# Patient Record
Sex: FEMALE | Race: WHITE | Employment: OTHER | URBAN - NONMETROPOLITAN AREA
[De-identification: names, ages, dates, MRNs, and addresses within clinical notes are randomized per-mention and may not be internally consistent; named-entity substitution may affect disease eponyms.]

---

## 2017-01-03 ENCOUNTER — OFFICE VISIT (OUTPATIENT)
Dept: FAMILY MEDICINE CLINIC | Age: 72
End: 2017-01-03
Payer: MEDICARE

## 2017-01-03 ENCOUNTER — HOSPITAL ENCOUNTER (OUTPATIENT)
Dept: GENERAL RADIOLOGY | Age: 72
Discharge: HOME | End: 2017-01-03
Payer: MEDICARE

## 2017-01-03 DIAGNOSIS — Z88.9 MULTIPLE ALLERGIES: Primary | ICD-10-CM

## 2017-01-03 DIAGNOSIS — M25.551 HIP PAIN, RIGHT: Primary | ICD-10-CM

## 2017-01-03 DIAGNOSIS — M25.551 HIP PAIN, RIGHT: ICD-10-CM

## 2017-01-03 PROCEDURE — 73502 X-RAY EXAM HIP UNI 2-3 VIEWS: CPT

## 2017-01-03 PROCEDURE — 95115 IMMUNOTHERAPY ONE INJECTION: CPT | Performed by: FAMILY MEDICINE

## 2017-01-19 RX ORDER — CARISOPRODOL 350 MG/1
350 TABLET ORAL 3 TIMES DAILY PRN
Qty: 30 TABLET | Refills: 2 | Status: SHIPPED | OUTPATIENT
Start: 2017-01-19 | End: 2017-04-04 | Stop reason: SDUPTHER

## 2017-01-25 ENCOUNTER — NURSE ONLY (OUTPATIENT)
Dept: FAMILY MEDICINE CLINIC | Age: 72
End: 2017-01-25
Payer: MEDICARE

## 2017-01-25 DIAGNOSIS — Z88.9 MULTIPLE ALLERGIES: Primary | ICD-10-CM

## 2017-01-25 DIAGNOSIS — J30.2 OTHER SEASONAL ALLERGIC RHINITIS: ICD-10-CM

## 2017-01-25 PROCEDURE — 95115 IMMUNOTHERAPY ONE INJECTION: CPT | Performed by: FAMILY MEDICINE

## 2017-02-20 ENCOUNTER — NURSE ONLY (OUTPATIENT)
Dept: FAMILY MEDICINE CLINIC | Age: 72
End: 2017-02-20
Payer: MEDICARE

## 2017-02-20 DIAGNOSIS — J30.2 OTHER SEASONAL ALLERGIC RHINITIS: Primary | ICD-10-CM

## 2017-02-20 PROCEDURE — 95115 IMMUNOTHERAPY ONE INJECTION: CPT | Performed by: FAMILY MEDICINE

## 2017-03-13 ENCOUNTER — NURSE ONLY (OUTPATIENT)
Dept: FAMILY MEDICINE CLINIC | Age: 72
End: 2017-03-13
Payer: MEDICARE

## 2017-03-13 DIAGNOSIS — E79.0 ELEVATED URIC ACID IN BLOOD: ICD-10-CM

## 2017-03-13 DIAGNOSIS — J30.2 OTHER SEASONAL ALLERGIC RHINITIS: ICD-10-CM

## 2017-03-13 DIAGNOSIS — Z88.9 MULTIPLE ALLERGIES: Primary | ICD-10-CM

## 2017-03-13 PROCEDURE — 95115 IMMUNOTHERAPY ONE INJECTION: CPT | Performed by: FAMILY MEDICINE

## 2017-03-13 RX ORDER — ALLOPURINOL 100 MG/1
100 TABLET ORAL DAILY
Qty: 90 TABLET | Refills: 3 | Status: SHIPPED | OUTPATIENT
Start: 2017-03-13 | End: 2018-04-20 | Stop reason: SDUPTHER

## 2017-03-21 RX ORDER — CELECOXIB 200 MG/1
CAPSULE ORAL
Qty: 30 CAPSULE | Refills: 5 | Status: SHIPPED | OUTPATIENT
Start: 2017-03-21 | End: 2017-07-21

## 2017-03-21 RX ORDER — GABAPENTIN 300 MG/1
CAPSULE ORAL
Qty: 180 CAPSULE | Refills: 3 | Status: SHIPPED | OUTPATIENT
Start: 2017-03-21 | End: 2017-08-02 | Stop reason: SDUPTHER

## 2017-03-29 ENCOUNTER — OFFICE VISIT (OUTPATIENT)
Dept: FAMILY MEDICINE CLINIC | Age: 72
End: 2017-03-29
Payer: MEDICARE

## 2017-03-29 ENCOUNTER — HOSPITAL ENCOUNTER (OUTPATIENT)
Dept: GENERAL RADIOLOGY | Age: 72
Discharge: HOME | End: 2017-03-29
Payer: MEDICARE

## 2017-03-29 VITALS
TEMPERATURE: 98.4 F | RESPIRATION RATE: 16 BRPM | HEIGHT: 65 IN | BODY MASS INDEX: 32.32 KG/M2 | HEART RATE: 77 BPM | WEIGHT: 194 LBS | OXYGEN SATURATION: 97 % | SYSTOLIC BLOOD PRESSURE: 142 MMHG | DIASTOLIC BLOOD PRESSURE: 94 MMHG

## 2017-03-29 DIAGNOSIS — M54.2 CHRONIC NECK PAIN: Primary | ICD-10-CM

## 2017-03-29 DIAGNOSIS — E78.5 HYPERLIPIDEMIA, UNSPECIFIED HYPERLIPIDEMIA TYPE: ICD-10-CM

## 2017-03-29 DIAGNOSIS — M54.2 CHRONIC NECK PAIN: ICD-10-CM

## 2017-03-29 DIAGNOSIS — G89.29 CHRONIC NECK PAIN: Primary | ICD-10-CM

## 2017-03-29 DIAGNOSIS — M25.512 ACUTE PAIN OF LEFT SHOULDER: ICD-10-CM

## 2017-03-29 DIAGNOSIS — G89.29 CHRONIC NECK PAIN: ICD-10-CM

## 2017-03-29 DIAGNOSIS — I10 ESSENTIAL HYPERTENSION: ICD-10-CM

## 2017-03-29 PROCEDURE — 72040 X-RAY EXAM NECK SPINE 2-3 VW: CPT

## 2017-03-29 PROCEDURE — G8427 DOCREV CUR MEDS BY ELIG CLIN: HCPCS | Performed by: FAMILY MEDICINE

## 2017-03-29 PROCEDURE — 96372 THER/PROPH/DIAG INJ SC/IM: CPT | Performed by: FAMILY MEDICINE

## 2017-03-29 PROCEDURE — G8399 PT W/DXA RESULTS DOCUMENT: HCPCS | Performed by: FAMILY MEDICINE

## 2017-03-29 PROCEDURE — 4040F PNEUMOC VAC/ADMIN/RCVD: CPT | Performed by: FAMILY MEDICINE

## 2017-03-29 PROCEDURE — G8419 CALC BMI OUT NRM PARAM NOF/U: HCPCS | Performed by: FAMILY MEDICINE

## 2017-03-29 PROCEDURE — 99214 OFFICE O/P EST MOD 30 MIN: CPT | Performed by: FAMILY MEDICINE

## 2017-03-29 PROCEDURE — 1036F TOBACCO NON-USER: CPT | Performed by: FAMILY MEDICINE

## 2017-03-29 PROCEDURE — 1123F ACP DISCUSS/DSCN MKR DOCD: CPT | Performed by: FAMILY MEDICINE

## 2017-03-29 PROCEDURE — 72125 CT NECK SPINE W/O DYE: CPT

## 2017-03-29 PROCEDURE — 3017F COLORECTAL CA SCREEN DOC REV: CPT | Performed by: FAMILY MEDICINE

## 2017-03-29 PROCEDURE — 1090F PRES/ABSN URINE INCON ASSESS: CPT | Performed by: FAMILY MEDICINE

## 2017-03-29 PROCEDURE — 73030 X-RAY EXAM OF SHOULDER: CPT

## 2017-03-29 PROCEDURE — 3014F SCREEN MAMMO DOC REV: CPT | Performed by: FAMILY MEDICINE

## 2017-03-29 PROCEDURE — G8484 FLU IMMUNIZE NO ADMIN: HCPCS | Performed by: FAMILY MEDICINE

## 2017-03-29 RX ORDER — OXYCODONE AND ACETAMINOPHEN 10; 325 MG/1; MG/1
1 TABLET ORAL EVERY 6 HOURS PRN
Qty: 30 TABLET | Refills: 0 | Status: SHIPPED | OUTPATIENT
Start: 2017-03-29 | End: 2017-04-18 | Stop reason: SDUPTHER

## 2017-03-29 RX ORDER — TRIAMCINOLONE ACETONIDE 40 MG/ML
40 INJECTION, SUSPENSION INTRA-ARTICULAR; INTRAMUSCULAR ONCE
Status: COMPLETED | OUTPATIENT
Start: 2017-03-29 | End: 2017-03-29

## 2017-03-29 RX ORDER — SIMVASTATIN 20 MG
20 TABLET ORAL EVERY EVENING
Qty: 90 TABLET | Refills: 3 | Status: SHIPPED | OUTPATIENT
Start: 2017-03-29 | End: 2018-02-12 | Stop reason: SDUPTHER

## 2017-03-29 RX ORDER — METHYLPREDNISOLONE 4 MG/1
TABLET ORAL
Qty: 1 KIT | Refills: 0 | Status: SHIPPED | OUTPATIENT
Start: 2017-03-29 | End: 2017-04-04

## 2017-03-29 RX ADMIN — TRIAMCINOLONE ACETONIDE 40 MG: 40 INJECTION, SUSPENSION INTRA-ARTICULAR; INTRAMUSCULAR at 10:25

## 2017-03-29 ASSESSMENT — ENCOUNTER SYMPTOMS
EYES NEGATIVE: 1
GASTROINTESTINAL NEGATIVE: 1
RESPIRATORY NEGATIVE: 1
TROUBLE SWALLOWING: 1
ALLERGIC/IMMUNOLOGIC NEGATIVE: 1

## 2017-03-31 ENCOUNTER — TELEPHONE (OUTPATIENT)
Dept: NEUROSURGERY | Age: 72
End: 2017-03-31

## 2017-04-04 ENCOUNTER — TELEPHONE (OUTPATIENT)
Dept: FAMILY MEDICINE CLINIC | Age: 72
End: 2017-04-04

## 2017-04-04 RX ORDER — CARISOPRODOL 350 MG/1
350 TABLET ORAL 3 TIMES DAILY PRN
Qty: 90 TABLET | Refills: 0 | OUTPATIENT
Start: 2017-04-04 | End: 2017-05-17 | Stop reason: SDUPTHER

## 2017-04-10 ENCOUNTER — OFFICE VISIT (OUTPATIENT)
Dept: NEUROSURGERY | Age: 72
End: 2017-04-10
Payer: MEDICARE

## 2017-04-10 ENCOUNTER — HOSPITAL ENCOUNTER (OUTPATIENT)
Dept: GENERAL RADIOLOGY | Age: 72
Discharge: HOME | End: 2017-04-10
Payer: MEDICARE

## 2017-04-10 VITALS
HEIGHT: 64 IN | WEIGHT: 190 LBS | HEART RATE: 80 BPM | BODY MASS INDEX: 32.44 KG/M2 | DIASTOLIC BLOOD PRESSURE: 85 MMHG | OXYGEN SATURATION: 95 % | SYSTOLIC BLOOD PRESSURE: 139 MMHG

## 2017-04-10 DIAGNOSIS — Z98.1 S/P CERVICAL SPINAL FUSION: ICD-10-CM

## 2017-04-10 DIAGNOSIS — M54.9 CHRONIC NECK AND BACK PAIN: ICD-10-CM

## 2017-04-10 DIAGNOSIS — M54.2 CHRONIC NECK AND BACK PAIN: ICD-10-CM

## 2017-04-10 DIAGNOSIS — I10 ESSENTIAL HYPERTENSION: ICD-10-CM

## 2017-04-10 DIAGNOSIS — M48.02 FORAMINAL STENOSIS OF CERVICAL REGION: ICD-10-CM

## 2017-04-10 DIAGNOSIS — G89.29 CHRONIC NECK AND BACK PAIN: ICD-10-CM

## 2017-04-10 DIAGNOSIS — M79.602 LEFT ARM PAIN: ICD-10-CM

## 2017-04-10 DIAGNOSIS — Z98.1 S/P CERVICAL SPINAL FUSION: Primary | ICD-10-CM

## 2017-04-10 LAB
CHOLESTEROL, TOTAL: 139 MG/DL (ref 160–199)
HDLC SERPL-MCNC: 47 MG/DL (ref 65–121)
LDL CHOLESTEROL CALCULATED: 53 MG/DL
TRIGL SERPL-MCNC: 195 MG/DL (ref 150–199)

## 2017-04-10 PROCEDURE — 3014F SCREEN MAMMO DOC REV: CPT | Performed by: NEUROLOGICAL SURGERY

## 2017-04-10 PROCEDURE — G8419 CALC BMI OUT NRM PARAM NOF/U: HCPCS | Performed by: NEUROLOGICAL SURGERY

## 2017-04-10 PROCEDURE — 72052 X-RAY EXAM NECK SPINE 6/>VWS: CPT

## 2017-04-10 PROCEDURE — 1090F PRES/ABSN URINE INCON ASSESS: CPT | Performed by: NEUROLOGICAL SURGERY

## 2017-04-10 PROCEDURE — 3017F COLORECTAL CA SCREEN DOC REV: CPT | Performed by: NEUROLOGICAL SURGERY

## 2017-04-10 PROCEDURE — 1036F TOBACCO NON-USER: CPT | Performed by: NEUROLOGICAL SURGERY

## 2017-04-10 PROCEDURE — G8399 PT W/DXA RESULTS DOCUMENT: HCPCS | Performed by: NEUROLOGICAL SURGERY

## 2017-04-10 PROCEDURE — 4040F PNEUMOC VAC/ADMIN/RCVD: CPT | Performed by: NEUROLOGICAL SURGERY

## 2017-04-10 PROCEDURE — 1123F ACP DISCUSS/DSCN MKR DOCD: CPT | Performed by: NEUROLOGICAL SURGERY

## 2017-04-10 PROCEDURE — G8427 DOCREV CUR MEDS BY ELIG CLIN: HCPCS | Performed by: NEUROLOGICAL SURGERY

## 2017-04-10 PROCEDURE — 99204 OFFICE O/P NEW MOD 45 MIN: CPT | Performed by: NEUROLOGICAL SURGERY

## 2017-04-10 ASSESSMENT — ENCOUNTER SYMPTOMS
SPUTUM PRODUCTION: 0
BACK PAIN: 1
EYES NEGATIVE: 1
HEARTBURN: 1
STRIDOR: 0
SORE THROAT: 1
ABDOMINAL PAIN: 0
NAUSEA: 0
COUGH: 1
CONSTIPATION: 1
WHEEZING: 1
DIARRHEA: 0
SHORTNESS OF BREATH: 0
VOMITING: 0
HEMOPTYSIS: 0

## 2017-04-18 DIAGNOSIS — M54.2 CHRONIC NECK PAIN: ICD-10-CM

## 2017-04-18 DIAGNOSIS — G89.29 CHRONIC NECK PAIN: ICD-10-CM

## 2017-04-18 RX ORDER — OXYCODONE AND ACETAMINOPHEN 10; 325 MG/1; MG/1
1 TABLET ORAL EVERY 6 HOURS PRN
Qty: 30 TABLET | Refills: 0 | Status: SHIPPED | OUTPATIENT
Start: 2017-04-18 | End: 2017-05-17 | Stop reason: SDUPTHER

## 2017-04-19 ENCOUNTER — HOSPITAL ENCOUNTER (OUTPATIENT)
Dept: MRI IMAGING | Age: 72
Discharge: HOME | End: 2017-04-19
Payer: MEDICARE

## 2017-04-19 DIAGNOSIS — M79.602 LEFT ARM PAIN: ICD-10-CM

## 2017-04-19 DIAGNOSIS — M48.02 FORAMINAL STENOSIS OF CERVICAL REGION: ICD-10-CM

## 2017-04-19 DIAGNOSIS — F41.9 ANXIETY: ICD-10-CM

## 2017-04-19 PROCEDURE — 72141 MRI NECK SPINE W/O DYE: CPT

## 2017-04-19 RX ORDER — LORAZEPAM 1 MG/1
1 TABLET ORAL 3 TIMES DAILY PRN
Qty: 90 TABLET | Refills: 2 | Status: SHIPPED | OUTPATIENT
Start: 2017-04-19 | End: 2017-07-25 | Stop reason: SDUPTHER

## 2017-04-21 DIAGNOSIS — E03.8 OTHER SPECIFIED HYPOTHYROIDISM: Primary | ICD-10-CM

## 2017-04-21 DIAGNOSIS — Z12.31 ENCOUNTER FOR SCREENING MAMMOGRAM FOR MALIGNANT NEOPLASM OF BREAST: ICD-10-CM

## 2017-04-21 DIAGNOSIS — M85.80 OSTEOPENIA: ICD-10-CM

## 2017-04-21 DIAGNOSIS — Z78.0 POSTMENOPAUSAL: ICD-10-CM

## 2017-04-24 DIAGNOSIS — I10 ESSENTIAL HYPERTENSION: ICD-10-CM

## 2017-04-24 LAB
ALBUMIN SERPL-MCNC: 4.1 G/DL (ref 3.5–5.2)
ALP BLD-CCNC: 98 U/L (ref 35–104)
ALT SERPL-CCNC: 16 U/L (ref 5–33)
ANION GAP SERPL CALCULATED.3IONS-SCNC: 18 MMOL/L (ref 7–19)
AST SERPL-CCNC: 23 U/L (ref 5–32)
BACTERIA: ABNORMAL /HPF
BILIRUB SERPL-MCNC: <0.2 MG/DL (ref 0.2–1.2)
BILIRUBIN URINE: NEGATIVE
BLOOD, URINE: NEGATIVE
BUN BLDV-MCNC: 20 MG/DL (ref 8–23)
CALCIUM SERPL-MCNC: 9.6 MG/DL (ref 8.8–10.2)
CHLORIDE BLD-SCNC: 101 MMOL/L (ref 98–111)
CLARITY: CLEAR
CO2: 23 MMOL/L (ref 22–29)
COLOR: YELLOW
CREAT SERPL-MCNC: 1.1 MG/DL (ref 0.5–0.9)
EPITHELIAL CELLS, UA: 1 /HPF (ref 0–5)
GFR NON-AFRICAN AMERICAN: 49
GLOBULIN: 3.1 G/DL
GLUCOSE BLD-MCNC: 104 MG/DL (ref 74–109)
GLUCOSE URINE: NEGATIVE MG/DL
HCT VFR BLD CALC: 40.3 % (ref 37–47)
HEMOGLOBIN: 13 G/DL (ref 12–16)
HYALINE CASTS: 1 /HPF (ref 0–8)
KETONES, URINE: NEGATIVE MG/DL
LEUKOCYTE ESTERASE, URINE: ABNORMAL
MCH RBC QN AUTO: 29 PG (ref 27–31)
MCHC RBC AUTO-ENTMCNC: 32.3 G/DL (ref 33–37)
MCV RBC AUTO: 90 FL (ref 81–99)
NITRITE, URINE: POSITIVE
PDW BLD-RTO: 13.9 % (ref 11.5–14.5)
PH UA: 5.5
PLATELET # BLD: 268 K/UL (ref 130–400)
PMV BLD AUTO: 10.4 FL (ref 7.4–10.4)
POTASSIUM SERPL-SCNC: 4.4 MMOL/L (ref 3.5–5)
PROTEIN UA: NEGATIVE MG/DL
RBC # BLD: 4.48 M/UL (ref 4.2–5.4)
RBC UA: 2 /HPF (ref 0–4)
SODIUM BLD-SCNC: 142 MMOL/L (ref 136–145)
SPECIFIC GRAVITY UA: 1.01
TOTAL PROTEIN: 7.2 G/DL (ref 6.6–8.7)
UROBILINOGEN, URINE: 0.2 E.U./DL
WBC # BLD: 6.3 K/UL (ref 4.8–10.8)
WBC UA: 11 /HPF (ref 0–5)

## 2017-04-26 LAB
ORGANISM: ABNORMAL
URINE CULTURE, ROUTINE: ABNORMAL
URINE CULTURE, ROUTINE: ABNORMAL

## 2017-04-26 RX ORDER — AMLODIPINE AND OLMESARTAN MEDOXOMIL 10; 40 MG/1; MG/1
TABLET ORAL
Qty: 30 TABLET | Refills: 5 | Status: SHIPPED | OUTPATIENT
Start: 2017-04-26 | End: 2017-10-16 | Stop reason: SDUPTHER

## 2017-04-26 RX ORDER — FLUOXETINE HYDROCHLORIDE 40 MG/1
CAPSULE ORAL
Qty: 30 CAPSULE | Refills: 5 | Status: SHIPPED | OUTPATIENT
Start: 2017-04-26 | End: 2017-10-16 | Stop reason: SDUPTHER

## 2017-04-27 ENCOUNTER — TELEPHONE (OUTPATIENT)
Dept: FAMILY MEDICINE CLINIC | Age: 72
End: 2017-04-27

## 2017-04-27 DIAGNOSIS — M25.561 RIGHT KNEE PAIN, UNSPECIFIED CHRONICITY: Primary | ICD-10-CM

## 2017-05-11 ENCOUNTER — OFFICE VISIT (OUTPATIENT)
Dept: NEUROSURGERY | Age: 72
End: 2017-05-11
Payer: MEDICARE

## 2017-05-11 VITALS
OXYGEN SATURATION: 93 % | WEIGHT: 190 LBS | HEIGHT: 65 IN | HEART RATE: 73 BPM | BODY MASS INDEX: 31.65 KG/M2 | DIASTOLIC BLOOD PRESSURE: 95 MMHG | SYSTOLIC BLOOD PRESSURE: 172 MMHG

## 2017-05-11 DIAGNOSIS — M54.2 CHRONIC NECK AND BACK PAIN: ICD-10-CM

## 2017-05-11 DIAGNOSIS — M48.02 FORAMINAL STENOSIS OF CERVICAL REGION: ICD-10-CM

## 2017-05-11 DIAGNOSIS — G89.29 CHRONIC NECK AND BACK PAIN: ICD-10-CM

## 2017-05-11 DIAGNOSIS — M50.30 DDD (DEGENERATIVE DISC DISEASE), CERVICAL: ICD-10-CM

## 2017-05-11 DIAGNOSIS — Z98.1 S/P CERVICAL SPINAL FUSION: Primary | ICD-10-CM

## 2017-05-11 DIAGNOSIS — M79.602 LEFT ARM PAIN: ICD-10-CM

## 2017-05-11 DIAGNOSIS — M54.9 CHRONIC NECK AND BACK PAIN: ICD-10-CM

## 2017-05-11 PROCEDURE — G8399 PT W/DXA RESULTS DOCUMENT: HCPCS | Performed by: NEUROLOGICAL SURGERY

## 2017-05-11 PROCEDURE — 1036F TOBACCO NON-USER: CPT | Performed by: NEUROLOGICAL SURGERY

## 2017-05-11 PROCEDURE — 3017F COLORECTAL CA SCREEN DOC REV: CPT | Performed by: NEUROLOGICAL SURGERY

## 2017-05-11 PROCEDURE — 1123F ACP DISCUSS/DSCN MKR DOCD: CPT | Performed by: NEUROLOGICAL SURGERY

## 2017-05-11 PROCEDURE — G8427 DOCREV CUR MEDS BY ELIG CLIN: HCPCS | Performed by: NEUROLOGICAL SURGERY

## 2017-05-11 PROCEDURE — 4040F PNEUMOC VAC/ADMIN/RCVD: CPT | Performed by: NEUROLOGICAL SURGERY

## 2017-05-11 PROCEDURE — 1090F PRES/ABSN URINE INCON ASSESS: CPT | Performed by: NEUROLOGICAL SURGERY

## 2017-05-11 PROCEDURE — 99213 OFFICE O/P EST LOW 20 MIN: CPT | Performed by: NEUROLOGICAL SURGERY

## 2017-05-11 PROCEDURE — 3014F SCREEN MAMMO DOC REV: CPT | Performed by: NEUROLOGICAL SURGERY

## 2017-05-11 PROCEDURE — G8417 CALC BMI ABV UP PARAM F/U: HCPCS | Performed by: NEUROLOGICAL SURGERY

## 2017-05-17 DIAGNOSIS — G89.29 CHRONIC NECK PAIN: ICD-10-CM

## 2017-05-17 DIAGNOSIS — G47.00 INSOMNIA, UNSPECIFIED TYPE: ICD-10-CM

## 2017-05-17 DIAGNOSIS — M54.2 CHRONIC NECK PAIN: ICD-10-CM

## 2017-05-17 RX ORDER — CARISOPRODOL 350 MG/1
350 TABLET ORAL 3 TIMES DAILY PRN
Qty: 90 TABLET | Refills: 1 | Status: SHIPPED | OUTPATIENT
Start: 2017-05-17 | End: 2017-08-02 | Stop reason: SDUPTHER

## 2017-05-17 RX ORDER — BUPROPION HYDROCHLORIDE 150 MG/1
TABLET, EXTENDED RELEASE ORAL
Qty: 30 TABLET | Refills: 5 | Status: SHIPPED | OUTPATIENT
Start: 2017-05-17 | End: 2017-10-20 | Stop reason: SDUPTHER

## 2017-05-17 RX ORDER — OXYCODONE AND ACETAMINOPHEN 10; 325 MG/1; MG/1
1 TABLET ORAL EVERY 6 HOURS PRN
Qty: 30 TABLET | Refills: 0 | Status: SHIPPED | OUTPATIENT
Start: 2017-05-17 | End: 2017-06-08 | Stop reason: SDUPTHER

## 2017-05-17 RX ORDER — DOXEPIN HYDROCHLORIDE 50 MG/1
100 CAPSULE ORAL NIGHTLY
Qty: 180 CAPSULE | Refills: 3 | Status: SHIPPED | OUTPATIENT
Start: 2017-05-17 | End: 2018-06-06 | Stop reason: SDUPTHER

## 2017-05-18 RX ORDER — LEVOTHYROXINE SODIUM 0.1 MG/1
TABLET ORAL
Qty: 30 TABLET | Refills: 5 | Status: SHIPPED | OUTPATIENT
Start: 2017-05-18 | End: 2017-11-15 | Stop reason: SDUPTHER

## 2017-05-30 ENCOUNTER — HOSPITAL ENCOUNTER (OUTPATIENT)
Dept: GENERAL RADIOLOGY | Age: 72
Discharge: HOME | End: 2017-05-30
Payer: MEDICARE

## 2017-05-30 ENCOUNTER — OFFICE VISIT (OUTPATIENT)
Dept: FAMILY MEDICINE CLINIC | Age: 72
End: 2017-05-30
Payer: MEDICARE

## 2017-05-30 VITALS
TEMPERATURE: 98.1 F | HEART RATE: 82 BPM | RESPIRATION RATE: 16 BRPM | OXYGEN SATURATION: 99 % | BODY MASS INDEX: 31.65 KG/M2 | HEIGHT: 65 IN | WEIGHT: 190 LBS | DIASTOLIC BLOOD PRESSURE: 82 MMHG | SYSTOLIC BLOOD PRESSURE: 138 MMHG

## 2017-05-30 DIAGNOSIS — T14.8XXA BRUISE: ICD-10-CM

## 2017-05-30 DIAGNOSIS — Z01.818 PRE-OP TESTING: ICD-10-CM

## 2017-05-30 DIAGNOSIS — Z01.818 PRE-OP TESTING: Primary | ICD-10-CM

## 2017-05-30 LAB
ANION GAP SERPL CALCULATED.3IONS-SCNC: 16 MMOL/L (ref 7–19)
BACTERIA: NEGATIVE /HPF
BILIRUBIN URINE: NEGATIVE
BLOOD, URINE: NEGATIVE
BUN BLDV-MCNC: 20 MG/DL (ref 8–23)
CALCIUM SERPL-MCNC: 9.4 MG/DL (ref 8.8–10.2)
CHLORIDE BLD-SCNC: 97 MMOL/L (ref 98–111)
CLARITY: CLEAR
CO2: 25 MMOL/L (ref 22–29)
COLOR: YELLOW
CREAT SERPL-MCNC: 1.1 MG/DL (ref 0.5–0.9)
EPITHELIAL CELLS, UA: 1 /HPF (ref 0–5)
GFR NON-AFRICAN AMERICAN: 49
GLUCOSE BLD-MCNC: 121 MG/DL (ref 74–109)
GLUCOSE URINE: NEGATIVE MG/DL
HCT VFR BLD CALC: 41 % (ref 37–47)
HEMOGLOBIN: 13.2 G/DL (ref 12–16)
HYALINE CASTS: 0 /HPF (ref 0–8)
INR BLD: 0.97 (ref 0.88–1.18)
KETONES, URINE: NEGATIVE MG/DL
LEUKOCYTE ESTERASE, URINE: ABNORMAL
MCH RBC QN AUTO: 29 PG (ref 27–31)
MCHC RBC AUTO-ENTMCNC: 32.2 G/DL (ref 33–37)
MCV RBC AUTO: 90.1 FL (ref 81–99)
NITRITE, URINE: NEGATIVE
PDW BLD-RTO: 13.8 % (ref 11.5–14.5)
PH UA: 6.5
PLATELET # BLD: 333 K/UL (ref 130–400)
PMV BLD AUTO: 10.3 FL (ref 7.4–10.4)
POTASSIUM SERPL-SCNC: 4.5 MMOL/L (ref 3.5–5)
PROTEIN UA: NEGATIVE MG/DL
PROTHROMBIN TIME: 12.8 SEC (ref 12–14.6)
RBC # BLD: 4.55 M/UL (ref 4.2–5.4)
RBC UA: 1 /HPF (ref 0–4)
SODIUM BLD-SCNC: 138 MMOL/L (ref 136–145)
SPECIFIC GRAVITY UA: 1.01
UROBILINOGEN, URINE: 0.2 E.U./DL
WBC # BLD: 7 K/UL (ref 4.8–10.8)
WBC UA: 4 /HPF (ref 0–5)

## 2017-05-30 PROCEDURE — 93000 ELECTROCARDIOGRAM COMPLETE: CPT | Performed by: FAMILY MEDICINE

## 2017-05-30 PROCEDURE — 4040F PNEUMOC VAC/ADMIN/RCVD: CPT | Performed by: FAMILY MEDICINE

## 2017-05-30 PROCEDURE — G8427 DOCREV CUR MEDS BY ELIG CLIN: HCPCS | Performed by: FAMILY MEDICINE

## 2017-05-30 PROCEDURE — 1090F PRES/ABSN URINE INCON ASSESS: CPT | Performed by: FAMILY MEDICINE

## 2017-05-30 PROCEDURE — 3017F COLORECTAL CA SCREEN DOC REV: CPT | Performed by: FAMILY MEDICINE

## 2017-05-30 PROCEDURE — G8417 CALC BMI ABV UP PARAM F/U: HCPCS | Performed by: FAMILY MEDICINE

## 2017-05-30 PROCEDURE — 71020 XR CHEST STANDARD TWO VW: CPT

## 2017-05-30 PROCEDURE — 1036F TOBACCO NON-USER: CPT | Performed by: FAMILY MEDICINE

## 2017-05-30 PROCEDURE — G8399 PT W/DXA RESULTS DOCUMENT: HCPCS | Performed by: FAMILY MEDICINE

## 2017-05-30 PROCEDURE — 3014F SCREEN MAMMO DOC REV: CPT | Performed by: FAMILY MEDICINE

## 2017-05-30 PROCEDURE — 1123F ACP DISCUSS/DSCN MKR DOCD: CPT | Performed by: FAMILY MEDICINE

## 2017-05-30 PROCEDURE — 99214 OFFICE O/P EST MOD 30 MIN: CPT | Performed by: FAMILY MEDICINE

## 2017-05-30 ASSESSMENT — ENCOUNTER SYMPTOMS
ALLERGIC/IMMUNOLOGIC NEGATIVE: 1
GASTROINTESTINAL NEGATIVE: 1
RESPIRATORY NEGATIVE: 1
EYES NEGATIVE: 1

## 2017-06-01 LAB
ORGANISM: ABNORMAL
URINE CULTURE, ROUTINE: ABNORMAL
URINE CULTURE, ROUTINE: ABNORMAL

## 2017-06-08 DIAGNOSIS — G89.29 CHRONIC NECK PAIN: ICD-10-CM

## 2017-06-08 DIAGNOSIS — M54.2 CHRONIC NECK PAIN: ICD-10-CM

## 2017-06-08 RX ORDER — OXYCODONE AND ACETAMINOPHEN 10; 325 MG/1; MG/1
1 TABLET ORAL EVERY 6 HOURS PRN
Qty: 30 TABLET | Refills: 0 | Status: SHIPPED | OUTPATIENT
Start: 2017-06-08 | End: 2017-07-10 | Stop reason: SDUPTHER

## 2017-06-15 DIAGNOSIS — N18.4 CKD (CHRONIC KIDNEY DISEASE) STAGE 4, GFR 15-29 ML/MIN (HCC): ICD-10-CM

## 2017-06-15 DIAGNOSIS — N18.4 CKD (CHRONIC KIDNEY DISEASE) STAGE 4, GFR 15-29 ML/MIN (HCC): Primary | ICD-10-CM

## 2017-06-15 LAB
ALBUMIN SERPL-MCNC: 3.5 G/DL (ref 3.5–5.2)
ALP BLD-CCNC: 87 U/L (ref 35–104)
ALT SERPL-CCNC: 11 U/L (ref 5–33)
ANION GAP SERPL CALCULATED.3IONS-SCNC: 18 MMOL/L (ref 7–19)
AST SERPL-CCNC: 28 U/L (ref 5–32)
BILIRUB SERPL-MCNC: 0.3 MG/DL (ref 0.2–1.2)
BUN BLDV-MCNC: 15 MG/DL (ref 8–23)
CALCIUM SERPL-MCNC: 9 MG/DL (ref 8.8–10.2)
CHLORIDE BLD-SCNC: 97 MMOL/L (ref 98–111)
CO2: 23 MMOL/L (ref 22–29)
CREAT SERPL-MCNC: 1.1 MG/DL (ref 0.5–0.9)
GFR NON-AFRICAN AMERICAN: 49
GLUCOSE BLD-MCNC: 111 MG/DL (ref 74–109)
HCT VFR BLD CALC: 34.8 % (ref 37–47)
HEMOGLOBIN: 11.3 G/DL (ref 12–16)
MCH RBC QN AUTO: 29.4 PG (ref 27–31)
MCHC RBC AUTO-ENTMCNC: 32.5 G/DL (ref 33–37)
MCV RBC AUTO: 90.4 FL (ref 81–99)
PDW BLD-RTO: 13.6 % (ref 11.5–14.5)
PLATELET # BLD: 240 K/UL (ref 130–400)
PMV BLD AUTO: 10.7 FL (ref 9.4–12.3)
POTASSIUM SERPL-SCNC: 4.3 MMOL/L (ref 3.5–5)
RBC # BLD: 3.85 M/UL (ref 4.2–5.4)
SODIUM BLD-SCNC: 138 MMOL/L (ref 136–145)
TOTAL PROTEIN: 7 G/DL (ref 6.6–8.7)
WBC # BLD: 9.3 K/UL (ref 4.8–10.8)

## 2017-07-01 RX ORDER — CARVEDILOL 12.5 MG/1
TABLET ORAL
Qty: 180 TABLET | Refills: 0 | Status: SHIPPED | OUTPATIENT
Start: 2017-07-01 | End: 2017-10-18 | Stop reason: SDUPTHER

## 2017-07-06 ENCOUNTER — NURSE ONLY (OUTPATIENT)
Dept: FAMILY MEDICINE CLINIC | Age: 72
End: 2017-07-06
Payer: MEDICARE

## 2017-07-06 DIAGNOSIS — Z88.9 MULTIPLE ALLERGIES: Primary | ICD-10-CM

## 2017-07-06 DIAGNOSIS — J30.2 OTHER SEASONAL ALLERGIC RHINITIS: ICD-10-CM

## 2017-07-06 PROCEDURE — 95115 IMMUNOTHERAPY ONE INJECTION: CPT | Performed by: FAMILY MEDICINE

## 2017-07-10 DIAGNOSIS — I10 ESSENTIAL HYPERTENSION: ICD-10-CM

## 2017-07-10 DIAGNOSIS — G89.29 CHRONIC NECK PAIN: ICD-10-CM

## 2017-07-10 DIAGNOSIS — M54.2 CHRONIC NECK PAIN: ICD-10-CM

## 2017-07-10 RX ORDER — OXYCODONE AND ACETAMINOPHEN 10; 325 MG/1; MG/1
1 TABLET ORAL DAILY
Qty: 30 TABLET | Refills: 0 | Status: SHIPPED | OUTPATIENT
Start: 2017-07-10 | End: 2018-09-25 | Stop reason: ALTCHOICE

## 2017-07-10 RX ORDER — CHLORTHALIDONE 25 MG/1
TABLET ORAL
Qty: 30 TABLET | Refills: 5 | Status: SHIPPED | OUTPATIENT
Start: 2017-07-10 | End: 2018-04-23 | Stop reason: ALTCHOICE

## 2017-07-20 ENCOUNTER — APPOINTMENT (OUTPATIENT)
Dept: GENERAL RADIOLOGY | Age: 72
End: 2017-07-20
Payer: MEDICARE

## 2017-07-20 ENCOUNTER — APPOINTMENT (OUTPATIENT)
Dept: CT IMAGING | Age: 72
End: 2017-07-20
Payer: MEDICARE

## 2017-07-20 ENCOUNTER — HOSPITAL ENCOUNTER (EMERGENCY)
Age: 72
Discharge: HOME | End: 2017-07-20
Attending: EMERGENCY MEDICINE
Payer: MEDICARE

## 2017-07-20 VITALS
HEIGHT: 65 IN | TEMPERATURE: 98.4 F | SYSTOLIC BLOOD PRESSURE: 143 MMHG | RESPIRATION RATE: 20 BRPM | HEART RATE: 72 BPM | DIASTOLIC BLOOD PRESSURE: 71 MMHG | WEIGHT: 186 LBS | OXYGEN SATURATION: 94 % | BODY MASS INDEX: 30.99 KG/M2

## 2017-07-20 DIAGNOSIS — R47.9 SPEECH DISTURBANCE, UNSPECIFIED TYPE: ICD-10-CM

## 2017-07-20 DIAGNOSIS — R79.89 ELEVATED SERUM CREATININE: Primary | ICD-10-CM

## 2017-07-20 DIAGNOSIS — N30.00 ACUTE CYSTITIS WITHOUT HEMATURIA: ICD-10-CM

## 2017-07-20 LAB
ALBUMIN SERPL-MCNC: 3.8 G/DL (ref 3.5–5.2)
ALP BLD-CCNC: 109 U/L (ref 35–104)
ALT SERPL-CCNC: 12 U/L (ref 5–33)
ANION GAP SERPL CALCULATED.3IONS-SCNC: 14 MMOL/L (ref 7–19)
APTT: 32 SEC (ref 26–36.2)
AST SERPL-CCNC: 18 U/L (ref 5–32)
BACTERIA: ABNORMAL /HPF
BASOPHILS ABSOLUTE: 0.1 K/UL (ref 0–0.2)
BASOPHILS RELATIVE PERCENT: 0.6 % (ref 0–1)
BILIRUB SERPL-MCNC: <0.2 MG/DL (ref 0.2–1.2)
BILIRUBIN URINE: NEGATIVE
BLOOD, URINE: NEGATIVE
BUN BLDV-MCNC: 31 MG/DL (ref 8–23)
CALCIUM SERPL-MCNC: 9.1 MG/DL (ref 8.8–10.2)
CASTS: ABNORMAL /LPF
CHLORIDE BLD-SCNC: 99 MMOL/L (ref 98–111)
CLARITY: ABNORMAL
CO2: 24 MMOL/L (ref 22–29)
COLOR: YELLOW
CREAT SERPL-MCNC: 1.7 MG/DL (ref 0.5–0.9)
EOSINOPHILS ABSOLUTE: 0.2 K/UL (ref 0–0.6)
EOSINOPHILS RELATIVE PERCENT: 2.3 % (ref 0–5)
EPITHELIAL CELLS, UA: ABNORMAL /HPF
GFR NON-AFRICAN AMERICAN: 29
GLUCOSE BLD-MCNC: 103 MG/DL (ref 74–109)
GLUCOSE URINE: NEGATIVE MG/DL
HCT VFR BLD CALC: 33.6 % (ref 37–47)
HEMOGLOBIN: 11 G/DL (ref 12–16)
INR BLD: 1.01 (ref 0.88–1.18)
KETONES, URINE: NEGATIVE MG/DL
LEUKOCYTE ESTERASE, URINE: ABNORMAL
LYMPHOCYTES ABSOLUTE: 2.1 K/UL (ref 1.1–4.5)
LYMPHOCYTES RELATIVE PERCENT: 27.3 % (ref 20–40)
MCH RBC QN AUTO: 29.3 PG (ref 27–31)
MCHC RBC AUTO-ENTMCNC: 32.7 G/DL (ref 33–37)
MCV RBC AUTO: 89.4 FL (ref 81–99)
MONOCYTES ABSOLUTE: 1.4 K/UL (ref 0–0.9)
MONOCYTES RELATIVE PERCENT: 17.6 % (ref 0–10)
NEUTROPHILS ABSOLUTE: 4 K/UL (ref 1.5–7.5)
NEUTROPHILS RELATIVE PERCENT: 51.8 % (ref 50–65)
NITRITE, URINE: NEGATIVE
PDW BLD-RTO: 13.2 % (ref 11.5–14.5)
PERFORMED ON: NORMAL
PH UA: 5.5
PLATELET # BLD: 226 K/UL (ref 130–400)
PMV BLD AUTO: 9.3 FL (ref 9.4–12.3)
POC TROPONIN I: 0 NG/ML (ref 0–0.08)
POTASSIUM SERPL-SCNC: 4.1 MMOL/L (ref 3.5–5)
PROTEIN UA: NEGATIVE MG/DL
PROTHROMBIN TIME: 13.2 SEC (ref 12–14.6)
RBC # BLD: 3.76 M/UL (ref 4.2–5.4)
RENAL EPITHELIAL, UA: ABNORMAL /HPF
SODIUM BLD-SCNC: 137 MMOL/L (ref 136–145)
SPECIFIC GRAVITY UA: 1.02
TOTAL PROTEIN: 6.8 G/DL (ref 6.6–8.7)
TSH SERPL DL<=0.05 MIU/L-ACNC: 0.78 UIU/ML (ref 0.27–4.2)
UROBILINOGEN, URINE: 0.2 E.U./DL
WBC # BLD: 7.8 K/UL (ref 4.8–10.8)
WBC UA: ABNORMAL /HPF (ref 0–5)
YEAST: ABNORMAL /HPF

## 2017-07-20 PROCEDURE — 99284 EMERGENCY DEPT VISIT MOD MDM: CPT | Performed by: EMERGENCY MEDICINE

## 2017-07-20 PROCEDURE — 36415 COLL VENOUS BLD VENIPUNCTURE: CPT

## 2017-07-20 PROCEDURE — 84484 ASSAY OF TROPONIN QUANT: CPT

## 2017-07-20 PROCEDURE — 84443 ASSAY THYROID STIM HORMONE: CPT

## 2017-07-20 PROCEDURE — 81001 URINALYSIS AUTO W/SCOPE: CPT

## 2017-07-20 PROCEDURE — 93005 ELECTROCARDIOGRAM TRACING: CPT

## 2017-07-20 PROCEDURE — 71010 XR CHEST PORTABLE: CPT

## 2017-07-20 PROCEDURE — 85730 THROMBOPLASTIN TIME PARTIAL: CPT

## 2017-07-20 PROCEDURE — 85610 PROTHROMBIN TIME: CPT

## 2017-07-20 PROCEDURE — 87086 URINE CULTURE/COLONY COUNT: CPT

## 2017-07-20 PROCEDURE — 99285 EMERGENCY DEPT VISIT HI MDM: CPT

## 2017-07-20 PROCEDURE — 2580000003 HC RX 258: Performed by: EMERGENCY MEDICINE

## 2017-07-20 PROCEDURE — 70450 CT HEAD/BRAIN W/O DYE: CPT

## 2017-07-20 PROCEDURE — 85025 COMPLETE CBC W/AUTO DIFF WBC: CPT

## 2017-07-20 PROCEDURE — 80053 COMPREHEN METABOLIC PANEL: CPT

## 2017-07-20 RX ORDER — CIPROFLOXACIN 500 MG/1
500 TABLET, FILM COATED ORAL 2 TIMES DAILY
Qty: 14 TABLET | Refills: 0 | Status: SHIPPED | OUTPATIENT
Start: 2017-07-20 | End: 2017-07-27

## 2017-07-20 RX ORDER — 0.9 % SODIUM CHLORIDE 0.9 %
500 INTRAVENOUS SOLUTION INTRAVENOUS ONCE
Status: COMPLETED | OUTPATIENT
Start: 2017-07-20 | End: 2017-07-20

## 2017-07-20 RX ADMIN — SODIUM CHLORIDE 500 ML: 9 INJECTION, SOLUTION INTRAVENOUS at 16:19

## 2017-07-20 ASSESSMENT — ENCOUNTER SYMPTOMS
BACK PAIN: 0
DIARRHEA: 0
RHINORRHEA: 0
SORE THROAT: 0
SHORTNESS OF BREATH: 0
ABDOMINAL PAIN: 0
NAUSEA: 0
VOMITING: 0

## 2017-07-21 ENCOUNTER — OFFICE VISIT (OUTPATIENT)
Dept: NEUROLOGY | Age: 72
End: 2017-07-21
Payer: MEDICARE

## 2017-07-21 ENCOUNTER — CARE COORDINATION (OUTPATIENT)
Dept: PRIMARY CARE CLINIC | Age: 72
End: 2017-07-21

## 2017-07-21 VITALS
DIASTOLIC BLOOD PRESSURE: 88 MMHG | BODY MASS INDEX: 30.33 KG/M2 | WEIGHT: 182.25 LBS | OXYGEN SATURATION: 94 % | SYSTOLIC BLOOD PRESSURE: 164 MMHG | HEART RATE: 76 BPM

## 2017-07-21 DIAGNOSIS — I34.1 MVP (MITRAL VALVE PROLAPSE): Primary | ICD-10-CM

## 2017-07-21 DIAGNOSIS — R26.9 GAIT ABNORMALITY: ICD-10-CM

## 2017-07-21 DIAGNOSIS — R25.1 TREMOR: ICD-10-CM

## 2017-07-21 DIAGNOSIS — R47.1 DYSARTHRIA: ICD-10-CM

## 2017-07-21 PROCEDURE — 1123F ACP DISCUSS/DSCN MKR DOCD: CPT | Performed by: PSYCHIATRY & NEUROLOGY

## 2017-07-21 PROCEDURE — G8427 DOCREV CUR MEDS BY ELIG CLIN: HCPCS | Performed by: PSYCHIATRY & NEUROLOGY

## 2017-07-21 PROCEDURE — G8399 PT W/DXA RESULTS DOCUMENT: HCPCS | Performed by: PSYCHIATRY & NEUROLOGY

## 2017-07-21 PROCEDURE — 99204 OFFICE O/P NEW MOD 45 MIN: CPT | Performed by: PSYCHIATRY & NEUROLOGY

## 2017-07-21 PROCEDURE — 3017F COLORECTAL CA SCREEN DOC REV: CPT | Performed by: PSYCHIATRY & NEUROLOGY

## 2017-07-21 PROCEDURE — 1090F PRES/ABSN URINE INCON ASSESS: CPT | Performed by: PSYCHIATRY & NEUROLOGY

## 2017-07-21 PROCEDURE — 1036F TOBACCO NON-USER: CPT | Performed by: PSYCHIATRY & NEUROLOGY

## 2017-07-21 PROCEDURE — 4040F PNEUMOC VAC/ADMIN/RCVD: CPT | Performed by: PSYCHIATRY & NEUROLOGY

## 2017-07-21 PROCEDURE — G8417 CALC BMI ABV UP PARAM F/U: HCPCS | Performed by: PSYCHIATRY & NEUROLOGY

## 2017-07-21 PROCEDURE — 3014F SCREEN MAMMO DOC REV: CPT | Performed by: PSYCHIATRY & NEUROLOGY

## 2017-07-22 LAB — URINE CULTURE, ROUTINE: NORMAL

## 2017-07-25 DIAGNOSIS — F41.9 ANXIETY: ICD-10-CM

## 2017-07-25 LAB
EKG P AXIS: 53 DEGREES
EKG P-R INTERVAL: 180 MS
EKG Q-T INTERVAL: 404 MS
EKG QRS DURATION: 94 MS
EKG QTC CALCULATION (BAZETT): 426 MS
EKG T AXIS: 41 DEGREES

## 2017-07-25 RX ORDER — LORAZEPAM 1 MG/1
1 TABLET ORAL 3 TIMES DAILY PRN
Qty: 90 TABLET | Refills: 2 | Status: SHIPPED | OUTPATIENT
Start: 2017-07-25 | End: 2017-11-15 | Stop reason: SDUPTHER

## 2017-07-26 ENCOUNTER — TELEPHONE (OUTPATIENT)
Dept: NEUROLOGY | Age: 72
End: 2017-07-26

## 2017-07-31 ENCOUNTER — OFFICE VISIT (OUTPATIENT)
Dept: FAMILY MEDICINE CLINIC | Age: 72
End: 2017-07-31
Payer: MEDICARE

## 2017-07-31 VITALS
DIASTOLIC BLOOD PRESSURE: 68 MMHG | OXYGEN SATURATION: 98 % | SYSTOLIC BLOOD PRESSURE: 128 MMHG | WEIGHT: 186 LBS | TEMPERATURE: 98.3 F | BODY MASS INDEX: 30.95 KG/M2 | HEART RATE: 99 BPM

## 2017-07-31 DIAGNOSIS — I10 ESSENTIAL HYPERTENSION: Primary | ICD-10-CM

## 2017-07-31 DIAGNOSIS — G25.0 ESSENTIAL TREMOR: ICD-10-CM

## 2017-07-31 DIAGNOSIS — Z88.9 MULTIPLE ALLERGIES: ICD-10-CM

## 2017-07-31 DIAGNOSIS — N17.9 AKI (ACUTE KIDNEY INJURY) (HCC): ICD-10-CM

## 2017-07-31 DIAGNOSIS — J30.2 SEASONAL ALLERGIC RHINITIS, UNSPECIFIED ALLERGIC RHINITIS TRIGGER: ICD-10-CM

## 2017-07-31 DIAGNOSIS — N18.4 CHRONIC KIDNEY DISEASE (CKD), STAGE IV (SEVERE) (HCC): ICD-10-CM

## 2017-07-31 LAB
ANION GAP SERPL CALCULATED.3IONS-SCNC: 16 MMOL/L (ref 7–19)
BUN BLDV-MCNC: 23 MG/DL (ref 8–23)
CALCIUM SERPL-MCNC: 9.7 MG/DL (ref 8.8–10.2)
CHLORIDE BLD-SCNC: 95 MMOL/L (ref 98–111)
CO2: 23 MMOL/L (ref 22–29)
CREAT SERPL-MCNC: 1.2 MG/DL (ref 0.5–0.9)
GFR NON-AFRICAN AMERICAN: 44
GLUCOSE BLD-MCNC: 96 MG/DL (ref 74–109)
POTASSIUM SERPL-SCNC: 4.6 MMOL/L (ref 3.5–5)
SODIUM BLD-SCNC: 134 MMOL/L (ref 136–145)

## 2017-07-31 PROCEDURE — 3017F COLORECTAL CA SCREEN DOC REV: CPT | Performed by: FAMILY MEDICINE

## 2017-07-31 PROCEDURE — 95115 IMMUNOTHERAPY ONE INJECTION: CPT | Performed by: FAMILY MEDICINE

## 2017-07-31 PROCEDURE — 4040F PNEUMOC VAC/ADMIN/RCVD: CPT | Performed by: FAMILY MEDICINE

## 2017-07-31 PROCEDURE — 99213 OFFICE O/P EST LOW 20 MIN: CPT | Performed by: FAMILY MEDICINE

## 2017-07-31 PROCEDURE — G8427 DOCREV CUR MEDS BY ELIG CLIN: HCPCS | Performed by: FAMILY MEDICINE

## 2017-07-31 PROCEDURE — 1123F ACP DISCUSS/DSCN MKR DOCD: CPT | Performed by: FAMILY MEDICINE

## 2017-07-31 PROCEDURE — 1036F TOBACCO NON-USER: CPT | Performed by: FAMILY MEDICINE

## 2017-07-31 PROCEDURE — 3014F SCREEN MAMMO DOC REV: CPT | Performed by: FAMILY MEDICINE

## 2017-07-31 PROCEDURE — 1090F PRES/ABSN URINE INCON ASSESS: CPT | Performed by: FAMILY MEDICINE

## 2017-07-31 PROCEDURE — G8399 PT W/DXA RESULTS DOCUMENT: HCPCS | Performed by: FAMILY MEDICINE

## 2017-07-31 PROCEDURE — G8417 CALC BMI ABV UP PARAM F/U: HCPCS | Performed by: FAMILY MEDICINE

## 2017-07-31 ASSESSMENT — ENCOUNTER SYMPTOMS
RESPIRATORY NEGATIVE: 1
GASTROINTESTINAL NEGATIVE: 1
ALLERGIC/IMMUNOLOGIC NEGATIVE: 1
EYES NEGATIVE: 1

## 2017-08-02 DIAGNOSIS — G89.29 CHRONIC NECK AND BACK PAIN: Primary | ICD-10-CM

## 2017-08-02 DIAGNOSIS — M54.9 CHRONIC NECK AND BACK PAIN: Primary | ICD-10-CM

## 2017-08-02 DIAGNOSIS — M54.2 CHRONIC NECK AND BACK PAIN: Primary | ICD-10-CM

## 2017-08-02 RX ORDER — GABAPENTIN 300 MG/1
CAPSULE ORAL
Qty: 180 CAPSULE | Refills: 5 | OUTPATIENT
Start: 2017-08-02 | End: 2018-06-06 | Stop reason: SDUPTHER

## 2017-08-02 RX ORDER — CARISOPRODOL 350 MG/1
350 TABLET ORAL 3 TIMES DAILY PRN
Qty: 90 TABLET | Refills: 2 | OUTPATIENT
Start: 2017-08-02 | End: 2018-01-19 | Stop reason: SDUPTHER

## 2017-08-04 ENCOUNTER — HOSPITAL ENCOUNTER (OUTPATIENT)
Dept: NON INVASIVE DIAGNOSTICS | Age: 72
Discharge: HOME | End: 2017-08-04
Payer: MEDICARE

## 2017-08-11 ENCOUNTER — HOSPITAL ENCOUNTER (OUTPATIENT)
Dept: NON INVASIVE DIAGNOSTICS | Age: 72
Discharge: HOME | End: 2017-08-11
Payer: MEDICARE

## 2017-08-15 ENCOUNTER — HOSPITAL ENCOUNTER (OUTPATIENT)
Dept: NON INVASIVE DIAGNOSTICS | Age: 72
Discharge: HOME | End: 2017-08-15
Payer: MEDICARE

## 2017-08-15 DIAGNOSIS — R47.1 DYSARTHRIA: ICD-10-CM

## 2017-08-15 DIAGNOSIS — I34.1 MVP (MITRAL VALVE PROLAPSE): ICD-10-CM

## 2017-08-15 LAB
LV EF: 58 %
LVEF MODALITY: NORMAL

## 2017-08-15 PROCEDURE — 93306 TTE W/DOPPLER COMPLETE: CPT

## 2017-08-15 PROCEDURE — 93880 EXTRACRANIAL BILAT STUDY: CPT

## 2017-08-22 ENCOUNTER — NURSE ONLY (OUTPATIENT)
Dept: FAMILY MEDICINE CLINIC | Age: 72
End: 2017-08-22
Payer: MEDICARE

## 2017-08-22 DIAGNOSIS — Z88.9 MULTIPLE ALLERGIES: Primary | ICD-10-CM

## 2017-08-22 DIAGNOSIS — J30.2 SEASONAL ALLERGIC RHINITIS, UNSPECIFIED ALLERGIC RHINITIS TRIGGER: ICD-10-CM

## 2017-08-22 PROCEDURE — 95115 IMMUNOTHERAPY ONE INJECTION: CPT | Performed by: FAMILY MEDICINE

## 2017-09-08 ENCOUNTER — TELEPHONE (OUTPATIENT)
Dept: FAMILY MEDICINE CLINIC | Age: 72
End: 2017-09-08

## 2017-09-08 DIAGNOSIS — I10 ESSENTIAL HYPERTENSION: Primary | ICD-10-CM

## 2017-09-08 RX ORDER — CLONIDINE HYDROCHLORIDE 0.1 MG/1
0.1 TABLET ORAL 2 TIMES DAILY
Qty: 60 TABLET | Refills: 3 | Status: SHIPPED | OUTPATIENT
Start: 2017-09-08 | End: 2017-12-08 | Stop reason: SDUPTHER

## 2017-09-12 ENCOUNTER — NURSE ONLY (OUTPATIENT)
Dept: FAMILY MEDICINE CLINIC | Age: 72
End: 2017-09-12
Payer: MEDICARE

## 2017-09-12 DIAGNOSIS — J30.2 SEASONAL ALLERGIC RHINITIS, UNSPECIFIED ALLERGIC RHINITIS TRIGGER: Primary | ICD-10-CM

## 2017-09-12 PROCEDURE — 95115 IMMUNOTHERAPY ONE INJECTION: CPT | Performed by: FAMILY MEDICINE

## 2017-09-21 ENCOUNTER — HOSPITAL ENCOUNTER (OUTPATIENT)
Dept: GENERAL RADIOLOGY | Age: 72
Discharge: HOME | End: 2017-09-21
Payer: MEDICARE

## 2017-09-21 DIAGNOSIS — H20.9 UVEITIS: ICD-10-CM

## 2017-09-21 DIAGNOSIS — D86.9 SARCOIDOSIS: ICD-10-CM

## 2017-09-21 PROCEDURE — 71020 XR CHEST STANDARD TWO VW: CPT

## 2017-09-26 DIAGNOSIS — N28.9 RENAL INSUFFICIENCY: Primary | ICD-10-CM

## 2017-09-27 DIAGNOSIS — N28.9 RENAL INSUFFICIENCY: ICD-10-CM

## 2017-09-27 LAB
ANION GAP SERPL CALCULATED.3IONS-SCNC: 14 MMOL/L (ref 7–19)
BUN BLDV-MCNC: 20 MG/DL (ref 8–23)
CALCIUM SERPL-MCNC: 9.4 MG/DL (ref 8.8–10.2)
CHLORIDE BLD-SCNC: 102 MMOL/L (ref 98–111)
CO2: 22 MMOL/L (ref 22–29)
CREAT SERPL-MCNC: 1.1 MG/DL (ref 0.5–0.9)
GFR NON-AFRICAN AMERICAN: 48
GLUCOSE BLD-MCNC: 86 MG/DL (ref 74–109)
POTASSIUM SERPL-SCNC: 4.2 MMOL/L (ref 3.5–5)
SODIUM BLD-SCNC: 138 MMOL/L (ref 136–145)

## 2017-09-29 DIAGNOSIS — N28.9 RENAL INSUFFICIENCY: Primary | ICD-10-CM

## 2017-10-06 DIAGNOSIS — N28.9 RENAL INSUFFICIENCY: ICD-10-CM

## 2017-10-06 LAB
ANION GAP SERPL CALCULATED.3IONS-SCNC: 17 MMOL/L (ref 7–19)
BUN BLDV-MCNC: 26 MG/DL (ref 8–23)
CALCIUM SERPL-MCNC: 9.2 MG/DL (ref 8.8–10.2)
CHLORIDE BLD-SCNC: 101 MMOL/L (ref 98–111)
CO2: 22 MMOL/L (ref 22–29)
CREAT SERPL-MCNC: 1.5 MG/DL (ref 0.5–0.9)
GFR NON-AFRICAN AMERICAN: 34
GLUCOSE BLD-MCNC: 94 MG/DL (ref 74–109)
POTASSIUM SERPL-SCNC: 4.2 MMOL/L (ref 3.5–5)
SODIUM BLD-SCNC: 140 MMOL/L (ref 136–145)

## 2017-10-11 DIAGNOSIS — N28.9 RENAL INSUFFICIENCY: ICD-10-CM

## 2017-10-11 LAB
ANION GAP SERPL CALCULATED.3IONS-SCNC: 16 MMOL/L (ref 7–19)
BUN BLDV-MCNC: 20 MG/DL (ref 8–23)
CALCIUM SERPL-MCNC: 9.3 MG/DL (ref 8.8–10.2)
CHLORIDE BLD-SCNC: 95 MMOL/L (ref 98–111)
CO2: 23 MMOL/L (ref 22–29)
CREAT SERPL-MCNC: 1.2 MG/DL (ref 0.5–0.9)
GFR NON-AFRICAN AMERICAN: 44
GLUCOSE BLD-MCNC: 105 MG/DL (ref 74–109)
POTASSIUM SERPL-SCNC: 4.2 MMOL/L (ref 3.5–5)
SODIUM BLD-SCNC: 134 MMOL/L (ref 136–145)

## 2017-10-16 RX ORDER — AMLODIPINE AND OLMESARTAN MEDOXOMIL 10; 40 MG/1; MG/1
1 TABLET ORAL DAILY
Qty: 30 TABLET | Refills: 5 | Status: SHIPPED | OUTPATIENT
Start: 2017-10-16 | End: 2018-03-24 | Stop reason: SDUPTHER

## 2017-10-16 RX ORDER — FLUOXETINE HYDROCHLORIDE 40 MG/1
40 CAPSULE ORAL DAILY
Qty: 30 CAPSULE | Refills: 5 | Status: SHIPPED | OUTPATIENT
Start: 2017-10-16 | End: 2018-05-09 | Stop reason: SDUPTHER

## 2017-10-18 RX ORDER — CARVEDILOL 12.5 MG/1
12.5 TABLET ORAL 2 TIMES DAILY
Qty: 180 TABLET | Refills: 3 | Status: SHIPPED | OUTPATIENT
Start: 2017-10-18 | End: 2018-04-23 | Stop reason: DRUGHIGH

## 2017-10-20 ENCOUNTER — NURSE ONLY (OUTPATIENT)
Dept: FAMILY MEDICINE CLINIC | Age: 72
End: 2017-10-20
Payer: MEDICARE

## 2017-10-20 DIAGNOSIS — N28.9 RENAL INSUFFICIENCY: ICD-10-CM

## 2017-10-20 DIAGNOSIS — J30.2 SEASONAL ALLERGIC RHINITIS, UNSPECIFIED CHRONICITY, UNSPECIFIED TRIGGER: Primary | ICD-10-CM

## 2017-10-20 LAB
ANION GAP SERPL CALCULATED.3IONS-SCNC: 20 MMOL/L (ref 7–19)
BUN BLDV-MCNC: 20 MG/DL (ref 8–23)
CALCIUM SERPL-MCNC: 9.6 MG/DL (ref 8.8–10.2)
CHLORIDE BLD-SCNC: 98 MMOL/L (ref 98–111)
CO2: 20 MMOL/L (ref 22–29)
CREAT SERPL-MCNC: 1.4 MG/DL (ref 0.5–0.9)
GFR NON-AFRICAN AMERICAN: 37
GLUCOSE BLD-MCNC: 99 MG/DL (ref 74–109)
POTASSIUM SERPL-SCNC: 4.4 MMOL/L (ref 3.5–5)
SODIUM BLD-SCNC: 138 MMOL/L (ref 136–145)

## 2017-10-20 PROCEDURE — 95115 IMMUNOTHERAPY ONE INJECTION: CPT | Performed by: FAMILY MEDICINE

## 2017-10-20 RX ORDER — BUPROPION HYDROCHLORIDE 150 MG/1
TABLET, EXTENDED RELEASE ORAL
Qty: 30 TABLET | Refills: 5 | Status: SHIPPED | OUTPATIENT
Start: 2017-10-20 | End: 2018-04-24 | Stop reason: SDUPTHER

## 2017-10-20 NOTE — PROGRESS NOTES
Patient is here for allergy injection(s). No problems with previous injection(s). No s/s of sickness voiced or observed today Patient has epi-pen and left without being checked today.

## 2017-10-25 DIAGNOSIS — N28.9 RENAL INSUFFICIENCY: ICD-10-CM

## 2017-10-25 LAB
ANION GAP SERPL CALCULATED.3IONS-SCNC: 16 MMOL/L (ref 7–19)
BUN BLDV-MCNC: 20 MG/DL (ref 8–23)
CALCIUM SERPL-MCNC: 9.4 MG/DL (ref 8.8–10.2)
CHLORIDE BLD-SCNC: 96 MMOL/L (ref 98–111)
CO2: 24 MMOL/L (ref 22–29)
CREAT SERPL-MCNC: 1.3 MG/DL (ref 0.5–0.9)
GFR NON-AFRICAN AMERICAN: 40
GLUCOSE BLD-MCNC: 118 MG/DL (ref 74–109)
POTASSIUM SERPL-SCNC: 4.8 MMOL/L (ref 3.5–5)
SODIUM BLD-SCNC: 136 MMOL/L (ref 136–145)

## 2017-11-01 DIAGNOSIS — N28.9 RENAL INSUFFICIENCY: ICD-10-CM

## 2017-11-01 LAB
ANION GAP SERPL CALCULATED.3IONS-SCNC: 17 MMOL/L (ref 7–19)
BUN BLDV-MCNC: 22 MG/DL (ref 8–23)
CALCIUM SERPL-MCNC: 9.5 MG/DL (ref 8.8–10.2)
CHLORIDE BLD-SCNC: 99 MMOL/L (ref 98–111)
CO2: 23 MMOL/L (ref 22–29)
CREAT SERPL-MCNC: 1.3 MG/DL (ref 0.5–0.9)
GFR NON-AFRICAN AMERICAN: 40
GLUCOSE BLD-MCNC: 92 MG/DL (ref 74–109)
POTASSIUM SERPL-SCNC: 4.4 MMOL/L (ref 3.5–5)
SODIUM BLD-SCNC: 139 MMOL/L (ref 136–145)

## 2017-11-09 DIAGNOSIS — N28.9 RENAL INSUFFICIENCY: ICD-10-CM

## 2017-11-09 LAB
ANION GAP SERPL CALCULATED.3IONS-SCNC: 16 MMOL/L (ref 7–19)
BUN BLDV-MCNC: 15 MG/DL (ref 8–23)
CALCIUM SERPL-MCNC: 9.2 MG/DL (ref 8.8–10.2)
CHLORIDE BLD-SCNC: 99 MMOL/L (ref 98–111)
CO2: 23 MMOL/L (ref 22–29)
CREAT SERPL-MCNC: 1.2 MG/DL (ref 0.5–0.9)
GFR NON-AFRICAN AMERICAN: 44
GLUCOSE BLD-MCNC: 96 MG/DL (ref 74–109)
POTASSIUM SERPL-SCNC: 4.3 MMOL/L (ref 3.5–5)
SODIUM BLD-SCNC: 138 MMOL/L (ref 136–145)

## 2017-11-15 DIAGNOSIS — F41.9 ANXIETY: ICD-10-CM

## 2017-11-15 RX ORDER — PANTOPRAZOLE SODIUM 40 MG/1
TABLET, DELAYED RELEASE ORAL
Qty: 60 TABLET | Refills: 5 | Status: SHIPPED | OUTPATIENT
Start: 2017-11-15 | End: 2018-10-08 | Stop reason: SDUPTHER

## 2017-11-15 RX ORDER — LEVOTHYROXINE SODIUM 0.1 MG/1
100 TABLET ORAL DAILY
Qty: 30 TABLET | Refills: 5 | Status: SHIPPED | OUTPATIENT
Start: 2017-11-15 | End: 2018-05-30 | Stop reason: SDUPTHER

## 2017-11-15 RX ORDER — LORAZEPAM 1 MG/1
1 TABLET ORAL 3 TIMES DAILY PRN
Qty: 90 TABLET | Refills: 2 | Status: SHIPPED | OUTPATIENT
Start: 2017-11-15 | End: 2018-03-23 | Stop reason: SDUPTHER

## 2017-12-01 ENCOUNTER — NURSE ONLY (OUTPATIENT)
Dept: FAMILY MEDICINE CLINIC | Age: 72
End: 2017-12-01
Payer: MEDICARE

## 2017-12-01 DIAGNOSIS — Z88.9 MULTIPLE ALLERGIES: Primary | ICD-10-CM

## 2017-12-01 DIAGNOSIS — J30.2 SEASONAL ALLERGIC RHINITIS, UNSPECIFIED CHRONICITY, UNSPECIFIED TRIGGER: ICD-10-CM

## 2017-12-01 PROCEDURE — 95115 IMMUNOTHERAPY ONE INJECTION: CPT | Performed by: FAMILY MEDICINE

## 2017-12-08 DIAGNOSIS — I10 ESSENTIAL HYPERTENSION: ICD-10-CM

## 2017-12-08 RX ORDER — CLONIDINE HYDROCHLORIDE 0.1 MG/1
TABLET ORAL
Qty: 60 TABLET | Refills: 5 | Status: SHIPPED | OUTPATIENT
Start: 2017-12-08 | End: 2018-06-13 | Stop reason: SDUPTHER

## 2017-12-19 ENCOUNTER — NURSE ONLY (OUTPATIENT)
Dept: FAMILY MEDICINE CLINIC | Age: 72
End: 2017-12-19
Payer: MEDICARE

## 2017-12-19 DIAGNOSIS — J30.2 SEASONAL ALLERGIC RHINITIS, UNSPECIFIED CHRONICITY, UNSPECIFIED TRIGGER: Primary | ICD-10-CM

## 2017-12-19 DIAGNOSIS — E53.8 B12 DEFICIENCY: ICD-10-CM

## 2017-12-19 PROCEDURE — 96372 THER/PROPH/DIAG INJ SC/IM: CPT | Performed by: FAMILY MEDICINE

## 2017-12-19 PROCEDURE — 95115 IMMUNOTHERAPY ONE INJECTION: CPT | Performed by: FAMILY MEDICINE

## 2017-12-19 RX ORDER — CYANOCOBALAMIN 1000 UG/ML
1000 INJECTION INTRAMUSCULAR; SUBCUTANEOUS ONCE
Status: COMPLETED | OUTPATIENT
Start: 2017-12-19 | End: 2017-12-19

## 2017-12-19 RX ADMIN — CYANOCOBALAMIN 1000 MCG: 1000 INJECTION INTRAMUSCULAR; SUBCUTANEOUS at 14:55

## 2018-01-19 DIAGNOSIS — M54.9 CHRONIC NECK AND BACK PAIN: ICD-10-CM

## 2018-01-19 DIAGNOSIS — M54.2 CHRONIC NECK AND BACK PAIN: ICD-10-CM

## 2018-01-19 DIAGNOSIS — G89.29 CHRONIC NECK AND BACK PAIN: ICD-10-CM

## 2018-01-19 RX ORDER — CARISOPRODOL 350 MG/1
350 TABLET ORAL 3 TIMES DAILY PRN
Qty: 90 TABLET | Refills: 2 | OUTPATIENT
Start: 2018-01-19 | End: 2018-02-18

## 2018-01-25 ENCOUNTER — OFFICE VISIT (OUTPATIENT)
Dept: FAMILY MEDICINE CLINIC | Age: 73
End: 2018-01-25
Payer: MEDICARE

## 2018-01-25 ENCOUNTER — HOSPITAL ENCOUNTER (OUTPATIENT)
Dept: GENERAL RADIOLOGY | Age: 73
Discharge: HOME | End: 2018-01-25
Payer: MEDICARE

## 2018-01-25 VITALS
RESPIRATION RATE: 18 BRPM | DIASTOLIC BLOOD PRESSURE: 68 MMHG | HEART RATE: 72 BPM | SYSTOLIC BLOOD PRESSURE: 122 MMHG | OXYGEN SATURATION: 94 %

## 2018-01-25 DIAGNOSIS — M25.511 ACUTE PAIN OF RIGHT SHOULDER: ICD-10-CM

## 2018-01-25 DIAGNOSIS — M25.511 ACUTE PAIN OF RIGHT SHOULDER: Primary | ICD-10-CM

## 2018-01-25 PROCEDURE — 1090F PRES/ABSN URINE INCON ASSESS: CPT | Performed by: FAMILY MEDICINE

## 2018-01-25 PROCEDURE — G8427 DOCREV CUR MEDS BY ELIG CLIN: HCPCS | Performed by: FAMILY MEDICINE

## 2018-01-25 PROCEDURE — 3017F COLORECTAL CA SCREEN DOC REV: CPT | Performed by: FAMILY MEDICINE

## 2018-01-25 PROCEDURE — G8417 CALC BMI ABV UP PARAM F/U: HCPCS | Performed by: FAMILY MEDICINE

## 2018-01-25 PROCEDURE — 1123F ACP DISCUSS/DSCN MKR DOCD: CPT | Performed by: FAMILY MEDICINE

## 2018-01-25 PROCEDURE — G8399 PT W/DXA RESULTS DOCUMENT: HCPCS | Performed by: FAMILY MEDICINE

## 2018-01-25 PROCEDURE — 4040F PNEUMOC VAC/ADMIN/RCVD: CPT | Performed by: FAMILY MEDICINE

## 2018-01-25 PROCEDURE — 73030 X-RAY EXAM OF SHOULDER: CPT

## 2018-01-25 PROCEDURE — 1036F TOBACCO NON-USER: CPT | Performed by: FAMILY MEDICINE

## 2018-01-25 PROCEDURE — G8484 FLU IMMUNIZE NO ADMIN: HCPCS | Performed by: FAMILY MEDICINE

## 2018-01-25 PROCEDURE — 99213 OFFICE O/P EST LOW 20 MIN: CPT | Performed by: FAMILY MEDICINE

## 2018-01-25 PROCEDURE — 3014F SCREEN MAMMO DOC REV: CPT | Performed by: FAMILY MEDICINE

## 2018-01-25 ASSESSMENT — ENCOUNTER SYMPTOMS
ALLERGIC/IMMUNOLOGIC NEGATIVE: 1
GASTROINTESTINAL NEGATIVE: 1
EYES NEGATIVE: 1
RESPIRATORY NEGATIVE: 1

## 2018-01-25 NOTE — PROGRESS NOTES
Subjective:      Patient ID: Jeffrey Crystal is a 74 y.o. female. Chief Complaint   Patient presents with    Shoulder Pain     Right shoulder pain S/P direct fall on right shoulder 30 minutes ago      Patient fall this morning  Patient is a 74 year white female. 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 She fall on her right shoulder. She is hurting the right shoulder she is not able to move her right arm. She came in for evaluation of possible fracture versus dislocation. Review of Systems   Constitutional: Negative. HENT: Negative. Eyes: Negative. Respiratory: Negative. Cardiovascular: Negative. Gastrointestinal: Negative. Endocrine: Negative. Genitourinary: Negative. Musculoskeletal:        Right shoulder/arm pain S/P fall   Allergic/Immunologic: Negative. Neurological: Negative. Hematological: Negative. Psychiatric/Behavioral: Negative. Objective:   Physical Exam   Constitutional: She is oriented to person, place, and time. She appears well-developed and well-nourished. HENT:   Head: Normocephalic and atraumatic. Right Ear: External ear normal.   Left Ear: External ear normal.   Nose: Nose normal.   Mouth/Throat: Oropharynx is clear and moist.   Eyes: Conjunctivae and EOM are normal. Pupils are equal, round, and reactive to light. Neck: Normal range of motion. Neck supple. Cardiovascular: Normal rate, regular rhythm, normal heart sounds and intact distal pulses. Pulmonary/Chest: Effort normal and breath sounds normal.   Abdominal: Soft. Bowel sounds are normal.   Musculoskeletal:        Right shoulder: She exhibits decreased range of motion and tenderness. Neurological: She is alert and oriented to person, place, and time. She has normal reflexes. Skin: Skin is warm and dry. Psychiatric: She has a normal mood and affect. Her behavior is normal. Judgment and thought content normal.   Vitals reviewed. Assessment:       1.  Acute pain of right
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
## 2018-02-01 DIAGNOSIS — R53.1 WEAKNESS GENERALIZED: ICD-10-CM

## 2018-02-01 DIAGNOSIS — R53.81 PHYSICAL DECONDITIONING: ICD-10-CM

## 2018-02-12 DIAGNOSIS — E78.5 HYPERLIPIDEMIA, UNSPECIFIED HYPERLIPIDEMIA TYPE: ICD-10-CM

## 2018-02-12 RX ORDER — SIMVASTATIN 20 MG
TABLET ORAL
Qty: 90 TABLET | Refills: 3 | Status: SHIPPED | OUTPATIENT
Start: 2018-02-12 | End: 2019-03-11 | Stop reason: SDUPTHER

## 2018-02-23 DIAGNOSIS — G89.29 CHRONIC NECK PAIN: ICD-10-CM

## 2018-02-23 DIAGNOSIS — M54.2 CHRONIC NECK PAIN: ICD-10-CM

## 2018-02-23 RX ORDER — OXYCODONE AND ACETAMINOPHEN 10; 325 MG/1; MG/1
1 TABLET ORAL EVERY 6 HOURS PRN
Qty: 30 TABLET | Refills: 0 | Status: SHIPPED | OUTPATIENT
Start: 2018-02-23 | End: 2018-04-23 | Stop reason: SDUPTHER

## 2018-02-23 NOTE — TELEPHONE ENCOUNTER
Pt had a fall recently and is having severe right shoulder pain. She is having an MRI this morning to check for a rotator tear. She has an apt with Dr. Lexx Farooq on 3/5/18 to discuss MRI and treatment options. She is requesting a refill on the Percocet 10. The last time she had a rx was for #30 in June of 2017 after her knee surgery.

## 2018-03-02 ENCOUNTER — HOSPITAL ENCOUNTER (OUTPATIENT)
Dept: GENERAL RADIOLOGY | Age: 73
Discharge: HOME | End: 2018-03-02
Payer: MEDICARE

## 2018-03-02 ENCOUNTER — NURSE ONLY (OUTPATIENT)
Dept: FAMILY MEDICINE CLINIC | Age: 73
End: 2018-03-02
Payer: MEDICARE

## 2018-03-02 DIAGNOSIS — N39.0 URINARY TRACT INFECTION WITHOUT HEMATURIA, SITE UNSPECIFIED: Primary | ICD-10-CM

## 2018-03-02 DIAGNOSIS — I10 ESSENTIAL HYPERTENSION: ICD-10-CM

## 2018-03-02 DIAGNOSIS — T14.8XXA BRUISING: ICD-10-CM

## 2018-03-02 DIAGNOSIS — E03.8 OTHER SPECIFIED HYPOTHYROIDISM: ICD-10-CM

## 2018-03-02 DIAGNOSIS — E78.5 HYPERLIPIDEMIA, UNSPECIFIED HYPERLIPIDEMIA TYPE: ICD-10-CM

## 2018-03-02 DIAGNOSIS — Z01.818 PRE-OP TESTING: ICD-10-CM

## 2018-03-02 DIAGNOSIS — Z01.818 PRE-OP TESTING: Primary | ICD-10-CM

## 2018-03-02 DIAGNOSIS — N39.0 URINARY TRACT INFECTION WITHOUT HEMATURIA, SITE UNSPECIFIED: ICD-10-CM

## 2018-03-02 LAB
ABO/RH: NORMAL
ALBUMIN SERPL-MCNC: 4.2 G/DL (ref 3.5–5.2)
ALP BLD-CCNC: 106 U/L (ref 35–104)
ALT SERPL-CCNC: 27 U/L (ref 5–33)
ANION GAP SERPL CALCULATED.3IONS-SCNC: 15 MMOL/L (ref 7–19)
ANTIBODY SCREEN: NORMAL
AST SERPL-CCNC: 29 U/L (ref 5–32)
BACTERIA: NEGATIVE /HPF
BILIRUB SERPL-MCNC: <0.2 MG/DL (ref 0.2–1.2)
BILIRUBIN URINE: NEGATIVE
BLOOD, URINE: NEGATIVE
BUN BLDV-MCNC: 14 MG/DL (ref 8–23)
CALCIUM SERPL-MCNC: 9.6 MG/DL (ref 8.8–10.2)
CHLORIDE BLD-SCNC: 90 MMOL/L (ref 98–111)
CHOLESTEROL, TOTAL: 165 MG/DL (ref 160–199)
CLARITY: CLEAR
CO2: 28 MMOL/L (ref 22–29)
COLOR: YELLOW
CREAT SERPL-MCNC: 0.9 MG/DL (ref 0.5–0.9)
EPITHELIAL CELLS, UA: 2 /HPF (ref 0–5)
GFR NON-AFRICAN AMERICAN: >60
GLUCOSE BLD-MCNC: 114 MG/DL (ref 74–109)
GLUCOSE URINE: NEGATIVE MG/DL
HCT VFR BLD CALC: 37.5 % (ref 37–47)
HDLC SERPL-MCNC: 54 MG/DL (ref 65–121)
HEMOGLOBIN: 12.3 G/DL (ref 12–16)
HYALINE CASTS: 1 /HPF (ref 0–8)
INR BLD: 0.95 (ref 0.88–1.18)
KETONES, URINE: NEGATIVE MG/DL
LDL CHOLESTEROL CALCULATED: 93 MG/DL
LEUKOCYTE ESTERASE, URINE: ABNORMAL
MCH RBC QN AUTO: 28.3 PG (ref 27–31)
MCHC RBC AUTO-ENTMCNC: 32.8 G/DL (ref 33–37)
MCV RBC AUTO: 86.2 FL (ref 81–99)
NITRITE, URINE: NEGATIVE
PDW BLD-RTO: 14 % (ref 11.5–14.5)
PH UA: 6
PLATELET # BLD: 273 K/UL (ref 130–400)
PMV BLD AUTO: 9.9 FL (ref 9.4–12.3)
POTASSIUM SERPL-SCNC: 4.9 MMOL/L (ref 3.5–5)
PROTEIN UA: NEGATIVE MG/DL
PROTHROMBIN TIME: 12.6 SEC (ref 12–14.6)
RBC # BLD: 4.35 M/UL (ref 4.2–5.4)
RBC UA: 0 /HPF (ref 0–4)
SODIUM BLD-SCNC: 133 MMOL/L (ref 136–145)
SPECIFIC GRAVITY UA: 1.01
TOTAL PROTEIN: 7.3 G/DL (ref 6.6–8.7)
TRIGL SERPL-MCNC: 89 MG/DL (ref 0–149)
TSH SERPL DL<=0.05 MIU/L-ACNC: 1.34 UIU/ML (ref 0.27–4.2)
UROBILINOGEN, URINE: 0.2 E.U./DL
WBC # BLD: 6.8 K/UL (ref 4.8–10.8)
WBC UA: 1 /HPF (ref 0–5)

## 2018-03-02 PROCEDURE — 93000 ELECTROCARDIOGRAM COMPLETE: CPT | Performed by: FAMILY MEDICINE

## 2018-03-02 PROCEDURE — 71046 X-RAY EXAM CHEST 2 VIEWS: CPT

## 2018-03-05 PROBLEM — M12.811 ROTATOR CUFF ARTHROPATHY, RIGHT: Status: ACTIVE | Noted: 2018-03-05

## 2018-03-05 LAB
ORGANISM: ABNORMAL
URINE CULTURE, ROUTINE: ABNORMAL
URINE CULTURE, ROUTINE: ABNORMAL

## 2018-03-05 RX ORDER — CLONIDINE HYDROCHLORIDE 0.1 MG/1
0.1 TABLET ORAL 2 TIMES DAILY PRN
COMMUNITY
End: 2022-01-01 | Stop reason: HOSPADM

## 2018-03-05 RX ORDER — SIMVASTATIN 20 MG
20 TABLET ORAL NIGHTLY
COMMUNITY
End: 2019-02-28

## 2018-03-05 RX ORDER — CHLORTHALIDONE 25 MG/1
25 TABLET ORAL DAILY
COMMUNITY
End: 2018-11-15

## 2018-03-05 RX ORDER — OXYCODONE AND ACETAMINOPHEN 10; 325 MG/1; MG/1
1 TABLET ORAL 2 TIMES DAILY PRN
COMMUNITY
End: 2018-03-12 | Stop reason: HOSPADM

## 2018-03-05 RX ORDER — AMLODIPINE AND OLMESARTAN MEDOXOMIL 10; 40 MG/1; MG/1
1 TABLET ORAL DAILY
COMMUNITY
End: 2020-02-27 | Stop reason: SDUPTHER

## 2018-03-05 RX ORDER — MOMETASONE FUROATE 50 UG/1
2 SPRAY, METERED NASAL DAILY
Status: ON HOLD | COMMUNITY
End: 2022-01-01

## 2018-03-06 ENCOUNTER — ANESTHESIA EVENT (OUTPATIENT)
Dept: PERIOP | Facility: HOSPITAL | Age: 73
End: 2018-03-06

## 2018-03-06 ENCOUNTER — HOSPITAL ENCOUNTER (INPATIENT)
Facility: HOSPITAL | Age: 73
LOS: 6 days | Discharge: HOME HEALTH | End: 2018-03-12
Attending: ORTHOPAEDIC SURGERY | Admitting: ORTHOPAEDIC SURGERY

## 2018-03-06 ENCOUNTER — APPOINTMENT (OUTPATIENT)
Dept: GENERAL RADIOLOGY | Facility: HOSPITAL | Age: 73
End: 2018-03-06

## 2018-03-06 ENCOUNTER — ANESTHESIA (OUTPATIENT)
Dept: PERIOP | Facility: HOSPITAL | Age: 73
End: 2018-03-06

## 2018-03-06 DIAGNOSIS — M12.811 ROTATOR CUFF ARTHROPATHY, RIGHT: Primary | ICD-10-CM

## 2018-03-06 DIAGNOSIS — Z78.9 IMPAIRED MOBILITY AND ADLS: ICD-10-CM

## 2018-03-06 DIAGNOSIS — I10 ESSENTIAL HYPERTENSION: Chronic | ICD-10-CM

## 2018-03-06 DIAGNOSIS — Z74.09 IMPAIRED FUNCTIONAL MOBILITY, BALANCE, GAIT, AND ENDURANCE: ICD-10-CM

## 2018-03-06 DIAGNOSIS — Z74.09 IMPAIRED MOBILITY AND ADLS: ICD-10-CM

## 2018-03-06 PROBLEM — E03.9 ACQUIRED HYPOTHYROIDISM: Chronic | Status: ACTIVE | Noted: 2018-03-06

## 2018-03-06 PROBLEM — K21.9 GASTROESOPHAGEAL REFLUX DISEASE WITHOUT ESOPHAGITIS: Chronic | Status: ACTIVE | Noted: 2018-03-06

## 2018-03-06 PROBLEM — E78.00 PURE HYPERCHOLESTEROLEMIA: Chronic | Status: ACTIVE | Noted: 2018-03-06

## 2018-03-06 PROBLEM — J45.20 MILD INTERMITTENT ASTHMA WITHOUT COMPLICATION: Status: ACTIVE | Noted: 2018-03-06

## 2018-03-06 PROBLEM — F41.1 GENERALIZED ANXIETY DISORDER: Chronic | Status: ACTIVE | Noted: 2018-03-06

## 2018-03-06 LAB
ABO GROUP BLD: NORMAL
BLD GP AB SCN SERPL QL: NEGATIVE
RH BLD: POSITIVE

## 2018-03-06 PROCEDURE — 86901 BLOOD TYPING SEROLOGIC RH(D): CPT | Performed by: ORTHOPAEDIC SURGERY

## 2018-03-06 PROCEDURE — 25010000003 CEFAZOLIN PER 500 MG: Performed by: NURSE ANESTHETIST, CERTIFIED REGISTERED

## 2018-03-06 PROCEDURE — 94799 UNLISTED PULMONARY SVC/PX: CPT

## 2018-03-06 PROCEDURE — 25010000002 MIDAZOLAM PER 1 MG: Performed by: ANESTHESIOLOGY

## 2018-03-06 PROCEDURE — 25010000002 MIDAZOLAM PER 1 MG

## 2018-03-06 PROCEDURE — 86850 RBC ANTIBODY SCREEN: CPT | Performed by: ORTHOPAEDIC SURGERY

## 2018-03-06 PROCEDURE — 25010000002 PROPOFOL 10 MG/ML EMULSION: Performed by: NURSE ANESTHETIST, CERTIFIED REGISTERED

## 2018-03-06 PROCEDURE — 0RRJ00Z REPLACEMENT OF RIGHT SHOULDER JOINT WITH REVERSE BALL AND SOCKET SYNTHETIC SUBSTITUTE, OPEN APPROACH: ICD-10-PCS | Performed by: ORTHOPAEDIC SURGERY

## 2018-03-06 PROCEDURE — 25010000002 SUCCINYLCHOLINE PER 20 MG: Performed by: NURSE ANESTHETIST, CERTIFIED REGISTERED

## 2018-03-06 PROCEDURE — 94640 AIRWAY INHALATION TREATMENT: CPT

## 2018-03-06 PROCEDURE — 25010000002 FENTANYL CITRATE (PF) 100 MCG/2ML SOLUTION: Performed by: NURSE ANESTHETIST, CERTIFIED REGISTERED

## 2018-03-06 PROCEDURE — C1776 JOINT DEVICE (IMPLANTABLE): HCPCS | Performed by: ORTHOPAEDIC SURGERY

## 2018-03-06 PROCEDURE — 73030 X-RAY EXAM OF SHOULDER: CPT

## 2018-03-06 PROCEDURE — 86900 BLOOD TYPING SEROLOGIC ABO: CPT | Performed by: ORTHOPAEDIC SURGERY

## 2018-03-06 PROCEDURE — 25010000003 CEFAZOLIN PER 500 MG: Performed by: ORTHOPAEDIC SURGERY

## 2018-03-06 PROCEDURE — 25010000002 ROPIVACAINE PER 1 MG: Performed by: ANESTHESIOLOGY

## 2018-03-06 PROCEDURE — C1713 ANCHOR/SCREW BN/BN,TIS/BN: HCPCS | Performed by: ORTHOPAEDIC SURGERY

## 2018-03-06 DEVICE — IMPLANTABLE DEVICE: Type: IMPLANTABLE DEVICE | Status: FUNCTIONAL

## 2018-03-06 DEVICE — SCRW GLEN UNIVERS REVERS PERIPH 4.5X24MM: Type: IMPLANTABLE DEVICE | Status: FUNCTIONAL

## 2018-03-06 DEVICE — SCRW GLEN UNIVERS REVERS CENTRL 6.5X15MM: Type: IMPLANTABLE DEVICE | Status: FUNCTIONAL

## 2018-03-06 DEVICE — SCRW GLEN UNIVERS REVERS PERIPH 4.5X30MM: Type: IMPLANTABLE DEVICE | Status: FUNCTIONAL

## 2018-03-06 DEVICE — GLENOSPHERE UNIVERS REVERS S/36 PLS4 LAT: Type: IMPLANTABLE DEVICE | Status: FUNCTIONAL

## 2018-03-06 DEVICE — CUP SUT UNIVERS REVERS OFFST PLS2 36 RT: Type: IMPLANTABLE DEVICE | Status: FUNCTIONAL

## 2018-03-06 DEVICE — STEM HUM/SHLDR UNIVERS REVERS SZ8: Type: IMPLANTABLE DEVICE | Status: FUNCTIONAL

## 2018-03-06 RX ORDER — DIPHENHYDRAMINE HYDROCHLORIDE 50 MG/ML
25 INJECTION INTRAMUSCULAR; INTRAVENOUS EVERY 6 HOURS PRN
Status: DISCONTINUED | OUTPATIENT
Start: 2018-03-06 | End: 2018-03-12 | Stop reason: HOSPADM

## 2018-03-06 RX ORDER — GABAPENTIN 300 MG/1
300 CAPSULE ORAL NIGHTLY
Status: DISCONTINUED | OUTPATIENT
Start: 2018-03-06 | End: 2018-03-06

## 2018-03-06 RX ORDER — FLUMAZENIL 0.1 MG/ML
0.2 INJECTION INTRAVENOUS AS NEEDED
Status: DISCONTINUED | OUTPATIENT
Start: 2018-03-06 | End: 2018-03-06

## 2018-03-06 RX ORDER — BUDESONIDE AND FORMOTEROL FUMARATE DIHYDRATE 160; 4.5 UG/1; UG/1
2 AEROSOL RESPIRATORY (INHALATION)
Status: DISCONTINUED | OUTPATIENT
Start: 2018-03-06 | End: 2018-03-12 | Stop reason: HOSPADM

## 2018-03-06 RX ORDER — ROPIVACAINE HYDROCHLORIDE 5 MG/ML
INJECTION, SOLUTION EPIDURAL; INFILTRATION; PERINEURAL AS NEEDED
Status: DISCONTINUED | OUTPATIENT
Start: 2018-03-06 | End: 2018-03-06 | Stop reason: SURG

## 2018-03-06 RX ORDER — MEPERIDINE HYDROCHLORIDE 25 MG/ML
12.5 INJECTION INTRAMUSCULAR; INTRAVENOUS; SUBCUTANEOUS
Status: DISCONTINUED | OUTPATIENT
Start: 2018-03-06 | End: 2018-03-06

## 2018-03-06 RX ORDER — ONDANSETRON 4 MG/1
4 TABLET, ORALLY DISINTEGRATING ORAL EVERY 6 HOURS PRN
Status: DISCONTINUED | OUTPATIENT
Start: 2018-03-06 | End: 2018-03-12 | Stop reason: HOSPADM

## 2018-03-06 RX ORDER — NALOXONE HCL 0.4 MG/ML
0.4 VIAL (ML) INJECTION
Status: DISCONTINUED | OUTPATIENT
Start: 2018-03-06 | End: 2018-03-12 | Stop reason: HOSPADM

## 2018-03-06 RX ORDER — ONDANSETRON 2 MG/ML
4 INJECTION INTRAMUSCULAR; INTRAVENOUS AS NEEDED
Status: DISCONTINUED | OUTPATIENT
Start: 2018-03-06 | End: 2018-03-06

## 2018-03-06 RX ORDER — CEFAZOLIN SODIUM 1 G/3ML
INJECTION, POWDER, FOR SOLUTION INTRAMUSCULAR; INTRAVENOUS AS NEEDED
Status: DISCONTINUED | OUTPATIENT
Start: 2018-03-06 | End: 2018-03-06 | Stop reason: SURG

## 2018-03-06 RX ORDER — OXYCODONE AND ACETAMINOPHEN 10; 325 MG/1; MG/1
1 TABLET ORAL EVERY 4 HOURS PRN
Status: DISCONTINUED | OUTPATIENT
Start: 2018-03-06 | End: 2018-03-12 | Stop reason: HOSPADM

## 2018-03-06 RX ORDER — IPRATROPIUM BROMIDE AND ALBUTEROL SULFATE 2.5; .5 MG/3ML; MG/3ML
3 SOLUTION RESPIRATORY (INHALATION) EVERY 6 HOURS PRN
Status: DISCONTINUED | OUTPATIENT
Start: 2018-03-06 | End: 2018-03-12 | Stop reason: HOSPADM

## 2018-03-06 RX ORDER — ROCURONIUM BROMIDE 10 MG/ML
INJECTION, SOLUTION INTRAVENOUS AS NEEDED
Status: DISCONTINUED | OUTPATIENT
Start: 2018-03-06 | End: 2018-03-06 | Stop reason: SURG

## 2018-03-06 RX ORDER — ALLOPURINOL 100 MG/1
50 TABLET ORAL DAILY
Status: DISCONTINUED | OUTPATIENT
Start: 2018-03-06 | End: 2018-03-12 | Stop reason: HOSPADM

## 2018-03-06 RX ORDER — LIDOCAINE HYDROCHLORIDE 20 MG/ML
INJECTION, SOLUTION INFILTRATION; PERINEURAL AS NEEDED
Status: DISCONTINUED | OUTPATIENT
Start: 2018-03-06 | End: 2018-03-06 | Stop reason: SURG

## 2018-03-06 RX ORDER — DOCUSATE SODIUM 100 MG/1
100 CAPSULE, LIQUID FILLED ORAL 2 TIMES DAILY PRN
Status: DISCONTINUED | OUTPATIENT
Start: 2018-03-06 | End: 2018-03-12 | Stop reason: HOSPADM

## 2018-03-06 RX ORDER — DOXEPIN HYDROCHLORIDE 100 MG/1
100 CAPSULE ORAL NIGHTLY
Status: DISCONTINUED | OUTPATIENT
Start: 2018-03-06 | End: 2018-03-12 | Stop reason: HOSPADM

## 2018-03-06 RX ORDER — MORPHINE SULFATE 4 MG/ML
4 INJECTION, SOLUTION INTRAMUSCULAR; INTRAVENOUS
Status: DISCONTINUED | OUTPATIENT
Start: 2018-03-06 | End: 2018-03-12 | Stop reason: HOSPADM

## 2018-03-06 RX ORDER — ONDANSETRON 4 MG/1
4 TABLET, FILM COATED ORAL EVERY 6 HOURS PRN
Status: DISCONTINUED | OUTPATIENT
Start: 2018-03-06 | End: 2018-03-12 | Stop reason: HOSPADM

## 2018-03-06 RX ORDER — CLONIDINE HYDROCHLORIDE 0.1 MG/1
0.1 TABLET ORAL EVERY 12 HOURS SCHEDULED
Status: DISCONTINUED | OUTPATIENT
Start: 2018-03-06 | End: 2018-03-12 | Stop reason: HOSPADM

## 2018-03-06 RX ORDER — MIDAZOLAM HYDROCHLORIDE 1 MG/ML
INJECTION INTRAMUSCULAR; INTRAVENOUS
Status: COMPLETED
Start: 2018-03-06 | End: 2018-03-06

## 2018-03-06 RX ORDER — ONDANSETRON 2 MG/ML
4 INJECTION INTRAMUSCULAR; INTRAVENOUS EVERY 6 HOURS PRN
Status: DISCONTINUED | OUTPATIENT
Start: 2018-03-06 | End: 2018-03-12 | Stop reason: HOSPADM

## 2018-03-06 RX ORDER — MORPHINE SULFATE 2 MG/ML
2 INJECTION, SOLUTION INTRAMUSCULAR; INTRAVENOUS AS NEEDED
Status: DISCONTINUED | OUTPATIENT
Start: 2018-03-06 | End: 2018-03-06

## 2018-03-06 RX ORDER — SODIUM CHLORIDE, SODIUM LACTATE, POTASSIUM CHLORIDE, CALCIUM CHLORIDE 600; 310; 30; 20 MG/100ML; MG/100ML; MG/100ML; MG/100ML
1000 INJECTION, SOLUTION INTRAVENOUS CONTINUOUS
Status: DISPENSED | OUTPATIENT
Start: 2018-03-06 | End: 2018-03-08

## 2018-03-06 RX ORDER — FENTANYL CITRATE 50 UG/ML
INJECTION, SOLUTION INTRAMUSCULAR; INTRAVENOUS AS NEEDED
Status: DISCONTINUED | OUTPATIENT
Start: 2018-03-06 | End: 2018-03-06 | Stop reason: SURG

## 2018-03-06 RX ORDER — METOCLOPRAMIDE HYDROCHLORIDE 5 MG/ML
5 INJECTION INTRAMUSCULAR; INTRAVENOUS
Status: DISCONTINUED | OUTPATIENT
Start: 2018-03-06 | End: 2018-03-06

## 2018-03-06 RX ORDER — LORAZEPAM 1 MG/1
1 TABLET ORAL 3 TIMES DAILY
Status: DISCONTINUED | OUTPATIENT
Start: 2018-03-06 | End: 2018-03-12 | Stop reason: HOSPADM

## 2018-03-06 RX ORDER — ACETAMINOPHEN 325 MG/1
650 TABLET ORAL EVERY 4 HOURS PRN
Status: DISCONTINUED | OUTPATIENT
Start: 2018-03-06 | End: 2018-03-12 | Stop reason: HOSPADM

## 2018-03-06 RX ORDER — SUCCINYLCHOLINE CHLORIDE 20 MG/ML
INJECTION INTRAMUSCULAR; INTRAVENOUS AS NEEDED
Status: DISCONTINUED | OUTPATIENT
Start: 2018-03-06 | End: 2018-03-06 | Stop reason: SURG

## 2018-03-06 RX ORDER — CARVEDILOL 6.25 MG/1
12.5 TABLET ORAL EVERY 12 HOURS SCHEDULED
Status: DISCONTINUED | OUTPATIENT
Start: 2018-03-06 | End: 2018-03-12 | Stop reason: HOSPADM

## 2018-03-06 RX ORDER — MIDAZOLAM HYDROCHLORIDE 1 MG/ML
1 INJECTION INTRAMUSCULAR; INTRAVENOUS
Status: DISCONTINUED | OUTPATIENT
Start: 2018-03-06 | End: 2018-03-06 | Stop reason: HOSPADM

## 2018-03-06 RX ORDER — MAGNESIUM HYDROXIDE 1200 MG/15ML
LIQUID ORAL AS NEEDED
Status: DISCONTINUED | OUTPATIENT
Start: 2018-03-06 | End: 2018-03-06 | Stop reason: HOSPADM

## 2018-03-06 RX ORDER — MIDAZOLAM HYDROCHLORIDE 1 MG/ML
2 INJECTION INTRAMUSCULAR; INTRAVENOUS
Status: DISCONTINUED | OUTPATIENT
Start: 2018-03-06 | End: 2018-03-06 | Stop reason: HOSPADM

## 2018-03-06 RX ORDER — LEVOTHYROXINE SODIUM 0.1 MG/1
100 TABLET ORAL
Status: DISCONTINUED | OUTPATIENT
Start: 2018-03-07 | End: 2018-03-12 | Stop reason: HOSPADM

## 2018-03-06 RX ORDER — FLUOXETINE HYDROCHLORIDE 20 MG/1
40 CAPSULE ORAL DAILY
Status: DISCONTINUED | OUTPATIENT
Start: 2018-03-06 | End: 2018-03-12 | Stop reason: HOSPADM

## 2018-03-06 RX ORDER — SENNA AND DOCUSATE SODIUM 50; 8.6 MG/1; MG/1
2 TABLET, FILM COATED ORAL NIGHTLY
Status: DISCONTINUED | OUTPATIENT
Start: 2018-03-06 | End: 2018-03-12 | Stop reason: HOSPADM

## 2018-03-06 RX ORDER — GABAPENTIN 300 MG/1
600 CAPSULE ORAL EVERY 8 HOURS SCHEDULED
Status: DISCONTINUED | OUTPATIENT
Start: 2018-03-06 | End: 2018-03-12 | Stop reason: HOSPADM

## 2018-03-06 RX ORDER — IPRATROPIUM BROMIDE AND ALBUTEROL SULFATE 2.5; .5 MG/3ML; MG/3ML
3 SOLUTION RESPIRATORY (INHALATION) ONCE AS NEEDED
Status: DISCONTINUED | OUTPATIENT
Start: 2018-03-06 | End: 2018-03-06

## 2018-03-06 RX ORDER — HYDRALAZINE HYDROCHLORIDE 20 MG/ML
5 INJECTION INTRAMUSCULAR; INTRAVENOUS
Status: DISCONTINUED | OUTPATIENT
Start: 2018-03-06 | End: 2018-03-06

## 2018-03-06 RX ORDER — LIDOCAINE HYDROCHLORIDE 40 MG/ML
SOLUTION TOPICAL AS NEEDED
Status: DISCONTINUED | OUTPATIENT
Start: 2018-03-06 | End: 2018-03-06 | Stop reason: SURG

## 2018-03-06 RX ORDER — SODIUM CHLORIDE 0.9 % (FLUSH) 0.9 %
3 SYRINGE (ML) INJECTION AS NEEDED
Status: DISCONTINUED | OUTPATIENT
Start: 2018-03-06 | End: 2018-03-06 | Stop reason: HOSPADM

## 2018-03-06 RX ORDER — SODIUM CHLORIDE, SODIUM LACTATE, POTASSIUM CHLORIDE, CALCIUM CHLORIDE 600; 310; 30; 20 MG/100ML; MG/100ML; MG/100ML; MG/100ML
100 INJECTION, SOLUTION INTRAVENOUS CONTINUOUS
Status: DISCONTINUED | OUTPATIENT
Start: 2018-03-06 | End: 2018-03-09

## 2018-03-06 RX ORDER — ACETAMINOPHEN 160 MG/5ML
650 SOLUTION ORAL EVERY 4 HOURS PRN
Status: DISCONTINUED | OUTPATIENT
Start: 2018-03-06 | End: 2018-03-12 | Stop reason: HOSPADM

## 2018-03-06 RX ORDER — NALOXONE HCL 0.4 MG/ML
0.04 VIAL (ML) INJECTION AS NEEDED
Status: DISCONTINUED | OUTPATIENT
Start: 2018-03-06 | End: 2018-03-06

## 2018-03-06 RX ORDER — PROMETHAZINE HYDROCHLORIDE 25 MG/ML
12.5 INJECTION, SOLUTION INTRAMUSCULAR; INTRAVENOUS EVERY 6 HOURS PRN
Status: DISCONTINUED | OUTPATIENT
Start: 2018-03-06 | End: 2018-03-12 | Stop reason: HOSPADM

## 2018-03-06 RX ORDER — BUPROPION HYDROCHLORIDE 100 MG/1
100 TABLET, EXTENDED RELEASE ORAL DAILY
Status: DISCONTINUED | OUTPATIENT
Start: 2018-03-06 | End: 2018-03-12 | Stop reason: HOSPADM

## 2018-03-06 RX ORDER — SODIUM CHLORIDE 0.9 % (FLUSH) 0.9 %
1-10 SYRINGE (ML) INJECTION AS NEEDED
Status: DISCONTINUED | OUTPATIENT
Start: 2018-03-06 | End: 2018-03-06 | Stop reason: HOSPADM

## 2018-03-06 RX ORDER — NALOXONE HCL 0.4 MG/ML
0.1 VIAL (ML) INJECTION
Status: DISCONTINUED | OUTPATIENT
Start: 2018-03-06 | End: 2018-03-12 | Stop reason: HOSPADM

## 2018-03-06 RX ORDER — DIPHENHYDRAMINE HCL 25 MG
25 CAPSULE ORAL EVERY 6 HOURS PRN
Status: DISCONTINUED | OUTPATIENT
Start: 2018-03-06 | End: 2018-03-12 | Stop reason: HOSPADM

## 2018-03-06 RX ORDER — ATORVASTATIN CALCIUM 10 MG/1
10 TABLET, FILM COATED ORAL NIGHTLY
Status: DISCONTINUED | OUTPATIENT
Start: 2018-03-06 | End: 2018-03-12 | Stop reason: HOSPADM

## 2018-03-06 RX ORDER — FAMOTIDINE 20 MG/1
40 TABLET, FILM COATED ORAL DAILY
Status: DISCONTINUED | OUTPATIENT
Start: 2018-03-06 | End: 2018-03-08

## 2018-03-06 RX ORDER — SODIUM CHLORIDE 0.9 % (FLUSH) 0.9 %
1-10 SYRINGE (ML) INJECTION AS NEEDED
Status: DISCONTINUED | OUTPATIENT
Start: 2018-03-06 | End: 2018-03-12 | Stop reason: HOSPADM

## 2018-03-06 RX ORDER — IPRATROPIUM BROMIDE AND ALBUTEROL SULFATE 2.5; .5 MG/3ML; MG/3ML
3 SOLUTION RESPIRATORY (INHALATION) ONCE
Status: COMPLETED | OUTPATIENT
Start: 2018-03-06 | End: 2018-03-06

## 2018-03-06 RX ORDER — DIAZEPAM 5 MG/1
5 TABLET ORAL EVERY 6 HOURS PRN
Status: DISCONTINUED | OUTPATIENT
Start: 2018-03-06 | End: 2018-03-06

## 2018-03-06 RX ORDER — OXYCODONE AND ACETAMINOPHEN 7.5; 325 MG/1; MG/1
2 TABLET ORAL EVERY 4 HOURS PRN
Status: DISCONTINUED | OUTPATIENT
Start: 2018-03-06 | End: 2018-03-12 | Stop reason: HOSPADM

## 2018-03-06 RX ORDER — PHENYLEPHRINE HCL IN 0.9% NACL 0.8MG/10ML
SYRINGE (ML) INTRAVENOUS AS NEEDED
Status: DISCONTINUED | OUTPATIENT
Start: 2018-03-06 | End: 2018-03-06 | Stop reason: SURG

## 2018-03-06 RX ORDER — LABETALOL HYDROCHLORIDE 5 MG/ML
5 INJECTION, SOLUTION INTRAVENOUS
Status: DISCONTINUED | OUTPATIENT
Start: 2018-03-06 | End: 2018-03-06

## 2018-03-06 RX ORDER — PROPOFOL 10 MG/ML
VIAL (ML) INTRAVENOUS AS NEEDED
Status: DISCONTINUED | OUTPATIENT
Start: 2018-03-06 | End: 2018-03-06 | Stop reason: SURG

## 2018-03-06 RX ADMIN — IPRATROPIUM BROMIDE AND ALBUTEROL SULFATE 3 ML: 2.5; .5 SOLUTION RESPIRATORY (INHALATION) at 12:47

## 2018-03-06 RX ADMIN — CLONIDINE HYDROCHLORIDE 0.1 MG: 0.1 TABLET ORAL at 21:44

## 2018-03-06 RX ADMIN — LORAZEPAM 1 MG: 1 TABLET ORAL at 17:44

## 2018-03-06 RX ADMIN — SODIUM CHLORIDE, POTASSIUM CHLORIDE, SODIUM LACTATE AND CALCIUM CHLORIDE 1000 ML: 600; 310; 30; 20 INJECTION, SOLUTION INTRAVENOUS at 10:10

## 2018-03-06 RX ADMIN — CARVEDILOL 12.5 MG: 6.25 TABLET, FILM COATED ORAL at 21:45

## 2018-03-06 RX ADMIN — GABAPENTIN 600 MG: 300 CAPSULE ORAL at 21:44

## 2018-03-06 RX ADMIN — MIDAZOLAM HYDROCHLORIDE 1 MG: 1 INJECTION, SOLUTION INTRAMUSCULAR; INTRAVENOUS at 12:23

## 2018-03-06 RX ADMIN — BUDESONIDE AND FORMOTEROL FUMARATE DIHYDRATE 2 PUFF: 160; 4.5 AEROSOL RESPIRATORY (INHALATION) at 20:00

## 2018-03-06 RX ADMIN — Medication 80 MCG: at 13:33

## 2018-03-06 RX ADMIN — GABAPENTIN 600 MG: 300 CAPSULE ORAL at 16:18

## 2018-03-06 RX ADMIN — LORAZEPAM 1 MG: 1 TABLET ORAL at 21:45

## 2018-03-06 RX ADMIN — PHENOL 2 SPRAY: 1.5 LIQUID ORAL at 22:42

## 2018-03-06 RX ADMIN — CEFAZOLIN SODIUM 2 G: 1 INJECTION, POWDER, FOR SOLUTION INTRAMUSCULAR; INTRAVENOUS at 21:44

## 2018-03-06 RX ADMIN — LIDOCAINE HYDROCHLORIDE 1 EACH: 40 SOLUTION TOPICAL at 12:57

## 2018-03-06 RX ADMIN — DOXEPIN HYDROCHLORIDE 100 MG: 100 CAPSULE ORAL at 21:45

## 2018-03-06 RX ADMIN — OXYCODONE HYDROCHLORIDE AND ACETAMINOPHEN 2 TABLET: 7.5; 325 TABLET ORAL at 22:42

## 2018-03-06 RX ADMIN — SODIUM CHLORIDE, POTASSIUM CHLORIDE, SODIUM LACTATE AND CALCIUM CHLORIDE 100 ML/HR: 600; 310; 30; 20 INJECTION, SOLUTION INTRAVENOUS at 15:18

## 2018-03-06 RX ADMIN — FENTANYL CITRATE 100 MCG: 50 INJECTION, SOLUTION INTRAMUSCULAR; INTRAVENOUS at 12:56

## 2018-03-06 RX ADMIN — CEFAZOLIN 2 G: 1 INJECTION, POWDER, FOR SOLUTION INTRAVENOUS at 13:05

## 2018-03-06 RX ADMIN — EPHEDRINE SULFATE 10 MG: 50 INJECTION INTRAMUSCULAR; INTRAVENOUS; SUBCUTANEOUS at 13:33

## 2018-03-06 RX ADMIN — ALLOPURINOL 50 MG: 100 TABLET ORAL at 16:18

## 2018-03-06 RX ADMIN — ROCURONIUM BROMIDE 10 MG: 10 INJECTION INTRAVENOUS at 12:56

## 2018-03-06 RX ADMIN — Medication 80 MCG: at 13:07

## 2018-03-06 RX ADMIN — LIDOCAINE HYDROCHLORIDE 0.5 ML: 10 INJECTION, SOLUTION EPIDURAL; INFILTRATION; INTRACAUDAL; PERINEURAL at 10:10

## 2018-03-06 RX ADMIN — MIDAZOLAM HYDROCHLORIDE 1 MG: 1 INJECTION, SOLUTION INTRAMUSCULAR; INTRAVENOUS at 12:26

## 2018-03-06 RX ADMIN — FAMOTIDINE 40 MG: 20 TABLET, FILM COATED ORAL at 16:18

## 2018-03-06 RX ADMIN — EPHEDRINE SULFATE 10 MG: 50 INJECTION INTRAMUSCULAR; INTRAVENOUS; SUBCUTANEOUS at 13:16

## 2018-03-06 RX ADMIN — PROPOFOL 110 MG: 10 INJECTION, EMULSION INTRAVENOUS at 12:56

## 2018-03-06 RX ADMIN — BUPROPION HYDROCHLORIDE 100 MG: 100 TABLET, FILM COATED, EXTENDED RELEASE ORAL at 16:18

## 2018-03-06 RX ADMIN — SUCCINYLCHOLINE CHLORIDE 100 MG: 20 INJECTION, SOLUTION INTRAMUSCULAR; INTRAVENOUS at 12:56

## 2018-03-06 RX ADMIN — ROPIVACAINE HYDROCHLORIDE 30 ML: 5 INJECTION, SOLUTION EPIDURAL; INFILTRATION; PERINEURAL at 12:32

## 2018-03-06 RX ADMIN — FLUOXETINE HYDROCHLORIDE 40 MG: 20 CAPSULE ORAL at 16:18

## 2018-03-06 RX ADMIN — OXYCODONE HYDROCHLORIDE AND ACETAMINOPHEN 1 TABLET: 10; 325 TABLET ORAL at 15:18

## 2018-03-06 RX ADMIN — LIDOCAINE HYDROCHLORIDE 60 MG: 20 INJECTION, SOLUTION INFILTRATION; PERINEURAL at 12:56

## 2018-03-06 RX ADMIN — ATORVASTATIN CALCIUM 10 MG: 10 TABLET, FILM COATED ORAL at 21:45

## 2018-03-06 NOTE — PLAN OF CARE
Problem: Patient Care Overview (Adult)  Goal: Plan of Care Review  Outcome: Ongoing (interventions implemented as appropriate)   03/06/18 1739   Patient Care Overview   Progress improving   Coping/Psychosocial Response Interventions   Plan Of Care Reviewed With patient   Outcome Evaluation   Outcome Summary/Follow up Plan Pt up x1. PO pain med given with some relief.        Problem: Perioperative Period (Adult)  Goal: Signs and Symptoms of Listed Potential Problems Will be Absent or Manageable (Perioperative Period)  Outcome: Ongoing (interventions implemented as appropriate)      Problem: Fall Risk (Adult)  Goal: Identify Related Risk Factors and Signs and Symptoms  Outcome: Outcome(s) achieved Date Met: 03/06/18    Goal: Absence of Falls  Outcome: Ongoing (interventions implemented as appropriate)

## 2018-03-06 NOTE — OP NOTE
Patient Name: Abbi  MRN: [id number]  : 1945      DATE of SURGERY: 3/6/18    SURGEON: Imtiaz Lobato MD    ASSISTANT: NONE    PREOPERATIVE DIAGNOSIS: Right Shoulder Rotator Cuff Tear Arthropathy    POSTOPERATIVE DIAGNOSIS:  Right Shoulder Rotator Cuff Tear Arthropathy    PROCEDURE PERFORMED:  Right Reverse Total Shoulder Arthroplasty    IMPLANTS: Arthrex [employer] Revers                Baseplate: small                Glenosphere: 36 + 4                Poly: + 6                Suture Cup: 36 + 2 right                              Stem: 8 pressfit    ANESTHESIA USED: General endotrachial anesthesia, interscalene block    OPERATIVE INDICATIONS: 73 y.o. YO female with progressive loss of function and increasing pain of the upper extremity due to a massive irreparable tear of the rotator cuff. She fell several months ago and developed pseudoparalysis of the shoulder.  Due to loss of function and progressive pain, a reverse shoulder arthroplasty is planned to improve function and decrease pain. Surgical evaluation was discussed and the patient wished to proceed understanding risks, benefits, and alternatives. The surgical indications were to relieve pain, improve function, and prevent future disability in regards to the shoulder pathology dictated in the aboved diagnoses.  Risks included, but were not limited to, that of anesthesia, bleeding, infection, pain, damage to local structures, postoperative dislocation, need for further surgery, failure of repair, stiffness, failure of implants, and loss of function.    The patient has failed a combination of the following improve pain and function: physical therapy >12 wks, corticosteroid injections, NSAID’s, activity modification.     ESTIMATED BLOOD LOSS: 150 mL    DRAINS: none     COMPLICATIONS: none    SPECIMENS: none    FINDINGS: see op note    PROCEDURE in DETAIL:  The patient was seen in the preoperative holding room, once again the informed consent was  "reviewed with the patient and signed.  The site of surgery was marked with the patient's agreement.  After being transported to the operating room, a timeout was performed identifying the correct patient as well as the operative site.  Dose appropriate IV antibiotics were given prior to incision.  The patient was positioned in the beach chair position, all bony prominences were protected and a sterile prep and drape was performed.  The surgical site was draped with ioban dressing.    A deltopectoral approach to the shoulder joint was utilized as soft tissue was dissected down the level of the cephalic vein which was taken laterally along with the deltoid.  The biceps tendon was located, tenodesed to the superior border of the pectoralis major tendon insertion.  The rotator interval was opened.  A tagging stitch was placed in the subscapularis and with progressive external rotation of the shoulder, the tendon and underlying capsule were peeled from the less tuberosity.  The humeral head was dislocated from the glenoid and strategic retractors were placed to protect the surrounding soft tissue.  The axillary nerve was palpated and protected, verified with a \"tug test.\"    Beginning a the apex of the humeral head, a starting reamer was introduced into the shaft of the humerus, followed by reaming with a 6 and 7 mm reamer.  The proximal humeral osteotomy guide was inserted and an osteotomy was performed and 135 degrees in 20 degrees of retroversion.  A protective plate was placed on the osteotomy site and attention was turned to the glenoid.      Again, strategic retractors were placed surround the glenoid, the axillary nerve was protected, and a complete capsulectomy was performed.  The labrum was excised.  A guidepin was placed in the inferior-central aspect of the glenoid, following by a reaming device, and drilling of the central peg hole.  A baseplate was impacted and superior, central, and inferior locking screws " were inserted.  The glenosphere was then impacted without complication.    Attention was turned back to the humeral side where progressively sized broaches were inserted until a stable fit was achieved, followed by the metaphyseal reaming guide.  The metaphysis was reamed and a trial stem inserted.  Trial polyethylenes were placed in the suture cup until range of motion and stability were adequate.  The conjoint tendon showed increased tension. With traction of the shoulder, the entire scapula was translating without dissociation of the polyethylene.    Trial implants were removed, final implants impacted, and the shoulder was once again reduced showing excellent stability and range of motion.    The incision was thoroughly irrigated, followed by closure in layers.  The skin was closed with adhesive glue.  A sterile dressing and sling were placed.  Counts were correct.    The patient was awakened by anesthesia, transported to the recovery room in stable condition.    POSTOPERATIVE PLAN:  1) Admit inpatient for pain control, neurovascular monitoring  2) Discharge home once pain is controlled  3) Reverse total shoulder protocol    Electronically signed by Imtiaz Lobato MD on 3/6/2018 at 2:13 PM

## 2018-03-06 NOTE — ANESTHESIA POSTPROCEDURE EVALUATION
"Patient: Hanny Ivy    Procedure Summary     Date Anesthesia Start Anesthesia Stop Room / Location    03/06/18 1251 1410 [facility] OR 10 / [facility]       Procedure Diagnosis Surgeon Provider    RIGHT REVERSE TOTAL SHOULDER ARTHROPLASTY (Right Shoulder) Rotator cuff arthropathy, right  (RIGHT SHOULDER ARTHROPATHY) MD Talya Alejandre, CRNA          Anesthesia Type: general  Last vitals  BP   148/64 (03/06/18 1450)   Temp   97.6 °F (36.4 °C) (03/06/18 1450)   Pulse   69 (03/06/18 1450)   Resp   16 (03/06/18 1450)     SpO2   96 % (03/06/18 1450)     Post Anesthesia Care and Evaluation    Patient location during evaluation: PACU  Patient participation: complete - patient participated  Level of consciousness: awake and alert  Pain management: adequate  Airway patency: patent  Anesthetic complications: No anesthetic complications    Cardiovascular status: acceptable  Respiratory status: acceptable  Hydration status: acceptable    Comments: Blood pressure 148/64, pulse 69, temperature 97.6 °F (36.4 °C), temperature source Oral, resp. rate 16, height 162.5 cm (63.98\"), weight 87.9 kg (193 lb 12.6 oz), SpO2 96 %.    Pt discharged from PACU based on baudilio score >8      "

## 2018-03-06 NOTE — BRIEF OP NOTE
TOTAL SHOULDER REVERSE ARTHROPLASTY  Progress Note    Hanny Ivy  3/6/2018    Pre-op Diagnosis:   RIGHT SHOULDER ARTHROPATHY       Post-Op Diagnosis Codes:     * Rotator cuff arthropathy, right [M12.811]    Procedure/CPT® Codes:  NE RECONSTR TOTAL SHOULDER IMPLANT [68048]    Procedure(s):  RIGHT REVERSE TOTAL SHOULDER ARTHROPLASTY    Surgeon(s):  Imtiaz Lobato MD    Anesthesia: General with Block    Staff:   Circulator: Andrea Syed RN; Erica Hansen RN  Scrub Person: Erica Montes; Juan Bee  Vendor Representative: Harrison Brannon  Assistant: Donna Cortez    Estimated Blood Loss: <500ml    Urine Voided: * No values recorded between 3/6/2018 12:50 PM and 3/6/2018  2:11 PM *    Specimens:                None      Drains:           Findings: see op note    Complications: none      Imtiaz Lobato MD     Date: 3/6/2018  Time: 2:13 PM

## 2018-03-06 NOTE — ANESTHESIA PREPROCEDURE EVALUATION
Anesthesia Evaluation     Patient summary reviewed and Nursing notes reviewed   no history of anesthetic complications:  NPO Solid Status: > 8 hours  NPO Liquid Status: > 8 hours           Airway   Mallampati: I  TM distance: >3 FB  Neck ROM: full  No difficulty expected  Dental      Pulmonary     breath sounds clear to auscultation  (+) asthma (uses inhalers regularly, reports chronic cough),   (-) not a smoker  Cardiovascular   Exercise tolerance: poor (<4 METS)    Patient on routine beta blocker and Beta blocker given within 24 hours of surgery  Rhythm: regular  Rate: normal    (+) hypertension,   (-) CAD, angina, cardiac stents      Neuro/Psych  (-) seizures, TIA, CVA  GI/Hepatic/Renal/Endo    (+) obesity,   renal disease CRI, hypothyroidism,   (-) liver disease, diabetes    Musculoskeletal     Abdominal    Substance History      OB/GYN          Other   (+) arthritis                     Anesthesia Plan    ASA 2     general   (Preop cxr, cbc, bmp, ekg reviewed via media tab, all wnl)  intravenous induction   Anesthetic plan and risks discussed with patient.

## 2018-03-06 NOTE — ANESTHESIA PROCEDURE NOTES
Airway  Urgency: elective    Airway not difficult    General Information and Staff    Patient location during procedure: OR  CRNA: EYE, CATALINA    Indications and Patient Condition  Indications for airway management: airway protection    Preoxygenated: yes  Mask difficulty assessment: 1 - vent by mask    Final Airway Details  Final airway type: endotracheal airway      Successful airway: ETT  Cuffed: yes   Successful intubation technique: direct laryngoscopy  Facilitating devices/methods: intubating stylet  Endotracheal tube insertion site: oral  Blade: Cory  Blade size: 3.5.  ETT size: 7.0 mm  Cormack-Lehane Classification: grade I - full view of glottis  Placement verified by: chest auscultation and capnometry   Cuff volume (mL): 5  Measured from: lips  ETT to lips (cm): 21  Number of attempts at approach: 1

## 2018-03-06 NOTE — PLAN OF CARE
Problem: Patient Care Overview (Adult)  Goal: Plan of Care Review  Outcome: Ongoing (interventions implemented as appropriate)   03/06/18 142   Patient Care Overview   Progress improving   Coping/Psychosocial Response Interventions   Plan Of Care Reviewed With patient   Outcome Evaluation   Outcome Summary/Follow up Plan d/c criteria met       Problem: Perioperative Period (Adult)  Goal: Signs and Symptoms of Listed Potential Problems Will be Absent or Manageable (Perioperative Period)  Outcome: Ongoing (interventions implemented as appropriate)

## 2018-03-06 NOTE — H&P
Pt Name: Hanny Ivy  MRN: [id number]  YOB: 1945  Date of evaluation: 3/6/2018    H&P including current review of systems was updated in the paper chart and/or the document previously scanned into the record.  There have been no significant changes or new problems since the original evaluation.  The patient's problems continue and indications for contemplated procedure have not changed.    Electronically signed by Imtiaz Lobato MD on 3/6/2018 at 11:20 AM

## 2018-03-06 NOTE — DISCHARGE INSTRUCTIONS
UPPER EXTREMITY POST-OP INSTRUCTIONS - DR. STANTON    IMPORTANT PHONE NUMBERS:  • For emergencies, please call [phone]  • You may reach Dr. Stanton and clinical staff at [phone]- M-F 8:00 am-5:00 pm  • After 5pm or on the weekends, please call [phone]  • Call immediately if you have any of the following symptoms:     Elevated temperature above 101.5 degrees for more than 48 hours after surgery     Persistent drainage from wound     Severe pain around surgical site    Sling use: The sling is provided for your comfort and to ensure proper healing of your repair following surgery. Please place the abduction pillow with the curved side against your side and the sling on the side of the pillow. Your surgery requires that you wear the sling if noted below.  ____ For comfort. Remove sling 24 hours and begin range of motion exercises  _X___ At all times except bathing, dressing, and therapy. Also wear the sling during sleep.  ____ No sling required    Bathing:  ___No bandages, no restrictions!!  _X__You may remove you dressing and shower on the 3rd day after surgery (Ex. Tues surgery, shower on Friday)  ** if you are told to it is ok to remove your dressing and shower, DO NOT SOAK your incisions in a tub.  ___Keep splint clean, dry, and intact. DO NOT place foreign objects into your splint.      Dressings: Keep dressing/splint intact unless instructed otherwise below. SOME DRAINAGE IS NORMAL!    • DO NOT touch or apply ointment to the incision.    • DO NOT remove the steri-strips over the incisions (if you have steri-strips). They will         generally fall off on their own or can be removed 1 weeksafter surgery.    • If you have yellow gauze and it comes off, do not worry about it. Leave them off.   • Signs of infection that warrant a phone call to our clinical line:     o Excessive drainage or redness     o Red streaking coming away from the incision  o Increased pain  o Increased temperature  above 101 degrees      Physical Therapy:        *  Your physical therapy status will be discussed with you postoperatively and at your first post-op appointment. Some injuries will not require physical therapy.      *  If you have a shoulder manipulation, please schedule therapy for the next day      Medications: You will be discharged with the appropriate medications following your surgery. Fill these at the pharmacy and take them as directed on the label. Not all of the medications below may be prescribed. Occasionally, other medications may be prescribed with specific instructions.    Percocet/Lortab (oxycodone/hydrocodone with tylenol) - Pain Medication, will cause drowsiness, possibly itchiness (this is NOT an allergy - use benadryl or an over the counter allergy medication such as Claritin or Zyrtec)     o Take 1-2 tablets every 4-6 hours. DO NOT EXCEED 4,000mg of Tylenol in 24 hours.  **DO NOT MIX WITH ALCOHOL, DRIVE WHILE TAKING, OR TAKE with extra TYLENOL**    Colace (Docusate) - stool softener, used for constipation. Take this only if you feel constipated.      Zofran (Ondansetron) or Phenergan - Anti-nausea medication, will cause drowsiness      **If you are running low on pain medications, please notify us if you need a refill 24-48 hours prior to when you run out, so we can make arrangements to refill the prescription for you if we determine is necessary

## 2018-03-06 NOTE — PLAN OF CARE
Problem: Perioperative Period (Adult)  Goal: Signs and Symptoms of Listed Potential Problems Will be Absent or Manageable (Perioperative Period)  Outcome: Ongoing (interventions implemented as appropriate)   03/06/18 8107   Perioperative Period   Problems Assessed (Perioperative Period) pain;hypothermia;hypoxia/hypoxemia;perioperative injury;situational response   Problems Present (Perioperative Period) situational response

## 2018-03-06 NOTE — ANESTHESIA PROCEDURE NOTES
Peripheral Block    Patient location during procedure: pre-op  Start time: 3/6/2018 12:27 PM  Stop time: 3/6/2018 12:31 PM  Reason for block: post-op pain management  Performed by  Anesthesiologist: TOM BRAY  Preanesthetic Checklist  Completed: patient identified, site marked, surgical consent, pre-op evaluation, timeout performed, IV checked, risks and benefits discussed and monitors and equipment checked  Prep:  Pt Position: supine  Sterile barriers:cap, gloves and mask  Prep: ChloraPrep  Patient monitoring: blood pressure monitoring, continuous pulse oximetry and EKG  Procedure  Sedation:yes  Performed under: local infiltration  Guidance:ultrasound guided  ULTRASOUND INTERPRETATION. Using ultrasound guidance a 20 G gauge needle was placed in close proximity to the nerve, at which point, under ultrasound guidance anesthetic was injected in the area of the nerve and spread of the anesthesia was seen on ultrasound in close proximity thereto.  There were no abnormalities seen on ultrasound; a digital image was taken; and the patient tolerated the procedure with no complications. Images:still images obtained    Laterality:right  Block Type:interscalene  Injection Technique:single-shot  Needle Type:echogenic  Needle Gauge:20 G  Resistance on Injection: none  Medications  Local Injected:ropivacaine 0.5% Local Amount Injected:30mL  Post Assessment  Injection Assessment: negative aspiration for heme, no paresthesia on injection and incremental injection  Patient Tolerance:comfortable throughout block  Complications:no

## 2018-03-07 LAB
ANION GAP SERPL CALCULATED.3IONS-SCNC: 9 MMOL/L (ref 4–13)
BUN BLD-MCNC: 17 MG/DL (ref 5–21)
BUN/CREAT SERPL: 11 (ref 7–25)
CALCIUM SPEC-SCNC: 8.7 MG/DL (ref 8.4–10.4)
CHLORIDE SERPL-SCNC: 97 MMOL/L (ref 98–110)
CO2 SERPL-SCNC: 26 MMOL/L (ref 24–31)
CREAT BLD-MCNC: 1.54 MG/DL (ref 0.5–1.4)
GFR SERPL CREATININE-BSD FRML MDRD: 33 ML/MIN/1.73
GLUCOSE BLD-MCNC: 107 MG/DL (ref 70–100)
HCT VFR BLD AUTO: 28.4 % (ref 37–47)
HGB BLD-MCNC: 9.3 G/DL (ref 12–16)
POTASSIUM BLD-SCNC: 4.4 MMOL/L (ref 3.5–5.3)
SODIUM BLD-SCNC: 132 MMOL/L (ref 135–145)

## 2018-03-07 PROCEDURE — 80048 BASIC METABOLIC PNL TOTAL CA: CPT | Performed by: ORTHOPAEDIC SURGERY

## 2018-03-07 PROCEDURE — 94799 UNLISTED PULMONARY SVC/PX: CPT

## 2018-03-07 PROCEDURE — 97530 THERAPEUTIC ACTIVITIES: CPT

## 2018-03-07 PROCEDURE — G8982 BODY POS GOAL STATUS: HCPCS

## 2018-03-07 PROCEDURE — 97166 OT EVAL MOD COMPLEX 45 MIN: CPT

## 2018-03-07 PROCEDURE — G8987 SELF CARE CURRENT STATUS: HCPCS | Performed by: OCCUPATIONAL THERAPIST

## 2018-03-07 PROCEDURE — 97110 THERAPEUTIC EXERCISES: CPT

## 2018-03-07 PROCEDURE — 85014 HEMATOCRIT: CPT | Performed by: ORTHOPAEDIC SURGERY

## 2018-03-07 PROCEDURE — 25010000002 HYDROMORPHONE PER 4 MG: Performed by: ORTHOPAEDIC SURGERY

## 2018-03-07 PROCEDURE — 25010000002 MORPHINE PER 10 MG: Performed by: ORTHOPAEDIC SURGERY

## 2018-03-07 PROCEDURE — 85018 HEMOGLOBIN: CPT | Performed by: ORTHOPAEDIC SURGERY

## 2018-03-07 PROCEDURE — 25010000003 CEFAZOLIN PER 500 MG: Performed by: ORTHOPAEDIC SURGERY

## 2018-03-07 PROCEDURE — G8981 BODY POS CURRENT STATUS: HCPCS

## 2018-03-07 PROCEDURE — G8988 SELF CARE GOAL STATUS: HCPCS | Performed by: OCCUPATIONAL THERAPIST

## 2018-03-07 PROCEDURE — 97162 PT EVAL MOD COMPLEX 30 MIN: CPT

## 2018-03-07 PROCEDURE — 25010000002 ENOXAPARIN PER 10 MG: Performed by: ORTHOPAEDIC SURGERY

## 2018-03-07 RX ADMIN — OXYCODONE HYDROCHLORIDE AND ACETAMINOPHEN 2 TABLET: 7.5; 325 TABLET ORAL at 06:38

## 2018-03-07 RX ADMIN — LORAZEPAM 1 MG: 1 TABLET ORAL at 10:36

## 2018-03-07 RX ADMIN — OXYCODONE HYDROCHLORIDE AND ACETAMINOPHEN 2 TABLET: 7.5; 325 TABLET ORAL at 14:37

## 2018-03-07 RX ADMIN — MORPHINE SULFATE 4 MG: 4 INJECTION INTRAVENOUS at 05:12

## 2018-03-07 RX ADMIN — CLONIDINE HYDROCHLORIDE 0.1 MG: 0.1 TABLET ORAL at 09:11

## 2018-03-07 RX ADMIN — BUPROPION HYDROCHLORIDE 100 MG: 100 TABLET, FILM COATED, EXTENDED RELEASE ORAL at 09:11

## 2018-03-07 RX ADMIN — CARVEDILOL 12.5 MG: 6.25 TABLET, FILM COATED ORAL at 09:11

## 2018-03-07 RX ADMIN — FAMOTIDINE 40 MG: 20 TABLET, FILM COATED ORAL at 09:00

## 2018-03-07 RX ADMIN — CEFAZOLIN SODIUM 2 G: 1 INJECTION, POWDER, FOR SOLUTION INTRAMUSCULAR; INTRAVENOUS at 05:09

## 2018-03-07 RX ADMIN — ATORVASTATIN CALCIUM 10 MG: 10 TABLET, FILM COATED ORAL at 22:17

## 2018-03-07 RX ADMIN — DOCUSATE SODIUM AND SENNOSIDES 2 TABLET: 8.6; 5 TABLET, FILM COATED ORAL at 22:16

## 2018-03-07 RX ADMIN — FLUOXETINE HYDROCHLORIDE 40 MG: 20 CAPSULE ORAL at 09:11

## 2018-03-07 RX ADMIN — OXYCODONE HYDROCHLORIDE AND ACETAMINOPHEN 2 TABLET: 7.5; 325 TABLET ORAL at 10:36

## 2018-03-07 RX ADMIN — ACETAMINOPHEN 650 MG: 325 TABLET, FILM COATED ORAL at 22:16

## 2018-03-07 RX ADMIN — CARVEDILOL 12.5 MG: 6.25 TABLET, FILM COATED ORAL at 22:17

## 2018-03-07 RX ADMIN — LEVOTHYROXINE SODIUM 100 MCG: 100 TABLET ORAL at 05:08

## 2018-03-07 RX ADMIN — BUDESONIDE AND FORMOTEROL FUMARATE DIHYDRATE 2 PUFF: 160; 4.5 AEROSOL RESPIRATORY (INHALATION) at 07:36

## 2018-03-07 RX ADMIN — HYDROMORPHONE HYDROCHLORIDE 1 MG: 1 INJECTION, SOLUTION INTRAMUSCULAR; INTRAVENOUS; SUBCUTANEOUS at 13:11

## 2018-03-07 RX ADMIN — ALLOPURINOL 50 MG: 100 TABLET ORAL at 09:11

## 2018-03-07 RX ADMIN — DOXEPIN HYDROCHLORIDE 100 MG: 100 CAPSULE ORAL at 22:16

## 2018-03-07 RX ADMIN — BUDESONIDE AND FORMOTEROL FUMARATE DIHYDRATE 2 PUFF: 160; 4.5 AEROSOL RESPIRATORY (INHALATION) at 21:38

## 2018-03-07 RX ADMIN — GABAPENTIN 600 MG: 300 CAPSULE ORAL at 05:08

## 2018-03-07 RX ADMIN — SODIUM CHLORIDE, POTASSIUM CHLORIDE, SODIUM LACTATE AND CALCIUM CHLORIDE 100 ML/HR: 600; 310; 30; 20 INJECTION, SOLUTION INTRAVENOUS at 00:47

## 2018-03-07 RX ADMIN — GABAPENTIN 600 MG: 300 CAPSULE ORAL at 14:11

## 2018-03-07 RX ADMIN — HYDROMORPHONE HYDROCHLORIDE 1 MG: 1 INJECTION, SOLUTION INTRAMUSCULAR; INTRAVENOUS; SUBCUTANEOUS at 00:45

## 2018-03-07 RX ADMIN — ENOXAPARIN SODIUM 40 MG: 40 INJECTION SUBCUTANEOUS at 09:10

## 2018-03-07 RX ADMIN — OXYCODONE HYDROCHLORIDE AND ACETAMINOPHEN 2 TABLET: 7.5; 325 TABLET ORAL at 02:23

## 2018-03-07 NOTE — THERAPY EVALUATION
Acute Care - Occupational Therapy Initial Evaluation  [facility]     Patient Name: Hanny Ivy  : 1945  MRN: [id number]  Today's Date: 3/7/2018  Onset of Illness/Injury or Date of Surgery Date: (P) 18  Date of Referral to OT: (P) 18  Referring Physician: FRANCISCO) Dr. Lobaot    Admit Date: 3/6/2018       ICD-10-CM ICD-9-CM   1. Impaired mobility and ADLs Z74.09 799.89   2. Impaired functional mobility, balance, gait, and endurance Z74.09 V49.89     Patient Active Problem List   Diagnosis   • Dermatochalasis of both upper eyelids   • Visual field defect, unspecified   • Rotator cuff arthropathy, right   • Pure hypercholesterolemia   • Essential hypertension   • Mild intermittent asthma without complication   • Generalized anxiety disorder   • Gastroesophageal reflux disease without esophagitis   • Acquired hypothyroidism     Past Medical History:   Diagnosis Date   • Asthma    • Dermatochalasis of both upper eyelids    • GERD (gastroesophageal reflux disease)    • Hypertension    • Hypothyroidism     hypothyroidism   • Osteoarthritis    • Visual field defect, unspecified      Past Surgical History:   Procedure Laterality Date   • ANKLE SURGERY     • CERVICAL FUSION     • HYSTERECTOMY     • OTHER SURGICAL HISTORY      thumb   • REPLACEMENT TOTAL KNEE     • TOTAL SHOULDER ARTHROPLASTY W/ DISTAL CLAVICLE EXCISION Right 3/6/2018    Procedure: RIGHT REVERSE TOTAL SHOULDER ARTHROPLASTY;  Surgeon: Imtiaz Lobato MD;  Location: [facility];  Service:           OT ASSESSMENT FLOWSHEET (last 72 hours)      OT Evaluation       18 1531 18 1451 18 0830 18 0818 18 1500    Rehab Evaluation    Document Type  therapy note (daily note)  -AE (P)  evaluation  -MK evaluation   See MAR  -PB (r) LN (t) PB (c)     Subjective Information  agree to therapy;complains of;pain  -AE (P)  agree to therapy;complains of;pain;weakness  -MK agree to therapy;complains of;pain;weakness;dizziness  -PB (r)  LN (t) PB (c)     Patient Effort, Rehab Treatment    good  -PB (r) LN (t) PB (c)     General Information    Patient Profile Review   (P)  yes  -MK yes  -PB (r) LN (t) PB (c)     Onset of Illness/Injury or Date of Surgery Date   (P)  03/06/18  -MK 03/06/18  -PB (r) LN (t) PB (c)     Referring Physician   (P)  Dr. Lobato  -MK Dr. Lobato  -PB (r) LN (t) PB (c)     General Observations   (P)  Pt sitting upright in chair with breakfast tray nearby, awake and alert. Sling on RUE  -MK Pt sitting upright in chair with breakfast tray nearby, awake and alert. shoulder immobilizer on RUE  -PB (r) LN (t) PB (c)     Pertinent History Of Current Problem   (P)  Pt is s/p Right reverse TSA performed on 3/6/18 for massive R rotator cuff tear. Pt fell several months ago and developed pseudoparalysis of the R shoulder as well as progressive loss of strength.  - Pt is s/p Right reverse TSA performed on 3/6/18 for massive R rotator cuff tear. Pt fell several months ago and developed pseudoparalysis of the R shoulder as well as progressive loss of strength.  -PB (r) LN (t) PB (c)     Precautions/Limitations  shoulder precautions;brace on when up   reverse total shlder NWB  -AE (P)  shoulder precautions;brace on when up;fall precautions   reverse total shoulder  -MK shoulder precautions   Reverse TSA  -PB (r) LN (t) PB (c)     Prior Level of Function   (P)  independent:;all household mobility;community mobility;ADL's  - independent:;all household mobility;gait;transfer;bed mobility;ADL's  -PB (r) LN (t) PB (c)     Equipment Currently Used at Home walker, rolling;wheelchair;bath bench  -KP  (P)  bath bench;wheelchair;walker, rolling  - none   pt states they have WC and walker for  at home  -PB (r) LN (t) PB (c) none  -MG    Plans/Goals Discussed With   (P)  patient;agreed upon  - patient;agreed upon  -PB (r) LN (t) PB (c)     Risks Reviewed   (P)  patient:;LOB;nausea/vomiting;dizziness;increased discomfort;lines disloged   - patient:;LOB;dizziness;increased discomfort;change in vital signs;lines disloged  -PB (r) LN (t) PB (c)     Benefits Reviewed   (P)  patient:;improve function;increase independence;increase strength;increase balance;decrease pain;increase knowledge  - patient:;improve function;increase independence;increase strength;increase balance;decrease pain  -PB (r) LN (t) PB (c)     Barriers to Rehab   (P)  previous functional deficit;physical barrier  - previous functional deficit  -PB (r) LN (t) PB (c)     Living Environment    Lives With spouse  -KP  (P)  spouse  -MK spouse  -PB (r) LN (t) PB (c) spouse  -MG    Living Arrangements house  -KP  (P)  house  - house  -PB (r) LN (t) PB (c) house  -MG    Home Accessibility   (P)  stairs to enter home;tub/shower is not walk in;bed and bath on same level;grab bars present (bathtub)   wants a bedside commode for return home  - stairs to enter home;tub/shower is not walk in   Wants a commode for return home  -PB (r) LN (t) PB (c) no concerns  -MG    Number of Stairs to Enter Home   (P)  2  -MK 2  -PB (r) LN (t) PB (c)     Stair Railings at Home    none  -PB (r) LN (t) PB (c) none  -MG    Type of Financial/Environmental Concern    none  -PB (r) LN (t) PB (c) none  -MG    Transportation Available family or friend will provide;car  -   car;family or friend will provide  -PB (r) LN (t) PB (c) car;family or friend will provide  -    Living Environment Comment   (P)  spouse undergoing chemo, daughters available some but not 24/7 for support  - spouse currently undergoing chemo  -PB (r) LN (t) PB (c)     Clinical Impression    Date of Referral to OT   (P)  03/06/18  -      OT Diagnosis   (P)  decreased ADL  -      Prognosis   (P)  good  -      Impairments Found (describe specific impairments)   (P)  aerobic capacity/endurance;gait, locomotion, and balance;joint integrity and mobility  -      Patient/Family Goals Statement   (P)  improve overall function and  independence for return home  -      Criteria for Skilled Therapeutic Interventions Met   (P)  yes;treatment indicated  -      Rehab Potential   (P)  good, to achieve stated therapy goals  -      Therapy Frequency   (P)  3-5 times/wk  -      Predicted Duration of Therapy Intervention (days/wks)   (P)  until d/c  -      Anticipated Equipment Needs At Discharge   (P)  bedside commode  -      Anticipated Discharge Disposition   (P)  skilled nursing facility  -      Functional Level Prior    Ambulation     0-->independent  -MG    Transferring     0-->independent  -MG    Toileting     0-->independent  -MG    Bathing     0-->independent  -MG    Dressing     0-->independent  -MG    Eating     0-->independent  -MG    Communication     0-->understands/communicates without difficulty  -MG    Swallowing     0-->swallows foods/liquids without difficulty  -MG    Prior Functional Level Comment     INDEPENDENT  -MG    Pain Assessment    Pain Assessment  0-10  -AE (P)  0-10  -MK 0-10  -PB (r) LN (t) PB (c)     Pain Score  6  -AE (P)  10  -MK 10  -PB (r) LN (t) PB (c)     Post Pain Score  6  -AE (P)  8  -MK 8  -PB (r) LN (t) PB (c)     Pain Type  Surgical pain;Acute pain  -AE (P)  Surgical pain;Acute pain  - Surgical pain  -PB (r) LN (t) PB (c)     Pain Location  Shoulder  -AE (P)  Shoulder  - Shoulder  -PB (r) LN (t) PB (c)     Pain Orientation  Right  -AE (P)  Right  -MK Right  -PB (r) LN (t) PB (c)     Pain Descriptors  Aching  -AE (P)  Aching  - Aching  -PB (r) LN (t) PB (c)     Pain Frequency  Constant/continuous  -AE (P)  Constant/continuous  -MK Constant/continuous  -PB (r) LN (t) PB (c)     Effect of Pain on Daily Activities    Impairs mobility and ADLs  -PB (r) LN (t) PB (c)     Pain Intervention(s)  Medication (See MAR)  -AE (P)  Medication (See MAR);Repositioned;Ambulation/increased activity  - Repositioned;Ambulation/increased activity  -PB (r) LN (t) PB (c)     Response to Interventions  tolerated   -AE (P)  tolerated  -MK pain decreased slightly  -PB (r) LN (t) PB (c)     Vision Assessment/Intervention    Visual Impairment   (P)  WFL with corrective lenses  -MK WFL with corrective lenses  -PB (r) LN (t) PB (c)     Cognitive Assessment/Intervention    Current Cognitive/Communication Assessment   (P)  functional  -MK functional  -PB (r) LN (t) PB (c)     Orientation Status   (P)  oriented x 4  -MK oriented x 4  -PB (r) LN (t) PB (c)     Follows Commands/Answers Questions   (P)  100% of the time;able to follow single-step instructions  -MK able to follow single-step instructions;100% of the time  -PB (r) LN (t) PB (c)     Personal Safety   (P)  WNL/WFL  -MK WNL/WFL  -PB (r) LN (t) PB (c)     Personal Safety Interventions   (P)  fall prevention program maintained;nonskid shoes/slippers when out of bed;supervised activity  - fall prevention program maintained;nonskid shoes/slippers when out of bed;supervised activity  -PB (r) LN (t) PB (c)     ROM (Range of Motion)    General ROM Detail   (P)  AROM WFL LUE, deferred R due to sx   -MK BLE AROM WFL  -PB (r) LN (t) PB (c)     MMT (Manual Muscle Testing)    General MMT Assessment Detail   (P)  LUE obs functional 4/5, deferred RUE  -MK BLE grossly 4+/5  -PB (r) LN (t) PB (c)     Mobility Assessment/Training    Extremity Weight-Bearing Status   (P)  right upper extremity  -MK right upper extremity  -PB (r) LN (t) PB (c)     Right Upper Extremity Weight-Bearing  non weight-bearing  -AE (P)  non weight-bearing  -MK non weight-bearing  -PB (r) LN (t) PB (c)     Bed Mobility, Assessment/Treatment    Bed Mobility, Assistive Device  bed rails;head of bed elevated  -AE (P)  bed rails;head of bed elevated  -MK bed rails;head of bed elevated  -PB (r) LN (t) PB (c)     Bed Mobility, Scoot/Bridge, [place]  minimum assist (75% patient effort)  -AE (P)  minimum assist (75% patient effort);2 person assist required  - minimum assist (75% patient effort);2 person assist  required  -PB (r) LN (t) PB (c)     Bed Mob, Sit to Supine, [place]   (P)  minimum assist (75% patient effort)  -MK contact guard assist;verbal cues required  -PB (r) LN (t) PB (c)     Bed Mobility, Safety Issues   (P)  decreased use of arms for pushing/pulling  -MK decreased use of arms for pushing/pulling  -PB (r) LN (t) PB (c)     Bed Mobility, Impairments   (P)  strength decreased;impaired balance;pain   immobilizer to RUE  -MK strength decreased;impaired balance  -PB (r) LN (t) PB (c)     Bed Mobility, Comment   (P)  limited in roll and sup>sit due to pain and restrictions/immobilizer, continuing education required for ind and safety  -MK Pt able to perform small bridge while supine  -PB (r) LN (t) PB (c)     Transfer Assessment/Treatment    Transfers, Chair-Bed [place]   (P)  minimum assist (75% patient effort)  -MK minimum assist (75% patient effort);verbal cues required;nonverbal cues required (demo/gesture)  -PB (r) LN (t) PB (c)     Transfers, Sit-Stand [place]  minimum assist (75% patient effort);verbal cues required  -AE (P)  minimum assist (75% patient effort);verbal cues required  -MK minimum assist (75% patient effort);contact guard assist;verbal cues required;nonverbal cues required (demo/gesture)  -PB (r) LN (t) PB (c)     Transfers, Stand-Sit [place]  minimum assist (75% patient effort)  -AE (P)  minimum assist (75% patient effort);verbal cues required  -MK minimum assist (75% patient effort);contact guard assist;verbal cues required;nonverbal cues required (demo/gesture)  -PB (r) LN (t) PB (c)     Transfers, Sit-Stand-Sit, Assist Device   (P)  aleksandar walker  -MK aleksandar walker   on L  -PB (r) LN (t) PB (c)     Transfer, Safety Issues   (P)  balance decreased during turns  -MK balance decreased during turns  -PB (r) LN (t) PB (c)     Transfer, Impairments   (P)  strength decreased;impaired balance;pain  -MK strength decreased;impaired balance;pain  -PB (r) LN (t) PB (c)     Transfer,  Comment   (P)  pt became dizzy with 1 min standing, does not slow to descend to chair, vc required for safety  - Pt became dizzy and weak after 30 seconds of standing, requested to sit; postural sway noted, min A to correct  -PB (r) LN (t) PB (c)     Functional Mobility    Functional Mobility- Comment   (P)  unable to perform at this time due to fatigue and dizziness  -      Upper Body Dressing Assessment/Training    UB Dressing Assess/Train, Clothing Type   (P)  donning:;doffing:   sh immobilizer  -      UB Dressing Assess/Train, Position   (P)  sitting  -      UB Dressing Assess/Train, [place]   (P)  maximum assist (25% patient effort)  -      UB Dressing Assess/Train, Impairments   (P)  strength decreased;impaired balance;pain  -      UB Dressing Assess/Train, Comment   (P)  maxA for sh immobilizer due to first time, will continue to need mod assistance with brace  -      Lower Body Dressing Assessment/Training    LB Dressing Assess/Train, Clothing Type   (P)  donning:;doffing:;slipper socks  -      LB Dressing Assess/Train, Position   (P)  sitting  -      LB Dressing Assess/Train, [place]   (P)  maximum assist (25% patient effort)  -      LB Dressing Assess/Train, Impairments   (P)  strength decreased;impaired balance;pain  -      LB Dressing Assess/Train, Comment   (P)  unable to perform task at this time due to dizziness and sh immobilizer, will need continuing ed for activity modifications  -      Motor Skills/Interventions    Additional Documentation   (P)  Balance Skills Training (Group)  - Balance Skills Training (Group)  -PB (r) LN (t) PB (c)     Balance Skills Training    Sitting-Level of Assistance  Contact guard  -AE (P)  Contact guard;Close supervision  - Contact guard;Close supervision  -PB (r) LN (t) PB (c)     Sitting-Balance Support   (P)  Left upper extremity supported;Feet supported  - Left upper extremity supported;Feet supported  -PB (r) LN (t) PB (c)      Standing-Level of Assistance   (P)  Contact guard;Minimum assistance  - Contact guard;Minimum assistance  -PB (r) LN (t) PB (c)     Static Standing Balance Support   (P)  assistive device  - assistive device  -PB (r) LN (t) PB (c)     Therapy Exercises    Bilateral Lower Extremities  AROM:;20 reps;sitting;LAQ;hip flexion;ankle pumps/circles  -AE       Orthotics Prosthetics    Additional Documentation   (P)  Orthosis Location (Group);Orthosis Management/Training (Group)  - Orthosis Location (Group);Orthosis Management/Training (Group)  -PB (r) LN (t) PB (c)     Orthosis Location    Orthosis Location/Type   (P)  upper extremity  - upper extremity  -PB (r) LN (t) PB (c)     Orthosis, Upper Extremity   (P)  Right:;shoulder immobilizer  - Right:;shoulder immobilizer  -PB (r) LN (t) PB (c)     Orthosis Management/Training    Orthosis Fabrication Detail    Shoulder immobilizer   -PB (r) LN (t) PB (c)     Orthosis Indications   (P)  immobilize, protect/position healing structures;rest, reduce inflammation;rest, reduce pain;restrict motion  - immobilize, protect/position healing structures;restrict motion  -PB (r) LN (t) PB (c)     Orthosis Indications Comment    s/p R reverse TSA  -PB (r) LN (t) PB (c)     Orthosis Skills Training   (P)  activity limitations;clothing management related to orthosis;doffing orthosis;donning orthosis;orthosis maintenance;purpose/goals of orthosis;recognizing skin issues related to orthosis;restrictions/precautions;sleeping with orthosis  - activity limitations;clothing management related to orthosis;doffing orthosis;donning orthosis;purpose/goals of orthosis;restrictions/precautions;sleeping with orthosis  -PB (r) LN (t) PB (c)     Orthosis Skills Training Comment   (P)  verbalized understanding initially, however requires continuing ed due to inability to teach back at end of eval  -MK      Orthosis Wear Schedule   (P)  remove for hygiene/bathing;wear full time  -MK wear full  time  -PB (r) LN (t) PB (c)     Orthosis Skin Assessment   (P)  skin is intact, no issues w/ skin integrity  -MK skin is intact, no issues w/ skin integrity  -PB (r) LN (t) PB (c)     Sensory Assessment/Intervention    Sensory Impairment   (P)  --   WNL per pt  -MK --   WNL per pt except slight numbness in L toes- prior surgery  -PB (r) LN (t) PB (c)     Light Touch    LLE;RLE  -PB (r) LN (t) PB (c)     LLE Light Touch    WNL  -PB (r) LN (t) PB (c)     RLE Light Touch    WNL  -PB (r) LN (t) PB (c)     General Therapy Interventions    Planned Therapy Interventions   (P)  activity intolerance;ADL retraining;balance training;bed mobility training;energy conservation;orthotic fitting/training;strengthening;ROM (Range of Motion);transfer training  -      Positioning and Restraints    Pre-Treatment Position  in bed  -AE (P)  sitting in chair/recliner  -MK sitting in chair/recliner  -PB (r) LN (t) PB (c)     Post Treatment Position  bed  -AE (P)  bed  -MK bed  -PB (r) LN (t) PB (c)     In Bed  fowlers;call light within reach  -AE (P)  fowlers;call light within reach;encouraged to call for assist;with family/caregiver;side rails up x2;SCD pump applied  -MK fowlers;call light within reach;encouraged to call for assist;side rails up x2;SCD pump applied;notified nsg;with family/caregiver  -PB (r) LN (t) PB (c)       03/05/18 1725                Living Environment    Transportation Available car;family or friend will provide  -KPA          User Key  (r) = Recorded By, (t) = Taken By, (c) = Cosigned By    Initials Name Effective Dates    AE Radha Puga, PTA 06/22/15 -     MG Duke Oleary, ANI 08/02/16 -     KPA Meghna Horner, ANI 08/02/16 -     PB Tk Nice, PT DPT 08/02/16 -     KP Alisson Pitts, BSW 09/15/16 -     MK Darleen Garza, OT Student 03/07/18 -     PENNY Hancock, PT Student 01/08/18 -            Occupational Therapy Education     Title: PT OT SLP Therapies (Active)     Topic: Occupational Therapy (Done)      Point: ADL training (Done)    Description: Instruct learner(s) on proper safety adaptation and remediation techniques during self care or transfers.   Instruct in proper use of assistive devices.    Learning Progress Summary    Learner Readiness Method Response Comment Documented by Status   Patient Acceptance E VU,NR precautions per surgeon, activity limitations/modifications, ADL retraining, transfer training, NWB status, orthosis wear/care  03/07/18 1019 Done               Point: Precautions (Done)    Description: Instruct learner(s) on prescribed precautions during self-care and functional transfers.    Learning Progress Summary    Learner Readiness Method Response Comment Documented by Status   Patient Acceptance E VU,NR precautions per surgeon, activity limitations/modifications, ADL retraining, transfer training, NWB status, orthosis wear/care  03/07/18 1019 Done               Point: Body mechanics (Done)    Description: Instruct learner(s) on proper positioning and spine alignment during self-care, functional mobility activities and/or exercises.    Learning Progress Summary    Learner Readiness Method Response Comment Documented by Status   Patient Acceptance E VU,NR precautions per surgeon, activity limitations/modifications, ADL retraining, transfer training, NWB status, orthosis wear/care  03/07/18 1019 Done                      User Key     Initials Effective Dates Name Provider Type Discipline     03/07/18 -  Darleen Garza, OT Student OT Student OT                  OT Recommendation and Plan  Anticipated Equipment Needs At Discharge: (P) bedside commode  Anticipated Discharge Disposition: (P) skilled nursing facility  Planned Therapy Interventions: (P) activity intolerance, ADL retraining, balance training, bed mobility training, energy conservation, orthotic fitting/training, strengthening, ROM (Range of Motion), transfer training  Therapy Frequency: (P) 3-5 times/wk  Plan of Care Review  Plan Of  Care Reviewed With: (P) patient  Progress: (P) no change  Outcome Summary/Follow up Plan: (P) OT eval completed. Pt up in chair finishing breakfast. Shoulder immobilizer was readjusted and pt was educated on precautions, wear, care, and activity modifications. Pt verbalized understanding, however unable to teach back appropriately to demonstrate understanding and safety, continuing education required. Sit<>stand Kayla with vc and use of hemiwalker, able to maintain NWB status with transfer with min vc. Unable to complete LB ADL at this time due to pain, dizziness, and fatigue. Pt is at a significant risk of falls due to immobilizer and dizziness with standing. Skilled OT required to address overall balance and endurance and for continued education regarding activity modifications and brace wear. Due to limited direct support at home and pt requiring 24/7 care at this time, OT recommends d/c to SNF to return independence and safety with ADL upon d/c.          OT Goals       03/07/18 1028          Transfer Training OT LTG    Transfer Training OT LTG, Date Established (P)  03/07/18  [facility]      Transfer Training OT LTG, Time to Achieve (P)  by discharge  -      Transfer Training OT LTG, Activity Type (P)  toilet;tub  -      Transfer Training OT LTG, [place] Level (P)  supervision required  -      Transfer Training OT LTG, Assist Device (P)  walker, aleksandar;tub bench  -      Transfer Training OT LTG, Additional Goal (P)  without vb to maintain NWB status  -      Patient Education OT LTG    Patient Education OT LTG, Date Established (P)  03/07/18  [facility]      Patient Education OT LTG, Time to Achieve (P)  by discharge  -      Patient Education OT LTG, Education Type (P)  precautions per surgeon;brace use/care;positioning;posture/body mechanics;1 hand/aleksandar technique;home safety;energy conservation;linden/doff brace  -      Patient Education OT LTG, Education Understanding (P)  independent;demonstrates  adequately;verbalizes understanding  -      Bathing OT LTG    Bathing Goal OT LTG, Date Established (P)  03/07/18  -      Bathing Goal OT LTG, Time to Achieve (P)  by discharge  -      Bathing Goal OT LTG, Activity Type (P)  upper body bathing;lower body bathing  -      Bathing Goal OT LTG, [place] Level (P)  supervision required  -      Bathing Goal OT LTG, Assist Device (P)  tub bench  -      LB Dressing OT LTG    LB Dressing Goal OT LTG, Date Established (P)  03/07/18  -      LB Dressing Goal OT LTG, Time to Achieve (P)  by discharge  -      LB Dressing Goal OT LTG, [place] Level (P)  minimum assist (75% patient effort)  -        User Key  (r) = Recorded By, (t) = Taken By, (c) = Cosigned By    Initials Name Provider Type    MARIE Garza OT Student OT Student                Outcome Measures       03/07/18 1018 03/07/18 1012       How much help from another person do you currently need...    Turning from your back to your side while in flat bed without using bedrails?  3  -PB (r) LN (t) PB (c)     Moving from lying on back to sitting on the side of a flat bed without bedrails?  2  -PB (r) LN (t) PB (c)     Moving to and from a bed to a chair (including a wheelchair)?  3  -PB (r) LN (t) PB (c)     Standing up from a chair using your arms (e.g., wheelchair, bedside chair)?  3  -PB (r) LN (t) PB (c)     Climbing 3-5 steps with a railing?  2  -PB (r) LN (t) PB (c)     To walk in hospital room?  2  -PB (r) LN (t) PB (c)     AM-PAC 6 Clicks Score  15  -PB (r) LN (t)     How much help from another is currently needed...    Putting on and taking off regular lower body clothing? (P)  2  -MK      Bathing (including washing, rinsing, and drying) (P)  2  -MK      Toileting (which includes using toilet bed pan or urinal) (P)  2  -MK      Putting on and taking off regular upper body clothing (P)  2  -MK      Taking care of personal grooming (such as brushing teeth) (P)  2  -MK      Eating meals (P)   3  -MK      Score (P)  13  -MM (r) MK (t)      Functional Assessment    Outcome Measure Options (P)  AM-PAC 6 Clicks Daily Activity (OT)  -MK AM-PAC 6 Clicks Basic Mobility (PT)  -PB (r) LN (t) PB (c)       User Key  (r) = Recorded By, (t) = Taken By, (c) = Cosigned By    Initials Name Provider Type    PB Tk Nice, PT DPT Physical Therapist    MM Duke Loco, OTR/L Occupational Therapist    MARIE Garza, OT Student OT Student    LN Toribio Hancock, PT Student PT Student          Time Calculation:   OT Start Time: (P) 0914  OT Stop Time: (P) 0957  OT Time Calculation (min): (P) 43 min    Therapy Charges for Today     Code Description Service Date Service Provider Modifiers Qty    [id number] HC OT EVAL MOD COMPLEXITY 3 3/7/2018 Darleen Garza, OT Student GO, KX 1          OT G-codes  OT Professional Judgement Used?: (P) Yes  OT Functional Scales Options: (P) AM-PAC 6 Clicks Daily Activity (OT)  Score: (P) 13  Functional Limitation: (P) Self care  Self Care Current Status (): (P) At least 60 percent but less than 80 percent impaired, limited or restricted  Self Care Goal Status (): (P) At least 40 percent but less than 60 percent impaired, limited or restricted    Darleen Garza OT Student  3/7/2018

## 2018-03-07 NOTE — PLAN OF CARE
Problem: Patient Care Overview (Adult)  Goal: Plan of Care Review  Outcome: Ongoing (interventions implemented as appropriate)   03/07/18 1017   Coping/Psychosocial Response Interventions   Plan Of Care Reviewed With patient   Outcome Evaluation   Outcome Summary/Follow up Plan PT eval completed. Pt presents s/p right reverse TSA performed on 3/6/18. Pt educated on purpose and use of shoulder immobilizer orthotic, and on shoulder precautions following reverse TSA. Pt performed sit <-> stand with CGA-min A, but became dizzy and weak and requested to sit after 30 seconds of standing with aleksandar walker on L. Pt then performed chair -> bed transfer with min A, and sit -> supine with CGA - supervision. Pt Is limited by decreased activity tolerance and being unable to use RUE to assist with transfers/mobility. Pt will benefit from skilled therapy to address her deficits and improve safety and independence with functional mobility. Recommend DC to SNF due to pt's home environment.        Problem: Inpatient Physical Therapy  Goal: Bed Mobility Goal LTG- PT  Outcome: Ongoing (interventions implemented as appropriate)   03/07/18 1017   Bed Mobility PT LTG   Bed Mobility PT LTG, Date Established 03/07/18   Bed Mobility PT LTG, Time to Achieve by discharge   Bed Mobility PT LTG, Activity Type all bed mobility   Bed Mobility PT LTG, [place] Level supervision required   Bed Mobility PT Goal LTG, Assist Device bed rails     Goal: Transfer Training Goal 1 LTG- PT  Outcome: Ongoing (interventions implemented as appropriate)   03/07/18 1017   Transfer Training PT LTG   Transfer Training PT LTG, Date Established 03/07/18   Transfer Training PT LTG, Time to Achieve by discharge   Transfer Training PT LTG, Activity Type bed to chair /chair to bed;sit to stand/stand to sit   Transfer Training PT LTG, [place] Level contact guard assist   Transfer Training PT LTG, Assist Device walker, aleksandar     Goal: Gait Training Goal LTG-  PT  Outcome: Ongoing (interventions implemented as appropriate)   03/07/18 1017   Gait Training PT LTG   Gait Training Goal PT LTG, Date Established 03/07/18   Gait Training Goal PT LTG, Time to Achieve by discharge   Gait Training Goal PT LTG, [place] Level contact guard assist   Gait Training Goal PT LTG, Assist Device walker, aleksandar   Gait Training Goal PT LTG, Distance to Achieve 30 ft x2 with seated rest break     Goal: Patient Education Goal LTG- PT  Outcome: Ongoing (interventions implemented as appropriate)   03/07/18 1017   Patient Education PT LTG   Patient Education PT LTG, Date Established 03/07/18   Patient Education PT LTG, Time to Achieve by discharge   Patient Education PT LTG, Education Type precaution per surgeon;linden/doff brace;positioning;gait;transfers;bed mobility   Patient Education PT LTG, Education Understanding demonstrate adequately;verbalize understanding

## 2018-03-07 NOTE — THERAPY TREATMENT NOTE
Acute Care - Physical Therapy Treatment Note  [facility]     Patient Name: Hanny Ivy  : 1945  MRN: [id number]  Today's Date: 3/7/2018  Onset of Illness/Injury or Date of Surgery Date: (P) 18  Date of Referral to PT: 18  Referring Physician: FRANCISCO) Dr. Lobato    Admit Date: 3/6/2018    Visit Dx:    ICD-10-CM ICD-9-CM   1. Impaired mobility and ADLs Z74.09 799.89   2. Impaired functional mobility, balance, gait, and endurance Z74.09 V49.89     Patient Active Problem List   Diagnosis   • Dermatochalasis of both upper eyelids   • Visual field defect, unspecified   • Rotator cuff arthropathy, right   • Pure hypercholesterolemia   • Essential hypertension   • Mild intermittent asthma without complication   • Generalized anxiety disorder   • Gastroesophageal reflux disease without esophagitis   • Acquired hypothyroidism               Adult Rehabilitation Note       18 1451          Rehab Assessment/Intervention    Discipline physical therapy assistant  -AE      Document Type therapy note (daily note)  -AE      Subjective Information agree to therapy;complains of;pain  -AE      Precautions/Limitations shoulder precautions;brace on when up   reverse total shlder NWB  -AE      Recorded by [AE] Radha Puga PTA      Pain Assessment    Pain Assessment 0-10  -AE      Pain Score 6  -AE      Post Pain Score 6  -AE      Pain Type Surgical pain;Acute pain  -AE      Pain Location Shoulder  -AE      Pain Orientation Right  -AE      Pain Descriptors Aching  -AE      Pain Frequency Constant/continuous  -AE      Pain Intervention(s) Medication (See MAR)  -AE      Response to Interventions tolerated  -AE      Recorded by [AE] Radha Puga PTA      Mobility Assessment/Training    Right Upper Extremity Weight-Bearing non weight-bearing  -AE      Recorded by [AE] Radha Puga PTA      Bed Mobility, Assessment/Treatment    Bed Mobility, Assistive Device bed rails;head of bed elevated  -AE      Bed Mobility,  Scoot/Bridge, [place] minimum assist (75% patient effort)  -AE      Recorded by [AE] Radha Puga PTA      Transfer Assessment/Treatment    Transfers, Sit-Stand [place] minimum assist (75% patient effort);verbal cues required  -AE      Transfers, Stand-Sit [place] minimum assist (75% patient effort)  -AE      Recorded by [AE] Radha Puga PTA      Gait Assessment/Treatment    Gait, Assistive Device --   HHA  -AE      Gait, Safety Issues weight-shifting ability decreased  -AE      Gait, Comment --   3 side steps  -AE      Recorded by [AE] Radha Puga PTA      Balance Skills Training    Sitting-Level of Assistance Contact guard  -AE      Recorded by [AE] Radha Puga PTA      Therapy Exercises    Bilateral Lower Extremities AROM:;20 reps;sitting;LAQ;hip flexion;ankle pumps/circles  -AE      Recorded by [AE] Radha Puga PTA      Positioning and Restraints    Pre-Treatment Position in bed  -AE      Post Treatment Position bed  -AE      In Bed fowlers;call light within reach  -AE      Recorded by [AE] Radha Puga PTA        User Key  (r) = Recorded By, (t) = Taken By, (c) = Cosigned By    Initials Name Effective Dates    AE Radha Puga PTA 06/22/15 -                 IP PT Goals       03/07/18 1017          Bed Mobility PT LTG    Bed Mobility PT LTG, Date Established 03/07/18  -PB (r) LN (t) PB (c)      Bed Mobility PT LTG, Time to Achieve by discharge  -PB (r) LN (t) PB (c)      Bed Mobility PT LTG, Activity Type all bed mobility  -PB (r) LN (t) PB (c)      Bed Mobility PT LTG, [place] Level supervision required  -PB (r) LN (t) PB (c)      Bed Mobility PT Goal  LTG, Assist Device bed rails  -PB (r) LN (t) PB (c)      Transfer Training PT LTG    Transfer Training PT LTG, Date Established 03/07/18  -PB (r) LN (t) PB (c)      Transfer Training PT LTG, Time to Achieve by discharge  -PB (r) LN (t) PB (c)      Transfer Training PT LTG, Activity Type bed to chair /chair to bed;sit to  stand/stand to sit  -PB (r) LN (t) PB (c)      Transfer Training PT LTG, [place] Level contact guard assist  -PB (r) LN (t) PB (c)      Transfer Training PT LTG, Assist Device walker, aleksandar  -PB (r) LN (t) PB (c)      Gait Training PT LTG    Gait Training Goal PT LTG, Date Established 03/07/18  -PB (r) LN (t) PB (c)      Gait Training Goal PT LTG, Time to Achieve by discharge  -PB (r) LN (t) PB (c)      Gait Training Goal PT LTG, [place] Level contact guard assist  -PB (r) LN (t) PB (c)      Gait Training Goal PT LTG, Assist Device walker, aleksandar  -PB (r) LN (t) PB (c)      Gait Training Goal PT LTG, Distance to Achieve 30 ft x2 with seated rest break  -PB (r) LN (t) PB (c)      Patient Education PT LTG    Patient Education PT LTG, Date Established 03/07/18  -PB (r) LN (t) PB (c)      Patient Education PT LTG, Time to Achieve by discharge  -PB (r) LN (t) PB (c)      Patient Education PT LTG, Education Type precaution per surgeon;linden/doff brace;positioning;gait;transfers;bed mobility  -PB (r) LN (t) PB (c)      Patient Education PT LTG, Education Understanding demonstrate adequately;verbalize understanding  -PB (r) LN (t) PB (c)        User Key  (r) = Recorded By, (t) = Taken By, (c) = Cosigned By    Initials Name Provider Type    PB Tk Nice, PT DPT Physical Therapist    LN Toribio Hancock, PT Student PT Student          Physical Therapy Education     Title: PT OT SLP Therapies (Active)     Topic: Physical Therapy (Active)     Point: Mobility training (Active)    Learning Progress Summary    Learner Readiness Method Response Comment Documented by Status   Patient Acceptance E NR Educated on orthosis purpose and readjustment for proper fit and comfort; reverse TSA precautions and activity limitations; hand placement and safety with sit <-> stand and chair <-> bed; technique with bed mobility using bed rails LN 03/07/18 1015 Active               Point: Precautions (Active)    Learning Progress Summary     Learner Readiness Method Response Comment Documented by Status   Patient Acceptance E NR Educated on orthosis purpose and readjustment for proper fit and comfort; reverse TSA precautions and activity limitations; hand placement and safety with sit <-> stand and chair <-> bed; technique with bed mobility using bed rails LN 03/07/18 1015 Active                      User Key     Initials Effective Dates Name Provider Type Discipline    LN 01/08/18 -  Toribio Hancock, PT Student PT Student PT                    PT Recommendation and Plan  Anticipated Discharge Disposition: skilled nursing facility  Planned Therapy Interventions: strengthening, bed mobility training, balance training, transfer training, gait training, home exercise program, patient/family education, orthotic fitting/training  PT Frequency: per priority policy, 2 times/day             Outcome Measures       03/07/18 1018 03/07/18 1012       How much help from another person do you currently need...    Turning from your back to your side while in flat bed without using bedrails?  3  -PB (r) LN (t) PB (c)     Moving from lying on back to sitting on the side of a flat bed without bedrails?  2  -PB (r) LN (t) PB (c)     Moving to and from a bed to a chair (including a wheelchair)?  3  -PB (r) LN (t) PB (c)     Standing up from a chair using your arms (e.g., wheelchair, bedside chair)?  3  -PB (r) LN (t) PB (c)     Climbing 3-5 steps with a railing?  2  -PB (r) LN (t) PB (c)     To walk in hospital room?  2  -PB (r) LN (t) PB (c)     AM-PAC 6 Clicks Score  15  -PB (r) LN (t)     How much help from another is currently needed...    Putting on and taking off regular lower body clothing? (P)  2  -MK      Bathing (including washing, rinsing, and drying) (P)  2  -MK      Toileting (which includes using toilet bed pan or urinal) (P)  2  -MK      Putting on and taking off regular upper body clothing (P)  2  -MK      Taking care of personal grooming (such as brushing  teeth) (P)  2  -MK      Eating meals (P)  3  -MK      Score (P)  13  -MM (r) MK (t)      Functional Assessment    Outcome Measure Options (P)  AM-PAC 6 Clicks Daily Activity (OT)  -MK AM-PAC 6 Clicks Basic Mobility (PT)  -PB (r) LN (t) PB (c)       User Key  (r) = Recorded By, (t) = Taken By, (c) = Cosigned By    Initials Name Provider Type    PB Tk Nice, PT DPT Physical Therapist    MM Duke Loco, OTR/L Occupational Therapist    MARIE Garza, OT Student OT Student    LN Toribio Hancock, PT Student PT Student           Time Calculation:         PT Charges       03/07/18 1519 03/07/18 1025       Time Calculation    Start Time 1451  -AE 0920   additional 8 minutes for chart review  -PB (r) LN (t) PB (c)     Stop Time 1520  -AE 0957  -PB (r) LN (t) PB (c)     Time Calculation (min) 29 min  -AE 37 min  -PB (r) LN (t)     PT Received On 03/07/18  -AE 03/07/18  -PB (r) LN (t) PB (c)     PT Goal Re-Cert Due Date 03/17/18  -AE 03/17/18  -PB (r) LN (t) PB (c)     Time Calculation- PT    Total Timed Code Minutes- PT 29 minute(s)  -AE        User Key  (r) = Recorded By, (t) = Taken By, (c) = Cosigned By    Initials Name Provider Type    AE Radha Puga PTA Physical Therapy Assistant    CLAIRE Nice, PT DPT Physical Therapist    PENNY Hancock, PT Student PT Student          Therapy Charges for Today     Code Description Service Date Service Provider Modifiers Qty    47998939658 HC PT THER PROC EA 15 MIN 3/7/2018 Radha Puga PTA GP, KX 1    39260967778 HC PT THERAPEUTIC ACT EA 15 MIN 3/7/2018 Radha Puga PTA GP, KX 1          PT G-Codes  Outcome Measure Options: (P) AM-PAC 6 Clicks Daily Activity (OT)  Score: 15  Functional Limitation: Changing and maintaining body position  Changing and Maintaining Body Position Current Status (): At least 40 percent but less than 60 percent impaired, limited or restricted  Changing and Maintaining Body Position Goal Status (): At least 20 percent but less  than 40 percent impaired, limited or restricted    Radha Puga, PTA  3/7/2018

## 2018-03-07 NOTE — PLAN OF CARE
Problem: Patient Care Overview (Adult)  Goal: Plan of Care Review  Outcome: Ongoing (interventions implemented as appropriate)   03/07/18 1021   Patient Care Overview   Progress no change   Coping/Psychosocial Response Interventions   Plan Of Care Reviewed With patient   Outcome Evaluation   Outcome Summary/Follow up Plan OT eval completed. Pt up in chair finishing breakfast. Shoulder immobilizer was readjusted and pt was educated on precautions, wear, care, and activity modifications. Pt verbalized understanding, however unable to teach back appropriately to demonstrate understanding and safety, continuing education required. Sit<>stand Kayla with vc and use of hemiwalker, able to maintain NWB status with transfer with min vc. Unable to complete LB ADL at this time due to pain, dizziness, and fatigue. Pt is at a significant risk of falls due to immobilizer and dizziness with standing. Skilled OT required to address overall balance and endurance and for continued education regarding activity modifications and brace wear. Due to limited direct support at home and pt requiring 24/7 care at this time, OT recommends d/c to SNF to return independence and safety with ADL upon d/c.       Problem: Inpatient Occupational Therapy  Goal: Transfer Training Goal 1 LTG- OT  Outcome: Ongoing (interventions implemented as appropriate)   03/07/18 1028   Transfer Training OT LTG   Transfer Training OT LTG, Date Established 03/07/18   Transfer Training OT LTG, Time to Achieve by discharge   Transfer Training OT LTG, Activity Type toilet;tub   Transfer Training OT LTG, [place] Level supervision required   Transfer Training OT LTG, Assist Device walker, aleksandar;tub bench   Transfer Training OT LTG, Additional Goal without vb to maintain NWB status     Goal: Patient Education Goal LTG- OT  Outcome: Ongoing (interventions implemented as appropriate)   03/07/18 1028   Patient Education OT LTG   Patient Education OT LTG, Date Established  03/07/18   Patient Education OT LTG, Time to Achieve by discharge   Patient Education OT LTG, Education Type precautions per surgeon;brace use/care;positioning;posture/body mechanics;1 hand/aleksandar technique;home safety;energy conservation;linden/doff brace   Patient Education OT LTG, Education Understanding independent;demonstrates adequately;verbalizes understanding     Goal: Bathing Goal LTG- OT  Outcome: Ongoing (interventions implemented as appropriate)   03/07/18 1028   Bathing OT LTG   Bathing Goal OT LTG, Date Established 03/07/18   Bathing Goal OT LTG, Time to Achieve by discharge   Bathing Goal OT LTG, Activity Type upper body bathing;lower body bathing   Bathing Goal OT LTG, [place] Level supervision required   Bathing Goal OT LTG, Assist Device tub bench     Goal: LB Dressing Goal LTG- OT  Outcome: Ongoing (interventions implemented as appropriate)   03/07/18 1028   LB Dressing OT LTG   LB Dressing Goal OT LTG, Date Established 03/07/18   LB Dressing Goal OT LTG, Time to Achieve by discharge   LB Dressing Goal OT LTG, [place] Level minimum assist (75% patient effort)

## 2018-03-07 NOTE — PROGRESS NOTES
"  Orthopedic Surgery Progress Note    Hanny Ivy  3/7/2018      Subjective:     Systemic or Specific Complaints: doing well.     Objective:     Patient Vitals for the past 24 hrs:   BP Temp Temp src Pulse Resp SpO2 Height Weight   03/07/18 0315 109/70 98.1 °F (36.7 °C) Oral 83 18 99 % - -   03/06/18 2350 118/49 98.1 °F (36.7 °C) Oral 84 19 95 % - -   03/06/18 2025 147/59 98.2 °F (36.8 °C) Oral 76 18 96 % - -   03/06/18 2000 - - - - - 95 % - -   03/06/18 1550 151/65 98 °F (36.7 °C) Oral 74 18 100 % 162.6 cm (64\") 92.7 kg (204 lb 7 oz)   03/06/18 1520 160/58 97.6 °F (36.4 °C) Oral 73 18 97 % - -   03/06/18 1450 148/64 97.6 °F (36.4 °C) Oral 69 16 96 % - -   03/06/18 1429 164/47 98.9 °F (37.2 °C) Temporal Art 74 16 96 % - -   03/06/18 1425 146/43 - - 74 16 95 % - -   03/06/18 1420 155/62 - - 84 14 90 % - -   03/06/18 1412 137/60 97.5 °F (36.4 °C) Temporal Art 74 14 96 % - -   03/06/18 1239 167/70 - - 81 16 93 % - -   03/06/18 1235 167/70 - - 80 - - - -   03/06/18 1232 - - - 74 - - - -   03/06/18 1230 169/61 - - - - - - -   03/06/18 1229 - - - 78 - - - -   03/06/18 1228 166/63 - - 80 16 92 % - -   03/06/18 1226 - - - 79 - - - -   03/06/18 1225 166/63 - - - - - - -   03/06/18 1224 170/76 - - 76 16 94 % - -   03/06/18 1223 - - - 77 - - - -   03/06/18 1210 165/62 - - 78 16 98 % - -   03/06/18 1200 159/64 - - 78 16 94 % - -   03/06/18 0955 169/77 98.1 °F (36.7 °C) Temporal Art 81 16 93 % 162.5 cm (63.98\") 87.9 kg (193 lb 12.6 oz)       right upper  General: alert, appears stated age and cooperative   Wound: clean, dry, intact             Dressing: clean, dry, intact   Extremity: Distal NVI           DVT Exam: No evidence of DVT seen on physical exam.                   Data Review:  Lab Results (last 24 hours)     Procedure Component Value Units Date/Time    Hemoglobin & Hematocrit, Blood [[id number]]  (Abnormal) Collected:  03/07/18 0438    Specimen:  Blood Updated:  03/07/18 0548     Hemoglobin 9.3 (L) g/dL      " Hematocrit 28.4 (L) %     Basic Metabolic Panel [[id number]]  (Abnormal) Collected:  03/07/18 0438    Specimen:  Blood Updated:  03/07/18 0602     Glucose 107 (H) mg/dL      BUN 17 mg/dL      Creatinine 1.54 (H) mg/dL      Sodium 132 (L) mmol/L      Potassium 4.4 mmol/L      Chloride 97 (L) mmol/L      CO2 26.0 mmol/L      Calcium 8.7 mg/dL      eGFR Non African Amer 33 (L) mL/min/1.73      BUN/Creatinine Ratio 11.0     Anion Gap 9.0 mmol/L     Narrative:       The MDRD GFR formula is only valid for adults with stable renal function between ages 18 and 70.        Imaging Results (last 24 hours)     Procedure Component Value Units Date/Time    XR Shoulder 2+ View Right [[id number]] Collected:  03/06/18 1450     Updated:  03/06/18 1455    Narrative:       XR SHOULDER 2+ VW RIGHT- 3/6/2018 2:30 PM CST     History: postop     Comparison: None      Findings:   3 frontal postoperative views of the right shoulder are submitted.      New reverse total shoulder arthroplasty with components appearing in  good position. No periprosthetic fractures identified       Impression:       Impression:   1. New reverse total shoulder arthroplasty without visualized  complication.        This report was finalized on 03/06/2018 14:52 by Dr Kuldip Nelson, .          Assessment:     POD# 1 r Reverse TSA    Plan:      1:  DVT prophylaxis, ICE, elevate  2:  Pain control  3:  Physical therapy/Occupational therapy  4:  Anticipate discharge today or tomorrow when pain well controlled  5: Reverse Protocol       Sandeep Prater PA-C

## 2018-03-07 NOTE — CONSULTS
Consults         Referring Provider:  Luis Alfredo  Reason for Consultation: Medical management    Patient Care Team:  Kun Gonzalez MD as PCP - General (Family Medicine)  Robb Ambrocio MD as Consulting Physician (Otolaryngology)    Chief complaint shoulder pain    Subjective .     History of present illness:  The patient presents today for surgical correction after failing conservative management.  They have been through anti-inflammatories, muscle relaxers, and pain medication.  The pain is progressed to the point in time where it is affecting their activities of daily living and after being explained all their options and elected to undergo surgical correction.  Their primary care physician does not attend here [facility]; therefore, I have been asked to take care of their primary medical needs in the perioperative period.  Postoperative pain is as expected.  There are no other precipitating or relieving factors.  Block is starting to wear off and pain is rated 4-5 out of 10.    REVIEW OF SYSTEMS:    CONSTITUTIONAL:  Negative for anorexia, chills, fevers, night sweats and weight loss  EYES:  negative for eye dryness, icterus and redness  HEENT:   negative for dental problems, epistaxis, facial trauma and thrush  RESPIRATORY:  negative for chest tightness, cough, dyspnea on exertion, pneumonia and sputum  CARDIOVASCULAR: negative for chest pain, dyspnea, exertional chest pressure/discomfort, irregular heart beat, palpitations, paroxysmal nocturnal dyspnea and syncope  GASTROINTESTINAL:  History of constipation with pain medication, negative for abdominal pain, hematemesis, jaundice, melena and rectal bleeding  MUSCULOSKELETAL:  negative for muscle weakness, myalgias and neck pain  NEUROLOGICAL:   negative for dizziness, headaches, seizures, speech problems, tremors and vertigo  INTEGUMENT: negative for pruritus, rash, skin color change and skin lesion(s)         History    Past Medical History:    Diagnosis Date   • Asthma    • Dermatochalasis of both upper eyelids    • GERD (gastroesophageal reflux disease)    • Hypertension    • Hypothyroidism     hypothyroidism   • Osteoarthritis    • Visual field defect, unspecified      Past Surgical History:   Procedure Laterality Date   • ANKLE SURGERY     • CERVICAL FUSION     • HYSTERECTOMY     • OTHER SURGICAL HISTORY      thumb   • REPLACEMENT TOTAL KNEE     • TOTAL SHOULDER ARTHROPLASTY W/ DISTAL CLAVICLE EXCISION Right 3/6/2018    Procedure: RIGHT REVERSE TOTAL SHOULDER ARTHROPLASTY;  Surgeon: Imtiaz Lobato MD;  Location: [facility];  Service:      Family History   Problem Relation Age of Onset   • Cancer Maternal Grandmother      Social History   Substance Use Topics   • Smoking status: Never Smoker   • Smokeless tobacco: Never Used   • Alcohol use No     Prescriptions Prior to Admission   Medication Sig Dispense Refill Last Dose   • allopurinol (ZYLOPRIM) 100 MG tablet Take 1 tablet by mouth daily   3/5/2018 at 0700   • amlodipine-olmesartan (KESHAV) 10-40 MG per tablet Take 1 tablet by mouth Daily.   3/5/2018 at 0700   • buPROPion SR (WELLBUTRIN SR) 150 MG 12 hr tablet Take  by mouth Daily.   3/5/2018 at 0700   • carisoprodol (SOMA) 350 MG tablet 3 (Three) Times a Day As Needed.   3/5/2018 at 2100   • carvedilol (COREG) 12.5 MG tablet TAKE ONE TABLET BY MOUTH TWICE A DAY WTIH MEALS ** MAY CAUSEDROWSINESS   3/6/2018 at 0500   • Cetirizine HCl (ZYRTEC ALLERGY) 10 MG capsule Take 10 mg by mouth Daily.   3/5/2018 at 0700   • CloNIDine (CATAPRES) 0.1 MG tablet Take 0.1 mg by mouth 2 (Two) Times a Day.   3/6/2018 at 0500   • doxepin (SINEquan) 50 MG capsule Take 100 mg by mouth Every Night.   3/5/2018 at 2100   • DULERA 100-5 MCG/ACT inhaler Inhale 2 (Two) Times a Day.   3/5/2018 at 2100   • FLUoxetine (PROzac) 40 MG capsule TAKE ONE CAPSULE BY MOUTH DAILY ** MAY CAUSE DROWSINESS   3/5/2018 at 0700   • gabapentin (NEURONTIN) 300 MG capsule TAKE TWO CAPSULES  BY MOUTH THREE TIMES DAILY AS NEEDED ** MAY CAUSEDROWSINESS   3/5/2018 at 2100   • ipratropium-albuterol (COMBIVENT)  MCG/ACT inhaler Every 6 (Six) Hours.   Past Week at Unknown time   • levothyroxine (SYNTHROID, LEVOTHROID) 100 MCG tablet Take 1 tablet by mouth Q Morning   3/5/2018 at 0700   • LORazepam (ATIVAN) 1 MG tablet 3 (Three) Times a Day.   3/6/2018 at 0400   • mometasone (NASONEX) 50 MCG/ACT nasal spray 2 sprays into each nostril Daily.   3/5/2018 at 0330   • oxyCODONE-acetaminophen (PERCOCET)  MG per tablet Take 1 tablet by mouth 2 (Two) Times a Day As Needed for Moderate Pain .   3/5/2018 at 2100   • pantoprazole (PROTONIX) 40 MG EC tablet TAKE ONE TABLET BY MOUTH TWICE A DAY   3/5/2018 at 0700   • simvastatin (ZOCOR) 20 MG tablet Take 20 mg by mouth Every Night.   3/5/2018 at 2100   • Unable to find 1 each 1 (One) Time. BENTAL 20 MG PO QD/PRN   Past Month at Unknown time   • celecoxib (CELEBREX) 200 MG capsule Take 1 capsule by mouth daily PRN   More than a month at Unknown time   • chlorthalidone (HYGROTON) 25 MG tablet Take 25 mg by mouth Daily.      • EPINEPHrine (EPIPEN 2-ABRIL) 0.3 MG/0.3ML solution auto-injector injection Inject 0.3 mg into the muscle as needed. Use as directed for allergic reaction   Unknown at Unknown time   • HYDROcodone-acetaminophen (NORCO)  MG per tablet 2 (Two) Times a Day.   Taking   • VYTORIN 10-20 MG per tablet Take  by mouth Every Night.   Taking       Allergies:  Penicillins and Sulfa antibiotics    Objective     Vital Signs   Temp:  [97.5 °F (36.4 °C)-98.9 °F (37.2 °C)] 98.1 °F (36.7 °C)  Heart Rate:  [69-84] 80  Resp:  [14-19] 18  BP: (109-170)/(43-77) 109/70          Physical Exam:  Constitutional: oriented to person, place, and time. appears well-developed.   HEENT:   Head: Normocephalic and atraumatic.   Eyes: Pupils are equal, round, and reactive to light.   Neck: Neck supple.   Cardiovascular: Regular rhythm and normal heart sounds.     Pulmonary/Chest: Effort normal and breath sounds normal. CTAB  Abdominal: Soft. Bowel sounds are normal. He exhibits no distension. There is no tenderness. There is no rebound and no guarding.   Musculoskeletal: Shoulder and brace with good pulses noted distally   Neurological: she is alert and oriented to person, place, and time. she has normal reflexes.   Skin: Skin is warm and dry.     Results Review:   I reviewed the patient's new imaging results and agree with the interpretation.      Assessment/Plan     Principal Problem:    Rotator cuff arthropathy, right  Active Problems:    Pure hypercholesterolemia    Essential hypertension    Mild intermittent asthma without complication    Generalized anxiety disorder    Gastroesophageal reflux disease without esophagitis    Acquired hypothyroidism      Constipation-start with Miralax 1 capful BID until BM, then decrease to 1 x a day,then can step up to Amitizia 24 mcg po BID which can be used for opiod induced constipation,and ultimately end up with Relistor 12 mcg sub Q q 48 hours to block the effect of narcotics on the gut.      GERD-exacerbated by pain meds and anesthesia, will add PPI as needed and can step up to Carafate 1 gm po q AC and qhs.      Hypertension-review pre and post BP's,will restart home BP meds when BP allows. Add Clonidine 0.1 mg q 4 hours prn if bp> 140/90,monitor BP and adjust meds as necessary.    Generalized anxiety disorder-restart home medications and follow clinical condition.  She does not have much help at home which is exacerbating her anxiety therefore we will keep her tonight and let her work with therapy hopefully discharge home tomorrow    I discussed the patients findings and my recommendations with patient, family and consulting provider    Jac Mena MD  03/07/18  8:01 AM

## 2018-03-07 NOTE — THERAPY EVALUATION
Acute Care - Physical Therapy Initial Evaluation  [facility]     Patient Name: Hanny Ivy  : 1945  MRN: [id number]  Today's Date: 3/7/2018   Onset of Illness/Injury or Date of Surgery Date: (P) 18  Date of Referral to PT: 18  Referring Physician: FRANCISCO) Dr. Lobato      Admit Date: 3/6/2018     Visit Dx:    ICD-10-CM ICD-9-CM   1. Impaired mobility and ADLs Z74.09 799.89   2. Impaired functional mobility, balance, gait, and endurance Z74.09 V49.89     Patient Active Problem List   Diagnosis   • Dermatochalasis of both upper eyelids   • Visual field defect, unspecified   • Rotator cuff arthropathy, right   • Pure hypercholesterolemia   • Essential hypertension   • Mild intermittent asthma without complication   • Generalized anxiety disorder   • Gastroesophageal reflux disease without esophagitis   • Acquired hypothyroidism     Past Medical History:   Diagnosis Date   • Asthma    • Dermatochalasis of both upper eyelids    • GERD (gastroesophageal reflux disease)    • Hypertension    • Hypothyroidism     hypothyroidism   • Osteoarthritis    • Visual field defect, unspecified      Past Surgical History:   Procedure Laterality Date   • ANKLE SURGERY     • CERVICAL FUSION     • HYSTERECTOMY     • OTHER SURGICAL HISTORY      thumb   • REPLACEMENT TOTAL KNEE     • TOTAL SHOULDER ARTHROPLASTY W/ DISTAL CLAVICLE EXCISION Right 3/6/2018    Procedure: RIGHT REVERSE TOTAL SHOULDER ARTHROPLASTY;  Surgeon: Imtiaz Lobato MD;  Location: [facility];  Service:           PT ASSESSMENT (last 72 hours)      PT Evaluation       18 0830 18 0818    Rehab Evaluation    Document Type (P)  evaluation  -MK evaluation   See MAR  -PB (r) LN (t) PB (c)    Subjective Information (P)  agree to therapy;complains of;pain;weakness  - agree to therapy;complains of;pain;weakness;dizziness  -PB (r) LN (t) PB (c)    Patient Effort, Rehab Treatment  good  -PB (r) LN (t) PB (c)    General Information    Patient Profile  Review (P)  yes  - yes  -PB (r) LN (t) PB (c)    Onset of Illness/Injury or Date of Surgery Date (P)  03/06/18  -MK 03/06/18  -PB (r) LN (t) PB (c)    Referring Physician (P)  Dr. Lobato  - Dr. Lobato  -PB (r) LN (t) PB (c)    General Observations (P)  Pt sitting upright in chair with breakfast tray nearby, awake and alert. Sling on RUE  -MK Pt sitting upright in chair with breakfast tray nearby, awake and alert. shoulder immobilizer on RUE  -PB (r) LN (t) PB (c)    Pertinent History Of Current Problem (P)  Pt is s/p Right reverse TSA performed on 3/6/18 for massive R rotator cuff tear. Pt fell several months ago and developed pseudoparalysis of the R shoulder as well as progressive loss of strength.  - Pt is s/p Right reverse TSA performed on 3/6/18 for massive R rotator cuff tear. Pt fell several months ago and developed pseudoparalysis of the R shoulder as well as progressive loss of strength.  -PB (r) LN (t) PB (c)    Precautions/Limitations (P)  shoulder precautions;brace on when up;fall precautions   reverse total shoulder  - shoulder precautions   Reverse TSA  -PB (r) LN (t) PB (c)    Prior Level of Function (P)  independent:;all household mobility;community mobility;ADL's  - independent:;all household mobility;gait;transfer;bed mobility;ADL's  -PB (r) LN (t) PB (c)    Equipment Currently Used at Home (P)  bath bench;wheelchair;walker, rolling  - none   pt states they have WC and walker for  at home  -PB (r) LN (t) PB (c)    Plans/Goals Discussed With (P)  patient;agreed upon  -MK patient;agreed upon  -PB (r) LN (t) PB (c)    Risks Reviewed (P)  patient:;LOB;nausea/vomiting;dizziness;increased discomfort;lines disloged  - patient:;LOB;dizziness;increased discomfort;change in vital signs;lines disloged  -PB (r) LN (t) PB (c)    Benefits Reviewed (P)  patient:;improve function;increase independence;increase strength;increase balance;decrease pain;increase knowledge  -MK patient:;improve  function;increase independence;increase strength;increase balance;decrease pain  -PB (r) LN (t) PB (c)    Barriers to Rehab (P)  previous functional deficit;physical barrier  - previous functional deficit  -PB (r) LN (t) PB (c)    Living Environment    Lives With (P)  spouse  - spouse  -PB (r) LN (t) PB (c)    Living Arrangements (P)  house  - house  -PB (r) LN (t) PB (c)    Home Accessibility (P)  stairs to enter home;tub/shower is not walk in;bed and bath on same level;grab bars present (bathtub)   wants a bedside commode for return home  - stairs to enter home;tub/shower is not walk in   Wants a commode for return home  -PB (r) LN (t) PB (c)    Number of Stairs to Enter Home (P)  2  -MK 2  -PB (r) LN (t) PB (c)    Stair Railings at Home  none  -PB (r) LN (t) PB (c)    Type of Financial/Environmental Concern  none  -PB (r) LN (t) PB (c)    Transportation Available  car;family or friend will provide  -PB (r) LN (t) PB (c)    Living Environment Comment (P)  spouse undergoing chemo, daughters available some but not 24/7 for support  - spouse currently undergoing chemo  -PB (r) LN (t) PB (c)    Clinical Impression    Date of Referral to PT  03/06/18  -PB (r) LN (t) PB (c)    PT Diagnosis  Impaired functionalmobility  -PB (r) LN (t) PB (c)    Patient/Family Goals Statement  Return home  -PB (r) LN (t) PB (c)    Criteria for Skilled Therapeutic Interventions Met  yes;treatment indicated  -PB (r) LN (t) PB (c)    Pathology/Pathophysiology Noted (Describe Specifically for Each System)  musculoskeletal  -PB (r) LN (t) PB (c)    Rehab Potential  good, to achieve stated therapy goals  -PB (r) LN (t) PB (c)    Predicted Duration of Therapy Intervention (days/wks)  Until DC  -PB (r) LN (t) PB (c)    Pain Assessment    Pain Assessment (P)  0-10  - 0-10  -PB (r) LN (t) PB (c)    Pain Score (P)  10  -MK 10  -PB (r) LN (t) PB (c)    Post Pain Score (P)  8  -MK 8  -PB (r) LN (t) PB (c)    Pain Type (P)  Surgical  pain;Acute pain  -MK Surgical pain  -PB (r) LN (t) PB (c)    Pain Location (P)  Shoulder  -MK Shoulder  -PB (r) LN (t) PB (c)    Pain Orientation (P)  Right  -MK Right  -PB (r) LN (t) PB (c)    Pain Descriptors (P)  Aching  -MK Aching  -PB (r) LN (t) PB (c)    Pain Frequency (P)  Constant/continuous  -MK Constant/continuous  -PB (r) LN (t) PB (c)    Effect of Pain on Daily Activities  Impairs mobility and ADLs  -PB (r) LN (t) PB (c)    Pain Intervention(s) (P)  Medication (See MAR);Repositioned;Ambulation/increased activity  -MK Repositioned;Ambulation/increased activity  -PB (r) LN (t) PB (c)    Response to Interventions (P)  tolerated  -MK pain decreased slightly  -PB (r) LN (t) PB (c)    Vision Assessment/Intervention    Visual Impairment (P)  WFL with corrective lenses  -MK WFL with corrective lenses  -PB (r) LN (t) PB (c)    Cognitive Assessment/Intervention    Current Cognitive/Communication Assessment (P)  functional  -MK functional  -PB (r) LN (t) PB (c)    Orientation Status (P)  oriented x 4  -MK oriented x 4  -PB (r) LN (t) PB (c)    Follows Commands/Answers Questions (P)  100% of the time;able to follow single-step instructions  - able to follow single-step instructions;100% of the time  -PB (r) LN (t) PB (c)    Personal Safety (P)  WNL/WFL  -MK WNL/WFL  -PB (r) LN (t) PB (c)    Personal Safety Interventions (P)  fall prevention program maintained;nonskid shoes/slippers when out of bed;supervised activity  - fall prevention program maintained;nonskid shoes/slippers when out of bed;supervised activity  -PB (r) LN (t) PB (c)    ROM (Range of Motion)    General ROM Detail (P)  AROM WFL LUE, deferred R due to sx   -MK BLE AROM WFL  -PB (r) LN (t) PB (c)    MMT (Manual Muscle Testing)    General MMT Assessment Detail (P)  LUE obs functional 4/5, deferred RUE  -MK BLE grossly 4+/5  -PB (r) LN (t) PB (c)    Mobility Assessment/Training    Extremity Weight-Bearing Status (P)  right upper extremity  -MK right  upper extremity  -PB (r) LN (t) PB (c)    Right Upper Extremity Weight-Bearing (P)  non weight-bearing  -MK non weight-bearing  -PB (r) LN (t) PB (c)    Bed Mobility, Assessment/Treatment    Bed Mobility, Assistive Device  bed rails;head of bed elevated  -PB (r) LN (t) PB (c)    Bed Mobility, Scoot/Bridge, [place] (P)  minimum assist (75% patient effort)  -MK minimum assist (75% patient effort);2 person assist required  -PB (r) LN (t) PB (c)    Bed Mob, Sit to Supine, [place] (P)  minimum assist (75% patient effort)  -MK contact guard assist;verbal cues required  -PB (r) LN (t) PB (c)    Bed Mobility, Safety Issues (P)  decreased use of arms for pushing/pulling  -MK decreased use of arms for pushing/pulling  -PB (r) LN (t) PB (c)    Bed Mobility, Impairments (P)  strength decreased;impaired balance;pain   immobilizer to RUE  -MK strength decreased;impaired balance  -PB (r) LN (t) PB (c)    Bed Mobility, Comment (P)  limited in roll and sup>sit due to pain and restrictions/immobilizer, continuing education required for ind and safety  -MK Pt able to perform small bridge while supine  -PB (r) LN (t) PB (c)    Transfer Assessment/Treatment    Transfers, Chair-Bed [place] (P)  minimum assist (75% patient effort)  -MK minimum assist (75% patient effort);verbal cues required;nonverbal cues required (demo/gesture)  -PB (r) LN (t) PB (c)    Transfers, Sit-Stand [place] (P)  minimum assist (75% patient effort);verbal cues required  -MK minimum assist (75% patient effort);contact guard assist;verbal cues required;nonverbal cues required (demo/gesture)  -PB (r) LN (t) PB (c)    Transfers, Stand-Sit [place] (P)  minimum assist (75% patient effort);verbal cues required  -MK minimum assist (75% patient effort);contact guard assist;verbal cues required;nonverbal cues required (demo/gesture)  -PB (r) LN (t) PB (c)    Transfers, Sit-Stand-Sit, Assist Device (P)  aleksandar walker  -MK aleksandar walker   on L  -PB (r)  LN (t) PB (c)    Transfer, Safety Issues  balance decreased during turns  -PB (r) LN (t) PB (c)    Transfer, Impairments (P)  strength decreased;impaired balance;pain  - strength decreased;impaired balance;pain  -PB (r) LN (t) PB (c)    Transfer, Comment (P)  pt became dizzy with 1 min standing, does not slow to descend to chair, vc required for safety  - Pt became dizzy and weak after 30 seconds of standing, requested to sit; postural sway noted, min A to correct  -PB (r) LN (t) PB (c)    Gait Assessment/Treatment    Gait, [place] Level  not tested   Pt became dizzy and reported legs feeling like giving out  -PB (r) LN (t) PB (c)    Motor Skills/Interventions    Additional Documentation (P)  Balance Skills Training (Group)  - Balance Skills Training (Group)  -PB (r) LN (t) PB (c)    Balance Skills Training    Sitting-Level of Assistance (P)  Contact guard;Close supervision  - Contact guard;Close supervision  -PB (r) LN (t) PB (c)    Sitting-Balance Support (P)  Left upper extremity supported;Feet supported  - Left upper extremity supported;Feet supported  -PB (r) LN (t) PB (c)    Standing-Level of Assistance (P)  Contact guard;Minimum assistance  - Contact guard;Minimum assistance  -PB (r) LN (t) PB (c)    Static Standing Balance Support (P)  assistive device  - assistive device  -PB (r) LN (t) PB (c)    Orthotics Prosthetics    Additional Documentation (P)  Orthosis Location (Group);Orthosis Management/Training (Group)  - Orthosis Location (Group);Orthosis Management/Training (Group)  -PB (r) LN (t) PB (c)    Orthosis Location    Orthosis Location/Type (P)  upper extremity  - upper extremity  -PB (r) LN (t) PB (c)    Orthosis, Upper Extremity (P)  Right:;shoulder immobilizer  - Right:;shoulder immobilizer  -PB (r) LN (t) PB (c)    Orthosis Management/Training    Orthosis Fabrication Detail  Shoulder immobilizer   -PB (r) LN (t) PB (c)    Orthosis Indications (P)  immobilize,  protect/position healing structures;rest, reduce inflammation;rest, reduce pain;restrict motion  - immobilize, protect/position healing structures;restrict motion  -PB (r) LN (t) PB (c)    Orthosis Indications Comment  s/p R reverse TSA  -PB (r) LN (t) PB (c)    Orthosis Skills Training (P)  activity limitations;clothing management related to orthosis;doffing orthosis;donning orthosis;orthosis maintenance;purpose/goals of orthosis;recognizing skin issues related to orthosis;restrictions/precautions;sleeping with orthosis  -MK activity limitations;clothing management related to orthosis;doffing orthosis;donning orthosis;purpose/goals of orthosis;restrictions/precautions;sleeping with orthosis  -PB (r) LN (t) PB (c)    Orthosis Skills Training Comment (P)  verbalized understanding initially, however requires continuing ed due to inability to teach back at end of eval  -MK     Orthosis Wear Schedule (P)  remove for hygiene/bathing;wear full time  - wear full time  -PB (r) LN (t) PB (c)    Orthosis Skin Assessment (P)  skin is intact, no issues w/ skin integrity  - skin is intact, no issues w/ skin integrity  -PB (r) LN (t) PB (c)    Sensory Assessment/Intervention    Sensory Impairment (P)  --   WNL per pt  - --   WNL per pt except slight numbness in L toes- prior surgery  -PB (r) LN (t) PB (c)    Light Touch  LLE;RLE  -PB (r) LN (t) PB (c)    LLE Light Touch  WNL  -PB (r) LN (t) PB (c)    RLE Light Touch  WNL  -PB (r) LN (t) PB (c)    Positioning and Restraints    Pre-Treatment Position (P)  sitting in chair/recliner  - sitting in chair/recliner  -PB (r) LN (t) PB (c)    Post Treatment Position (P)  bed  - bed  -PB (r) LN (t) PB (c)    In Bed (P)  fowlers;call light within reach;encouraged to call for assist;with family/caregiver;side rails up x2;SCD pump applied  - fowlers;call light within reach;encouraged to call for assist;side rails up x2;SCD pump applied;notified nsg;with family/caregiver  -PB (r)  LN (t) PB (c)      03/06/18 1500 03/05/18 1725    General Information    Equipment Currently Used at Home none  -MG     Living Environment    Lives With spouse  -MG     Living Arrangements house  -MG     Home Accessibility no concerns  -MG     Stair Railings at Home none  -MG     Type of Financial/Environmental Concern none  -MG     Transportation Available car;family or friend will provide  -MG car;family or friend will provide  -KP      User Key  (r) = Recorded By, (t) = Taken By, (c) = Cosigned By    Initials Name Provider Type    MG Duke Oleary, RN Registered Nurse    KP Meghna Horner, ANI Registered Nurse    PB Tk Nice, PT DPT Physical Therapist    MARIE Garza, OT Student OT Student    LN Toribio Hancock, PT Student PT Student          Physical Therapy Education     Title: PT OT SLP Therapies (Active)     Topic: Physical Therapy (Active)     Point: Mobility training (Active)    Learning Progress Summary    Learner Readiness Method Response Comment Documented by Status   Patient Acceptance E NR Educated on orthosis purpose and readjustment for proper fit and comfort; reverse TSA precautions and activity limitations; hand placement and safety with sit <-> stand and chair <-> bed; technique with bed mobility using bed rails LN 03/07/18 1015 Active               Point: Precautions (Active)    Learning Progress Summary    Learner Readiness Method Response Comment Documented by Status   Patient Acceptance E NR Educated on orthosis purpose and readjustment for proper fit and comfort; reverse TSA precautions and activity limitations; hand placement and safety with sit <-> stand and chair <-> bed; technique with bed mobility using bed rails LN 03/07/18 1015 Active                      User Key     Initials Effective Dates Name Provider Type Discipline    LN 01/08/18 -  Toribio Hancock, PT Student PT Student PT                PT Recommendation and Plan  Anticipated Discharge Disposition: skilled nursing  facility  Planned Therapy Interventions: strengthening, bed mobility training, balance training, transfer training, gait training, home exercise program, patient/family education, orthotic fitting/training  PT Frequency: per priority policy, 2 times/day  Plan of Care Review  Plan Of Care Reviewed With: patient  Outcome Summary/Follow up Plan: PT michael completed. Pt presents s/p right reverse TSA performed on 3/6/18. Pt educated on purpose and use of shoulder immobilizer orthotic, and on shoulder precautions following reverse TSA. Pt performed sit  <-> stand with CGA-min A, but became dizzy and weak and requested to sit after 30 seconds of standing with aleksandar walker on L. Pt then performed chair -> bed transfer with min A, and sit -> supine with CGA - supervision. Pt Is limited by decreased activity tolerance and being unable to use RUE to assist with transfers/mobility. Pt will benefit from skilled therapy to address her deficits and improve safety and independence with functional mobilit. Recommend DC to SNF due to pt's home environment.           IP PT Goals       03/07/18 1017          Bed Mobility PT LTG    Bed Mobility PT LTG, Date Established 03/07/18  -PB (r) LN (t) PB (c)      Bed Mobility PT LTG, Time to Achieve by discharge  -PB (r) LN (t) PB (c)      Bed Mobility PT LTG, Activity Type all bed mobility  -PB (r) LN (t) PB (c)      Bed Mobility PT LTG, [place] Level supervision required  -PB (r) LN (t) PB (c)      Bed Mobility PT Goal  LTG, Assist Device bed rails  -PB (r) LN (t) PB (c)      Transfer Training PT LTG    Transfer Training PT LTG, Date Established 03/07/18  -PB (r) LN (t) PB (c)      Transfer Training PT LTG, Time to Achieve by discharge  -PB (r) LN (t) PB (c)      Transfer Training PT LTG, Activity Type bed to chair /chair to bed;sit to stand/stand to sit  -PB (r) LN (t) PB (c)      Transfer Training PT LTG, [place] Level contact guard assist  -PB (r) LN (t) PB (c)      Transfer  Training PT LTG, Assist Device walker, aleksandar  -PB (r) LN (t) PB (c)      Gait Training PT LTG    Gait Training Goal PT LTG, Date Established 03/07/18  -PB (r) LN (t) PB (c)      Gait Training Goal PT LTG, Time to Achieve by discharge  -PB (r) LN (t) PB (c)      Gait Training Goal PT LTG, [place] Level contact guard assist  -PB (r) LN (t) PB (c)      Gait Training Goal PT LTG, Assist Device walker, aleksandar  -PB (r) LN (t) PB (c)      Gait Training Goal PT LTG, Distance to Achieve 30 ft x2 with seated rest break  -PB (r) LN (t) PB (c)      Patient Education PT LTG    Patient Education PT LTG, Date Established 03/07/18  -PB (r) LN (t) PB (c)      Patient Education PT LTG, Time to Achieve by discharge  -PB (r) LN (t) PB (c)      Patient Education PT LTG, Education Type precaution per surgeon;linden/doff brace;positioning;gait;transfers;bed mobility  -PB (r) LN (t) PB (c)      Patient Education PT LTG, Education Understanding demonstrate adequately;verbalize understanding  -PB (r) LN (t) PB (c)        User Key  (r) = Recorded By, (t) = Taken By, (c) = Cosigned By    Initials Name Provider Type    PB Tk Nice, PT DPT Physical Therapist    LN Toribio Hancock, PT Student PT Student                Outcome Measures       03/07/18 1018 03/07/18 1012       How much help from another person do you currently need...    Turning from your back to your side while in flat bed without using bedrails?  3  -PB (r) LN (t) PB (c)     Moving from lying on back to sitting on the side of a flat bed without bedrails?  2  -PB (r) LN (t) PB (c)     Moving to and from a bed to a chair (including a wheelchair)?  3  -PB (r) LN (t) PB (c)     Standing up from a chair using your arms (e.g., wheelchair, bedside chair)?  3  -PB (r) LN (t) PB (c)     Climbing 3-5 steps with a railing?  2  -PB (r) LN (t) PB (c)     To walk in hospital room?  2  -PB (r) LN (t) PB (c)     AM-PAC 6 Clicks Score  15  -PB (r) LN (t)     How much help from another is  currently needed...    Putting on and taking off regular lower body clothing? (P)  2  -MK      Bathing (including washing, rinsing, and drying) (P)  2  -MK      Toileting (which includes using toilet bed pan or urinal) (P)  2  -MK      Putting on and taking off regular upper body clothing (P)  2  -MK      Taking care of personal grooming (such as brushing teeth) (P)  2  -MK      Eating meals (P)  3  -MK      Score (P)  13  -PB (r) MK (t)      Functional Assessment    Outcome Measure Options (P)  AM-PAC 6 Clicks Daily Activity (OT)  -MK AM-PAC 6 Clicks Basic Mobility (PT)  -PB (r) LN (t) PB (c)       User Key  (r) = Recorded By, (t) = Taken By, (c) = Cosigned By    Initials Name Provider Type    CLAIRE Nice, PT DPT Physical Therapist    MARIE Garza, OT Student OT Student    LN Toribio Hancock, PT Student PT Student           Time Calculation:         PT Charges       03/07/18 1025          Time Calculation    Start Time 0920   additional 8 minutes for chart review  -PB (r) LN (t) PB (c)      Stop Time 0957  -PB (r) LN (t) PB (c)      Time Calculation (min) 37 min  -PB (r) LN (t)      PT Received On 03/07/18  -PB (r) LN (t) PB (c)      PT Goal Re-Cert Due Date 03/17/18  -PB (r) LN (t) PB (c)        User Key  (r) = Recorded By, (t) = Taken By, (c) = Cosigned By    Initials Name Provider Type    CLAIRE Niec, PT DPT Physical Therapist    PENNY Hancock, PT Student PT Student          Therapy Charges for Today     Code Description Service Date Service Provider Modifiers Qty    [id number] HC PT EVAL MOD COMPLEXITY 3 3/7/2018 Toribio Hancock, PT Student GP, KX 1          PT G-Codes  Outcome Measure Options: (P) AM-PAC 6 Clicks Daily Activity (OT)  Score: 15  Functional Limitation: Changing and maintaining body position  Changing and Maintaining Body Position Current Status (): At least 40 percent but less than 60 percent impaired, limited or restricted  Changing and Maintaining Body Position Goal Status  (): At least 20 percent but less than 40 percent impaired, limited or restricted      Toribio Hancock, PT Student  3/7/2018

## 2018-03-07 NOTE — PLAN OF CARE
Problem: Patient Care Overview (Adult)  Goal: Plan of Care Review  Outcome: Ongoing (interventions implemented as appropriate)   03/07/18 0432   Coping/Psychosocial Response Interventions   Plan Of Care Reviewed With patient   Outcome Evaluation   Outcome Summary/Follow up Plan VSS, Dilaudid and Percocet given for pain with some relief, up with standby assist to bathroom, ice in place to incision, c/o some numbness to right arm, dressing cdi.        Problem: Perioperative Period (Adult)  Goal: Signs and Symptoms of Listed Potential Problems Will be Absent or Manageable (Perioperative Period)  Outcome: Ongoing (interventions implemented as appropriate)      Problem: Fall Risk (Adult)  Goal: Absence of Falls  Outcome: Ongoing (interventions implemented as appropriate)         Ears: no ear pain and no hearing problems.Nose: no nasal congestion and no nasal drainage.Mouth/Throat: no dysphagia, no hoarseness and no throat pain.Neck: no lumps, no pain, no stiffness and no swollen glands.

## 2018-03-07 NOTE — PLAN OF CARE
Problem: Patient Care Overview (Adult)  Goal: Plan of Care Review  Outcome: Ongoing (interventions implemented as appropriate)   03/07/18 7365   Patient Care Overview   Progress improving   Coping/Psychosocial Response Interventions   Plan Of Care Reviewed With patient   Outcome Evaluation   Outcome Summary/Follow up Plan Pt A&Ox4. C/O constant pain 9-10 out of 10. Meds administered prn as directed. Ativan held at 16:00 due to pt was drifting off to sleep during conversation. Dressing is c/d/i. Sling/immobilizer in place. Up to bsc with assist x1.       Problem: Fall Risk (Adult)  Goal: Absence of Falls  Outcome: Ongoing (interventions implemented as appropriate)

## 2018-03-08 ENCOUNTER — TELEPHONE (OUTPATIENT)
Dept: FAMILY MEDICINE CLINIC | Age: 73
End: 2018-03-08

## 2018-03-08 DIAGNOSIS — R26.81 GAIT INSTABILITY: ICD-10-CM

## 2018-03-08 DIAGNOSIS — R29.6 RECURRENT FALLS: Primary | ICD-10-CM

## 2018-03-08 LAB
ANION GAP SERPL CALCULATED.3IONS-SCNC: 8 MMOL/L (ref 4–13)
BUN BLD-MCNC: 18 MG/DL (ref 5–21)
BUN/CREAT SERPL: 12.4 (ref 7–25)
CALCIUM SPEC-SCNC: 8.8 MG/DL (ref 8.4–10.4)
CHLORIDE SERPL-SCNC: 94 MMOL/L (ref 98–110)
CO2 SERPL-SCNC: 30 MMOL/L (ref 24–31)
CREAT BLD-MCNC: 1.45 MG/DL (ref 0.5–1.4)
GFR SERPL CREATININE-BSD FRML MDRD: 35 ML/MIN/1.73
GLUCOSE BLD-MCNC: 124 MG/DL (ref 70–100)
POTASSIUM BLD-SCNC: 4.3 MMOL/L (ref 3.5–5.3)
SODIUM BLD-SCNC: 132 MMOL/L (ref 135–145)

## 2018-03-08 PROCEDURE — 80048 BASIC METABOLIC PNL TOTAL CA: CPT | Performed by: ORTHOPAEDIC SURGERY

## 2018-03-08 PROCEDURE — 97530 THERAPEUTIC ACTIVITIES: CPT

## 2018-03-08 PROCEDURE — 94799 UNLISTED PULMONARY SVC/PX: CPT

## 2018-03-08 PROCEDURE — 97535 SELF CARE MNGMENT TRAINING: CPT

## 2018-03-08 PROCEDURE — 97110 THERAPEUTIC EXERCISES: CPT

## 2018-03-08 PROCEDURE — 97116 GAIT TRAINING THERAPY: CPT

## 2018-03-08 PROCEDURE — 25010000002 ENOXAPARIN PER 10 MG: Performed by: ORTHOPAEDIC SURGERY

## 2018-03-08 RX ORDER — FAMOTIDINE 20 MG/1
20 TABLET, FILM COATED ORAL DAILY
Status: DISCONTINUED | OUTPATIENT
Start: 2018-03-09 | End: 2018-03-12 | Stop reason: HOSPADM

## 2018-03-08 RX ADMIN — CLONIDINE HYDROCHLORIDE 0.1 MG: 0.1 TABLET ORAL at 08:44

## 2018-03-08 RX ADMIN — CARVEDILOL 12.5 MG: 6.25 TABLET, FILM COATED ORAL at 20:28

## 2018-03-08 RX ADMIN — DOCUSATE SODIUM AND SENNOSIDES 2 TABLET: 8.6; 5 TABLET, FILM COATED ORAL at 20:21

## 2018-03-08 RX ADMIN — CLONIDINE HYDROCHLORIDE 0.1 MG: 0.1 TABLET ORAL at 20:29

## 2018-03-08 RX ADMIN — DOXEPIN HYDROCHLORIDE 100 MG: 100 CAPSULE ORAL at 20:21

## 2018-03-08 RX ADMIN — LORAZEPAM 1 MG: 1 TABLET ORAL at 20:21

## 2018-03-08 RX ADMIN — BUDESONIDE AND FORMOTEROL FUMARATE DIHYDRATE 2 PUFF: 160; 4.5 AEROSOL RESPIRATORY (INHALATION) at 09:05

## 2018-03-08 RX ADMIN — ENOXAPARIN SODIUM 40 MG: 40 INJECTION SUBCUTANEOUS at 08:45

## 2018-03-08 RX ADMIN — BUDESONIDE AND FORMOTEROL FUMARATE DIHYDRATE 2 PUFF: 160; 4.5 AEROSOL RESPIRATORY (INHALATION) at 21:29

## 2018-03-08 RX ADMIN — CARVEDILOL 12.5 MG: 6.25 TABLET, FILM COATED ORAL at 08:45

## 2018-03-08 RX ADMIN — LEVOTHYROXINE SODIUM 100 MCG: 100 TABLET ORAL at 06:41

## 2018-03-08 RX ADMIN — LORAZEPAM 1 MG: 1 TABLET ORAL at 08:45

## 2018-03-08 RX ADMIN — ACETAMINOPHEN 650 MG: 325 TABLET, FILM COATED ORAL at 15:48

## 2018-03-08 RX ADMIN — ALLOPURINOL 50 MG: 100 TABLET ORAL at 08:45

## 2018-03-08 RX ADMIN — OXYCODONE HYDROCHLORIDE AND ACETAMINOPHEN 1 TABLET: 10; 325 TABLET ORAL at 12:08

## 2018-03-08 RX ADMIN — FAMOTIDINE 40 MG: 20 TABLET, FILM COATED ORAL at 08:44

## 2018-03-08 RX ADMIN — ACETAMINOPHEN 650 MG: 325 TABLET, FILM COATED ORAL at 20:21

## 2018-03-08 RX ADMIN — FLUOXETINE HYDROCHLORIDE 40 MG: 20 CAPSULE ORAL at 08:44

## 2018-03-08 RX ADMIN — ACETAMINOPHEN 650 MG: 325 TABLET, FILM COATED ORAL at 06:41

## 2018-03-08 RX ADMIN — ATORVASTATIN CALCIUM 10 MG: 10 TABLET, FILM COATED ORAL at 20:21

## 2018-03-08 RX ADMIN — BUPROPION HYDROCHLORIDE 100 MG: 100 TABLET, FILM COATED, EXTENDED RELEASE ORAL at 08:45

## 2018-03-08 NOTE — PLAN OF CARE
Problem: Patient Care Overview (Adult)  Goal: Plan of Care Review  Outcome: Ongoing (interventions implemented as appropriate)   03/08/18 4077   Patient Care Overview   Progress progress towards functional goals is fair   Coping/Psychosocial Response Interventions   Plan Of Care Reviewed With patient   Outcome Evaluation   Outcome Summary/Follow up Plan Pt and son educated on benefits of acute rehab at discharge. Pt mod A for bed mobility and CGA for transfers. Max A to adjust sling. Continue OT POC

## 2018-03-08 NOTE — PLAN OF CARE
Problem: Patient Care Overview (Adult)  Goal: Plan of Care Review  Outcome: Ongoing (interventions implemented as appropriate)   03/08/18 1041   Patient Care Overview   Progress improving   Coping/Psychosocial Response Interventions   Plan Of Care Reviewed With patient   Outcome Evaluation   Outcome Summary/Follow up Plan Pt sitting in floor with nursing. Pt was assisted to standing with max assist of 3 . Pt was min x 2 HHA to take steps back to bed. Pt was min x 2 sit to supine. Pt then performed AROM ex's in bed. RN approved.

## 2018-03-08 NOTE — PROGRESS NOTES
"Pharmacy Dosing Service  Automatic Renal Adjustment  Pepcid    Assessment/Action/Plan:  Based on prescribing information provided by the drug , Pepcid 40 mg PO every 24 hours, has been changed to 20 mg PO every 24 hours. Pharmacy will continue to monitor daily and make further adjustment(s) accordingly.     Subjective:  Hanny Ivy is a 73 y.o. female     Additional Factors Considered:  • Patient disposition per documentation  • Disease state or condition being treated    Objective:  Ht: 162.6 cm (64\"); Wt: 92.7 kg (204 lb 7 oz)  Estimated Creatinine Clearance: 37 mL/min (by C-G formula based on Cr of 1.45).   Lab Results   Component Value Date    CREATININE 1.45 (H) 03/08/2018    CREATININE 1.54 (H) 03/07/2018       HERNAN Mortensen RPH  03/08/18 10:12 AM    "

## 2018-03-08 NOTE — THERAPY TREATMENT NOTE
Acute Care - Physical Therapy Treatment Note  [facility]     Patient Name: Hanny Ivy  : 1945  MRN: [id number]  Today's Date: 3/8/2018  Onset of Illness/Injury or Date of Surgery Date: 18  Date of Referral to PT: 18  Referring Physician: Dr. Lobato    Admit Date: 3/6/2018    Visit Dx:    ICD-10-CM ICD-9-CM   1. Impaired mobility and ADLs Z74.09 799.89   2. Impaired functional mobility, balance, gait, and endurance Z74.09 V49.89     Patient Active Problem List   Diagnosis   • Dermatochalasis of both upper eyelids   • Visual field defect, unspecified   • Rotator cuff arthropathy, right   • Pure hypercholesterolemia   • Essential hypertension   • Mild intermittent asthma without complication   • Generalized anxiety disorder   • Gastroesophageal reflux disease without esophagitis   • Acquired hypothyroidism               Adult Rehabilitation Note       18 1034 18 0932 18 1451    Rehab Assessment/Intervention    Discipline occupational therapy assistant  -TS physical therapy assistant  -AE physical therapy assistant  -AE    Document Type therapy note (daily note)  -TS therapy note (daily note)  -AE therapy note (daily note)  -AE    Subjective Information  agree to therapy;complains of;pain  -AE agree to therapy;complains of;pain  -AE    Precautions/Limitations  shoulder precautions;brace on when up   reverse total shoulder  -AE shoulder precautions;brace on when up   reverse total shlder NWB  -AE    Recorded by [TS] RIN Gutierrez/HERNAN [AE] Radha Puga PTA [AE] Radha Puga PTA    Pain Assessment    Pain Assessment  0-10  -AE 0-10  -AE    Pain Score  6  -AE 6  -AE    Post Pain Score  6  -AE 6  -AE    Pain Type   Surgical pain;Acute pain  -AE    Pain Location   Shoulder  -AE    Pain Orientation   Right  -AE    Pain Descriptors  Aching  -AE Aching  -AE    Pain Frequency   Constant/continuous  -AE    Pain Intervention(s)  Medication (See MAR)  -AE Medication (See MAR)   -AE    Response to Interventions   tolerated  -AE    Recorded by  [AE] Radha Puga PTA [AE] Radha Puga PTA    Mobility Assessment/Training    Right Upper Extremity Weight-Bearing   non weight-bearing  -AE    Recorded by   [AE] Radha Puga PTA    Bed Mobility, Assessment/Treatment    Bed Mobility, Assistive Device   bed rails;head of bed elevated  -AE    Bed Mobility, Scoot/Bridge, Christian   minimum assist (75% patient effort)  -AE    Bed Mob, Sit to Supine, Christian  minimum assist (75% patient effort);2 person assist required  -AE     Recorded by  [AE] Radha Puga PTA [AE] Radha Puga PTA    Transfer Assessment/Treatment    Transfers, Sit-Stand Christian  maximum assist (25% patient effort)   Pt was assisted to standing position RN assist  -AE minimum assist (75% patient effort);verbal cues required  -AE    Transfers, Stand-Sit Christian   minimum assist (75% patient effort)  -AE    Transfer, Comment  --   sitting in floor with nursing  -AE     Recorded by  [AE] Radha Puga PTA [AE] Radha Puga PTA    Gait Assessment/Treatment    Gait, Christian Level  minimum assist (75% patient effort);2 person assist required  -AE     Gait, Assistive Device  --   HHA  -AE --   HHA  -AE    Gait, Distance (Feet)  3   steps back to bed  -AE     Gait, Safety Issues   weight-shifting ability decreased  -AE    Gait, Comment   --   3 side steps  -AE    Recorded by  [AE] Radha Puga PTA [AE] Radha Puga PTA    Balance Skills Training    Sitting-Level of Assistance   Contact guard  -AE    Recorded by   [AE] Radha Puga PTA    Therapy Exercises    Bilateral Lower Extremities  AROM:;hip abduction/adduction;ankle pumps/circles;SLR;20 reps   RROM 40 reps heel slides  -AE AROM:;20 reps;sitting;LAQ;hip flexion;ankle pumps/circles  -AE    Recorded by  [AE] Radha Puga PTA [AE] Radha Puga PTA    Positioning and Restraints    Pre-Treatment Position  --   sitting in floor with  nursing  -AE in bed  -AE    Post Treatment Position  bed  -AE bed  -AE    In Bed  fowlers;call light within reach  -AE fowlers;call light within reach  -AE    Recorded by  [AE] Radha Puga PTA [AE] Radha Puga PTA      User Key  (r) = Recorded By, (t) = Taken By, (c) = Cosigned By    Initials Name Effective Dates    AE Radha Puga PTA 06/22/15 -     TS Talya Biggs, GAR/HERNAN 08/02/16 -                 IP PT Goals       03/07/18 1017          Bed Mobility PT LTG    Bed Mobility PT LTG, Date Established 03/07/18  -PB (r) LN (t) PB (c)      Bed Mobility PT LTG, Time to Achieve by discharge  -PB (r) LN (t) PB (c)      Bed Mobility PT LTG, Activity Type all bed mobility  -PB (r) LN (t) PB (c)      Bed Mobility PT LTG, [place] Level supervision required  -PB (r) LN (t) PB (c)      Bed Mobility PT Goal  LTG, Assist Device bed rails  -PB (r) LN (t) PB (c)      Transfer Training PT LTG    Transfer Training PT LTG, Date Established 03/07/18  -PB (r) LN (t) PB (c)      Transfer Training PT LTG, Time to Achieve by discharge  -PB (r) LN (t) PB (c)      Transfer Training PT LTG, Activity Type bed to chair /chair to bed;sit to stand/stand to sit  -PB (r) LN (t) PB (c)      Transfer Training PT LTG, [place] Level contact guard assist  -PB (r) LN (t) PB (c)      Transfer Training PT LTG, Assist Device walker, aleksandar  -PB (r) LN (t) PB (c)      Gait Training PT LTG    Gait Training Goal PT LTG, Date Established 03/07/18  -PB (r) LN (t) PB (c)      Gait Training Goal PT LTG, Time to Achieve by discharge  -PB (r) LN (t) PB (c)      Gait Training Goal PT LTG, [place] Level contact guard assist  -PB (r) LN (t) PB (c)      Gait Training Goal PT LTG, Assist Device walker, aleksandar  -PB (r) LN (t) PB (c)      Gait Training Goal PT LTG, Distance to Achieve 30 ft x2 with seated rest break  -PB (r) LN (t) PB (c)      Patient Education PT LTG    Patient Education PT LTG, Date Established 03/07/18  -PB (r) LN (t) PB (c)       Patient Education PT LTG, Time to Achieve by discharge  -PB (r) LN (t) PB (c)      Patient Education PT LTG, Education Type precaution per surgeon;linden/doff brace;positioning;gait;transfers;bed mobility  -PB (r) LN (t) PB (c)      Patient Education PT LTG, Education Understanding demonstrate adequately;verbalize understanding  -PB (r) LN (t) PB (c)        User Key  (r) = Recorded By, (t) = Taken By, (c) = Cosigned By    Initials Name Provider Type    PB Tk Nice, PT DPT Physical Therapist    LN Toribio Hancock, PT Student PT Student          Physical Therapy Education     Title: PT OT SLP Therapies (Active)     Topic: Physical Therapy (Active)     Point: Mobility training (Done)    Learning Progress Summary    Learner Readiness Method Response Comment Documented by Status   Patient Eager E VU ex's AE 03/08/18 1046 Done    Acceptance E NR Educated on orthosis purpose and readjustment for proper fit and comfort; reverse TSA precautions and activity limitations; hand placement and safety with sit <-> stand and chair <-> bed; technique with bed mobility using bed rails LN 03/07/18 1015 Active               Point: Precautions (Active)    Learning Progress Summary    Learner Readiness Method Response Comment Documented by Status   Patient Acceptance E NR Educated on orthosis purpose and readjustment for proper fit and comfort; reverse TSA precautions and activity limitations; hand placement and safety with sit <-> stand and chair <-> bed; technique with bed mobility using bed rails LN 03/07/18 1015 Active                      User Key     Initials Effective Dates Name Provider Type Discipline    AE 06/22/15 -  Radha Puga PTA Physical Therapy Assistant PT    LN 01/08/18 -  Toribio Hancock, PT Student PT Student PT                    PT Recommendation and Plan  Anticipated Discharge Disposition: skilled nursing facility  Planned Therapy Interventions: strengthening, bed mobility training, balance training, transfer  training, gait training, home exercise program, patient/family education, orthotic fitting/training  PT Frequency: per priority policy, 2 times/day  Plan of Care Review  Plan Of Care Reviewed With: patient  Progress: improving  Outcome Summary/Follow up Plan: Pt sitting in floor with nursing. Pt was assisted to standing with max assist of 3 . Pt was min x 2 HHA to take steps          Outcome Measures       03/08/18 0932 03/07/18 1018 03/07/18 1012    How much help from another person do you currently need...    Turning from your back to your side while in flat bed without using bedrails? 3  -AE  3  -PB (r) LN (t) PB (c)    Moving from lying on back to sitting on the side of a flat bed without bedrails? 2  -AE  2  -PB (r) LN (t) PB (c)    Moving to and from a bed to a chair (including a wheelchair)? 3  -AE  3  -PB (r) LN (t) PB (c)    Standing up from a chair using your arms (e.g., wheelchair, bedside chair)? 3  -AE  3  -PB (r) LN (t) PB (c)    Climbing 3-5 steps with a railing? 2  -AE  2  -PB (r) LN (t) PB (c)    To walk in hospital room? 2  -AE  2  -PB (r) LN (t) PB (c)    AM-PAC 6 Clicks Score 15  -AE  15  -PB (r) LN (t)    How much help from another is currently needed...    Putting on and taking off regular lower body clothing?  2  -MM (r) MK (t) MM (c)     Bathing (including washing, rinsing, and drying)  2  -MM (r) MK (t) MM (c)     Toileting (which includes using toilet bed pan or urinal)  2  -MM (r) MK (t) MM (c)     Putting on and taking off regular upper body clothing  2  -MM (r) MK (t) MM (c)     Taking care of personal grooming (such as brushing teeth)  2  -MM (r) MK (t) MM (c)     Eating meals  3  -MM (r) MK (t) MM (c)     Score  13  -MM (r) MK (t)     Functional Assessment    Outcome Measure Options AM-PAC 6 Clicks Basic Mobility (PT)  -AE AM-PAC 6 Clicks Daily Activity (OT)  -MM (r) MK (t) MM (c) AM-PAC 6 Clicks Basic Mobility (PT)  -PB (r) LN (t) PB (c)      User Key  (r) = Recorded By, (t) = Taken  By, (c) = Cosigned By    Initials Name Provider Type    AE Radha Puga PTA Physical Therapy Assistant    PB Tk Nice, PT DPT Physical Therapist    MM Duke Loco, OTR/L Occupational Therapist    MARIE Garza, OT Student OT Student    LN Toribio Hancock, PT Student PT Student           Time Calculation:         PT Charges       03/08/18 1045          Time Calculation    Start Time 0932  -AE      Stop Time 0956  -AE      Time Calculation (min) 24 min  -AE      PT Received On 03/08/18  -AE      PT Goal Re-Cert Due Date 03/17/18  -AE      Time Calculation- PT    Total Timed Code Minutes- PT 24 minute(s)  -AE        User Key  (r) = Recorded By, (t) = Taken By, (c) = Cosigned By    Initials Name Provider Type    AE Radha Puga PTA Physical Therapy Assistant          Therapy Charges for Today     Code Description Service Date Service Provider Modifiers Qty    22228235494 HC PT THER PROC EA 15 MIN 3/7/2018 Radha Puga PTA GP, KX 1    14331331195 HC PT THERAPEUTIC ACT EA 15 MIN 3/7/2018 Radha Puga PTA GP, KX 1    01281873922 HC PT THERAPEUTIC ACT EA 15 MIN 3/8/2018 Radha Puga, SHANTANU GP, KX 1    97289452110 HC PT THER PROC EA 15 MIN 3/8/2018 Radha Puga PTA GP, KX 1          PT G-Codes  Outcome Measure Options: AM-PAC 6 Clicks Basic Mobility (PT)  Score: 15  Functional Limitation: Changing and maintaining body position  Changing and Maintaining Body Position Current Status (): At least 40 percent but less than 60 percent impaired, limited or restricted  Changing and Maintaining Body Position Goal Status (): At least 20 percent but less than 40 percent impaired, limited or restricted    Radha Puga PTA  3/8/2018

## 2018-03-08 NOTE — SIGNIFICANT NOTE
I walked by the patient's room at this time and saw that she was lying in the floor. She was on her back tilted trying to lean up on her left arm. She denies hitting her head or hurting anything in the fall. No changes noted, see assessment.

## 2018-03-08 NOTE — THERAPY TREATMENT NOTE
Acute Care - Physical Therapy Treatment Note  [facility]     Patient Name: Hanny Ivy  : 1945  MRN: [id number]  Today's Date: 3/8/2018  Onset of Illness/Injury or Date of Surgery Date: 18  Date of Referral to PT: 18  Referring Physician: Dr. Lobato    Admit Date: 3/6/2018    Visit Dx:    ICD-10-CM ICD-9-CM   1. Impaired mobility and ADLs Z74.09 799.89   2. Impaired functional mobility, balance, gait, and endurance Z74.09 V49.89     Patient Active Problem List   Diagnosis   • Dermatochalasis of both upper eyelids   • Visual field defect, unspecified   • Rotator cuff arthropathy, right   • Pure hypercholesterolemia   • Essential hypertension   • Mild intermittent asthma without complication   • Generalized anxiety disorder   • Gastroesophageal reflux disease without esophagitis   • Acquired hypothyroidism               Adult Rehabilitation Note       18 1503 18 1034 18 0932    Rehab Assessment/Intervention    Discipline physical therapy assistant  -AE occupational therapy assistant  -TS physical therapy assistant  -AE    Document Type therapy note (daily note)  -AE therapy note (daily note)  -TS therapy note (daily note)  -AE    Subjective Information agree to therapy;complains of;pain  -AE agree to therapy;complains of;pain  -TS agree to therapy;complains of;pain  -AE    Patient Effort, Rehab Treatment  adequate  -TS     Symptoms Noted During/After Treatment fatigue;shortness of breath  -AE      Precautions/Limitations shoulder precautions;non-weight bearing status;brace on when up   exit alarm  -AE non-weight bearing status;shoulder precautions;brace on when up;fall precautions  -TS shoulder precautions;brace on when up   reverse total shoulder  -AE    Specific Treatment Considerations impulsive, confused  -AE      Recorded by [AE] Radha Puga PTA [TS] RIN Gutierrez/HERNAN [AE] Radha Puga PTA    Pain Assessment    Pain Assessment 0-10  -AE 0-10  -TS 0-10  -AE     Pain Score 5  -AE 5  -TS 6  -AE    Post Pain Score 5  -AE  6  -AE    Pain Type Acute pain  -AE Acute pain;Surgical pain  -TS     Pain Location Shoulder  -AE      Pain Orientation Right  -AE      Pain Descriptors Aching  -AE Aching  -TS Aching  -AE    Pain Frequency Constant/continuous  -AE      Pain Intervention(s) Medication (See MAR)  -AE Medication (See MAR)  -TS Medication (See MAR)  -AE    Response to Interventions tolerated  -AE      Recorded by [AE] Radha Puga PTA [TS] NEGRITA Gutierrez [AE] Radha Puga PTA    Cognitive Assessment/Intervention    Personal Safety Interventions  fall prevention program maintained;gait belt;nonskid shoes/slippers when out of bed  -TS     Recorded by  [TS] NEGRITA Gutierrez     Bed Mobility, Assessment/Treatment    Bed Mobility, Assistive Device head of bed elevated  -AE head of bed elevated  -TS     Bed Mobility, Scoot/Bridge, [place] minimum assist (75% patient effort);verbal cues required  -AE      Bed Mob, Supine to Sit, [place]  minimum assist (75% patient effort);moderate assist (50% patient effort)  -TS     Bed Mob, Sit to Supine, [place] moderate assist (50% patient effort)  -AE  minimum assist (75% patient effort);2 person assist required  -AE    Bed Mobility, Safety Issues decreased use of arms for pushing/pulling  -AE      Bed Mobility, Impairments strength decreased  -AE      Recorded by [AE] Radha Puga PTA [TS] NEGRITA Gutierrez [AE] Radha Puga PTA    Transfer Assessment/Treatment    Transfers, Bed-Chair [place]  contact guard assist  -TS     Transfers, Sit-Stand [place] contact guard assist;2 person assist required  -AE contact guard assist;stand by assist  -TS maximum assist (25% patient effort)   Pt was assisted to standing position RN assist  -AE    Transfers, Stand-Sit [place]  contact guard assist;stand by assist  -TS     Transfer, Safety Issues balance decreased during turns  -AE       Transfer, Impairments strength decreased  -AE      Transfer, Comment   --   sitting in floor with nursing  -AE    Recorded by [AE] Radha Puga PTA [TS] NEGRITA Gutierrez [AE] Radha Puga PTA    Gait Assessment/Treatment    Gait, [place] Level minimum assist (75% patient effort);2 person assist required  -AE  minimum assist (75% patient effort);2 person assist required  -AE    Gait, Assistive Device --   HHA  -AE  --   HHA  -AE    Gait, Distance (Feet) 20   plus 20ft x 1 sitting rest  -AE  3   steps back to bed  -AE    Gait, Gait Deviations forward flexed posture;step length decreased;marine decreased  -AE      Gait, Safety Issues balance decreased during turns;weight-shifting ability decreased  -AE      Gait, Comment --   RN followed with chair  -AE      Recorded by [AE] Radha Puga PTA  [AE] Radha Puga PTA    Upper Body Dressing Assessment/Training    UB Dressing Assess/Train, Position  standing  -TS     UB Dressing Assess/Train, [place]  maximum assist (25% patient effort)  -TS     UB Dressing Assess/Train, Comment  max A for adjustment of immobilizer   -TS     Recorded by  [TS] RIN Gutierrez/L     Therapy Exercises    Bilateral Lower Extremities AROM:;20 reps;sitting  -AE  AROM:;hip abduction/adduction;ankle pumps/circles;SLR;20 reps   RROM 40 reps heel slides  -AE    Recorded by [AE] Radha Puga PTA  [AE] Radha Puga PTA    Orthosis Management/Training    Orthosis Skills Training  doffing orthosis;donning orthosis;purpose/goals of orthosis  -TS     Recorded by  [TS] RIN Gutierrez/L     Positioning and Restraints    Pre-Treatment Position in bed  -AE in bed  -TS --   sitting in floor with nursing  -AE    Post Treatment Position bed  -AE chair  -TS bed  -AE    In Bed fowlers;call light within reach;exit alarm on  -AE  fowlers;call light within reach  -AE    In Chair  call light within reach;encouraged to call for assist;with  family/caregiver;sitting  -TS     Recorded by [AE] Radha Puga PTA [TS] RIN Gutierrez/HERNAN [AE] Radha Puga PTA      03/07/18 1456          Rehab Assessment/Intervention    Discipline physical therapy assistant  -AE      Document Type therapy note (daily note)  -AE      Subjective Information agree to therapy;complains of;pain  -AE      Precautions/Limitations shoulder precautions;brace on when up   reverse total shlder NWB  -AE      Recorded by [AE] Radha Puga PTA      Pain Assessment    Pain Assessment 0-10  -AE      Pain Score 6  -AE      Post Pain Score 6  -AE      Pain Type Surgical pain;Acute pain  -AE      Pain Location Shoulder  -AE      Pain Orientation Right  -AE      Pain Descriptors Aching  -AE      Pain Frequency Constant/continuous  -AE      Pain Intervention(s) Medication (See MAR)  -AE      Response to Interventions tolerated  -AE      Recorded by [AE] Radha Puga PTA      Mobility Assessment/Training    Right Upper Extremity Weight-Bearing non weight-bearing  -AE      Recorded by [AE] Radha Puga PTA      Bed Mobility, Assessment/Treatment    Bed Mobility, Assistive Device bed rails;head of bed elevated  -AE      Bed Mobility, Scoot/Bridge, [place] minimum assist (75% patient effort)  -AE      Recorded by [AE] Radha Puga PTA      Transfer Assessment/Treatment    Transfers, Sit-Stand [place] minimum assist (75% patient effort);verbal cues required  -AE      Transfers, Stand-Sit [place] minimum assist (75% patient effort)  -AE      Recorded by [AE] Radha Puga PTA      Gait Assessment/Treatment    Gait, Assistive Device --   HHA  -AE      Gait, Safety Issues weight-shifting ability decreased  -AE      Gait, Comment --   3 side steps  -AE      Recorded by [AE] Radha Puga PTA      Balance Skills Training    Sitting-Level of Assistance Contact guard  -AE      Recorded by [AE] Radha Puga PTA      Therapy Exercises    Bilateral Lower  Extremities AROM:;20 reps;sitting;LAQ;hip flexion;ankle pumps/circles  -AE      Recorded by [AE] Radha Puga PTA      Positioning and Restraints    Pre-Treatment Position in bed  -AE      Post Treatment Position bed  -AE      In Bed fowlers;call light within reach  -AE      Recorded by [AE] Radha Puga PTA        User Key  (r) = Recorded By, (t) = Taken By, (c) = Cosigned By    Initials Name Effective Dates    AE Radha Puga PTA 06/22/15 -     TS Talya Biggs, GAR/L 08/02/16 -                 IP PT Goals       03/07/18 1017          Bed Mobility PT LTG    Bed Mobility PT LTG, Date Established 03/07/18  -PB (r) LN (t) PB (c)      Bed Mobility PT LTG, Time to Achieve by discharge  -PB (r) LN (t) PB (c)      Bed Mobility PT LTG, Activity Type all bed mobility  -PB (r) LN (t) PB (c)      Bed Mobility PT LTG, [place] supervision required  -PB (r) LN (t) PB (c)      Bed Mobility PT Goal  LTG, Assist Device bed rails  -PB (r) LN (t) PB (c)      Transfer Training PT LTG    Transfer Training PT LTG, Date Established 03/07/18  -PB (r) LN (t) PB (c)      Transfer Training PT LTG, Time to Achieve by discharge  -PB (r) LN (t) PB (c)      Transfer Training PT LTG, Activity Type bed to chair /chair to bed;sit to stand/stand to sit  -PB (r) LN (t) PB (c)      Transfer Training PT LTG, [place] contact guard assist  -PB (r) LN (t) PB (c)      Transfer Training PT LTG, Assist Device walker, aleksandar  -PB (r) LN (t) PB (c)      Gait Training PT LTG    Gait Training Goal PT LTG, Date Established 03/07/18  -PB (r) LN (t) PB (c)      Gait Training Goal PT LTG, Time to Achieve by discharge  -PB (r) LN (t) PB (c)      Gait Training Goal PT LTG, [place] contact guard assist  -PB (r) LN (t) PB (c)      Gait Training Goal PT LTG, Assist Device walker, aleksandar  -PB (r) LN (t) PB (c)      Gait Training Goal PT LTG, Distance to Achieve 30 ft x2 with seated rest break  -PB (r) LN (t) PB (c)       Patient Education PT LTG    Patient Education PT LTG, Date Established 03/07/18  -PB (r) LN (t) PB (c)      Patient Education PT LTG, Time to Achieve by discharge  -PB (r) LN (t) PB (c)      Patient Education PT LTG, Education Type precaution per surgeon;[place]/doff brace;positioning;gait;transfers;bed mobility  -PB (r) LN (t) PB (c)      Patient Education PT LTG, Education Understanding demonstrate adequately;verbalize understanding  -PB (r) LN (t) PB (c)        User Key  (r) = Recorded By, (t) = Taken By, (c) = Cosigned By    Initials Name Provider Type    PB Tk Nice, PT DPT Physical Therapist    PENNY Hancock, PT Student PT Student          Physical Therapy Education     Title: PT OT SLP Therapies (Active)     Topic: Physical Therapy (Active)     Point: Mobility training (Done)    Learning Progress Summary    Learner Readiness Method Response Comment Documented by Status   Patient Eager E VU ex's AE 03/08/18 1046 Done    Acceptance E NR Educated on orthosis purpose and readjustment for proper fit and comfort; reverse TSA precautions and activity limitations; hand placement and safety with sit <-> stand and chair <-> bed; technique with bed mobility using bed rails LN 03/07/18 1015 Active               Point: Precautions (Active)    Learning Progress Summary    Learner Readiness Method Response Comment Documented by Status   Patient Acceptance E NR Educated on orthosis purpose and readjustment for proper fit and comfort; reverse TSA precautions and activity limitations; hand placement and safety with sit <-> stand and chair <-> bed; technique with bed mobility using bed rails LN 03/07/18 1015 Active                      User Key     Initials Effective Dates Name Provider Type Discipline    AE 06/22/15 -  Radha Puga, PTA Physical Therapy Assistant PT    LN 01/08/18 -  Toribio Hancock, PT Student PT Student PT                    PT Recommendation and Plan  Anticipated Discharge Disposition: skilled nursing  facility  Planned Therapy Interventions: strengthening, bed mobility training, balance training, transfer training, gait training, home exercise program, patient/family education, orthotic fitting/training  PT Frequency: per priority policy, 2 times/day  Plan of Care Review  Plan Of Care Reviewed With: patient  Progress: improving  Outcome Summary/Follow up Plan: Pt sitting in floor with nursing. Pt was assisted to standing with max assist of 3 . Pt was min x 2 HHA to take steps          Outcome Measures       03/08/18 1400 03/08/18 0932 03/07/18 1018    How much help from another person do you currently need...    Turning from your back to your side while in flat bed without using bedrails?  3  -AE     Moving from lying on back to sitting on the side of a flat bed without bedrails?  2  -AE     Moving to and from a bed to a chair (including a wheelchair)?  3  -AE     Standing up from a chair using your arms (e.g., wheelchair, bedside chair)?  3  -AE     Climbing 3-5 steps with a railing?  2  -AE     To walk in hospital room?  2  -AE     AM-PAC 6 Clicks Score  15  -AE     How much help from another is currently needed...    Putting on and taking off regular lower body clothing? 2  -TS  2  -MM (r) MK (t) MM (c)    Bathing (including washing, rinsing, and drying) 2  -TS  2  -MM (r) MK (t) MM (c)    Toileting (which includes using toilet bed pan or urinal) 3  -TS  2  -MM (r) MK (t) MM (c)    Putting on and taking off regular upper body clothing 3  -TS  2  -MM (r) MK (t) MM (c)    Taking care of personal grooming (such as brushing teeth) 3  -TS  2  -MM (r) MK (t) MM (c)    Eating meals 4  -TS  3  -MM (r) MK (t) MM (c)    Score 17  -TS  13  -MM (r) MK (t)    Functional Assessment    Outcome Measure Options AM-PAC 6 Clicks Daily Activity (OT)  -TS AM-PAC 6 Clicks Basic Mobility (PT)  -AE AM-PAC 6 Clicks Daily Activity (OT)  -MM (r) MK (t) MM (c)      03/07/18 1012          How much help from another person do you currently  need...    Turning from your back to your side while in flat bed without using bedrails? 3  -PB (r) LN (t) PB (c)      Moving from lying on back to sitting on the side of a flat bed without bedrails? 2  -PB (r) LN (t) PB (c)      Moving to and from a bed to a chair (including a wheelchair)? 3  -PB (r) LN (t) PB (c)      Standing up from a chair using your arms (e.g., wheelchair, bedside chair)? 3  -PB (r) LN (t) PB (c)      Climbing 3-5 steps with a railing? 2  -PB (r) LN (t) PB (c)      To walk in hospital room? 2  -PB (r) LN (t) PB (c)      AM-PAC 6 Clicks Score 15  -PB (r) LN (t)      Functional Assessment    Outcome Measure Options AM-PAC 6 Clicks Basic Mobility (PT)  -PB (r) LN (t) PB (c)        User Key  (r) = Recorded By, (t) = Taken By, (c) = Cosigned By    Initials Name Provider Type    AE Radha Puga PTA Physical Therapy Assistant    TS Talya Biggs, GAR/L Occupational Therapy Assistant    PB Tk Nice, PT DPT Physical Therapist    MM Duke Loco, OTR/L Occupational Therapist    MARIE Garza, OT Student OT Student    LN Toribio Hancock, PT Student PT Student           Time Calculation:         PT Charges       03/08/18 1548 03/08/18 1545 03/08/18 1045    Time Calculation    Start Time 1503  -AE 1500  -AE 0932  -AE    Stop Time 1541  -AE  0956  -AE    Time Calculation (min) 38 min  -AE  24 min  -AE    PT Received On 03/08/18  -AE  03/08/18  -AE    PT Goal Re-Cert Due Date 03/17/18  -AE  03/17/18  -AE    Time Calculation- PT    Total Timed Code Minutes- PT 28 minute(s)  -AE  24 minute(s)  -AE      User Key  (r) = Recorded By, (t) = Taken By, (c) = Cosigned By    Initials Name Provider Type    GUIDO Puga PTA Physical Therapy Assistant          Therapy Charges for Today     Code Description Service Date Service Provider Modifiers Qty    08234568126  PT THER PROC EA 15 MIN 3/7/2018 Radha Puga PTA GP, KX 1    [id number]  PT THERAPEUTIC ACT EA 15 MIN 3/7/2018 Radha Puga,  PTA GP, KX 1    25344905485 HC PT THERAPEUTIC ACT EA 15 MIN 3/8/2018 Radha Puga, PTA GP, KX 1    56529086609 HC PT THER PROC EA 15 MIN 3/8/2018 Radha Puga, PTA GP, KX 1    [id number] HC GAIT TRAINING EA 15 MIN 3/8/2018 Radha Puga, SHANTANU GP, KX 1    [id number] HC PT THER PROC EA 15 MIN 3/8/2018 Radha Puga, SHANTANU GP, KX 2          PT G-Codes  Outcome Measure Options: AM-PAC 6 Clicks Daily Activity (OT)  Score: 15  Functional Limitation: Changing and maintaining body position  Changing and Maintaining Body Position Current Status (): At least 40 percent but less than 60 percent impaired, limited or restricted  Changing and Maintaining Body Position Goal Status (): At least 20 percent but less than 40 percent impaired, limited or restricted    Radha Puga PTA  3/8/2018

## 2018-03-08 NOTE — PLAN OF CARE
Problem: Patient Care Overview (Adult)  Goal: Plan of Care Review  Outcome: Ongoing (interventions implemented as appropriate)   03/08/18 1429   Patient Care Overview   Progress improving   Coping/Psychosocial Response Interventions   Plan Of Care Reviewed With patient;daughter   Outcome Evaluation   Outcome Summary/Follow up Plan VSS throughout shift. Patient was slightly disoriented throughout shift. Patient was found in floor in the AM. Physician and family notified. The patient will be discharged to a nursing home when approved.      Goal: Adult Individualization and Mutuality  Outcome: Ongoing (interventions implemented as appropriate)    Goal: Discharge Needs Assessment  Outcome: Ongoing (interventions implemented as appropriate)      Problem: Perioperative Period (Adult)  Goal: Signs and Symptoms of Listed Potential Problems Will be Absent or Manageable (Perioperative Period)  Outcome: Ongoing (interventions implemented as appropriate)      Problem: Fall Risk (Adult)  Goal: Absence of Falls  Outcome: Ongoing (interventions implemented as appropriate)

## 2018-03-08 NOTE — PROGRESS NOTES
Hanny Ivy is a 73 y.o. female patient.  Still complains of pain in the shoulder.\  Limited help at home.  Current Facility-Administered Medications   Medication Dose Route Frequency Provider Last Rate Last Dose   • acetaminophen (TYLENOL) tablet 650 mg  650 mg Oral Q4H PRN Imtiaz Lobato MD   650 mg at 03/07/18 2216    Or   • acetaminophen (TYLENOL) 160 MG/5ML solution 650 mg  650 mg Oral Q4H PRN Imtiaz Lobato MD        Or   • acetaminophen (TYLENOL) suppository 650 mg  650 mg Rectal Q4H PRN Imtiaz Lobato MD       • allopurinol (ZYLOPRIM) tablet 50 mg  50 mg Oral Daily Imtiaz Lobato MD   50 mg at 03/07/18 0911   • atorvastatin (LIPITOR) tablet 10 mg  10 mg Oral Nightly Imtiaz Lobato MD   10 mg at 03/07/18 2217   • budesonide-formoterol (SYMBICORT) 160-4.5 MCG/ACT inhaler 2 puff  2 puff Inhalation BID - RT Imtiaz Lobato MD   2 puff at 03/07/18 2138   • buPROPion SR (WELLBUTRIN SR) 12 hr tablet 100 mg  100 mg Oral Daily Imtiaz Lobato MD   100 mg at 03/07/18 0911   • carvedilol (COREG) tablet 12.5 mg  12.5 mg Oral Q12H Imtiaz Lobato MD   12.5 mg at 03/07/18 2217   • CloNIDine (CATAPRES) tablet 0.1 mg  0.1 mg Oral Q12H Imtiaz Lobato MD   0.1 mg at 03/07/18 0911   • diphenhydrAMINE (BENADRYL) capsule 25 mg  25 mg Oral Q6H PRN Imtiaz Lobato MD        Or   • diphenhydrAMINE (BENADRYL) injection 25 mg  25 mg Intravenous Q6H PRN Imtiaz Lobato MD       • docusate sodium (COLACE) capsule 100 mg  100 mg Oral BID PRN Imtiaz Lobato MD       • doxepin (SINEquan) capsule 100 mg  100 mg Oral Nightly Imtiaz Lobato MD   100 mg at 03/07/18 2216   • enoxaparin (LOVENOX) syringe 40 mg  40 mg Subcutaneous Daily Imtiaz Lobato MD   40 mg at 03/07/18 0910   • famotidine (PEPCID) tablet 40 mg  40 mg Oral Daily Imtiaz Lobato MD   40 mg at 03/07/18 0900   • FLUoxetine (PROzac) capsule 40 mg  40 mg Oral Daily Imtiaz Alcantar  MD Luis Alfredo   40 mg at 03/07/18 0911   • gabapentin (NEURONTIN) capsule 600 mg  600 mg Oral Q8H Imtiaz Lobato MD   600 mg at 03/07/18 1411   • HYDROmorphone (DILAUDID) injection 1 mg  1 mg Intravenous Q4H PRN Imtiaz Lobato MD   1 mg at 03/07/18 1311    And   • naloxone (NARCAN) injection 0.1 mg  0.1 mg Intravenous Q5 Min PRN Imtiaz Lobato MD       • ipratropium-albuterol (DUO-NEB) nebulizer solution 3 mL  3 mL Nebulization Q6H PRN Imtiaz Lobato MD       • lactated ringers infusion  100 mL/hr Intravenous Continuous Wilda Davis  mL/hr at 03/07/18 0047 100 mL/hr at 03/07/18 0047   • lactated ringers infusion  100 mL/hr Intravenous Continuous Imtiaz Lobato MD       • levothyroxine (SYNTHROID, LEVOTHROID) tablet 100 mcg  100 mcg Oral Q AM Imtiaz Lobato MD   100 mcg at 03/07/18 0508   • LORazepam (ATIVAN) tablet 1 mg  1 mg Oral TID Imtiaz Lobato MD   1 mg at 03/07/18 1036   • magnesium hydroxide (MILK OF MAGNESIA) suspension 2400 mg/10mL 10 mL  10 mL Oral Daily PRN Imtiaz Lobato MD       • Morphine sulfate (PF) injection 4 mg  4 mg Intravenous Q2H PRN Imtiaz Lobato MD   4 mg at 03/07/18 0512    And   • naloxone (NARCAN) injection 0.4 mg  0.4 mg Intravenous Q5 Min PRN Imtiaz Lobato MD       • ondansetron (ZOFRAN) tablet 4 mg  4 mg Oral Q6H PRN Imtiaz Lobato MD        Or   • ondansetron ODT (ZOFRAN-ODT) disintegrating tablet 4 mg  4 mg Oral Q6H PRN Imtiaz Lobato MD        Or   • ondansetron (ZOFRAN) injection 4 mg  4 mg Intravenous Q6H PRN Imtiaz Lobato MD       • oxyCODONE-acetaminophen (PERCOCET)  MG per tablet 1 tablet  1 tablet Oral Q4H PRN Imtiaz Lobato MD   1 tablet at 03/06/18 1518   • oxyCODONE-acetaminophen (PERCOCET) 7.5-325 MG per tablet 2 tablet  2 tablet Oral Q4H PRN Imtiaz Lobato MD   2 tablet at 03/07/18 1437   • phenol (CHLORASEPTIC) 1.4 % liquid 2 spray  2 spray  "Mouth/Throat Q2H PRN Imtiaz Lobato MD   2 spray at 03/06/18 2242   • promethazine (PHENERGAN) injection 12.5 mg  12.5 mg Intravenous Q6H PRN Imtiaz Lobato MD        Or   • promethazine (PHENERGAN) injection 12.5 mg  12.5 mg Intramuscular Q6H PRN Imtiaz Lobato MD       • sennosides-docusate sodium (SENOKOT-S) 8.6-50 MG tablet 2 tablet  2 tablet Oral Nightly Imtiaz Lobato MD   2 tablet at 03/07/18 2216   • sodium chloride 0.9 % flush 1-10 mL  1-10 mL Intravenous PRN Imtiaz Lobato MD         ALLERGIES:    Allergies   Allergen Reactions   • Penicillins Other (See Comments)     Unknown   • Sulfa Antibiotics Other (See Comments)     Unknown     Principal Problem:    Rotator cuff arthropathy, right  Active Problems:    Pure hypercholesterolemia    Essential hypertension    Mild intermittent asthma without complication    Generalized anxiety disorder    Gastroesophageal reflux disease without esophagitis    Acquired hypothyroidism    Blood pressure 152/77, pulse 89, temperature 98.1 °F (36.7 °C), temperature source Oral, resp. rate 18, height 162.6 cm (64\"), weight 92.7 kg (204 lb 7 oz), SpO2 91 %.      Subjective:  Symptoms:  Stable.    Diet:  Adequate intake.    Activity level: Impaired due to pain.    Pain:  She complains of pain that is moderate.  She reports pain is improving.  Pain is partially controlled.      Review of Systems  Objective:  General Appearance:  Comfortable and well-appearing.    Vital signs: (most recent): Blood pressure 152/77, pulse 89, temperature 98.1 °F (36.7 °C), temperature source Oral, resp. rate 18, height 162.6 cm (64\"), weight 92.7 kg (204 lb 7 oz), SpO2 91 %.  Vital signs are normal.    Output: Producing urine and producing stool.    Lungs:  Normal respiratory rate and normal effort.    Heart: Normal rate.  Regular rhythm.    Abdomen: Abdomen is soft.  Bowel sounds are normal.     Extremities: (Shoulder with postoperative changes in sling with good " pulses noted distally)  Neurological: Patient is alert and oriented to person, place and time.    Skin:  Warm and dry.              Labs:  Lab Results (last 72 hours)     Procedure Component Value Units Date/Time    Hemoglobin & Hematocrit, Blood [[id number]]  (Abnormal) Collected:  03/07/18 0438    Specimen:  Blood Updated:  03/07/18 0548     Hemoglobin 9.3 (L) g/dL      Hematocrit 28.4 (L) %     Basic Metabolic Panel [[id number]]  (Abnormal) Collected:  03/07/18 0438    Specimen:  Blood Updated:  03/07/18 0602     Glucose 107 (H) mg/dL      BUN 17 mg/dL      Creatinine 1.54 (H) mg/dL      Sodium 132 (L) mmol/L      Potassium 4.4 mmol/L      Chloride 97 (L) mmol/L      CO2 26.0 mmol/L      Calcium 8.7 mg/dL      eGFR Non African Amer 33 (L) mL/min/1.73      BUN/Creatinine Ratio 11.0     Anion Gap 9.0 mmol/L     Narrative:       The MDRD GFR formula is only valid for adults with stable renal function between ages 18 and 70.          Imaging Results (last 72 hours)     Procedure Component Value Units Date/Time    XR Shoulder 2+ View Right [[id number]] Collected:  03/06/18 1450     Updated:  03/06/18 1455    Narrative:       XR SHOULDER 2+ VW RIGHT- 3/6/2018 2:30 PM CST     History: postop     Comparison: None      Findings:   3 frontal postoperative views of the right shoulder are submitted.      New reverse total shoulder arthroplasty with components appearing in  good position. No periprosthetic fractures identified       Impression:       Impression:   1. New reverse total shoulder arthroplasty without visualized  complication.        This report was finalized on 03/06/2018 14:52 by Dr Kuldip Nelson, .                Assessment:    Condition: In stable condition.  Improving.   (Anemia post-op expected-check iron, B12,and folate. Replenish as needed.Transfuse at acceptable levels depending on clinical judgement and comorbidities.  Labs this a.m. are pending    Constipation-start with Miralax 1 capful BID until BM,  then decrease to 1 x a day,then can step up to Amitizia 24 mcg po BID which can be used for opiod induced constipation,and ultimately end up with Relistor 12 mcg sub Q q 48 hours to block the effect of narcotics on the gut.    Explained to patient and staff that we were consulted for medical management during their acute care hospitalization. They need to f/u with their regular primary care provider concerning any further treatment and review of abnormalities found during their hospitalization at [facility] and they agree with that treatment plan. ).     Plan:   Discharge home.  Encourage ambulation and per physical therapy.  (Medically stable for discharge home today.  Home health being set up.  Labs still pending at this time for this morning.).     Patient Active Problem List   Diagnosis   • Dermatochalasis of both upper eyelids   • Visual field defect, unspecified   • Rotator cuff arthropathy, right   • Pure hypercholesterolemia   • Essential hypertension   • Mild intermittent asthma without complication   • Generalized anxiety disorder   • Gastroesophageal reflux disease without esophagitis   • Acquired hypothyroidism     Jac Mena MD  3/8/2018

## 2018-03-08 NOTE — DISCHARGE PLACEMENT REQUEST
"Call Back: Alisson Hong, [phone]      Annalisa Barrett (73 y.o. Female)     Date of Birth Social Security Number Address Home Phone MRN    1945  [address] [facility] [address] [phone] [id number]    Mandaeism Marital Status          None        Admission Date Admission Type Admitting Provider Attending Provider Department, Room/Bed    3/6/18 Elective Imtiaz Lobato MD Romine, Spencer Elton, MD [facility] 3A, 354/1    Discharge Date Discharge Disposition Discharge Destination                      Attending Provider: Imtiaz Lobato MD     Allergies:  Penicillins, Sulfa Antibiotics    Isolation:  None   Infection:  None   Code Status:  FULL    Ht:  162.6 cm (64\")   Wt:  92.7 kg (204 lb 7 oz)    Admission Cmt:  None   Principal Problem:  Rotator cuff arthropathy, right [M12.811]                 Active Insurance as of 3/6/2018     Primary Coverage     Payor Plan Insurance Group Employer/Plan Group    MEDICARE MEDICARE A & B      Payor Plan Address Payor Plan Phone Number Effective From Effective To    [address] [phone] 2/1/2008     [place] [address]       Subscriber Name Subscriber Birth Date Member ID       ANNALISA BARRETT 1945 [id number]           Secondary Coverage     Payor Plan Insurance Group Employer/Plan Group    AARP MED SUPP AARP HEALTH CARE OPTIONS      Payor Plan Address Payor Plan Phone Number Effective From Effective To    [facility] [phone] 1/1/2016     [address]       [place] [address]       Subscriber Name Subscriber Birth Date Member ID       ANNALISA BARRETT 1945 [id number]                 Emergency Contacts      (Rel.) Home Phone Work Phone Mobile Phone    Shantell Myanard (Daughter) -- [phone]            Insurance Information                MEDICARE/MEDICARE A & B Phone: [phone]    Subscriber: Annalisa Barrett Subscriber#: [id number]    Group#:  Precert#:         AARP MED " [facility] HEALTH CARE OPTIONS Phone: [phone]    Subscriber: Hanny Ivy Subscriber#: [id number]    Group#:  Precert#:              History & Physical      H&P filed by Sammi Sykes MD at 3/5/2018  2:46 PM      Scan on 3/6/2018 : HISTORY AND PHYSICAL 3/6/18 (below)              Electronically signed by Interface, Scans Incoming at 3/5/2018  2:46 PM      H&P filed by Sammi Sykes MD at 3/6/2018  9:40 AM      Scan on 3/6/2018 : 03/01/2018 (below)              Electronically signed by Interface, Scans Incoming at 3/6/2018  9:40 AM      H&P filed by Sammi Sykes MD at 3/6/2018 11:01 AM      Scan on 3/6/2018 : HISTORY AND PHYSICAL 3/7/2018 (below)              Electronically signed by Interface, Scans Incoming at 3/6/2018 11:01 AM      Imtiaz Lobato MD at 3/6/2018 11:20 AM          Pt Name: Hanny Ivy  MRN: [id number]  YOB: 1945  Date of evaluation: 3/6/2018    H&P including current review of systems was updated in the paper chart and/or the document previously scanned into the record.  There have been no significant changes or new problems since the original evaluation.  The patient's problems continue and indications for contemplated procedure have not changed.    Electronically signed by Imtiaz Lobato MD on 3/6/2018 at 11:20 AM       Electronically signed by Imtiaz Lobato MD at 3/6/2018 11:20 AM        Vital Signs (last 24 hours)       03/07 0700  -  03/08 0659 03/08 0700  -  03/08 1504   Most Recent    Temp (°F) 98.1 -  100.1      99.4     99.4 (37.4)    Heart Rate 80 -  94      83     83    Resp   18      18     18    /51 -  152/77      108/64     108/64    SpO2 (%) 91 -  98    92 -  96     92          Prior to Admission Medications     Prescriptions Last Dose Informant Patient Reported? Taking?    allopurinol (ZYLOPRIM) 100 MG tablet 3/5/2018  Yes Yes    Take 1 tablet by mouth daily    amlodipine-olmesartan (KESHAV) 10-40 MG per tablet 3/5/2018  Family Member Yes Yes    Take 1 tablet by mouth Daily.    buPROPion SR (WELLBUTRIN SR) 150 MG 12 hr tablet 3/5/2018  Yes Yes    Take  by mouth Daily.    carisoprodol (SOMA) 350 MG tablet 3/5/2018  Yes Yes    3 (Three) Times a Day As Needed.    carvedilol (COREG) 12.5 MG tablet 3/6/2018  Yes Yes    TAKE ONE TABLET BY MOUTH TWICE A DAY WTIH MEALS ** MAY CAUSEDROWSINESS    Cetirizine HCl (ZYRTEC ALLERGY) 10 MG capsule 3/5/2018 Family Member Yes Yes    Take 10 mg by mouth Daily.    CloNIDine (CATAPRES) 0.1 MG tablet 3/6/2018 Family Member Yes Yes    Take 0.1 mg by mouth 2 (Two) Times a Day.    doxepin (SINEquan) 50 MG capsule 3/5/2018  Yes Yes    Take 100 mg by mouth Every Night.    DULERA 100-5 MCG/ACT inhaler 3/5/2018  Yes Yes    Inhale 2 (Two) Times a Day.    FLUoxetine (PROzac) 40 MG capsule 3/5/2018  Yes Yes    TAKE ONE CAPSULE BY MOUTH DAILY ** MAY CAUSE DROWSINESS    gabapentin (NEURONTIN) 300 MG capsule 3/5/2018  Yes Yes    TAKE TWO CAPSULES BY MOUTH THREE TIMES DAILY AS NEEDED ** MAY CAUSEDROWSINESS    ipratropium-albuterol (COMBIVENT)  MCG/ACT inhaler Past Week  Yes Yes    Every 6 (Six) Hours.    levothyroxine (SYNTHROID, LEVOTHROID) 100 MCG tablet 3/5/2018  Yes Yes    Take 1 tablet by mouth Q Morning    LORazepam (ATIVAN) 1 MG tablet 3/6/2018  Yes Yes    3 (Three) Times a Day.    mometasone (NASONEX) 50 MCG/ACT nasal spray 3/5/2018 Family Member Yes Yes    2 sprays into each nostril Daily.    oxyCODONE-acetaminophen (PERCOCET)  MG per tablet 3/5/2018 Family Member Yes Yes    Take 1 tablet by mouth 2 (Two) Times a Day As Needed for Moderate Pain .    pantoprazole (PROTONIX) 40 MG EC tablet 3/5/2018  Yes Yes    TAKE ONE TABLET BY MOUTH TWICE A DAY    simvastatin (ZOCOR) 20 MG tablet 3/5/2018 Family Member Yes Yes    Take 20 mg by mouth Every Night.    Unable to find Past Month Family Member Yes Yes    1 each 1 (One) Time. BENTAL 20 MG PO QD/PRN    celecoxib (CELEBREX) 200 MG capsule More than a  "month  Yes No    Take 1 capsule by mouth daily PRN    chlorthalidone (HYGROTON) 25 MG tablet  Family Member Yes No    Take 25 mg by mouth Daily.    EPINEPHrine (EPIPEN 2-ABRIL) 0.3 MG/0.3ML solution auto-injector injection Unknown  Yes No    Inject 0.3 mg into the muscle as needed. Use as directed for allergic reaction    HYDROcodone-acetaminophen (NORCO)  MG per tablet   Yes No    2 (Two) Times a Day.    VYTORIN 10-20 MG per tablet   Yes No    Take  by mouth Every Night.          Hospital Medications (active)       Dose Frequency Start End    acetaminophen (TYLENOL) 160 MG/5ML solution 650 mg 650 mg Every 4 Hours PRN 3/6/2018     Sig - Route: Take 20.3 mL by mouth Every 4 (Four) Hours As Needed for Mild Pain . - Oral    Linked Group 1:  \"Or\" Linked Group Details        acetaminophen (TYLENOL) suppository 650 mg 650 mg Every 4 Hours PRN 3/6/2018     Sig - Route: Insert 2 suppositories into the rectum Every 4 (Four) Hours As Needed for Mild Pain . - Rectal    Linked Group 1:  \"Or\" Linked Group Details        acetaminophen (TYLENOL) tablet 650 mg 650 mg Every 4 Hours PRN 3/6/2018     Sig - Route: Take 2 tablets by mouth Every 4 (Four) Hours As Needed for Mild Pain . - Oral    Linked Group 1:  \"Or\" Linked Group Details        allopurinol (ZYLOPRIM) tablet 50 mg 50 mg Daily 3/6/2018     Sig - Route: Take 0.5 tablets by mouth Daily. - Oral    atorvastatin (LIPITOR) tablet 10 mg 10 mg Nightly 3/6/2018     Sig - Route: Take 1 tablet by mouth Every Night. - Oral    budesonide-formoterol (SYMBICORT) 160-4.5 MCG/ACT inhaler 2 puff 2 puff 2 Times Daily - RT 3/6/2018     Sig - Route: Inhale 2 puffs 2 (Two) Times a Day. - Inhalation    buPROPion SR (WELLBUTRIN SR) 12 hr tablet 100 mg 100 mg Daily 3/6/2018     Sig - Route: Take 1 tablet by mouth Daily. - Oral    carvedilol (COREG) tablet 12.5 mg 12.5 mg Every 12 Hours Scheduled 3/6/2018     Sig - Route: Take 2 tablets by mouth Every 12 (Twelve) Hours. - Oral    CloNIDine " "(CATAPRES) tablet 0.1 mg 0.1 mg Every 12 Hours Scheduled 3/6/2018     Sig - Route: Take 1 tablet by mouth Every 12 (Twelve) Hours. - Oral    diphenhydrAMINE (BENADRYL) capsule 25 mg 25 mg Every 6 Hours PRN 3/6/2018     Sig - Route: Take 1 capsule by mouth Every 6 (Six) Hours As Needed for Itching. - Oral    Linked Group 2:  \"Or\" Linked Group Details        diphenhydrAMINE (BENADRYL) injection 25 mg 25 mg Every 6 Hours PRN 3/6/2018     Sig - Route: Infuse 0.5 mL into a venous catheter Every 6 (Six) Hours As Needed for Itching. - Intravenous    Linked Group 2:  \"Or\" Linked Group Details        docusate sodium (COLACE) capsule 100 mg 100 mg 2 Times Daily PRN 3/6/2018     Sig - Route: Take 1 capsule by mouth 2 (Two) Times a Day As Needed for Constipation. - Oral    doxepin (SINEquan) capsule 100 mg 100 mg Nightly 3/6/2018     Sig - Route: Take 1 capsule by mouth Every Night. - Oral    enoxaparin (LOVENOX) syringe 40 mg 40 mg Daily 3/7/2018     Sig - Route: Inject 0.4 mL under the skin Daily. - Subcutaneous    famotidine (PEPCID) tablet 20 mg 20 mg Daily 3/9/2018     Sig - Route: Take 1 tablet by mouth Daily. - Oral    FLUoxetine (PROzac) capsule 40 mg 40 mg Daily 3/6/2018     Sig - Route: Take 2 capsules by mouth Daily. - Oral    gabapentin (NEURONTIN) capsule 600 mg 600 mg Every 8 Hours Scheduled 3/6/2018     Sig - Route: Take 2 capsules by mouth Every 8 (Eight) Hours. - Oral    HYDROmorphone (DILAUDID) injection 1 mg 1 mg Every 4 Hours PRN 3/6/2018 3/16/2018    Sig - Route: Infuse 1 mL into a venous catheter Every 4 (Four) Hours As Needed for Severe Pain . - Intravenous    Linked Group 3:  \"And\" Linked Group Details        ipratropium-albuterol (DUO-NEB) nebulizer solution 3 mL 3 mL Every 6 Hours PRN 3/6/2018     Sig - Route: Take 3 mL by nebulization Every 6 (Six) Hours As Needed for Wheezing. - Nebulization    lactated ringers infusion 1,000 mL 1,000 mL Continuous 3/6/2018 3/8/2018    Sig - Route: Infuse 1,000 mL " "into a venous catheter Continuous. - Intravenous    Cosign for Ordering: Accepted by Imtiaz Lobato MD on 3/6/2018  8:57 PM    lactated ringers infusion 100 mL/hr Continuous 3/6/2018     Sig - Route: Infuse 100 mL/hr into a venous catheter Continuous. - Intravenous    lactated ringers infusion 100 mL/hr Continuous 3/6/2018     Sig - Route: Infuse 100 mL/hr into a venous catheter Continuous. - Intravenous    levothyroxine (SYNTHROID, LEVOTHROID) tablet 100 mcg 100 mcg Every Early Morning 3/7/2018     Sig - Route: Take 1 tablet by mouth Every Morning. - Oral    LORazepam (ATIVAN) tablet 1 mg 1 mg 3 Times Daily 3/6/2018 3/16/2018    Sig - Route: Take 1 tablet by mouth 3 (Three) Times a Day. - Oral    magnesium hydroxide (MILK OF MAGNESIA) suspension 2400 mg/10mL 10 mL 10 mL Daily PRN 3/6/2018     Sig - Route: Take 10 mL by mouth Daily As Needed for Constipation. - Oral    Morphine sulfate (PF) injection 4 mg 4 mg Every 2 Hours PRN 3/6/2018 3/16/2018    Sig - Route: Infuse 1 mL into a venous catheter Every 2 (Two) Hours As Needed for Moderate Pain . - Intravenous    Linked Group 4:  \"And\" Linked Group Details        naloxone (NARCAN) injection 0.1 mg 0.1 mg Every 5 Minutes PRN 3/6/2018     Sig - Route: Infuse 0.25 mL into a venous catheter Every 5 (Five) Minutes As Needed for Respiratory Depression. - Intravenous    Linked Group 3:  \"And\" Linked Group Details        naloxone (NARCAN) injection 0.4 mg 0.4 mg Every 5 Minutes PRN 3/6/2018     Sig - Route: Infuse 1 mL into a venous catheter Every 5 (Five) Minutes As Needed for Respiratory Depression. - Intravenous    Linked Group 4:  \"And\" Linked Group Details        ondansetron (ZOFRAN) injection 4 mg 4 mg Every 6 Hours PRN 3/6/2018     Sig - Route: Infuse 2 mL into a venous catheter Every 6 (Six) Hours As Needed for Nausea or Vomiting. - Intravenous    Linked Group 5:  \"Or\" Linked Group Details        ondansetron (ZOFRAN) tablet 4 mg 4 mg Every 6 Hours PRN 3/6/2018 " "    Sig - Route: Take 1 tablet by mouth Every 6 (Six) Hours As Needed for Nausea or Vomiting. - Oral    Linked Group 5:  \"Or\" Linked Group Details        ondansetron ODT (ZOFRAN-ODT) disintegrating tablet 4 mg 4 mg Every 6 Hours PRN 3/6/2018     Sig - Route: Take 1 tablet by mouth Every 6 (Six) Hours As Needed for Nausea or Vomiting. - Oral    Linked Group 5:  \"Or\" Linked Group Details        oxyCODONE-acetaminophen (PERCOCET)  MG per tablet 1 tablet 1 tablet Every 4 Hours PRN 3/6/2018 3/16/2018    Sig - Route: Take 1 tablet by mouth Every 4 (Four) Hours As Needed for Moderate Pain . - Oral    oxyCODONE-acetaminophen (PERCOCET) 7.5-325 MG per tablet 2 tablet 2 tablet Every 4 Hours PRN 3/6/2018 3/16/2018    Sig - Route: Take 2 tablets by mouth Every 4 (Four) Hours As Needed for Severe Pain . - Oral    phenol (CHLORASEPTIC) 1.4 % liquid 2 spray 2 spray Every 2 Hours PRN 3/6/2018     Sig - Route: Apply 2 sprays to the mouth or throat Every 2 (Two) Hours As Needed for Sore Throat. - Mouth/Throat    promethazine (PHENERGAN) injection 12.5 mg 12.5 mg Every 6 Hours PRN 3/6/2018     Sig - Route: Infuse 0.5 mL into a venous catheter Every 6 (Six) Hours As Needed for Nausea or Vomiting. - Intravenous    Linked Group 6:  \"Or\" Linked Group Details        promethazine (PHENERGAN) injection 12.5 mg 12.5 mg Every 6 Hours PRN 3/6/2018     Sig - Route: Inject 0.5 mL into the shoulder, thigh, or buttocks Every 6 (Six) Hours As Needed for Nausea or Vomiting. - Intramuscular    Linked Group 6:  \"Or\" Linked Group Details        sennosides-docusate sodium (SENOKOT-S) 8.6-50 MG tablet 2 tablet 2 tablet Nightly 3/6/2018     Sig - Route: Take 2 tablets by mouth Every Night. - Oral    sodium chloride 0.9 % flush 1-10 mL 1-10 mL As Needed 3/6/2018     Sig - Route: Infuse 1-10 mL into a venous catheter As Needed for Line Care. - Intravenous    famotidine (PEPCID) tablet 40 mg (Discontinued) 40 mg Daily 3/6/2018 3/8/2018    Sig - Route: " Take 2 tablets by mouth Daily. - Oral             Operative/Procedure Notes (most recent note)      Imtiaz Lobato MD at 3/6/2018  2:13 PM  Version 1 of 1         TOTAL SHOULDER REVERSE ARTHROPLASTY  Progress Note    Hanny Ivy  3/6/2018    Pre-op Diagnosis:   RIGHT SHOULDER ARTHROPATHY       Post-Op Diagnosis Codes:     * Rotator cuff arthropathy, right [M12.811]    Procedure/CPT® Codes:  PA RECONSTR TOTAL SHOULDER IMPLANT [49733]    Procedure(s):  RIGHT REVERSE TOTAL SHOULDER ARTHROPLASTY    Surgeon(s):  Imtiaz Lobato MD    Anesthesia: General with Block    Staff:   Circulator: Andrea Syed RN; Erica Hansen RN  Scrub Person: Erica Montes; Juan Bee  Vendor Representative: Harrison Brannon  Assistant: Donna Cortez    Estimated Blood Loss: <500ml    Urine Voided: * No values recorded between 3/6/2018 12:50 PM and 3/6/2018  2:11 PM *    Specimens:                None      Drains:           Findings: see op note    Complications: none      Imtiaz Lobato MD     Date: 3/6/2018  Time: 2:13 PM         Electronically signed by Imtiaz Lobato MD at 3/6/2018  2:13 PM           Physician Progress Notes (most recent note)      Jac Mena MD at 3/8/2018  6:23 AM  Version 1 of 1         Hanny Ivy is a 73 y.o. female patient.  Still complains of pain in the shoulder.\help at home.  Current Facility-Administered Medications   Medication Dose Route Frequency Provider Last Rate Last Dose   • acetaminophen (TYLENOL) tablet 650 mg  650 mg Oral Q4H PRN Imtiaz Lobato MD   650 mg at 03/07/18 2216    Or   • acetaminophen (TYLENOL) 160 MG/5ML solution 650 mg  650 mg Oral Q4H PRN Imtiaz Lobato MD        Or   • acetaminophen (TYLENOL) suppository 650 mg  650 mg Rectal Q4H PRN Imtiaz Lobato MD       • allopurinol (ZYLOPRIM) tablet 50 mg  50 mg Oral Daily Imtiaz Lobato MD   50 mg at 03/07/18 0911   • atorvastatin (LIPITOR) tablet 10 mg  10 mg  Oral Nightly Imtiaz Lobato MD   10 mg at 03/07/18 2217   • budesonide-formoterol (SYMBICORT) 160-4.5 MCG/ACT inhaler 2 puff  2 puff Inhalation BID - RT Imtiaz Lobato MD   2 puff at 03/07/18 2138   • buPROPion SR (WELLBUTRIN SR) 12 hr tablet 100 mg  100 mg Oral Daily Imtiaz Lobato MD   100 mg at 03/07/18 0911   • carvedilol (COREG) tablet 12.5 mg  12.5 mg Oral Q12H Imtiaz Lobato MD   12.5 mg at 03/07/18 2217   • CloNIDine (CATAPRES) tablet 0.1 mg  0.1 mg Oral Q12H Imtiaz Lobato MD   0.1 mg at 03/07/18 0911   • diphenhydrAMINE (BENADRYL) capsule 25 mg  25 mg Oral Q6H PRN Imtiaz Lobato MD        Or   • diphenhydrAMINE (BENADRYL) injection 25 mg  25 mg Intravenous Q6H PRN Imtiaz Lobato MD       • docusate sodium (COLACE) capsule 100 mg  100 mg Oral BID PRN Imtiaz Lobato MD       • doxepin (SINEquan) capsule 100 mg  100 mg Oral Nightly Imtiaz Lobato MD   100 mg at 03/07/18 2216   • enoxaparin (LOVENOX) syringe 40 mg  40 mg Subcutaneous Daily Imtiaz Lobato MD   40 mg at 03/07/18 0910   • famotidine (PEPCID) tablet 40 mg  40 mg Oral Daily Imtiaz Lobato MD   40 mg at 03/07/18 0900   • FLUoxetine (PROzac) capsule 40 mg  40 mg Oral Daily Imtiaz Lobato MD   40 mg at 03/07/18 0911   • gabapentin (NEURONTIN) capsule 600 mg  600 mg Oral Q8H Imtiaz Lobato MD   600 mg at 03/07/18 1411   • HYDROmorphone (DILAUDID) injection 1 mg  1 mg Intravenous Q4H PRN Imtiaz Lobato MD   1 mg at 03/07/18 1311    And   • naloxone (NARCAN) injection 0.1 mg  0.1 mg Intravenous Q5 Min PRN Imtiaz Lobato MD       • ipratropium-albuterol (DUO-NEB) nebulizer solution 3 mL  3 mL Nebulization Q6H PRN Imtiaz Lobato MD       • lactated ringers infusion  100 mL/hr Intravenous Continuous Wilda Davis  mL/hr at 03/07/18 0047 100 mL/hr at 03/07/18 0047   • lactated ringers infusion  100 mL/hr Intravenous Continuous Imtiaz  Ortiz Lobato MD       • levothyroxine (SYNTHROID, LEVOTHROID) tablet 100 mcg  100 mcg Oral Q AM Imtiaz Lobato MD   100 mcg at 03/07/18 0508   • LORazepam (ATIVAN) tablet 1 mg  1 mg Oral TID Imtiaz Lobato MD   1 mg at 03/07/18 1036   • magnesium hydroxide (MILK OF MAGNESIA) suspension 2400 mg/10mL 10 mL  10 mL Oral Daily PRN Imtiaz Lobato MD       • Morphine sulfate (PF) injection 4 mg  4 mg Intravenous Q2H PRN Imtiaz Lobato MD   4 mg at 03/07/18 0512    And   • naloxone (NARCAN) injection 0.4 mg  0.4 mg Intravenous Q5 Min PRN Imtiaz Lobato MD       • ondansetron (ZOFRAN) tablet 4 mg  4 mg Oral Q6H PRN Imtiaz Lobato MD        Or   • ondansetron ODT (ZOFRAN-ODT) disintegrating tablet 4 mg  4 mg Oral Q6H PRN Imtiaz Lobato MD        Or   • ondansetron (ZOFRAN) injection 4 mg  4 mg Intravenous Q6H PRN Imtiaz Lobato MD       • oxyCODONE-acetaminophen (PERCOCET)  MG per tablet 1 tablet  1 tablet Oral Q4H PRN Imtiaz Lobato MD   1 tablet at 03/06/18 1518   • oxyCODONE-acetaminophen (PERCOCET) 7.5-325 MG per tablet 2 tablet  2 tablet Oral Q4H PRN Imtiaz Lobato MD   2 tablet at 03/07/18 1437   • phenol (CHLORASEPTIC) 1.4 % liquid 2 spray  2 spray Mouth/Throat Q2H PRN Imtiaz Lobato MD   2 spray at 03/06/18 2242   • promethazine (PHENERGAN) injection 12.5 mg  12.5 mg Intravenous Q6H PRN Imtiaz Lobato MD        Or   • promethazine (PHENERGAN) injection 12.5 mg  12.5 mg Intramuscular Q6H PRN Imtiaz Lobato MD       • sennosides-docusate sodium (SENOKOT-S) 8.6-50 MG tablet 2 tablet  2 tablet Oral Nightly Imtiaz Lobato MD   2 tablet at 03/07/18 9917   • sodium chloride 0.9 % flush 1-10 mL  1-10 mL Intravenous PRN Imtiaz Lobato MD         ALLERGIES:    Allergies   Allergen Reactions   • Penicillins Other (See Comments)     Unknown   • Sulfa Antibiotics Other (See Comments)     Unknown     Principal Problem:     "Rotator cuff arthropathy, right  Active Problems:    Pure hypercholesterolemia    Essential hypertension    Mild intermittent asthma without complication    Generalized anxiety disorder    Gastroesophageal reflux disease without esophagitis    Acquired hypothyroidism    Blood pressure 152/77, pulse 89, temperature 98.1 °F (36.7 °C), temperature source Oral, resp. rate 18, height 162.6 cm (64\"), weight 92.7 kg (204 lb 7 oz), SpO2 91 %.      Subjective:  Symptoms:  Stable.    Diet:  Adequate intake.    Activity level: Impaired due to pain.    Pain:  She complains of pain that is moderate.  She reports pain is improving.  Pain is partially controlled.      Review of Systems  Objective:  General Appearance:  Comfortable and well-appearing.    Vital signs: (most recent): Blood pressure 152/77, pulse 89, temperature 98.1 °F (36.7 °C), temperature source Oral, resp. rate 18, height 162.6 cm (64\"), weight 92.7 kg (204 lb 7 oz), SpO2 91 %.  Vital signs are normal.    Output: Producing urine and producing stool.    Lungs:  Normal respiratory rate and normal effort.    Heart: Normal rate.  Regular rhythm.    Abdomen: Abdomen is soft.  Bowel sounds are normal.     Extremities: (Shoulder with postoperative changes in sling with good pulses noted distally)  Neurological: Patient is alert and oriented to person, place and time.    Skin:  Warm and dry.              Labs:  Lab Results (last 72 hours)     Procedure Component Value Units Date/Time    Hemoglobin & Hematocrit, Blood [[id number]]  (Abnormal) Collected:  03/07/18 0438    Specimen:  Blood Updated:  03/07/18 0548     Hemoglobin 9.3 (L) g/dL      Hematocrit 28.4 (L) %     Basic Metabolic Panel [[id number]]  (Abnormal) Collected:  03/07/18 0438    Specimen:  Blood Updated:  03/07/18 0602     Glucose 107 (H) mg/dL      BUN 17 mg/dL      Creatinine 1.54 (H) mg/dL      Sodium 132 (L) mmol/L      Potassium 4.4 mmol/L      Chloride 97 (L) mmol/L      CO2 26.0 mmol/L      Calcium 8.7 " mg/dL      eGFR Non  Amer 33 (L) mL/min/1.73      BUN/Creatinine Ratio 11.0     Anion Gap 9.0 mmol/L     Narrative:       The MDRD GFR formula is only valid for adults with stable renal function between ages 18 and 70.          Imaging Results (last 72 hours)     Procedure Component Value Units Date/Time    XR Shoulder 2+ View Right [[id number]] Collected:  03/06/18 1450     Updated:  03/06/18 1455    Narrative:       XR SHOULDER 2+ VW RIGHT- 3/6/2018 2:30 PM CST     History: postop     Comparison: None      Findings:   3 frontal postoperative views of the right shoulder are submitted.      New reverse total shoulder arthroplasty with components appearing in  good position. No periprosthetic fractures identified       Impression:       Impression:   1. New reverse total shoulder arthroplasty without visualized  complication.        This report was finalized on 03/06/2018 14:52 by Dr Kuldip Nelson, .                Assessment:    Condition: In stable condition.  Improving.   (Anemia post-op expected-check iron, B12,and folate. Replenish as needed.Transfuse at acceptable levels depending on clinical judgement and comorbidities.  Labs this a.m. are pending    Constipation-start with Miralax 1 capful BID until BM, then decrease to 1 x a day,then can step up to Amitizia 24 mcg po BID which can be used for opiod induced constipation,and ultimately end up with Relistor 12 mcg sub Q q 48 hours to block the effect of narcotics on the gut.    Explained to patient and staff that we were consulted for medical management during their acute care hospitalization. They need to f/u with their regular primary care provider concerning any further treatment and review of abnormalities found during their hospitalization at [facility] and they agree with that treatment plan. ).     Plan:   Discharge home.  Encourage ambulation and per physical therapy.  (Medically stable for discharge home today.  Home health being set  up.  Labs still pending at this time for this morning.).     Patient Active Problem List   Diagnosis   • Dermatochalasis of both upper eyelids   • Visual field defect, unspecified   • Rotator cuff arthropathy, right   • Pure hypercholesterolemia   • Essential hypertension   • Mild intermittent asthma without complication   • Generalized anxiety disorder   • Gastroesophageal reflux disease without esophagitis   • Acquired hypothyroidism     Jac Mena MD  3/8/2018                                                 Electronically signed by Jac Mena MD at 3/8/2018  6:26 AM           Consult Notes (most recent note)      Jac Mena MD at 3/7/2018  8:01 AM  Version 1 of 1         Consults         Referring Provider:  Luis Alfredo  Reason for Consultation: Medical management    Patient Care Team:  Kun Gonzalez MD as PCP - General (Family Medicine)  Robb Ambrocio MD as Consulting Physician (Otolaryngology)    Chief complaint shoulder pain    Subjective .     History of present illness:  The patient presents today for surgical correction after failing conservative management.  They have been through anti-inflammatories, muscle relaxers, and pain medication.  The pain is progressed to the point in time where it is affecting their activities of daily living and after being explained all their options and elected to undergo surgical correction.  Their primary care physician does not attend here [facility]; therefore, I have been asked to take care of their primary medical needs in the perioperative period.  Postoperative pain is as expected.  There are no other precipitating or relieving factors.  Block is starting to wear off and pain is rated 4-5 out of 10.    REVIEW OF SYSTEMS:    CONSTITUTIONAL:  Negative for anorexia, chills, fevers, night sweats and weight loss  EYES:  negative for eye dryness, icterus and redness  HEENT:   negative for dental problems, epistaxis, facial trauma  and thrush  RESPIRATORY:  negative for chest tightness, cough, dyspnea on exertion, pneumonia and sputum  CARDIOVASCULAR: negative for chest pain, dyspnea, exertional chest pressure/discomfort, irregular heart beat, palpitations, paroxysmal nocturnal dyspnea and syncope  GASTROINTESTINAL:  History of constipation with pain medication, negative for abdominal pain, hematemesis, jaundice, melena and rectal bleeding  MUSCULOSKELETAL:  negative for muscle weakness, myalgias and neck pain  NEUROLOGICAL:   negative for dizziness, headaches, seizures, speech problems, tremors and vertigo  INTEGUMENT: negative for pruritus, rash, skin color change and skin lesion(s)         History    Past Medical History:   Diagnosis Date   • Asthma    • Dermatochalasis of both upper eyelids    • GERD (gastroesophageal reflux disease)    • Hypertension    • Hypothyroidism     hypothyroidism   • Osteoarthritis    • Visual field defect, unspecified      Past Surgical History:   Procedure Laterality Date   • ANKLE SURGERY     • CERVICAL FUSION     • HYSTERECTOMY     • OTHER SURGICAL HISTORY      thumb   • REPLACEMENT TOTAL KNEE     • TOTAL SHOULDER ARTHROPLASTY W/ DISTAL CLAVICLE EXCISION Right 3/6/2018    Procedure: RIGHT REVERSE TOTAL SHOULDER ARTHROPLASTY;  Surgeon: Imtiaz Lobato MD;  Location: [facility];  Service:      Family History   Problem Relation Age of Onset   • Cancer Maternal Grandmother      Social History   Substance Use Topics   • Smoking status: Never Smoker   • Smokeless tobacco: Never Used   • Alcohol use No     Prescriptions Prior to Admission   Medication Sig Dispense Refill Last Dose   • allopurinol (ZYLOPRIM) 100 MG tablet Take 1 tablet by mouth daily   3/5/2018 at 0700   • amlodipine-olmesartan (KESHAV) 10-40 MG per tablet Take 1 tablet by mouth Daily.   3/5/2018 at 0700   • buPROPion SR (WELLBUTRIN SR) 150 MG 12 hr tablet Take  by mouth Daily.   3/5/2018 at 0700   • carisoprodol (SOMA) 350 MG tablet 3 (Three)  Times a Day As Needed.   3/5/2018 at 2100   • carvedilol (COREG) 12.5 MG tablet TAKE ONE TABLET BY MOUTH TWICE A DAY WTIH MEALS ** MAY CAUSEDROWSINESS   3/6/2018 at 0500   • Cetirizine HCl (ZYRTEC ALLERGY) 10 MG capsule Take 10 mg by mouth Daily.   3/5/2018 at 0700   • CloNIDine (CATAPRES) 0.1 MG tablet Take 0.1 mg by mouth 2 (Two) Times a Day.   3/6/2018 at 0500   • doxepin (SINEquan) 50 MG capsule Take 100 mg by mouth Every Night.   3/5/2018 at 2100   • DULERA 100-5 MCG/ACT inhaler Inhale 2 (Two) Times a Day.   3/5/2018 at 2100   • FLUoxetine (PROzac) 40 MG capsule TAKE ONE CAPSULE BY MOUTH DAILY ** MAY CAUSE DROWSINESS   3/5/2018 at 0700   • gabapentin (NEURONTIN) 300 MG capsule TAKE TWO CAPSULES BY MOUTH THREE TIMES DAILY AS NEEDED ** MAY CAUSEDROWSINESS   3/5/2018 at 2100   • ipratropium-albuterol (COMBIVENT)  MCG/ACT inhaler Every 6 (Six) Hours.   Past Week at Unknown time   • levothyroxine (SYNTHROID, LEVOTHROID) 100 MCG tablet Take 1 tablet by mouth Q Morning   3/5/2018 at 0700   • LORazepam (ATIVAN) 1 MG tablet 3 (Three) Times a Day.   3/6/2018 at 0400   • mometasone (NASONEX) 50 MCG/ACT nasal spray 2 sprays into each nostril Daily.   3/5/2018 at 0330   • oxyCODONE-acetaminophen (PERCOCET)  MG per tablet Take 1 tablet by mouth 2 (Two) Times a Day As Needed for Moderate Pain .   3/5/2018 at 2100   • pantoprazole (PROTONIX) 40 MG EC tablet TAKE ONE TABLET BY MOUTH TWICE A DAY   3/5/2018 at 0700   • simvastatin (ZOCOR) 20 MG tablet Take 20 mg by mouth Every Night.   3/5/2018 at 2100   • Unable to find 1 each 1 (One) Time. BENTAL 20 MG PO QD/PRN   Past Month at Unknown time   • celecoxib (CELEBREX) 200 MG capsule Take 1 capsule by mouth daily PRN   More than a month at Unknown time   • chlorthalidone (HYGROTON) 25 MG tablet Take 25 mg by mouth Daily.      • EPINEPHrine (EPIPEN 2-ABRIL) 0.3 MG/0.3ML solution auto-injector injection Inject 0.3 mg into the muscle as needed. Use as directed for allergic  reaction   Unknown at Unknown time   • HYDROcodone-acetaminophen (NORCO)  MG per tablet 2 (Two) Times a Day.   Taking   • VYTORIN 10-20 MG per tablet Take  by mouth Every Night.   Taking       Allergies:  Penicillins and Sulfa antibiotics    Objective     Vital Signs   Temp:  [97.5 °F (36.4 °C)-98.9 °F (37.2 °C)] 98.1 °F (36.7 °C)  Heart Rate:  [69-84] 80  Resp:  [14-19] 18  BP: (109-170)/(43-77) 109/70          Physical Exam:  Constitutional: oriented to person, place, and time. appears well-developed.   HEENT:   Head: Normocephalic and atraumatic.   Eyes: Pupils are equal, round, and reactive to light.   Neck: Neck supple.   Cardiovascular: Regular rhythm and normal heart sounds.    Pulmonary/Chest: Effort normal and breath sounds normal. CTAB  Abdominal: Soft. Bowel sounds are normal. He exhibits no distension. There is no tenderness. There is no rebound and no guarding.   Musculoskeletal: Shoulder and brace with good pulses noted distally   Neurological: she is alert and oriented to person, place, and time. she has normal reflexes.   Skin: Skin is warm and dry.     Results Review:   I reviewed the patient's new imaging results and agree with the interpretation.      Assessment/Plan     Principal Problem:    Rotator cuff arthropathy, right  Active Problems:    Pure hypercholesterolemia    Essential hypertension    Mild intermittent asthma without complication    Generalized anxiety disorder    Gastroesophageal reflux disease without esophagitis    Acquired hypothyroidism      Constipation-start with Miralax 1 capful BID until BM, then decrease to 1 x a day,then can step up to Amitizia 24 mcg po BID which can be used for opiod induced constipation,and ultimately end up with Relistor 12 mcg sub Q q 48 hours to block the effect of narcotics on the gut.      GERD-exacerbated by pain meds and anesthesia, will add PPI as needed and can step up to Carafate 1 gm po q AC and qhs.      Hypertension-review pre and  post BP's,will restart home BP meds when BP allows. Add Clonidine 0.1 mg q 4 hours prn if bp> 140/90,monitor BP and adjust meds as necessary.    Generalized anxiety disorder-restart home medications and follow clinical condition.  She does not have much help at home which is exacerbating her anxiety therefore we will keep her tonight and let her work with therapy hopefully discharge home tomorrow    I discussed the patients findings and my recommendations with patient, family and consulting provider    Jac Mena MD  18  8:01 AM             Electronically signed by Jac Mena MD at 3/7/2018  8:03 AM           Physical Therapy Notes (most recent note)      Radha Puga PTA at 3/8/2018 10:46 AM  Version 1 of 1         Acute Care - Physical Therapy Treatment Note   [place]     Patient Name: Hanny Ivy  : 1945  MRN: [id number]  Today's Date: 3/8/2018  Onset of Illness/Injury or Date of Surgery Date: 18  Date of Referral to PT: 18  Referring Physician: Dr. Lobato    Admit Date: 3/6/2018    Visit Dx:    ICD-10-CM ICD-9-CM   1. Impaired mobility and ADLs Z74.09 799.89   2. Impaired functional mobility, balance, gait, and endurance Z74.09 V49.89     Patient Active Problem List   Diagnosis   • Dermatochalasis of both upper eyelids   • Visual field defect, unspecified   • Rotator cuff arthropathy, right   • Pure hypercholesterolemia   • Essential hypertension   • Mild intermittent asthma without complication   • Generalized anxiety disorder   • Gastroesophageal reflux disease without esophagitis   • Acquired hypothyroidism               Adult Rehabilitation Note       18 1034 18 0932 18 1451    Rehab Assessment/Intervention    Discipline occupational therapy assistant  -TS physical therapy assistant  -AE physical therapy assistant  -AE    Document Type therapy note (daily note)  -TS therapy note (daily note)  -AE therapy note (daily note)  -AE     Subjective Information  agree to therapy;complains of;pain  -AE agree to therapy;complains of;pain  -AE    Precautions/Limitations  shoulder precautions;brace on when up   reverse total shoulder  -AE shoulder precautions;brace on when up   reverse total shlder NWB  -AE    Recorded by [TS] RIN Gutierrez/HERNAN [AE] Radha Puga PTA [AE] Radha Puga PTA    Pain Assessment    Pain Assessment  0-10  -AE 0-10  -AE    Pain Score  6  -AE 6  -AE    Post Pain Score  6  -AE 6  -AE    Pain Type   Surgical pain;Acute pain  -AE    Pain Location   Shoulder  -AE    Pain Orientation   Right  -AE    Pain Descriptors  Aching  -AE Aching  -AE    Pain Frequency   Constant/continuous  -AE    Pain Intervention(s)  Medication (See MAR)  -AE Medication (See MAR)  -AE    Response to Interventions   tolerated  -AE    Recorded by  [AE] Radha Puga PTA [AE] Radha Puga PTA    Mobility Assessment/Training    Right Upper Extremity Weight-Bearing   non weight-bearing  -AE    Recorded by   [AE] Radha Puga PTA    Bed Mobility, Assessment/Treatment    Bed Mobility, Assistive Device   bed rails;head of bed elevated  -AE    Bed Mobility, Scoot/Bridge, [place]   minimum assist (75% patient effort)  -AE    Bed Mob, Sit to Supine, [place]  minimum assist (75% patient effort);2 person assist required  -AE     Recorded by  [AE] Radha Puga PTA [AE] Radha Puga PTA    Transfer Assessment/Treatment    Transfers, Sit-Stand [place]  maximum assist (25% patient effort)   Pt was assisted to standing position RN assist  -AE minimum assist (75% patient effort);verbal cues required  -AE    Transfers, Stand-Sit [place]   minimum assist (75% patient effort)  -AE    Transfer, Comment  --   sitting in floor with nursing  -AE     Recorded by  [AE] Radha Puga PTA [AE] Radha Puga PTA    Gait Assessment/Treatment    Gait, [place] Level  minimum assist (75% patient effort);2 person assist required  -AE      Gait, Assistive Device  --   HHA  -AE --   HHA  -AE    Gait, Distance (Feet)  3   steps back to bed  -AE     Gait, Safety Issues   weight-shifting ability decreased  -AE    Gait, Comment   --   3 side steps  -AE    Recorded by  [AE] Radha Puga PTA [AE] Radha Puga PTA    Balance Skills Training    Sitting-Level of Assistance   Contact guard  -AE    Recorded by   [AE] Radha Puga PTA    Therapy Exercises    Bilateral Lower Extremities  AROM:;hip abduction/adduction;ankle pumps/circles;SLR;20 reps   RROM 40 reps heel slides  -AE AROM:;20 reps;sitting;LAQ;hip flexion;ankle pumps/circles  -AE    Recorded by  [AE] Radha Puga PTA [AE] Radha Puga PTA    Positioning and Restraints    Pre-Treatment Position  --   sitting in floor with nursing  -AE in bed  -AE    Post Treatment Position  bed  -AE bed  -AE    In Bed  fowlers;call light within reach  -AE fowlers;call light within reach  -AE    Recorded by  [AE] Radha Puga PTA [AE] Radha Puga PTA      User Key  (r) = Recorded By, (t) = Taken By, (c) = Cosigned By    Initials Name Effective Dates    AE Radha Puga PTA 06/22/15 -     TS RIN Gutierrez/HERNAN 08/02/16 -                 IP PT Goals       03/07/18 1017          Bed Mobility PT LTG    Bed Mobility PT LTG, Date Established 03/07/18  -PB (r) LN (t) PB (c)      Bed Mobility PT LTG, Time to Achieve by discharge  -PB (r) LN (t) PB (c)      Bed Mobility PT LTG, Activity Type all bed mobility  -PB (r) LN (t) PB (c)      Bed Mobility PT LTG, [place] Level supervision required  -PB (r) LN (t) PB (c)      Bed Mobility PT Goal  LTG, Assist Device bed rails  -PB (r) LN (t) PB (c)      Transfer Training PT LTG    Transfer Training PT LTG, Date Established 03/07/18  -PB (r) LN (t) PB (c)      Transfer Training PT LTG, Time to Achieve by discharge  -PB (r) LN (t) PB (c)      Transfer Training PT LTG, Activity Type bed to chair /chair to bed;sit to stand/stand to sit  -PB (r) LN (t)  PB (c)      Transfer Training PT LTG, [place] Level contact guard assist  -PB (r) LN (t) PB (c)      Transfer Training PT LTG, Assist Device walker, aleksandar  -PB (r) LN (t) PB (c)      Gait Training PT LTG    Gait Training Goal PT LTG, Date Established 03/07/18  -PB (r) LN (t) PB (c)      Gait Training Goal PT LTG, Time to Achieve by discharge  -PB (r) LN (t) PB (c)      Gait Training Goal PT LTG, [place] Level contact guard assist  -PB (r) LN (t) PB (c)      Gait Training Goal PT LTG, Assist Device walker, aleksandar  -PB (r) LN (t) PB (c)      Gait Training Goal PT LTG, Distance to Achieve 30 ft x2 with seated rest break  -PB (r) LN (t) PB (c)      Patient Education PT LTG    Patient Education PT LTG, Date Established 03/07/18  -PB (r) LN (t) PB (c)      Patient Education PT LTG, Time to Achieve by discharge  -PB (r) LN (t) PB (c)      Patient Education PT LTG, Education Type precaution per surgeon;linden/doff brace;positioning;gait;transfers;bed mobility  -PB (r) LN (t) PB (c)      Patient Education PT LTG, Education Understanding demonstrate adequately;verbalize understanding  -PB (r) LN (t) PB (c)        User Key  (r) = Recorded By, (t) = Taken By, (c) = Cosigned By    Initials Name Provider Type    CLAIRE Nice, PT DPT Physical Therapist    PENNY Hancock, PT Student PT Student          Physical Therapy Education     Title: PT OT SLP Therapies (Active)     Topic: Physical Therapy (Active)     Point: Mobility training (Done)    Learning Progress Summary    Learner Readiness Method Response Comment Documented by Status   Patient Eager E VU ex's AE 03/08/18 1046 Done    Acceptance E NR Educated on orthosis purpose and readjustment for proper fit and comfort; reverse TSA precautions and activity limitations; hand placement and safety with sit <-> stand and chair <-> bed; technique with bed mobility using bed rails LN 03/07/18 1015 Active               Point: Precautions (Active)    Learning Progress Summary     Learner Readiness Method Response Comment Documented by Status   Patient Acceptance E NR Educated on orthosis purpose and readjustment for proper fit and comfort; reverse TSA precautions and activity limitations; hand placement and safety with sit <-> stand and chair <-> bed; technique with bed mobility using bed rails LN 03/07/18 1015 Active                      User Key     Initials Effective Dates Name Provider Type Discipline    AE 06/22/15 -  Radha Puga, PTA Physical Therapy Assistant PT    LN 01/08/18 -  Toribio Hancock, LILLY Student PT Student PT                    PT Recommendation and Plan  Anticipated Discharge Disposition: skilled nursing facility  Planned Therapy Interventions: strengthening, bed mobility training, balance training, transfer training, gait training, home exercise program, patient/family education, orthotic fitting/training  PT Frequency: per priority policy, 2 times/day  Plan of Care Review  Plan Of Care Reviewed With: patient  Progress: improving  Outcome Summary/Follow up Plan: Pt sitting in floor with nursing. Pt was assisted to standing with max assist of 3 . Pt was min x 2 HHA to take steps          Outcome Measures       03/08/18 0932 03/07/18 1018 03/07/18 1012    How much help from another person do you currently need...    Turning from your back to your side while in flat bed without using bedrails? 3  -AE  3  -PB (r) LN (t) PB (c)    Moving from lying on back to sitting on the side of a flat bed without bedrails? 2  -AE  2  -PB (r) LN (t) PB (c)    Moving to and from a bed to a chair (including a wheelchair)? 3  -AE  3  -PB (r) LN (t) PB (c)    Standing up from a chair using your arms (e.g., wheelchair, bedside chair)? 3  -AE  3  -PB (r) LN (t) PB (c)    Climbing 3-5 steps with a railing? 2  -AE  2  -PB (r) LN (t) PB (c)    To walk in hospital room? 2  -AE  2  -PB (r) LN (t) PB (c)    AM-PAC 6 Clicks Score 15  -AE  15  -PB (r) LN (t)    How much help from another is currently  needed...    Putting on and taking off regular lower body clothing?  2  -MM (r) MK (t) MM (c)     Bathing (including washing, rinsing, and drying)  2  -MM (r) MK (t) MM (c)     Toileting (which includes using toilet bed pan or urinal)  2  -MM (r) MK (t) MM (c)     Putting on and taking off regular upper body clothing  2  -MM (r) MK (t) MM (c)     Taking care of personal grooming (such as brushing teeth)  2  -MM (r) MK (t) MM (c)     Eating meals  3  -MM (r) MK (t) MM (c)     Score  13  -MM (r) MK (t)     Functional Assessment    Outcome Measure Options AM-PAC 6 Clicks Basic Mobility (PT)  -AE AM-PAC 6 Clicks Daily Activity (OT)  -MM (r) MK (t) MM (c) AM-PAC 6 Clicks Basic Mobility (PT)  -PB (r) LN (t) PB (c)      User Key  (r) = Recorded By, (t) = Taken By, (c) = Cosigned By    Initials Name Provider Type    AE Radha Puga PTA Physical Therapy Assistant    CLAIRE Nice, PT DPT Physical Therapist    MM Duke Loco, OTR/L Occupational Therapist    MARIE Garza, OT Student OT Student    LN Toribio Hancock, PT Student PT Student           Time Calculation:         PT Charges       03/08/18 1045          Time Calculation    Start Time 0932  -AE      Stop Time 0956  -AE      Time Calculation (min) 24 min  -AE      PT Received On 03/08/18  -AE      PT Goal Re-Cert Due Date 03/17/18  -AE      Time Calculation- PT    Total Timed Code Minutes- PT 24 minute(s)  -AE        User Key  (r) = Recorded By, (t) = Taken By, (c) = Cosigned By    Initials Name Provider Type    AE Radha Puga PTA Physical Therapy Assistant          Therapy Charges for Today     Code Description Service Date Service Provider Modifiers Qty    84570089278 HC PT THER PROC EA 15 MIN 3/7/2018 Radha Puga PTA GP, KX 1    47380234814 HC PT THERAPEUTIC ACT EA 15 MIN 3/7/2018 Radha Puga PTA GP, KX 1    18001876584 HC PT THERAPEUTIC ACT EA 15 MIN 3/8/2018 Radha Puga, PTA GP, KX 1    [id number]  PT THER PROC EA 15 MIN 3/8/2018 Radha  JUAN Puga PTA GP, KX 1          PT G-Codes  Outcome Measure Options: AM-PAC 6 Clicks Basic Mobility (PT)  Score: 15  Functional Limitation: Changing and maintaining body position  Changing and Maintaining Body Position Current Status (): At least 40 percent but less than 60 percent impaired, limited or restricted  Changing and Maintaining Body Position Goal Status (): At least 20 percent but less than 40 percent impaired, limited or restricted    Radha Puga PTA  3/8/2018          Electronically signed by Radha Puga PTA at 3/8/2018 10:47 AM           Occupational Therapy Notes (most recent note)      RIN Gutierrez/L at 3/8/2018  2:58 PM  Version 1 of 1         Acute Care - Occupational Therapy Treatment Note   [place]     Patient Name: Hanny Ivy  : 1945  MRN: [id number]  Today's Date: 3/8/2018  Onset of Illness/Injury or Date of Surgery Date: 18  Date of Referral to OT: 18  Referring Physician: Dr. Lobato      Admit Date: 3/6/2018    Visit Dx:     ICD-10-CM ICD-9-CM   1. Impaired mobility and ADLs Z74.09 799.89   2. Impaired functional mobility, balance, gait, and endurance Z74.09 V49.89     Patient Active Problem List   Diagnosis   • Dermatochalasis of both upper eyelids   • Visual field defect, unspecified   • Rotator cuff arthropathy, right   • Pure hypercholesterolemia   • Essential hypertension   • Mild intermittent asthma without complication   • Generalized anxiety disorder   • Gastroesophageal reflux disease without esophagitis   • Acquired hypothyroidism             Adult Rehabilitation Note       18 1034 18 0932 18 1451    Rehab Assessment/Intervention    Discipline occupational therapy assistant  -TS physical therapy assistant  -AE physical therapy assistant  -AE    Document Type therapy note (daily note)  -TS therapy note (daily note)  -AE therapy note (daily note)  -AE    Subjective Information agree to therapy;complains of;pain   -TS agree to therapy;complains of;pain  -AE agree to therapy;complains of;pain  -AE    Patient Effort, Rehab Treatment adequate  -TS      Precautions/Limitations non-weight bearing status;shoulder precautions;brace on when up;fall precautions  -TS shoulder precautions;brace on when up   reverse total shoulder  -AE shoulder precautions;brace on when up   reverse total shlder NWB  -AE    Recorded by [TS] RIN Gutierrez/HERNAN [AE] Radha Puga PTA [AE] Radha Puga PTA    Pain Assessment    Pain Assessment 0-10  -TS 0-10  -AE 0-10  -AE    Pain Score 5  -TS 6  -AE 6  -AE    Post Pain Score  6  -AE 6  -AE    Pain Type Acute pain;Surgical pain  -TS  Surgical pain;Acute pain  -AE    Pain Location   Shoulder  -AE    Pain Orientation   Right  -AE    Pain Descriptors Aching  -TS Aching  -AE Aching  -AE    Pain Frequency   Constant/continuous  -AE    Pain Intervention(s) Medication (See MAR)  -TS Medication (See MAR)  -AE Medication (See MAR)  -AE    Response to Interventions   tolerated  -AE    Recorded by [TS] RIN Gutierrez/HERNAN [AE] Radha Puga PTA [AE] Radha Puga PTA    Cognitive Assessment/Intervention    Personal Safety Interventions fall prevention program maintained;gait belt;nonskid shoes/slippers when out of bed  -TS      Recorded by [TS] RIN Gutirerez/L      Mobility Assessment/Training    Right Upper Extremity Weight-Bearing   non weight-bearing  -AE    Recorded by   [AE] Radha Puga PTA    Bed Mobility, Assessment/Treatment    Bed Mobility, Assistive Device head of bed elevated  -TS  bed rails;head of bed elevated  -AE    Bed Mobility, Scoot/Bridge, [place]   minimum assist (75% patient effort)  -AE    Bed Mob, Supine to Sit, [place] minimum assist (75% patient effort);moderate assist (50% patient effort)  -TS      Bed Mob, Sit to Supine, [place]  minimum assist (75% patient effort);2 person assist required  -AE     Recorded by [TS] Talya Biggs,  RIN/L [AE] Radha Puga PTA [AE] Radha Puga PTA    Transfer Assessment/Treatment    Transfers, Bed-Chair [place] contact guard assist  -TS      Transfers, Sit-Stand [place] contact guard assist;stand by assist  -TS maximum assist (25% patient effort)   Pt was assisted to standing position RN assist  -AE minimum assist (75% patient effort);verbal cues required  -AE    Transfers, Stand-Sit [place] contact guard assist;stand by assist  -TS  minimum assist (75% patient effort)  -AE    Transfer, Comment  --   sitting in floor with nursing  -AE     Recorded by [TS] RIN Gutierrez/HERNAN [AE] Radha Puga PTA [AE] Radha Puga PTA    Gait Assessment/Treatment    Gait, [place] Level  minimum assist (75% patient effort);2 person assist required  -AE     Gait, Assistive Device  --   HHA  -AE --   HHA  -AE    Gait, Distance (Feet)  3   steps back to bed  -AE     Gait, Safety Issues   weight-shifting ability decreased  -AE    Gait, Comment   --   3 side steps  -AE    Recorded by  [AE] Radha Puga PTA [AE] Radha Puga PTA    Upper Body Dressing Assessment/Training    UB Dressing Assess/Train, Position standing  -TS      UB Dressing Assess/Train, [place] maximum assist (25% patient effort)  -TS      UB Dressing Assess/Train, Comment max A for adjustment of immobilizer   -TS      Recorded by [TS] MANUEL GutierrezA/L      Balance Skills Training    Sitting-Level of Assistance   Contact guard  -AE    Recorded by   [AE] Radha Puga PTA    Therapy Exercises    Bilateral Lower Extremities  AROM:;hip abduction/adduction;ankle pumps/circles;SLR;20 reps   RROM 40 reps heel slides  -AE AROM:;20 reps;sitting;LAQ;hip flexion;ankle pumps/circles  -AE    Recorded by  [AE] Radha Puga PTA [AE] Radha Puga PTA    Orthosis Management/Training    Orthosis Skills Training doffing orthosis;donning orthosis;purpose/goals of orthosis  -TS      Recorded by [TS] Talya HAMILTON  Dian, GAR/L      Positioning and Restraints    Pre-Treatment Position in bed  -TS --   sitting in floor with nursing  -AE in bed  -AE    Post Treatment Position chair  -TS bed  -AE bed  -AE    In Bed  fowlers;call light within reach  -AE fowlers;call light within reach  -AE    In Chair call light within reach;encouraged to call for assist;with family/caregiver;sitting  -TS      Recorded by [TS] NEGRITA Gutierrez [AE] Radha Puga PTA [AE] Radha Puga PTA      User Key  (r) = Recorded By, (t) = Taken By, (c) = Cosigned By    Initials Name Effective Dates    AE Radha Puga PTA 06/22/15 -     TS NEGRITA Gutierrez 08/02/16 -                 OT Goals       03/07/18 1028          Transfer Training OT LTG    Transfer Training OT LTG, Date Established 03/07/18  -MM (r) MK (t) MM (c)      Transfer Training OT LTG, Time to Achieve by discharge  -MM (r) MK (t) MM (c)      Transfer Training OT LTG, Activity Type toilet;tub  -MM (r) MK (t) MM (c)      Transfer Training OT LTG, [place] Level supervision required  -MM (r) MK (t) MM (c)      Transfer Training OT LTG, Assist Device walker, aleksandar;tub bench  -MM (r) MK (t) MM (c)      Transfer Training OT LTG, Additional Goal without vb to maintain NWB status  -MM (r) MK (t) MM (c)      Patient Education OT LTG    Patient Education OT LTG, Date Established 03/07/18  -MM (r) MK (t) MM (c)      Patient Education OT LTG, Time to Achieve by discharge  -MM (r) MK (t) MM (c)      Patient Education OT LTG, Education Type precautions per surgeon;brace use/care;positioning;posture/body mechanics;1 hand/aleksandar technique;home safety;energy conservation;linden/doff brace  -MM (r) MK (t) MM (c)      Patient Education OT LTG, Education Understanding independent;demonstrates adequately;verbalizes understanding  -MM (r) MK (t) MM (c)      Bathing OT LTG    Bathing Goal OT LTG, Date Established 03/07/18  -RAMÓN (r) MARIE (t) RAMÓN (c)      Bathing Goal OT LTG, Time to  Achieve by discharge  -MM (r) MK (t) MM (c)      Bathing Goal OT LTG, Activity Type upper body bathing;lower body bathing  -MM (r) MK (t) MM (c)      Bathing Goal OT LTG, [place] Level supervision required  -MM (r) MK (t) MM (c)      Bathing Goal OT LTG, Assist Device tub bench  -MM (r) MK (t) MM (c)      LB Dressing OT LTG    LB Dressing Goal OT LTG, Date Established 03/07/18  -MM (r) MK (t) MM (c)      LB Dressing Goal OT LTG, Time to Achieve by discharge  -MM (r) MK (t) MM (c)      LB Dressing Goal OT LTG, [place] Level minimum assist (75% patient effort)  -MM (r) MK (t) MM (c)        User Key  (r) = Recorded By, (t) = Taken By, (c) = Cosigned By    Initials Name Provider Type    RAMÓN Loco, OTR/L Occupational Therapist    MARIE Garza, OT Student OT Student          Occupational Therapy Education     Title: PT OT SLP Therapies (Active)     Topic: Occupational Therapy (Done)     Point: ADL training (Done)    Description: Instruct learner(s) on proper safety adaptation and remediation techniques during self care or transfers.   Instruct in proper use of assistive devices.    Learning Progress Summary    Learner Readiness Method Response Comment Documented by Status   Patient Acceptance E,D VU ADLs, transfers, benefits of activity  03/08/18 1448 Done    Acceptance E VU,NR precautions per surgeon, activity limitations/modifications, ADL retraining, transfer training, NWB status, orthosis wear/care  03/07/18 1019 Done               Point: Precautions (Done)    Description: Instruct learner(s) on prescribed precautions during self-care and functional transfers.    Learning Progress Summary    Learner Readiness Method Response Comment Documented by Status   Patient Acceptance E VU,NR precautions per surgeon, activity limitations/modifications, ADL retraining, transfer training, NWB status, orthosis wear/care  03/07/18 1019 Done               Point: Body mechanics (Done)    Description:  Instruct learner(s) on proper positioning and spine alignment during self-care, functional mobility activities and/or exercises.    Learning Progress Summary    Learner Readiness Method Response Comment Documented by Status   Patient Acceptance E VU,NR precautions per surgeon, activity limitations/modifications, ADL retraining, transfer training, NWB status, orthosis wear/care  03/07/18 1019 Done                      User Key     Initials Effective Dates Name Provider Type Discipline     08/02/16 -  RIN Gutierrez/L Occupational Therapy Assistant OT     03/07/18 -  Darleen Garza OT Student OT Student OT                  OT Recommendation and Plan  Anticipated Equipment Needs At Discharge: bedside commode  Anticipated Discharge Disposition: skilled nursing facility  Planned Therapy Interventions: activity intolerance, ADL retraining, balance training, bed mobility training, energy conservation, orthotic fitting/training, strengthening, ROM (Range of Motion), transfer training  Therapy Frequency: 3-5 times/wk  Plan of Care Review  Plan Of Care Reviewed With: patient  Progress: progress towards functional goals is fair  Outcome Summary/Follow up Plan: Pt and son educated on benefits of acute rehab at discharge. Pt mod A for bed mobility and CGA for transfers. Max A to adjust sling. Continue OT POC         Outcome Measures       03/08/18 1400 03/08/18 0932 03/07/18 1018    How much help from another person do you currently need...    Turning from your back to your side while in flat bed without using bedrails?  3  -AE     Moving from lying on back to sitting on the side of a flat bed without bedrails?  2  -AE     Moving to and from a bed to a chair (including a wheelchair)?  3  -AE     Standing up from a chair using your arms (e.g., wheelchair, bedside chair)?  3  -AE     Climbing 3-5 steps with a railing?  2  -AE     To walk in hospital room?  2  -AE     AM-PAC 6 Clicks Score  15  -AE     How much help  from another is currently needed...    Putting on and taking off regular lower body clothing? 2  -TS  2  -MM (r) MK (t) MM (c)    Bathing (including washing, rinsing, and drying) 2  -TS  2  -MM (r) MK (t) MM (c)    Toileting (which includes using toilet bed pan or urinal) 3  -TS  2  -MM (r) MK (t) MM (c)    Putting on and taking off regular upper body clothing 3  -TS  2  -MM (r) MK (t) MM (c)    Taking care of personal grooming (such as brushing teeth) 3  -TS  2  -MM (r) MK (t) MM (c)    Eating meals 4  -TS  3  -MM (r) MK (t) MM (c)    Score 17  -TS  13  -MM (r) MK (t)    Functional Assessment    Outcome Measure Options AM-PAC 6 Clicks Daily Activity (OT)  -TS AM-PAC 6 Clicks Basic Mobility (PT)  -AE AM-PAC 6 Clicks Daily Activity (OT)  -MM (r) MK (t) MM (c)      03/07/18 1012          How much help from another person do you currently need...    Turning from your back to your side while in flat bed without using bedrails? 3  -PB (r) LN (t) PB (c)      Moving from lying on back to sitting on the side of a flat bed without bedrails? 2  -PB (r) LN (t) PB (c)      Moving to and from a bed to a chair (including a wheelchair)? 3  -PB (r) LN (t) PB (c)      Standing up from a chair using your arms (e.g., wheelchair, bedside chair)? 3  -PB (r) LN (t) PB (c)      Climbing 3-5 steps with a railing? 2  -PB (r) LN (t) PB (c)      To walk in hospital room? 2  -PB (r) LN (t) PB (c)      AM-PAC 6 Clicks Score 15  -PB (r) LN (t)      Functional Assessment    Outcome Measure Options AM-PAC 6 Clicks Basic Mobility (PT)  -PB (r) LN (t) PB (c)        User Key  (r) = Recorded By, (t) = Taken By, (c) = Cosigned By    Initials Name Provider Type    AE Radha Puga, PTA Physical Therapy Assistant    TS Talya Biggs, GAR/L Occupational Therapy Assistant    PB Tk Nice, PT DPT Physical Therapist    MM Duke Loco, OTR/L Occupational Therapist    MARIE Garza, OT Student OT Student    LN Toribio Hancock, PT Student PT  Student           Time Calculation:         Time Calculation- OT       03/08/18 1457          Time Calculation- OT    OT Start Time 1034  -TS      OT Stop Time 1100  -TS      OT Time Calculation (min) 26 min  -TS      Total Timed Code Minutes- OT 26 minute(s)  -TS      OT Received On 03/08/18  -TS        User Key  (r) = Recorded By, (t) = Taken By, (c) = Cosigned By    Initials Name Provider Type     RIN Gutierrez/L Occupational Therapy Assistant           Therapy Charges for Today     Code Description Service Date Service Provider Modifiers Qty    48716762296 HC OT SELF CARE/MGMT/TRAIN EA 15 MIN 3/8/2018 NEGRITA Gutierrez GO, KX 2          OT G-codes  OT Professional Judgement Used?: Yes  OT Functional Scales Options: AM-PAC 6 Clicks Daily Activity (OT)  Score: 13  Functional Limitation: Self care  Self Care Current Status (): At least 60 percent but less than 80 percent impaired, limited or restricted  Self Care Goal Status (): At least 40 percent but less than 60 percent impaired, limited or restricted    NEGRITA Hurtado  3/8/2018     Electronically signed by NEGRITA Gutierrez at 3/8/2018  2:59 PM

## 2018-03-08 NOTE — THERAPY TREATMENT NOTE
Acute Care - Occupational Therapy Treatment Note  [facility]     Patient Name: Hanny Ivy  : 1945  MRN: [id number]  Today's Date: 3/8/2018  Onset of Illness/Injury or Date of Surgery Date: 18  Date of Referral to OT: 18  Referring Physician: Dr. Lobato      Admit Date: 3/6/2018    Visit Dx:     ICD-10-CM ICD-9-CM   1. Impaired mobility and ADLs Z74.09 799.89   2. Impaired functional mobility, balance, gait, and endurance Z74.09 V49.89     Patient Active Problem List   Diagnosis   • Dermatochalasis of both upper eyelids   • Visual field defect, unspecified   • Rotator cuff arthropathy, right   • Pure hypercholesterolemia   • Essential hypertension   • Mild intermittent asthma without complication   • Generalized anxiety disorder   • Gastroesophageal reflux disease without esophagitis   • Acquired hypothyroidism             Adult Rehabilitation Note       18 1034 18 0932 18 1451    Rehab Assessment/Intervention    Discipline occupational therapy assistant  -TS physical therapy assistant  -AE physical therapy assistant  -AE    Document Type therapy note (daily note)  -TS therapy note (daily note)  -AE therapy note (daily note)  -AE    Subjective Information agree to therapy;complains of;pain  -TS agree to therapy;complains of;pain  -AE agree to therapy;complains of;pain  -AE    Patient Effort, Rehab Treatment adequate  -TS      Precautions/Limitations non-weight bearing status;shoulder precautions;brace on when up;fall precautions  -TS shoulder precautions;brace on when up   reverse total shoulder  -AE shoulder precautions;brace on when up   reverse total shlder NWB  -AE    Recorded by [TS] RIN Gutierrez/HERNAN [AE] Radha Puga PTA [AE] Radha Puga PTA    Pain Assessment    Pain Assessment 0-10  -TS 0-10  -AE 0-10  -AE    Pain Score 5  -TS 6  -AE 6  -AE    Post Pain Score  6  -AE 6  -AE    Pain Type Acute pain;Surgical pain  -TS  Surgical pain;Acute pain  -AE     Pain Location   Shoulder  -AE    Pain Orientation   Right  -AE    Pain Descriptors Aching  -TS Aching  -AE Aching  -AE    Pain Frequency   Constant/continuous  -AE    Pain Intervention(s) Medication (See MAR)  -TS Medication (See MAR)  -AE Medication (See MAR)  -AE    Response to Interventions   tolerated  -AE    Recorded by [TS] MANUEL GutierrezA/L [AE] Radha Puga PTA [AE] Radha Puga PTA    Cognitive Assessment/Intervention    Personal Safety Interventions fall prevention program maintained;gait belt;nonskid shoes/slippers when out of bed  -TS      Recorded by [TS] MANUEL GutierrezA/L      Mobility Assessment/Training    Right Upper Extremity Weight-Bearing   non weight-bearing  -AE    Recorded by   [AE] Radha Puga PTA    Bed Mobility, Assessment/Treatment    Bed Mobility, Assistive Device head of bed elevated  -TS  bed rails;head of bed elevated  -AE    Bed Mobility, Scoot/Bridge, [place]   minimum assist (75% patient effort)  -AE    Bed Mob, Supine to Sit, [place] minimum assist (75% patient effort);moderate assist (50% patient effort)  -TS      Bed Mob, Sit to Supine, [place]  minimum assist (75% patient effort);2 person assist required  -AE     Recorded by [TS] RIN Gutierrez/HERNAN [AE] Radha Puga PTA [AE] Radha Puga PTA    Transfer Assessment/Treatment    Transfers, Bed-Chair [place] contact guard assist  -TS      Transfers, Sit-Stand [place] contact guard assist;stand by assist  -TS maximum assist (25% patient effort)   Pt was assisted to standing position RN assist  -AE minimum assist (75% patient effort);verbal cues required  -AE    Transfers, Stand-Sit [place] contact guard assist;stand by assist  -TS  minimum assist (75% patient effort)  -AE    Transfer, Comment  --   sitting in floor with nursing  -AE     Recorded by [TS] RIN Gutierrez/HERNAN [AE] Radha Puga PTA [AE] Radha Puga PTA    Gait Assessment/Treatment     Gait, [place] Level  minimum assist (75% patient effort);2 person assist required  -AE     Gait, Assistive Device  --   HHA  -AE --   HHA  -AE    Gait, Distance (Feet)  3   steps back to bed  -AE     Gait, Safety Issues   weight-shifting ability decreased  -AE    Gait, Comment   --   3 side steps  -AE    Recorded by  [AE] Radha Puga PTA [AE] Radha Puga PTA    Upper Body Dressing Assessment/Training    UB Dressing Assess/Train, Position standing  -TS      UB Dressing Assess/Train, [place] maximum assist (25% patient effort)  -TS      UB Dressing Assess/Train, Comment max A for adjustment of immobilizer   -TS      Recorded by [TS] RIN Gutierrez/L      Balance Skills Training    Sitting-Level of Assistance   Contact guard  -AE    Recorded by   [AE] Radha Puga PTA    Therapy Exercises    Bilateral Lower Extremities  AROM:;hip abduction/adduction;ankle pumps/circles;SLR;20 reps   RROM 40 reps heel slides  -AE AROM:;20 reps;sitting;LAQ;hip flexion;ankle pumps/circles  -AE    Recorded by  [AE] Radha Puga PTA [AE] Radha Puga PTA    Orthosis Management/Training    Orthosis Skills Training doffing orthosis;donning orthosis;purpose/goals of orthosis  -TS      Recorded by [TS] RIN Gutierrez/L      Positioning and Restraints    Pre-Treatment Position in bed  -TS --   sitting in floor with nursing  -AE in bed  -AE    Post Treatment Position chair  -TS bed  -AE bed  -AE    In Bed  fowlers;call light within reach  -AE fowlers;call light within reach  -AE    In Chair call light within reach;encouraged to call for assist;with family/caregiver;sitting  -TS      Recorded by [TS] RIN Gutierrez/HERNAN [AE] Radha Puga PTA [AE] Radha Puga PTA      User Key  (r) = Recorded By, (t) = Taken By, (c) = Cosigned By    Initials Name Effective Dates    AE Radha Puga PTA 06/22/15 -     TS NEGRITA Gutierrez 08/02/16 -                 OT Goals       03/07/18  1028          Transfer Training OT LTG    Transfer Training OT LTG, Date Established 03/07/18  -MM (r) MK (t) MM (c)      Transfer Training OT LTG, Time to Achieve by discharge  -MM (r) MK (t) MM (c)      Transfer Training OT LTG, Activity Type toilet;tub  -MM (r) MK (t) MM (c)      Transfer Training OT LTG, [place] Level supervision required  -MM (r) MK (t) MM (c)      Transfer Training OT LTG, Assist Device walker, aleksandar;tub bench  -MM (r) MK (t) MM (c)      Transfer Training OT LTG, Additional Goal without vb to maintain NWB status  -MM (r) MK (t) MM (c)      Patient Education OT LTG    Patient Education OT LTG, Date Established 03/07/18  -MM (r) MK (t) MM (c)      Patient Education OT LTG, Time to Achieve by discharge  -MM (r) MK (t) MM (c)      Patient Education OT LTG, Education Type precautions per surgeon;brace use/care;positioning;posture/body mechanics;1 hand/aleksandar technique;home safety;energy conservation;linden/doff brace  -MM (r) MK (t) MM (c)      Patient Education OT LTG, Education Understanding independent;demonstrates adequately;verbalizes understanding  -MM (r) MK (t) MM (c)      Bathing OT LTG    Bathing Goal OT LTG, Date Established 03/07/18  -MM (r) MK (t) MM (c)      Bathing Goal OT LTG, Time to Achieve by discharge  -MM (r) MK (t) MM (c)      Bathing Goal OT LTG, Activity Type upper body bathing;lower body bathing  -MM (r) MK (t) MM (c)      Bathing Goal OT LTG, [place] Level supervision required  -MM (r) MK (t) MM (c)      Bathing Goal OT LTG, Assist Device tub bench  -MM (r) MK (t) MM (c)      LB Dressing OT LTG    LB Dressing Goal OT LTG, Date Established 03/07/18  -MM (r) MK (t) MM (c)      LB Dressing Goal OT LTG, Time to Achieve by discharge  -MM (r) MK (t) MM (c)      LB Dressing Goal OT LTG, [place] Level minimum assist (75% patient effort)  -MM (r) MK (t) MM (c)        User Key  (r) = Recorded By, (t) = Taken By, (c) = Cosigned By    Initials Name Provider Type    MM Duke  ISAAC Loco OTR/L Occupational Therapist    MARIE Garza, OT Student OT Student          Occupational Therapy Education     Title: PT OT SLP Therapies (Active)     Topic: Occupational Therapy (Done)     Point: ADL training (Done)    Description: Instruct learner(s) on proper safety adaptation and remediation techniques during self care or transfers.   Instruct in proper use of assistive devices.    Learning Progress Summary    Learner Readiness Method Response Comment Documented by Status   Patient Acceptance E,D VU ADLs, transfers, benefits of activity  03/08/18 1448 Done    Acceptance E VU,NR precautions per surgeon, activity limitations/modifications, ADL retraining, transfer training, NWB status, orthosis wear/care  03/07/18 1019 Done               Point: Precautions (Done)    Description: Instruct learner(s) on prescribed precautions during self-care and functional transfers.    Learning Progress Summary    Learner Readiness Method Response Comment Documented by Status   Patient Acceptance E VU,NR precautions per surgeon, activity limitations/modifications, ADL retraining, transfer training, NWB status, orthosis wear/care  03/07/18 1019 Done               Point: Body mechanics (Done)    Description: Instruct learner(s) on proper positioning and spine alignment during self-care, functional mobility activities and/or exercises.    Learning Progress Summary    Learner Readiness Method Response Comment Documented by Status   Patient Acceptance E VU,NR precautions per surgeon, activity limitations/modifications, ADL retraining, transfer training, NWB status, orthosis wear/care  03/07/18 1019 Done                      User Key     Initials Effective Dates Name Provider Type Discipline     08/02/16 -  RIN Gutierrez/L Occupational Therapy Assistant OT     03/07/18 -  Darleen Garza, OT Student OT Student OT                  OT Recommendation and Plan  Anticipated Equipment Needs At Discharge:  bedside commode  Anticipated Discharge Disposition: skilled nursing facility  Planned Therapy Interventions: activity intolerance, ADL retraining, balance training, bed mobility training, energy conservation, orthotic fitting/training, strengthening, ROM (Range of Motion), transfer training  Therapy Frequency: 3-5 times/wk  Plan of Care Review  Plan Of Care Reviewed With: patient  Progress: progress towards functional goals is fair  Outcome Summary/Follow up Plan: Pt and son educated on benefits of acute rehab at discharge. Pt mod A for bed mobility and CGA for transfers. Max A to adjust sling. Continue OT POC         Outcome Measures       03/08/18 1400 03/08/18 0932 03/07/18 1018    How much help from another person do you currently need...    Turning from your back to your side while in flat bed without using bedrails?  3  -AE     Moving from lying on back to sitting on the side of a flat bed without bedrails?  2  -AE     Moving to and from a bed to a chair (including a wheelchair)?  3  -AE     Standing up from a chair using your arms (e.g., wheelchair, bedside chair)?  3  -AE     Climbing 3-5 steps with a railing?  2  -AE     To walk in hospital room?  2  -AE     AM-PAC 6 Clicks Score  15  -AE     How much help from another is currently needed...    Putting on and taking off regular lower body clothing? 2  -TS  2  -MM (r) MK (t) MM (c)    Bathing (including washing, rinsing, and drying) 2  -TS  2  -MM (r) MK (t) MM (c)    Toileting (which includes using toilet bed pan or urinal) 3  -TS  2  -MM (r) MK (t) MM (c)    Putting on and taking off regular upper body clothing 3  -TS  2  -MM (r) MK (t) MM (c)    Taking care of personal grooming (such as brushing teeth) 3  -TS  2  -MM (r) MK (t) MM (c)    Eating meals 4  -TS  3  -MM (r) MK (t) MM (c)    Score 17  -TS  13  -MM (r) MK (t)    Functional Assessment    Outcome Measure Options AM-PAC 6 Clicks Daily Activity (OT)  -TS AM-PAC 6 Clicks Basic Mobility (PT)  -AE  -[facility] 6 Clicks Daily Activity (OT)  -MM (r) MK (t) MM (c)      03/07/18 1012          How much help from another person do you currently need...    Turning from your back to your side while in flat bed without using bedrails? 3  -PB (r) LN (t) PB (c)      Moving from lying on back to sitting on the side of a flat bed without bedrails? 2  -PB (r) LN (t) PB (c)      Moving to and from a bed to a chair (including a wheelchair)? 3  -PB (r) LN (t) PB (c)      Standing up from a chair using your arms (e.g., wheelchair, bedside chair)? 3  -PB (r) LN (t) PB (c)      Climbing 3-5 steps with a railing? 2  -PB (r) LN (t) PB (c)      To walk in hospital room? 2  -PB (r) LN (t) PB (c)      AM-PAC 6 Clicks Score 15  -PB (r) LN (t)      Functional Assessment    Outcome Measure Options -[facility] 6 Clicks Basic Mobility (PT)  -PB (r) LN (t) PB (c)        User Key  (r) = Recorded By, (t) = Taken By, (c) = Cosigned By    Initials Name Provider Type    AE Radha Puga, PTA Physical Therapy Assistant     Talya Biggs, GAR/L Occupational Therapy Assistant    PB Tk Nice, PT DPT Physical Therapist    MM Duke Loco, OTR/L Occupational Therapist    MARIE Garza, OT Student OT Student    LN Toribio Hancock, PT Student PT Student           Time Calculation:         Time Calculation- OT       03/08/18 1457          Time Calculation- OT    OT Start Time 1034  -TS      OT Stop Time 1100  -TS      OT Time Calculation (min) 26 min  -TS      Total Timed Code Minutes- OT 26 minute(s)  -TS      OT Received On 03/08/18  -TS        User Key  (r) = Recorded By, (t) = Taken By, (c) = Cosigned By    Initials Name Provider Type     Talya Biggs GAR/L Occupational Therapy Assistant           Therapy Charges for Today     Code Description Service Date Service Provider Modifiers Qty    [id number]  OT SELF CARE/MGMT/TRAIN EA 15 MIN 3/8/2018 RIN Gutierrez/L GO, KX 2          OT G-codes  OT Professional Judgement  Used?: Yes  OT Functional Scales Options: AM-PAC 6 Clicks Daily Activity (OT)  Score: 13  Functional Limitation: Self care  Self Care Current Status (): At least 60 percent but less than 80 percent impaired, limited or restricted  Self Care Goal Status (): At least 40 percent but less than 60 percent impaired, limited or restricted    RIN Hurtado/HERNAN  3/8/2018

## 2018-03-08 NOTE — PLAN OF CARE
Problem: Patient Care Overview (Adult)  Goal: Plan of Care Review  Outcome: Ongoing (interventions implemented as appropriate)   03/08/18 2687   Patient Care Overview   Progress improving   Coping/Psychosocial Response Interventions   Plan Of Care Reviewed With patient   Outcome Evaluation   Outcome Summary/Follow up Plan Pt. slightly confused at beginning of shift and has been drowsy through the night, Tylenol given for pain and pt. rested well, dressing cdi, up with 1 assist, safety maintained, possible d/c home today.        Problem: Perioperative Period (Adult)  Goal: Signs and Symptoms of Listed Potential Problems Will be Absent or Manageable (Perioperative Period)  Outcome: Ongoing (interventions implemented as appropriate)      Problem: Fall Risk (Adult)  Goal: Absence of Falls  Outcome: Ongoing (interventions implemented as appropriate)

## 2018-03-09 LAB
ANION GAP SERPL CALCULATED.3IONS-SCNC: 3 MMOL/L (ref 4–13)
BASOPHILS # BLD AUTO: 0.03 10*3/MM3 (ref 0–0.2)
BASOPHILS NFR BLD AUTO: 0.4 % (ref 0–2)
BUN BLD-MCNC: 18 MG/DL (ref 5–21)
BUN/CREAT SERPL: 16.1 (ref 7–25)
CALCIUM SPEC-SCNC: 8.6 MG/DL (ref 8.4–10.4)
CHLORIDE SERPL-SCNC: 95 MMOL/L (ref 98–110)
CO2 SERPL-SCNC: 33 MMOL/L (ref 24–31)
CREAT BLD-MCNC: 1.12 MG/DL (ref 0.5–1.4)
DEPRECATED RDW RBC AUTO: 42.9 FL (ref 40–54)
EOSINOPHIL # BLD AUTO: 0.13 10*3/MM3 (ref 0–0.7)
EOSINOPHIL NFR BLD AUTO: 1.6 % (ref 0–4)
ERYTHROCYTE [DISTWIDTH] IN BLOOD BY AUTOMATED COUNT: 14.3 % (ref 12–15)
GFR SERPL CREATININE-BSD FRML MDRD: 47 ML/MIN/1.73
GLUCOSE BLD-MCNC: 115 MG/DL (ref 70–100)
HCT VFR BLD AUTO: 26.9 % (ref 37–47)
HGB BLD-MCNC: 8.8 G/DL (ref 12–16)
IMM GRANULOCYTES # BLD: 0.06 10*3/MM3 (ref 0–0.03)
IMM GRANULOCYTES NFR BLD: 0.7 % (ref 0–5)
LYMPHOCYTES # BLD AUTO: 0.87 10*3/MM3 (ref 0.72–4.86)
LYMPHOCYTES NFR BLD AUTO: 10.8 % (ref 15–45)
MCH RBC QN AUTO: 27.2 PG (ref 28–32)
MCHC RBC AUTO-ENTMCNC: 32.7 G/DL (ref 33–36)
MCV RBC AUTO: 83 FL (ref 82–98)
MONOCYTES # BLD AUTO: 1.46 10*3/MM3 (ref 0.19–1.3)
MONOCYTES NFR BLD AUTO: 18 % (ref 4–12)
NEUTROPHILS # BLD AUTO: 5.54 10*3/MM3 (ref 1.87–8.4)
NEUTROPHILS NFR BLD AUTO: 68.5 % (ref 39–78)
NRBC BLD MANUAL-RTO: 0 /100 WBC (ref 0–0)
PLATELET # BLD AUTO: 201 10*3/MM3 (ref 130–400)
PMV BLD AUTO: 9.5 FL (ref 6–12)
POTASSIUM BLD-SCNC: 4.3 MMOL/L (ref 3.5–5.3)
RBC # BLD AUTO: 3.24 10*6/MM3 (ref 4.2–5.4)
SODIUM BLD-SCNC: 131 MMOL/L (ref 135–145)
WBC NRBC COR # BLD: 8.09 10*3/MM3 (ref 4.8–10.8)

## 2018-03-09 PROCEDURE — 25010000002 ENOXAPARIN PER 10 MG: Performed by: ORTHOPAEDIC SURGERY

## 2018-03-09 PROCEDURE — 85025 COMPLETE CBC W/AUTO DIFF WBC: CPT | Performed by: FAMILY MEDICINE

## 2018-03-09 PROCEDURE — 80048 BASIC METABOLIC PNL TOTAL CA: CPT | Performed by: ORTHOPAEDIC SURGERY

## 2018-03-09 PROCEDURE — 97110 THERAPEUTIC EXERCISES: CPT

## 2018-03-09 PROCEDURE — 94799 UNLISTED PULMONARY SVC/PX: CPT

## 2018-03-09 PROCEDURE — 97535 SELF CARE MNGMENT TRAINING: CPT

## 2018-03-09 PROCEDURE — 97530 THERAPEUTIC ACTIVITIES: CPT

## 2018-03-09 RX ADMIN — ALLOPURINOL 50 MG: 100 TABLET ORAL at 08:42

## 2018-03-09 RX ADMIN — GABAPENTIN 600 MG: 300 CAPSULE ORAL at 20:41

## 2018-03-09 RX ADMIN — CLONIDINE HYDROCHLORIDE 0.1 MG: 0.1 TABLET ORAL at 20:40

## 2018-03-09 RX ADMIN — CLONIDINE HYDROCHLORIDE 0.1 MG: 0.1 TABLET ORAL at 08:43

## 2018-03-09 RX ADMIN — LEVOTHYROXINE SODIUM 100 MCG: 100 TABLET ORAL at 05:51

## 2018-03-09 RX ADMIN — ACETAMINOPHEN 650 MG: 325 TABLET, FILM COATED ORAL at 20:47

## 2018-03-09 RX ADMIN — LORAZEPAM 1 MG: 1 TABLET ORAL at 16:12

## 2018-03-09 RX ADMIN — LORAZEPAM 1 MG: 1 TABLET ORAL at 20:40

## 2018-03-09 RX ADMIN — DOXEPIN HYDROCHLORIDE 100 MG: 100 CAPSULE ORAL at 20:41

## 2018-03-09 RX ADMIN — CARVEDILOL 12.5 MG: 6.25 TABLET, FILM COATED ORAL at 20:40

## 2018-03-09 RX ADMIN — OXYCODONE HYDROCHLORIDE AND ACETAMINOPHEN 1 TABLET: 10; 325 TABLET ORAL at 02:12

## 2018-03-09 RX ADMIN — GABAPENTIN 600 MG: 300 CAPSULE ORAL at 16:12

## 2018-03-09 RX ADMIN — ATORVASTATIN CALCIUM 10 MG: 10 TABLET, FILM COATED ORAL at 20:40

## 2018-03-09 RX ADMIN — BUDESONIDE AND FORMOTEROL FUMARATE DIHYDRATE 2 PUFF: 160; 4.5 AEROSOL RESPIRATORY (INHALATION) at 08:07

## 2018-03-09 RX ADMIN — ACETAMINOPHEN 650 MG: 325 TABLET, FILM COATED ORAL at 05:51

## 2018-03-09 RX ADMIN — FLUOXETINE HYDROCHLORIDE 40 MG: 20 CAPSULE ORAL at 08:43

## 2018-03-09 RX ADMIN — DOCUSATE SODIUM AND SENNOSIDES 2 TABLET: 8.6; 5 TABLET, FILM COATED ORAL at 20:40

## 2018-03-09 RX ADMIN — ENOXAPARIN SODIUM 40 MG: 40 INJECTION SUBCUTANEOUS at 08:43

## 2018-03-09 RX ADMIN — MAGNESIUM HYDROXIDE 10 ML: 2400 SUSPENSION ORAL at 05:51

## 2018-03-09 RX ADMIN — GABAPENTIN 600 MG: 300 CAPSULE ORAL at 05:51

## 2018-03-09 RX ADMIN — FAMOTIDINE 20 MG: 20 TABLET, FILM COATED ORAL at 08:42

## 2018-03-09 RX ADMIN — BUDESONIDE AND FORMOTEROL FUMARATE DIHYDRATE 2 PUFF: 160; 4.5 AEROSOL RESPIRATORY (INHALATION) at 20:24

## 2018-03-09 RX ADMIN — LORAZEPAM 1 MG: 1 TABLET ORAL at 08:43

## 2018-03-09 RX ADMIN — CARVEDILOL 12.5 MG: 6.25 TABLET, FILM COATED ORAL at 08:42

## 2018-03-09 RX ADMIN — BUPROPION HYDROCHLORIDE 100 MG: 100 TABLET, FILM COATED, EXTENDED RELEASE ORAL at 08:42

## 2018-03-09 NOTE — PROGRESS NOTES
Continued Stay Note   [facility]     Patient Name: Hanny Ivy  MRN: [id number]  Today's Date: 3/9/2018    Admit Date: 3/6/2018          Discharge Plan       03/09/18 1516    Case Management/Social Work Plan    Plan [facility] to [facility]    Additional Comments Tori in admissions at [place] called SW back. Decision has not been made as of yet but Tori states she will call  when decision has been made. Patient does not have alternate facility choice as her home is very close to this facility. Will follow for decision.                  TJ WallisW

## 2018-03-09 NOTE — PROGRESS NOTES
Orthopedic Surgery Progress Note    Hanny Ivy  3/9/2018      Subjective:     Systemic or Specific Complaints: doing well.     Objective:     Patient Vitals for the past 24 hrs:   BP Temp Temp src Pulse Resp SpO2   03/09/18 0005 - - - - - 92 %   03/09/18 0000 152/53 99.2 °F (37.3 °C) Oral 92 20 (!) 87 %   03/08/18 2027 137/63 98.4 °F (36.9 °C) Oral 96 20 100 %   03/08/18 0927 - - - - - 92 %   03/08/18 0747 108/64 99.4 °F (37.4 °C) Oral 83 18 96 %       right upper  General: alert, appears stated age and cooperative   Wound: clean, dry, intact             Dressing: clean, dry, intact   Extremity: Distal NVI           DVT Exam: No evidence of DVT seen on physical exam.                   Data Review:  Lab Results (last 24 hours)     Procedure Component Value Units Date/Time    Basic Metabolic Panel [[id number]]  (Abnormal) Collected:  03/08/18 0658    Specimen:  Blood Updated:  03/08/18 0743     Glucose 124 (H) mg/dL      BUN 18 mg/dL      Creatinine 1.45 (H) mg/dL      Sodium 132 (L) mmol/L      Potassium 4.3 mmol/L      Chloride 94 (L) mmol/L      CO2 30.0 mmol/L      Calcium 8.8 mg/dL      eGFR Non African Amer 35 (L) mL/min/1.73      BUN/Creatinine Ratio 12.4     Anion Gap 8.0 mmol/L     Narrative:       The MDRD GFR formula is only valid for adults with stable renal function between ages 18 and 70.    Basic Metabolic Panel [[id number]]  (Abnormal) Collected:  03/09/18 0651    Specimen:  Blood Updated:  03/09/18 0727     Glucose 115 (H) mg/dL      BUN 18 mg/dL      Creatinine 1.12 mg/dL      Sodium 131 (L) mmol/L      Potassium 4.3 mmol/L      Chloride 95 (L) mmol/L      CO2 33.0 (H) mmol/L      Calcium 8.6 mg/dL      eGFR Non African Amer 47 (L) mL/min/1.73      BUN/Creatinine Ratio 16.1     Anion Gap 3.0 (L) mmol/L     Narrative:       The MDRD GFR formula is only valid for adults with stable renal function between ages 18 and 70.        Imaging Results (last 24 hours)     ** No results found for the last 24  hours. **          Assessment:     POD# 3 R Reverse TSA    Plan:      1:  DVT prophylaxis, ICE, elevate  2:  Pain control  3:  Physical therapy/Occupational therapy  4:  Anticipate discharge today or tomorrow when pain well controlled  5: Reverse Protocol       Sandeep Prater PA-C

## 2018-03-09 NOTE — PLAN OF CARE
Problem: Patient Care Overview (Adult)  Goal: Plan of Care Review  Outcome: Ongoing (interventions implemented as appropriate)   03/09/18 0515   Patient Care Overview   Progress no change   Coping/Psychosocial Response Interventions   Plan Of Care Reviewed With patient   Outcome Evaluation   Outcome Summary/Follow up Plan Pt. confused through the night and tried to get out of bed many times and took off her sling, dressing cdi, VSS, [facility].        Problem: Perioperative Period (Adult)  Goal: Signs and Symptoms of Listed Potential Problems Will be Absent or Manageable (Perioperative Period)  Outcome: Ongoing (interventions implemented as appropriate)      Problem: Fall Risk (Adult)  Goal: Absence of Falls  Outcome: Ongoing (interventions implemented as appropriate)

## 2018-03-09 NOTE — PROGRESS NOTES
Hanny Ivy is a 73 y.o. female patient.  Still complains of pain in the shoulder.\  Limited help at home.  Current Facility-Administered Medications   Medication Dose Route Frequency Provider Last Rate Last Dose   • acetaminophen (TYLENOL) tablet 650 mg  650 mg Oral Q4H PRN Imtiaz Lobato MD   650 mg at 03/09/18 0551    Or   • acetaminophen (TYLENOL) 160 MG/5ML solution 650 mg  650 mg Oral Q4H PRN Imtiaz Lobato MD        Or   • acetaminophen (TYLENOL) suppository 650 mg  650 mg Rectal Q4H PRN Imtiaz Lobato MD       • allopurinol (ZYLOPRIM) tablet 50 mg  50 mg Oral Daily Imtiaz Lobato MD   50 mg at 03/08/18 0845   • atorvastatin (LIPITOR) tablet 10 mg  10 mg Oral Nightly Imtiaz Lobato MD   10 mg at 03/08/18 2021   • budesonide-formoterol (SYMBICORT) 160-4.5 MCG/ACT inhaler 2 puff  2 puff Inhalation BID - RT Imtiaz Lobato MD   2 puff at 03/08/18 2129   • buPROPion SR (WELLBUTRIN SR) 12 hr tablet 100 mg  100 mg Oral Daily Imtiaz Lobato MD   100 mg at 03/08/18 0845   • carvedilol (COREG) tablet 12.5 mg  12.5 mg Oral Q12H Imtiaz Lobato MD   12.5 mg at 03/08/18 2028   • CloNIDine (CATAPRES) tablet 0.1 mg  0.1 mg Oral Q12H Imtiaz Lobato MD   0.1 mg at 03/08/18 2029   • diphenhydrAMINE (BENADRYL) capsule 25 mg  25 mg Oral Q6H PRN Imtiaz Lobato MD        Or   • diphenhydrAMINE (BENADRYL) injection 25 mg  25 mg Intravenous Q6H PRN Imtiaz Lobato MD       • docusate sodium (COLACE) capsule 100 mg  100 mg Oral BID PRN Imtiaz Lobato MD       • doxepin (SINEquan) capsule 100 mg  100 mg Oral Nightly Imtiaz Lobato MD   100 mg at 03/08/18 2021   • enoxaparin (LOVENOX) syringe 40 mg  40 mg Subcutaneous Daily Imtiaz Lobato MD   40 mg at 03/08/18 0845   • famotidine (PEPCID) tablet 20 mg  20 mg Oral Daily Imtiaz Lobato MD       • FLUoxetine (PROzac) capsule 40 mg  40 mg Oral Daily Imtiaz Lobato MD   40 mg at  03/08/18 0844   • gabapentin (NEURONTIN) capsule 600 mg  600 mg Oral Q8H Imtiaz Lobato MD   600 mg at 03/09/18 0551   • HYDROmorphone (DILAUDID) injection 1 mg  1 mg Intravenous Q4H PRN Imtiaz Lobato MD   1 mg at 03/07/18 1311    And   • naloxone (NARCAN) injection 0.1 mg  0.1 mg Intravenous Q5 Min PRN Imtiaz Lobato MD       • ipratropium-albuterol (DUO-NEB) nebulizer solution 3 mL  3 mL Nebulization Q6H PRN Imtiaz Lobato MD       • lactated ringers infusion  100 mL/hr Intravenous Continuous Wilda Davis  mL/hr at 03/07/18 0047 100 mL/hr at 03/07/18 0047   • lactated ringers infusion  100 mL/hr Intravenous Continuous Imtiaz Lobato MD       • levothyroxine (SYNTHROID, LEVOTHROID) tablet 100 mcg  100 mcg Oral Q AM Imtiaz Lobato MD   100 mcg at 03/09/18 0551   • LORazepam (ATIVAN) tablet 1 mg  1 mg Oral TID Imtiaz Lobato MD   1 mg at 03/08/18 2021   • magnesium hydroxide (MILK OF MAGNESIA) suspension 2400 mg/10mL 10 mL  10 mL Oral Daily PRN Imtiaz Lobato MD   10 mL at 03/09/18 0551   • Morphine sulfate (PF) injection 4 mg  4 mg Intravenous Q2H PRN Imtiaz Lobato MD   4 mg at 03/07/18 0512    And   • naloxone (NARCAN) injection 0.4 mg  0.4 mg Intravenous Q5 Min PRN Imtiaz oLbato MD       • ondansetron (ZOFRAN) tablet 4 mg  4 mg Oral Q6H PRN Imtiaz Lobato MD        Or   • ondansetron ODT (ZOFRAN-ODT) disintegrating tablet 4 mg  4 mg Oral Q6H PRN Imtiaz Lobato MD        Or   • ondansetron (ZOFRAN) injection 4 mg  4 mg Intravenous Q6H PRN Imtiaz Lobato MD       • oxyCODONE-acetaminophen (PERCOCET)  MG per tablet 1 tablet  1 tablet Oral Q4H PRN Imtiaz Lobato MD   1 tablet at 03/09/18 0212   • oxyCODONE-acetaminophen (PERCOCET) 7.5-325 MG per tablet 2 tablet  2 tablet Oral Q4H PRN Imtiaz Lobato MD   2 tablet at 03/07/18 1437   • phenol (CHLORASEPTIC) 1.4 % liquid 2 spray  2 spray  "Mouth/Throat Q2H PRN Imtiaz Lobato MD   2 spray at 03/06/18 2242   • promethazine (PHENERGAN) injection 12.5 mg  12.5 mg Intravenous Q6H PRN Imtiaz Lobato MD        Or   • promethazine (PHENERGAN) injection 12.5 mg  12.5 mg Intramuscular Q6H PRN Imtiaz Lobato MD       • sennosides-docusate sodium (SENOKOT-S) 8.6-50 MG tablet 2 tablet  2 tablet Oral Nightly Imtiaz Lobato MD   2 tablet at 03/08/18 2021   • sodium chloride 0.9 % flush 1-10 mL  1-10 mL Intravenous PRN Imtiaz Lobato MD         ALLERGIES:    Allergies   Allergen Reactions   • Penicillins Other (See Comments)     Unknown   • Sulfa Antibiotics Other (See Comments)     Unknown     Principal Problem:    Rotator cuff arthropathy, right  Active Problems:    Pure hypercholesterolemia    Essential hypertension    Mild intermittent asthma without complication    Generalized anxiety disorder    Gastroesophageal reflux disease without esophagitis    Acquired hypothyroidism    Blood pressure 152/53, pulse 92, temperature 99.2 °F (37.3 °C), temperature source Oral, resp. rate 20, height 162.6 cm (64\"), weight 92.7 kg (204 lb 7 oz), SpO2 92 %.      Subjective:  Symptoms:  Stable.    Diet:  Adequate intake.    Activity level: Impaired due to pain.    Pain:  She complains of pain that is moderate.  She reports pain is improving.  Pain is partially controlled.      Review of Systems  Objective:  General Appearance:  Comfortable and well-appearing.    Vital signs: (most recent): Blood pressure 152/53, pulse 92, temperature 99.2 °F (37.3 °C), temperature source Oral, resp. rate 20, height 162.6 cm (64\"), weight 92.7 kg (204 lb 7 oz), SpO2 92 %.  Vital signs are normal.    Output: Producing urine and producing stool.    Lungs:  Normal respiratory rate and normal effort.    Heart: Normal rate.  Regular rhythm.    Abdomen: Abdomen is soft.  Bowel sounds are normal.     Extremities: (Shoulder with postoperative changes in sling with good " pulses noted distally)  Neurological: Patient is alert and oriented to person, place and time.    Skin:  Warm and dry.              Labs:  Lab Results (last 72 hours)     Procedure Component Value Units Date/Time    Hemoglobin & Hematocrit, Blood [[id number]]  (Abnormal) Collected:  03/07/18 0438    Specimen:  Blood Updated:  03/07/18 0548     Hemoglobin 9.3 (L) g/dL      Hematocrit 28.4 (L) %     Basic Metabolic Panel [[id number]]  (Abnormal) Collected:  03/07/18 0438    Specimen:  Blood Updated:  03/07/18 0602     Glucose 107 (H) mg/dL      BUN 17 mg/dL      Creatinine 1.54 (H) mg/dL      Sodium 132 (L) mmol/L      Potassium 4.4 mmol/L      Chloride 97 (L) mmol/L      CO2 26.0 mmol/L      Calcium 8.7 mg/dL      eGFR Non African Amer 33 (L) mL/min/1.73      BUN/Creatinine Ratio 11.0     Anion Gap 9.0 mmol/L     Narrative:       The MDRD GFR formula is only valid for adults with stable renal function between ages 18 and 70.          Imaging Results (last 72 hours)     Procedure Component Value Units Date/Time    XR Shoulder 2+ View Right [[id number]] Collected:  03/06/18 1450     Updated:  03/06/18 1455    Narrative:       XR SHOULDER 2+ VW RIGHT- 3/6/2018 2:30 PM CST     History: postop     Comparison: None      Findings:   3 frontal postoperative views of the right shoulder are submitted.      New reverse total shoulder arthroplasty with components appearing in  good position. No periprosthetic fractures identified       Impression:       Impression:   1. New reverse total shoulder arthroplasty without visualized  complication.        This report was finalized on 03/06/2018 14:52 by Dr Kuldip Nelson, .                Assessment:    Condition: In stable condition.  Improving.   (Anemia post-op expected-check iron, B12,and folate. Replenish as needed.Transfuse at acceptable levels depending on clinical judgement and comorbidities.  Labs this a.m. are pending    Constipation-start with Miralax 1 capful BID until BM,  then decrease to 1 x a day,then can step up to Amitizia 24 mcg po BID which can be used for opiod induced constipation,and ultimately end up with Relistor 12 mcg sub Q q 48 hours to block the effect of narcotics on the gut.    Explained to patient and staff that we were consulted for medical management during their acute care hospitalization. They need to f/u with their regular primary care provider concerning any further treatment and review of abnormalities found during their hospitalization at [facility] and they agree with that treatment plan. ).     Plan:   Discharge home.  Encourage ambulation and per physical therapy.  (Medically stable for discharge home today.  Home health being set up.  She looks better today.  Okay to discharge home if okay with orthopedics.).   Explained to patient and staff that we were consulted for medical management during their acute care hospitalization. They need to f/u with their regular primary care provider concerning any further treatment and review of abnormalities found during their hospitalization at [facility] and they agree with that treatment plan.     Patient Active Problem List   Diagnosis   • Dermatochalasis of both upper eyelids   • Visual field defect, unspecified   • Rotator cuff arthropathy, right   • Pure hypercholesterolemia   • Essential hypertension   • Mild intermittent asthma without complication   • Generalized anxiety disorder   • Gastroesophageal reflux disease without esophagitis   • Acquired hypothyroidism     Jac Mena MD  3/9/2018

## 2018-03-09 NOTE — PLAN OF CARE
Problem: Patient Care Overview (Adult)  Goal: Plan of Care Review  Outcome: Ongoing (interventions implemented as appropriate)   03/09/18 1606   Patient Care Overview   Progress progress toward functional goals as expected   Coping/Psychosocial Response Interventions   Plan Of Care Reviewed With patient   Outcome Evaluation   Outcome Summary/Follow up Plan patient instructed multiple times to call out for assistance when needing to get out of bed.     Goal: Adult Individualization and Mutuality  Outcome: Ongoing (interventions implemented as appropriate)    Goal: Discharge Needs Assessment  Outcome: Ongoing (interventions implemented as appropriate)      Problem: Perioperative Period (Adult)  Goal: Signs and Symptoms of Listed Potential Problems Will be Absent or Manageable (Perioperative Period)  Outcome: Ongoing (interventions implemented as appropriate)      Problem: Fall Risk (Adult)  Goal: Absence of Falls  Outcome: Ongoing (interventions implemented as appropriate)

## 2018-03-09 NOTE — PLAN OF CARE
Problem: Patient Care Overview (Adult)  Goal: Plan of Care Review  Outcome: Ongoing (interventions implemented as appropriate)   03/09/18 1137   Patient Care Overview   Progress improving   Coping/Psychosocial Response Interventions   Plan Of Care Reviewed With patient   Outcome Evaluation   Outcome Summary/Follow up Plan Pt was up in chair and min x 1 to stand. Pt ambulated in room 15ft with aleksandar walker min- mod x 1 assist. Pt would benefit from continued rehab to improve balance endurance and strength.

## 2018-03-09 NOTE — PLAN OF CARE
Problem: Patient Care Overview (Adult)  Goal: Plan of Care Review  Outcome: Ongoing (interventions implemented as appropriate)   03/09/18 1322   Patient Care Overview   Progress progress towards functional goals is fair   Coping/Psychosocial Response Interventions   Plan Of Care Reviewed With patient   Outcome Evaluation   Outcome Summary/Follow up Plan Pt required max A for UB/LB dressing including readjustment and don/doff of R shoulder immobilizer. Pt mod A for UB/LB bathing. Pt would benefit from rehab at SNF prior to discharge. Continue OT POC

## 2018-03-09 NOTE — PROGRESS NOTES
Continued Stay Note  [facility]     Patient Name: Hanny Ivy  MRN: [id number]  Today's Date: 3/9/2018    Admit Date: 3/6/2018          Discharge Plan       03/09/18 1600    Case Management/Social Work Plan    Plan [facility] [place] - Monday    Patient/Family In Agreement With Plan yes    Additional Comments Tori in admissions at [facility] called and confirmed patient has been accepted. However, Tori states that patient cannot admit there until Monday, 3/12.  notified PA with Dr. Lobato.       03/09/18 1516    Case Management/Social Work Plan    Plan Referral to [facility] [place]    Additional Comments Tori in admissions at [facility] called SW back. Decision has not been made as of yet but Tori states she will call  when decision has been made. Patient does not have alternate facility choice as her home is very close to this facility. Will follow for decision.               Discharge Codes     None            TRAV Wallis

## 2018-03-09 NOTE — THERAPY TREATMENT NOTE
Acute Care - Occupational Therapy Treatment Note  [facility]     Patient Name: Hanny Ivy  : 1945  MRN: [id number]  Today's Date: 3/9/2018  Onset of Illness/Injury or Date of Surgery Date: 18  Date of Referral to OT: 18  Referring Physician: Dr. Lobato      Admit Date: 3/6/2018    Visit Dx:     ICD-10-CM ICD-9-CM   1. Impaired mobility and ADLs Z74.09 799.89   2. Impaired functional mobility, balance, gait, and endurance Z74.09 V49.89     Patient Active Problem List   Diagnosis   • Dermatochalasis of both upper eyelids   • Visual field defect, unspecified   • Rotator cuff arthropathy, right   • Pure hypercholesterolemia   • Essential hypertension   • Mild intermittent asthma without complication   • Generalized anxiety disorder   • Gastroesophageal reflux disease without esophagitis   • Acquired hypothyroidism             Adult Rehabilitation Note       18 1116 18 1009 18 1503    Rehab Assessment/Intervention    Discipline physical therapy assistant  -AE occupational therapy assistant  -TS physical therapy assistant  -AE    Document Type therapy note (daily note)  -AE therapy note (daily note)  -TS therapy note (daily note)  -AE    Subjective Information agree to therapy;complains of;pain  -AE agree to therapy;complains of;pain;fatigue  -TS agree to therapy;complains of;pain  -AE    Patient Effort, Rehab Treatment  good  -TS     Symptoms Noted During/After Treatment   fatigue;shortness of breath  -AE    Precautions/Limitations shoulder precautions;non-weight bearing status;brace on when up  -AE fall precautions;non-weight bearing status;shoulder precautions   NWB RUE  -TS shoulder precautions;non-weight bearing status;brace on when up   exit alarm  -AE    Specific Treatment Considerations   impulsive, confused  -AE    Recorded by [AE] Radha Puga PTA [TS] RIN Gutierrez/HERNAN [AE] Radha Puga PTA    Pain Assessment    Pain Assessment 0-10  -AE 0-10  -TS 0-10   -AE    Pain Score 4  -AE 3  -TS 5  -AE    Post Pain Score 4  -AE  5  -AE    Pain Type Acute pain  -AE Acute pain;Surgical pain  -TS Acute pain  -AE    Pain Location Shoulder  -AE Shoulder  -TS Shoulder  -AE    Pain Orientation Right  -AE Right  -TS Right  -AE    Pain Descriptors Aching  -AE Aching  -TS Aching  -AE    Pain Frequency Constant/continuous  -AE Intermittent  -TS Constant/continuous  -AE    Pain Intervention(s) Medication (See MAR)  -AE Repositioned  -TS Medication (See MAR)  -AE    Response to Interventions tolerated  -AE  tolerated  -AE    Recorded by [AE] Radha Puga PTA [TS] MANUEL GutierrezA/L [AE] Radha Puga PTA    Cognitive Assessment/Intervention    Orientation Status  oriented x 4  -TS     Personal Safety Interventions  fall prevention program maintained;gait belt;nonskid shoes/slippers when out of bed  -TS     Recorded by  [TS] Talya Biggs GAR/L     Mobility Assessment/Training    Right Upper Extremity Weight-Bearing non weight-bearing  -AE      Recorded by [AE] Radha Puga PTA      Bed Mobility, Assessment/Treatment    Bed Mobility, Assistive Device  bed rails;head of bed elevated  -TS head of bed elevated  -AE    Bed Mobility, Scoot/Bridge, [place]  supervision required  -TS minimum assist (75% patient effort);verbal cues required  -AE    Bed Mob, Supine to Sit, [place]  --   SBA  -TS     Bed Mob, Sit to Supine, [place]   moderate assist (50% patient effort)  -AE    Bed Mobility, Safety Issues   decreased use of arms for pushing/pulling  -AE    Bed Mobility, Impairments   strength decreased  -AE    Bed Mobility, Comment up in chair  -AE      Recorded by [AE] Radha Puga PTA [TS] MANUEL GutierrezA/L [AE] Radha Puga PTA    Transfer Assessment/Treatment    Transfers, Sit-Stand [place] minimum assist (75% patient effort);contact guard assist  -AE contact guard assist;verbal cues required  -TS contact guard assist;2 person assist  required  -AE    Transfers, Stand-Sit [place]  contact guard assist  -TS     Transfers, Sit-Stand-Sit, Assist Device  --   HHA on L  -TS     Transfer, Safety Issues balance decreased during turns  -AE  balance decreased during turns  -AE    Transfer, Impairments --   vc's to push up from chair and reach back  -AE  strength decreased  -AE    Transfer, Comment Practiced sit to stand x 5 reps  -AE      Recorded by [AE] Radha Puga PTA [TS] RIN Gutierrez/HERNAN [AE] Radha Puga PTA    Gait Assessment/Treatment    Gait, [place] Level minimum assist (75% patient effort);moderate assist (50% patient effort)  -AE  minimum assist (75% patient effort);2 person assist required  -AE    Gait, Assistive Device   --   HHA  -AE    Gait, Distance (Feet) 15  -AE  20   plus 20ft x 1 sitting rest  -AE    Gait, Gait Deviations forward flexed posture  -AE  forward flexed posture;step length decreased;marine decreased  -AE    Gait, Safety Issues balance decreased during turns  -AE  balance decreased during turns;weight-shifting ability decreased  -AE    Gait, Comment   --   RN followed with chair  -AE    Recorded by [AE] Radha Puga PTA  [AE] Radha Puga PTA    Functional Mobility    Functional Mobility- Ind. Level  contact guard assist  -TS     Functional Mobility- Device  --   HHA  -TS     Functional Mobility- Comment  in room/in bathroom with CGA and HHA on L side  -TS     Recorded by  [TS] RIN Gutierrez/L     Upper Body Bathing Assessment/Training    UB Bathing Assess/Train Assistive Device  one hand technique;tub bench  -TS     UB Bathing Assess/Train, Position  sitting  -TS     UB Bathing Assess/Train, [place] Level  moderate assist (50% patient effort)  -TS     UB Bathing Assess/Train, Impairments  ROM decreased;strength decreased  -TS     Recorded by  [TS] RIN Gutierrez/L     Lower Body Bathing Assessment/Training    LB Bathing Assess/Train Assistive Device  one hand  technique;tub bench  -TS     LB Bathing Assess/Train, Position  sitting;standing  -TS     LB Bathing Assess/Train, [place] Level  moderate assist (50% patient effort)  -TS     LB Bathing Assess/Train, Impairments  ROM decreased;strength decreased  -TS     Recorded by  [TS] NEGRITA Gutierrez     Upper Body Dressing Assessment/Training    UB Dressing Assess/Train, Clothing Type  doffing:;donning:;hospital gown   robe and shoulder immobilizer  -TS     UB Dressing Assess/Train, Position  sitting;standing  -TS     UB Dressing Assess/Train, [place]  maximum assist (25% patient effort)  -TS     UB Dressing Assess/Train, Impairments  ROM decreased;strength decreased  -TS     Recorded by  [TS] RIN Gutierrez/L     Lower Body Dressing Assessment/Training    LB Dressing Assess/Train, Clothing Type  doffing:;donning:;socks   brief  -TS     LB Dressing Assess/Train, Position  sitting;standing  -TS     LB Dressing Assess/Train, [place]  maximum assist (25% patient effort)  -TS     LB Dressing Assess/Train, Impairments  ROM decreased;pain  -TS     Recorded by  [TS] NEGRITA Gutierrez     Grooming Assessment/Training    Grooming Assess/Train, Position  sitting  -TS     Grooming Assess/Train, [place] Level  set up required;contact guard assist  -TS     Grooming Assess/Train, Impairments  ROM decreased  -TS     Recorded by  [TS] RIN Gutierrez/L     Balance Skills Training    Standing-Level of Assistance Minimum assistance  -AE      Recorded by [AE] Radha Puga PTA      Therapy Exercises    Bilateral Lower Extremities AROM:;20 reps   plus 10 reps hip flexion and mini squats  -AE  AROM:;20 reps;sitting  -AE    Recorded by [AE] Radha Puga PTA  [AE] Radha Puga PTA    Orthosis Management/Training    Orthosis Skills Training  doffing orthosis;donning orthosis;clothing management related to orthosis;activity limitations;purpose/goals of orthosis;restrictions/precautions   -TS     Recorded by  [TS] NEGRITA Gutierrez     Positioning and Restraints    Pre-Treatment Position sitting in chair/recliner  -AE in bed  -TS in bed  -AE    Post Treatment Position chair  -AE chair  -TS bed  -AE    In Bed   fowlers;call light within reach;exit alarm on  -AE    In Chair sitting;call light within reach  -AE reclined;call light within reach;encouraged to call for assist;notified nsg  -TS     Recorded by [AE] Radha Puga PTA [TS] NEGRITA Gutierrez [AE] Radha Puga PTA      03/08/18 1034 03/08/18 0932 03/07/18 1451    Rehab Assessment/Intervention    Discipline occupational therapy assistant  -TS physical therapy assistant  -AE physical therapy assistant  -AE    Document Type therapy note (daily note)  -TS therapy note (daily note)  -AE therapy note (daily note)  -AE    Subjective Information agree to therapy;complains of;pain  -TS agree to therapy;complains of;pain  -AE agree to therapy;complains of;pain  -AE    Patient Effort, Rehab Treatment adequate  -TS      Precautions/Limitations non-weight bearing status;shoulder precautions;brace on when up;fall precautions  -TS shoulder precautions;brace on when up   reverse total shoulder  -AE shoulder precautions;brace on when up   reverse total shlder NWB  -AE    Recorded by [TS] NEGRITA Gutierrez [AE] Radha Puga PTA [AE] Radha Puga PTA    Pain Assessment    Pain Assessment 0-10  -TS 0-10  -AE 0-10  -AE    Pain Score 5  -TS 6  -AE 6  -AE    Post Pain Score  6  -AE 6  -AE    Pain Type Acute pain;Surgical pain  -TS  Surgical pain;Acute pain  -AE    Pain Location   Shoulder  -AE    Pain Orientation   Right  -AE    Pain Descriptors Aching  -TS Aching  -AE Aching  -AE    Pain Frequency   Constant/continuous  -AE    Pain Intervention(s) Medication (See MAR)  -TS Medication (See MAR)  -AE Medication (See MAR)  -AE    Response to Interventions   tolerated  -AE    Recorded by [TS] RIN Gutierrez/HERNAN [AE]  Radha Puga PTA [AE] Radha Puga PTA    Cognitive Assessment/Intervention    Personal Safety Interventions fall prevention program maintained;gait belt;nonskid shoes/slippers when out of bed  -TS      Recorded by [TS] MANUEL GutierrezA/L      Mobility Assessment/Training    Right Upper Extremity Weight-Bearing   non weight-bearing  -AE    Recorded by   [AE] Radha Puga PTA    Bed Mobility, Assessment/Treatment    Bed Mobility, Assistive Device head of bed elevated  -TS  bed rails;head of bed elevated  -AE    Bed Mobility, Scoot/Bridge, [place]   minimum assist (75% patient effort)  -AE    Bed Mob, Supine to Sit, [place] minimum assist (75% patient effort);moderate assist (50% patient effort)  -TS      Bed Mob, Sit to Supine, [place]  minimum assist (75% patient effort);2 person assist required  -AE     Recorded by [TS] Talya Biggs, GAR/L [AE] Radha Puga PTA [AE] Radha Puga PTA    Transfer Assessment/Treatment    Transfers, Bed-Chair [place] contact guard assist  -TS      Transfers, Sit-Stand [place] contact guard assist;stand by assist  -TS maximum assist (25% patient effort)   Pt was assisted to standing position RN assist  -AE minimum assist (75% patient effort);verbal cues required  -AE    Transfers, Stand-Sit [place] contact guard assist;stand by assist  -TS  minimum assist (75% patient effort)  -AE    Transfer, Comment  --   sitting in floor with nursing  -AE     Recorded by [TS] MANUEL GutierrezA/L [AE] Radha Puga PTA [AE] Radha Puga PTA    Gait Assessment/Treatment    Gait, [place] Level  minimum assist (75% patient effort);2 person assist required  -AE     Gait, Assistive Device  --   HHA  -AE --   HHA  -AE    Gait, Distance (Feet)  3   steps back to bed  -AE     Gait, Safety Issues   weight-shifting ability decreased  -AE    Gait, Comment   --   3 side steps  -AE    Recorded by  [AE] Radha Puga PTA [AE] Radha MARTINEZ  SHANTANU Puga    Upper Body Dressing Assessment/Training    UB Dressing Assess/Train, Position standing  -TS      UB Dressing Assess/Train, [place] maximum assist (25% patient effort)  -TS      UB Dressing Assess/Train, Comment max A for adjustment of immobilizer   -TS      Recorded by [TS] RIN Gutierrez/L      Balance Skills Training    Sitting-Level of Assistance   Contact guard  -AE    Recorded by   [AE] Radha Puga PTA    Therapy Exercises    Bilateral Lower Extremities  AROM:;hip abduction/adduction;ankle pumps/circles;SLR;20 reps   RROM 40 reps heel slides  -AE AROM:;20 reps;sitting;LAQ;hip flexion;ankle pumps/circles  -AE    Recorded by  [AE] Radha Puga PTA [AE] Radha Puga PTA    Orthosis Management/Training    Orthosis Skills Training doffing orthosis;donning orthosis;purpose/goals of orthosis  -TS      Recorded by [TS] RIN Gutierrez/L      Positioning and Restraints    Pre-Treatment Position in bed  -TS --   sitting in floor with nursing  -AE in bed  -AE    Post Treatment Position chair  -TS bed  -AE bed  -AE    In Bed  fowlers;call light within reach  -AE fowlers;call light within reach  -AE    In Chair call light within reach;encouraged to call for assist;with family/caregiver;sitting  -TS      Recorded by [TS] RIN Gutierrez/HERNAN [AE] Radha Puga PTA [AE] Radha Puga PTA      User Key  (r) = Recorded By, (t) = Taken By, (c) = Cosigned By    Initials Name Effective Dates    AE Radha Puga PTA 06/22/15 -     TS RIN Gutierrez/HERNAN 08/02/16 -                 OT Goals       03/07/18 1028          Transfer Training OT LTG    Transfer Training OT LTG, Date Established 03/07/18  -MM (r) MK (t) MM (c)      Transfer Training OT LTG, Time to Achieve by discharge  -MM (r) MK (t) MM (c)      Transfer Training OT LTG, Activity Type toilet;tub  -MM (r) MK (t) MM (c)      Transfer Training OT LTG, [place] Level supervision required  -MM (r) MK (t)  MM (c)      Transfer Training OT LTG, Assist Device walker, aleksandar;tub bench  -MM (r) MK (t) MM (c)      Transfer Training OT LTG, Additional Goal without vb to maintain NWB status  -MM (r) MK (t) MM (c)      Patient Education OT LTG    Patient Education OT LTG, Date Established 03/07/18  -MM (r) MK (t) MM (c)      Patient Education OT LTG, Time to Achieve by discharge  -MM (r) MK (t) MM (c)      Patient Education OT LTG, Education Type precautions per surgeon;brace use/care;positioning;posture/body mechanics;1 hand/aleksandar technique;home safety;energy conservation;[place]/doff brace  -MM (r) MK (t) MM (c)      Patient Education OT LTG, Education Understanding independent;demonstrates adequately;verbalizes understanding  -MM (r) MK (t) MM (c)      Bathing OT LTG    Bathing Goal OT LTG, Date Established 03/07/18  -MM (r) MK (t) MM (c)      Bathing Goal OT LTG, Time to Achieve by discharge  -MM (r) MK (t) MM (c)      Bathing Goal OT LTG, Activity Type upper body bathing;lower body bathing  -MM (r) MK (t) MM (c)      Bathing Goal OT LTG, [place] Level supervision required  -MM (r) MK (t) MM (c)      Bathing Goal OT LTG, Assist Device tub bench  -MM (r) MK (t) MM (c)      LB Dressing OT LTG    LB Dressing Goal OT LTG, Date Established 03/07/18  -MM (r) MK (t) MM (c)      LB Dressing Goal OT LTG, Time to Achieve by discharge  -MM (r) MK (t) MM (c)      LB Dressing Goal OT LTG, [place] Level minimum assist (75% patient effort)  -MM (r) MK (t) MM (c)        User Key  (r) = Recorded By, (t) = Taken By, (c) = Cosigned By    Initials Name Provider Type    RAMÓN Loco, OTR/L Occupational Therapist    MARIE Garza, OT Student OT Student          Occupational Therapy Education     Title: PT OT SLP Therapies (Active)     Topic: Occupational Therapy (Done)     Point: ADL training (Done)    Description: Instruct learner(s) on proper safety adaptation and remediation techniques during self care or transfers.   Instruct in  proper use of assistive devices.    Learning Progress Summary    Learner Readiness Method Response Comment Documented by Status   Patient Acceptance E,D VU transfers, TB bathing and dressing  03/09/18 1321 Done    Acceptance E,D VU ADLs, transfers, benefits of activity  03/08/18 1448 Done    Acceptance E VU,NR precautions per surgeon, activity limitations/modifications, ADL retraining, transfer training, NWB status, orthosis wear/care  03/07/18 1019 Done               Point: Precautions (Done)    Description: Instruct learner(s) on prescribed precautions during self-care and functional transfers.    Learning Progress Summary    Learner Readiness Method Response Comment Documented by Status   Patient Acceptance E VU,NR precautions per surgeon, activity limitations/modifications, ADL retraining, transfer training, NWB status, orthosis wear/care  03/07/18 1019 Done               Point: Body mechanics (Done)    Description: Instruct learner(s) on proper positioning and spine alignment during self-care, functional mobility activities and/or exercises.    Learning Progress Summary    Learner Readiness Method Response Comment Documented by Status   Patient Acceptance E VU,NR precautions per surgeon, activity limitations/modifications, ADL retraining, transfer training, NWB status, orthosis wear/care  03/07/18 1019 Done                      User Key     Initials Effective Dates Name Provider Type Discipline     08/02/16 -  RIN Gutierrez/L Occupational Therapy Assistant OT     03/07/18 -  Darleen Garza OT Student OT Student OT                  OT Recommendation and Plan  Anticipated Equipment Needs At Discharge: bedside commode  Anticipated Discharge Disposition: skilled nursing facility  Planned Therapy Interventions: activity intolerance, ADL retraining, balance training, bed mobility training, energy conservation, orthotic fitting/training, strengthening, ROM (Range of Motion), transfer  training  Therapy Frequency: 3-5 times/wk  Plan of Care Review  Plan Of Care Reviewed With: patient  Progress: progress towards functional goals is fair  Outcome Summary/Follow up Plan: Pt required max A for UB/LB dressing including readjustment and don/doff of R shoulder immobilizer. Pt mod A for UB/LB bathing. Pt would benefit from rehab at [facility] prior to discharge. Continue OT POC         Outcome Measures       03/09/18 1200 03/08/18 1400 03/08/18 0932    How much help from another person do you currently need...    Turning from your back to your side while in flat bed without using bedrails?   3  -AE    Moving from lying on back to sitting on the side of a flat bed without bedrails?   2  -AE    Moving to and from a bed to a chair (including a wheelchair)?   3  -AE    Standing up from a chair using your arms (e.g., wheelchair, bedside chair)?   3  -AE    Climbing 3-5 steps with a railing?   2  -AE    To walk in hospital room?   2  -AE    AM-PAC 6 Clicks Score   15  -AE    How much help from another is currently needed...    Putting on and taking off regular lower body clothing? 2  -TS 2  -TS     Bathing (including washing, rinsing, and drying) 2  -TS 2  -TS     Toileting (which includes using toilet bed pan or urinal) 3  -TS 3  -TS     Putting on and taking off regular upper body clothing 3  -TS 3  -TS     Taking care of personal grooming (such as brushing teeth) 3  -TS 3  -TS     Eating meals 4  -TS 4  -TS     Score 17  -TS 17  -TS     Functional Assessment    Outcome Measure Options AM-PAC 6 Clicks Daily Activity (OT)  -TS AM-PAC 6 Clicks Daily Activity (OT)  -TS AM-PAC 6 Clicks Basic Mobility (PT)  -AE      03/07/18 1018 03/07/18 1012       How much help from another person do you currently need...    Turning from your back to your side while in flat bed without using bedrails?  3  -PB (r) LN (t) PB (c)     Moving from lying on back to sitting on the side of a flat bed without bedrails?  2  -PB (r) LN (t) PB (c)      Moving to and from a bed to a chair (including a wheelchair)?  3  -PB (r) LN (t) PB (c)     Standing up from a chair using your arms (e.g., wheelchair, bedside chair)?  3  -PB (r) LN (t) PB (c)     Climbing 3-5 steps with a railing?  2  -PB (r) LN (t) PB (c)     To walk in hospital room?  2  -PB (r) LN (t) PB (c)     AM-PAC 6 Clicks Score  15  -PB (r) LN (t)     How much help from another is currently needed...    Putting on and taking off regular lower body clothing? 2  -MM (r) MK (t) MM (c)      Bathing (including washing, rinsing, and drying) 2  -MM (r) MK (t) MM (c)      Toileting (which includes using toilet bed pan or urinal) 2  -MM (r) MK (t) MM (c)      Putting on and taking off regular upper body clothing 2  -MM (r) MK (t) MM (c)      Taking care of personal grooming (such as brushing teeth) 2  -MM (r) MK (t) MM (c)      Eating meals 3  -MM (r) MK (t) MM (c)      Score 13  -MM (r) MK (t)      Functional Assessment    Outcome Measure Options AM-PAC 6 Clicks Daily Activity (OT)  -MM (r) MK (t) MM (c) AM-PAC 6 Clicks Basic Mobility (PT)  -PB (r) LN (t) PB (c)       User Key  (r) = Recorded By, (t) = Taken By, (c) = Cosigned By    Initials Name Provider Type    GUIDO Puga, SHANTANU Physical Therapy Assistant    TS Talya Biggs, GAR/L Occupational Therapy Assistant    CLAIRE Nice, PT DPT Physical Therapist    MM Duke Loco, OTR/L Occupational Therapist    MARIE Garza, OT Student OT Student    LN Toribio Hancock, PT Student PT Student           Time Calculation:         Time Calculation- OT       03/09/18 1324          Time Calculation- OT    OT Start Time 1000  -TS      OT Stop Time 1055  -TS      OT Time Calculation (min) 55 min  -TS      Total Timed Code Minutes- OT 55 minute(s)  -TS      OT Received On 03/09/18  -TS        User Key  (r) = Recorded By, (t) = Taken By, (c) = Cosigned By    Initials Name Provider Type    NEGRITA Lozoya Occupational Therapy Assistant            Therapy Charges for Today     Code Description Service Date Service Provider Modifiers Qty    86787541625 HC OT SELF CARE/MGMT/TRAIN EA 15 MIN 3/8/2018 MANUEL GutierrezA/L GO, KX 2    [id number] HC OT SELF CARE/MGMT/TRAIN EA 15 MIN 3/9/2018 RIN Gutierrez/L GO, KX 4          OT G-codes  OT Professional Judgement Used?: Yes  OT Functional Scales Options: AM-PAC 6 Clicks Daily Activity (OT)  Score: 13  Functional Limitation: Self care  Self Care Current Status (): At least 60 percent but less than 80 percent impaired, limited or restricted  Self Care Goal Status (): At least 40 percent but less than 60 percent impaired, limited or restricted    NEGRITA Hurtado  3/9/2018

## 2018-03-09 NOTE — DISCHARGE SUMMARY
NAME: Hanny Ivy  : 1945  MRN: [id number]      Admission Date: 3/6/2018    Discharge Date: 3/12/10    Final Diagnoses: Rotator cuff arthropathy, right [M12.811]    Procedures: Right Reverse Total Shoulder Replacement Arthroplasty    Consultations: Dr. Miller for Medical Management    Reason for Admission: 73 y.o. YO female with progressive loss of function and increasing pain of the upper extremity due to a massive irreparable tear of the rotator cuff. She fell several months ago and developed pseudoparalysis of the shoulder.  Due to loss of function and progressive pain, a reverse shoulder arthroplasty is planned to improve function and decrease pain.    Hospital Course:  The patient was admitted with the above named diagnosis, surgery was performed and tolerated well.  At the time of discharge, the patient was afebrile, vitals stable, pain was controlled with oral medication, they were tolerating a by mouth diet, and voiding appropriately. Physical therapy and occupational therapy were consulted. Given these findings they were deemed suitable to be discharged. The patient progressed slower than expected. Nursing home placement was suggested for the safety of the patient.     Disposition: Home with      Activity: Reverse Total Shoulder Protocol, Right Shoulder    Wound Instructions: see postop instructions    Diet: regular    Resume home meds:   Prior to Admission medications    Medication Sig Start Date End Date Taking? Authorizing Provider   allopurinol (ZYLOPRIM) 100 MG tablet Take 1 tablet by mouth daily 16  Yes Historical Provider, MD   amlodipine-olmesartan (KESHAV) 10-40 MG per tablet Take 1 tablet by mouth Daily.   Yes Historical Provider, MD   buPROPion SR (WELLBUTRIN SR) 150 MG 12 hr tablet Take  by mouth Daily. 16  Yes Historical Provider, MD   carisoprodol (SOMA) 350 MG tablet 3 (Three) Times a Day As Needed. 16  Yes Historical Provider, MD   carvedilol (COREG) 12.5 MG tablet  TAKE ONE TABLET BY MOUTH TWICE A DAY WTIH MEALS ** MAY CAUSEDROWSINESS 9/9/16  Yes Historical Provider, MD   Cetirizine HCl (ZYRTEC ALLERGY) 10 MG capsule Take 10 mg by mouth Daily.   Yes Historical Provider, MD   CloNIDine (CATAPRES) 0.1 MG tablet Take 0.1 mg by mouth 2 (Two) Times a Day.   Yes Historical Provider, MD   doxepin (SINEquan) 50 MG capsule Take 100 mg by mouth Every Night. 11/12/16  Yes Historical Provider, MD   DULERA 100-5 MCG/ACT inhaler Inhale 2 (Two) Times a Day. 9/19/16  Yes Historical Provider, MD   FLUoxetine (PROzac) 40 MG capsule TAKE ONE CAPSULE BY MOUTH DAILY ** MAY CAUSE DROWSINESS 11/8/16  Yes Historical Provider, MD   gabapentin (NEURONTIN) 300 MG capsule TAKE TWO CAPSULES BY MOUTH THREE TIMES DAILY AS NEEDED ** MAY CAUSEDROWSINESS 9/9/16  Yes Historical Provider, MD   ipratropium-albuterol (COMBIVENT)  MCG/ACT inhaler Every 6 (Six) Hours.   Yes Historical Provider, MD   levothyroxine (SYNTHROID, LEVOTHROID) 100 MCG tablet Take 1 tablet by mouth Q Morning 6/9/16  Yes Historical Provider, MD   LORazepam (ATIVAN) 1 MG tablet 3 (Three) Times a Day. 9/9/16  Yes Historical Provider, MD   mometasone (NASONEX) 50 MCG/ACT nasal spray 2 sprays into each nostril Daily.   Yes Historical Provider, MD   oxyCODONE-acetaminophen (PERCOCET)  MG per tablet Take 1 tablet by mouth 2 (Two) Times a Day As Needed for Moderate Pain .   Yes Historical Provider, MD   pantoprazole (PROTONIX) 40 MG EC tablet TAKE ONE TABLET BY MOUTH TWICE A DAY 11/8/16  Yes Historical Provider, MD   simvastatin (ZOCOR) 20 MG tablet Take 20 mg by mouth Every Night.   Yes Historical Provider, MD   Unable to find 1 each 1 (One) Time. BENTAL 20 MG PO QD/PRN   Yes Historical Provider, MD   celecoxib (CELEBREX) 200 MG capsule Take 1 capsule by mouth daily PRN 11/6/14   Historical Provider, MD   chlorthalidone (HYGROTON) 25 MG tablet Take 25 mg by mouth Daily.    Historical Provider, MD   EPINEPHrine (EPIPEN 2-ABRIL) 0.3  MG/0.3ML solution auto-injector injection Inject 0.3 mg into the muscle as needed. Use as directed for allergic reaction    Historical Provider, MD   HYDROcodone-acetaminophen (NORCO)  MG per tablet 2 (Two) Times a Day. 2/8/16   Historical Provider, MD   VYTORIN 10-20 MG per tablet Take  by mouth Every Night. 10/12/16   Historical Provider, MD       Prescriptions for:  Percocet 10/325, #60    Return to Clinic: 2 weeks    Xrays: yes

## 2018-03-09 NOTE — PROGRESS NOTES
Continued Stay Note   [place]     Patient Name: Hanny Ivy  MRN: [id number]  Today's Date: 3/9/2018    Admit Date: 3/6/2018          Discharge Plan       03/09/18 1049    Case Management/Social Work Plan    [facility]    Additional Comments SW called [facility] and left a message for Tori in admissions re status of referral. Will follow for bed offer.               Discharge Codes     None            TJ WallisW

## 2018-03-09 NOTE — THERAPY TREATMENT NOTE
Acute Care - Physical Therapy Treatment Note  [facility]     Patient Name: Hanny Ivy  : 1945  MRN: [id number]  Today's Date: 3/9/2018  Onset of Illness/Injury or Date of Surgery Date: 18  Date of Referral to PT: 18  Referring Physician: Dr. Lobato    Admit Date: 3/6/2018    Visit Dx:    ICD-10-CM ICD-9-CM   1. Impaired mobility and ADLs Z74.09 799.89   2. Impaired functional mobility, balance, gait, and endurance Z74.09 V49.89     Patient Active Problem List   Diagnosis   • Dermatochalasis of both upper eyelids   • Visual field defect, unspecified   • Rotator cuff arthropathy, right   • Pure hypercholesterolemia   • Essential hypertension   • Mild intermittent asthma without complication   • Generalized anxiety disorder   • Gastroesophageal reflux disease without esophagitis   • Acquired hypothyroidism               Adult Rehabilitation Note       18 1116 18 1009 18 1503    Rehab Assessment/Intervention    Discipline physical therapy assistant  -AE occupational therapy assistant  -TS physical therapy assistant  -AE    Document Type therapy note (daily note)  -AE therapy note (daily note)  -TS therapy note (daily note)  -AE    Subjective Information agree to therapy;complains of;pain  -AE  agree to therapy;complains of;pain  -AE    Symptoms Noted During/After Treatment   fatigue;shortness of breath  -AE    Precautions/Limitations shoulder precautions;non-weight bearing status;brace on when up  -AE  shoulder precautions;non-weight bearing status;brace on when up   exit alarm  -AE    Specific Treatment Considerations   impulsive, confused  -AE    Recorded by [AE] Radha Puga PTA [TS] NEGRITA Gutierrez [AE] Radha Puga PTA    Pain Assessment    Pain Assessment 0-10  -AE  0-10  -AE    Pain Score 4  -AE  5  -AE    Post Pain Score 4  -AE  5  -AE    Pain Type Acute pain  -AE  Acute pain  -AE    Pain Location Shoulder  -AE  Shoulder  -AE    Pain Orientation Right   -AE  Right  -AE    Pain Descriptors Aching  -AE  Aching  -AE    Pain Frequency Constant/continuous  -AE  Constant/continuous  -AE    Pain Intervention(s) Medication (See MAR)  -AE  Medication (See MAR)  -AE    Response to Interventions tolerated  -AE  tolerated  -AE    Recorded by [AE] Radha Puga PTA  [AE] Radha Puga PTA    Mobility Assessment/Training    Right Upper Extremity Weight-Bearing non weight-bearing  -AE      Recorded by [AE] Radha Puga PTA      Bed Mobility, Assessment/Treatment    Bed Mobility, Assistive Device   head of bed elevated  -AE    Bed Mobility, Scoot/Bridge, [place]   minimum assist (75% patient effort);verbal cues required  -AE    Bed Mob, Sit to Supine, [place]   moderate assist (50% patient effort)  -AE    Bed Mobility, Safety Issues   decreased use of arms for pushing/pulling  -AE    Bed Mobility, Impairments   strength decreased  -AE    Bed Mobility, Comment up in chair  -AE      Recorded by [AE] Radha Puga PTA  [AE] Radha Puga PTA    Transfer Assessment/Treatment    Transfers, Sit-Stand [place] minimum assist (75% patient effort);contact guard assist  -AE  contact guard assist;2 person assist required  -AE    Transfer, Safety Issues balance decreased during turns  -AE  balance decreased during turns  -AE    Transfer, Impairments --   vc's to push up from chair and reach back  -AE  strength decreased  -AE    Transfer, Comment Practiced sit to stand x 5 reps  -AE      Recorded by [AE] Radha Puga PTA  [AE] Radha Puga PTA    Gait Assessment/Treatment    Gait, [place] Level minimum assist (75% patient effort);moderate assist (50% patient effort)  -AE  minimum assist (75% patient effort);2 person assist required  -AE    Gait, Assistive Device   --   HHA  -AE    Gait, Distance (Feet) 15  -AE  20   plus 20ft x 1 sitting rest  -AE    Gait, Gait Deviations forward flexed posture  -AE  forward flexed posture;step length decreased;marine  decreased  -AE    Gait, Safety Issues balance decreased during turns  -AE  balance decreased during turns;weight-shifting ability decreased  -AE    Gait, Comment   --   RN followed with chair  -AE    Recorded by [AE] Radha Puga PTA  [AE] aRdha Puga PTA    Balance Skills Training    Standing-Level of Assistance Minimum assistance  -AE      Recorded by [AE] Radha Puga PTA      Therapy Exercises    Bilateral Lower Extremities AROM:;20 reps   plus 10 reps hip flexion and mini squats  -AE  AROM:;20 reps;sitting  -AE    Recorded by [AE] Radha Puga PTA  [AE] Radha Puga PTA    Positioning and Restraints    Pre-Treatment Position sitting in chair/recliner  -AE  in bed  -AE    Post Treatment Position chair  -AE  bed  -AE    In Bed   fowlers;call light within reach;exit alarm on  -AE    In Chair sitting;call light within reach  -AE      Recorded by [AE] Radha Puga PTA  [AE] Radha Puga PTA      03/08/18 1034 03/08/18 0932 03/07/18 1451    Rehab Assessment/Intervention    Discipline occupational therapy assistant  -TS physical therapy assistant  -AE physical therapy assistant  -AE    Document Type therapy note (daily note)  -TS therapy note (daily note)  -AE therapy note (daily note)  -AE    Subjective Information agree to therapy;complains of;pain  -TS agree to therapy;complains of;pain  -AE agree to therapy;complains of;pain  -AE    Patient Effort, Rehab Treatment adequate  -TS      Precautions/Limitations non-weight bearing status;shoulder precautions;brace on when up;fall precautions  -TS shoulder precautions;brace on when up   reverse total shoulder  -AE shoulder precautions;brace on when up   reverse total shlder NWB  -AE    Recorded by [TS] RIN Gutierrez/HERNAN [AE] Radha Puga PTA [AE] Radha Puga PTA    Pain Assessment    Pain Assessment 0-10  -TS 0-10  -AE 0-10  -AE    Pain Score 5  -TS 6  -AE 6  -AE    Post Pain Score  6  -AE 6  -AE    Pain Type Acute pain;Surgical  pain  -TS  Surgical pain;Acute pain  -AE    Pain Location   Shoulder  -AE    Pain Orientation   Right  -AE    Pain Descriptors Aching  -TS Aching  -AE Aching  -AE    Pain Frequency   Constant/continuous  -AE    Pain Intervention(s) Medication (See MAR)  -TS Medication (See MAR)  -AE Medication (See MAR)  -AE    Response to Interventions   tolerated  -AE    Recorded by [TS] MANUEL GutierrezA/L [AE] Radha Puga PTA [AE] Radha Puga PTA    Cognitive Assessment/Intervention    Personal Safety Interventions fall prevention program maintained;gait belt;nonskid shoes/slippers when out of bed  -TS      Recorded by [TS] MANUEL GutierrezA/L      Mobility Assessment/Training    Right Upper Extremity Weight-Bearing   non weight-bearing  -AE    Recorded by   [AE] Radha Puga PTA    Bed Mobility, Assessment/Treatment    Bed Mobility, Assistive Device head of bed elevated  -TS  bed rails;head of bed elevated  -AE    Bed Mobility, Scoot/Bridge, [place]   minimum assist (75% patient effort)  -AE    Bed Mob, Supine to Sit, [place] minimum assist (75% patient effort);moderate assist (50% patient effort)  -TS      Bed Mob, Sit to Supine, [place]  minimum assist (75% patient effort);2 person assist required  -AE     Recorded by [TS] RIN Gutierrez/HERNAN [AE] Radha Puga PTA [AE] Radha Puga PTA    Transfer Assessment/Treatment    Transfers, Bed-Chair [place] contact guard assist  -TS      Transfers, Sit-Stand [place] contact guard assist;stand by assist  -TS maximum assist (25% patient effort)   Pt was assisted to standing position RN assist  -AE minimum assist (75% patient effort);verbal cues required  -AE    Transfers, Stand-Sit [place] contact guard assist;stand by assist  -TS  minimum assist (75% patient effort)  -AE    Transfer, Comment  --   sitting in floor with nursing  -AE     Recorded by [TS] RIN Gutierrez/HERNAN [AE] Radha Puga PTA [AE]  Radha Puga PTA    Gait Assessment/Treatment    Gait, [place] Level  minimum assist (75% patient effort);2 person assist required  -AE     Gait, Assistive Device  --   HHA  -AE --   HHA  -AE    Gait, Distance (Feet)  3   steps back to bed  -AE     Gait, Safety Issues   weight-shifting ability decreased  -AE    Gait, Comment   --   3 side steps  -AE    Recorded by  [AE] Radha Puga PTA [AE] Radha Puga PTA    Upper Body Dressing Assessment/Training    UB Dressing Assess/Train, Position standing  -TS      UB Dressing Assess/Train, [place] maximum assist (25% patient effort)  -TS      UB Dressing Assess/Train, Comment max A for adjustment of immobilizer   -TS      Recorded by [TS] NEGRITA Gutierrez      Balance Skills Training    Sitting-Level of Assistance   Contact guard  -AE    Recorded by   [AE] Radha Puga PTA    Therapy Exercises    Bilateral Lower Extremities  AROM:;hip abduction/adduction;ankle pumps/circles;SLR;20 reps   RROM 40 reps heel slides  -AE AROM:;20 reps;sitting;LAQ;hip flexion;ankle pumps/circles  -AE    Recorded by  [AE] Radha Puga PTA [AE] Radha Puga PTA    Orthosis Management/Training    Orthosis Skills Training doffing orthosis;donning orthosis;purpose/goals of orthosis  -TS      Recorded by [TS] NEGRITA Gutierrez      Positioning and Restraints    Pre-Treatment Position in bed  -TS --   sitting in floor with nursing  -AE in bed  -AE    Post Treatment Position chair  -TS bed  -AE bed  -AE    In Bed  fowlers;call light within reach  -AE fowlers;call light within reach  -AE    In Chair call light within reach;encouraged to call for assist;with family/caregiver;sitting  -TS      Recorded by [TS] NEGRITA Gutierrez [AE] Radha Puga PTA [AE] Radha Puga PTA      User Key  (r) = Recorded By, (t) = Taken By, (c) = Cosigned By    Initials Name Effective Dates    AE Radha Puga PTA 06/22/15 -     TS NEGRITA Gutierrez  08/02/16 -                 IP PT Goals       03/07/18 1017          Bed Mobility PT LTG    Bed Mobility PT LTG, Date Established 03/07/18  -PB (r) LN (t) PB (c)      Bed Mobility PT LTG, Time to Achieve by discharge  -PB (r) LN (t) PB (c)      Bed Mobility PT LTG, Activity Type all bed mobility  -PB (r) LN (t) PB (c)      Bed Mobility PT LTG, [place] Level supervision required  -PB (r) LN (t) PB (c)      Bed Mobility PT Goal  LTG, Assist Device bed rails  -PB (r) LN (t) PB (c)      Transfer Training PT LTG    Transfer Training PT LTG, Date Established 03/07/18  -PB (r) LN (t) PB (c)      Transfer Training PT LTG, Time to Achieve by discharge  -PB (r) LN (t) PB (c)      Transfer Training PT LTG, Activity Type bed to chair /chair to bed;sit to stand/stand to sit  -PB (r) LN (t) PB (c)      Transfer Training PT LTG, [place] Level contact guard assist  -PB (r) LN (t) PB (c)      Transfer Training PT LTG, Assist Device walker, aleksandar  -PB (r) LN (t) PB (c)      Gait Training PT LTG    Gait Training Goal PT LTG, Date Established 03/07/18  -PB (r) LN (t) PB (c)      Gait Training Goal PT LTG, Time to Achieve by discharge  -PB (r) LN (t) PB (c)      Gait Training Goal PT LTG, [place] Level contact guard assist  -PB (r) LN (t) PB (c)      Gait Training Goal PT LTG, Assist Device walker, aleksandar  -PB (r) LN (t) PB (c)      Gait Training Goal PT LTG, Distance to Achieve 30 ft x2 with seated rest break  -PB (r) LN (t) PB (c)      Patient Education PT LTG    Patient Education PT LTG, Date Established 03/07/18  -PB (r) LN (t) PB (c)      Patient Education PT LTG, Time to Achieve by discharge  -PB (r) LN (t) PB (c)      Patient Education PT LTG, Education Type precaution per surgeon;[place]/doff brace;positioning;gait;transfers;bed mobility  -PB (r) LN (t) PB (c)      Patient Education PT LTG, Education Understanding demonstrate adequately;verbalize understanding  -PB (r) LN (t) PB (c)        User Key  (r) = Recorded By,  (t) = Taken By, (c) = Cosigned By    Initials Name Provider Type    PB Tk Nice, PT DPT Physical Therapist    LN Toribio Hancock, PT Student PT Student          Physical Therapy Education     Title: PT OT SLP Therapies (Active)     Topic: Physical Therapy (Active)     Point: Mobility training (Done)    Learning Progress Summary    Learner Readiness Method Response Comment Documented by Status   Patient Acceptance E VU ex's AE 03/09/18 1137 Done    Eager E VU ex's AE 03/08/18 1046 Done    Acceptance E NR Educated on orthosis purpose and readjustment for proper fit and comfort; reverse TSA precautions and activity limitations; hand placement and safety with sit <-> stand and chair <-> bed; technique with bed mobility using bed rails LN 03/07/18 1015 Active               Point: Precautions (Active)    Learning Progress Summary    Learner Readiness Method Response Comment Documented by Status   Patient Acceptance E NR Educated on orthosis purpose and readjustment for proper fit and comfort; reverse TSA precautions and activity limitations; hand placement and safety with sit <-> stand and chair <-> bed; technique with bed mobility using bed rails LN 03/07/18 1015 Active                      User Key     Initials Effective Dates Name Provider Type Discipline    AE 06/22/15 -  Radha Puga, PTA Physical Therapy Assistant PT    LN 01/08/18 -  Toribio Hancock, PT Student PT Student PT                    PT Recommendation and Plan  Anticipated Discharge Disposition: skilled nursing facility  Planned Therapy Interventions: strengthening, bed mobility training, balance training, transfer training, gait training, home exercise program, patient/family education, orthotic fitting/training  PT Frequency: per priority policy, 2 times/day  Plan of Care Review  Plan Of Care Reviewed With: patient  Progress: improving  Outcome Summary/Follow up Plan: Pt was up in chair and min x 1 to stand. Pt ambulated in room 15ft with aleksandar walker  min- mod x 1 assist          Outcome Measures       03/08/18 1400 03/08/18 0932 03/07/18 1018    How much help from another person do you currently need...    Turning from your back to your side while in flat bed without using bedrails?  3  -AE     Moving from lying on back to sitting on the side of a flat bed without bedrails?  2  -AE     Moving to and from a bed to a chair (including a wheelchair)?  3  -AE     Standing up from a chair using your arms (e.g., wheelchair, bedside chair)?  3  -AE     Climbing 3-5 steps with a railing?  2  -AE     To walk in hospital room?  2  -AE     AM-PAC 6 Clicks Score  15  -AE     How much help from another is currently needed...    Putting on and taking off regular lower body clothing? 2  -TS  2  -MM (r) MK (t) MM (c)    Bathing (including washing, rinsing, and drying) 2  -TS  2  -MM (r) MK (t) MM (c)    Toileting (which includes using toilet bed pan or urinal) 3  -TS  2  -MM (r) MK (t) MM (c)    Putting on and taking off regular upper body clothing 3  -TS  2  -MM (r) MK (t) MM (c)    Taking care of personal grooming (such as brushing teeth) 3  -TS  2  -MM (r) MK (t) MM (c)    Eating meals 4  -TS  3  -MM (r) MK (t) MM (c)    Score 17  -TS  13  -MM (r) MK (t)    Functional Assessment    Outcome Measure Options AM-PAC 6 Clicks Daily Activity (OT)  -TS AM-PAC 6 Clicks Basic Mobility (PT)  -AE AM-PAC 6 Clicks Daily Activity (OT)  -MM (r) MK (t) MM (c)      03/07/18 1012          How much help from another person do you currently need...    Turning from your back to your side while in flat bed without using bedrails? 3  -PB (r) LN (t) PB (c)      Moving from lying on back to sitting on the side of a flat bed without bedrails? 2  -PB (r) LN (t) PB (c)      Moving to and from a bed to a chair (including a wheelchair)? 3  -PB (r) LN (t) PB (c)      Standing up from a chair using your arms (e.g., wheelchair, bedside chair)? 3  -PB (r) LN (t) PB (c)      Climbing 3-5 steps with a railing?  2  -PB (r) LN (t) PB (c)      To walk in hospital room? 2  -PB (r) LN (t) PB (c)      AM-PAC 6 Clicks Score 15  -PB (r) LN (t)      Functional Assessment    Outcome Measure Options AM-PAC 6 Clicks Basic Mobility (PT)  -PB (r) LN (t) PB (c)        User Key  (r) = Recorded By, (t) = Taken By, (c) = Cosigned By    Initials Name Provider Type    AE Radha Puga PTA Physical Therapy Assistant    TS Talya Biggs, GAR/L Occupational Therapy Assistant    PB Tk Nice, PT DPT Physical Therapist    MM Duke Loco, OTR/L Occupational Therapist    MARIE Garza, OT Student OT Student    LN Toribio Hancock, PT Student PT Student           Time Calculation:         PT Charges       03/09/18 1140          Time Calculation    Start Time 1116  -AE      Stop Time 1140  -AE      Time Calculation (min) 24 min  -AE      PT Received On 03/09/18  -AE      PT Goal Re-Cert Due Date 03/17/18  -AE      Time Calculation- PT    Total Timed Code Minutes- PT 24 minute(s)  -AE        User Key  (r) = Recorded By, (t) = Taken By, (c) = Cosigned By    Initials Name Provider Type    AE Radha Puga PTA Physical Therapy Assistant          Therapy Charges for Today     Code Description Service Date Service Provider Modifiers Qty    82457069643 HC PT THERAPEUTIC ACT EA 15 MIN 3/8/2018 Radha Puga PTA GP, KX 1    09141016455 HC PT THER PROC EA 15 MIN 3/8/2018 Radha Puga PTA GP, KX 1    99244314528 HC GAIT TRAINING EA 15 MIN 3/8/2018 Radha Puga PTA GP, KX 1    87332946396 HC PT THER PROC EA 15 MIN 3/8/2018 Radha Puga PTA GP, KX 2    49521254912 HC PT THERAPEUTIC ACT EA 15 MIN 3/9/2018 Radha Puga PTA GP, KX 1    44613943646 HC PT THER PROC EA 15 MIN 3/9/2018 Radha Puga PTA GP, KX 1          PT G-Codes  Outcome Measure Options: AM-PAC 6 Clicks Daily Activity (OT)  Score: 15  Functional Limitation: Changing and maintaining body position  Changing and Maintaining Body Position Current Status (): At least  40 percent but less than 60 percent impaired, limited or restricted  Changing and Maintaining Body Position Goal Status (): At least 20 percent but less than 40 percent impaired, limited or restricted    Radha Puga, PTA  3/9/2018

## 2018-03-10 PROCEDURE — 94799 UNLISTED PULMONARY SVC/PX: CPT

## 2018-03-10 PROCEDURE — 94760 N-INVAS EAR/PLS OXIMETRY 1: CPT

## 2018-03-10 PROCEDURE — 25010000002 ENOXAPARIN PER 10 MG: Performed by: ORTHOPAEDIC SURGERY

## 2018-03-10 PROCEDURE — 97110 THERAPEUTIC EXERCISES: CPT

## 2018-03-10 PROCEDURE — 97116 GAIT TRAINING THERAPY: CPT

## 2018-03-10 RX ADMIN — ACETAMINOPHEN 650 MG: 325 TABLET, FILM COATED ORAL at 20:06

## 2018-03-10 RX ADMIN — ACETAMINOPHEN 650 MG: 325 TABLET, FILM COATED ORAL at 14:27

## 2018-03-10 RX ADMIN — ATORVASTATIN CALCIUM 10 MG: 10 TABLET, FILM COATED ORAL at 20:05

## 2018-03-10 RX ADMIN — ENOXAPARIN SODIUM 40 MG: 40 INJECTION SUBCUTANEOUS at 08:33

## 2018-03-10 RX ADMIN — LEVOTHYROXINE SODIUM 100 MCG: 100 TABLET ORAL at 05:57

## 2018-03-10 RX ADMIN — LORAZEPAM 1 MG: 1 TABLET ORAL at 20:05

## 2018-03-10 RX ADMIN — CLONIDINE HYDROCHLORIDE 0.1 MG: 0.1 TABLET ORAL at 22:04

## 2018-03-10 RX ADMIN — FAMOTIDINE 20 MG: 20 TABLET, FILM COATED ORAL at 08:34

## 2018-03-10 RX ADMIN — CLONIDINE HYDROCHLORIDE 0.1 MG: 0.1 TABLET ORAL at 08:37

## 2018-03-10 RX ADMIN — DOXEPIN HYDROCHLORIDE 100 MG: 100 CAPSULE ORAL at 20:05

## 2018-03-10 RX ADMIN — GABAPENTIN 600 MG: 300 CAPSULE ORAL at 05:57

## 2018-03-10 RX ADMIN — DOCUSATE SODIUM AND SENNOSIDES 2 TABLET: 8.6; 5 TABLET, FILM COATED ORAL at 20:05

## 2018-03-10 RX ADMIN — CARVEDILOL 12.5 MG: 6.25 TABLET, FILM COATED ORAL at 08:34

## 2018-03-10 RX ADMIN — BUDESONIDE AND FORMOTEROL FUMARATE DIHYDRATE 2 PUFF: 160; 4.5 AEROSOL RESPIRATORY (INHALATION) at 07:04

## 2018-03-10 RX ADMIN — GABAPENTIN 600 MG: 300 CAPSULE ORAL at 20:05

## 2018-03-10 RX ADMIN — CARVEDILOL 12.5 MG: 6.25 TABLET, FILM COATED ORAL at 22:04

## 2018-03-10 RX ADMIN — BUDESONIDE AND FORMOTEROL FUMARATE DIHYDRATE 2 PUFF: 160; 4.5 AEROSOL RESPIRATORY (INHALATION) at 21:23

## 2018-03-10 RX ADMIN — BUPROPION HYDROCHLORIDE 100 MG: 100 TABLET, FILM COATED, EXTENDED RELEASE ORAL at 08:34

## 2018-03-10 RX ADMIN — ALLOPURINOL 50 MG: 100 TABLET ORAL at 08:33

## 2018-03-10 RX ADMIN — FLUOXETINE HYDROCHLORIDE 40 MG: 20 CAPSULE ORAL at 08:33

## 2018-03-10 RX ADMIN — LORAZEPAM 1 MG: 1 TABLET ORAL at 08:34

## 2018-03-10 NOTE — PLAN OF CARE
Problem: Patient Care Overview  Goal: Plan of Care Review  Outcome: Ongoing (interventions implemented as appropriate)   03/10/18 1042   Coping/Psychosocial   Plan of Care Reviewed With patient   Plan of Care Review   Progress improving   OTHER   Outcome Summary PT tx completed. Pt supine in bed. Rates right shoulder ppn 5/10. Cg supine to sit, CG/Min to stand, amb 60' x 2 min assist. First 60' used aleksandar wx, but this seem to throw her off. Went to hand held assistance for safety. She tends to festinate forward, her head going faster than feet.

## 2018-03-10 NOTE — PLAN OF CARE
Problem: Patient Care Overview (Adult)  Goal: Plan of Care Review  Outcome: Ongoing (interventions implemented as appropriate)   03/10/18 1540   Patient Care Overview   Progress improving   Coping/Psychosocial Response Interventions   Plan Of Care Reviewed With patient   Outcome Evaluation   Outcome Summary/Follow up Plan C/o right shoulder incisional pain, medicated PRN with some relief. Right arm sling in place. Up with assist and voiding. Tolerating diet. [facility] daughter.      Goal: Adult Individualization and Mutuality  Outcome: Ongoing (interventions implemented as appropriate)    Goal: Discharge Needs Assessment  Outcome: Ongoing (interventions implemented as appropriate)      Problem: Fall Risk (Adult)  Goal: Identify Related Risk Factors and Signs and Symptoms  Outcome: Ongoing (interventions implemented as appropriate)   03/06/18 1637   Fall Risk   Retired CPM F14 ROW HRRF FALL RISK: RELATED RISK FACTORS.HRRF FALL INJURY RISK V1 age-related changes;culprit medication(s);gait/mobility problems;history of falls;environment unfamiliar   Retired CPM F14 ROW HRSS FALL RISK: SIGNS AND SYMPTOMS presence of risk factors     Goal: Absence of Falls  Outcome: Ongoing (interventions implemented as appropriate)   03/10/18 1540   Retired CPM F14 SGRP CPG HR FALL RISK (ADULT)   Retired CPM F14 ROW GHR ABSENCE OF FALLS.FALL RISK (ADULT) making progress toward outcome       Problem: Shoulder Arthroplasty (Adult)  Goal: Signs and Symptoms of Listed Potential Problems Will be Absent, Minimized or Managed (Shoulder Arthroplasty)  Outcome: Ongoing (interventions implemented as appropriate)   03/10/18 1540   Goal/Outcome Evaluation   Problems Assessed (Shoulder Arthro) all   Problems Present (Shoulder Arthro) pain     Goal: Anesthesia/Sedation Recovery  Outcome: Outcome(s) achieved Date Met: 03/10/18

## 2018-03-10 NOTE — THERAPY TREATMENT NOTE
Acute Care - Physical Therapy Treatment Note  [facility]     Patient Name: Hanny Ivy  : 1945  MRN: [id number]  Today's Date: 3/10/2018     Date of Referral to PT: 18       Admit Date: 3/6/2018    Visit Dx:    ICD-10-CM ICD-9-CM   1. Impaired mobility and ADLs Z74.09 799.89   2. Impaired functional mobility, balance, gait, and endurance Z74.09 V49.89     Patient Active Problem List   Diagnosis   • Dermatochalasis of both upper eyelids   • Visual field defect, unspecified   • Rotator cuff arthropathy, right   • Pure hypercholesterolemia   • Essential hypertension   • Mild intermittent asthma without complication   • Generalized anxiety disorder   • Gastroesophageal reflux disease without esophagitis   • Acquired hypothyroidism       Therapy Treatment    Therapy Treatment / Health Promotion    Treatment Time/Intention  Discipline: physical therapy assistant  Document Type: therapy note (daily note)  Subjective Information: no complaints  Patient Effort: good  Plan of Care Review  Plan of Care Reviewed With: patient    Vitals/Pain/Safety  Pain Assessment  Additional Documentation: Pain Scale: Numbers Pre/Post-Treatment (Group)  Pain Scale: Numbers Pre/Post-Treatment  Pain Scale: Numbers, Pretreatment: 5/10  Pain Scale: Numbers, Post-Treatment: 5/10  Pain Location - Side: Right  Pain Location: shoulder  Pain Intervention(s): Ambulation/increased activity  Pain Scale: Word Pre/Post-Treatment  Pain Location - Side: Right  Pain Location: shoulder  Pain Intervention(s): Ambulation/increased activity  Pain Scale: FACES Pre/Post-Treatment  Pain Location - Side: Right  Pain Location: shoulder  Pain Intervention(s): Ambulation/increased activity  Positioning and Restraints  Pre-Treatment Position: in bed  Post Treatment Position: chair    Mobility,ADL,Motor, Modality  Mobility Assessment/Intervention  Extremity Weight-bearing Status: right upper extremity  Right Upper Extremity (Weight-bearing Status): non  weight-bearing (NWB)  Bed Mobility Assessment/Treatment  Supine-Sit [place] (Bed Mobility): verbal cues, contact guard  Bed Mobility, Safety Issues: decreased use of arms for pushing/pulling  Assistive Device (Bed Mobility): bed rails  Sit-Stand Transfer  Sit-Stand [place] (Transfers): verbal cues, contact guard, minimum assist (75% patient effort)  Stand-Sit Transfer  Stand-Sit [place] (Transfers): verbal cues, contact guard  Gait/Stairs Assessment/Training  [place] Level (Gait): verbal cues, minimum assist (75% patient effort)  Assistive Device (Gait): walker, aleksandar (then went to hand held assistance)  Distance in Feet (Gait): 60 (x 2)  Deviations/Abnormal Patterns (Gait): base of support, narrow (gets going to fast and legs cant keep up )     Motor Skills Assessment/Interventions  Additional Documentation: Therapeutic Exercise (Group)  Therapeutic Exercise  Exercise Type (Therapeutic Exercise): AROM (active range of motion)  Position (Therapeutic Exercise): seated  Sets/Reps (Therapeutic Exercise): 20           ROM/MMT             Sensory, Edema, Orthotics          Cognition, Communication, Swallow       Outcome Summary             Physical Therapy Education     Title: PT OT SLP Therapies (Active)     Topic: Physical Therapy (Active)     Point: Mobility training (Done)    Learning Progress Summary     Learner Status Readiness Method Response Comment Documented by    Patient Done Acceptance E MAGALYS ex's AE 03/09/18 1137     Done Eager E MAGALYS ex's AE 03/08/18 1046     Active Acceptance E NR Educated on orthosis purpose and readjustment for proper fit and comfort; reverse TSA precautions and activity limitations; hand placement and safety with sit <-> stand and chair <-> bed; technique with bed mobility using bed rails LN 03/07/18 1015          Point: Precautions (Active)    Learning Progress Summary     Learner Status Readiness Method Response Comment Documented by    Patient Active Acceptance E NR  Educated on orthosis purpose and readjustment for proper fit and comfort; reverse TSA precautions and activity limitations; hand placement and safety with sit <-> stand and chair <-> bed; technique with bed mobility using bed rails LN 03/07/18 1015                      User Key     Initials Effective Dates Name Provider Type Discipline    AE 06/22/15 -  Radha Puga, PTA Physical Therapy Assistant PT    LN 01/08/18 -  Toribio Hancock, PT Student PT Student PT                    PT Recommendation and Plan     Plan of Care Reviewed With: patient  Progress: improving  Outcome Summary: PT tx completed. Pt supine in bed. Rates right shoulder ppn 5/10. Cg supine to sit, CG/Min to stand, amb 60' x 2 min assist. First 60' used aleksandar wx, but this seem to throw her off. Went to hand held assistance for safety. She tends to festinate forward, her head going faster than feet.           Outcome Measures     Row Name 03/09/18 1200 03/08/18 1400 03/08/18 0932       How much help from another person do you currently need...    Turning from your back to your side while in flat bed without using bedrails?  --  -- 3  -AE    Moving from lying on back to sitting on the side of a flat bed without bedrails?  --  -- 2  -AE    Moving to and from a bed to a chair (including a wheelchair)?  --  -- 3  -AE    Standing up from a chair using your arms (e.g., wheelchair, bedside chair)?  --  -- 3  -AE    Climbing 3-5 steps with a railing?  --  -- 2  -AE    To walk in hospital room?  --  -- 2  -AE    AM-PAC 6 Clicks Score  --  -- 15  -AE       How much help from another is currently needed...    Putting on and taking off regular lower body clothing? 2  -TS 2  -TS  --    Bathing (including washing, rinsing, and drying) 2  -TS 2  -TS  --    Toileting (which includes using toilet bed pan or urinal) 3  -TS 3  -TS  --    Putting on and taking off regular upper body clothing 3  -TS 3  -TS  --    Taking care of personal grooming (such as brushing teeth) 3   -TS 3  -TS  --    Eating meals 4  -TS 4  -TS  --    Score 17  -TS 17  -TS  --       Functional Assessment    Outcome Measure Options AM-PAC 6 Clicks Daily Activity (OT)  -TS AM-PAC 6 Clicks Daily Activity (OT)  -TS AM-PAC 6 Clicks Basic Mobility (PT)  -AE      User Key  (r) = Recorded By, (t) = Taken By, (c) = Cosigned By    Initials Name Provider Type    AE Radha Puga PTA Physical Therapy Assistant     RIN Gutierrez/L Occupational Therapy Assistant           Time Calculation:           PT G-Codes  Outcome Measure Options: AM-PAC 6 Clicks Daily Activity (OT)  Score: 15  Functional Limitation: Changing and maintaining body position  Changing and Maintaining Body Position Current Status (): At least 40 percent but less than 60 percent impaired, limited or restricted  Changing and Maintaining Body Position Goal Status (): At least 20 percent but less than 40 percent impaired, limited or restricted    May Loera, PTA  3/10/2018

## 2018-03-10 NOTE — PLAN OF CARE
Problem: Patient Care Overview (Adult)  Goal: Plan of Care Review  Outcome: Ongoing (interventions implemented as appropriate)   03/09/18 3848   Patient Care Overview   Progress improving   Coping/Psychosocial Response Interventions   Plan Of Care Reviewed With patient   Outcome Evaluation   Outcome Summary/Follow up Plan Patient c/o mild pain tylenol given as requested. Dressing to right shoulder CDI, Sling in place, pulses palpable, VSS, Safety maintained.       Problem: Perioperative Period (Adult)  Goal: Signs and Symptoms of Listed Potential Problems Will be Absent or Manageable (Perioperative Period)  Outcome: Ongoing (interventions implemented as appropriate)      Problem: Fall Risk (Adult)  Goal: Absence of Falls  Outcome: Ongoing (interventions implemented as appropriate)

## 2018-03-11 PROBLEM — D64.9 ANEMIA: Status: ACTIVE | Noted: 2018-03-11

## 2018-03-11 PROCEDURE — 97116 GAIT TRAINING THERAPY: CPT

## 2018-03-11 PROCEDURE — 25010000002 ENOXAPARIN PER 10 MG: Performed by: ORTHOPAEDIC SURGERY

## 2018-03-11 PROCEDURE — 97110 THERAPEUTIC EXERCISES: CPT

## 2018-03-11 PROCEDURE — 94799 UNLISTED PULMONARY SVC/PX: CPT

## 2018-03-11 RX ADMIN — GABAPENTIN 600 MG: 300 CAPSULE ORAL at 14:18

## 2018-03-11 RX ADMIN — CLONIDINE HYDROCHLORIDE 0.1 MG: 0.1 TABLET ORAL at 20:46

## 2018-03-11 RX ADMIN — DOXEPIN HYDROCHLORIDE 100 MG: 100 CAPSULE ORAL at 20:47

## 2018-03-11 RX ADMIN — CARVEDILOL 12.5 MG: 6.25 TABLET, FILM COATED ORAL at 09:44

## 2018-03-11 RX ADMIN — FAMOTIDINE 20 MG: 20 TABLET, FILM COATED ORAL at 09:44

## 2018-03-11 RX ADMIN — BUDESONIDE AND FORMOTEROL FUMARATE DIHYDRATE 2 PUFF: 160; 4.5 AEROSOL RESPIRATORY (INHALATION) at 07:37

## 2018-03-11 RX ADMIN — ACETAMINOPHEN 650 MG: 325 TABLET, FILM COATED ORAL at 06:00

## 2018-03-11 RX ADMIN — ACETAMINOPHEN 650 MG: 325 TABLET, FILM COATED ORAL at 20:47

## 2018-03-11 RX ADMIN — CLONIDINE HYDROCHLORIDE 0.1 MG: 0.1 TABLET ORAL at 09:50

## 2018-03-11 RX ADMIN — BUDESONIDE AND FORMOTEROL FUMARATE DIHYDRATE 2 PUFF: 160; 4.5 AEROSOL RESPIRATORY (INHALATION) at 20:34

## 2018-03-11 RX ADMIN — CARVEDILOL 12.5 MG: 6.25 TABLET, FILM COATED ORAL at 20:46

## 2018-03-11 RX ADMIN — LORAZEPAM 1 MG: 1 TABLET ORAL at 09:50

## 2018-03-11 RX ADMIN — LEVOTHYROXINE SODIUM 100 MCG: 100 TABLET ORAL at 06:00

## 2018-03-11 RX ADMIN — FLUOXETINE HYDROCHLORIDE 40 MG: 20 CAPSULE ORAL at 09:44

## 2018-03-11 RX ADMIN — LORAZEPAM 1 MG: 1 TABLET ORAL at 17:07

## 2018-03-11 RX ADMIN — GABAPENTIN 600 MG: 300 CAPSULE ORAL at 20:47

## 2018-03-11 RX ADMIN — LORAZEPAM 1 MG: 1 TABLET ORAL at 20:47

## 2018-03-11 RX ADMIN — ACETAMINOPHEN 650 MG: 325 TABLET, FILM COATED ORAL at 14:18

## 2018-03-11 RX ADMIN — ENOXAPARIN SODIUM 40 MG: 40 INJECTION SUBCUTANEOUS at 09:50

## 2018-03-11 RX ADMIN — DOCUSATE SODIUM AND SENNOSIDES 2 TABLET: 8.6; 5 TABLET, FILM COATED ORAL at 20:46

## 2018-03-11 RX ADMIN — GABAPENTIN 600 MG: 300 CAPSULE ORAL at 06:00

## 2018-03-11 RX ADMIN — ALLOPURINOL 50 MG: 100 TABLET ORAL at 09:44

## 2018-03-11 RX ADMIN — BUPROPION HYDROCHLORIDE 100 MG: 100 TABLET, FILM COATED, EXTENDED RELEASE ORAL at 09:44

## 2018-03-11 RX ADMIN — ATORVASTATIN CALCIUM 10 MG: 10 TABLET, FILM COATED ORAL at 20:46

## 2018-03-11 NOTE — PLAN OF CARE
Problem: Patient Care Overview (Adult)  Goal: Plan of Care Review  Outcome: Ongoing (interventions implemented as appropriate)   03/11/18 0310   Patient Care Overview   Progress no change   Coping/Psychosocial Response Interventions   Plan Of Care Reviewed With patient   Outcome Evaluation   Outcome Summary/Follow up Plan Patient c/o of pain, prn meds given as ordered. Sling to right ar, in place. Patient more oriented tonight. VSS, Safety maintained.       Problem: Fall Risk (Adult)  Goal: Identify Related Risk Factors and Signs and Symptoms  Outcome: Outcome(s) achieved Date Met: 03/11/18    Goal: Absence of Falls  Outcome: Ongoing (interventions implemented as appropriate)      Problem: Shoulder Arthroplasty (Adult)  Goal: Signs and Symptoms of Listed Potential Problems Will be Absent, Minimized or Managed (Shoulder Arthroplasty)  Outcome: Ongoing (interventions implemented as appropriate)

## 2018-03-11 NOTE — PLAN OF CARE
Problem: Patient Care Overview (Adult)  Goal: Plan of Care Review  Outcome: Ongoing (interventions implemented as appropriate)   03/11/18 4144   Patient Care Overview   Progress improving   Coping/Psychosocial Response Interventions   Plan Of Care Reviewed With patient;family   Outcome Evaluation   Outcome Summary/Follow up Plan Up medicated x1 for c/o R shoulder pain. Scheduled ativan given. Family at bedside. R shoulder d/i with mepliex. R sling in place. Family at bedside. Family wants to send patient home with home health and they have set up several sitters to stay with patient. Continue to monitor.      Goal: Adult Individualization and Mutuality  Outcome: Ongoing (interventions implemented as appropriate)    Goal: Discharge Needs Assessment  Outcome: Ongoing (interventions implemented as appropriate)      Problem: Fall Risk (Adult)  Goal: Absence of Falls  Outcome: Ongoing (interventions implemented as appropriate)      Problem: Shoulder Arthroplasty (Adult)  Goal: Signs and Symptoms of Listed Potential Problems Will be Absent, Minimized or Managed (Shoulder Arthroplasty)  Outcome: Outcome(s) achieved Date Met: 03/11/18

## 2018-03-11 NOTE — THERAPY TREATMENT NOTE
Acute Care - Physical Therapy Treatment Note  [facility]     Patient Name: Hanny Ivy  : 1945  MRN: [id number]  Today's Date: 3/11/2018     Date of Referral to PT: 18       Admit Date: 3/6/2018    Visit Dx:    ICD-10-CM ICD-9-CM   1. Impaired mobility and ADLs Z74.09 799.89   2. Impaired functional mobility, balance, gait, and endurance Z74.09 V49.89     Patient Active Problem List   Diagnosis   • Dermatochalasis of both upper eyelids   • Visual field defect, unspecified   • Rotator cuff arthropathy, right   • Pure hypercholesterolemia   • Essential hypertension   • Mild intermittent asthma without complication   • Generalized anxiety disorder   • Gastroesophageal reflux disease without esophagitis   • Acquired hypothyroidism   • Anemia       Therapy Treatment    Therapy Treatment / Health Promotion    Treatment Time/Intention  Discipline: physical therapy assistant  Document Type: therapy note (daily note)  Subjective Information: no complaints  Patient Effort: adequate    Vitals/Pain/Safety  Pain Scale: Numbers Pre/Post-Treatment  Pain Scale: Numbers, Pretreatment: 2/10  Pain Scale: Numbers, Post-Treatment: 2/10  Pain Location - Side: Right  Pain Location: shoulder  Pain Scale: Word Pre/Post-Treatment  Pain Location - Side: Right  Pain Location: shoulder  Pain Scale: FACES Pre/Post-Treatment  Pain Location - Side: Right  Pain Location: shoulder  Positioning and Restraints  Pre-Treatment Position: in bed  Post Treatment Position: chair    Mobility,ADL,Motor, Modality  Mobility Assessment/Intervention  Extremity Weight-bearing Status: right upper extremity  Right Upper Extremity (Weight-bearing Status): non weight-bearing (NWB)  Bed Mobility Assessment/Treatment  Supine-Sit [place] (Bed Mobility): verbal cues, contact guard  Bed Mobility, Safety Issues: decreased use of arms for pushing/pulling  Sit-Stand Transfer  Sit-Stand [place] (Transfers): verbal cues, contact guard, minimum assist  (75% patient effort)  Stand-Sit Transfer  Stand-Sit [place] (Transfers): verbal cues, contact guard  Gait/Stairs Assessment/Training  [place] Level (Gait): minimum assist (75% patient effort), verbal cues  Distance in Feet (Gait): 50 (x2)  Deviations/Abnormal Patterns (Gait):  (towards end of walking tends to go head first)     Therapeutic Exercise  Exercise Type (Therapeutic Exercise): AROM (active range of motion)  Position (Therapeutic Exercise): seated  Sets/Reps (Therapeutic Exercise): 200           ROM/MMT             Sensory, Edema, Orthotics          Cognition, Communication, Swallow       Outcome Summary             Physical Therapy Education     Title: PT OT SLP Therapies (Active)     Topic: Physical Therapy (Active)     Point: Mobility training (Done)    Learning Progress Summary     Learner Status Readiness Method Response Comment Documented by    Patient Done Acceptance E VU ex's AE 03/09/18 1137     Done Eager E VU ex's AE 03/08/18 1046     Active Acceptance E NR Educated on orthosis purpose and readjustment for proper fit and comfort; reverse TSA precautions and activity limitations; hand placement and safety with sit <-> stand and chair <-> bed; technique with bed mobility using bed rails LN 03/07/18 1015          Point: Precautions (Active)    Learning Progress Summary     Learner Status Readiness Method Response Comment Documented by    Patient Active Acceptance E NR Educated on orthosis purpose and readjustment for proper fit and comfort; reverse TSA precautions and activity limitations; hand placement and safety with sit <-> stand and chair <-> bed; technique with bed mobility using bed rails LN 03/07/18 1015                      User Key     Initials Effective Dates Name Provider Type Discipline    AE 06/22/15 -  Radha Puga, PTA Physical Therapy Assistant PT    LN 01/08/18 -  Toribio Hancock, PT Student PT Student PT                    PT Recommendation and Plan     Plan of Care  Reviewed With: patient  Progress: improving  Outcome Summary: PT tx completed. Pt supine in bed. Rates right shoulder ppn 5/10. Cg supine to sit, CG/Min to stand, amb 60' x 2 min assist. First 60' used aleksandar wx, but this seem to throw her off. Went to hand held assistance for safety. She tends to festinate forward, her head going faster than feet.           Outcome Measures     Row Name 03/09/18 1200 03/08/18 1400          How much help from another is currently needed...    Putting on and taking off regular lower body clothing? 2  -TS 2  -TS     Bathing (including washing, rinsing, and drying) 2  -TS 2  -TS     Toileting (which includes using toilet bed pan or urinal) 3  -TS 3  -TS     Putting on and taking off regular upper body clothing 3  -TS 3  -TS     Taking care of personal grooming (such as brushing teeth) 3  -TS 3  -TS     Eating meals 4  -TS 4  -TS     Score 17  -TS 17  -TS        Functional Assessment    Outcome Measure Options AM-PAC 6 Clicks Daily Activity (OT)  -TS AM-PAC 6 Clicks Daily Activity (OT)  -TS       User Key  (r) = Recorded By, (t) = Taken By, (c) = Cosigned By    Initials Name Provider Type    TS RIN Gutierrez/L Occupational Therapy Assistant           Time Calculation:       Therapy Charges for Today     Code Description Service Date Service Provider Modifiers Qty    18832012823 HC GAIT TRAINING EA 15 MIN 3/10/2018 May Loera, SHANTANU GP, KX 1    28669699419 HC PT THER PROC EA 15 MIN 3/10/2018 May Loera PTA GP, KX 1          PT G-Codes  Outcome Measure Options: AM-PAC 6 Clicks Daily Activity (OT)  Score: 15  Functional Limitation: Changing and maintaining body position  Changing and Maintaining Body Position Current Status (): At least 40 percent but less than 60 percent impaired, limited or restricted  Changing and Maintaining Body Position Goal Status (): At least 20 percent but less than 40 percent impaired, limited or restricted    May Loera,  PTA  3/11/2018

## 2018-03-11 NOTE — PROGRESS NOTES
Orthopedic Surgery Progress Note    Hanny Ivy  3/11/2018      Subjective:     Systemic or Specific Complaints: doing well, waiting on bed at [place]    Objective:     Patient Vitals for the past 24 hrs:   BP Temp Temp src Pulse Resp SpO2   03/11/18 0944 141/52 - - - - -   03/11/18 0848 174/75 98 °F (36.7 °C) Oral 72 18 91 %   03/10/18 2020 130/54 98.8 °F (37.1 °C) Oral 72 18 96 %   03/10/18 1137 (!) 102/39 98.1 °F (36.7 °C) Oral 81 16 91 %       right upper  General: alert, appears stated age and cooperative   Wound: clean, dry, intact             Dressing: clean, dry, intact   Extremity: Distal NVI           DVT Exam: No evidence of DVT seen on physical exam.                   Data Review:  Lab Results (last 24 hours)     ** No results found for the last 24 hours. **        Imaging Results (last 24 hours)     ** No results found for the last 24 hours. **          Assessment:     POD# 4 R Reverse TSA    Plan:      1:  DVT prophylaxis, ICE, elevate  2:  Pain control  3:  Physical therapy/Occupational therapy  4:  Anticipate discharge today or tomorrow when bed available  5: Reverse Protocol       Imtiaz Lobato MD

## 2018-03-11 NOTE — PROGRESS NOTES
Family Medicine Progress Note    Patient:  Hanny Ivy  YOB: 1945    MRN: [id number]     Acct: [id number]     Admit date: 3/6/2018    Patient Seen, Chart, Consults notes, Labs, Radiology studies reviewed.    Subjective: Day 5 of stay with chronic rotator cuff tear with arthropathy on the right requiring arthroplasty and most recent (in last 24 hours) has had no new problems or complaints.  Pain is controlled.  Initially yesterday was told to me that she would be discharged, however no bed was available and she is staying until there is 1.  Fortunately medically she was stable yesterday as she appears to be today.    Past, Family, Social History unchanged from admission.    Diet: Diet Regular    Medications:  Scheduled Meds:  allopurinol 50 mg Oral Daily   atorvastatin 10 mg Oral Nightly   budesonide-formoterol 2 puff Inhalation BID - RT   buPROPion  mg Oral Daily   carvedilol 12.5 mg Oral Q12H   CloNIDine 0.1 mg Oral Q12H   doxepin 100 mg Oral Nightly   enoxaparin 40 mg Subcutaneous Daily   famotidine 20 mg Oral Daily   FLUoxetine 40 mg Oral Daily   gabapentin 600 mg Oral Q8H   levothyroxine 100 mcg Oral Q AM   LORazepam 1 mg Oral TID   sennosides-docusate sodium 2 tablet Oral Nightly     Continuous Infusions:   PRN Meds:•  acetaminophen **OR** acetaminophen **OR** acetaminophen  •  diphenhydrAMINE **OR** diphenhydrAMINE  •  docusate sodium  •  HYDROmorphone **AND** naloxone  •  ipratropium-albuterol  •  magnesium hydroxide  •  Morphine **AND** naloxone  •  ondansetron **OR** ondansetron ODT **OR** ondansetron  •  oxyCODONE-acetaminophen  •  oxyCODONE-acetaminophen  •  phenol  •  promethazine **OR** promethazine  •  sodium chloride    Objective:    Lab Results (last 24 hours)     ** No results found for the last 24 hours. **           Imaging Results (last 72 hours)     ** No results found for the last 72 hours. **           Physical Exam:    Vitals: /52   Pulse 72   Temp 98 °F (36.7  "°C) (Oral)   Resp 18   Ht 162.6 cm (64\")   Wt 92.7 kg (204 lb 7 oz)   SpO2 91%   BMI 35.09 kg/m²   24 hour intake/output:  Intake/Output Summary (Last 24 hours) at 03/11/18 1119  Last data filed at 03/10/18 2030   Gross per 24 hour   Intake                0 ml   Output              250 ml   Net             -250 ml     Last 3 weights:  Wt Readings from Last 3 Encounters:   03/09/18 92.7 kg (204 lb 7 oz)   11/28/16 87.5 kg (193 lb)       General Appearance alert, appears stated age and cooperative  Head normocephalic, without obvious abnormality  Eyes conjunctivae and sclerae normal and no icterus  Neck trachea midline and no JVD  Lungs clear to auscultation, respirations regular, respirations even, respirations unlabored and mildly diminished throughout  Heart regular rhythm & normal rate and normal S1, S2  Abdomen normal bowel sounds  Extremities no cyanosis, no redness and Right arm in a sling  Skin turgor normal and color normal  Neurologic Mental Status orientated to person, place, time and situation        Assessment:    Principal Problem:    Rotator cuff arthropathy, right  Active Problems:    Pure hypercholesterolemia    Essential hypertension    Mild intermittent asthma without complication    Generalized anxiety disorder    Gastroesophageal reflux disease without esophagitis    Acquired hypothyroidism    Anemia          Plan:  Continue present medical care plans.  She has had up-and-down blood pressure but it seems to be overall better.  Certainly no hypotensive episodes since yesterday.  I anticipate that she will be ready for discharge to a skilled facility when a bed is available.      Electronically signed by Jose Canales MD on 3/11/2018 at 11:19 AM            "

## 2018-03-11 NOTE — PLAN OF CARE
Problem: Patient Care Overview (Adult)  Goal: Plan of Care Review  Outcome: Ongoing (interventions implemented as appropriate)   03/11/18 1110   Patient Care Overview   Progress progress toward functional goals as expected   Coping/Psychosocial Response Interventions   Plan Of Care Reviewed With patient   Outcome Evaluation   Outcome Summary/Follow up Plan PT tx completed. Pt supine in bed no c/o. SBA bed mobility, CG sit to stand, amb 50' x 2 hand held assistance minimal assistance due to decreased balance.

## 2018-03-11 NOTE — PROGRESS NOTES
Continued Stay Note   [facility]     Patient Name: Hanny Ivy  MRN: [id number]  Today's Date: 3/11/2018    Admit Date: 3/6/2018          Discharge Plan     Row Name 03/11/18 1607       Plan    Plan Pt and her daughter are no longer wanting NHP.  Daughter has arranged sitters 24/7 and is requesting [facility] HH.  SW will follow for MD orders to arrange hh services.  TRAV Mclaughlin.    Patient/Family in Agreement with Plan yes              Discharge Codes    No documentation.       Expected Discharge Date and Time     Expected Discharge Date Expected Discharge Time    Mar 10, 2018             TRAV Orellana

## 2018-03-12 VITALS
TEMPERATURE: 98 F | WEIGHT: 204.44 LBS | OXYGEN SATURATION: 95 % | SYSTOLIC BLOOD PRESSURE: 154 MMHG | HEIGHT: 64 IN | RESPIRATION RATE: 18 BRPM | DIASTOLIC BLOOD PRESSURE: 76 MMHG | BODY MASS INDEX: 34.9 KG/M2 | HEART RATE: 75 BPM

## 2018-03-12 PROCEDURE — 97110 THERAPEUTIC EXERCISES: CPT

## 2018-03-12 PROCEDURE — 94799 UNLISTED PULMONARY SVC/PX: CPT

## 2018-03-12 PROCEDURE — 97116 GAIT TRAINING THERAPY: CPT

## 2018-03-12 PROCEDURE — 25010000002 ENOXAPARIN PER 10 MG: Performed by: ORTHOPAEDIC SURGERY

## 2018-03-12 PROCEDURE — 94760 N-INVAS EAR/PLS OXIMETRY 1: CPT

## 2018-03-12 RX ADMIN — BUPROPION HYDROCHLORIDE 100 MG: 100 TABLET, FILM COATED, EXTENDED RELEASE ORAL at 08:20

## 2018-03-12 RX ADMIN — BUDESONIDE AND FORMOTEROL FUMARATE DIHYDRATE 2 PUFF: 160; 4.5 AEROSOL RESPIRATORY (INHALATION) at 08:15

## 2018-03-12 RX ADMIN — GABAPENTIN 600 MG: 300 CAPSULE ORAL at 05:43

## 2018-03-12 RX ADMIN — FLUOXETINE HYDROCHLORIDE 40 MG: 20 CAPSULE ORAL at 08:20

## 2018-03-12 RX ADMIN — LEVOTHYROXINE SODIUM 100 MCG: 100 TABLET ORAL at 05:43

## 2018-03-12 RX ADMIN — FAMOTIDINE 20 MG: 20 TABLET, FILM COATED ORAL at 08:20

## 2018-03-12 RX ADMIN — ENOXAPARIN SODIUM 40 MG: 40 INJECTION SUBCUTANEOUS at 08:20

## 2018-03-12 RX ADMIN — ALLOPURINOL 50 MG: 100 TABLET ORAL at 08:21

## 2018-03-12 RX ADMIN — LORAZEPAM 1 MG: 1 TABLET ORAL at 08:20

## 2018-03-12 RX ADMIN — CARVEDILOL 12.5 MG: 6.25 TABLET, FILM COATED ORAL at 08:20

## 2018-03-12 RX ADMIN — CLONIDINE HYDROCHLORIDE 0.1 MG: 0.1 TABLET ORAL at 08:20

## 2018-03-12 RX ADMIN — ACETAMINOPHEN 650 MG: 325 TABLET, FILM COATED ORAL at 05:43

## 2018-03-12 NOTE — PROGRESS NOTES
Continued Stay Note  [facility]     Patient Name: Hanny Ivy  MRN: [id number]  Today's Date: 3/12/2018    Admit Date: 3/6/2018          Discharge Plan     Row Name 03/12/18 1015       Plan    Plan Home with [facility]    Patient/Family in Agreement with Plan yes    Final Discharge Disposition Code 06 - home with home health care    Final Note Plan discharge today. Patient has requested [facility] and PA has provided home health orders. SW faxed referral to [facility] at [phone]. No other discharge planning needs identified.               Discharge Codes    No documentation.       Expected Discharge Date and Time     Expected Discharge Date Expected Discharge Time    Mar 12, 2018             TRAV Wallis

## 2018-03-12 NOTE — PLAN OF CARE
Problem: Patient Care Overview (Adult)  Goal: Plan of Care Review  Outcome: Ongoing (interventions implemented as appropriate)   03/12/18 0120   Patient Care Overview   Progress no change   Coping/Psychosocial Response Interventions   Plan Of Care Reviewed With patient   Outcome Evaluation   Outcome Summary/Follow up Plan Patient rested well through the night. dressing to right shoulder CDI, sling in place. tylenol given for pain, VSS, Safety maintained.       Problem: Fall Risk (Adult)  Goal: Absence of Falls  Outcome: Ongoing (interventions implemented as appropriate)

## 2018-03-12 NOTE — THERAPY TREATMENT NOTE
Acute Care - Physical Therapy Treatment Note  [facility]     Patient Name: Hanny Ivy  : 1945  MRN: [id number]  Today's Date: 3/12/2018     Date of Referral to PT: 18       Admit Date: 3/6/2018    Visit Dx:    ICD-10-CM ICD-9-CM   1. Rotator cuff arthropathy, right M12.811 716.81   2. Impaired mobility and ADLs Z74.09 799.89   3. Impaired functional mobility, balance, gait, and endurance Z74.09 V49.89   4. Essential hypertension I10 401.9     Patient Active Problem List   Diagnosis   • Dermatochalasis of both upper eyelids   • Visual field defect, unspecified   • Rotator cuff arthropathy, right   • Pure hypercholesterolemia   • Essential hypertension   • Mild intermittent asthma without complication   • Generalized anxiety disorder   • Gastroesophageal reflux disease without esophagitis   • Acquired hypothyroidism   • Anemia       Therapy Treatment    Therapy Treatment / Health Promotion    Treatment Time/Intention  Discipline: physical therapy assistant  Document Type: therapy note (daily note)  Subjective Information: no complaints  Patient Effort: good    Vitals/Pain/Safety  Pain Scale: Numbers Pre/Post-Treatment  Pain Scale: Numbers, Pretreatment: 3/10  Pain Scale: Numbers, Post-Treatment: 4/10  Pain Location - Side: Right  Pain Location: shoulder  Pain Scale: Word Pre/Post-Treatment  Pain Location - Side: Right  Pain Location: shoulder  Pain Scale: FACES Pre/Post-Treatment  Pain Location - Side: Right  Pain Location: shoulder  Positioning and Restraints  Pre-Treatment Position: in bed  Post Treatment Position: chair    Mobility,ADL,Motor, Modality  Mobility Assessment/Intervention  Extremity Weight-bearing Status: right upper extremity  Right Upper Extremity (Weight-bearing Status): non weight-bearing (NWB)  Bed Mobility Assessment/Treatment  Scooting/Bridging [place] (Bed Mobility): conditional independence  Supine-Sit [place] (Bed Mobility): independent  Comment (Bed Mobility): sit  patient in chair  Sit-Stand Transfer  Sit-Stand [place] (Transfers): supervision, verbal cues  Stand-Sit Transfer  Stand-Sit [place] (Transfers): supervision, verbal cues  Gait/Stairs Assessment/Training  [place] Level (Gait): verbal cues, contact guard, minimum assist (75% patient effort)  Assistive Device (Gait):  (first 50' without AD, last 50' with handheld assistance)  Distance in Feet (Gait): 50 (x 2)  Pattern (Gait): step-through  Deviations/Abnormal Patterns (Gait): festinating/shuffling     Therapeutic Exercise  Therapeutic Exercise: standing, lower extremities  Lower Extremity Standing Therapeutic Exercise  Performed, Standing Lower Extremity (Therapeutic Exercise): mini-squats, toe raises, heel raises, hip/knee flexion/extension, hip abduction/adduction, hip flexion/extension, shallow squats  Exercise Type, Standing Lower Extremity (Therapeutic Exercise): AROM (active range of motion)  Sets/Reps Detail, Standing Lower Extremity (Therapeutic Exercise): 15           ROM/MMT             Sensory, Edema, Orthotics          Cognition, Communication, Swallow       Outcome Summary             Physical Therapy Education     Title: PT OT SLP Therapies (Active)     Topic: Physical Therapy (Active)     Point: Mobility training (Done)    Learning Progress Summary     Learner Status Readiness Method Response Comment Documented by    Patient Done Acceptance E VU ex's AE 03/09/18 1137     Done Eager E VU ex's AE 03/08/18 1046     Active Acceptance E NR Educated on orthosis purpose and readjustment for proper fit and comfort; reverse TSA precautions and activity limitations; hand placement and safety with sit <-> stand and chair <-> bed; technique with bed mobility using bed rails LN 03/07/18 1015          Point: Precautions (Active)    Learning Progress Summary     Learner Status Readiness Method Response Comment Documented by    Patient Active Acceptance E NR Educated on orthosis purpose and readjustment  for proper fit and comfort; reverse TSA precautions and activity limitations; hand placement and safety with sit <-> stand and chair <-> bed; technique with bed mobility using bed rails LN 03/07/18 1015                      User Key     Initials Effective Dates Name Provider Type Discipline    AE 06/22/15 -  Radha Puga PTA Physical Therapy Assistant PT    LN 01/08/18 -  Toribio Hancock, PT Student PT Student PT                    PT Recommendation and Plan     Plan of Care Reviewed With: patient  Progress: improving  Outcome Summary: PT tx completed. Pt supine in bed. Rates right shoulder ppn 5/10. Cg supine to sit, CG/Min to stand, amb 60' x 2 min assist. First 60' used aleksandar wx, but this seem to throw her off. Went to hand held assistance for safety. She tends to festinate forward, her head going faster than feet.           Outcome Measures     Row Name 03/09/18 1200             How much help from another is currently needed...    Putting on and taking off regular lower body clothing? 2  -TS      Bathing (including washing, rinsing, and drying) 2  -TS      Toileting (which includes using toilet bed pan or urinal) 3  -TS      Putting on and taking off regular upper body clothing 3  -TS      Taking care of personal grooming (such as brushing teeth) 3  -TS      Eating meals 4  -TS      Score 17  -TS         Functional Assessment    Outcome Measure Options AM-PAC 6 Clicks Daily Activity (OT)  -TS        User Key  (r) = Recorded By, (t) = Taken By, (c) = Cosigned By    Initials Name Provider Type    TS RIN Gutierrez/L Occupational Therapy Assistant           Time Calculation:       Therapy Charges for Today     Code Description Service Date Service Provider Modifiers Qty    83353453881 HC GAIT TRAINING EA 15 MIN 3/11/2018 May Loera, SHANTANU GP, KX 1    82901820820 HC PT THER PROC EA 15 MIN 3/11/2018 May Loera PTA GP, KX 1          PT G-Codes  Outcome Measure Options: AM-PAC 6 Clicks Daily Activity  (OT)  Score: 15  Functional Limitation: Changing and maintaining body position  Changing and Maintaining Body Position Current Status (): At least 40 percent but less than 60 percent impaired, limited or restricted  Changing and Maintaining Body Position Goal Status (): At least 20 percent but less than 40 percent impaired, limited or restricted    May Loera, PTA  3/12/2018

## 2018-03-12 NOTE — PLAN OF CARE
Problem: Patient Care Overview (Adult)  Goal: Plan of Care Review  Outcome: Ongoing (interventions implemented as appropriate)   03/12/18 1110   Patient Care Overview   Progress progress toward functional goals as expected   Coping/Psychosocial Response Interventions   Plan Of Care Reviewed With patient   Outcome Evaluation   Outcome Summary/Follow up Plan PT tx completed. Pt supiine in bed with no c/o. Minimal right shoulder pn rates 4/10. I bed mobility, S transfers, amb 50' x 2 with hand held assistance for half of distance when she got tired. She tends to lean forward. Benefit ffrom can.Performed AROM at rail x 15 reps.Plan for discharge home today.

## 2018-03-12 NOTE — PROGRESS NOTES
Orthopedic Surgery Progress Note    Hanny Ivy  3/12/2018      Subjective:     Systemic or Specific Complaints: doing well, home with HH    Objective:     Patient Vitals for the past 24 hrs:   BP Temp Temp src Pulse Resp SpO2   03/11/18 1952 158/80 98.2 °F (36.8 °C) Oral 75 16 94 %   03/11/18 0944 141/52 - - - - -   03/11/18 0848 174/75 98 °F (36.7 °C) Oral 72 18 91 %       right upper  General: alert, appears stated age and cooperative   Wound: clean, dry, intact             Dressing: clean, dry, intact   Extremity: Distal NVI           DVT Exam: No evidence of DVT seen on physical exam.                   Data Review:  Lab Results (last 24 hours)     ** No results found for the last 24 hours. **        Imaging Results (last 24 hours)     ** No results found for the last 24 hours. **          Assessment:     POD# 6 R Reverse TSA    Plan:      1:  DVT prophylaxis, ICE, elevate  2:  Pain control  3:  Physical therapy/Occupational therapy  4:  Anticipate discharge today   5: Reverse Protocol       Sandeep Prater PA-C

## 2018-03-12 NOTE — PROGRESS NOTES
Hanny Ivy is a 73 y.o. female patient.  Still complains of pain in the shoulder.\  Limited help at home.  Current Facility-Administered Medications   Medication Dose Route Frequency Provider Last Rate Last Dose   • acetaminophen (TYLENOL) tablet 650 mg  650 mg Oral Q4H PRN Imtiaz Lobato MD   650 mg at 03/12/18 0543    Or   • acetaminophen (TYLENOL) 160 MG/5ML solution 650 mg  650 mg Oral Q4H PRN Imtiaz Lobato MD        Or   • acetaminophen (TYLENOL) suppository 650 mg  650 mg Rectal Q4H PRN Imtiaz Lobato MD       • allopurinol (ZYLOPRIM) tablet 50 mg  50 mg Oral Daily Imtiaz Lobato MD   50 mg at 03/11/18 0944   • atorvastatin (LIPITOR) tablet 10 mg  10 mg Oral Nightly Imtiaz Lobato MD   10 mg at 03/11/18 2046   • budesonide-formoterol (SYMBICORT) 160-4.5 MCG/ACT inhaler 2 puff  2 puff Inhalation BID - RT Imtiaz Lobato MD   2 puff at 03/11/18 2034   • buPROPion SR (WELLBUTRIN SR) 12 hr tablet 100 mg  100 mg Oral Daily Imtiaz Lobato MD   100 mg at 03/11/18 0944   • carvedilol (COREG) tablet 12.5 mg  12.5 mg Oral Q12H Imtiaz Lobato MD   12.5 mg at 03/11/18 2046   • CloNIDine (CATAPRES) tablet 0.1 mg  0.1 mg Oral Q12H Imtiaz Lobato MD   0.1 mg at 03/11/18 2046   • diphenhydrAMINE (BENADRYL) capsule 25 mg  25 mg Oral Q6H PRN Imtiaz Lobato MD        Or   • diphenhydrAMINE (BENADRYL) injection 25 mg  25 mg Intravenous Q6H PRN Imtiaz Lobato MD       • docusate sodium (COLACE) capsule 100 mg  100 mg Oral BID PRN Imtiaz Lobato MD       • doxepin (SINEquan) capsule 100 mg  100 mg Oral Nightly Imtiaz Lobato MD   100 mg at 03/11/18 2047   • enoxaparin (LOVENOX) syringe 40 mg  40 mg Subcutaneous Daily Imtiaz Lobato MD   40 mg at 03/11/18 0950   • famotidine (PEPCID) tablet 20 mg  20 mg Oral Daily Imtiaz Lobato MD   20 mg at 03/11/18 0944   • FLUoxetine (PROzac) capsule 40 mg  40 mg Oral Daily Imtiaz Alcantar  MD Luis Alfredo   40 mg at 03/11/18 0944   • gabapentin (NEURONTIN) capsule 600 mg  600 mg Oral Q8H Imtiaz Lobato MD   600 mg at 03/12/18 0543   • HYDROmorphone (DILAUDID) injection 1 mg  1 mg Intravenous Q4H PRN Imtiaz Lobato MD   1 mg at 03/07/18 1311    And   • naloxone (NARCAN) injection 0.1 mg  0.1 mg Intravenous Q5 Min PRN Imtiaz Lobato MD       • ipratropium-albuterol (DUO-NEB) nebulizer solution 3 mL  3 mL Nebulization Q6H PRN Imtiaz Lobato MD       • levothyroxine (SYNTHROID, LEVOTHROID) tablet 100 mcg  100 mcg Oral Q AM Imtiaz Lobato MD   100 mcg at 03/12/18 0543   • LORazepam (ATIVAN) tablet 1 mg  1 mg Oral TID Imtiaz Lobato MD   1 mg at 03/11/18 2047   • magnesium hydroxide (MILK OF MAGNESIA) suspension 2400 mg/10mL 10 mL  10 mL Oral Daily PRN Imtiaz Lobato MD   10 mL at 03/09/18 0551   • Morphine sulfate (PF) injection 4 mg  4 mg Intravenous Q2H PRN Imtiaz Lobato MD   4 mg at 03/07/18 0512    And   • naloxone (NARCAN) injection 0.4 mg  0.4 mg Intravenous Q5 Min PRN Imtiaz Lobato MD       • ondansetron (ZOFRAN) tablet 4 mg  4 mg Oral Q6H PRN Imtiaz Lobato MD        Or   • ondansetron ODT (ZOFRAN-ODT) disintegrating tablet 4 mg  4 mg Oral Q6H PRN Imtiaz Lobato MD        Or   • ondansetron (ZOFRAN) injection 4 mg  4 mg Intravenous Q6H PRN Imtiaz Lobato MD       • oxyCODONE-acetaminophen (PERCOCET)  MG per tablet 1 tablet  1 tablet Oral Q4H PRN Imtiaz Lobato MD   1 tablet at 03/09/18 0212   • oxyCODONE-acetaminophen (PERCOCET) 7.5-325 MG per tablet 2 tablet  2 tablet Oral Q4H PRN Imtiaz Lobato MD   2 tablet at 03/07/18 1437   • phenol (CHLORASEPTIC) 1.4 % liquid 2 spray  2 spray Mouth/Throat Q2H PRN Imtiaz Lobato MD   2 spray at 03/06/18 2245   • promethazine (PHENERGAN) injection 12.5 mg  12.5 mg Intravenous Q6H PRN Imtiaz Lobato MD        Or   • promethazine (PHENERGAN)  "injection 12.5 mg  12.5 mg Intramuscular Q6H PRN Imtiaz Lobato MD       • sennosides-docusate sodium (SENOKOT-S) 8.6-50 MG tablet 2 tablet  2 tablet Oral Nightly Imtiaz Lobato MD   2 tablet at 03/11/18 2046   • sodium chloride 0.9 % flush 1-10 mL  1-10 mL Intravenous PRN Imtiaz Lobato MD         ALLERGIES:    Allergies   Allergen Reactions   • Penicillins Other (See Comments)     Unknown   • Sulfa Antibiotics Other (See Comments)     Unknown     Principal Problem:    Rotator cuff arthropathy, right  Active Problems:    Pure hypercholesterolemia    Essential hypertension    Mild intermittent asthma without complication    Generalized anxiety disorder    Gastroesophageal reflux disease without esophagitis    Acquired hypothyroidism    Anemia    Blood pressure 158/80, pulse 75, temperature 98.2 °F (36.8 °C), temperature source Oral, resp. rate 16, height 162.6 cm (64\"), weight 92.7 kg (204 lb 7 oz), SpO2 94 %.      Subjective:  Symptoms:  Stable.    Diet:  Adequate intake.    Activity level: Impaired due to pain.    Pain:  She complains of pain that is moderate.  She reports pain is improving.  Pain is partially controlled.      Review of Systems  Objective:  General Appearance:  Comfortable and well-appearing.    Vital signs: (most recent): Blood pressure 158/80, pulse 75, temperature 98.2 °F (36.8 °C), temperature source Oral, resp. rate 16, height 162.6 cm (64\"), weight 92.7 kg (204 lb 7 oz), SpO2 94 %.  Vital signs are normal.    Output: Producing urine and producing stool.    Lungs:  Normal effort and normal respiratory rate.    Heart: Normal rate.  Regular rhythm.    Abdomen: Abdomen is soft.  Bowel sounds are normal.     Extremities: (Shoulder with postoperative changes in sling with good pulses noted distally)  Neurological: Patient is alert and oriented to person, place and time.    Skin:  Warm and dry.              Labs:  Lab Results (last 72 hours)     Procedure Component Value Units " Date/Time    Hemoglobin & Hematocrit, Blood [[id number]]  (Abnormal) Collected:  03/07/18 0438    Specimen:  Blood Updated:  03/07/18 0548     Hemoglobin 9.3 (L) g/dL      Hematocrit 28.4 (L) %     Basic Metabolic Panel [[id number]]  (Abnormal) Collected:  03/07/18 0438    Specimen:  Blood Updated:  03/07/18 0602     Glucose 107 (H) mg/dL      BUN 17 mg/dL      Creatinine 1.54 (H) mg/dL      Sodium 132 (L) mmol/L      Potassium 4.4 mmol/L      Chloride 97 (L) mmol/L      CO2 26.0 mmol/L      Calcium 8.7 mg/dL      eGFR Non African Amer 33 (L) mL/min/1.73      BUN/Creatinine Ratio 11.0     Anion Gap 9.0 mmol/L     Narrative:       The MDRD GFR formula is only valid for adults with stable renal function between ages 18 and 70.          Imaging Results (last 72 hours)     Procedure Component Value Units Date/Time    XR Shoulder 2+ View Right [[id number]] Collected:  03/06/18 1450     Updated:  03/06/18 1455    Narrative:       XR SHOULDER 2+ VW RIGHT- 3/6/2018 2:30 PM CST     History: postop     Comparison: None      Findings:   3 frontal postoperative views of the right shoulder are submitted.      New reverse total shoulder arthroplasty with components appearing in  good position. No periprosthetic fractures identified       Impression:       Impression:   1. New reverse total shoulder arthroplasty without visualized  complication.        This report was finalized on 03/06/2018 14:52 by Dr Kuldip Nelson, .                Assessment:    Condition: In stable condition.  Improving.   (Anemia post-op expected-check iron, B12,and folate. Replenish as needed.Transfuse at acceptable levels depending on clinical judgement and comorbidities.  Labs this a.m. are pending    Constipation-start with Miralax 1 capful BID until BM, then decrease to 1 x a day,then can step up to Amitizia 24 mcg po BID which can be used for opiod induced constipation,and ultimately end up with Relistor 12 mcg sub Q q 48 hours to block the effect of  narcotics on the gut.    Explained to patient and staff that we were consulted for medical management during their acute care hospitalization. They need to f/u with their regular primary care provider concerning any further treatment and review of abnormalities found during their hospitalization at [facility] and they agree with that treatment plan. ).     Plan:   Discharge home.  Encourage ambulation and per physical therapy.  (Medically stable for discharge home today.  [facility] placement declined going home with HH  She looks better today.  Okay to discharge home if okay with orthopedics.  Hypertension-review pre and post BP's, adjust meds as necessary.).   Explained to patient and staff that we were consulted for medical management during their acute care hospitalization. They need to f/u with their regular primary care provider concerning any further treatment and review of abnormalities found during their hospitalization at [facility] and they agree with that treatment plan.     Patient Active Problem List   Diagnosis   • Dermatochalasis of both upper eyelids   • Visual field defect, unspecified   • Rotator cuff arthropathy, right   • Pure hypercholesterolemia   • Essential hypertension   • Mild intermittent asthma without complication   • Generalized anxiety disorder   • Gastroesophageal reflux disease without esophagitis   • Acquired hypothyroidism   • Anemia     Jac Mena MD  3/12/2018

## 2018-03-13 NOTE — THERAPY DISCHARGE NOTE
Acute Care - Occupational Therapy Discharge Summary  [facility]     Patient Name: Hanny Ivy  : 1945  MRN: [id number]    Today's Date: 3/13/2018  Onset of Illness/Injury or Date of Surgery Date: 18    Date of Referral to OT: 18  Referring Physician: Dr. Lobato      Admit Date: 3/6/2018        OT Recommendation and Plan    Visit Dx:    ICD-10-CM ICD-9-CM   1. Rotator cuff arthropathy, right M12.811 716.81   2. Impaired mobility and ADLs Z74.09 799.89   3. Impaired functional mobility, balance, gait, and endurance Z74.09 V49.89   4. Essential hypertension I10 401.9       Problem: Inpatient Occupational Therapy  Goal: Transfer Training Goal 1 LTG- OT  Outcome: Unable to achieve outcome(s) by discharge Date Met: 18 1028 18   Transfer Training OT LTG   Transfer Training OT LTG, Date Established 18 --    Transfer Training OT LTG, Time to Achieve by discharge --    Transfer Training OT LTG, Activity Type toilet;tub --    Transfer Training OT LTG, [place] Level supervision required --    Transfer Training OT LTG, Assist Device walker, aleksandar;tub bench --    Transfer Training OT LTG, Additional Goal without vb to maintain NWB status --    Transfer Training OT LTG, Date Goal Reviewed --  18   Transfer Training OT LTG, Outcome --  goal not met   Transfer Training OT LTG, Reason Goal Not Met --  discharged from facility      Goal: Patient Education Goal LTG- OT  Outcome: Unable to achieve outcome(s) by discharge Date Met: 18 1028 18 0812   Patient Education OT LTG   Patient Education OT LTG, Date Established 18 --    Patient Education OT LTG, Time to Achieve by discharge --    Patient Education OT LTG, Education Type precautions per surgeon;brace use/care;positioning;posture/body mechanics;1 hand/aleksandar technique;home safety;energy conservation;linden/doff brace --    Patient Education OT LTG, Education Understanding  independent;demonstrates adequately;verbalizes understanding --    Patient Education OT LTG, Date Goal Reviewed --  03/13/18   Patient Education OT LTG Outcome --  goal not met   Patient Education OT LTG, Reason Goal Not Met --  discharged from facility      Goal: Bathing Goal LTG- OT  Outcome: Unable to achieve outcome(s) by discharge Date Met: 03/13/18 03/07/18 1028 03/13/18 0812   Bathing OT LTG   Bathing Goal OT LTG, Date Established 03/07/18 --    Bathing Goal OT LTG, Time to Achieve by discharge --    Bathing Goal OT LTG, Activity Type upper body bathing;lower body bathing --    Bathing Goal OT LTG, [place] Level supervision required --    Bathing Goal OT LTG, Assist Device tub bench --    Bathing Goal OT LTG, Date Goal Reviewed --  03/13/18   Bathing Goal OT LTG, Outcome --  goal not met   Bathing Goal OT LTG, Reason Goal Not Met --  discharged from facility      Goal: LB Dressing Goal LTG- OT  Outcome: Unable to achieve outcome(s) by discharge Date Met: 03/13/18 03/07/18 1028 03/13/18 0812   LB Dressing OT LTG   LB Dressing Goal OT LTG, Date Established 03/07/18 --    LB Dressing Goal OT LTG, Time to Achieve by discharge --    LB Dressing Goal OT LTG, [place] Level minimum assist (75% patient effort) --    LB Dressing Goal OT LTG, Date Goal Reviewed --  03/13/18   LB Dressing Goal OT LTG, Outcome --  goal not met   LB Dressing Goal OT LTG, Reason Goal Not Met --  discharged from facility                         OT Discharge Summary  Reason for Discharge: Discharge from facility  Outcomes Achieved: Refer to plan of care for updates on goals achieved  Discharge Destination: Home with assist, Home with home health      RIN Hurtado/HERNAN  3/13/2018

## 2018-03-13 NOTE — PLAN OF CARE
Problem: Inpatient Physical Therapy  Goal: Bed Mobility Goal LTG- PT  Outcome: Unable to achieve outcome(s) by discharge Date Met: 03/13/18 03/13/18 0809   Bed Mobility PT LTG   Bed Mobility PT LTG, Date Goal Reviewed 03/13/18   Bed Mobility PT LTG, Outcome goal not met   Bed Mobility PT LTG, Reason Goal Not Met discharged from facility     Goal: Transfer Training Goal 1 LTG- PT  Outcome: Unable to achieve outcome(s) by discharge Date Met: 03/13/18 03/13/18 0809   Transfer Training PT LTG   Transfer Training PT LTG, Date Goal Reviewed 03/13/18   Transfer Training PT LTG, Reason Goal Not Met discharged from facility     Goal: Gait Training Goal LTG- PT  Outcome: Unable to achieve outcome(s) by discharge Date Met: 03/13/18 03/13/18 0809   Gait Training PT LTG   Gait Training Goal PT LTG, Date Goal Reviewed 03/13/18   Gait Training Goal PT LTG, Outcome goal not met   Gait Training Goal PT LTG, Reason Goal Not Met discharged from facility      03/13/18 0809   Gait Training PT LTG   Gait Training Goal PT LTG, Date Goal Reviewed 03/13/18   Gait Training Goal PT LTG, Outcome goal not met   Gait Training Goal PT LTG, Reason Goal Not Met discharged from facility     Goal: Patient Education Goal LTG- PT  Outcome: Unable to achieve outcome(s) by discharge Date Met: 03/13/18 03/13/18 0809   Patient Education PT LTG   Patient Education PT LTG, Date Goal Reviewed 03/13/18   Patient Education PT LTG Outcome goal not met   Patient Education PT LTG, Reason Goal Not Met discharged from facility      03/13/18 0809   Patient Education PT LTG   Patient Education PT LTG, Date Goal Reviewed 03/13/18   Patient Education PT LTG Outcome goal not met   Patient Education PT LTG, Reason Goal Not Met discharged from facility

## 2018-03-13 NOTE — PLAN OF CARE
Problem: Inpatient Occupational Therapy  Goal: Transfer Training Goal 1 LTG- OT  Outcome: Unable to achieve outcome(s) by discharge Date Met: 03/13/18 03/07/18 1028 03/13/18 0812   Transfer Training OT LTG   Transfer Training OT LTG, Date Established 03/07/18 --    Transfer Training OT LTG, Time to Achieve by discharge --    Transfer Training OT LTG, Activity Type toilet;tub --    Transfer Training OT LTG, [place] Level supervision required --    Transfer Training OT LTG, Assist Device walker, aleksandar;tub bench --    Transfer Training OT LTG, Additional Goal without vb to maintain NWB status --    Transfer Training OT LTG, Date Goal Reviewed --  03/13/18   Transfer Training OT LTG, Outcome --  goal not met   Transfer Training OT LTG, Reason Goal Not Met --  discharged from facility     Goal: Patient Education Goal LTG- OT  Outcome: Unable to achieve outcome(s) by discharge Date Met: 03/13/18 03/07/18 1028 03/13/18 0812   Patient Education OT LTG   Patient Education OT LTG, Date Established 03/07/18 --    Patient Education OT LTG, Time to Achieve by discharge --    Patient Education OT LTG, Education Type precautions per surgeon;brace use/care;positioning;posture/body mechanics;1 hand/aleksandar technique;home safety;energy conservation;linden/doff brace --    Patient Education OT LTG, Education Understanding independent;demonstrates adequately;verbalizes understanding --    Patient Education OT LTG, Date Goal Reviewed --  03/13/18   Patient Education OT LTG Outcome --  goal not met   Patient Education OT LTG, Reason Goal Not Met --  discharged from facility     Goal: Bathing Goal LTG- OT  Outcome: Unable to achieve outcome(s) by discharge Date Met: 03/13/18 03/07/18 1028 03/13/18 0812   Bathing OT LTG   Bathing Goal OT LTG, Date Established 03/07/18 --    Bathing Goal OT LTG, Time to Achieve by discharge --    Bathing Goal OT LTG, Activity Type upper body bathing;lower body bathing --    Bathing Goal OT LTG,  Th[place] Level supervision required --    Bathing Goal OT LTG, Assist Device tub bench --    Bathing Goal OT LTG, Date Goal Reviewed --  03/13/18   Bathing Goal OT LTG, Outcome --  goal not met   Bathing Goal OT LTG, Reason Goal Not Met --  discharged from facility     Goal: LB Dressing Goal LTG- OT  Outcome: Unable to achieve outcome(s) by discharge Date Met: 03/13/18 03/07/18 1028 03/13/18 0812   LB Dressing OT LTG   LB Dressing Goal OT LTG, Date Established 03/07/18 --    LB Dressing Goal OT LTG, Time to Achieve by discharge --    LB Dressing Goal OT LTG, Thayer Level minimum assist (75% patient effort) --    LB Dressing Goal OT LTG, Date Goal Reviewed --  03/13/18   LB Dressing Goal OT LTG, Outcome --  goal not met   LB Dressing Goal OT LTG, Reason Goal Not Met --  discharged from facility

## 2018-03-13 NOTE — THERAPY DISCHARGE NOTE
Acute Care - Physical Therapy Progress Note  [facility]     Patient Name: Hanny Ivy  : 1945  MRN: [id number]    Today's Date: 3/13/2018       Date of Referral to PT: 18         Admit Date: 3/6/2018      Visit Dx:      ICD-10-CM ICD-9-CM   1. Rotator cuff arthropathy, right M12.811 716.81   2. Impaired mobility and ADLs Z74.09 799.89   3. Impaired functional mobility, balance, gait, and endurance Z74.09 V49.89   4. Essential hypertension I10 401.9       Patient Active Problem List   Diagnosis   • Dermatochalasis of both upper eyelids   • Visual field defect, unspecified   • Rotator cuff arthropathy, right   • Pure hypercholesterolemia   • Essential hypertension   • Mild intermittent asthma without complication   • Generalized anxiety disorder   • Gastroesophageal reflux disease without esophagitis   • Acquired hypothyroidism   • Anemia       Therapy Treatment    Evaluation/Coping         Vitals/Pain/Safety         Cognition/Communication         Oral Motor/Eating         Mobility/Basic Activities/Instrumental Activities/Motor/Modality                   ROM/MMT                   Sensory/Myotome/Dermatome/Edema               Posture/Balance/Special Tests/Exercise/Transportation/Sexual Function                   Orthotics/Residual Limb/Prosthetic Management              Outcome Summary                 PT Recommendation and Plan    Anticipated Discharge Disposition (PT): skilled nursing facility (SNF)          Goal: Bed Mobility Goal LTG- PT  Outcome: Unable to achieve outcome(s) by discharge Date Met: 18 0809   Bed Mobility PT LTG   Bed Mobility PT LTG, Date Goal Reviewed 18   Bed Mobility PT LTG, Outcome goal not met   Bed Mobility PT LTG, Reason Goal Not Met discharged from facility      Goal: Transfer Training Goal 1 LTG- PT  Outcome: Unable to achieve outcome(s) by discharge Date Met: 18 0809   Transfer Training PT LTG   Transfer Training PT LTG, Date Goal  Reviewed 03/13/18   Transfer Training PT LTG, Reason Goal Not Met discharged from facility      Goal: Gait Training Goal LTG- PT  Outcome: Unable to achieve outcome(s) by discharge Date Met: 03/13/18 03/13/18 0809   Gait Training PT LTG   Gait Training Goal PT LTG, Date Goal Reviewed 03/13/18   Gait Training Goal PT LTG, Outcome goal not met   Gait Training Goal PT LTG, Reason Goal Not Met discharged from facility        03/13/18 0809   Gait Training PT LTG   Gait Training Goal PT LTG, Date Goal Reviewed 03/13/18   Gait Training Goal PT LTG, Outcome goal not met   Gait Training Goal PT LTG, Reason Goal Not Met discharged from facility      Goal: Patient Education Goal LTG- PT  Outcome: Unable to achieve outcome(s) by discharge Date Met: 03/13/18 03/13/18 0809   Patient Education PT LTG   Patient Education PT LTG, Date Goal Reviewed 03/13/18   Patient Education PT LTG Outcome goal not met   Patient Education PT LTG, Reason Goal Not Met discharged from facility        03/13/18 0809   Patient Education PT LTG   Patient Education PT LTG, Date Goal Reviewed 03/13/18   Patient Education PT LTG Outcome goal not met   Patient Education PT LTG, Reason Goal Not Met discharged from facility                             Time Calculation:                 PT G-Codes  Outcome Measure Options: AM-PAC 6 Clicks Daily Activity (OT)  Score: 15  Functional Limitation: Changing and maintaining body position  Changing and Maintaining Body Position Current Status (): At least 40 percent but less than 60 percent impaired, limited or restricted  Changing and Maintaining Body Position Goal Status (): At least 20 percent but less than 40 percent impaired, limited or restricted      Radha Puga, PTA  3/13/2018

## 2018-03-23 DIAGNOSIS — F41.9 ANXIETY: ICD-10-CM

## 2018-03-23 NOTE — TELEPHONE ENCOUNTER
Pt has recently had shoulder surgery and is not driving at this time. She is requesting a refill on Ativan to [address]. Last Rx was written in November 2017 for Ativan. MARIO completed and on file for review. Pt uses [facility] [place].

## 2018-03-26 RX ORDER — LORAZEPAM 1 MG/1
1 TABLET ORAL 3 TIMES DAILY PRN
Qty: 90 TABLET | Refills: 2 | OUTPATIENT
Start: 2018-03-26 | End: 2018-06-24

## 2018-03-26 RX ORDER — AMLODIPINE AND OLMESARTAN MEDOXOMIL 10; 40 MG/1; MG/1
TABLET ORAL
Qty: 90 TABLET | Refills: 3 | Status: SHIPPED | OUTPATIENT
Start: 2018-03-26 | End: 2018-04-23 | Stop reason: ALTCHOICE

## 2018-03-28 ENCOUNTER — NURSE ONLY (OUTPATIENT)
Dept: FAMILY MEDICINE CLINIC | Age: 73
End: 2018-03-28
Payer: MEDICARE

## 2018-03-28 DIAGNOSIS — J30.2 SEASONAL ALLERGIC RHINITIS, UNSPECIFIED CHRONICITY, UNSPECIFIED TRIGGER: Primary | ICD-10-CM

## 2018-03-28 PROCEDURE — 95115 IMMUNOTHERAPY ONE INJECTION: CPT | Performed by: FAMILY MEDICINE

## 2018-03-28 PROCEDURE — 96372 THER/PROPH/DIAG INJ SC/IM: CPT | Performed by: FAMILY MEDICINE

## 2018-03-28 RX ORDER — CYANOCOBALAMIN 1000 UG/ML
1000 INJECTION INTRAMUSCULAR; SUBCUTANEOUS ONCE
Status: COMPLETED | OUTPATIENT
Start: 2018-03-28 | End: 2018-03-28

## 2018-03-28 RX ADMIN — CYANOCOBALAMIN 1000 MCG: 1000 INJECTION INTRAMUSCULAR; SUBCUTANEOUS at 15:33

## 2018-03-30 ENCOUNTER — APPOINTMENT (OUTPATIENT)
Dept: CT IMAGING | Facility: HOSPITAL | Age: 73
End: 2018-03-30

## 2018-03-30 ENCOUNTER — APPOINTMENT (OUTPATIENT)
Dept: GENERAL RADIOLOGY | Facility: HOSPITAL | Age: 73
End: 2018-03-30

## 2018-03-30 ENCOUNTER — HOSPITAL ENCOUNTER (EMERGENCY)
Facility: HOSPITAL | Age: 73
Discharge: HOME | End: 2018-03-31
Admitting: FAMILY MEDICINE

## 2018-03-30 DIAGNOSIS — M25.511 RIGHT SHOULDER PAIN, UNSPECIFIED CHRONICITY: Primary | ICD-10-CM

## 2018-03-30 DIAGNOSIS — G89.18 POST-OP PAIN: ICD-10-CM

## 2018-03-30 LAB
ALBUMIN SERPL-MCNC: 3.9 G/DL (ref 3.5–5)
ALBUMIN/GLOB SERPL: 1.3 G/DL (ref 1.1–2.5)
ALP SERPL-CCNC: 99 U/L (ref 24–120)
ALT SERPL W P-5'-P-CCNC: 32 U/L (ref 0–54)
ANION GAP SERPL CALCULATED.3IONS-SCNC: 13 MMOL/L (ref 4–13)
APTT PPP: 31.9 SECONDS (ref 24.1–34.8)
AST SERPL-CCNC: 25 U/L (ref 7–45)
BASOPHILS # BLD AUTO: 0.06 10*3/MM3 (ref 0–0.2)
BASOPHILS NFR BLD AUTO: 0.8 % (ref 0–2)
BILIRUB SERPL-MCNC: 0.2 MG/DL (ref 0.1–1)
BUN BLD-MCNC: 16 MG/DL (ref 5–21)
BUN/CREAT SERPL: 13 (ref 7–25)
CALCIUM SPEC-SCNC: 9.3 MG/DL (ref 8.4–10.4)
CHLORIDE SERPL-SCNC: 102 MMOL/L (ref 98–110)
CO2 SERPL-SCNC: 25 MMOL/L (ref 24–31)
CREAT BLD-MCNC: 1.23 MG/DL (ref 0.5–1.4)
CRP SERPL-MCNC: 1.41 MG/DL (ref 0–0.99)
DEPRECATED RDW RBC AUTO: 43.8 FL (ref 40–54)
EOSINOPHIL # BLD AUTO: 0.21 10*3/MM3 (ref 0–0.7)
EOSINOPHIL NFR BLD AUTO: 2.7 % (ref 0–4)
ERYTHROCYTE [DISTWIDTH] IN BLOOD BY AUTOMATED COUNT: 14.4 % (ref 12–15)
ERYTHROCYTE [SEDIMENTATION RATE] IN BLOOD: 26 MM/HR (ref 0–20)
GFR SERPL CREATININE-BSD FRML MDRD: 43 ML/MIN/1.73
GLOBULIN UR ELPH-MCNC: 3 GM/DL
GLUCOSE BLD-MCNC: 103 MG/DL (ref 70–100)
HCT VFR BLD AUTO: 32.5 % (ref 37–47)
HGB BLD-MCNC: 10.3 G/DL (ref 12–16)
IMM GRANULOCYTES # BLD: 0.03 10*3/MM3 (ref 0–0.03)
IMM GRANULOCYTES NFR BLD: 0.4 % (ref 0–5)
INR PPP: 0.95 (ref 0.91–1.09)
LYMPHOCYTES # BLD AUTO: 1.76 10*3/MM3 (ref 0.72–4.86)
LYMPHOCYTES NFR BLD AUTO: 22.3 % (ref 15–45)
MCH RBC QN AUTO: 26.4 PG (ref 28–32)
MCHC RBC AUTO-ENTMCNC: 31.7 G/DL (ref 33–36)
MCV RBC AUTO: 83.3 FL (ref 82–98)
MONOCYTES # BLD AUTO: 1.51 10*3/MM3 (ref 0.19–1.3)
MONOCYTES NFR BLD AUTO: 19.1 % (ref 4–12)
NEUTROPHILS # BLD AUTO: 4.33 10*3/MM3 (ref 1.87–8.4)
NEUTROPHILS NFR BLD AUTO: 54.7 % (ref 39–78)
NRBC BLD MANUAL-RTO: 0 /100 WBC (ref 0–0)
PLATELET # BLD AUTO: 275 10*3/MM3 (ref 130–400)
PMV BLD AUTO: 9 FL (ref 6–12)
POTASSIUM BLD-SCNC: 4.1 MMOL/L (ref 3.5–5.3)
PROT SERPL-MCNC: 6.9 G/DL (ref 6.3–8.7)
PROTHROMBIN TIME: 13 SECONDS (ref 11.9–14.6)
RBC # BLD AUTO: 3.9 10*6/MM3 (ref 4.2–5.4)
SODIUM BLD-SCNC: 140 MMOL/L (ref 135–145)
WBC NRBC COR # BLD: 7.9 10*3/MM3 (ref 4.8–10.8)

## 2018-03-30 PROCEDURE — 73030 X-RAY EXAM OF SHOULDER: CPT

## 2018-03-30 PROCEDURE — 85610 PROTHROMBIN TIME: CPT | Performed by: PHYSICIAN ASSISTANT

## 2018-03-30 PROCEDURE — 85025 COMPLETE CBC W/AUTO DIFF WBC: CPT | Performed by: PHYSICIAN ASSISTANT

## 2018-03-30 PROCEDURE — 73201 CT UPPER EXTREMITY W/DYE: CPT

## 2018-03-30 PROCEDURE — 85730 THROMBOPLASTIN TIME PARTIAL: CPT | Performed by: PHYSICIAN ASSISTANT

## 2018-03-30 PROCEDURE — 85651 RBC SED RATE NONAUTOMATED: CPT | Performed by: PHYSICIAN ASSISTANT

## 2018-03-30 PROCEDURE — 87040 BLOOD CULTURE FOR BACTERIA: CPT | Performed by: PHYSICIAN ASSISTANT

## 2018-03-30 PROCEDURE — 80053 COMPREHEN METABOLIC PANEL: CPT | Performed by: PHYSICIAN ASSISTANT

## 2018-03-30 PROCEDURE — 86140 C-REACTIVE PROTEIN: CPT | Performed by: PHYSICIAN ASSISTANT

## 2018-03-30 PROCEDURE — 99283 EMERGENCY DEPT VISIT LOW MDM: CPT

## 2018-03-30 RX ADMIN — IOPAMIDOL 100 ML: 612 INJECTION, SOLUTION INTRAVENOUS at 23:44

## 2018-03-31 VITALS
TEMPERATURE: 97 F | RESPIRATION RATE: 20 BRPM | SYSTOLIC BLOOD PRESSURE: 169 MMHG | BODY MASS INDEX: 30.99 KG/M2 | WEIGHT: 186 LBS | HEART RATE: 79 BPM | HEIGHT: 65 IN | OXYGEN SATURATION: 97 % | DIASTOLIC BLOOD PRESSURE: 75 MMHG

## 2018-03-31 PROCEDURE — 25010000002 IOPAMIDOL 61 % SOLUTION: Performed by: PHYSICIAN ASSISTANT

## 2018-03-31 RX ORDER — CEPHALEXIN 500 MG/1
500 CAPSULE ORAL 2 TIMES DAILY
Qty: 20 CAPSULE | Refills: 0 | Status: SHIPPED | OUTPATIENT
Start: 2018-03-31 | End: 2018-11-15

## 2018-04-04 LAB
BACTERIA SPEC AEROBE CULT: NORMAL
BACTERIA SPEC AEROBE CULT: NORMAL

## 2018-04-04 NOTE — ED NOTES
"ED Call Back Questions    1. How are you doing since leaving the Emergency Department?    Doing better  2. Do you have any questions about your discharge instructions? No     3. Have you filled your new prescriptions yet? Yes   a. Do you have any questions about those medications? N/A    4. Were you able to make a follow-up appointment with the physician? No     5. Do you have a primary care physician? Yes   a. If No, would you like for me to set you up with one? N/A  i. If Yes, “I will have our ED  give you a call right back at this number to work with you on the best time for an appointment.”    6. We are always looking to get better at what we do. Do you have any suggestions for what we can do to be even better? No   a. If Yes, \"Thank you for sharing your concerns. I apologize. I will follow up with our manager and patient . Would you like someone to call you back?\" No     7. Is there anything else I can do for you? No   Visit was very good     Juan Manuel Ryan  04/04/18 1101    "

## 2018-04-16 ENCOUNTER — HOSPITAL ENCOUNTER (OUTPATIENT)
Dept: GENERAL RADIOLOGY | Age: 73
Discharge: HOME | End: 2018-04-16
Payer: MEDICARE

## 2018-04-16 ENCOUNTER — OFFICE VISIT (OUTPATIENT)
Dept: FAMILY MEDICINE CLINIC | Age: 73
End: 2018-04-16
Payer: MEDICARE

## 2018-04-16 VITALS
TEMPERATURE: 97.9 F | BODY MASS INDEX: 30.99 KG/M2 | SYSTOLIC BLOOD PRESSURE: 130 MMHG | WEIGHT: 186 LBS | DIASTOLIC BLOOD PRESSURE: 80 MMHG | RESPIRATION RATE: 16 BRPM | HEIGHT: 65 IN | HEART RATE: 68 BPM | OXYGEN SATURATION: 97 %

## 2018-04-16 DIAGNOSIS — R07.9 CHEST PAIN, UNSPECIFIED TYPE: ICD-10-CM

## 2018-04-16 DIAGNOSIS — R52 BODY ACHES: Primary | ICD-10-CM

## 2018-04-16 DIAGNOSIS — R52 BODY ACHES: ICD-10-CM

## 2018-04-16 LAB
ALBUMIN SERPL-MCNC: 4.2 G/DL (ref 3.5–5.2)
ALP BLD-CCNC: 106 U/L (ref 35–104)
ALT SERPL-CCNC: 19 U/L (ref 5–33)
ANION GAP SERPL CALCULATED.3IONS-SCNC: 18 MMOL/L (ref 7–19)
AST SERPL-CCNC: 21 U/L (ref 5–32)
BILIRUB SERPL-MCNC: <0.2 MG/DL (ref 0.2–1.2)
BUN BLDV-MCNC: 19 MG/DL (ref 8–23)
C-REACTIVE PROTEIN: 0.91 MG/DL (ref 0–0.5)
CALCIUM SERPL-MCNC: 9.5 MG/DL (ref 8.8–10.2)
CHLORIDE BLD-SCNC: 101 MMOL/L (ref 98–111)
CO2: 22 MMOL/L (ref 22–29)
CREAT SERPL-MCNC: 1.2 MG/DL (ref 0.5–0.9)
GFR NON-AFRICAN AMERICAN: 44
GLUCOSE BLD-MCNC: 102 MG/DL (ref 74–109)
HCT VFR BLD CALC: 35.9 % (ref 37–47)
HEMOGLOBIN: 10.9 G/DL (ref 12–16)
MCH RBC QN AUTO: 26.7 PG (ref 27–31)
MCHC RBC AUTO-ENTMCNC: 30.4 G/DL (ref 33–37)
MCV RBC AUTO: 87.8 FL (ref 81–99)
PDW BLD-RTO: 14.2 % (ref 11.5–14.5)
PLATELET # BLD: 323 K/UL (ref 130–400)
PMV BLD AUTO: 9.8 FL (ref 9.4–12.3)
POTASSIUM SERPL-SCNC: 4.8 MMOL/L (ref 3.5–5)
RBC # BLD: 4.09 M/UL (ref 4.2–5.4)
SODIUM BLD-SCNC: 141 MMOL/L (ref 136–145)
TOTAL CK: 99 U/L (ref 26–192)
TOTAL PROTEIN: 7.2 G/DL (ref 6.6–8.7)
WBC # BLD: 7.4 K/UL (ref 4.8–10.8)

## 2018-04-16 PROCEDURE — 99214 OFFICE O/P EST MOD 30 MIN: CPT | Performed by: FAMILY MEDICINE

## 2018-04-16 PROCEDURE — 3017F COLORECTAL CA SCREEN DOC REV: CPT | Performed by: FAMILY MEDICINE

## 2018-04-16 PROCEDURE — 4040F PNEUMOC VAC/ADMIN/RCVD: CPT | Performed by: FAMILY MEDICINE

## 2018-04-16 PROCEDURE — 93000 ELECTROCARDIOGRAM COMPLETE: CPT | Performed by: FAMILY MEDICINE

## 2018-04-16 PROCEDURE — 1123F ACP DISCUSS/DSCN MKR DOCD: CPT | Performed by: FAMILY MEDICINE

## 2018-04-16 PROCEDURE — G8417 CALC BMI ABV UP PARAM F/U: HCPCS | Performed by: FAMILY MEDICINE

## 2018-04-16 PROCEDURE — G8399 PT W/DXA RESULTS DOCUMENT: HCPCS | Performed by: FAMILY MEDICINE

## 2018-04-16 PROCEDURE — 71046 X-RAY EXAM CHEST 2 VIEWS: CPT

## 2018-04-16 PROCEDURE — 1036F TOBACCO NON-USER: CPT | Performed by: FAMILY MEDICINE

## 2018-04-16 PROCEDURE — G8427 DOCREV CUR MEDS BY ELIG CLIN: HCPCS | Performed by: FAMILY MEDICINE

## 2018-04-16 PROCEDURE — 1090F PRES/ABSN URINE INCON ASSESS: CPT | Performed by: FAMILY MEDICINE

## 2018-04-16 ASSESSMENT — ENCOUNTER SYMPTOMS
EYES NEGATIVE: 1
ALLERGIC/IMMUNOLOGIC NEGATIVE: 1
GASTROINTESTINAL NEGATIVE: 1
RESPIRATORY NEGATIVE: 1

## 2018-04-20 ENCOUNTER — TELEPHONE (OUTPATIENT)
Dept: FAMILY MEDICINE CLINIC | Age: 73
End: 2018-04-20

## 2018-04-20 DIAGNOSIS — E79.0 ELEVATED URIC ACID IN BLOOD: ICD-10-CM

## 2018-04-20 RX ORDER — ALLOPURINOL 100 MG/1
100 TABLET ORAL DAILY
Qty: 90 TABLET | Refills: 3 | Status: SHIPPED | OUTPATIENT
Start: 2018-04-20 | End: 2019-01-18 | Stop reason: SDUPTHER

## 2018-04-23 ENCOUNTER — HOSPITAL ENCOUNTER (OUTPATIENT)
Dept: GENERAL RADIOLOGY | Age: 73
Discharge: HOME | End: 2018-04-23
Payer: MEDICARE

## 2018-04-23 ENCOUNTER — OFFICE VISIT (OUTPATIENT)
Dept: FAMILY MEDICINE CLINIC | Age: 73
End: 2018-04-23
Payer: MEDICARE

## 2018-04-23 VITALS
DIASTOLIC BLOOD PRESSURE: 68 MMHG | BODY MASS INDEX: 30.82 KG/M2 | SYSTOLIC BLOOD PRESSURE: 104 MMHG | HEART RATE: 76 BPM | HEIGHT: 65 IN | WEIGHT: 185 LBS | RESPIRATION RATE: 18 BRPM | OXYGEN SATURATION: 94 % | TEMPERATURE: 98.1 F

## 2018-04-23 DIAGNOSIS — G89.29 CHRONIC NECK PAIN: ICD-10-CM

## 2018-04-23 DIAGNOSIS — M54.2 CHRONIC NECK PAIN: ICD-10-CM

## 2018-04-23 DIAGNOSIS — R50.9 FEVER, UNSPECIFIED FEVER CAUSE: ICD-10-CM

## 2018-04-23 DIAGNOSIS — D64.9 ANEMIA, UNSPECIFIED TYPE: ICD-10-CM

## 2018-04-23 DIAGNOSIS — I10 ESSENTIAL HYPERTENSION: ICD-10-CM
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
## 2018-04-23 LAB
ALBUMIN SERPL-MCNC: 4.2 G/DL (ref 3.5–5.2)
ALP BLD-CCNC: 107 U/L (ref 35–104)
ALT SERPL-CCNC: 21 U/L (ref 5–33)
ANION GAP SERPL CALCULATED.3IONS-SCNC: 17 MMOL/L (ref 7–19)
AST SERPL-CCNC: 25 U/L (ref 5–32)
BACTERIA: NEGATIVE /HPF
BILIRUB SERPL-MCNC: <0.2 MG/DL (ref 0.2–1.2)
BILIRUBIN URINE: ABNORMAL
BLOOD, URINE: NEGATIVE
BUN BLDV-MCNC: 16 MG/DL (ref 8–23)
CALCIUM SERPL-MCNC: 9.7 MG/DL (ref 8.8–10.2)
CHLORIDE BLD-SCNC: 101 MMOL/L (ref 98–111)
CLARITY: ABNORMAL
CO2: 24 MMOL/L (ref 22–29)
COLOR: ABNORMAL
CREAT SERPL-MCNC: 1.2 MG/DL (ref 0.5–0.9)
EPITHELIAL CELLS, UA: 25 /HPF (ref 0–5)
FERRITIN: 21.7 NG/ML (ref 13–150)
FOLATE: 9.3 NG/ML (ref 4.8–37.3)
GFR NON-AFRICAN AMERICAN: 44
GLUCOSE BLD-MCNC: 111 MG/DL (ref 74–109)
GLUCOSE URINE: NEGATIVE MG/DL
HCT VFR BLD CALC: 37.2 % (ref 37–47)
HEMOGLOBIN: 11.4 G/DL (ref 12–16)
HYALINE CASTS: 11 /HPF (ref 0–8)
IRON SATURATION: 7 % (ref 14–50)
IRON: 26 UG/DL (ref 37–145)
KETONES, URINE: NEGATIVE MG/DL
LEUKOCYTE ESTERASE, URINE: ABNORMAL
MCH RBC QN AUTO: 26.7 PG (ref 27–31)
MCHC RBC AUTO-ENTMCNC: 30.6 G/DL (ref 33–37)
MCV RBC AUTO: 87.1 FL (ref 81–99)
MONO TEST: NEGATIVE
NITRITE, URINE: NEGATIVE
PDW BLD-RTO: 14.1 % (ref 11.5–14.5)
PH UA: 6
PLATELET # BLD: 337 K/UL (ref 130–400)
PMV BLD AUTO: 10 FL (ref 9.4–12.3)
POTASSIUM SERPL-SCNC: 4.2 MMOL/L (ref 3.5–5)
PROTEIN UA: NEGATIVE MG/DL
RBC # BLD: 4.27 M/UL (ref 4.2–5.4)
RBC UA: 2 /HPF (ref 0–4)
RETICULOCYTE ABSOLUTE COUNT: 0.09 M/UL (ref 0.03–0.12)
RETICULOCYTE COUNT PCT: 2 % (ref 0.5–1.5)
SODIUM BLD-SCNC: 142 MMOL/L (ref 136–145)
SPECIFIC GRAVITY UA: 1.02
TOTAL IRON BINDING CAPACITY: 399 UG/DL (ref 250–400)
TOTAL PROTEIN: 7.6 G/DL (ref 6.6–8.7)
UROBILINOGEN, URINE: 0.2 E.U./DL
VITAMIN B-12: 825 PG/ML (ref 211–946)
WBC # BLD: 7.4 K/UL (ref 4.8–10.8)
WBC UA: 8 /HPF (ref 0–5)

## 2018-04-23 PROCEDURE — G8417 CALC BMI ABV UP PARAM F/U: HCPCS | Performed by: FAMILY MEDICINE

## 2018-04-23 PROCEDURE — 3017F COLORECTAL CA SCREEN DOC REV: CPT | Performed by: FAMILY MEDICINE

## 2018-04-23 PROCEDURE — G8399 PT W/DXA RESULTS DOCUMENT: HCPCS | Performed by: FAMILY MEDICINE

## 2018-04-23 PROCEDURE — 93000 ELECTROCARDIOGRAM COMPLETE: CPT | Performed by: FAMILY MEDICINE

## 2018-04-23 PROCEDURE — 1090F PRES/ABSN URINE INCON ASSESS: CPT | Performed by: FAMILY MEDICINE

## 2018-04-23 PROCEDURE — 99214 OFFICE O/P EST MOD 30 MIN: CPT | Performed by: FAMILY MEDICINE

## 2018-04-23 PROCEDURE — G8427 DOCREV CUR MEDS BY ELIG CLIN: HCPCS | Performed by: FAMILY MEDICINE

## 2018-04-23 PROCEDURE — 72125 CT NECK SPINE W/O DYE: CPT

## 2018-04-23 PROCEDURE — 1123F ACP DISCUSS/DSCN MKR DOCD: CPT | Performed by: FAMILY MEDICINE

## 2018-04-23 PROCEDURE — 1036F TOBACCO NON-USER: CPT | Performed by: FAMILY MEDICINE

## 2018-04-23 PROCEDURE — 4040F PNEUMOC VAC/ADMIN/RCVD: CPT | Performed by: FAMILY MEDICINE

## 2018-04-23 PROCEDURE — 71250 CT THORAX DX C-: CPT

## 2018-04-23 RX ORDER — LEVOFLOXACIN 500 MG/1
500 TABLET, FILM COATED ORAL DAILY
Qty: 10 TABLET | Refills: 0 | Status: SHIPPED | OUTPATIENT
Start: 2018-04-23 | End: 2018-05-03

## 2018-04-23 RX ORDER — AMLODIPINE AND OLMESARTAN MEDOXOMIL 5; 20 MG/1; MG/1
1 TABLET ORAL DAILY
Qty: 30 TABLET | Refills: 5 | Status: SHIPPED | OUTPATIENT
Start: 2018-04-23 | End: 2019-01-21 | Stop reason: SDUPTHER

## 2018-04-23 RX ORDER — CARVEDILOL 12.5 MG/1
6.25 TABLET ORAL 2 TIMES DAILY
Qty: 180 TABLET | Refills: 3 | Status: SHIPPED | OUTPATIENT
Start: 2018-04-23 | End: 2018-07-27 | Stop reason: SDUPTHER

## 2018-04-23 RX ORDER — FERROUS SULFATE 325(65) MG
325 TABLET ORAL DAILY
Qty: 30 TABLET | Refills: 3 | Status: SHIPPED | OUTPATIENT
Start: 2018-04-23 | End: 2018-09-25 | Stop reason: SDUPTHER

## 2018-04-23 RX ORDER — OXYCODONE AND ACETAMINOPHEN 10; 325 MG/1; MG/1
1 TABLET ORAL EVERY 6 HOURS PRN
Qty: 30 TABLET | Refills: 0 | Status: SHIPPED | OUTPATIENT
Start: 2018-04-23 | End: 2018-11-15 | Stop reason: SDUPTHER

## 2018-04-23 ASSESSMENT — ENCOUNTER SYMPTOMS
RESPIRATORY NEGATIVE: 1
ALLERGIC/IMMUNOLOGIC NEGATIVE: 1
GASTROINTESTINAL NEGATIVE: 1
EYES NEGATIVE: 1

## 2018-04-24 RX ORDER — BUPROPION HYDROCHLORIDE 150 MG/1
TABLET, EXTENDED RELEASE ORAL
Qty: 30 TABLET | Refills: 5 | Status: SHIPPED | OUTPATIENT
Start: 2018-04-24 | End: 2018-11-05 | Stop reason: SDUPTHER

## 2018-04-25 LAB
LYME (B. BURGDORFERI) AB IGG WB: NEGATIVE
LYME AB IGM BY WB:: NEGATIVE

## 2018-04-27 LAB
ROCKY MOUNTAIN SPOTTED FEVER AB IGM: ABNORMAL
ROCKY MOUNTAIN SPOTTED FEVER ANTIBODY IGG: ABNORMAL

## 2018-04-28 LAB
BLOOD CULTURE, ROUTINE: NORMAL
CULTURE, BLOOD 2: NORMAL

## 2018-04-30 DIAGNOSIS — A77.0 RMSF (ROCKY MOUNTAIN SPOTTED FEVER): Primary | ICD-10-CM

## 2018-04-30 RX ORDER — DOXYCYCLINE HYCLATE 100 MG
100 TABLET ORAL 2 TIMES DAILY
Qty: 20 TABLET | Refills: 0 | Status: SHIPPED | OUTPATIENT
Start: 2018-04-30 | End: 2018-05-10

## 2018-05-09 RX ORDER — FLUOXETINE HYDROCHLORIDE 40 MG/1
40 CAPSULE ORAL DAILY
Qty: 30 CAPSULE | Refills: 5 | Status: SHIPPED | OUTPATIENT
Start: 2018-05-09 | End: 2018-10-06 | Stop reason: SDUPTHER

## 2018-05-15 ENCOUNTER — TELEPHONE (OUTPATIENT)
Dept: FAMILY MEDICINE CLINIC | Age: 73
End: 2018-05-15

## 2018-05-15 DIAGNOSIS — B36.9 FUNGAL INFECTION OF SKIN OF ABDOMEN: Primary | ICD-10-CM

## 2018-05-30 RX ORDER — LEVOTHYROXINE SODIUM 0.1 MG/1
100 TABLET ORAL DAILY
Qty: 30 TABLET | Refills: 5 | Status: SHIPPED | OUTPATIENT
Start: 2018-05-30 | End: 2018-11-05 | Stop reason: SDUPTHER

## 2018-06-06 DIAGNOSIS — M54.9 CHRONIC NECK AND BACK PAIN: ICD-10-CM

## 2018-06-06 DIAGNOSIS — G47.00 INSOMNIA, UNSPECIFIED TYPE: ICD-10-CM

## 2018-06-06 DIAGNOSIS — G89.29 CHRONIC NECK AND BACK PAIN: ICD-10-CM

## 2018-06-06 DIAGNOSIS — M54.2 CHRONIC NECK AND BACK PAIN: ICD-10-CM

## 2018-06-06 RX ORDER — DOXEPIN HYDROCHLORIDE 50 MG/1
100 CAPSULE ORAL NIGHTLY
Qty: 180 CAPSULE | Refills: 3 | Status: SHIPPED | OUTPATIENT
Start: 2018-06-06 | End: 2019-04-30 | Stop reason: SDUPTHER

## 2018-06-06 RX ORDER — GABAPENTIN 300 MG/1
CAPSULE ORAL
Qty: 180 CAPSULE | Refills: 5 | Status: SHIPPED | OUTPATIENT
Start: 2018-06-06 | End: 2019-01-18 | Stop reason: SDUPTHER

## 2018-06-13 DIAGNOSIS — I10 ESSENTIAL HYPERTENSION: ICD-10-CM

## 2018-06-13 RX ORDER — CLONIDINE HYDROCHLORIDE 0.1 MG/1
TABLET ORAL
Qty: 60 TABLET | Refills: 5 | Status: SHIPPED | OUTPATIENT
Start: 2018-06-13 | End: 2018-12-28 | Stop reason: SDUPTHER

## 2018-06-21 DIAGNOSIS — Z12.31 ENCOUNTER FOR SCREENING MAMMOGRAM FOR MALIGNANT NEOPLASM OF BREAST: Primary | ICD-10-CM

## 2018-06-21 DIAGNOSIS — R05.3 CHRONIC COUGH: Primary | ICD-10-CM

## 2018-06-26 ENCOUNTER — HOSPITAL ENCOUNTER (OUTPATIENT)
Dept: GENERAL RADIOLOGY | Age: 73
Discharge: HOME | End: 2018-06-26
Payer: MEDICARE

## 2018-06-26 DIAGNOSIS — R05.3 CHRONIC COUGH: ICD-10-CM

## 2018-06-26 PROCEDURE — 71250 CT THORAX DX C-: CPT

## 2018-07-06 DIAGNOSIS — M54.9 CHRONIC NECK AND BACK PAIN: Primary | ICD-10-CM

## 2018-07-06 DIAGNOSIS — G89.29 CHRONIC NECK AND BACK PAIN: Primary | ICD-10-CM

## 2018-07-06 DIAGNOSIS — M54.2 CHRONIC NECK AND BACK PAIN: Primary | ICD-10-CM

## 2018-07-06 RX ORDER — CARISOPRODOL 350 MG/1
TABLET ORAL
Qty: 90 TABLET | Refills: 2 | Status: SHIPPED | OUTPATIENT
Start: 2018-07-06 | End: 2018-11-05 | Stop reason: SDUPTHER

## 2018-07-17 ENCOUNTER — HOSPITAL ENCOUNTER (OUTPATIENT)
Age: 73
Setting detail: OUTPATIENT SURGERY
Discharge: HOME | End: 2018-07-17
Attending: INTERNAL MEDICINE | Admitting: INTERNAL MEDICINE
Payer: MEDICARE

## 2018-07-17 VITALS
HEIGHT: 65 IN | DIASTOLIC BLOOD PRESSURE: 61 MMHG | HEART RATE: 64 BPM | TEMPERATURE: 97.8 F | BODY MASS INDEX: 31.65 KG/M2 | SYSTOLIC BLOOD PRESSURE: 123 MMHG | WEIGHT: 190 LBS | OXYGEN SATURATION: 96 % | RESPIRATION RATE: 16 BRPM

## 2018-07-17 PROBLEM — R91.8 PULMONARY INFILTRATE IN LEFT LUNG ON CHEST X-RAY: Status: ACTIVE | Noted: 2018-07-17

## 2018-07-17 LAB
APPEARANCE FLUID: NORMAL
APPEARANCE FLUID: NORMAL
CELL COUNT FLUID TYPE: NORMAL
CELL COUNT FLUID TYPE: NORMAL
CLOT EVALUATION: NORMAL
CLOT EVALUATION: NORMAL
COLOR FLUID: COLORLESS
COLOR FLUID: NORMAL
FLUID PATH CONSULT: NO
FLUID PATH CONSULT: NO
LYMPHOCYTES, BODY FLUID: 16 %
LYMPHOCYTES, BODY FLUID: 7 %
MACROPHAGE FLUID: 10 %
MACROPHAGE FLUID: 69 %
MONOCYTE, FLUID: 2 %
MONOCYTE, FLUID: 3 %
MONONUCLEAR UNIDENTIFIED CELLS FLUID: 4 %
NEUTROPHIL, FLUID: 8 %
NEUTROPHIL, FLUID: 81 %
NUCLEATED CELLS FLUID: 245 /CUMM
NUCLEATED CELLS FLUID: 875 /CUMM
NUMBER OF CELLS COUNTED FLUID: 100
NUMBER OF CELLS COUNTED FLUID: 100
RBC FLUID: 1580 /CUMM
RBC FLUID: 45 /CUMM

## 2018-07-17 PROCEDURE — 2500000003 HC RX 250 WO HCPCS: Performed by: INTERNAL MEDICINE

## 2018-07-17 PROCEDURE — 87102 FUNGUS ISOLATION CULTURE: CPT

## 2018-07-17 PROCEDURE — 6360000002 HC RX W HCPCS: Performed by: INTERNAL MEDICINE

## 2018-07-17 PROCEDURE — 87070 CULTURE OTHR SPECIMN AEROBIC: CPT

## 2018-07-17 PROCEDURE — 87186 SC STD MICRODIL/AGAR DIL: CPT

## 2018-07-17 PROCEDURE — 6370000000 HC RX 637 (ALT 250 FOR IP): Performed by: INTERNAL MEDICINE

## 2018-07-17 PROCEDURE — 7100000011 HC PHASE II RECOVERY - ADDTL 15 MIN: Performed by: INTERNAL MEDICINE

## 2018-07-17 PROCEDURE — 87077 CULTURE AEROBIC IDENTIFY: CPT

## 2018-07-17 PROCEDURE — 87252 VIRUS INOCULATION TISSUE: CPT

## 2018-07-17 PROCEDURE — 88112 CYTOPATH CELL ENHANCE TECH: CPT

## 2018-07-17 PROCEDURE — 3609027000 HC BRONCHOSCOPY: Performed by: INTERNAL MEDICINE

## 2018-07-17 PROCEDURE — 2580000003 HC RX 258: Performed by: INTERNAL MEDICINE

## 2018-07-17 PROCEDURE — 87210 SMEAR WET MOUNT SALINE/INK: CPT

## 2018-07-17 PROCEDURE — 87015 SPECIMEN INFECT AGNT CONCNTJ: CPT

## 2018-07-17 PROCEDURE — 88312 SPECIAL STAINS GROUP 1: CPT

## 2018-07-17 PROCEDURE — 89051 BODY FLUID CELL COUNT: CPT

## 2018-07-17 PROCEDURE — 99152 MOD SED SAME PHYS/QHP 5/>YRS: CPT | Performed by: INTERNAL MEDICINE

## 2018-07-17 PROCEDURE — 7100000010 HC PHASE II RECOVERY - FIRST 15 MIN: Performed by: INTERNAL MEDICINE

## 2018-07-17 PROCEDURE — 87206 SMEAR FLUORESCENT/ACID STAI: CPT

## 2018-07-17 PROCEDURE — 87116 MYCOBACTERIA CULTURE: CPT

## 2018-07-17 PROCEDURE — 87205 SMEAR GRAM STAIN: CPT

## 2018-07-17 RX ORDER — FENTANYL CITRATE 50 UG/ML
INJECTION, SOLUTION INTRAMUSCULAR; INTRAVENOUS PRN
Status: DISCONTINUED | OUTPATIENT
Start: 2018-07-17 | End: 2018-07-17 | Stop reason: HOSPADM

## 2018-07-17 RX ORDER — LIDOCAINE HYDROCHLORIDE 10 MG/ML
1 INJECTION, SOLUTION EPIDURAL; INFILTRATION; INTRACAUDAL; PERINEURAL ONCE
Status: COMPLETED | OUTPATIENT
Start: 2018-07-17 | End: 2018-07-17

## 2018-07-17 RX ORDER — SODIUM CHLORIDE 9 MG/ML
INJECTION, SOLUTION INTRAVENOUS CONTINUOUS
Status: DISCONTINUED | OUTPATIENT
Start: 2018-07-17 | End: 2018-07-17 | Stop reason: HOSPADM

## 2018-07-17 RX ORDER — LIDOCAINE HYDROCHLORIDE 10 MG/ML
INJECTION, SOLUTION EPIDURAL; INFILTRATION; INTRACAUDAL; PERINEURAL PRN
Status: DISCONTINUED | OUTPATIENT
Start: 2018-07-17 | End: 2018-07-17 | Stop reason: HOSPADM

## 2018-07-17 RX ORDER — MIDAZOLAM HYDROCHLORIDE 1 MG/ML
INJECTION INTRAMUSCULAR; INTRAVENOUS PRN
Status: DISCONTINUED | OUTPATIENT
Start: 2018-07-17 | End: 2018-07-17 | Stop reason: HOSPADM

## 2018-07-17 RX ADMIN — SODIUM CHLORIDE: 9 INJECTION, SOLUTION INTRAVENOUS at 06:56

## 2018-07-17 RX ADMIN — LIDOCAINE HYDROCHLORIDE 1 ML: 10 INJECTION, SOLUTION EPIDURAL; INFILTRATION; INTRACAUDAL; PERINEURAL at 06:57

## 2018-07-17 ASSESSMENT — PAIN - FUNCTIONAL ASSESSMENT: PAIN_FUNCTIONAL_ASSESSMENT: 0-10

## 2018-07-19 LAB
CULTURE, RESPIRATORY: ABNORMAL
GRAM STAIN RESULT: ABNORMAL
GRAM STAIN RESULT: ABNORMAL
ORGANISM: ABNORMAL
ORGANISM: ABNORMAL

## 2018-07-27 RX ORDER — CARVEDILOL 12.5 MG/1
TABLET ORAL
Qty: 180 TABLET | Refills: 3 | Status: SHIPPED | OUTPATIENT
Start: 2018-07-27 | End: 2019-05-02

## 2018-07-30 LAB
FINAL REPORT: NORMAL
FINAL REPORT: NORMAL
PRELIMINARY: NORMAL
PRELIMINARY: NORMAL

## 2018-08-01 DIAGNOSIS — M54.2 CHRONIC NECK AND BACK PAIN: ICD-10-CM

## 2018-08-01 DIAGNOSIS — G89.29 CHRONIC NECK AND BACK PAIN: ICD-10-CM

## 2018-08-01 DIAGNOSIS — M19.90 ARTHRITIS: Primary | ICD-10-CM

## 2018-08-01 DIAGNOSIS — F41.9 ANXIETY: Primary | ICD-10-CM

## 2018-08-01 DIAGNOSIS — M54.9 CHRONIC NECK AND BACK PAIN: ICD-10-CM

## 2018-08-01 RX ORDER — CELECOXIB 200 MG/1
CAPSULE ORAL
Qty: 30 CAPSULE | Refills: 1 | Status: SHIPPED | OUTPATIENT
Start: 2018-08-01 | End: 2018-08-01 | Stop reason: SDUPTHER

## 2018-08-01 RX ORDER — LORAZEPAM 1 MG/1
TABLET ORAL
Qty: 90 TABLET | Refills: 2 | Status: SHIPPED | OUTPATIENT
Start: 2018-08-01 | End: 2018-10-06 | Stop reason: SDUPTHER

## 2018-08-01 RX ORDER — CELECOXIB 200 MG/1
CAPSULE ORAL
Qty: 30 CAPSULE | Refills: 5 | Status: SHIPPED | OUTPATIENT
Start: 2018-08-01 | End: 2019-05-02

## 2018-08-06 RX ORDER — FERROUS SULFATE TAB EC 324 MG (65 MG FE EQUIVALENT) 324 (65 FE) MG
TABLET DELAYED RESPONSE ORAL
Qty: 30 TABLET | Refills: 3 | Status: SHIPPED | OUTPATIENT
Start: 2018-08-06 | End: 2018-12-14 | Stop reason: SDUPTHER

## 2018-08-08 ENCOUNTER — HOSPITAL ENCOUNTER (OUTPATIENT)
Dept: GENERAL RADIOLOGY | Age: 73
Discharge: HOME | End: 2018-08-08
Payer: MEDICARE

## 2018-08-08 ENCOUNTER — OFFICE VISIT (OUTPATIENT)
Dept: FAMILY MEDICINE CLINIC | Age: 73
End: 2018-08-08
Payer: MEDICARE

## 2018-08-08 VITALS
WEIGHT: 189 LBS | BODY MASS INDEX: 31.49 KG/M2 | HEIGHT: 65 IN | TEMPERATURE: 98.4 F | RESPIRATION RATE: 16 BRPM | OXYGEN SATURATION: 93 % | SYSTOLIC BLOOD PRESSURE: 116 MMHG | DIASTOLIC BLOOD PRESSURE: 68 MMHG | HEART RATE: 70 BPM

## 2018-08-08 DIAGNOSIS — M54.41 CHRONIC MIDLINE LOW BACK PAIN WITH BILATERAL SCIATICA: Primary | ICD-10-CM

## 2018-08-08 DIAGNOSIS — M54.41 CHRONIC MIDLINE LOW BACK PAIN WITH BILATERAL SCIATICA: ICD-10-CM

## 2018-08-08 DIAGNOSIS — G89.29 CHRONIC MIDLINE LOW BACK PAIN WITH BILATERAL SCIATICA: ICD-10-CM

## 2018-08-08 DIAGNOSIS — G89.29 CHRONIC MIDLINE LOW BACK PAIN WITH BILATERAL SCIATICA: Primary | ICD-10-CM

## 2018-08-08 DIAGNOSIS — M54.42 CHRONIC MIDLINE LOW BACK PAIN WITH BILATERAL SCIATICA: Primary | ICD-10-CM

## 2018-08-08 DIAGNOSIS — M54.42 CHRONIC MIDLINE LOW BACK PAIN WITH BILATERAL SCIATICA: ICD-10-CM

## 2018-08-08 PROCEDURE — 3017F COLORECTAL CA SCREEN DOC REV: CPT | Performed by: FAMILY MEDICINE

## 2018-08-08 PROCEDURE — 1090F PRES/ABSN URINE INCON ASSESS: CPT | Performed by: FAMILY MEDICINE

## 2018-08-08 PROCEDURE — G8399 PT W/DXA RESULTS DOCUMENT: HCPCS | Performed by: FAMILY MEDICINE

## 2018-08-08 PROCEDURE — 1123F ACP DISCUSS/DSCN MKR DOCD: CPT | Performed by: FAMILY MEDICINE

## 2018-08-08 PROCEDURE — 1036F TOBACCO NON-USER: CPT | Performed by: FAMILY MEDICINE

## 2018-08-08 PROCEDURE — G8417 CALC BMI ABV UP PARAM F/U: HCPCS | Performed by: FAMILY MEDICINE

## 2018-08-08 PROCEDURE — 96372 THER/PROPH/DIAG INJ SC/IM: CPT | Performed by: FAMILY MEDICINE

## 2018-08-08 PROCEDURE — 99213 OFFICE O/P EST LOW 20 MIN: CPT | Performed by: FAMILY MEDICINE

## 2018-08-08 PROCEDURE — 4040F PNEUMOC VAC/ADMIN/RCVD: CPT | Performed by: FAMILY MEDICINE

## 2018-08-08 PROCEDURE — 72072 X-RAY EXAM THORAC SPINE 3VWS: CPT

## 2018-08-08 PROCEDURE — 72100 X-RAY EXAM L-S SPINE 2/3 VWS: CPT

## 2018-08-08 PROCEDURE — G8427 DOCREV CUR MEDS BY ELIG CLIN: HCPCS | Performed by: FAMILY MEDICINE

## 2018-08-08 PROCEDURE — 1101F PT FALLS ASSESS-DOCD LE1/YR: CPT | Performed by: FAMILY MEDICINE

## 2018-08-08 RX ORDER — TRIAMCINOLONE ACETONIDE 40 MG/ML
40 INJECTION, SUSPENSION INTRA-ARTICULAR; INTRAMUSCULAR ONCE
Status: COMPLETED | OUTPATIENT
Start: 2018-08-08 | End: 2018-08-08

## 2018-08-08 RX ORDER — DEXAMETHASONE SODIUM PHOSPHATE 4 MG/ML
4 INJECTION, SOLUTION INTRA-ARTICULAR; INTRALESIONAL; INTRAMUSCULAR; INTRAVENOUS; SOFT TISSUE ONCE
Status: COMPLETED | OUTPATIENT
Start: 2018-08-08 | End: 2018-08-08

## 2018-08-08 RX ADMIN — TRIAMCINOLONE ACETONIDE 40 MG: 40 INJECTION, SUSPENSION INTRA-ARTICULAR; INTRAMUSCULAR at 16:18

## 2018-08-08 RX ADMIN — DEXAMETHASONE SODIUM PHOSPHATE 4 MG: 4 INJECTION, SOLUTION INTRA-ARTICULAR; INTRALESIONAL; INTRAMUSCULAR; INTRAVENOUS; SOFT TISSUE at 16:18

## 2018-08-08 ASSESSMENT — PATIENT HEALTH QUESTIONNAIRE - PHQ9
2. FEELING DOWN, DEPRESSED OR HOPELESS: 1
1. LITTLE INTEREST OR PLEASURE IN DOING THINGS: 1
SUM OF ALL RESPONSES TO PHQ QUESTIONS 1-9: 2
SUM OF ALL RESPONSES TO PHQ9 QUESTIONS 1 & 2: 2
SUM OF ALL RESPONSES TO PHQ QUESTIONS 1-9: 2

## 2018-08-08 ASSESSMENT — ENCOUNTER SYMPTOMS
RESPIRATORY NEGATIVE: 1
BACK PAIN: 1
ALLERGIC/IMMUNOLOGIC NEGATIVE: 1
EYES NEGATIVE: 1
GASTROINTESTINAL NEGATIVE: 1

## 2018-08-08 NOTE — PROGRESS NOTES
SUBJECTIVE:    Patient ID: Abel Davison is a 74 y.o. female. HPI:   Patient here for follow-up of back pain  Patient been having increasing lower back pain. Pain radiates to both of his legs. Pain is diffusely located between her lower back and midback. Denies any recent falls. She been having problems on and off with this for years. She have an appointment to see Dr. Nathan Cage her appointment is not until October. Past Medical History:   Diagnosis Date    Allergic rhinitis     Anxiety     Arthritis     Asthma     Chronic back pain     Chronic kidney disease     Depression     GERD (gastroesophageal reflux disease)     Hyperlipidemia     Hypertension     Hypothyroidism     Irritable bowel syndrome     Restless legs syndrome       Current Outpatient Prescriptions   Medication Sig Dispense Refill    ferrous sulfate 324 (65 Fe) MG EC tablet TAKE ONE TABLET BY MOUTH DAILY 30 tablet 3    celecoxib (CELEBREX) 200 MG capsule TAKE ONE CAPSULE BY MOUTH DAILY -TAKE WITH FOOD ** MAY CAUSE DROWSINESS 30 capsule 5    LORazepam (ATIVAN) 1 MG tablet TAKE ONE TABLET BY MOUTH THREE TIMES DAILY AS NEEDED FOR ANXIETY ** MAY CAUSE DROWSINESS 90 tablet 2    carvedilol (COREG) 12.5 MG tablet TAKE ONE TABLET BY MOUTH TWICE A DAY WTIH MEALS ** MAY CAUSE DROWSINESS 180 tablet 3    cloNIDine (CATAPRES) 0.1 MG tablet TAKE ONE TABLET BY MOUTH TWICE A DAY 60 tablet 5    gabapentin (NEURONTIN) 300 MG capsule TAKE TWO CAPSULES BY MOUTH THREE TIMES DAILY AS NEEDED ** MAY CAUSEDROWSINESS. 180 capsule 5    doxepin (SINEQUAN) 50 MG capsule Take 2 capsules by mouth nightly 180 capsule 3    levothyroxine (SYNTHROID) 100 MCG tablet Take 1 tablet by mouth daily 30 tablet 5    ciclopirox (LOPROX) 0.77 % cream Apply topically 2 times daily.  30 g 1    FLUoxetine (PROZAC) 40 MG capsule Take 1 capsule by mouth daily 30 capsule 5    buPROPion (WELLBUTRIN SR) 150 MG extended release tablet TAKE ONE TABLET BY MOUTH DAILY ** MAY CAUSE DROWSINESS 30 tablet 5    amLODIPine-olmesartan (WILL) 5-20 MG per tablet Take 1 tablet by mouth daily 30 tablet 5    ferrous sulfate (DAVID-CLAUDIA) 325 (65 Fe) MG tablet Take 1 tablet by mouth daily 30 tablet 3    allopurinol (ZYLOPRIM) 100 MG tablet Take 1 tablet by mouth daily 90 tablet 3    simvastatin (ZOCOR) 20 MG tablet TAKE ONE TABLET BY MOUTH EVERY EVENING 90 tablet 3    pantoprazole (PROTONIX) 40 MG tablet TAKE ONE TABLET BY MOUTH TWICE A DAY (Patient taking differently: daily TAKE ONE TABLET BY MOUTH TWICE A DAY) 60 tablet 5    mometasone-formoterol (DULERA) 100-5 MCG/ACT inhaler Inhale 2 puffs into the lungs 2 times daily 1 Inhaler 5    oxyCODONE-acetaminophen (PERCOCET)  MG per tablet Take 1 tablet by mouth daily . Earliest Fill Date: 7/10/17 (Patient taking differently: Take 1 tablet by mouth daily as needed. Jewell Cameron ) 30 tablet 0    dicyclomine (BENTYL) 20 MG tablet Take 1 tablet by mouth every 6 hours 120 tablet 5    cetirizine (ZYRTEC) 10 MG tablet Take 10 mg by mouth daily      albuterol-ipratropium (COMBIVENT)  MCG/ACT inhaler Inhale 2 puffs into the lungs every 6 hours as needed for Wheezing.  EPINEPHrine (EPIPEN) 0.3 MG/0.3ML SOAJ injection Inject 0.3 mg into the muscle as needed. Use as directed for allergic reaction      mometasone (NASONEX) 50 MCG/ACT nasal spray 2 sprays by Nasal route daily. No current facility-administered medications for this visit. Allergies   Allergen Reactions    Penicillins     Sulfa Antibiotics        Review of Systems   Constitutional: Negative. HENT: Negative. Eyes: Negative. Respiratory: Negative. Cardiovascular: Negative. Gastrointestinal: Negative. Endocrine: Negative. Genitourinary: Negative. Musculoskeletal: Positive for back pain. Skin: Negative. Allergic/Immunologic: Negative. Neurological: Negative. Hematological: Negative. Psychiatric/Behavioral: Negative.

## 2018-08-13 DIAGNOSIS — E78.5 HYPERLIPIDEMIA, UNSPECIFIED HYPERLIPIDEMIA TYPE: ICD-10-CM

## 2018-08-13 DIAGNOSIS — E79.0 ELEVATED URIC ACID IN BLOOD: ICD-10-CM

## 2018-08-13 DIAGNOSIS — E03.8 OTHER SPECIFIED HYPOTHYROIDISM: ICD-10-CM

## 2018-08-13 DIAGNOSIS — I10 ESSENTIAL HYPERTENSION: Primary | ICD-10-CM

## 2018-08-21 DIAGNOSIS — E79.0 ELEVATED URIC ACID IN BLOOD: ICD-10-CM

## 2018-08-21 DIAGNOSIS — E03.8 OTHER SPECIFIED HYPOTHYROIDISM: ICD-10-CM

## 2018-08-21 DIAGNOSIS — I10 ESSENTIAL HYPERTENSION: ICD-10-CM

## 2018-08-21 DIAGNOSIS — E78.5 HYPERLIPIDEMIA, UNSPECIFIED HYPERLIPIDEMIA TYPE: ICD-10-CM

## 2018-08-21 LAB
ALBUMIN SERPL-MCNC: 4.3 G/DL (ref 3.5–5.2)
ALP BLD-CCNC: 97 U/L (ref 35–104)
ALT SERPL-CCNC: 20 U/L (ref 5–33)
ANION GAP SERPL CALCULATED.3IONS-SCNC: 20 MMOL/L (ref 7–19)
AST SERPL-CCNC: 27 U/L (ref 5–32)
BACTERIA: NEGATIVE /HPF
BILIRUB SERPL-MCNC: <0.2 MG/DL (ref 0.2–1.2)
BILIRUBIN URINE: NEGATIVE
BLOOD, URINE: NEGATIVE
BUN BLDV-MCNC: 19 MG/DL (ref 8–23)
CALCIUM SERPL-MCNC: 9.7 MG/DL (ref 8.8–10.2)
CHLORIDE BLD-SCNC: 101 MMOL/L (ref 98–111)
CHOLESTEROL, TOTAL: 188 MG/DL (ref 160–199)
CLARITY: CLEAR
CO2: 21 MMOL/L (ref 22–29)
COLOR: YELLOW
CREAT SERPL-MCNC: 1.1 MG/DL (ref 0.5–0.9)
EPITHELIAL CELLS, UA: 2 /HPF (ref 0–5)
FUNGUS (MYCOLOGY) CULTURE: NORMAL
GFR NON-AFRICAN AMERICAN: 48
GLUCOSE BLD-MCNC: 103 MG/DL (ref 74–109)
GLUCOSE URINE: NEGATIVE MG/DL
HCT VFR BLD CALC: 39.4 % (ref 37–47)
HDLC SERPL-MCNC: 51 MG/DL (ref 65–121)
HEMOGLOBIN: 12.3 G/DL (ref 12–16)
HYALINE CASTS: 1 /HPF (ref 0–8)
KETONES, URINE: NEGATIVE MG/DL
KOH PREP: NORMAL
LDL CHOLESTEROL CALCULATED: 113 MG/DL
LEUKOCYTE ESTERASE, URINE: ABNORMAL
MCH RBC QN AUTO: 28.2 PG (ref 27–31)
MCHC RBC AUTO-ENTMCNC: 31.2 G/DL (ref 33–37)
MCV RBC AUTO: 90.4 FL (ref 81–99)
NITRITE, URINE: NEGATIVE
PDW BLD-RTO: 16.6 % (ref 11.5–14.5)
PH UA: 5.5
PLATELET # BLD: 254 K/UL (ref 130–400)
PMV BLD AUTO: 10.4 FL (ref 9.4–12.3)
POTASSIUM SERPL-SCNC: 4.5 MMOL/L (ref 3.5–5)
PROTEIN UA: NEGATIVE MG/DL
RBC # BLD: 4.36 M/UL (ref 4.2–5.4)
RBC UA: 1 /HPF (ref 0–4)
SODIUM BLD-SCNC: 142 MMOL/L (ref 136–145)
SPECIFIC GRAVITY UA: 1.02
TOTAL PROTEIN: 7.5 G/DL (ref 6.6–8.7)
TRIGL SERPL-MCNC: 121 MG/DL (ref 0–149)
TSH SERPL DL<=0.05 MIU/L-ACNC: 2.95 UIU/ML (ref 0.27–4.2)
URIC ACID, SERUM: 6.1 MG/DL (ref 2.4–5.7)
URINE REFLEX TO CULTURE: YES
UROBILINOGEN, URINE: 0.2 E.U./DL
WBC # BLD: 6.9 K/UL (ref 4.8–10.8)
WBC UA: 4 /HPF (ref 0–5)

## 2018-08-23 LAB — URINE CULTURE, ROUTINE: NORMAL

## 2018-08-27 LAB
FUNGUS (MYCOLOGY) CULTURE: ABNORMAL
FUNGUS (MYCOLOGY) CULTURE: ABNORMAL
KOH PREP: ABNORMAL
ORGANISM: ABNORMAL

## 2018-08-29 LAB
AFB CULTURE (MYCOBACTERIA): NORMAL
AFB CULTURE (MYCOBACTERIA): NORMAL
AFB SMEAR: NORMAL
AFB SMEAR: NORMAL

## 2018-09-12 ENCOUNTER — NURSE ONLY (OUTPATIENT)
Dept: FAMILY MEDICINE CLINIC | Age: 73
End: 2018-09-12
Payer: MEDICARE

## 2018-09-12 DIAGNOSIS — J30.2 SEASONAL ALLERGIES: ICD-10-CM

## 2018-09-12 DIAGNOSIS — Z23 NEED FOR INFLUENZA VACCINATION: Primary | ICD-10-CM

## 2018-09-12 DIAGNOSIS — Z88.9 MULTIPLE ALLERGIES: ICD-10-CM

## 2018-09-12 PROCEDURE — 90662 IIV NO PRSV INCREASED AG IM: CPT | Performed by: INTERNAL MEDICINE

## 2018-09-12 PROCEDURE — G0008 ADMIN INFLUENZA VIRUS VAC: HCPCS | Performed by: INTERNAL MEDICINE

## 2018-09-12 PROCEDURE — 95115 IMMUNOTHERAPY ONE INJECTION: CPT | Performed by: INTERNAL MEDICINE

## 2018-09-12 ASSESSMENT — PATIENT HEALTH QUESTIONNAIRE - PHQ9
1. LITTLE INTEREST OR PLEASURE IN DOING THINGS: 0
SUM OF ALL RESPONSES TO PHQ QUESTIONS 1-9: 0
SUM OF ALL RESPONSES TO PHQ9 QUESTIONS 1 & 2: 0
2. FEELING DOWN, DEPRESSED OR HOPELESS: 0
SUM OF ALL RESPONSES TO PHQ QUESTIONS 1-9: 0

## 2018-10-08 RX ORDER — PANTOPRAZOLE SODIUM 40 MG/1
TABLET, DELAYED RELEASE ORAL
Qty: 60 TABLET | Refills: 5 | Status: SHIPPED | OUTPATIENT
Start: 2018-10-08 | End: 2019-02-15 | Stop reason: SDUPTHER

## 2018-10-10 ENCOUNTER — NURSE ONLY (OUTPATIENT)
Dept: FAMILY MEDICINE CLINIC | Age: 73
End: 2018-10-10
Payer: MEDICARE

## 2018-10-10 DIAGNOSIS — E53.8 B12 DEFICIENCY: ICD-10-CM

## 2018-10-10 DIAGNOSIS — J30.2 SEASONAL ALLERGIES: Primary | ICD-10-CM

## 2018-10-10 PROCEDURE — 96372 THER/PROPH/DIAG INJ SC/IM: CPT | Performed by: FAMILY MEDICINE

## 2018-10-10 PROCEDURE — 95115 IMMUNOTHERAPY ONE INJECTION: CPT | Performed by: FAMILY MEDICINE

## 2018-10-10 RX ORDER — CYANOCOBALAMIN 1000 UG/ML
1000 INJECTION INTRAMUSCULAR; SUBCUTANEOUS ONCE
Status: COMPLETED | OUTPATIENT
Start: 2018-10-10 | End: 2018-10-10

## 2018-10-10 RX ADMIN — CYANOCOBALAMIN 1000 MCG: 1000 INJECTION INTRAMUSCULAR; SUBCUTANEOUS at 10:10

## 2018-10-25 ENCOUNTER — NURSE ONLY (OUTPATIENT)
Dept: FAMILY MEDICINE CLINIC | Age: 73
End: 2018-10-25
Payer: MEDICARE

## 2018-10-25 VITALS — HEART RATE: 78 BPM | TEMPERATURE: 98.4 F | DIASTOLIC BLOOD PRESSURE: 86 MMHG | SYSTOLIC BLOOD PRESSURE: 138 MMHG

## 2018-10-25 DIAGNOSIS — E53.8 VITAMIN B 12 DEFICIENCY: ICD-10-CM

## 2018-10-25 DIAGNOSIS — J30.2 SEASONAL ALLERGIES: ICD-10-CM

## 2018-10-25 DIAGNOSIS — Z88.9 MULTIPLE ALLERGIES: Primary | ICD-10-CM

## 2018-10-25 PROCEDURE — 95115 IMMUNOTHERAPY ONE INJECTION: CPT | Performed by: FAMILY MEDICINE

## 2018-10-25 PROCEDURE — 96372 THER/PROPH/DIAG INJ SC/IM: CPT | Performed by: FAMILY MEDICINE

## 2018-10-25 RX ORDER — CYANOCOBALAMIN 1000 UG/ML
1000 INJECTION INTRAMUSCULAR; SUBCUTANEOUS ONCE
Status: COMPLETED | OUTPATIENT
Start: 2018-10-25 | End: 2018-10-25

## 2018-10-25 RX ADMIN — CYANOCOBALAMIN 1000 MCG: 1000 INJECTION INTRAMUSCULAR; SUBCUTANEOUS at 09:03

## 2018-11-05 DIAGNOSIS — M54.9 CHRONIC NECK AND BACK PAIN: ICD-10-CM

## 2018-11-05 DIAGNOSIS — G89.29 CHRONIC NECK AND BACK PAIN: ICD-10-CM

## 2018-11-05 DIAGNOSIS — M54.2 CHRONIC NECK AND BACK PAIN: ICD-10-CM

## 2018-11-05 RX ORDER — BUPROPION HYDROCHLORIDE 150 MG/1
TABLET, EXTENDED RELEASE ORAL
Qty: 30 TABLET | Refills: 5 | Status: SHIPPED | OUTPATIENT
Start: 2018-11-05 | End: 2019-05-02

## 2018-11-05 RX ORDER — CARISOPRODOL 350 MG/1
TABLET ORAL
Qty: 90 TABLET | Refills: 2 | Status: SHIPPED | OUTPATIENT
Start: 2018-11-05 | End: 2018-12-05

## 2018-11-05 RX ORDER — LEVOTHYROXINE SODIUM 0.1 MG/1
TABLET ORAL
Qty: 30 TABLET | Refills: 5 | Status: SHIPPED | OUTPATIENT
Start: 2018-11-05 | End: 2019-05-02

## 2018-11-15 ENCOUNTER — OFFICE VISIT (OUTPATIENT)
Dept: PULMONOLOGY | Facility: CLINIC | Age: 73
End: 2018-11-15

## 2018-11-15 VITALS
HEIGHT: 64 IN | OXYGEN SATURATION: 97 % | BODY MASS INDEX: 32.78 KG/M2 | SYSTOLIC BLOOD PRESSURE: 136 MMHG | WEIGHT: 192 LBS | HEART RATE: 77 BPM | DIASTOLIC BLOOD PRESSURE: 74 MMHG

## 2018-11-15 DIAGNOSIS — J47.9 TRACTION BRONCHIECTASIS (HCC): ICD-10-CM

## 2018-11-15 DIAGNOSIS — M54.2 CHRONIC NECK PAIN: ICD-10-CM

## 2018-11-15 DIAGNOSIS — I10 ESSENTIAL HYPERTENSION: Chronic | ICD-10-CM

## 2018-11-15 DIAGNOSIS — K21.9 GASTROESOPHAGEAL REFLUX DISEASE WITHOUT ESOPHAGITIS: Chronic | ICD-10-CM

## 2018-11-15 DIAGNOSIS — G89.29 CHRONIC NECK PAIN: ICD-10-CM

## 2018-11-15 DIAGNOSIS — J45.20 MILD INTERMITTENT ASTHMA WITHOUT COMPLICATION: Primary | ICD-10-CM

## 2018-11-15 DIAGNOSIS — R91.8 PULMONARY INFILTRATE IN LEFT LUNG ON CHEST X-RAY: ICD-10-CM

## 2018-11-15 PROBLEM — R26.9 GAIT ABNORMALITY: Status: ACTIVE | Noted: 2017-07-21

## 2018-11-15 PROBLEM — I34.1 MVP (MITRAL VALVE PROLAPSE): Status: ACTIVE | Noted: 2017-07-21

## 2018-11-15 PROCEDURE — 99214 OFFICE O/P EST MOD 30 MIN: CPT | Performed by: INTERNAL MEDICINE

## 2018-11-15 RX ORDER — CELECOXIB 200 MG/1
CAPSULE ORAL
COMMUNITY
Start: 2018-08-01 | End: 2020-02-27 | Stop reason: ALTCHOICE

## 2018-11-15 RX ORDER — FERROUS SULFATE TAB EC 324 MG (65 MG FE EQUIVALENT) 324 (65 FE) MG
324 TABLET DELAYED RESPONSE ORAL
COMMUNITY
Start: 2018-08-06 | End: 2022-01-01 | Stop reason: HOSPADM

## 2018-11-15 RX ORDER — OXYCODONE AND ACETAMINOPHEN 10; 325 MG/1; MG/1
1 TABLET ORAL EVERY 6 HOURS PRN
Qty: 30 TABLET | Refills: 0 | Status: SHIPPED | OUTPATIENT
Start: 2018-11-15 | End: 2018-11-22

## 2018-11-15 RX ORDER — SIMVASTATIN 20 MG
20 TABLET ORAL NIGHTLY
COMMUNITY
Start: 2018-02-12 | End: 2022-01-01 | Stop reason: HOSPADM

## 2018-11-15 NOTE — PROGRESS NOTES
Subjective   Hanny Ivy is a 73 y.o. female.     Chief Complaint   Patient presents with   • Allergies        History of Present Illness   She follows up with history of moderate cough and history of mild intermittent asthma.  She had a lung infiltrate and we took her for bronchoscopy in July with BAL growing stenotrophomonas.  PFT in July showed normal spirometry, borderline dlco?Va at 77% pred.  We have reviewed her CT scans again.  She had the original one in the spring and one in August.  She has had interstitial infiltrates and traction bronchiectasis.  She still has some cough.  She does say that the Dulera helps her.  She does think she chokes a little bit when she swallows sometimes but not every time.  Is not specific for any consistencies.  She has moderate to severe cough in her chest aggravated by eating and drinking alleviated by inhalers but persistent through the days and is unchanged for several months.    Medical History   has a past medical history of Asthma, Cataract, Dermatochalasis of both upper eyelids, GERD (gastroesophageal reflux disease), Hypertension, Hypothyroidism, Osteoarthritis, and Visual field defect, unspecified.   has a past surgical history that includes Replacement total knee; Ankle surgery; Other surgical history; Cervical fusion; Hysterectomy; Appendectomy; Bronchoscopy; Cholecystectomy; and RIGHT REVERSE TOTAL SHOULDER ARTHROPLASTY (Right, 3/6/2018).  Allergies   Allergen Reactions   • Penicillins Other (See Comments)     Unknown   • Sulfa Antibiotics Other (See Comments)     Unknown       Current Outpatient Medications:   •  allopurinol (ZYLOPRIM) 100 MG tablet, Take 1 tablet by mouth daily, Disp: , Rfl:   •  amlodipine-olmesartan (KESHAV) 10-40 MG per tablet, Take 1 tablet by mouth Daily., Disp: , Rfl:   •  buPROPion SR (WELLBUTRIN SR) 150 MG 12 hr tablet, Take  by mouth Daily., Disp: , Rfl:   •  carisoprodol (SOMA) 350 MG tablet, 3 (Three) Times a Day As Needed., Disp: ,  Rfl:   •  carvedilol (COREG) 12.5 MG tablet, TAKE ONE TABLET BY MOUTH TWICE A DAY WTIH MEALS ** MAY CAUSEDROWSINESS, Disp: , Rfl:   •  celecoxib (CeleBREX) 200 MG capsule, celecoxib 200 mg capsule  prn, Disp: , Rfl:   •  Cetirizine HCl (ZYRTEC ALLERGY) 10 MG capsule, Take 10 mg by mouth Daily., Disp: , Rfl:   •  CloNIDine (CATAPRES) 0.1 MG tablet, Take 0.1 mg by mouth 2 (Two) Times a Day., Disp: , Rfl:   •  doxepin (SINEquan) 50 MG capsule, Take 100 mg by mouth Every Night., Disp: , Rfl:   •  DULERA 100-5 MCG/ACT inhaler, Inhale 2 (Two) Times a Day., Disp: , Rfl:   •  EPINEPHrine (EPIPEN 2-ABRIL) 0.3 MG/0.3ML solution auto-injector injection, Inject 0.3 mg into the muscle as needed. Use as directed for allergic reaction, Disp: , Rfl:   •  ferrous sulfate 324 (65 Fe) MG tablet delayed-release EC tablet, TAKE ONE TABLET BY MOUTH DAILY, Disp: , Rfl:   •  FLUoxetine (PROzac) 40 MG capsule, TAKE ONE CAPSULE BY MOUTH DAILY ** MAY CAUSE DROWSINESS, Disp: , Rfl:   •  gabapentin (NEURONTIN) 300 MG capsule, TAKE TWO CAPSULES BY MOUTH THREE TIMES DAILY AS NEEDED ** MAY CAUSEDROWSINESS, Disp: , Rfl:   •  levothyroxine (SYNTHROID, LEVOTHROID) 100 MCG tablet, Take 1 tablet by mouth Q Morning, Disp: , Rfl:   •  LORazepam (ATIVAN) 1 MG tablet, 3 (Three) Times a Day., Disp: , Rfl:   •  mometasone (NASONEX) 50 MCG/ACT nasal spray, 2 sprays into each nostril Daily., Disp: , Rfl:   •  pantoprazole (PROTONIX) 40 MG EC tablet, TAKE ONE TABLET BY MOUTH TWICE A DAY, Disp: , Rfl:   •  simvastatin (ZOCOR) 20 MG tablet, Take 20 mg by mouth Every Night., Disp: , Rfl:   •  simvastatin (ZOCOR) 20 MG tablet, simvastatin 20 mg tablet, Disp: , Rfl:   •  Unable to find, 1 each 1 (One) Time. BENTAL 20 MG PO QD/PRN, Disp: , Rfl:   family history includes Cancer in her maternal grandmother; Heart attack in her father; Hypertension in her mother; Stroke in her mother.   reports that  has never smoked. she has never used smokeless tobacco. She reports that  she does not drink alcohol or use drugs.  Review of Systems  ------------------------------------  Objective     Physical Exam      Chest CT 6/2018  The CT scan of the chest is performed without intravenous contrast  enhancement. The images are acquired in axial plane with subsequent  reconstruction in coronal and sagittal planes.  The comparison is made with the previous study dated 4/23/2018.  There is a persistent interstitial infiltrate with air bronchogram in  the left upper lobe anterior segment with traction bronchiectasis.  This is unchanged in since the previous study. There are persistent  interstitial infiltrate in the lower lobes bilaterally with the  subpleural sparing as in the previous study. Similar changes are seen  in the right middle lobe.  There is loss of left lung volume with elevation of the left hilum.  A few calcified granulomas are seen in the lungs bilaterally, in the  mediastinum and left hilum. These are also unchanged in the previous  study. There are atheromatous changes of the thoracic aorta. No  aneurysmal dilatation.  The thyroid gland is partly obscured by the extensive streak artifacts  produced by the right shoulder arthroplasty hardware.  There is no axillary lymphadenopathy.  Normal size mediastinal lymph nodes are seen. The hilar lymph node is  are not evaluated due to absence of contrast enhancement.  Asymmetrical right breast density is noted. This is unchanged in the  previous study. No discrete mass is seen. This may be correlated with  mammography.  Unenhanced liver and spleen appear unremarkable. There is a small  metallic foreign body in the right lobe of the liver posteriorly and  laterally. There are several calcified granulomas in the spleen. The  adrenal glands are normal.  The images reviewed in bone window show chronic degenerative changes  of the thoracic spine. No focal bony lesion is seen.  IMPRESSION:  A stable CT scan of the chest since the previous  study  dated 4/23/2018.  The chronic inflammatory ch  anges/scarring with traction bronchiectasis  in the left upper lobe. No change in the previous study. The loss of  left upper lung volume.    Pulmonary Functions Testing Results:    No results found for: FEV1, FVC, HDB3KTP, TLC, DLCO   ------------------------------------  Assessment/Plan   Hanny was seen today for allergies.    Diagnoses and all orders for this visit:    Mild intermittent asthma without complication    Pulmonary infiltrate in left lung on chest x-ray  -     SLP Video Swallow; Future  -     FL Video Swallow With Speech; Future  -     CT Chest Hi Resolution; Future    Gastroesophageal reflux disease without esophagitis    Essential hypertension    Traction bronchiectasis (CMS/HCC)  -     FL Video Swallow With Speech; Future  -     CT Chest Hi Resolution; Future      Patient's Body mass index is 32.96 kg/m². BMI is above normal parameters. Recommendations include: referral to primary care.      I am concerned that she might have some dysphagia and may be aspirating.  We will order a study for that.  We will need to follow-up imaging and get a repeat chest CT after the holidays it, 3 months from now for comparison.  Continue Dulera.  Add Mucinex or Mucinex to all strength 1 tablet twice a day with 8 ounces of water.  She will continue her Dulera and she can get that through her family's family practice office where she usually gets it.

## 2018-12-14 DIAGNOSIS — R30.0 DYSURIA: ICD-10-CM

## 2018-12-14 DIAGNOSIS — R30.0 DYSURIA: Primary | ICD-10-CM

## 2018-12-14 LAB
BACTERIA: ABNORMAL /HPF
BILIRUBIN URINE: NEGATIVE
BLOOD, URINE: NEGATIVE
CLARITY: ABNORMAL
COLOR: YELLOW
EPITHELIAL CELLS, UA: ABNORMAL /HPF
GLUCOSE URINE: NEGATIVE MG/DL
KETONES, URINE: NEGATIVE MG/DL
LEUKOCYTE ESTERASE, URINE: ABNORMAL
NITRITE, URINE: NEGATIVE
PH UA: 5.5
PROTEIN UA: NEGATIVE MG/DL
RBC UA: ABNORMAL /HPF (ref 0–2)
RENAL EPITHELIAL, UA: ABNORMAL /HPF
SPECIFIC GRAVITY UA: 1.01
URINE REFLEX TO CULTURE: YES
URINE TYPE: ABNORMAL
UROBILINOGEN, URINE: 0.2 E.U./DL
WBC UA: ABNORMAL /HPF (ref 0–5)

## 2018-12-14 RX ORDER — FERROUS SULFATE TAB EC 324 MG (65 MG FE EQUIVALENT) 324 (65 FE) MG
TABLET DELAYED RESPONSE ORAL
Qty: 30 TABLET | Refills: 3 | Status: SHIPPED | OUTPATIENT
Start: 2018-12-14 | End: 2019-04-17 | Stop reason: SDUPTHER

## 2018-12-16 LAB — URINE CULTURE, ROUTINE: NORMAL

## 2018-12-28 ENCOUNTER — OFFICE VISIT (OUTPATIENT)
Dept: FAMILY MEDICINE CLINIC | Age: 73
End: 2018-12-28
Payer: MEDICARE

## 2018-12-28 ENCOUNTER — HOSPITAL ENCOUNTER (OUTPATIENT)
Dept: GENERAL RADIOLOGY | Age: 73
Discharge: HOME | End: 2018-12-28
Payer: MEDICARE

## 2018-12-28 VITALS
WEIGHT: 197 LBS | BODY MASS INDEX: 32.78 KG/M2 | OXYGEN SATURATION: 95 % | HEART RATE: 65 BPM | TEMPERATURE: 98.1 F | SYSTOLIC BLOOD PRESSURE: 122 MMHG | DIASTOLIC BLOOD PRESSURE: 72 MMHG

## 2018-12-28 DIAGNOSIS — R42 DIZZINESS: ICD-10-CM

## 2018-12-28 DIAGNOSIS — R29.6 MULTIPLE FALLS: ICD-10-CM

## 2018-12-28 DIAGNOSIS — E03.9 HYPOTHYROIDISM, UNSPECIFIED TYPE: ICD-10-CM

## 2018-12-28 DIAGNOSIS — M85.80 OSTEOPENIA, UNSPECIFIED LOCATION: ICD-10-CM

## 2018-12-28 DIAGNOSIS — I65.23 CAROTID ARTERY PLAQUE, BILATERAL: ICD-10-CM

## 2018-12-28 DIAGNOSIS — I10 ESSENTIAL HYPERTENSION: ICD-10-CM

## 2018-12-28 DIAGNOSIS — R42 DIZZINESS: Primary | ICD-10-CM

## 2018-12-28 LAB
ALBUMIN SERPL-MCNC: 3.6 G/DL (ref 3.5–5.2)
ALP BLD-CCNC: 102 U/L (ref 35–104)
ALT SERPL-CCNC: 29 U/L (ref 5–33)
ANION GAP SERPL CALCULATED.3IONS-SCNC: 15 MMOL/L (ref 7–19)
AST SERPL-CCNC: 30 U/L (ref 5–32)
BASOPHILS ABSOLUTE: 0.1 K/UL (ref 0–0.2)
BASOPHILS RELATIVE PERCENT: 0.8 % (ref 0–1)
BILIRUB SERPL-MCNC: <0.2 MG/DL (ref 0.2–1.2)
BILIRUBIN URINE: NEGATIVE
BLOOD, URINE: NEGATIVE
BUN BLDV-MCNC: 22 MG/DL (ref 8–23)
CALCIUM SERPL-MCNC: 9 MG/DL (ref 8.8–10.2)
CHLORIDE BLD-SCNC: 104 MMOL/L (ref 98–111)
CLARITY: ABNORMAL
CO2: 22 MMOL/L (ref 22–29)
COLOR: YELLOW
CREAT SERPL-MCNC: 1.5 MG/DL (ref 0.5–0.9)
EOSINOPHILS ABSOLUTE: 0.2 K/UL (ref 0–0.6)
EOSINOPHILS RELATIVE PERCENT: 2.3 % (ref 0–5)
GFR NON-AFRICAN AMERICAN: 34
GLUCOSE BLD-MCNC: 103 MG/DL (ref 74–109)
GLUCOSE URINE: NEGATIVE MG/DL
HCT VFR BLD CALC: 37.9 % (ref 37–47)
HEMOGLOBIN: 11.9 G/DL (ref 12–16)
KETONES, URINE: NEGATIVE MG/DL
LEUKOCYTE ESTERASE, URINE: NEGATIVE
LYMPHOCYTES ABSOLUTE: 2.2 K/UL (ref 1.1–4.5)
LYMPHOCYTES RELATIVE PERCENT: 29.6 % (ref 20–40)
MCH RBC QN AUTO: 29.7 PG (ref 27–31)
MCHC RBC AUTO-ENTMCNC: 31.4 G/DL (ref 33–37)
MCV RBC AUTO: 94.5 FL (ref 81–99)
MONOCYTES ABSOLUTE: 1.1 K/UL (ref 0–0.9)
MONOCYTES RELATIVE PERCENT: 15 % (ref 0–10)
NEUTROPHILS ABSOLUTE: 3.9 K/UL (ref 1.5–7.5)
NEUTROPHILS RELATIVE PERCENT: 51.9 % (ref 50–65)
NITRITE, URINE: NEGATIVE
PDW BLD-RTO: 13.5 % (ref 11.5–14.5)
PH UA: 5.5
PLATELET # BLD: 248 K/UL (ref 130–400)
PMV BLD AUTO: 10 FL (ref 9.4–12.3)
POTASSIUM SERPL-SCNC: 4.7 MMOL/L (ref 3.5–5)
PROTEIN UA: NEGATIVE MG/DL
RBC # BLD: 4.01 M/UL (ref 4.2–5.4)
SODIUM BLD-SCNC: 141 MMOL/L (ref 136–145)
SPECIFIC GRAVITY UA: 1.02
T4 FREE: 1.1 NG/DL (ref 0.9–1.7)
TOTAL PROTEIN: 6.9 G/DL (ref 6.6–8.7)
TSH SERPL DL<=0.05 MIU/L-ACNC: 1.05 UIU/ML (ref 0.27–4.2)
URINE REFLEX TO CULTURE: ABNORMAL
UROBILINOGEN, URINE: 0.2 E.U./DL
VITAMIN B-12: 636 PG/ML (ref 211–946)
VITAMIN D 25-HYDROXY: 23.4 NG/ML
WBC # BLD: 7.5 K/UL (ref 4.8–10.8)

## 2018-12-28 PROCEDURE — 1036F TOBACCO NON-USER: CPT | Performed by: NURSE PRACTITIONER

## 2018-12-28 PROCEDURE — G8599 NO ASA/ANTIPLAT THER USE RNG: HCPCS | Performed by: NURSE PRACTITIONER

## 2018-12-28 PROCEDURE — G8399 PT W/DXA RESULTS DOCUMENT: HCPCS | Performed by: NURSE PRACTITIONER

## 2018-12-28 PROCEDURE — G8427 DOCREV CUR MEDS BY ELIG CLIN: HCPCS | Performed by: NURSE PRACTITIONER

## 2018-12-28 PROCEDURE — G8417 CALC BMI ABV UP PARAM F/U: HCPCS | Performed by: NURSE PRACTITIONER

## 2018-12-28 PROCEDURE — 1090F PRES/ABSN URINE INCON ASSESS: CPT | Performed by: NURSE PRACTITIONER

## 2018-12-28 PROCEDURE — 93000 ELECTROCARDIOGRAM COMPLETE: CPT | Performed by: NURSE PRACTITIONER

## 2018-12-28 PROCEDURE — 99214 OFFICE O/P EST MOD 30 MIN: CPT | Performed by: NURSE PRACTITIONER

## 2018-12-28 PROCEDURE — G8482 FLU IMMUNIZE ORDER/ADMIN: HCPCS | Performed by: NURSE PRACTITIONER

## 2018-12-28 PROCEDURE — 3017F COLORECTAL CA SCREEN DOC REV: CPT | Performed by: NURSE PRACTITIONER

## 2018-12-28 PROCEDURE — 4040F PNEUMOC VAC/ADMIN/RCVD: CPT | Performed by: NURSE PRACTITIONER

## 2018-12-28 PROCEDURE — 70450 CT HEAD/BRAIN W/O DYE: CPT

## 2018-12-28 PROCEDURE — 1123F ACP DISCUSS/DSCN MKR DOCD: CPT | Performed by: NURSE PRACTITIONER

## 2018-12-28 PROCEDURE — 1101F PT FALLS ASSESS-DOCD LE1/YR: CPT | Performed by: NURSE PRACTITIONER

## 2018-12-28 RX ORDER — CLONIDINE HYDROCHLORIDE 0.1 MG/1
TABLET ORAL
Qty: 60 TABLET | Refills: 5 | Status: SHIPPED | OUTPATIENT
Start: 2018-12-28 | End: 2019-04-30 | Stop reason: SDUPTHER

## 2018-12-28 ASSESSMENT — ENCOUNTER SYMPTOMS
CONSTIPATION: 0
BACK PAIN: 1
SHORTNESS OF BREATH: 1
DIARRHEA: 0

## 2018-12-28 NOTE — PROGRESS NOTES
palpitations (occasionally after fall/dizziness). Negative for chest pain and leg swelling. Gastrointestinal: Negative for constipation and diarrhea. Genitourinary: Positive for difficulty urinating. Musculoskeletal: Positive for back pain. Neurological: Positive for dizziness. OBJECTIVE:    Physical Exam   Constitutional: She is oriented to person, place, and time. She appears well-developed and well-nourished. No distress. HENT:   Head: Normocephalic and atraumatic. Right Ear: Tympanic membrane, external ear and ear canal normal.   Left Ear: Tympanic membrane, external ear and ear canal normal.   Nose: Nose normal. Right sinus exhibits no maxillary sinus tenderness and no frontal sinus tenderness. Left sinus exhibits no maxillary sinus tenderness and no frontal sinus tenderness. Mouth/Throat: Uvula is midline, oropharynx is clear and moist and mucous membranes are normal. No oropharyngeal exudate, posterior oropharyngeal edema or posterior oropharyngeal erythema. Eyes: Pupils are equal, round, and reactive to light. Conjunctivae and EOM are normal.   Neck: Trachea normal. Neck supple. Carotid bruit is not present. No tracheal deviation present. Cardiovascular: Normal rate, regular rhythm and normal heart sounds. Pulmonary/Chest: Effort normal and breath sounds normal. No respiratory distress. She has no wheezes. Abdominal: Soft. Bowel sounds are normal. There is no tenderness. Musculoskeletal: She exhibits no edema (no BLE edema). Slow position change at initiation of walking; cautious   Lymphadenopathy:     She has no cervical adenopathy. Neurological: She is alert and oriented to person, place, and time. Skin: Skin is warm and dry. She is not diaphoretic. Psychiatric: She has a normal mood and affect. Her behavior is normal.   Nursing note and vitals reviewed.      /72 (Site: Right Upper Arm, Position: Sitting, Cuff Size: Large Adult)   Pulse 65   Temp 98.1 °F (36.7

## 2019-01-17 DIAGNOSIS — J47.9 TRACTION BRONCHIECTASIS (HCC): Primary | ICD-10-CM

## 2019-01-17 DIAGNOSIS — J47.9 TRACTION BRONCHIECTASIS (HCC): ICD-10-CM

## 2019-01-17 DIAGNOSIS — R91.8 PULMONARY INFILTRATE IN LEFT LUNG ON CHEST X-RAY: Primary | ICD-10-CM

## 2019-01-17 DIAGNOSIS — R91.8 PULMONARY INFILTRATE IN LEFT LUNG ON CHEST X-RAY: ICD-10-CM

## 2019-01-18 DIAGNOSIS — G89.29 CHRONIC NECK AND BACK PAIN: ICD-10-CM

## 2019-01-18 DIAGNOSIS — M54.2 CHRONIC NECK AND BACK PAIN: ICD-10-CM

## 2019-01-18 DIAGNOSIS — E79.0 ELEVATED URIC ACID IN BLOOD: ICD-10-CM

## 2019-01-18 DIAGNOSIS — M54.9 CHRONIC NECK AND BACK PAIN: ICD-10-CM

## 2019-01-18 RX ORDER — ALLOPURINOL 100 MG/1
TABLET ORAL
Qty: 90 TABLET | Refills: 3 | Status: SHIPPED | OUTPATIENT
Start: 2019-01-18 | End: 2019-05-02

## 2019-01-18 RX ORDER — GABAPENTIN 300 MG/1
CAPSULE ORAL
Qty: 180 CAPSULE | Refills: 5 | Status: SHIPPED | OUTPATIENT
Start: 2019-01-18 | End: 2019-03-18 | Stop reason: SDUPTHER

## 2019-01-21 DIAGNOSIS — I10 ESSENTIAL HYPERTENSION: ICD-10-CM

## 2019-01-21 RX ORDER — AMLODIPINE AND OLMESARTAN MEDOXOMIL 5; 20 MG/1; MG/1
1 TABLET ORAL DAILY
Qty: 30 TABLET | Refills: 5 | Status: SHIPPED | OUTPATIENT
Start: 2019-01-21 | End: 2019-04-30 | Stop reason: SDUPTHER

## 2019-01-24 DIAGNOSIS — J47.9 TRACTION BRONCHIECTASIS (HCC): ICD-10-CM

## 2019-01-24 DIAGNOSIS — R91.8 PULMONARY INFILTRATE IN LEFT LUNG ON CHEST X-RAY: ICD-10-CM

## 2019-02-06 ENCOUNTER — OFFICE VISIT (OUTPATIENT)
Dept: FAMILY MEDICINE CLINIC | Age: 74
End: 2019-02-06
Payer: MEDICARE

## 2019-02-06 ENCOUNTER — HOSPITAL ENCOUNTER (OUTPATIENT)
Dept: GENERAL RADIOLOGY | Age: 74
Discharge: HOME | End: 2019-02-06
Payer: MEDICARE

## 2019-02-06 VITALS
SYSTOLIC BLOOD PRESSURE: 142 MMHG | OXYGEN SATURATION: 95 % | DIASTOLIC BLOOD PRESSURE: 78 MMHG | HEART RATE: 84 BPM | TEMPERATURE: 98.1 F | BODY MASS INDEX: 31.78 KG/M2 | WEIGHT: 191 LBS

## 2019-02-06 DIAGNOSIS — R22.42 MASS OF LEFT HIP REGION: ICD-10-CM

## 2019-02-06 DIAGNOSIS — R91.8 PULMONARY INFILTRATE IN LEFT LUNG ON CHEST X-RAY: ICD-10-CM

## 2019-02-06 DIAGNOSIS — E78.5 HYPERLIPIDEMIA, UNSPECIFIED HYPERLIPIDEMIA TYPE: ICD-10-CM

## 2019-02-06 DIAGNOSIS — R53.83 FATIGUE, UNSPECIFIED TYPE: ICD-10-CM

## 2019-02-06 DIAGNOSIS — R22.42 MASS OF LEFT HIP REGION: Primary | ICD-10-CM

## 2019-02-06 DIAGNOSIS — J47.9 TRACTION BRONCHIECTASIS (HCC): ICD-10-CM

## 2019-02-06 PROCEDURE — G8417 CALC BMI ABV UP PARAM F/U: HCPCS | Performed by: FAMILY MEDICINE

## 2019-02-06 PROCEDURE — G8399 PT W/DXA RESULTS DOCUMENT: HCPCS | Performed by: FAMILY MEDICINE

## 2019-02-06 PROCEDURE — 1123F ACP DISCUSS/DSCN MKR DOCD: CPT | Performed by: FAMILY MEDICINE

## 2019-02-06 PROCEDURE — 1090F PRES/ABSN URINE INCON ASSESS: CPT | Performed by: FAMILY MEDICINE

## 2019-02-06 PROCEDURE — G8482 FLU IMMUNIZE ORDER/ADMIN: HCPCS | Performed by: FAMILY MEDICINE

## 2019-02-06 PROCEDURE — 4040F PNEUMOC VAC/ADMIN/RCVD: CPT | Performed by: FAMILY MEDICINE

## 2019-02-06 PROCEDURE — 3017F COLORECTAL CA SCREEN DOC REV: CPT | Performed by: FAMILY MEDICINE

## 2019-02-06 PROCEDURE — 76881 US COMPL JOINT R-T W/IMG: CPT

## 2019-02-06 PROCEDURE — 1036F TOBACCO NON-USER: CPT | Performed by: FAMILY MEDICINE

## 2019-02-06 PROCEDURE — 71250 CT THORAX DX C-: CPT

## 2019-02-06 PROCEDURE — 1101F PT FALLS ASSESS-DOCD LE1/YR: CPT | Performed by: FAMILY MEDICINE

## 2019-02-06 PROCEDURE — 99214 OFFICE O/P EST MOD 30 MIN: CPT | Performed by: FAMILY MEDICINE

## 2019-02-06 PROCEDURE — G8427 DOCREV CUR MEDS BY ELIG CLIN: HCPCS | Performed by: FAMILY MEDICINE

## 2019-02-06 ASSESSMENT — ENCOUNTER SYMPTOMS
EYES NEGATIVE: 1
RESPIRATORY NEGATIVE: 1
GASTROINTESTINAL NEGATIVE: 1
ALLERGIC/IMMUNOLOGIC NEGATIVE: 1
BACK PAIN: 1

## 2019-02-14 ENCOUNTER — OFFICE VISIT (OUTPATIENT)
Dept: PULMONOLOGY | Facility: CLINIC | Age: 74
End: 2019-02-14

## 2019-02-14 VITALS
SYSTOLIC BLOOD PRESSURE: 136 MMHG | HEIGHT: 64 IN | BODY MASS INDEX: 32.95 KG/M2 | DIASTOLIC BLOOD PRESSURE: 74 MMHG | HEART RATE: 78 BPM | WEIGHT: 193 LBS | OXYGEN SATURATION: 96 %

## 2019-02-14 DIAGNOSIS — I10 ESSENTIAL HYPERTENSION: ICD-10-CM

## 2019-02-14 DIAGNOSIS — J47.9 TRACTION BRONCHIECTASIS (HCC): ICD-10-CM

## 2019-02-14 DIAGNOSIS — J45.20 MILD INTERMITTENT ASTHMA WITHOUT COMPLICATION: ICD-10-CM

## 2019-02-14 DIAGNOSIS — K21.9 GASTROESOPHAGEAL REFLUX DISEASE WITHOUT ESOPHAGITIS: ICD-10-CM

## 2019-02-14 DIAGNOSIS — R91.8 PULMONARY INFILTRATE IN LEFT LUNG ON CHEST X-RAY: Primary | ICD-10-CM

## 2019-02-14 PROCEDURE — 99214 OFFICE O/P EST MOD 30 MIN: CPT | Performed by: INTERNAL MEDICINE

## 2019-02-14 NOTE — PROGRESS NOTES
Subjective   Hanny Ivy is a 74 y.o. female.     Chief Complaint   Patient presents with   • Shortness of Breath      Background: a patient with mild intermittent asthma, developed pulmonary infiltrates fall 2018.  Dysphagia study neg 2019    History of Present Illness   Pt returns after we ordered dysphagia study and follow up ct chest.  We reviewed these today both unremarkable.  She has no fever chills or sweats no palpitations no substernal chest pain.  She has had back problems and has been getting back epidural injections.  That explains the air bubbles described on the CT below.  Best aggravated by exertion alleviated by rest associated with wheeze.    Medical/Family/Social History   has a past medical history of Asthma, Cataract, Dermatochalasis of both upper eyelids, GERD (gastroesophageal reflux disease), Hypertension, Hypothyroidism, Osteoarthritis, and Visual field defect, unspecified.   has a past surgical history that includes Replacement total knee; Ankle surgery; Other surgical history; Cervical fusion; Hysterectomy; Appendectomy; Bronchoscopy; Cholecystectomy; and RIGHT REVERSE TOTAL SHOULDER ARTHROPLASTY (Right, 3/6/2018).  family history includes Cancer in her maternal grandmother; Heart attack in her father; Hypertension in her mother; Stroke in her mother.   reports that  has never smoked. she has never used smokeless tobacco. She reports that she does not drink alcohol or use drugs.  Allergies   Allergen Reactions   • Penicillins Other (See Comments)     Unknown   • Sulfa Antibiotics Other (See Comments)     Unknown     Medications    Current Outpatient Medications:   •  allopurinol (ZYLOPRIM) 100 MG tablet, Take 1 tablet by mouth daily, Disp: , Rfl:   •  amlodipine-olmesartan (KESHAV) 10-40 MG per tablet, Take 1 tablet by mouth Daily., Disp: , Rfl:   •  buPROPion SR (WELLBUTRIN SR) 150 MG 12 hr tablet, Take  by mouth Daily., Disp: , Rfl:   •  carisoprodol (SOMA) 350 MG tablet, 3 (Three) Times  a Day As Needed., Disp: , Rfl:   •  carvedilol (COREG) 12.5 MG tablet, TAKE ONE TABLET BY MOUTH TWICE A DAY WTIH MEALS ** MAY CAUSEDROWSINESS, Disp: , Rfl:   •  celecoxib (CeleBREX) 200 MG capsule, celecoxib 200 mg capsule  prn, Disp: , Rfl:   •  Cetirizine HCl (ZYRTEC ALLERGY) 10 MG capsule, Take 10 mg by mouth Daily., Disp: , Rfl:   •  CloNIDine (CATAPRES) 0.1 MG tablet, Take 0.1 mg by mouth 2 (Two) Times a Day., Disp: , Rfl:   •  doxepin (SINEquan) 50 MG capsule, Take 100 mg by mouth Every Night., Disp: , Rfl:   •  DULERA 100-5 MCG/ACT inhaler, Inhale 2 (Two) Times a Day., Disp: , Rfl:   •  EPINEPHrine (EPIPEN 2-ABRIL) 0.3 MG/0.3ML solution auto-injector injection, Inject 0.3 mg into the muscle as needed. Use as directed for allergic reaction, Disp: , Rfl:   •  ferrous sulfate 324 (65 Fe) MG tablet delayed-release EC tablet, TAKE ONE TABLET BY MOUTH DAILY, Disp: , Rfl:   •  FLUoxetine (PROzac) 40 MG capsule, TAKE ONE CAPSULE BY MOUTH DAILY ** MAY CAUSE DROWSINESS, Disp: , Rfl:   •  gabapentin (NEURONTIN) 300 MG capsule, TAKE TWO CAPSULES BY MOUTH THREE TIMES DAILY AS NEEDED ** MAY CAUSEDROWSINESS, Disp: , Rfl:   •  levothyroxine (SYNTHROID, LEVOTHROID) 100 MCG tablet, Take 1 tablet by mouth Q Morning, Disp: , Rfl:   •  LORazepam (ATIVAN) 1 MG tablet, 3 (Three) Times a Day., Disp: , Rfl:   •  mometasone (NASONEX) 50 MCG/ACT nasal spray, 2 sprays into each nostril Daily., Disp: , Rfl:   •  pantoprazole (PROTONIX) 40 MG EC tablet, TAKE ONE TABLET BY MOUTH TWICE A DAY, Disp: , Rfl:   •  simvastatin (ZOCOR) 20 MG tablet, Take 20 mg by mouth Every Night., Disp: , Rfl:   •  simvastatin (ZOCOR) 20 MG tablet, simvastatin 20 mg tablet, Disp: , Rfl:   •  Unable to find, 1 each 1 (One) Time. BENTAL 20 MG PO QD/PRN, Disp: , Rfl:     Review of Systems   Constitutional: Negative for chills and fever.   Respiratory: Positive for wheezing.    Cardiovascular: Negative for chest pain.   Gastrointestinal: Negative for nausea and  "vomiting.     Objective   /74   Pulse 78   Ht 162.6 cm (64\")   Wt 87.5 kg (193 lb)   SpO2 96% Comment: RA  Breastfeeding? No   BMI 33.13 kg/m²   Physical Exam   Constitutional: She appears well-developed. She is active.  Non-toxic appearance. She does not appear ill. No distress.   HENT:   Head: Atraumatic.   Nose: Nose normal.   Eyes: Conjunctivae and EOM are normal. No scleral icterus.   Neck: Neck supple.   Cardiovascular: Normal rate, regular rhythm, S1 normal and S2 normal.   Pulmonary/Chest: Effort normal and breath sounds normal. No accessory muscle usage. She has no rhonchi.   Abdominal: Soft. She exhibits no distension. There is no tenderness.   Musculoskeletal: She exhibits no deformity.   Lymphadenopathy:     She has no cervical adenopathy.   Neurological: She is alert.   Skin: Skin is warm. No rash noted.   Psychiatric: She has a normal mood and affect.      ------------------------------------  Recent Imaging:      Examination. CT CHEST WO CONTRAST  History: Pulmonary infiltrate.  DLP: 868.3 2mGycm.  CT scan of the chest is performed without intravenous contrast  enhancement. The images are acquired in axial plane with subsequent  reconstruction in coronal and sagittal plane.  The comparison is made with the previous study dated 6/26/2018.  There is no significant interval change.  The chronic interstitial infiltrate/scarring in the left upper lobe  with traction bronchiectasis is reidentified. No change. There are  bilateral lower lobar interstitial infiltrate with subpleural sparing  is unchanged. No honeycombing. Similar changes are seen in the right  middle lobe.  No discrete lung nodules or masses. A few calcified granulomas are  seen.  Thyroid gland is not evaluated.  There are extensive streak artifacts from the cervical fusion hardware  and the right arthroplasty hardware which is obscuring the soft tissue  structures of the base of the neck and upper chest.  Atheromatous changes of " the thoracic aorta are seen. No aneurysmal  dilatation. Moderate dilatation of the main pulmonary artery and right  and left pulmonary arteries are noted.  There are several calcified granulomas in the mediastinum and left  hilum.  Normal-appearing normal size mediastinal lymph nodes are seen.  There are atheromatous changes of the right coronary artery. A heavy  calcification of the mitral annulus is noted.  Asymmetrical right breast density is incompletely evaluated due to  incomplete inclusion of the area.  The unenhanced liver and spleen appear normal. A small rounded  metallic foreign body is seen located posteriorly in the right lobe of  the liver. The adrenal glands bilaterally are normal. There is fatty  infiltration of the partly visualized pancreas.  The images reviewed in bone window show severe chronic degenerative  changes of the thoracic spine.  Incidentally noted is air bubbles in the posterior mediastinum,  particularly in the anterior paraspinal region and air in the epidural  space. This was not noted in the previous study. This may be due to  recent epidural injection and probable leakage along the lateral  roots. This may be clinically correlated.  IMPRESSION:  A stable CT scan of the chest. Persistent scarring in the  left upper lobe with traction bronchiectasis.  Chronic nonspecific interstitial pneumonia. Old healed granulomas.  Evidence of small amount of posterior mediastinal and epidural there  which is probably due to the recent epidural injection. This data be  clinically correlated and further evaluated.  Signed by Dr Kierra Miranda on 2/6/2019 4:22 PM    ------------------------------------  Assessment/Plan   Problem List Items Addressed This Visit        Cardiovascular and Mediastinum    Essential hypertension (Chronic)       Respiratory    Mild intermittent asthma without complication    Pulmonary infiltrate in left lung on chest x-ray - Primary    Traction bronchiectasis (CMS/HCC)        Digestive    Gastroesophageal reflux disease without esophagitis (Chronic)        Patient's Body mass index is 33.13 kg/m². BMI is above normal parameters. Recommendations include: referral to primary care.      She is overall stable.  She has findings on CT suggestive of NSI P.  She has multiple reasons not treat her with steroids.  She has had reflux she has had bone disease and overweight already.  Point.  We will continue her asthma therapy.  Call as needed if exercise tolerance decreases her dyspnea increases otherwise.

## 2019-02-15 RX ORDER — PANTOPRAZOLE SODIUM 40 MG/1
TABLET, DELAYED RELEASE ORAL
Qty: 60 TABLET | Refills: 5 | Status: SHIPPED | OUTPATIENT
Start: 2019-02-15 | End: 2019-05-02

## 2019-02-26 ENCOUNTER — HOSPITAL ENCOUNTER (OUTPATIENT)
Dept: GENERAL RADIOLOGY | Age: 74
Discharge: HOME | End: 2019-02-26
Payer: MEDICARE

## 2019-02-26 DIAGNOSIS — I65.23 CAROTID ARTERY PLAQUE, BILATERAL: ICD-10-CM

## 2019-02-26 DIAGNOSIS — R42 DIZZINESS: ICD-10-CM

## 2019-02-26 PROCEDURE — 93880 EXTRACRANIAL BILAT STUDY: CPT

## 2019-02-28 ENCOUNTER — HOSPITAL ENCOUNTER (OUTPATIENT)
Dept: GENERAL RADIOLOGY | Facility: HOSPITAL | Age: 74
Discharge: HOME | End: 2019-02-28
Admitting: SPECIALIST

## 2019-02-28 ENCOUNTER — APPOINTMENT (OUTPATIENT)
Dept: PREADMISSION TESTING | Facility: HOSPITAL | Age: 74
End: 2019-02-28

## 2019-02-28 VITALS
WEIGHT: 193.12 LBS | SYSTOLIC BLOOD PRESSURE: 128 MMHG | RESPIRATION RATE: 20 BRPM | DIASTOLIC BLOOD PRESSURE: 54 MMHG | OXYGEN SATURATION: 97 % | BODY MASS INDEX: 34.22 KG/M2 | HEART RATE: 73 BPM | HEIGHT: 63 IN

## 2019-02-28 LAB
ALBUMIN SERPL-MCNC: 4.2 G/DL (ref 3.5–5)
ALBUMIN/GLOB SERPL: 1.4 G/DL (ref 1.1–2.5)
ALP SERPL-CCNC: 95 U/L (ref 24–120)
ALT SERPL W P-5'-P-CCNC: 41 U/L (ref 0–54)
ANION GAP SERPL CALCULATED.3IONS-SCNC: 8 MMOL/L (ref 4–13)
AST SERPL-CCNC: 49 U/L (ref 7–45)
BASOPHILS # BLD AUTO: 0.04 10*3/MM3 (ref 0–0.2)
BASOPHILS NFR BLD AUTO: 0.7 % (ref 0–2)
BILIRUB SERPL-MCNC: 0.4 MG/DL (ref 0.1–1)
BUN BLD-MCNC: 21 MG/DL (ref 5–21)
BUN/CREAT SERPL: 17.4 (ref 7–25)
CALCIUM SPEC-SCNC: 9.1 MG/DL (ref 8.4–10.4)
CHLORIDE SERPL-SCNC: 98 MMOL/L (ref 98–110)
CO2 SERPL-SCNC: 27 MMOL/L (ref 24–31)
CREAT BLD-MCNC: 1.21 MG/DL (ref 0.5–1.4)
DEPRECATED RDW RBC AUTO: 42.5 FL (ref 40–54)
EOSINOPHIL # BLD AUTO: 0.16 10*3/MM3 (ref 0–0.7)
EOSINOPHIL NFR BLD AUTO: 2.7 % (ref 0–4)
ERYTHROCYTE [DISTWIDTH] IN BLOOD BY AUTOMATED COUNT: 13.2 % (ref 12–15)
GFR SERPL CREATININE-BSD FRML MDRD: 43 ML/MIN/1.73
GLOBULIN UR ELPH-MCNC: 2.9 GM/DL
GLUCOSE BLD-MCNC: 101 MG/DL (ref 70–100)
HCT VFR BLD AUTO: 37.4 % (ref 37–47)
HGB BLD-MCNC: 12.5 G/DL (ref 12–16)
IMM GRANULOCYTES # BLD AUTO: 0.02 10*3/MM3 (ref 0–0.05)
IMM GRANULOCYTES NFR BLD AUTO: 0.3 % (ref 0–5)
LYMPHOCYTES # BLD AUTO: 1.45 10*3/MM3 (ref 0.72–4.86)
LYMPHOCYTES NFR BLD AUTO: 24.3 % (ref 15–45)
MCH RBC QN AUTO: 29.6 PG (ref 28–32)
MCHC RBC AUTO-ENTMCNC: 33.4 G/DL (ref 33–36)
MCV RBC AUTO: 88.4 FL (ref 82–98)
MONOCYTES # BLD AUTO: 1.12 10*3/MM3 (ref 0.19–1.3)
MONOCYTES NFR BLD AUTO: 18.8 % (ref 4–12)
NEUTROPHILS # BLD AUTO: 3.17 10*3/MM3 (ref 1.87–8.4)
NEUTROPHILS NFR BLD AUTO: 53.2 % (ref 39–78)
NRBC BLD AUTO-RTO: 0 /100 WBC (ref 0–0)
PLATELET # BLD AUTO: 236 10*3/MM3 (ref 130–400)
PMV BLD AUTO: 9.6 FL (ref 6–12)
POTASSIUM BLD-SCNC: 4.3 MMOL/L (ref 3.5–5.3)
PROT SERPL-MCNC: 7.1 G/DL (ref 6.3–8.7)
RBC # BLD AUTO: 4.23 10*6/MM3 (ref 4.2–5.4)
SODIUM BLD-SCNC: 133 MMOL/L (ref 135–145)
WBC NRBC COR # BLD: 5.96 10*3/MM3 (ref 4.8–10.8)

## 2019-02-28 PROCEDURE — 80053 COMPREHEN METABOLIC PANEL: CPT | Performed by: SPECIALIST

## 2019-02-28 PROCEDURE — 93010 ELECTROCARDIOGRAM REPORT: CPT | Performed by: INTERNAL MEDICINE

## 2019-02-28 PROCEDURE — 85025 COMPLETE CBC W/AUTO DIFF WBC: CPT | Performed by: SPECIALIST

## 2019-02-28 PROCEDURE — 93005 ELECTROCARDIOGRAM TRACING: CPT

## 2019-02-28 PROCEDURE — 71046 X-RAY EXAM CHEST 2 VIEWS: CPT

## 2019-02-28 PROCEDURE — 36415 COLL VENOUS BLD VENIPUNCTURE: CPT

## 2019-02-28 RX ORDER — DICYCLOMINE HYDROCHLORIDE 10 MG/1
10 CAPSULE ORAL 4 TIMES DAILY PRN
COMMUNITY
End: 2020-02-27 | Stop reason: ALTCHOICE

## 2019-03-04 ENCOUNTER — ANESTHESIA EVENT (OUTPATIENT)
Dept: PERIOP | Facility: HOSPITAL | Age: 74
End: 2019-03-04

## 2019-03-04 ENCOUNTER — ANESTHESIA (OUTPATIENT)
Dept: PERIOP | Facility: HOSPITAL | Age: 74
End: 2019-03-04

## 2019-03-04 ENCOUNTER — HOSPITAL ENCOUNTER (OUTPATIENT)
Facility: HOSPITAL | Age: 74
Setting detail: HOSPITAL OUTPATIENT SURGERY
Discharge: HOME | End: 2019-03-04
Attending: SPECIALIST | Admitting: SPECIALIST

## 2019-03-04 VITALS
RESPIRATION RATE: 20 BRPM | DIASTOLIC BLOOD PRESSURE: 60 MMHG | SYSTOLIC BLOOD PRESSURE: 116 MMHG | HEART RATE: 65 BPM | OXYGEN SATURATION: 94 % | TEMPERATURE: 97.6 F

## 2019-03-04 DIAGNOSIS — D17.9 LIPOMA: ICD-10-CM

## 2019-03-04 PROCEDURE — 25010000002 NEOSTIGMINE PER 0.5 MG: Performed by: NURSE ANESTHETIST, CERTIFIED REGISTERED

## 2019-03-04 PROCEDURE — 25010000002 ONDANSETRON PER 1 MG: Performed by: NURSE ANESTHETIST, CERTIFIED REGISTERED

## 2019-03-04 PROCEDURE — 25010000002 FENTANYL CITRATE (PF) 100 MCG/2ML SOLUTION: Performed by: NURSE ANESTHETIST, CERTIFIED REGISTERED

## 2019-03-04 PROCEDURE — 88377 M/PHMTRC ALYS ISHQUANT/SEMIQ: CPT

## 2019-03-04 PROCEDURE — 25010000002 PROPOFOL 10 MG/ML EMULSION: Performed by: NURSE ANESTHETIST, CERTIFIED REGISTERED

## 2019-03-04 PROCEDURE — 88304 TISSUE EXAM BY PATHOLOGIST: CPT | Performed by: SPECIALIST

## 2019-03-04 RX ORDER — ACETAMINOPHEN 500 MG
1000 TABLET ORAL ONCE
Status: COMPLETED | OUTPATIENT
Start: 2019-03-04 | End: 2019-03-04

## 2019-03-04 RX ORDER — SODIUM CHLORIDE, SODIUM LACTATE, POTASSIUM CHLORIDE, CALCIUM CHLORIDE 600; 310; 30; 20 MG/100ML; MG/100ML; MG/100ML; MG/100ML
1000 INJECTION, SOLUTION INTRAVENOUS CONTINUOUS
Status: DISCONTINUED | OUTPATIENT
Start: 2019-03-04 | End: 2019-03-04 | Stop reason: HOSPADM

## 2019-03-04 RX ORDER — VANCOMYCIN HYDROCHLORIDE 1 G/200ML
1 INJECTION, SOLUTION INTRAVENOUS ONCE
Status: DISCONTINUED | OUTPATIENT
Start: 2019-03-04 | End: 2019-03-04

## 2019-03-04 RX ORDER — GLYCOPYRROLATE 0.2 MG/ML
INJECTION INTRAMUSCULAR; INTRAVENOUS AS NEEDED
Status: DISCONTINUED | OUTPATIENT
Start: 2019-03-04 | End: 2019-03-04 | Stop reason: SURG

## 2019-03-04 RX ORDER — ONDANSETRON 2 MG/ML
INJECTION INTRAMUSCULAR; INTRAVENOUS AS NEEDED
Status: DISCONTINUED | OUTPATIENT
Start: 2019-03-04 | End: 2019-03-04 | Stop reason: SURG

## 2019-03-04 RX ORDER — SODIUM CHLORIDE 0.9 % (FLUSH) 0.9 %
3 SYRINGE (ML) INJECTION AS NEEDED
Status: DISCONTINUED | OUTPATIENT
Start: 2019-03-04 | End: 2019-03-04 | Stop reason: HOSPADM

## 2019-03-04 RX ORDER — PROPOFOL 10 MG/ML
VIAL (ML) INTRAVENOUS AS NEEDED
Status: DISCONTINUED | OUTPATIENT
Start: 2019-03-04 | End: 2019-03-04 | Stop reason: SURG

## 2019-03-04 RX ORDER — OXYCODONE AND ACETAMINOPHEN 7.5; 325 MG/1; MG/1
2 TABLET ORAL EVERY 4 HOURS PRN
Status: DISCONTINUED | OUTPATIENT
Start: 2019-03-04 | End: 2019-03-04 | Stop reason: HOSPADM

## 2019-03-04 RX ORDER — IBUPROFEN 600 MG/1
600 TABLET ORAL ONCE AS NEEDED
Status: DISCONTINUED | OUTPATIENT
Start: 2019-03-04 | End: 2019-03-04 | Stop reason: HOSPADM

## 2019-03-04 RX ORDER — NALOXONE HCL 0.4 MG/ML
0.4 VIAL (ML) INJECTION AS NEEDED
Status: DISCONTINUED | OUTPATIENT
Start: 2019-03-04 | End: 2019-03-04 | Stop reason: HOSPADM

## 2019-03-04 RX ORDER — SODIUM CHLORIDE 0.9 % (FLUSH) 0.9 %
1-10 SYRINGE (ML) INJECTION AS NEEDED
Status: DISCONTINUED | OUTPATIENT
Start: 2019-03-04 | End: 2019-03-04 | Stop reason: HOSPADM

## 2019-03-04 RX ORDER — IPRATROPIUM BROMIDE AND ALBUTEROL SULFATE 2.5; .5 MG/3ML; MG/3ML
3 SOLUTION RESPIRATORY (INHALATION) ONCE AS NEEDED
Status: DISCONTINUED | OUTPATIENT
Start: 2019-03-04 | End: 2019-03-04 | Stop reason: HOSPADM

## 2019-03-04 RX ORDER — FENTANYL CITRATE 50 UG/ML
INJECTION, SOLUTION INTRAMUSCULAR; INTRAVENOUS AS NEEDED
Status: DISCONTINUED | OUTPATIENT
Start: 2019-03-04 | End: 2019-03-04 | Stop reason: SURG

## 2019-03-04 RX ORDER — SODIUM CHLORIDE 0.9 % (FLUSH) 0.9 %
3 SYRINGE (ML) INJECTION EVERY 12 HOURS SCHEDULED
Status: DISCONTINUED | OUTPATIENT
Start: 2019-03-04 | End: 2019-03-04 | Stop reason: HOSPADM

## 2019-03-04 RX ORDER — ROCURONIUM BROMIDE 10 MG/ML
INJECTION, SOLUTION INTRAVENOUS AS NEEDED
Status: DISCONTINUED | OUTPATIENT
Start: 2019-03-04 | End: 2019-03-04 | Stop reason: SURG

## 2019-03-04 RX ORDER — FENTANYL CITRATE 50 UG/ML
25 INJECTION, SOLUTION INTRAMUSCULAR; INTRAVENOUS AS NEEDED
Status: DISCONTINUED | OUTPATIENT
Start: 2019-03-04 | End: 2019-03-04 | Stop reason: HOSPADM

## 2019-03-04 RX ORDER — FAMOTIDINE 10 MG/ML
20 INJECTION, SOLUTION INTRAVENOUS
Status: DISCONTINUED | OUTPATIENT
Start: 2019-03-04 | End: 2019-03-04 | Stop reason: HOSPADM

## 2019-03-04 RX ORDER — MAGNESIUM HYDROXIDE 1200 MG/15ML
LIQUID ORAL AS NEEDED
Status: DISCONTINUED | OUTPATIENT
Start: 2019-03-04 | End: 2019-03-04 | Stop reason: HOSPADM

## 2019-03-04 RX ORDER — BUPIVACAINE HYDROCHLORIDE AND EPINEPHRINE 5; 5 MG/ML; UG/ML
INJECTION, SOLUTION PERINEURAL AS NEEDED
Status: DISCONTINUED | OUTPATIENT
Start: 2019-03-04 | End: 2019-03-04 | Stop reason: HOSPADM

## 2019-03-04 RX ORDER — OXYCODONE AND ACETAMINOPHEN 10; 325 MG/1; MG/1
1 TABLET ORAL ONCE AS NEEDED
Status: DISCONTINUED | OUTPATIENT
Start: 2019-03-04 | End: 2019-03-04 | Stop reason: HOSPADM

## 2019-03-04 RX ORDER — LABETALOL HYDROCHLORIDE 5 MG/ML
5 INJECTION, SOLUTION INTRAVENOUS
Status: DISCONTINUED | OUTPATIENT
Start: 2019-03-04 | End: 2019-03-04 | Stop reason: HOSPADM

## 2019-03-04 RX ORDER — MEPERIDINE HYDROCHLORIDE 25 MG/ML
12.5 INJECTION INTRAMUSCULAR; INTRAVENOUS; SUBCUTANEOUS
Status: DISCONTINUED | OUTPATIENT
Start: 2019-03-04 | End: 2019-03-04 | Stop reason: HOSPADM

## 2019-03-04 RX ORDER — ONDANSETRON 2 MG/ML
4 INJECTION INTRAMUSCULAR; INTRAVENOUS ONCE AS NEEDED
Status: DISCONTINUED | OUTPATIENT
Start: 2019-03-04 | End: 2019-03-04 | Stop reason: HOSPADM

## 2019-03-04 RX ORDER — METOCLOPRAMIDE HYDROCHLORIDE 5 MG/ML
5 INJECTION INTRAMUSCULAR; INTRAVENOUS
Status: DISCONTINUED | OUTPATIENT
Start: 2019-03-04 | End: 2019-03-04 | Stop reason: HOSPADM

## 2019-03-04 RX ORDER — SODIUM CHLORIDE, SODIUM LACTATE, POTASSIUM CHLORIDE, CALCIUM CHLORIDE 600; 310; 30; 20 MG/100ML; MG/100ML; MG/100ML; MG/100ML
100 INJECTION, SOLUTION INTRAVENOUS CONTINUOUS
Status: DISCONTINUED | OUTPATIENT
Start: 2019-03-04 | End: 2019-03-04 | Stop reason: HOSPADM

## 2019-03-04 RX ORDER — HYDROCODONE BITARTRATE AND ACETAMINOPHEN 7.5; 325 MG/1; MG/1
1 TABLET ORAL EVERY 4 HOURS PRN
Qty: 15 TABLET | Refills: 0 | Status: ON HOLD | OUTPATIENT
Start: 2019-03-04 | End: 2022-01-01

## 2019-03-04 RX ORDER — IPRATROPIUM BROMIDE AND ALBUTEROL SULFATE 2.5; .5 MG/3ML; MG/3ML
3 SOLUTION RESPIRATORY (INHALATION) ONCE
Status: COMPLETED | OUTPATIENT
Start: 2019-03-04 | End: 2019-03-04

## 2019-03-04 RX ORDER — LIDOCAINE HYDROCHLORIDE 20 MG/ML
INJECTION, SOLUTION INFILTRATION; PERINEURAL AS NEEDED
Status: DISCONTINUED | OUTPATIENT
Start: 2019-03-04 | End: 2019-03-04 | Stop reason: SURG

## 2019-03-04 RX ADMIN — VANCOMYCIN HYDROCHLORIDE 1 G: 1 INJECTION, POWDER, LYOPHILIZED, FOR SOLUTION INTRAVENOUS at 08:05

## 2019-03-04 RX ADMIN — SODIUM CHLORIDE, POTASSIUM CHLORIDE, SODIUM LACTATE AND CALCIUM CHLORIDE 1000 ML: 600; 310; 30; 20 INJECTION, SOLUTION INTRAVENOUS at 06:30

## 2019-03-04 RX ADMIN — ROCURONIUM BROMIDE 10 MG: 10 INJECTION INTRAVENOUS at 07:58

## 2019-03-04 RX ADMIN — GLYCOPYRROLATE 0.4 MG: 0.2 INJECTION, SOLUTION INTRAMUSCULAR; INTRAVENOUS at 08:34

## 2019-03-04 RX ADMIN — PROPOFOL 100 MG: 10 INJECTION, EMULSION INTRAVENOUS at 07:58

## 2019-03-04 RX ADMIN — Medication 1 MG: at 08:37

## 2019-03-04 RX ADMIN — LIDOCAINE HYDROCHLORIDE 100 MG: 20 INJECTION, SOLUTION INFILTRATION; PERINEURAL at 07:58

## 2019-03-04 RX ADMIN — ACETAMINOPHEN 1000 MG: 500 TABLET, FILM COATED ORAL at 07:15

## 2019-03-04 RX ADMIN — ONDANSETRON HYDROCHLORIDE 4 MG: 2 SOLUTION INTRAMUSCULAR; INTRAVENOUS at 08:05

## 2019-03-04 RX ADMIN — FENTANYL CITRATE 50 MCG: 50 INJECTION, SOLUTION INTRAMUSCULAR; INTRAVENOUS at 08:22

## 2019-03-04 RX ADMIN — FAMOTIDINE 20 MG: 10 INJECTION, SOLUTION INTRAVENOUS at 07:15

## 2019-03-04 RX ADMIN — FENTANYL CITRATE 50 MCG: 50 INJECTION, SOLUTION INTRAMUSCULAR; INTRAVENOUS at 07:58

## 2019-03-04 RX ADMIN — Medication 2 MG: at 08:35

## 2019-03-04 RX ADMIN — IPRATROPIUM BROMIDE AND ALBUTEROL SULFATE 3 ML: 2.5; .5 SOLUTION RESPIRATORY (INHALATION) at 07:15

## 2019-03-04 RX ADMIN — ROCURONIUM BROMIDE 20 MG: 10 INJECTION INTRAVENOUS at 07:59

## 2019-03-04 NOTE — ANESTHESIA PREPROCEDURE EVALUATION
Anesthesia Evaluation     Patient summary reviewed and Nursing notes reviewed   no history of anesthetic complications:  NPO Solid Status: > 8 hours  NPO Liquid Status: > 8 hours           Airway   Mallampati: I  TM distance: >3 FB  Neck ROM: full  No difficulty expected  Dental      Pulmonary     breath sounds clear to auscultation  (+) asthma (uses inhalers regularly, reports chronic cough),   (-) not a smoker  Cardiovascular   Exercise tolerance: poor (<4 METS)    Patient on routine beta blocker and Beta blocker given within 24 hours of surgery  Rhythm: regular  Rate: normal    (+) hypertension,   (-) CAD, angina, cardiac stents      Neuro/Psych  (+) psychiatric history Anxiety and Depression,     (-) seizures, TIA, CVA  GI/Hepatic/Renal/Endo    (+) obesity,  GERD,  renal disease CRI, hypothyroidism,   (-) liver disease, diabetes    Musculoskeletal     Abdominal    Substance History      OB/GYN          Other   (+) arthritis                       Anesthesia Plan    ASA 3     general     intravenous induction   Anesthetic plan, all risks, benefits, and alternatives have been provided, discussed and informed consent has been obtained with: patient.

## 2019-03-04 NOTE — OP NOTE
Giulia Rodrigez MD Operative Note    Hanny Ivy  3/4/2019    Pre-op Diagnosis:   LIPOMA LEFT HIP    Post-op Diagnosis:     same    Procedure/CPT® Codes:      Procedure(s):  EXCISION LIPOMA - LEFT HIP with layered closure    Surgeon(s):  Giulia Rodrigez MD    Anesthesia: General    Staff:   Circulator: Erin Henry RN  Scrub Person: Suze Salomon  Assistant: Zac Liz  Orientee: Marielos Veloz RNA    Estimated Blood Loss: minimal    Specimens:                ID Type Source Tests Collected by Time   A : lipoma Tissue Hip, Left TISSUE PATHOLOGY EXAM Giulia Rodrigez MD 3/4/2019 0833         Drains:      Indications: Symptomatic lipoma left hip    Findings: Somewhat amorphous racquetball sized lipoma in the deep subcutaneous tissue left hip    Complications: none    Procedure: The patient was brought to the operating room and placed in the supine position.  After induction of general anesthesia and infusion of IV antibiotics, the patient was prepped and draped in the usual sterile fashion.  A 10 cm incision was made overlying the palpable area.  Subtenons tissue was dissected without Accardi down to the deep subcutaneous space.  The lipoma was somewhat amorphous that did extend deep.  It was circumferentially dissected free and excised.  We did send it away for pathology.  There was significant lipomatous changes throughout this layer.  We debrided further fatty tissue and then irrigated with saline.  We then closed in layers of interrupted 2 oh and running 4-0 Vicryl.  Dressing was placed patient was awakened and transferred to the recovery room in stable condition with tolerated the procedure well.  At the end of the procedure all counts were correct.  Giulia Rodrigez MD     Date: 3/4/2019  Time: 8:40 AM

## 2019-03-04 NOTE — DISCHARGE INSTRUCTIONS

## 2019-03-04 NOTE — ANESTHESIA POSTPROCEDURE EVALUATION
Patient: Hanny Ivy    Procedure Summary     Date:  03/04/19 Room / Location:   [facility] OR  /  [facility]    Anesthesia Start:  0751 Anesthesia Stop:  0856    Procedure:  EXCISION LIPOMA - LEFT HIP (Left ) Diagnosis:  (LIPOMA LEFT HIP)    Surgeon:  Giulia Rodrigze MD Provider:  Cynthia Nails CRNA    Anesthesia Type:  general ASA Status:  3          Anesthesia Type: general  Last vitals  BP   113/59 (03/04/19 0930)   Temp   97.6 °F (36.4 °C) (03/04/19 0920)   Pulse   73 (03/04/19 0930)   Resp   16 (03/04/19 0930)     SpO2   93 % (03/04/19 0930)     Post Anesthesia Care and Evaluation    Patient location during evaluation: PACU  Patient participation: complete - patient participated  Level of consciousness: awake and alert  Pain management: adequate  Airway patency: patent  Anesthetic complications: No anesthetic complications    Cardiovascular status: acceptable  Respiratory status: acceptable  Hydration status: acceptable    Comments: Blood pressure 113/59, pulse 73, temperature 97.6 °F (36.4 °C), temperature source Temporal, resp. rate 16, SpO2 93 %, not currently breastfeeding.    Pt discharged from PACU based on baudilio score >8

## 2019-03-04 NOTE — ANESTHESIA PROCEDURE NOTES
Airway  Urgency: elective    Airway not difficult    General Information and Staff    Patient location during procedure: OR  CRNA: Cynthia Nails CRNA    Indications and Patient Condition  Indications for airway management: airway protection    Preoxygenated: yes  Mask difficulty assessment: 1 - vent by mask    Final Airway Details  Final airway type: endotracheal airway      Successful airway: ETT  Cuffed: yes   Successful intubation technique: direct laryngoscopy  Facilitating devices/methods: intubating stylet  Endotracheal tube insertion site: oral  Blade: Cory  Blade size: 3.5.  ETT size (mm): 7.5  Cormack-Lehane Classification: grade IIb - view of arytenoids or posterior of glottis only  Placement verified by: chest auscultation and capnometry   Cuff volume (mL): 5  Number of attempts at approach: 1    Additional Comments  Atraumatic. Dentition unchanged.

## 2019-03-11 DIAGNOSIS — E78.5 HYPERLIPIDEMIA, UNSPECIFIED HYPERLIPIDEMIA TYPE: ICD-10-CM

## 2019-03-11 RX ORDER — SIMVASTATIN 20 MG
TABLET ORAL
Qty: 90 TABLET | Refills: 3 | Status: SHIPPED | OUTPATIENT
Start: 2019-03-11 | End: 2019-05-02

## 2019-03-12 LAB
CYTO UR: NORMAL
LAB AP CASE REPORT: NORMAL
PATH REPORT.FINAL DX SPEC: NORMAL
PATH REPORT.GROSS SPEC: NORMAL

## 2019-03-18 DIAGNOSIS — M54.9 CHRONIC NECK AND BACK PAIN: ICD-10-CM

## 2019-03-18 DIAGNOSIS — M54.2 CHRONIC NECK AND BACK PAIN: ICD-10-CM

## 2019-03-18 DIAGNOSIS — G89.29 CHRONIC NECK AND BACK PAIN: ICD-10-CM

## 2019-03-18 DIAGNOSIS — F41.9 ANXIETY: ICD-10-CM

## 2019-03-18 RX ORDER — LORAZEPAM 1 MG/1
TABLET ORAL
Qty: 90 TABLET | Refills: 2 | Status: SHIPPED | OUTPATIENT
Start: 2019-03-18 | End: 2019-04-18

## 2019-03-18 RX ORDER — GABAPENTIN 300 MG/1
CAPSULE ORAL
Qty: 180 CAPSULE | Refills: 2 | Status: SHIPPED | OUTPATIENT
Start: 2019-03-18 | End: 2019-05-02

## 2019-04-02 ENCOUNTER — TELEPHONE (OUTPATIENT)
Dept: FAMILY MEDICINE CLINIC | Age: 74
End: 2019-04-02

## 2019-04-02 RX ORDER — CLINDAMYCIN HYDROCHLORIDE 150 MG/1
600 CAPSULE ORAL
Qty: 4 CAPSULE | Refills: 0 | OUTPATIENT
Start: 2019-04-02 | End: 2019-04-03 | Stop reason: SDUPTHER

## 2019-04-03 RX ORDER — CLINDAMYCIN HYDROCHLORIDE 150 MG/1
600 CAPSULE ORAL
Qty: 4 CAPSULE | Refills: 3 | Status: SHIPPED | OUTPATIENT
Start: 2019-04-03 | End: 2019-04-12 | Stop reason: SDUPTHER

## 2019-04-12 RX ORDER — CLINDAMYCIN HYDROCHLORIDE 150 MG/1
600 CAPSULE ORAL
Qty: 16 CAPSULE | Refills: 0 | Status: SHIPPED | OUTPATIENT
Start: 2019-04-12 | End: 2019-04-12

## 2019-04-18 RX ORDER — FERROUS SULFATE TAB EC 324 MG (65 MG FE EQUIVALENT) 324 (65 FE) MG
TABLET DELAYED RESPONSE ORAL
Qty: 30 TABLET | Refills: 5 | Status: SHIPPED | OUTPATIENT
Start: 2019-04-18 | End: 2019-05-02

## 2019-04-18 RX ORDER — FLUOXETINE HYDROCHLORIDE 40 MG/1
CAPSULE ORAL
Qty: 30 CAPSULE | Refills: 5 | Status: SHIPPED | OUTPATIENT
Start: 2019-04-18 | End: 2019-05-02

## 2019-04-24 DIAGNOSIS — B36.9 FUNGAL INFECTION OF SKIN OF ABDOMEN: ICD-10-CM

## 2019-04-30 DIAGNOSIS — G47.00 INSOMNIA, UNSPECIFIED TYPE: ICD-10-CM

## 2019-04-30 DIAGNOSIS — I10 ESSENTIAL HYPERTENSION: ICD-10-CM

## 2019-04-30 RX ORDER — DOXEPIN HYDROCHLORIDE 50 MG/1
100 CAPSULE ORAL NIGHTLY
Qty: 180 CAPSULE | Refills: 3 | Status: ON HOLD | OUTPATIENT
Start: 2019-04-30 | End: 2019-07-05 | Stop reason: HOSPADM

## 2019-04-30 RX ORDER — CLONIDINE HYDROCHLORIDE 0.1 MG/1
TABLET ORAL
Qty: 180 TABLET | Refills: 3 | Status: SHIPPED | OUTPATIENT
Start: 2019-04-30 | End: 2019-05-02

## 2019-04-30 RX ORDER — AMLODIPINE AND OLMESARTAN MEDOXOMIL 5; 20 MG/1; MG/1
1 TABLET ORAL DAILY
Qty: 90 TABLET | Refills: 3 | Status: ON HOLD
Start: 2019-04-30 | End: 2019-07-16 | Stop reason: HOSPADM

## 2019-05-02 ENCOUNTER — HOSPITAL ENCOUNTER (OUTPATIENT)
Dept: PREADMISSION TESTING | Age: 74
Discharge: HOME | End: 2019-05-06
Payer: MEDICARE

## 2019-05-02 ENCOUNTER — HOSPITAL ENCOUNTER (OUTPATIENT)
Dept: GENERAL RADIOLOGY | Age: 74
Discharge: HOME | End: 2019-05-02
Payer: MEDICARE

## 2019-05-02 VITALS — WEIGHT: 184 LBS | HEIGHT: 65 IN | BODY MASS INDEX: 30.66 KG/M2

## 2019-05-02 LAB
ALBUMIN SERPL-MCNC: 4 G/DL (ref 3.5–5.2)
ALP BLD-CCNC: 104 U/L (ref 35–104)
ALT SERPL-CCNC: 36 U/L (ref 5–33)
ANION GAP SERPL CALCULATED.3IONS-SCNC: 15 MMOL/L (ref 7–19)
APTT: 32.2 SEC (ref 26–36.2)
AST SERPL-CCNC: 37 U/L (ref 5–32)
BACTERIA: ABNORMAL /HPF
BASOPHILS ABSOLUTE: 0.1 K/UL (ref 0–0.2)
BASOPHILS RELATIVE PERCENT: 0.9 % (ref 0–1)
BILIRUB SERPL-MCNC: <0.2 MG/DL (ref 0.2–1.2)
BILIRUBIN URINE: NEGATIVE
BLOOD, URINE: NEGATIVE
BUN BLDV-MCNC: 20 MG/DL (ref 8–23)
CALCIUM SERPL-MCNC: 9.4 MG/DL (ref 8.8–10.2)
CHLORIDE BLD-SCNC: 97 MMOL/L (ref 98–111)
CLARITY: ABNORMAL
CO2: 22 MMOL/L (ref 22–29)
COLOR: YELLOW
CREAT SERPL-MCNC: 1.1 MG/DL (ref 0.5–0.9)
EOSINOPHILS ABSOLUTE: 0.2 K/UL (ref 0–0.6)
EOSINOPHILS RELATIVE PERCENT: 2.7 % (ref 0–5)
EPITHELIAL CELLS, UA: 0 /HPF (ref 0–5)
EPITHELIAL CELLS, UA: ABNORMAL /HPF
GFR NON-AFRICAN AMERICAN: 48
GLUCOSE BLD-MCNC: 111 MG/DL (ref 74–109)
GLUCOSE URINE: NEGATIVE MG/DL
HCT VFR BLD CALC: 39.4 % (ref 37–47)
HEMOGLOBIN: 13 G/DL (ref 12–16)
HYALINE CASTS: 0 /HPF (ref 0–8)
INR BLD: 1.1 (ref 0.88–1.18)
KETONES, URINE: NEGATIVE MG/DL
LEUKOCYTE ESTERASE, URINE: ABNORMAL
LYMPHOCYTES ABSOLUTE: 1.7 K/UL (ref 1.1–4.5)
LYMPHOCYTES RELATIVE PERCENT: 25.1 % (ref 20–40)
MCH RBC QN AUTO: 30.3 PG (ref 27–31)
MCHC RBC AUTO-ENTMCNC: 33 G/DL (ref 33–37)
MCV RBC AUTO: 91.8 FL (ref 81–99)
MONOCYTES ABSOLUTE: 1.1 K/UL (ref 0–0.9)
MONOCYTES RELATIVE PERCENT: 16.1 % (ref 0–10)
NEUTROPHILS ABSOLUTE: 3.7 K/UL (ref 1.5–7.5)
NEUTROPHILS RELATIVE PERCENT: 54.7 % (ref 50–65)
NITRITE, URINE: NEGATIVE
PDW BLD-RTO: 13.2 % (ref 11.5–14.5)
PH UA: 6 (ref 5–8)
PLATELET # BLD: 251 K/UL (ref 130–400)
PMV BLD AUTO: 9.8 FL (ref 9.4–12.3)
POTASSIUM SERPL-SCNC: 4.1 MMOL/L (ref 3.5–5)
PROTEIN UA: NEGATIVE MG/DL
PROTHROMBIN TIME: 13.6 SEC (ref 12–14.6)
RBC # BLD: 4.29 M/UL (ref 4.2–5.4)
RBC UA: 0 /HPF (ref 0–4)
RBC UA: ABNORMAL /HPF (ref 0–2)
SODIUM BLD-SCNC: 134 MMOL/L (ref 136–145)
SPECIFIC GRAVITY UA: 1.02 (ref 1–1.03)
TOTAL PROTEIN: 7.3 G/DL (ref 6.6–8.7)
URINE REFLEX TO CULTURE: YES
UROBILINOGEN, URINE: 0.2 E.U./DL
WBC # BLD: 6.7 K/UL (ref 4.8–10.8)
WBC UA: 0 /HPF (ref 0–5)
WBC UA: ABNORMAL /HPF (ref 0–5)

## 2019-05-02 PROCEDURE — 85730 THROMBOPLASTIN TIME PARTIAL: CPT

## 2019-05-02 PROCEDURE — 36415 COLL VENOUS BLD VENIPUNCTURE: CPT

## 2019-05-02 PROCEDURE — 85610 PROTHROMBIN TIME: CPT

## 2019-05-02 PROCEDURE — 87086 URINE CULTURE/COLONY COUNT: CPT

## 2019-05-02 PROCEDURE — 93005 ELECTROCARDIOGRAM TRACING: CPT

## 2019-05-02 PROCEDURE — 85025 COMPLETE CBC W/AUTO DIFF WBC: CPT

## 2019-05-02 PROCEDURE — 80053 COMPREHEN METABOLIC PANEL: CPT

## 2019-05-02 PROCEDURE — 71046 X-RAY EXAM CHEST 2 VIEWS: CPT

## 2019-05-02 PROCEDURE — 81001 URINALYSIS AUTO W/SCOPE: CPT

## 2019-05-02 RX ORDER — FLUOXETINE HYDROCHLORIDE 40 MG/1
40 CAPSULE ORAL DAILY
Status: ON HOLD | COMMUNITY
End: 2019-07-05 | Stop reason: HOSPADM

## 2019-05-02 RX ORDER — OXYCODONE AND ACETAMINOPHEN 10; 325 MG/1; MG/1
1 TABLET ORAL EVERY 6 HOURS PRN
Status: ON HOLD | COMMUNITY
End: 2019-05-24 | Stop reason: HOSPADM

## 2019-05-02 RX ORDER — BUPROPION HYDROCHLORIDE 150 MG/1
150 TABLET ORAL EVERY MORNING
COMMUNITY
End: 2019-09-06 | Stop reason: ALTCHOICE

## 2019-05-02 RX ORDER — GABAPENTIN 300 MG/1
600 CAPSULE ORAL 2 TIMES DAILY PRN
Status: ON HOLD | COMMUNITY
End: 2019-06-15

## 2019-05-02 RX ORDER — LEVOTHYROXINE SODIUM 0.1 MG/1
100 TABLET ORAL DAILY
COMMUNITY
End: 2019-10-10 | Stop reason: SDUPTHER

## 2019-05-02 RX ORDER — LORAZEPAM 1 MG/1
0.5 TABLET ORAL 3 TIMES DAILY
Status: ON HOLD | COMMUNITY
End: 2019-07-05 | Stop reason: HOSPADM

## 2019-05-02 RX ORDER — PANTOPRAZOLE SODIUM 40 MG/1
40 TABLET, DELAYED RELEASE ORAL DAILY
COMMUNITY
End: 2019-10-30 | Stop reason: ALTCHOICE

## 2019-05-02 RX ORDER — SIMVASTATIN 20 MG
20 TABLET ORAL NIGHTLY
COMMUNITY
End: 2019-09-06 | Stop reason: ALTCHOICE

## 2019-05-02 RX ORDER — ALLOPURINOL 100 MG/1
100 TABLET ORAL DAILY
COMMUNITY
End: 2019-09-06 | Stop reason: ALTCHOICE

## 2019-05-02 RX ORDER — CLONIDINE HYDROCHLORIDE 0.1 MG/1
0.1 TABLET ORAL 2 TIMES DAILY
Status: ON HOLD | COMMUNITY
End: 2019-06-21 | Stop reason: HOSPADM

## 2019-05-02 RX ORDER — FERROUS SULFATE TAB EC 324 MG (65 MG FE EQUIVALENT) 324 (65 FE) MG
324 TABLET DELAYED RESPONSE ORAL
Status: ON HOLD | COMMUNITY
End: 2019-06-21 | Stop reason: HOSPADM

## 2019-05-02 RX ORDER — ACETAMINOPHEN 160 MG
1 TABLET,DISINTEGRATING ORAL NIGHTLY
Status: ON HOLD | COMMUNITY
End: 2022-01-01 | Stop reason: SDUPTHER

## 2019-05-02 RX ORDER — CARVEDILOL 12.5 MG/1
12.5 TABLET ORAL 2 TIMES DAILY WITH MEALS
Status: ON HOLD | COMMUNITY
End: 2019-06-21 | Stop reason: HOSPADM

## 2019-05-03 LAB
EKG P AXIS: 57 DEGREES
EKG P-R INTERVAL: 202 MS
EKG Q-T INTERVAL: 406 MS
EKG QRS DURATION: 104 MS
EKG QTC CALCULATION (BAZETT): 434 MS
EKG T AXIS: 60 DEGREES

## 2019-05-04 LAB — URINE CULTURE, ROUTINE: NORMAL

## 2019-05-15 RX ORDER — BUPROPION HYDROCHLORIDE 150 MG/1
TABLET, EXTENDED RELEASE ORAL
Qty: 90 TABLET | Refills: 3 | Status: ON HOLD | OUTPATIENT
Start: 2019-05-15 | End: 2019-05-21

## 2019-05-15 RX ORDER — LEVOTHYROXINE SODIUM 0.1 MG/1
TABLET ORAL
Qty: 90 TABLET | Refills: 3 | Status: ON HOLD | OUTPATIENT
Start: 2019-05-15 | End: 2019-05-21

## 2019-05-21 ENCOUNTER — ANESTHESIA EVENT (OUTPATIENT)
Dept: OPERATING ROOM | Age: 74
DRG: 455 | End: 2019-05-21
Payer: MEDICARE

## 2019-05-21 ENCOUNTER — ANESTHESIA (OUTPATIENT)
Dept: OPERATING ROOM | Age: 74
DRG: 455 | End: 2019-05-21
Payer: MEDICARE

## 2019-05-21 ENCOUNTER — HOSPITAL ENCOUNTER (INPATIENT)
Age: 74
LOS: 3 days | Discharge: NURSING, REHAB OR LONG-TERM CARE | DRG: 455 | End: 2019-05-24
Payer: MEDICARE

## 2019-05-21 VITALS
OXYGEN SATURATION: 84 % | RESPIRATION RATE: 3 BRPM | TEMPERATURE: 95.9 F | SYSTOLIC BLOOD PRESSURE: 96 MMHG | DIASTOLIC BLOOD PRESSURE: 56 MMHG

## 2019-05-21 DIAGNOSIS — G89.29 CHRONIC BILATERAL LOW BACK PAIN WITHOUT SCIATICA: Primary | ICD-10-CM

## 2019-05-21 DIAGNOSIS — M54.50 CHRONIC BILATERAL LOW BACK PAIN WITHOUT SCIATICA: Primary | ICD-10-CM

## 2019-05-21 PROBLEM — M51.36 DDD (DEGENERATIVE DISC DISEASE), LUMBAR: Status: ACTIVE | Noted: 2019-05-21

## 2019-05-21 PROBLEM — M43.16 SPONDYLOLISTHESIS AT L4-L5 LEVEL: Status: ACTIVE | Noted: 2019-05-21

## 2019-05-21 LAB
ABO/RH: NORMAL
ANTIBODY SCREEN: NORMAL
ANTIBODY SCREEN: NORMAL

## 2019-05-21 PROCEDURE — 36415 COLL VENOUS BLD VENIPUNCTURE: CPT

## 2019-05-21 PROCEDURE — 07DR3ZZ EXTRACTION OF ILIAC BONE MARROW, PERCUTANEOUS APPROACH: ICD-10-PCS

## 2019-05-21 PROCEDURE — 7100000000 HC PACU RECOVERY - FIRST 15 MIN

## 2019-05-21 PROCEDURE — 86901 BLOOD TYPING SEROLOGIC RH(D): CPT

## 2019-05-21 PROCEDURE — 6360000002 HC RX W HCPCS

## 2019-05-21 PROCEDURE — 94664 DEMO&/EVAL PT USE INHALER: CPT

## 2019-05-21 PROCEDURE — C1713 ANCHOR/SCREW BN/BN,TIS/BN: HCPCS

## 2019-05-21 PROCEDURE — 6360000002 HC RX W HCPCS: Performed by: NURSE ANESTHETIST, CERTIFIED REGISTERED

## 2019-05-21 PROCEDURE — 7100000001 HC PACU RECOVERY - ADDTL 15 MIN

## 2019-05-21 PROCEDURE — 94762 N-INVAS EAR/PLS OXIMTRY CONT: CPT

## 2019-05-21 PROCEDURE — 2580000003 HC RX 258

## 2019-05-21 PROCEDURE — 0SG00A0 FUSION OF LUMBAR VERTEBRAL JOINT WITH INTERBODY FUSION DEVICE, ANTERIOR APPROACH, ANTERIOR COLUMN, OPEN APPROACH: ICD-10-PCS

## 2019-05-21 PROCEDURE — 2780000010 HC IMPLANT OTHER

## 2019-05-21 PROCEDURE — 6370000000 HC RX 637 (ALT 250 FOR IP)

## 2019-05-21 PROCEDURE — 2580000003 HC RX 258: Performed by: NURSE ANESTHETIST, CERTIFIED REGISTERED

## 2019-05-21 PROCEDURE — 3700000000 HC ANESTHESIA ATTENDED CARE

## 2019-05-21 PROCEDURE — 3700000001 HC ADD 15 MINUTES (ANESTHESIA)

## 2019-05-21 PROCEDURE — 0SG0071 FUSION OF LUMBAR VERTEBRAL JOINT WITH AUTOLOGOUS TISSUE SUBSTITUTE, POSTERIOR APPROACH, POSTERIOR COLUMN, OPEN APPROACH: ICD-10-PCS

## 2019-05-21 PROCEDURE — 1210000000 HC MED SURG R&B

## 2019-05-21 PROCEDURE — 3600000005 HC SURGERY LEVEL 5 BASE

## 2019-05-21 PROCEDURE — 86900 BLOOD TYPING SEROLOGIC ABO: CPT

## 2019-05-21 PROCEDURE — 2720000010 HC SURG SUPPLY STERILE

## 2019-05-21 PROCEDURE — 86850 RBC ANTIBODY SCREEN: CPT

## 2019-05-21 PROCEDURE — 2700000000 HC OXYGEN THERAPY PER DAY

## 2019-05-21 PROCEDURE — 2709999900 HC NON-CHARGEABLE SUPPLY

## 2019-05-21 PROCEDURE — 94640 AIRWAY INHALATION TREATMENT: CPT

## 2019-05-21 PROCEDURE — 2500000003 HC RX 250 WO HCPCS: Performed by: NURSE ANESTHETIST, CERTIFIED REGISTERED

## 2019-05-21 PROCEDURE — 3600000015 HC SURGERY LEVEL 5 ADDTL 15MIN

## 2019-05-21 DEVICE — TI MIS LORDOTIC ROD, 5.5 MM X 40 MM
Type: IMPLANTABLE DEVICE | Site: SPINE LUMBAR | Status: FUNCTIONAL
Brand: INVICTUS

## 2019-05-21 DEVICE — CANNULATED EXTENDED TAB POLYAXIAL REDUCTION SCREW, 6.5 MM X 45 MM
Type: IMPLANTABLE DEVICE | Site: SPINE LUMBAR | Status: FUNCTIONAL
Brand: INVICTUS

## 2019-05-21 DEVICE — FOUNDATION 3D LATERAL CAGE - 22MM X 50MM X 12MM, 8°
Type: IMPLANTABLE DEVICE | Site: SPINE LUMBAR | Status: FUNCTIONAL
Brand: CORELINK FOUNDATION 3D LATERAL CAGE

## 2019-05-21 DEVICE — TI MIS LORDOTIC ROD, 5.5 MM X 35 MM
Type: IMPLANTABLE DEVICE | Site: SPINE LUMBAR | Status: FUNCTIONAL
Brand: INVICTUS

## 2019-05-21 DEVICE — GRAFT BONE SUB 10CC W25XH8XL50MM NANOSTRUCTURED HA GRAN POR: Type: IMPLANTABLE DEVICE | Site: SPINE LUMBAR | Status: FUNCTIONAL

## 2019-05-21 DEVICE — SET SCREW
Type: IMPLANTABLE DEVICE | Site: SPINE LUMBAR | Status: FUNCTIONAL
Brand: INVICTUS

## 2019-05-21 RX ORDER — AMLODIPINE BESYLATE 5 MG/1
5 TABLET ORAL DAILY
Status: DISCONTINUED | OUTPATIENT
Start: 2019-05-21 | End: 2019-05-24 | Stop reason: HOSPADM

## 2019-05-21 RX ORDER — METOCLOPRAMIDE HYDROCHLORIDE 5 MG/ML
10 INJECTION INTRAMUSCULAR; INTRAVENOUS
Status: DISCONTINUED | OUTPATIENT
Start: 2019-05-21 | End: 2019-05-21 | Stop reason: HOSPADM

## 2019-05-21 RX ORDER — LORAZEPAM 1 MG/1
1 TABLET ORAL EVERY 6 HOURS PRN
Status: DISCONTINUED | OUTPATIENT
Start: 2019-05-21 | End: 2019-05-22

## 2019-05-21 RX ORDER — DOXEPIN HYDROCHLORIDE 25 MG/1
100 CAPSULE ORAL NIGHTLY
Status: DISCONTINUED | OUTPATIENT
Start: 2019-05-21 | End: 2019-05-24 | Stop reason: HOSPADM

## 2019-05-21 RX ORDER — DIPHENHYDRAMINE HYDROCHLORIDE 50 MG/ML
12.5 INJECTION INTRAMUSCULAR; INTRAVENOUS
Status: DISCONTINUED | OUTPATIENT
Start: 2019-05-21 | End: 2019-05-21 | Stop reason: HOSPADM

## 2019-05-21 RX ORDER — PROPOFOL 10 MG/ML
INJECTION, EMULSION INTRAVENOUS PRN
Status: DISCONTINUED | OUTPATIENT
Start: 2019-05-21 | End: 2019-05-21 | Stop reason: SDUPTHER

## 2019-05-21 RX ORDER — ENALAPRILAT 2.5 MG/2ML
1.25 INJECTION INTRAVENOUS
Status: DISCONTINUED | OUTPATIENT
Start: 2019-05-21 | End: 2019-05-21 | Stop reason: HOSPADM

## 2019-05-21 RX ORDER — FENTANYL CITRATE 50 UG/ML
INJECTION, SOLUTION INTRAMUSCULAR; INTRAVENOUS PRN
Status: DISCONTINUED | OUTPATIENT
Start: 2019-05-21 | End: 2019-05-21 | Stop reason: SDUPTHER

## 2019-05-21 RX ORDER — DOCUSATE SODIUM 100 MG/1
100 CAPSULE, LIQUID FILLED ORAL 2 TIMES DAILY
Status: DISCONTINUED | OUTPATIENT
Start: 2019-05-21 | End: 2019-05-23

## 2019-05-21 RX ORDER — BUPROPION HYDROCHLORIDE 150 MG/1
150 TABLET ORAL EVERY MORNING
Status: DISCONTINUED | OUTPATIENT
Start: 2019-05-22 | End: 2019-05-24 | Stop reason: HOSPADM

## 2019-05-21 RX ORDER — FENTANYL CITRATE 50 UG/ML
25 INJECTION, SOLUTION INTRAMUSCULAR; INTRAVENOUS
Status: DISCONTINUED | OUTPATIENT
Start: 2019-05-21 | End: 2019-05-21 | Stop reason: HOSPADM

## 2019-05-21 RX ORDER — PROPOFOL 10 MG/ML
INJECTION, EMULSION INTRAVENOUS CONTINUOUS PRN
Status: DISCONTINUED | OUTPATIENT
Start: 2019-05-21 | End: 2019-05-21 | Stop reason: SDUPTHER

## 2019-05-21 RX ORDER — MEPERIDINE HYDROCHLORIDE 50 MG/ML
12.5 INJECTION INTRAMUSCULAR; INTRAVENOUS; SUBCUTANEOUS EVERY 5 MIN PRN
Status: DISCONTINUED | OUTPATIENT
Start: 2019-05-21 | End: 2019-05-21 | Stop reason: HOSPADM

## 2019-05-21 RX ORDER — BUDESONIDE 0.25 MG/2ML
0.25 INHALANT ORAL 2 TIMES DAILY
Status: DISCONTINUED | OUTPATIENT
Start: 2019-05-21 | End: 2019-05-24

## 2019-05-21 RX ORDER — ONDANSETRON 2 MG/ML
4 INJECTION INTRAMUSCULAR; INTRAVENOUS EVERY 6 HOURS PRN
Status: DISCONTINUED | OUTPATIENT
Start: 2019-05-21 | End: 2019-05-24 | Stop reason: HOSPADM

## 2019-05-21 RX ORDER — SODIUM CHLORIDE 0.9 % (FLUSH) 0.9 %
10 SYRINGE (ML) INJECTION ONCE
Status: COMPLETED | OUTPATIENT
Start: 2019-05-21 | End: 2019-05-21

## 2019-05-21 RX ORDER — ALLOPURINOL 100 MG/1
100 TABLET ORAL DAILY
Status: DISCONTINUED | OUTPATIENT
Start: 2019-05-22 | End: 2019-05-24 | Stop reason: HOSPADM

## 2019-05-21 RX ORDER — FLUOXETINE HYDROCHLORIDE 20 MG/1
40 CAPSULE ORAL DAILY
Status: DISCONTINUED | OUTPATIENT
Start: 2019-05-21 | End: 2019-05-24 | Stop reason: HOSPADM

## 2019-05-21 RX ORDER — MORPHINE SULFATE 2 MG/ML
4 INJECTION, SOLUTION INTRAMUSCULAR; INTRAVENOUS EVERY 5 MIN PRN
Status: DISCONTINUED | OUTPATIENT
Start: 2019-05-21 | End: 2019-05-21 | Stop reason: HOSPADM

## 2019-05-21 RX ORDER — AMLODIPINE AND OLMESARTAN MEDOXOMIL 5; 20 MG/1; MG/1
1 TABLET ORAL DAILY
Status: DISCONTINUED | OUTPATIENT
Start: 2019-05-21 | End: 2019-05-21 | Stop reason: CLARIF

## 2019-05-21 RX ORDER — SODIUM CHLORIDE 0.9 % (FLUSH) 0.9 %
10 SYRINGE (ML) INJECTION EVERY 12 HOURS SCHEDULED
Status: DISCONTINUED | OUTPATIENT
Start: 2019-05-21 | End: 2019-05-24 | Stop reason: HOSPADM

## 2019-05-21 RX ORDER — FERROUS SULFATE 325(65) MG
324 TABLET ORAL
Status: DISCONTINUED | OUTPATIENT
Start: 2019-05-22 | End: 2019-05-24 | Stop reason: HOSPADM

## 2019-05-21 RX ORDER — PANTOPRAZOLE SODIUM 40 MG/1
40 TABLET, DELAYED RELEASE ORAL DAILY
Status: DISCONTINUED | OUTPATIENT
Start: 2019-05-22 | End: 2019-05-24 | Stop reason: HOSPADM

## 2019-05-21 RX ORDER — CEFAZOLIN SODIUM 1 G/50ML
1 INJECTION, SOLUTION INTRAVENOUS EVERY 8 HOURS
Status: COMPLETED | OUTPATIENT
Start: 2019-05-21 | End: 2019-05-22

## 2019-05-21 RX ORDER — PROMETHAZINE HYDROCHLORIDE 25 MG/ML
6.25 INJECTION, SOLUTION INTRAMUSCULAR; INTRAVENOUS
Status: DISCONTINUED | OUTPATIENT
Start: 2019-05-21 | End: 2019-05-21 | Stop reason: HOSPADM

## 2019-05-21 RX ORDER — GABAPENTIN 300 MG/1
300 CAPSULE ORAL 2 TIMES DAILY
Status: DISCONTINUED | OUTPATIENT
Start: 2019-05-21 | End: 2019-05-23

## 2019-05-21 RX ORDER — DEXAMETHASONE SODIUM PHOSPHATE 10 MG/ML
INJECTION INTRAMUSCULAR; INTRAVENOUS PRN
Status: DISCONTINUED | OUTPATIENT
Start: 2019-05-21 | End: 2019-05-21 | Stop reason: SDUPTHER

## 2019-05-21 RX ORDER — EPHEDRINE SULFATE 50 MG/ML
INJECTION, SOLUTION INTRAVENOUS PRN
Status: DISCONTINUED | OUTPATIENT
Start: 2019-05-21 | End: 2019-05-21 | Stop reason: SDUPTHER

## 2019-05-21 RX ORDER — CETIRIZINE HYDROCHLORIDE 10 MG/1
10 TABLET ORAL DAILY
Status: DISCONTINUED | OUTPATIENT
Start: 2019-05-21 | End: 2019-05-23

## 2019-05-21 RX ORDER — SODIUM CHLORIDE 0.9 % (FLUSH) 0.9 %
10 SYRINGE (ML) INJECTION PRN
Status: DISCONTINUED | OUTPATIENT
Start: 2019-05-21 | End: 2019-05-24 | Stop reason: HOSPADM

## 2019-05-21 RX ORDER — SODIUM CHLORIDE, SODIUM LACTATE, POTASSIUM CHLORIDE, CALCIUM CHLORIDE 600; 310; 30; 20 MG/100ML; MG/100ML; MG/100ML; MG/100ML
INJECTION, SOLUTION INTRAVENOUS CONTINUOUS
Status: DISCONTINUED | OUTPATIENT
Start: 2019-05-21 | End: 2019-05-21

## 2019-05-21 RX ORDER — MORPHINE SULFATE 2 MG/ML
2 INJECTION, SOLUTION INTRAMUSCULAR; INTRAVENOUS EVERY 5 MIN PRN
Status: DISCONTINUED | OUTPATIENT
Start: 2019-05-21 | End: 2019-05-21 | Stop reason: HOSPADM

## 2019-05-21 RX ORDER — LIDOCAINE HYDROCHLORIDE 10 MG/ML
INJECTION, SOLUTION INFILTRATION; PERINEURAL PRN
Status: DISCONTINUED | OUTPATIENT
Start: 2019-05-21 | End: 2019-05-21 | Stop reason: SDUPTHER

## 2019-05-21 RX ORDER — SODIUM CHLORIDE 0.9 % (FLUSH) 0.9 %
10 SYRINGE (ML) INJECTION PRN
Status: DISCONTINUED | OUTPATIENT
Start: 2019-05-21 | End: 2019-05-21 | Stop reason: HOSPADM

## 2019-05-21 RX ORDER — OXYCODONE AND ACETAMINOPHEN 10; 325 MG/1; MG/1
1 TABLET ORAL EVERY 4 HOURS PRN
Status: DISCONTINUED | OUTPATIENT
Start: 2019-05-21 | End: 2019-05-23

## 2019-05-21 RX ORDER — LABETALOL HYDROCHLORIDE 5 MG/ML
5 INJECTION, SOLUTION INTRAVENOUS EVERY 10 MIN PRN
Status: DISCONTINUED | OUTPATIENT
Start: 2019-05-21 | End: 2019-05-21 | Stop reason: HOSPADM

## 2019-05-21 RX ORDER — CARVEDILOL 12.5 MG/1
12.5 TABLET ORAL 2 TIMES DAILY WITH MEALS
Status: DISCONTINUED | OUTPATIENT
Start: 2019-05-21 | End: 2019-05-24 | Stop reason: HOSPADM

## 2019-05-21 RX ORDER — ONDANSETRON 2 MG/ML
INJECTION INTRAMUSCULAR; INTRAVENOUS PRN
Status: DISCONTINUED | OUTPATIENT
Start: 2019-05-21 | End: 2019-05-21 | Stop reason: SDUPTHER

## 2019-05-21 RX ORDER — LOSARTAN POTASSIUM 50 MG/1
50 TABLET ORAL DAILY
Status: DISCONTINUED | OUTPATIENT
Start: 2019-05-21 | End: 2019-05-24 | Stop reason: HOSPADM

## 2019-05-21 RX ORDER — HEPARIN SODIUM 5000 [USP'U]/ML
INJECTION, SOLUTION INTRAVENOUS; SUBCUTANEOUS PRN
Status: DISCONTINUED | OUTPATIENT
Start: 2019-05-21 | End: 2019-05-21 | Stop reason: HOSPADM

## 2019-05-21 RX ORDER — HYDRALAZINE HYDROCHLORIDE 20 MG/ML
5 INJECTION INTRAMUSCULAR; INTRAVENOUS EVERY 10 MIN PRN
Status: DISCONTINUED | OUTPATIENT
Start: 2019-05-21 | End: 2019-05-21 | Stop reason: HOSPADM

## 2019-05-21 RX ORDER — ALBUTEROL SULFATE 2.5 MG/3ML
2.5 SOLUTION RESPIRATORY (INHALATION) 4 TIMES DAILY
Status: DISCONTINUED | OUTPATIENT
Start: 2019-05-21 | End: 2019-05-24

## 2019-05-21 RX ORDER — LIDOCAINE HYDROCHLORIDE 10 MG/ML
1 INJECTION, SOLUTION EPIDURAL; INFILTRATION; INTRACAUDAL; PERINEURAL
Status: DISCONTINUED | OUTPATIENT
Start: 2019-05-21 | End: 2019-05-21 | Stop reason: HOSPADM

## 2019-05-21 RX ORDER — SUCCINYLCHOLINE CHLORIDE 20 MG/ML
INJECTION INTRAMUSCULAR; INTRAVENOUS PRN
Status: DISCONTINUED | OUTPATIENT
Start: 2019-05-21 | End: 2019-05-21 | Stop reason: SDUPTHER

## 2019-05-21 RX ORDER — MIDAZOLAM HYDROCHLORIDE 1 MG/ML
2 INJECTION INTRAMUSCULAR; INTRAVENOUS
Status: DISCONTINUED | OUTPATIENT
Start: 2019-05-21 | End: 2019-05-21 | Stop reason: HOSPADM

## 2019-05-21 RX ORDER — FENTANYL CITRATE 50 UG/ML
50 INJECTION, SOLUTION INTRAMUSCULAR; INTRAVENOUS
Status: DISCONTINUED | OUTPATIENT
Start: 2019-05-21 | End: 2019-05-21 | Stop reason: HOSPADM

## 2019-05-21 RX ORDER — CLONIDINE HYDROCHLORIDE 0.1 MG/1
0.1 TABLET ORAL 2 TIMES DAILY
Status: DISCONTINUED | OUTPATIENT
Start: 2019-05-21 | End: 2019-05-24 | Stop reason: HOSPADM

## 2019-05-21 RX ORDER — DIPHENHYDRAMINE HCL 25 MG
25 TABLET ORAL EVERY 6 HOURS PRN
Status: DISCONTINUED | OUTPATIENT
Start: 2019-05-21 | End: 2019-05-24 | Stop reason: HOSPADM

## 2019-05-21 RX ORDER — SODIUM CHLORIDE 9 MG/ML
INJECTION, SOLUTION INTRAVENOUS CONTINUOUS
Status: ACTIVE | OUTPATIENT
Start: 2019-05-21 | End: 2019-05-23

## 2019-05-21 RX ORDER — LEVOTHYROXINE SODIUM 0.1 MG/1
100 TABLET ORAL DAILY
Status: DISCONTINUED | OUTPATIENT
Start: 2019-05-22 | End: 2019-05-24 | Stop reason: HOSPADM

## 2019-05-21 RX ORDER — SODIUM CHLORIDE 0.9 % (FLUSH) 0.9 %
10 SYRINGE (ML) INJECTION EVERY 12 HOURS SCHEDULED
Status: DISCONTINUED | OUTPATIENT
Start: 2019-05-21 | End: 2019-05-21 | Stop reason: HOSPADM

## 2019-05-21 RX ADMIN — EPHEDRINE SULFATE 10 MG: 50 INJECTION, SOLUTION INTRAMUSCULAR; INTRAVENOUS; SUBCUTANEOUS at 11:43

## 2019-05-21 RX ADMIN — ALBUTEROL SULFATE 2.5 MG: 2.5 SOLUTION RESPIRATORY (INHALATION) at 20:02

## 2019-05-21 RX ADMIN — Medication 4 MG: at 14:55

## 2019-05-21 RX ADMIN — BUDESONIDE 250 MCG: 0.25 SUSPENSION RESPIRATORY (INHALATION) at 20:01

## 2019-05-21 RX ADMIN — SUCCINYLCHOLINE CHLORIDE 100 MG: 20 INJECTION, SOLUTION INTRAMUSCULAR; INTRAVENOUS; PARENTERAL at 11:31

## 2019-05-21 RX ADMIN — HYDROMORPHONE HYDROCHLORIDE 0.25 MG: 1 INJECTION, SOLUTION INTRAMUSCULAR; INTRAVENOUS; SUBCUTANEOUS at 14:11

## 2019-05-21 RX ADMIN — DOXEPIN HYDROCHLORIDE 100 MG: 25 CAPSULE ORAL at 20:27

## 2019-05-21 RX ADMIN — PROPOFOL 120 MCG/KG/MIN: 10 INJECTION, EMULSION INTRAVENOUS at 11:35

## 2019-05-21 RX ADMIN — SODIUM CHLORIDE: 9 INJECTION, SOLUTION INTRAVENOUS at 16:00

## 2019-05-21 RX ADMIN — ONDANSETRON HYDROCHLORIDE 4 MG: 2 INJECTION, SOLUTION INTRAMUSCULAR; INTRAVENOUS at 14:00

## 2019-05-21 RX ADMIN — CETIRIZINE HYDROCHLORIDE 10 MG: 10 TABLET, FILM COATED ORAL at 16:33

## 2019-05-21 RX ADMIN — HYDROMORPHONE HYDROCHLORIDE 0.25 MG: 1 INJECTION, SOLUTION INTRAMUSCULAR; INTRAVENOUS; SUBCUTANEOUS at 14:09

## 2019-05-21 RX ADMIN — Medication 0.5 MG: at 15:08

## 2019-05-21 RX ADMIN — Medication 0.5 MG: at 15:14

## 2019-05-21 RX ADMIN — PHENYLEPHRINE HYDROCHLORIDE 80 MCG: 10 INJECTION INTRAVENOUS at 11:43

## 2019-05-21 RX ADMIN — DOCUSATE SODIUM 100 MG: 100 CAPSULE, LIQUID FILLED ORAL at 20:27

## 2019-05-21 RX ADMIN — SODIUM CHLORIDE, SODIUM LACTATE, POTASSIUM CHLORIDE, AND CALCIUM CHLORIDE: 600; 310; 30; 20 INJECTION, SOLUTION INTRAVENOUS at 13:04

## 2019-05-21 RX ADMIN — DIPHENHYDRAMINE HCL 25 MG: 25 TABLET ORAL at 22:27

## 2019-05-21 RX ADMIN — CARVEDILOL 12.5 MG: 12.5 TABLET, FILM COATED ORAL at 16:33

## 2019-05-21 RX ADMIN — HYDROMORPHONE HYDROCHLORIDE 0.5 MG: 1 INJECTION, SOLUTION INTRAMUSCULAR; INTRAVENOUS; SUBCUTANEOUS at 14:03

## 2019-05-21 RX ADMIN — REMIFENTANIL HYDROCHLORIDE 0.25 MCG/KG/MIN: 1 INJECTION, POWDER, LYOPHILIZED, FOR SOLUTION INTRAVENOUS at 11:35

## 2019-05-21 RX ADMIN — SODIUM CHLORIDE, SODIUM LACTATE, POTASSIUM CHLORIDE, AND CALCIUM CHLORIDE: 600; 310; 30; 20 INJECTION, SOLUTION INTRAVENOUS at 10:15

## 2019-05-21 RX ADMIN — GABAPENTIN 300 MG: 300 CAPSULE ORAL at 20:27

## 2019-05-21 RX ADMIN — LIDOCAINE HYDROCHLORIDE 50 MG: 10 INJECTION, SOLUTION INFILTRATION; PERINEURAL at 11:31

## 2019-05-21 RX ADMIN — CEFAZOLIN SODIUM 1 G: 1 INJECTION, SOLUTION INTRAVENOUS at 20:27

## 2019-05-21 RX ADMIN — Medication 2 G: at 11:37

## 2019-05-21 RX ADMIN — SODIUM CHLORIDE, SODIUM LACTATE, POTASSIUM CHLORIDE, AND CALCIUM CHLORIDE: 600; 310; 30; 20 INJECTION, SOLUTION INTRAVENOUS at 10:56

## 2019-05-21 RX ADMIN — DEXAMETHASONE SODIUM PHOSPHATE 10 MG: 10 INJECTION INTRAMUSCULAR; INTRAVENOUS at 11:39

## 2019-05-21 RX ADMIN — PROPOFOL 120 MG: 10 INJECTION, EMULSION INTRAVENOUS at 11:31

## 2019-05-21 RX ADMIN — OXYCODONE HYDROCHLORIDE AND ACETAMINOPHEN 1 TABLET: 10; 325 TABLET ORAL at 20:27

## 2019-05-21 RX ADMIN — PHENYLEPHRINE HYDROCHLORIDE 80 MCG: 10 INJECTION INTRAVENOUS at 12:25

## 2019-05-21 RX ADMIN — AMLODIPINE BESYLATE 5 MG: 5 TABLET ORAL at 16:33

## 2019-05-21 RX ADMIN — LORAZEPAM 1 MG: 1 TABLET ORAL at 22:27

## 2019-05-21 RX ADMIN — Medication 10 ML: at 10:16

## 2019-05-21 RX ADMIN — EPHEDRINE SULFATE 10 MG: 50 INJECTION, SOLUTION INTRAMUSCULAR; INTRAVENOUS; SUBCUTANEOUS at 12:25

## 2019-05-21 RX ADMIN — FENTANYL CITRATE 100 MCG: 50 INJECTION INTRAMUSCULAR; INTRAVENOUS at 11:31

## 2019-05-21 ASSESSMENT — LIFESTYLE VARIABLES: SMOKING_STATUS: 0

## 2019-05-21 ASSESSMENT — PAIN SCALES - GENERAL
PAINLEVEL_OUTOF10: 7
PAINLEVEL_OUTOF10: 0
PAINLEVEL_OUTOF10: 0
PAINLEVEL_OUTOF10: 3
PAINLEVEL_OUTOF10: 0
PAINLEVEL_OUTOF10: 7
PAINLEVEL_OUTOF10: 6
PAINLEVEL_OUTOF10: 0
PAINLEVEL_OUTOF10: 7

## 2019-05-21 ASSESSMENT — PAIN - FUNCTIONAL ASSESSMENT: PAIN_FUNCTIONAL_ASSESSMENT: 0-10

## 2019-05-21 NOTE — H&P
Pt's H&P reviewed and the patient was seen and examined. There are no interval changes. Proceed per pre-operative plan.

## 2019-05-21 NOTE — ANESTHESIA POSTPROCEDURE EVALUATION
Department of Anesthesiology  Postprocedure Note    Patient: Loida Cleveland  MRN: [id number]  YOB: 1945  Date of evaluation: 5/21/2019  Time:  2:13 PM     Procedure Summary     Date:  05/21/19 Room / Location:  [facility] OR 06 / [facility] OR    Anesthesia Start:  1128 Anesthesia Stop:  1412    Procedure:  LEFT L4-5 LLIF WITH POSTERIOR SPINAL FUSION WITH INSTRUMENTATION (Left ) Diagnosis:  (M43.16)    Surgeon:  Oniel Milian MD Responsible Provider:  KEYONA River CRNA    Anesthesia Type:  general, TIVA ASA Status:  2          Anesthesia Type: general, TIVA    Eve Phase I: Eve Score: 10    Eve Phase II:      Last vitals: Reviewed and per EMR flowsheets. Anesthesia Post Evaluation    Patient location during evaluation: PACU  Patient participation: complete - patient participated  Level of consciousness: awake and alert  Pain score: 0  Airway patency: patent  Nausea & Vomiting: no nausea and no vomiting  Complications: no  Cardiovascular status: hemodynamically stable and blood pressure returned to baseline  Respiratory status: acceptable and nasal cannula  Hydration status: stable  Comments: Vital Signs Stable. Exchanging well. PACU RN received care.

## 2019-05-21 NOTE — BRIEF OP NOTE
Brief Postoperative Note  ______________________________________________________________    Patient: Juanpablo Ace  YOB: 1945  MRN: [id number]  Date of Procedure: 5/21/2019    Pre-Op Diagnosis: M43.16    Post-Op Diagnosis: Same       Procedure(s):  LEFT L4-5 LLIF WITH POSTERIOR SPINAL FUSION WITH INSTRUMENTATION    Anesthesia: General, TIVA    Surgeon(s):  Alissa Mcadams MD    Assistant: Lakshmi Sparks PA-C    Estimated Blood Loss (mL): 792     Complications: None    Specimens:   * No specimens in log *    Implants:  Implant Name Type Inv.  Item Serial No.  Lot No. LRB No. Used   GRAFT SUB BONE 10CC Spine GRAFT SUB BONE 10CC  [employer] [id number] Left 1   [id number] degree 12 mm lateral implant - corelink     [id number] Left 1   SCREW SPINE KODIAC 6.5X45MM Spine SCREW SPINE KODIAC 6.5X45MM  [employer]PHATEC SPINE  Left 4   SCREW SET [address] Spine SCREW SET [employer] SPINE  Left 4   IMPL BRYNN SPINE [address] 5.5X35MM Spine IMPL BRYNN SPINE [address] 5.5X35MM  ALPHATEC SPINE  Left 1   IMPL BRYNN SPINE [address] [address] 5.5X40MM Spine IMPL BRYNN SPINE [address] [facility] 5.5X40MM  ALPHATEC SPINE  Left 1         Drains: * No LDAs found *    Findings: SEE OPERATIVE REPORT    Alissa Mcadams MD  Date: 5/21/2019  Time: 2:29 PM

## 2019-05-21 NOTE — OP NOTE
PREOPERATIVE DIAGNOSES  1. Lumbar stenosis, L45.  2. Lumbar degenerative disk disease. 3. Lumbar Radiculopathy  4. Lower back pain  5. Severe disc space collapse L45  6. Spondylolisthesis L45  7. Senile osteoporosis       POSTOPERATIVE DIAGNOSES  Same      PROCEDURE  1. left-sided lateral lumbar interbody fusion through a retroperitoneal  approach. 2. Placement of biomechanical interbody fusion space, L4, L5, using a [id number] mm spacer. 3. Use of allograft bone  (Nanos, RTi soaked in autologous stem cell concentrate)  4. Use of intraoperative neuromonitoring. 5. Use of intraoperative fluoroscopy for verification of positioning of the  interbody, as well as confirming the appropriate levels being addressed. 6.  Bone marrow aspiration from the iliac crest and use of autologous stem cells and growth factors from the [facility]  7. Posterior spinal fusion L4, L5  8. Pedicle screw instrumentation L4, L5 ([facility] Inv[employer]us, 6.5x45 mm screws)      ASSISTANT  ASHLEY Khan assisted throughout all key components of this case by retracting soft  tissues and helping to enable adequate visualization to safely complete all  aspects of this procedure. He was scrubbed throughout the entirety of the  procedure.      ANESTHESIA  General endotracheal anesthesia.      ESTIMATED BLOOD LOSS: 100 mL.      INTRAVENOUS FLUIDS  Crystalloid.     COMPLICATIONS  None.      IMPLANTS  See Brief op note.      PROCEDURE IN DETAIL     The patient was seen on the day of surgery in the preoperative holding area.    The operative site was identified and prophylactic antibiotics were administered.    The patient was taken to the operating room, where the patient was placed under  general endotracheal anesthesia and positioned in the right lateral decubitus position with the left side up. An axillary roll was placed. All bonyprominences were padded.  The patient was held in the lateral position with 3-inch wide silk tape using our standard taping technique. The C-arm was  used to confirm that the positioning was satisfactory on both AP and lateral  images. Once this was accomplished, the left flank was prepped and drapedin sterile fashion. A time-out was called and all in the room agreed to  Proceed.     Initially, through a stab incision over the iliac crest of the operative side, a bone marrow aspiration trocar was introduced into the cancellous portion of the iliac crest.  55ml's of BMA were obtained by aspirating in several different directions. This was passed off to the rep to prepare the stem cell/growth factor concentrate to use within the spacers.            Next, a left-sided transverse incision was made just above the iliac crest and below the 12th rib, taken down through the skin and subcutaneous fat down to  the external oblique fascia. The external oblique fascia was opened and the  muscular fibers were divided using fingertip dissection until the internal  oblique fibers were identified. The internal obliques were divided and the  transversus abdominis musculature was divided. The retroperitoneal space was  entered. The characteristic retroperitoneal fat was visualized. The  peritoneal contents were mobilized anteriorly with the assistance of a  lighted right angled hand held retractor.      The psoas was directly visualized. The fascia over the psoas was opened and  then the muscular fibers of the psoas were dilated with the initial dilator  for the retractor. Once this was done, nerve stimulation was used  to ensure the positioning was satisfactory with regard to the positional relationship to the nerve. C-arm flouroscopy images were also used to confirm the positioning of the dilator.      Once it was determined that a safe docking point for the retractor had been  Localized over the appropriate level (L4, L5), a guide wire was placed into the disc space.   A larger dilator was used to further dilate the musculature and the Vicryl. The skin edges were reapproximated with running subcuticular       At this point, a Carlos incision was made over the operative level L4, L5 bilaterally, exposing the starting point for the pedicle screws. The facet joinst of the operative level were exposed and confirmed to be the appropriate ones. The facet joints were then destroyed by curetting them. The curettaged bone was left in position to promote a posterolateral fusion.        Once this was completed, the pedicles of the operative levels were cannulated  using a pedicle awl/Jamshidi needle. Once the pedicles were cannulated, guidewires were  placed through the central portion of the Jamshidi needles used to create the  pedicle screw paths. The Jamshidi needles were then removed. The screw paths were tapped with a 6.5 mm tap. The tap was tested with direct stimulation from the probe and found to have safe readings. Pedicle screws were inserted over the guidewires and seated into the vertebral bodies through the pedicles to the appropriate depth. The screwdrivers were removed.      The candie measuring tool was then used to determine the appropriate length candie  necessary to span the construct. A candie of this size was then obtained and  slid in a submuscular fashion into the saddles of the pedicle screws through  the insertion towers. The locking caps were then applied and torqued to the  appropriate specifications on the caudal most screws and L5. Using the reduction threads built into the towers of the Alphatec screws, the L4 spondylolisthesis was attempted to be reduced however the right sided L4 pedicle screw pulled out of the bone and lost purchase. At this time the decision was made to remove the right-sided screws. The construct was can be left with left-sided unilateral screws. . The insertion towers were removed.      The C-arm was used to obtain final images, showing satisfactory positioning  of the implants.      The wound was irrigated and suctioned and then closed using No. 1 to  reapproximate the fascia, 2-0 Vicryl to reapproximate the subcutaneous skin,  and a running subcuticular Monocryl to reapproximate the skin edges.    Mastisol, Steri-Strips and sterile dressings were applied to both incisions.       The patient was then transferred to a supine position on a standard recovery  room bed, was awakened from general endotracheal anesthesia, extubated and  taken to the recovery room in satisfactory condition. There were no  complications. All counts were correct at the end of the case.

## 2019-05-21 NOTE — ANESTHESIA PRE PROCEDURE
Department of Anesthesiology  Preprocedure Note       Name:  Antonio Shannon   Age:  66 y.o.  :  1945                                          MRN:  [id number]         Date:  2019      Surgeon: Alix Rivers):  Rachel Herrera MD    Procedure: LEFT L4-5 LLIF WITH POSTERIOR SPINAL FUSION WITH INSTRUMENTATION (Left )    Medications prior to admission:   Prior to Admission medications    Medication Sig Start Date End Date Taking? Authorizing Provider   ferrous sulfate 324 (65 Fe) MG EC tablet Take 324 mg by mouth daily (with breakfast)   Yes Historical Provider, MD   buPROPion (WELLBUTRIN XL) 150 MG extended release tablet Take 150 mg by mouth every morning   Yes Historical Provider, MD   pantoprazole (PROTONIX) 40 MG tablet Take 40 mg by mouth daily   Yes Historical Provider, MD   FLUoxetine (PROZAC) 40 MG capsule Take 40 mg by mouth daily   Yes Historical Provider, MD   levothyroxine (SYNTHROID) 100 MCG tablet Take 100 mcg by mouth Daily   Yes Historical Provider, MD   allopurinol (ZYLOPRIM) 100 MG tablet Take 100 mg by mouth daily   Yes Historical Provider, MD   carvedilol (COREG) 12.5 MG tablet Take 12.5 mg by mouth 2 times daily (with meals)   Yes Historical Provider, MD   cloNIDine (CATAPRES) 0.1 MG tablet Take 0.1 mg by mouth 2 times daily    Yes Historical Provider, MD   simvastatin (ZOCOR) 20 MG tablet Take 20 mg by mouth nightly   Yes Historical Provider, MD   Omega-3 Fatty Acids (GNP FISH OIL PO) Take 520 mg by mouth nightly   Yes Historical Provider, MD   Cholecalciferol (VITAMIN D3) 2000 units CAPS Take 1 capsule by mouth nightly   Yes Historical Provider, MD   oxyCODONE-acetaminophen (PERCOCET)  MG per tablet Take 1 tablet by mouth every 6 hours as needed for Pain. Yes Historical Provider, MD   LORazepam (ATIVAN) 1 MG tablet Take 1 mg by mouth 3 times daily. Yes Historical Provider, MD   gabapentin (NEURONTIN) 300 MG capsule Take 600 mg by mouth 2 times daily as needed.     Yes Historical Provider, MD   mometasone-formoterol [facility]) 100-5 MCG/ACT inhaler Inhale 2 puffs into the lungs 2 times daily as needed   Yes Historical Provider, MD   amLODIPine-olmesartan (WILL) 5-20 MG per tablet Take 1 tablet by mouth daily 4/30/19  Yes Mike Drew MD   doxepin [facility]) 50 MG capsule Take 2 capsules by mouth nightly 4/30/19  Yes Mike Drew MD   cetirizine (ZYRTEC) 10 MG tablet Take 10 mg by mouth daily   Yes Historical Provider, MD   EPINEPHrine (EPIPEN) 0.3 MG/0.3ML SOAJ injection Inject 0.3 mg into the muscle as needed. Use as directed for allergic reaction    Historical Provider, MD       Current medications:    Current Facility-Administered Medications   Medication Dose Route Frequency Provider Last Rate Last Dose    ceFAZolin (ANCEF) 2 g in 0.9% sodium chloride 50 mL IVPB  2 g Intravenous Once Earnest Emerson MD        lactated ringers infusion   Intravenous Continuous Earnest Emerson  mL/hr at 05/21/19 1015         Allergies:     Allergies   Allergen Reactions    Penicillins     Sulfa Antibiotics        Problem List:    Patient Active Problem List   Diagnosis Code    Hypertension I10    Hyperlipidemia E78.5    Hypothyroid E03.9    Multiple allergies Z88.9    Chronic neck and back pain M54.2, M54.9, G89.29    Seasonal allergies J30.2    Dysarthria R47.1    MVP (mitral valve prolapse) I34.1    Tremor R25.1    Gait abnormality R26.9    Pulmonary infiltrate in left lung on chest x-ray R91.8       Past Medical History:        Diagnosis Date    Allergic rhinitis     Anxiety     Arthritis     Asthma     Chronic back pain     Chronic kidney disease     Depression     GERD (gastroesophageal reflux disease)     Hyperlipidemia     Hypertension     Hypothyroidism     Irritable bowel syndrome     Restless legs syndrome        Past Surgical History:        Procedure Laterality Date    ANKLE FRACTURE SURGERY Left     metal plate & screws    CERVICAL SPINE SURGERY  x 2    WITH 97 05/02/2019    CO2 22 05/02/2019    BUN 20 05/02/2019    CREATININE 1.1 05/02/2019    LABGLOM 48 05/02/2019    GLUCOSE 111 05/02/2019    PROT 7.3 05/02/2019    CALCIUM 9.4 05/02/2019    BILITOT <0.2 05/02/2019    ALKPHOS 104 05/02/2019    AST 37 05/02/2019    ALT 36 05/02/2019       POC Tests: No results for input(s): POCGLU, POCNA, POCK, POCCL, POCBUN, POCHEMO, POCHCT in the last 72 hours. Coags:   Lab Results   Component Value Date    PROTIME 13.6 05/02/2019    INR 1.10 05/02/2019    APTT 32.2 05/02/2019       HCG (If Applicable): No results found for: PREGTESTUR, PREGSERUM, HCG, HCGQUANT     ABGs: No results found for: PHART, PO2ART, ZJN4MUS, ALM9NCX, BEART, P2ROSXAC     Type & Screen (If Applicable):  No results found for: LABABO, [address]    Anesthesia Evaluation  Patient summary reviewed and Nursing notes reviewed   History of anesthetic complications: Post Operative Delirium in the past.  Airway: Mallampati: II  TM distance: >3 FB   Neck ROM: full  Mouth opening: > = 3 FB Dental:          Pulmonary:   (+) asthma:     (-) not a current smoker          Patient did not smoke on day of surgery. Cardiovascular:    (+) hypertension:,     (-) CAD, CABG/stent and dysrhythmias    ECG reviewed               Beta Blocker:  Not on Beta Blocker         Neuro/Psych:   Negative Neuro/Psych ROS              GI/Hepatic/Renal:   (+) GERD: well controlled,           Endo/Other:    (+) hypothyroidism::., .    (-) diabetes mellitus, hyperthyroidism               Abdominal:           Vascular:                                        Anesthesia Plan      general and TIVA     ASA 2     (Decadron/Zofran Intraop)  Induction: intravenous. BIS  MIPS: Postoperative opioids intended and Prophylactic antiemetics administered. Anesthetic plan and risks discussed with patient.                     Julienne Browne MD   5/21/2019

## 2019-05-22 LAB
BILIRUBIN URINE: NEGATIVE
BLOOD, URINE: NEGATIVE
CLARITY: CLEAR
COLOR: YELLOW
GLUCOSE URINE: NEGATIVE MG/DL
HCT VFR BLD CALC: 30.8 % (ref 37–47)
HEMOGLOBIN: 10.1 G/DL (ref 12–16)
KETONES, URINE: NEGATIVE MG/DL
LEUKOCYTE ESTERASE, URINE: NEGATIVE
MCH RBC QN AUTO: 30.1 PG (ref 27–31)
MCHC RBC AUTO-ENTMCNC: 32.8 G/DL (ref 33–37)
MCV RBC AUTO: 91.7 FL (ref 81–99)
NITRITE, URINE: NEGATIVE
PDW BLD-RTO: 13.1 % (ref 11.5–14.5)
PH UA: 5.5 (ref 5–8)
PLATELET # BLD: 212 K/UL (ref 130–400)
PMV BLD AUTO: 10.1 FL (ref 9.4–12.3)
PROTEIN UA: NEGATIVE MG/DL
RBC # BLD: 3.36 M/UL (ref 4.2–5.4)
SPECIFIC GRAVITY UA: 1.01 (ref 1–1.03)
UROBILINOGEN, URINE: 0.2 E.U./DL
WBC # BLD: 9.8 K/UL (ref 4.8–10.8)

## 2019-05-22 PROCEDURE — 6360000002 HC RX W HCPCS

## 2019-05-22 PROCEDURE — 94762 N-INVAS EAR/PLS OXIMTRY CONT: CPT

## 2019-05-22 PROCEDURE — 97161 PT EVAL LOW COMPLEX 20 MIN: CPT

## 2019-05-22 PROCEDURE — 1210000000 HC MED SURG R&B

## 2019-05-22 PROCEDURE — 97530 THERAPEUTIC ACTIVITIES: CPT

## 2019-05-22 PROCEDURE — 51701 INSERT BLADDER CATHETER: CPT

## 2019-05-22 PROCEDURE — 6360000002 HC RX W HCPCS: Performed by: PHYSICIAN ASSISTANT

## 2019-05-22 PROCEDURE — 6370000000 HC RX 637 (ALT 250 FOR IP): Performed by: PHYSICIAN ASSISTANT

## 2019-05-22 PROCEDURE — 85027 COMPLETE CBC AUTOMATED: CPT

## 2019-05-22 PROCEDURE — 97165 OT EVAL LOW COMPLEX 30 MIN: CPT

## 2019-05-22 PROCEDURE — 36415 COLL VENOUS BLD VENIPUNCTURE: CPT

## 2019-05-22 PROCEDURE — 2700000000 HC OXYGEN THERAPY PER DAY

## 2019-05-22 PROCEDURE — 6370000000 HC RX 637 (ALT 250 FOR IP)

## 2019-05-22 PROCEDURE — 97535 SELF CARE MNGMENT TRAINING: CPT

## 2019-05-22 PROCEDURE — 94640 AIRWAY INHALATION TREATMENT: CPT

## 2019-05-22 PROCEDURE — 81003 URINALYSIS AUTO W/O SCOPE: CPT

## 2019-05-22 PROCEDURE — 51798 US URINE CAPACITY MEASURE: CPT

## 2019-05-22 RX ORDER — LORAZEPAM 1 MG/1
1 TABLET ORAL 3 TIMES DAILY
Status: DISCONTINUED | OUTPATIENT
Start: 2019-05-22 | End: 2019-05-24 | Stop reason: HOSPADM

## 2019-05-22 RX ADMIN — ALLOPURINOL 100 MG: 100 TABLET ORAL at 07:43

## 2019-05-22 RX ADMIN — LORAZEPAM 1 MG: 1 TABLET ORAL at 17:34

## 2019-05-22 RX ADMIN — HYDROMORPHONE HYDROCHLORIDE 0.5 MG: 1 INJECTION, SOLUTION INTRAMUSCULAR; INTRAVENOUS; SUBCUTANEOUS at 14:58

## 2019-05-22 RX ADMIN — METHYLNALTREXONE BROMIDE 12 MG: 12 INJECTION, SOLUTION SUBCUTANEOUS at 17:34

## 2019-05-22 RX ADMIN — PANTOPRAZOLE SODIUM 40 MG: 40 TABLET, DELAYED RELEASE ORAL at 07:43

## 2019-05-22 RX ADMIN — DOCUSATE SODIUM 100 MG: 100 CAPSULE, LIQUID FILLED ORAL at 23:47

## 2019-05-22 RX ADMIN — CLONIDINE HYDROCHLORIDE 0.1 MG: 0.1 TABLET ORAL at 07:42

## 2019-05-22 RX ADMIN — ALBUTEROL SULFATE 2.5 MG: 2.5 SOLUTION RESPIRATORY (INHALATION) at 11:31

## 2019-05-22 RX ADMIN — DOXEPIN HYDROCHLORIDE 100 MG: 25 CAPSULE ORAL at 23:46

## 2019-05-22 RX ADMIN — LORAZEPAM 1 MG: 1 TABLET ORAL at 05:03

## 2019-05-22 RX ADMIN — CARVEDILOL 12.5 MG: 12.5 TABLET, FILM COATED ORAL at 17:34

## 2019-05-22 RX ADMIN — BUDESONIDE 250 MCG: 0.25 SUSPENSION RESPIRATORY (INHALATION) at 19:45

## 2019-05-22 RX ADMIN — ALBUTEROL SULFATE 2.5 MG: 2.5 SOLUTION RESPIRATORY (INHALATION) at 15:50

## 2019-05-22 RX ADMIN — LEVOTHYROXINE SODIUM 100 MCG: 100 TABLET ORAL at 05:03

## 2019-05-22 RX ADMIN — DOCUSATE SODIUM 100 MG: 100 CAPSULE, LIQUID FILLED ORAL at 07:43

## 2019-05-22 RX ADMIN — LORAZEPAM 1 MG: 1 TABLET ORAL at 23:47

## 2019-05-22 RX ADMIN — CARVEDILOL 12.5 MG: 12.5 TABLET, FILM COATED ORAL at 07:43

## 2019-05-22 RX ADMIN — GABAPENTIN 300 MG: 300 CAPSULE ORAL at 23:45

## 2019-05-22 RX ADMIN — AMLODIPINE BESYLATE 5 MG: 5 TABLET ORAL at 07:42

## 2019-05-22 RX ADMIN — CETIRIZINE HYDROCHLORIDE 10 MG: 10 TABLET, FILM COATED ORAL at 07:43

## 2019-05-22 RX ADMIN — HYDROMORPHONE HYDROCHLORIDE 0.5 MG: 1 INJECTION, SOLUTION INTRAMUSCULAR; INTRAVENOUS; SUBCUTANEOUS at 10:58

## 2019-05-22 RX ADMIN — DIPHENHYDRAMINE HCL 25 MG: 25 TABLET ORAL at 05:03

## 2019-05-22 RX ADMIN — FLUOXETINE HYDROCHLORIDE 40 MG: 20 CAPSULE ORAL at 07:42

## 2019-05-22 RX ADMIN — CEFAZOLIN SODIUM 1 G: 1 INJECTION, SOLUTION INTRAVENOUS at 05:03

## 2019-05-22 RX ADMIN — BUPROPION HYDROCHLORIDE 150 MG: 150 TABLET, FILM COATED, EXTENDED RELEASE ORAL at 07:42

## 2019-05-22 RX ADMIN — ALBUTEROL SULFATE 2.5 MG: 2.5 SOLUTION RESPIRATORY (INHALATION) at 19:45

## 2019-05-22 RX ADMIN — BUDESONIDE 250 MCG: 0.25 SUSPENSION RESPIRATORY (INHALATION) at 07:44

## 2019-05-22 RX ADMIN — HYDROMORPHONE HYDROCHLORIDE 0.25 MG: 1 INJECTION, SOLUTION INTRAMUSCULAR; INTRAVENOUS; SUBCUTANEOUS at 02:48

## 2019-05-22 RX ADMIN — CLONIDINE HYDROCHLORIDE 0.1 MG: 0.1 TABLET ORAL at 23:46

## 2019-05-22 RX ADMIN — ALBUTEROL SULFATE 2.5 MG: 2.5 SOLUTION RESPIRATORY (INHALATION) at 07:44

## 2019-05-22 RX ADMIN — GABAPENTIN 300 MG: 300 CAPSULE ORAL at 07:43

## 2019-05-22 ASSESSMENT — PAIN SCALES - GENERAL
PAINLEVEL_OUTOF10: 9
PAINLEVEL_OUTOF10: 9
PAINLEVEL_OUTOF10: 10
PAINLEVEL_OUTOF10: 10
PAINLEVEL_OUTOF10: 8
PAINLEVEL_OUTOF10: 7

## 2019-05-22 NOTE — CARE COORDINATION
SW met with Pt to discuss DC planning options. Pt stated she knows it will be a struggle for her to go home unless she has 24/7 support. Pt stated if she needed to do short term rehab placement, then she would like to choose [address] [place] [address]. Pt stated her daughter would be back up to the room here shortly and she would like SW to speak with her regarding choices. SW left contact card and asked to be called once her daughter is in the room.  Electronically signed by Soha Brennan on 5/22/2019 at 10:44 AM

## 2019-05-22 NOTE — PROGRESS NOTES
Physical Therapy    Facility/Department: [facility] SURG SERVICES  Initial Assessment    NAME: Chance Saba  : 1945  MRN: [id number]    Date of Service: 2019    Discharge Recommendations:  Continue to assess pending progress, Patient would benefit from continued therapy after discharge        Assessment   Body structures, Functions, Activity limitations: Decreased functional mobility ; Decreased ROM; Decreased strength;Decreased balance;Decreased safe awareness;Decreased endurance  Assessment: LE'S VERY WEAK AT THIS TIME. REQUIRES ASSIST X 2 TO SAFELY TRANSFER. WOULD BE GOOD CANDIDATE FOR ROYER STEDY AS NEEDED. DO NOT RECOMMEND D/C HOME AT THIS TIME. REQUIRES PT FOLLOW UP: Yes  Activity Tolerance  Activity Tolerance: Patient Tolerated treatment well       Patient Diagnosis(es): There were no encounter diagnoses. has a past medical history of Allergic rhinitis, Anxiety, Arthritis, Asthma, Chronic back pain, Chronic kidney disease, DDD (degenerative disc disease), lumbar, Depression, GERD (gastroesophageal reflux disease), Hyperlipidemia, Hypertension, Hypothyroidism, Irritable bowel syndrome, Restless legs syndrome, and Spondylolisthesis at L4-L5 level.   has a past surgical history that includes Hysterectomy; Cervical spine surgery (x 2); Ankle fracture surgery (Left); Hand surgery (Left, 2007); Ovary removal (Right, 3/2010); Cholecystectomy (2015); Foot surgery (Left, 2015); Total knee arthroplasty (Bilateral); shoulder surgery (Right, 2018); pr [facility] incl fluor gdnce dx w/cell washg spx (N/A, 2018); joint replacement (Bilateral); joint replacement (Right); and joint replacement (Left).     Restrictions  Restrictions/Precautions  Restrictions/Precautions: Fall Risk  Required Braces or Orthoses  Spinal: Lumbar Corset  Position Activity Restriction  Spinal Precautions: No Bending, No Lifting, No Twisting  Vision/Hearing        Subjective  General  Patient assessed for rehabilitation services?: Yes  Diagnosis: L4-5 LLIF WITH POSTERIOR SPINAL FUSION  Subjective  Subjective: Pt WANTS TO USE BR  Pain Screening  Patient Currently in Pain: Yes  Vital Signs  Patient Currently in Pain: Yes  Oxygen Therapy  SpO2: 90 %(WITH ACTIVITY)  O2 Device: None (Room air)       Orientation  Orientation  Overall Orientation Status: Within Normal Limits  Social/Functional History  Social/Functional History  Lives With: Alone  Type of Home: House  Home Layout: One level  Home Access: Stairs to enter with rails  Entrance Stairs - Rails: Both  Bathroom Shower/Tub: Tub/Shower unit  Bathroom Toilet: Bedside commode  Bathroom Equipment: Tub transfer bench  Home Equipment: [employer], Rolling walker  ADL Assistance: Independent  Homemaking Assistance: Independent  Ambulation Assistance: Independent  Transfer Assistance: Independent  Active : No  Occupation: Retired  Additional Comments: hasnt driven in 1 year since R shoulder sx 2018, frequent falls   Cognition   Cognition  Overall Cognitive Status: WNL    Objective     Observation/Palpation  Observation:      PROM RLE (degrees)  RLE PROM: WNL  PROM LLE (degrees)  LLE PROM: WNL  Strength RLE  R Hip Flexion: 3-/5  R Knee Extension: 3+/5  R Ankle Dorsiflexion: 3+/5  Strength LLE  L Hip Flexion: 2+/5  L Knee Extension: 3+/5  L Ankle Dorsiflexion: 3+/5        Bed mobility  Supine to Sit: Minimal assistance  Transfers  Sit to Stand: Moderate Assistance  Bed to Chair: Moderate assistance;2 Person Assistance  Stand Pivot Transfers: Moderate Assistance(X 2 USING RW)  Comment: ATTEMPTED STAND X 2 WITH ASSIST X 1. ABLE TO ACHIEVE BRIEF UPRIGHT POSITION BUT LE'S BUCKLE. WITH ASSIST X 2, Pt UP TO BS SLOWLY (LE'S UNSTEADY THROUGHOUT), SAT X 7-8 MIN, TRANSFERRED TO RECLINER.    Ambulation  Ambulation?: No     Balance  Sitting - Dynamic: Good;-  Standing - Dynamic: Poor;+        Plan   Plan  Times per week: AT LEAST 7  Current Treatment Recommendations: Strengthening, ROM, Balance Training, Functional Mobility Training, Transfer Training, Gait Training, Patient/Caregiver Education & Training, Safety Education & Training  Safety Devices  Type of devices: Left in chair, Call light within reach    G-Code       OutComes Score                                                  AM-PAC Score             Goals  Short term goals  Time Frame for Short term goals: 14 DAYS  Short term goal 1: BED MOB MOD IND  Short term goal 2: TRANSFERS CGA  Short term goal 3: ' RW CGA  Short term goal 4: Pt/FAMILY IND WITH LSO MANAGEMENT       Therapy Time   Individual Concurrent Group Co-treatment   Time In           Time Out           Minutes                   Guzman Malik, PT

## 2019-05-22 NOTE — PROGRESS NOTES
[facility]  Rola Yung PA-C  Physician Assistant Progress Note        Subjective/Overnight Events:  Back pain, sore, no issues overnight   Hospital LOS: 1    Vitals  VITALS:  /60   Pulse 65   Temp 97.7 °F (36.5 °C) (Temporal)   Resp 16   Ht 5' 5\" (1.651 m)   Wt 184 lb (83.5 kg)   SpO2 97%   BMI 30.62 kg/m²   24HR INTAKE/OUTPUT:    Intake/Output Summary (Last 24 hours) at 5/22/2019 1243  Last data filed at 5/22/2019 1241  Gross per 24 hour   Intake 1350 ml   Output 1965 ml   Net -615 ml            Current Facility-Administered Medications   Medication Dose Route Frequency Provider Last Rate Last Dose    allopurinol (ZYLOPRIM) tablet 100 mg  100 mg Oral Daily Earnest Emerson MD   100 mg at 05/22/19 0743    buPROPion (WELLBUTRIN XL) extended release tablet 150 mg  150 mg Oral QAM Earnest Emerson MD   150 mg at 05/22/19 0742    carvedilol (COREG) tablet 12.5 mg  12.5 mg Oral BID WC Earnest Emerson MD   12.5 mg at 05/22/19 0743    cetirizine (ZYRTEC) tablet 10 mg  10 mg Oral Daily Earnest Emerson MD   10 mg at 05/22/19 0743    cloNIDine (CATAPRES) tablet 0.1 mg  0.1 mg Oral BID Earnest Emerson MD   0.1 mg at 05/22/19 0742    doxepin (SINEQUAN) capsule 100 mg  100 mg Oral Nightly Earnest Emerson MD   100 mg at 05/21/19 2027    ferrous sulfate tablet 324 mg  324 mg Oral Daily with breakfast Earnest Emerson MD        gabapentin (NEURONTIN) capsule 300 mg  300 mg Oral BID Earnest Emerson MD   300 mg at 05/22/19 0743    FLUoxetine (PROZAC) capsule 40 mg  40 mg Oral Daily Earnest Emerson MD   40 mg at 05/22/19 0742    levothyroxine (SYNTHROID) tablet 100 mcg  100 mcg Oral Daily Earnest Emerson MD   100 mcg at 05/22/19 0503    LORazepam (ATIVAN) tablet 1 mg  1 mg Oral Q6H PRN Earnest Emerson MD   1 mg at 05/22/19 0503    oxyCODONE-acetaminophen (PERCOCET)  MG per tablet 1 tablet  1 tablet Oral Q4H PRN Earnest ALTON Emerson MD   1 tablet at 05/21/19 2027    pantoprazole (PROTONIX) tablet 40 mg  40 mg Oral Daily Itzel Meza MD   40 mg at 05/22/19 0743    0.9 % sodium chloride infusion   Intravenous Continuous Earnest Emerson  mL/hr at 05/21/19 1600      sodium chloride flush 0.9 % injection 10 mL  10 mL Intravenous 2 times per day Itzel Meza MD        sodium chloride flush 0.9 % injection 10 mL  10 mL Intravenous PRN Earnest Emerson MD        HYDROmorphone (DILAUDID) injection 0.25 mg  0.25 mg Intravenous Q3H PRN Earnest Emerson MD   0.25 mg at 05/22/19 0248    Or    HYDROmorphone (DILAUDID) injection 0.5 mg  0.5 mg Intravenous Q3H PRN Earnest Emerson MD   0.5 mg at 05/22/19 1058    docusate sodium (COLACE) capsule 100 mg  100 mg Oral BID Earnest Emerson MD   100 mg at 05/22/19 0743    ondansetron (ZOFRAN) injection 4 mg  4 mg Intravenous Q6H PRN Earnest Emerson MD        amLODIPine (NORVASC) tablet 5 mg  5 mg Oral Daily Earnest Emerson MD   5 mg at 05/22/19 7116    Or    losartan (COZAAR) tablet 50 mg  50 mg Oral Daily Earnest Emerson MD        budesonide (PULMICORT) nebulizer suspension 250 mcg  0.25 mg Nebulization BID Earnest Emerson MD   250 mcg at 05/22/19 0744    And    albuterol (PROVENTIL) nebulizer solution 2.5 mg  2.5 mg Nebulization 4x daily Itzel Meza MD   2.5 mg at 05/22/19 1131    diphenhydrAMINE (BENADRYL) tablet 25 mg  25 mg Oral Q6H PRN Itzel Meza MD   25 mg at 05/22/19 0503       PHYSICAL EXAM:    Orientation:  alert and oriented to person, place and time    Incision:  dressing in place, clean, dry and intact    Upper Extremity Motor :  deltoids/biceps/triceps/wirst flexion/wrist extension/finger flexion/finger extension 5/5 bilaterally    Upper Motor Neuron Signs:  None    Upper Extremity Sensory:  Intact C3-T1 distribution    Lower Extremity Motor :  quadriceps, extensor hallucis longus, dorsiflexion, plantarflexion 5/5 bilaterally    Lower Extremity Sensory:  Intact L1-S1    Flatus:  positive    ABNORMAL EXAM FINDINGS:  none    LABS:    CBC:   Lab Results   Component Value Date    WBC 9.8 05/22/2019    RBC 3.36 05/22/2019    HGB 10.1 05/22/2019    HCT 30.8 05/22/2019    MCV 91.7 05/22/2019    MCH 30.1 05/22/2019    MCHC 32.8 05/22/2019    RDW 13.1 05/22/2019     05/22/2019    MPV 10.1 05/22/2019     CMP:    Lab Results   Component Value Date     05/02/2019    K 4.1 05/02/2019    CL 97 05/02/2019    CO2 22 05/02/2019    BUN 20 05/02/2019    CREATININE 1.1 05/02/2019    LABGLOM 48 05/02/2019    GLUCOSE 111 05/02/2019    PROT 7.3 05/02/2019    LABALBU 4.0 05/02/2019    CALCIUM 9.4 05/02/2019    BILITOT <0.2 05/02/2019    ALKPHOS 104 05/02/2019    AST 37 05/02/2019    ALT 36 05/02/2019       ASSESSMENT AND PLAN:    Patient Active Problem List    Diagnosis Date Noted    Spondylolisthesis at L4-L5 level 05/21/2019    DDD (degenerative disc disease), lumbar 05/21/2019    Chronic low back pain without sciatica 05/21/2019    Pulmonary infiltrate in left lung on chest x-ray 07/17/2018    Dysarthria 07/21/2017    MVP (mitral valve prolapse) 07/21/2017    Tremor 07/21/2017    Gait abnormality 07/21/2017    Seasonal allergies 11/15/2016    Chronic neck and back pain 10/26/2015    Multiple allergies 05/21/2015    Hypothyroid 02/23/2015    Hypertension 10/14/2014    Hyperlipidemia 10/14/2014       Post operative day 1 status post LLIF/PSF L4/5     1:  Activity Level:  OOB with brace   2:  Pain Control:  Continue current   3:  Discharge Planning:   Will likely need rehab placement   4:  Other:  PT/OT      Electronically signed by Derrell Rothman PA-C on 5/22/19 at 12:42 PM

## 2019-05-22 NOTE — PROGRESS NOTES
Precautions: No Bending, No Lifting, No Twisting    Subjective   General  Patient assessed for rehabilitation services?: Yes  Referring Practitioner: Dr. Rogelio Erazo  Diagnosis: L LLIF L4-5   Subjective  Subjective: Pt pleasant and cooperative for session   Pain Assessment  Pain Assessment: 0-10  Pain Level: 7  Patient's Stated Pain Goal: No pain     Social/Functional History  Social/Functional History  Lives With: Alone  Type of Home: House  Home Layout: One level  Home Access: Stairs to enter with rails  Entrance Stairs - Rails: Both  Bathroom Shower/Tub: Tub/Shower unit  Bathroom Toilet: Bedside commode  Bathroom Equipment: Tub transfer bench  Home Equipment: [employer], Rolling walker  ADL Assistance: Independent  Homemaking Assistance: Independent  Ambulation Assistance: Independent  Transfer Assistance: Independent  Active : No  Occupation: Retired  Additional Comments: hasnt driven in 1 year since R shoulder sx 2018, frequent falls        Objective   Vision: Impaired  Vision Exceptions: Wears glasses for reading  Hearing: Within functional limits    Orientation  Overall Orientation Status: Within Functional Limits  Observation/Palpation  Posture: Poor  Observation: pt tendency to lean forward onto walker; and unable to come to upright posture; cued to stand tall and for proper hand placement during transfer training   Balance  Sitting Balance: Minimal assistance  Standing Balance: Maximum assistance  Functional Mobility  Functional Mobility Comments:  Mod A x 2 to stand and step to [facility] from bed and then to chair   Toilet Transfers  Equipment Used: Standard bedside commode  Toilet Transfer: Maximum assistance;2 Person assistance  ADL  Feeding: Independent  Grooming: Stand by assistance  UE Bathing: Minimal assistance  LE Bathing: Maximum assistance  UE Dressing: Minimal assistance  LE Dressing: Maximum assistance  Toileting: Maximum assistance  Additional Comments: Pt required increased time for toileting with use of water to assist; pt decreased safety awareness during toileting due to pt tendency to lean forward and squat in attempt to urinate; To stand and step to/from [facility] pt required Mod A x 2 for transfer on/off BSC;  Max A for hygiene            Transfers  Sit to stand: 2 Person assistance;Maximum assistance  Stand to sit: Maximum assistance;2 Person assistance     Cognition  Overall Cognitive Status: WFL       LUE AROM (degrees)  LUE AROM : WFL  Left Hand AROM (degrees)  Left Hand AROM: WFL  RUE AROM (degrees)  RUE General AROM: AROM of R shoudler to 90 degrees   Right Hand AROM (degrees)  Right Hand AROM: WFL  LUE Strength  Gross LUE Strength: WFL  RUE Strength  RUE Strength Comment: poor strength in R shoulder; R elbow, wrist and hand WFL         Plan   Plan  Times per week: 3-5x/wk  Current Treatment Recommendations: Self-Care / ADL, Functional Mobility Training, Safety Education & Training, Equipment Evaluation, Education, & procurement, Patient/Caregiver Education & Training, Home Management Training, Endurance Training         Goals  Short term goals  Time Frame for Short term goals: 1 week  Short term goal 1: Pt will be Modified I for toileting task/transfer  Short term goal 2: Pt will be Modified I for functional mobility  Short term goal 3: Pt will be Modified I for LE dressing with AE prn  Patient Goals   Patient goals : to go home       Electronically signed by Xochitl Yee OT on 5/22/2019 at 9:42 AM    Xochitl Yee OT

## 2019-05-23 PROCEDURE — 6370000000 HC RX 637 (ALT 250 FOR IP): Performed by: PHYSICIAN ASSISTANT

## 2019-05-23 PROCEDURE — 1210000000 HC MED SURG R&B

## 2019-05-23 PROCEDURE — 2700000000 HC OXYGEN THERAPY PER DAY

## 2019-05-23 PROCEDURE — 2580000003 HC RX 258

## 2019-05-23 PROCEDURE — 94762 N-INVAS EAR/PLS OXIMTRY CONT: CPT

## 2019-05-23 PROCEDURE — 6360000002 HC RX W HCPCS

## 2019-05-23 PROCEDURE — 94761 N-INVAS EAR/PLS OXIMETRY MLT: CPT

## 2019-05-23 PROCEDURE — 94640 AIRWAY INHALATION TREATMENT: CPT

## 2019-05-23 PROCEDURE — 97530 THERAPEUTIC ACTIVITIES: CPT

## 2019-05-23 PROCEDURE — 6370000000 HC RX 637 (ALT 250 FOR IP)

## 2019-05-23 RX ORDER — SENNA AND DOCUSATE SODIUM 50; 8.6 MG/1; MG/1
2 TABLET, FILM COATED ORAL DAILY PRN
Status: DISCONTINUED | OUTPATIENT
Start: 2019-05-23 | End: 2019-05-24

## 2019-05-23 RX ORDER — OXYCODONE HYDROCHLORIDE AND ACETAMINOPHEN 5; 325 MG/1; MG/1
1 TABLET ORAL EVERY 8 HOURS PRN
Status: DISCONTINUED | OUTPATIENT
Start: 2019-05-23 | End: 2019-05-24 | Stop reason: HOSPADM

## 2019-05-23 RX ORDER — ACETAMINOPHEN 500 MG
500 TABLET ORAL EVERY 8 HOURS SCHEDULED
Status: DISCONTINUED | OUTPATIENT
Start: 2019-05-23 | End: 2019-05-24 | Stop reason: HOSPADM

## 2019-05-23 RX ORDER — POLYETHYLENE GLYCOL 3350 17 G/17G
17 POWDER, FOR SOLUTION ORAL DAILY PRN
Status: DISCONTINUED | OUTPATIENT
Start: 2019-05-23 | End: 2019-05-24 | Stop reason: HOSPADM

## 2019-05-23 RX ADMIN — ALBUTEROL SULFATE 2.5 MG: 2.5 SOLUTION RESPIRATORY (INHALATION) at 12:11

## 2019-05-23 RX ADMIN — PANTOPRAZOLE SODIUM 40 MG: 40 TABLET, DELAYED RELEASE ORAL at 08:18

## 2019-05-23 RX ADMIN — CARVEDILOL 12.5 MG: 12.5 TABLET, FILM COATED ORAL at 08:18

## 2019-05-23 RX ADMIN — LORAZEPAM 1 MG: 1 TABLET ORAL at 08:17

## 2019-05-23 RX ADMIN — DOXEPIN HYDROCHLORIDE 100 MG: 25 CAPSULE ORAL at 21:20

## 2019-05-23 RX ADMIN — ALBUTEROL SULFATE 2.5 MG: 2.5 SOLUTION RESPIRATORY (INHALATION) at 19:55

## 2019-05-23 RX ADMIN — ALBUTEROL SULFATE 2.5 MG: 2.5 SOLUTION RESPIRATORY (INHALATION) at 08:24

## 2019-05-23 RX ADMIN — FERROUS SULFATE TAB 325 MG (65 MG ELEMENTAL FE) 324 MG: 325 (65 FE) TAB at 09:30

## 2019-05-23 RX ADMIN — CARVEDILOL 12.5 MG: 12.5 TABLET, FILM COATED ORAL at 17:50

## 2019-05-23 RX ADMIN — Medication 10 ML: at 21:20

## 2019-05-23 RX ADMIN — LORAZEPAM 1 MG: 1 TABLET ORAL at 21:20

## 2019-05-23 RX ADMIN — ACETAMINOPHEN 500 MG: 500 TABLET, FILM COATED ORAL at 21:19

## 2019-05-23 RX ADMIN — BUDESONIDE 250 MCG: 0.25 SUSPENSION RESPIRATORY (INHALATION) at 08:25

## 2019-05-23 RX ADMIN — CLONIDINE HYDROCHLORIDE 0.1 MG: 0.1 TABLET ORAL at 08:18

## 2019-05-23 RX ADMIN — LOSARTAN POTASSIUM 50 MG: 50 TABLET ORAL at 08:18

## 2019-05-23 RX ADMIN — POLYETHYLENE GLYCOL 3350 17 G: 17 POWDER, FOR SOLUTION ORAL at 14:22

## 2019-05-23 RX ADMIN — SODIUM CHLORIDE: 9 INJECTION, SOLUTION INTRAVENOUS at 08:17

## 2019-05-23 RX ADMIN — ACETAMINOPHEN 500 MG: 500 TABLET, FILM COATED ORAL at 14:13

## 2019-05-23 RX ADMIN — BUDESONIDE 250 MCG: 0.25 SUSPENSION RESPIRATORY (INHALATION) at 19:55

## 2019-05-23 RX ADMIN — ACETAMINOPHEN 500 MG: 500 TABLET, FILM COATED ORAL at 08:17

## 2019-05-23 RX ADMIN — BUPROPION HYDROCHLORIDE 150 MG: 150 TABLET, FILM COATED, EXTENDED RELEASE ORAL at 08:17

## 2019-05-23 RX ADMIN — ALLOPURINOL 100 MG: 100 TABLET ORAL at 08:18

## 2019-05-23 RX ADMIN — CLONIDINE HYDROCHLORIDE 0.1 MG: 0.1 TABLET ORAL at 21:19

## 2019-05-23 RX ADMIN — LORAZEPAM 1 MG: 1 TABLET ORAL at 14:14

## 2019-05-23 RX ADMIN — ALBUTEROL SULFATE 2.5 MG: 2.5 SOLUTION RESPIRATORY (INHALATION) at 15:28

## 2019-05-23 RX ADMIN — SENNOSIDES AND DOCUSATE SODIUM 2 TABLET: 8.6; 5 TABLET ORAL at 14:22

## 2019-05-23 RX ADMIN — LEVOTHYROXINE SODIUM 100 MCG: 100 TABLET ORAL at 08:18

## 2019-05-23 RX ADMIN — FLUOXETINE HYDROCHLORIDE 40 MG: 20 CAPSULE ORAL at 08:17

## 2019-05-23 ASSESSMENT — PAIN SCALES - GENERAL
PAINLEVEL_OUTOF10: 6
PAINLEVEL_OUTOF10: 8
PAINLEVEL_OUTOF10: 5
PAINLEVEL_OUTOF10: 10
PAINLEVEL_OUTOF10: 8

## 2019-05-23 NOTE — PROGRESS NOTES
[facility] Johnny Healy PA-C  Physician Assistant Progress Note        Subjective/Overnight Events:  Confusion since last night, UA clean, mild temp since last night, weak in the legs and unable to stand unassisted, history of postoperative delirium, no BM yet      Hospital LOS: 2    Vitals  VITALS:  /71   Pulse 92   Temp 99.8 °F (37.7 °C) (Temporal)   Resp 14   Ht 5' 5\" (1.651 m)   Wt 184 lb (83.5 kg)   SpO2 96%   BMI 30.62 kg/m²   24HR INTAKE/OUTPUT:      Intake/Output Summary (Last 24 hours) at 5/23/2019 1805  Last data filed at 5/23/2019 0600  Gross per 24 hour   Intake 300 ml   Output 2810 ml   Net -2510 ml            Current Facility-Administered Medications   Medication Dose Route Frequency Provider Last Rate Last Dose    sennosides-docusate sodium (SENOKOT-S) 8.6-50 MG tablet 2 tablet  2 tablet Oral Daily PRN Jaida Rea PA-C        acetaminophen (TYLENOL) tablet 500 mg  500 mg Oral 3 times per day Jaida Rea PA-C        oxyCODONE-acetaminophen (PERCOCET) 5-325 MG per tablet 1 tablet  1 tablet Oral Q8H PRN Jaida Rea PA-C        polyethylene glycol (GLYCOLAX) packet 17 g  17 g Oral Daily PRN Jaida Rea PA-C        LORazepam (ATIVAN) tablet 1 mg  1 mg Oral TID Supriya Steen PA-C   1 mg at 05/22/19 2347    allopurinol (ZYLOPRIM) tablet 100 mg  100 mg Oral Daily Earnest Emerson MD   100 mg at 05/22/19 0743    buPROPion (WELLBUTRIN XL) extended release tablet 150 mg  150 mg Oral QAM Earnest Emerson MD   150 mg at 05/22/19 0742    carvedilol (COREG) tablet 12.5 mg  12.5 mg Oral BID WC Earnest Emerson MD   12.5 mg at 05/22/19 1734    cetirizine (ZYRTEC) tablet 10 mg  10 mg Oral Daily Earnest Emerson MD   10 mg at 05/22/19 0743    cloNIDine (CATAPRES) tablet 0.1 mg  0.1 mg Oral BID Earnest Emerson MD   0.1 mg at 05/22/19 2346    doxepin (SINEQUAN) capsule 100 mg  100 mg Oral Nightly Earnest Emerson MD   100 mg at 05/22/19 4618    ferrous EXAM FINDINGS:  none    LABS:    CBC:   Lab Results   Component Value Date    WBC 9.8 2019    RBC 3.36 2019    HGB 10.1 2019    HCT 30.8 2019    MCV 91.7 2019    MCH 30.1 2019    MCHC 32.8 2019    RDW 13.1 2019     2019    MPV 10.1 2019     CMP:    Lab Results   Component Value Date     2019    K 4.1 2019    CL 97 2019    CO2 22 2019    BUN 20 2019    CREATININE 1.1 2019    LABGLOM 48 2019    GLUCOSE 111 2019    PROT 7.3 2019    LABALBU 4.0 2019    CALCIUM 9.4 2019    BILITOT <0.2 2019    ALKPHOS 104 2019    AST 37 2019    ALT 36 2019       ASSESSMENT AND PLAN:    Patient Active Problem List    Diagnosis Date Noted    Spondylolisthesis at L4-L5 level 2019    DDD (degenerative disc disease), lumbar 2019    Chronic low back pain without sciatica 2019    Pulmonary infiltrate in left lung on chest x-ray 2018    Dysarthria 2017    MVP (mitral valve prolapse) 2017    Tremor 2017    Gait abnormality 2017    Seasonal allergies 11/15/2016    Chronic neck and back pain 10/26/2015    Multiple allergies 2015    Hypothyroid 2015    Hypertension 10/14/2014    Hyperlipidemia 10/14/2014       Post operative day 2 status post LLIF/PSF L4/5     1:  Activity Level:  OOB with brace, encourage ambulation   2:  Pain Control:  DC hydromorphone, decrease oxycodone, schedule acetaminophen   3:  Discharge [address] [place] [address]  4:  Other:  Labs tomorrow AM      Electronically signed by Griselda Jacobson PA-C on 19 at 6:26 AM

## 2019-05-23 NOTE — PROGRESS NOTES
Patient's daughter is saying that patient is supposed to be getting the ativan TID but it is ordered Q6 PRN. Patient is also asking for the bedpan every 30-45 minutes all day long and only voiding small amounts at a time. She has no c/o burning, just frequency.  Electronically signed by Sharda Block RN on 5/22/2019 at 7:23 PM

## 2019-05-23 NOTE — PROGRESS NOTES
Occupational Therapy  Facility/Department: [facility] SURG SERVICES  Daily Treatment Note  NAME: Chance Saba  : 1945  MRN: [id number]    Date of Service: 2019    Discharge Recommendations:  Patient would benefit from continued therapy after discharge       Assessment   Performance deficits / Impairments: Decreased functional mobility ; Decreased ADL status; Decreased ROM; Decreased high-level IADLs;Decreased balance  Assessment: Pt able to get upl to chair today with Olivia Mauricio. Pt benefits from skilled OT to address decreased pt I to complete functional mobility, balance, endurance, ADL tasks s/p L LLIF L4-5   Treatment Diagnosis: LLIF L4-5   Prognosis: Good  REQUIRES OT FOLLOW UP: Yes  Activity Tolerance  Activity Tolerance: Patient Tolerated treatment well         Patient Diagnosis(es): There were no encounter diagnoses. has a past medical history of Allergic rhinitis, Anxiety, Arthritis, Asthma, Chronic back pain, Chronic kidney disease, DDD (degenerative disc disease), lumbar, Depression, GERD (gastroesophageal reflux disease), Hyperlipidemia, Hypertension, Hypothyroidism, Irritable bowel syndrome, Restless legs syndrome, and Spondylolisthesis at L4-L5 level.   has a past surgical history that includes Hysterectomy; Cervical spine surgery (x 2); Ankle fracture surgery (Left); Hand surgery (Left, 2007); Ovary removal (Right, 3/2010); Cholecystectomy (2015); Foot surgery (Left, 2015); Total knee arthroplasty (Bilateral); shoulder surgery (Right, 2018); pr [facility] incl fluor gdnce dx w/cell washg spx (N/A, 2018); joint replacement (Bilateral); joint replacement (Right); joint replacement (Left); and lumbar fusion (Left, 2019).     Restrictions  Restrictions/Precautions  Restrictions/Precautions: Fall Risk  Required Braces or Orthoses  Spinal: Lumbar Corset  Position Activity Restriction  Spinal Precautions: No Bending, No Lifting, No Twisting  Subjective   General  Chart Reviewed:

## 2019-05-23 NOTE — CARE COORDINATION
Pt has been accepted at [facility]. Pt may DC to the facility on Friday May 24th if medically cleared.    [address]  Electronically signed by Lalita Ruelas on 5/23/2019 at 12:43 PM

## 2019-05-23 NOTE — PROGRESS NOTES
Physical Therapy  Name: Matthew Cowart  MRN:  [id number]  Date of service:  5/23/2019 05/23/19 1430   Subjective   Subjective Patient agreeable to work with therapy. Pain Screening   Patient Currently in Pain Yes   Pain Assessment   Pain Level 8   Pain Assessment 0-10   Bed Mobility   Supine to Sit Moderate assistance;Maximal assistance  (x2)   Scooting Maximal assistance;2 Person assistance   Comment worked on proper log roll technique   Transfers   Sit to Stand Moderate Assistance;Maximum Assistance;2 Person Assistance   Stand to sit Moderate Assistance;Maximum Assistance;2 Person Assistance   Bed to Chair Dependent/Total  (nhi steady)   Comment several sit to stands in the nhi steady and practiced sit to stand at bedside without nhi steady as well. Ambulation   Ambulation? No   Other Activities   Comment donned LSO in sitting, complete assist for this   Short term goals   Time Frame for Short term goals 14 DAYS   Short term goal 1 BED MOB MOD IND   Short term goal 2 TRANSFERS CGA   Short term goal 3 ' RW CGA   Short term goal 4 Pt/FAMILY IND WITH LSO MANAGEMENT   Conditions Requiring Skilled Therapeutic Intervention   Body structures, Functions, Activity limitations Decreased functional mobility ; Decreased ROM; Decreased strength;Decreased balance;Decreased safe awareness;Decreased endurance   Assessment Assisted patient to the recliner via [address] [place] [address] steady and left with all needs in reach.           Electronically signed by Sandra Otero PTA on 5/23/2019 at 5:45 PM

## 2019-05-24 VITALS
SYSTOLIC BLOOD PRESSURE: 135 MMHG | HEART RATE: 79 BPM | BODY MASS INDEX: 30.66 KG/M2 | TEMPERATURE: 98 F | HEIGHT: 65 IN | OXYGEN SATURATION: 92 % | WEIGHT: 184 LBS | DIASTOLIC BLOOD PRESSURE: 80 MMHG | RESPIRATION RATE: 18 BRPM

## 2019-05-24 LAB
ANION GAP SERPL CALCULATED.3IONS-SCNC: 11 MMOL/L (ref 7–19)
BUN BLDV-MCNC: 15 MG/DL (ref 8–23)
CALCIUM SERPL-MCNC: 8.6 MG/DL (ref 8.8–10.2)
CHLORIDE BLD-SCNC: 108 MMOL/L (ref 98–111)
CO2: 22 MMOL/L (ref 22–29)
CREAT SERPL-MCNC: 1 MG/DL (ref 0.5–0.9)
GFR NON-AFRICAN AMERICAN: 54
GLUCOSE BLD-MCNC: 113 MG/DL (ref 74–109)
HCT VFR BLD CALC: 30.5 % (ref 37–47)
HEMOGLOBIN: 10.1 G/DL (ref 12–16)
MCH RBC QN AUTO: 30.9 PG (ref 27–31)
MCHC RBC AUTO-ENTMCNC: 33.1 G/DL (ref 33–37)
MCV RBC AUTO: 93.3 FL (ref 81–99)
PDW BLD-RTO: 13.1 % (ref 11.5–14.5)
PLATELET # BLD: 177 K/UL (ref 130–400)
PMV BLD AUTO: 10.1 FL (ref 9.4–12.3)
POTASSIUM REFLEX MAGNESIUM: 4.2 MMOL/L (ref 3.5–5)
RBC # BLD: 3.27 M/UL (ref 4.2–5.4)
SODIUM BLD-SCNC: 141 MMOL/L (ref 136–145)
WBC # BLD: 8.7 K/UL (ref 4.8–10.8)

## 2019-05-24 PROCEDURE — 94640 AIRWAY INHALATION TREATMENT: CPT

## 2019-05-24 PROCEDURE — 80048 BASIC METABOLIC PNL TOTAL CA: CPT

## 2019-05-24 PROCEDURE — 6370000000 HC RX 637 (ALT 250 FOR IP): Performed by: PHYSICIAN ASSISTANT

## 2019-05-24 PROCEDURE — 36415 COLL VENOUS BLD VENIPUNCTURE: CPT

## 2019-05-24 PROCEDURE — 94761 N-INVAS EAR/PLS OXIMETRY MLT: CPT

## 2019-05-24 PROCEDURE — 51798 US URINE CAPACITY MEASURE: CPT

## 2019-05-24 PROCEDURE — 85027 COMPLETE CBC AUTOMATED: CPT

## 2019-05-24 PROCEDURE — 97530 THERAPEUTIC ACTIVITIES: CPT

## 2019-05-24 PROCEDURE — 6370000000 HC RX 637 (ALT 250 FOR IP)

## 2019-05-24 PROCEDURE — 2580000003 HC RX 258

## 2019-05-24 PROCEDURE — 6360000002 HC RX W HCPCS

## 2019-05-24 RX ORDER — OXYCODONE HYDROCHLORIDE AND ACETAMINOPHEN 5; 325 MG/1; MG/1
1 TABLET ORAL EVERY 8 HOURS PRN
Qty: 56 TABLET | Refills: 0 | Status: SHIPPED | OUTPATIENT
Start: 2019-05-24 | End: 2019-06-07

## 2019-05-24 RX ORDER — BUDESONIDE 0.25 MG/2ML
0.25 INHALANT ORAL 2 TIMES DAILY
Status: DISCONTINUED | OUTPATIENT
Start: 2019-05-24 | End: 2019-05-24 | Stop reason: HOSPADM

## 2019-05-24 RX ORDER — ALBUTEROL SULFATE 2.5 MG/3ML
2.5 SOLUTION RESPIRATORY (INHALATION) 2 TIMES DAILY
Status: DISCONTINUED | OUTPATIENT
Start: 2019-05-24 | End: 2019-05-24 | Stop reason: HOSPADM

## 2019-05-24 RX ORDER — OXYBUTYNIN CHLORIDE 5 MG/1
5 TABLET ORAL 2 TIMES DAILY
Status: DISCONTINUED | OUTPATIENT
Start: 2019-05-24 | End: 2019-05-24 | Stop reason: HOSPADM

## 2019-05-24 RX ORDER — BISACODYL 10 MG
10 SUPPOSITORY, RECTAL RECTAL DAILY PRN
Status: DISCONTINUED | OUTPATIENT
Start: 2019-05-24 | End: 2019-05-24 | Stop reason: HOSPADM

## 2019-05-24 RX ORDER — OXYBUTYNIN CHLORIDE 10 MG/1
10 TABLET, EXTENDED RELEASE ORAL DAILY
Qty: 30 TABLET | Refills: 1 | Status: ON HOLD | OUTPATIENT
Start: 2019-05-24 | End: 2019-07-05 | Stop reason: HOSPADM

## 2019-05-24 RX ORDER — DOCUSATE SODIUM 100 MG/1
100 CAPSULE, LIQUID FILLED ORAL 2 TIMES DAILY
Status: DISCONTINUED | OUTPATIENT
Start: 2019-05-24 | End: 2019-05-24 | Stop reason: HOSPADM

## 2019-05-24 RX ADMIN — Medication 10 ML: at 09:09

## 2019-05-24 RX ADMIN — PANTOPRAZOLE SODIUM 40 MG: 40 TABLET, DELAYED RELEASE ORAL at 09:08

## 2019-05-24 RX ADMIN — CLONIDINE HYDROCHLORIDE 0.1 MG: 0.1 TABLET ORAL at 09:08

## 2019-05-24 RX ADMIN — FERROUS SULFATE TAB 325 MG (65 MG ELEMENTAL FE) 325 MG: 325 (65 FE) TAB at 09:08

## 2019-05-24 RX ADMIN — OXYBUTYNIN CHLORIDE 5 MG: 5 TABLET ORAL at 12:28

## 2019-05-24 RX ADMIN — LORAZEPAM 1 MG: 1 TABLET ORAL at 09:07

## 2019-05-24 RX ADMIN — LEVOTHYROXINE SODIUM 100 MCG: 100 TABLET ORAL at 05:21

## 2019-05-24 RX ADMIN — LINACLOTIDE 145 MCG: 145 CAPSULE, GELATIN COATED ORAL at 12:28

## 2019-05-24 RX ADMIN — BISACODYL 10 MG: 10 SUPPOSITORY RECTAL at 09:09

## 2019-05-24 RX ADMIN — AMLODIPINE BESYLATE 5 MG: 5 TABLET ORAL at 09:07

## 2019-05-24 RX ADMIN — BUDESONIDE 250 MCG: 0.25 SUSPENSION RESPIRATORY (INHALATION) at 07:41

## 2019-05-24 RX ADMIN — FLUOXETINE HYDROCHLORIDE 40 MG: 20 CAPSULE ORAL at 09:07

## 2019-05-24 RX ADMIN — ACETAMINOPHEN 500 MG: 500 TABLET, FILM COATED ORAL at 14:35

## 2019-05-24 RX ADMIN — LORAZEPAM 1 MG: 1 TABLET ORAL at 14:35

## 2019-05-24 RX ADMIN — ALBUTEROL SULFATE 2.5 MG: 2.5 SOLUTION RESPIRATORY (INHALATION) at 07:41

## 2019-05-24 RX ADMIN — BUPROPION HYDROCHLORIDE 150 MG: 150 TABLET, FILM COATED, EXTENDED RELEASE ORAL at 09:08

## 2019-05-24 RX ADMIN — ALLOPURINOL 100 MG: 100 TABLET ORAL at 09:08

## 2019-05-24 RX ADMIN — CARVEDILOL 12.5 MG: 12.5 TABLET, FILM COATED ORAL at 09:07

## 2019-05-24 RX ADMIN — ACETAMINOPHEN 500 MG: 500 TABLET, FILM COATED ORAL at 05:21

## 2019-05-24 RX ADMIN — DOCUSATE SODIUM 100 MG: 100 CAPSULE, LIQUID FILLED ORAL at 12:28

## 2019-05-24 ASSESSMENT — PAIN SCALES - GENERAL
PAINLEVEL_OUTOF10: 10
PAINLEVEL_OUTOF10: 5
PAINLEVEL_OUTOF10: 10

## 2019-05-24 NOTE — DISCHARGE INSTR - DIET

## 2019-05-24 NOTE — PROGRESS NOTES
[address] [facility] Arturo Calvert PA-C  Physician Assistant Progress Note        Subjective/Overnight Events:  Confusion has improved, UA clean, labs okay, weak in the legs and unable to stand unassisted, no ambulation yet, frequent urinary urge, no BM yet, accepted to [facility] LOS: 3    Vitals  VITALS:  /80   Pulse 79   Temp 98 °F (36.7 °C) (Temporal)   Resp 18   Ht 5' 5\" (1.651 m)   Wt 184 lb (83.5 kg)   SpO2 92%   BMI 30.62 kg/m²   24HR INTAKE/OUTPUT:      Intake/Output Summary (Last 24 hours) at 5/24/2019 1131  Last data filed at 5/24/2019 9520  Gross per 24 hour   Intake 680 ml   Output 500 ml   Net 180 ml            Current Facility-Administered Medications   Medication Dose Route Frequency Provider Last Rate Last Dose    bisacodyl (DULCOLAX) suppository 10 mg  10 mg Rectal Daily PRN Earnest Emerson MD   10 mg at 05/24/19 2974    docusate sodium (COLACE) capsule 100 mg  100 mg Oral BID Lawalejandroe Chantel, PA-C        albuterol (PROVENTIL) nebulizer solution 2.5 mg  2.5 mg Nebulization BID Abigail Golden PA-C        And    budesonide (PULMICORT) nebulizer suspension 250 mcg  0.25 mg Nebulization BID Dashae Chantel, PA-C        linaclotide [facility]) capsule 145 mcg  145 mcg Oral QAM AC Abigail Golden, PA-C        oxybutynin (DITROPAN) tablet 5 mg  5 mg Oral BID Lawerence Golden, PA-C        acetaminophen (TYLENOL) tablet 500 mg  500 mg Oral 3 times per day Dashae Chantel, PA-C   500 mg at 05/24/19 0521    oxyCODONE-acetaminophen (PERCOCET) 5-325 MG per tablet 1 tablet  1 tablet Oral Q8H PRN Abigail Golden, PA-C        polyethylene glycol (GLYCOLAX) packet 17 g  17 g Oral Daily PRN Dashae Chantel, PA-C   17 g at 05/23/19 1422    LORazepam (ATIVAN) tablet 1 mg  1 mg Oral TID SHANNAN RochaC   1 mg at 05/24/19 8147    allopurinol (ZYLOPRIM) tablet 100 mg  100 mg Oral Daily Earnest Emerson MD   100 mg at 05/24/19 0908    buPROPion positive    ABNORMAL EXAM FINDINGS:  none    LABS:    CBC:   Lab Results   Component Value Date    WBC 8.7 05/24/2019    RBC 3.27 05/24/2019    HGB 10.1 05/24/2019    HCT 30.5 05/24/2019    MCV 93.3 05/24/2019    MCH 30.9 05/24/2019    MCHC 33.1 05/24/2019    RDW 13.1 05/24/2019     05/24/2019    MPV 10.1 05/24/2019     CMP:    Lab Results   Component Value Date     05/24/2019    K 4.2 05/24/2019     05/24/2019    CO2 22 05/24/2019    BUN 15 05/24/2019    CREATININE 1.0 05/24/2019    LABGLOM 54 05/24/2019    GLUCOSE 113 05/24/2019    PROT 7.3 05/02/2019    LABALBU 4.0 05/02/2019    CALCIUM 8.6 05/24/2019    BILITOT <0.2 05/02/2019    ALKPHOS 104 05/02/2019    AST 37 05/02/2019    ALT 36 05/02/2019       ASSESSMENT AND PLAN:    Patient Active Problem List    Diagnosis Date Noted    Spondylolisthesis at L4-L5 level 05/21/2019    DDD (degenerative disc disease), lumbar 05/21/2019    Chronic low back pain without sciatica 05/21/2019    Pulmonary infiltrate in left lung on chest x-ray 07/17/2018    Dysarthria 07/21/2017    MVP (mitral valve prolapse) 07/21/2017    Tremor 07/21/2017    Gait abnormality 07/21/2017    Seasonal allergies 11/15/2016    Chronic neck and back pain 10/26/2015    Multiple allergies 05/21/2015    Hypothyroid 02/23/2015    Hypertension 10/14/2014    Hyperlipidemia 10/14/2014       Post operative day 3 status post LLIF/PSF L4/5     1:  Activity Level:  OOB with brace, encourage ambulation, encourage in bed leg exercise   2:  Pain Control:  No narcotics, scheduled acetaminophen  3:  Discharge Planning:  [facility] this PM or tomorrow  4:  Other:  Additional BM support added, Pure Wick for incontinence, added Ditropan for urge incontinence      Electronically signed by Yonathan Higginbotham PA-C on 5/24/19 at 11:35 AM

## 2019-05-24 NOTE — DISCHARGE SUMMARY
[facility]  DISCHARGE SUMMARY  AUTHOR: Mundo Perdue PA-C    Patient ID:  Cal Godinez  [id number]  59 y.o.  1945    Admit date: 5/21/2019    Discharge date and time: 05/24/19    Hospital LOS: 3    Admitting Physician: Regis Graham MD     Indication for Admission: Planned admit for surgical procedure    Admission Diagnoses: Chronic low back pain without sciatica, unspecified back pain laterality [M54.5, G89.29]    Discharge Diagnoses: Chronic low back pain without sciatica, unspecified back pain laterality [M54.5, G89.29]    Procedures: 1. LLIF L4/L5. 2. PSF L4/L5.     Problem List:   Patient Active Problem List    Diagnosis Date Noted    Spondylolisthesis at L4-L5 level 05/21/2019    DDD (degenerative disc disease), lumbar 05/21/2019    Chronic low back pain without sciatica 05/21/2019    Pulmonary infiltrate in left lung on chest x-ray 07/17/2018    Dysarthria 07/21/2017    MVP (mitral valve prolapse) 07/21/2017    Tremor 07/21/2017    Gait abnormality 07/21/2017    Seasonal allergies 11/15/2016    Chronic neck and back pain 10/26/2015    Multiple allergies 05/21/2015    Hypothyroid 02/23/2015    Hypertension 10/14/2014    Hyperlipidemia 10/14/2014       Consults: none    Admission Condition: fair    Discharged Condition: stable    Hospital Course: patient admitted for surgical procedure, procedure completed without complication, postoperatively noted to have weakness and deconditioning, recommended to discharge to SNF for strengthening    Disposition: SNF    Patient Instructions:    Ankur Hillman Medication Instructions KIRK:[id number]    Printed on:05/24/19 1422   Medication Information                      allopurinol (ZYLOPRIM) 100 MG tablet  Take 100 mg by mouth daily             amLODIPine-olmesartan (WILL) 5-20 MG per tablet  Take 1 tablet by mouth daily             buPROPion (WELLBUTRIN XL) 150 MG extended release tablet  Take 150 mg by mouth every morning questions or concerns    Follow-up with Dr Mary Grace Dowling or PA's in 2 weeks.     Electronically signed by Chad Peacock PA-C on 5/24/19 at 2:22 PM

## 2019-05-24 NOTE — PROGRESS NOTES
Physical Therapy  [facility]  [id number]     05/24/19 1437   Subjective   Subjective Agrees to work with therapy. Bed Mobility   Supine to Sit Moderate assistance;Maximal assistance  (x2)   Transfers   Sit to Stand Moderate Assistance;Maximum Assistance;2 Person Assistance   Stand to sit Moderate Assistance;Maximum Assistance;2 Person Assistance   Ambulation   Ambulation? No   Other Activities   Comment Worked with patient on bed mobility. Patient not able to giovany LSO independently. Patient stood using Olya [place] and transferred bed to chair via Olya [place]. Patient positioned for comfort with all needs in reach. Short term goals   Time Frame for Short term goals 14 DAYS   Short term goal 1 BED MOB MOD IND   Short term goal 2 TRANSFERS CGA   Short term goal 3 ' RW CGA   Short term goal 4 Pt/FAMILY IND WITH LSO MANAGEMENT   Activity Tolerance   Activity Tolerance Patient Tolerated treatment well;Patient limited by pain   Safety Devices   Type of devices Call light within reach; Left in chair   Electronically signed by Lucina Smith PTA on 5/24/2019 at 2:40 PM

## 2019-05-28 ENCOUNTER — TELEPHONE (OUTPATIENT)
Dept: PRIMARY CARE CLINIC | Age: 74
End: 2019-05-28

## 2019-05-28 NOTE — TELEPHONE ENCOUNTER
SKILLED NURSING FACILITY:   INITIAL CALL POST-HOSPITAL DISCHARGE    SNF: [facility]    PHONE [facility]     / :    PATIENT CARE SERVICES:  Patient is participating in Physical therapy. Regular diet    ANTICIPATED LENGTH OF STAY: Unknown    PATIENT CARE COORDINATOR:  Talked with Gaston Francois. Gaston Francois stated that patient has had some urinary urgency in the morning. Bowels are good. Patient is eating well and sleeping well. Patients pain is managed with pain medication. Patient is participating in PT. No other concerns noted.

## 2019-06-02 ENCOUNTER — LAB REQUISITION (OUTPATIENT)
Dept: LAB | Facility: HOSPITAL | Age: 74
End: 2019-06-02

## 2019-06-02 DIAGNOSIS — Z00.00 ENCOUNTER FOR GENERAL ADULT MEDICAL EXAMINATION WITHOUT ABNORMAL FINDINGS: ICD-10-CM

## 2019-06-02 LAB
ALBUMIN SERPL-MCNC: 4.1 G/DL (ref 3.5–5)
ALBUMIN/GLOB SERPL: 1.2 G/DL (ref 1.1–2.5)
ALP SERPL-CCNC: 108 U/L (ref 24–120)
ALT SERPL W P-5'-P-CCNC: 29 U/L (ref 0–54)
ANION GAP SERPL CALCULATED.3IONS-SCNC: 9 MMOL/L (ref 4–13)
AST SERPL-CCNC: 41 U/L (ref 7–45)
BACTERIA UR QL AUTO: ABNORMAL /HPF
BASOPHILS # BLD AUTO: 0.04 10*3/MM3 (ref 0–0.2)
BASOPHILS NFR BLD AUTO: 0.4 % (ref 0–2)
BILIRUB SERPL-MCNC: 0.4 MG/DL (ref 0.1–1)
BILIRUB UR QL STRIP: NEGATIVE
BUN BLD-MCNC: 22 MG/DL (ref 5–21)
BUN/CREAT SERPL: 22.4 (ref 7–25)
CALCIUM SPEC-SCNC: 9.9 MG/DL (ref 8.4–10.4)
CHLORIDE SERPL-SCNC: 104 MMOL/L (ref 98–110)
CLARITY UR: CLEAR
CO2 SERPL-SCNC: 27 MMOL/L (ref 24–31)
COLOR UR: YELLOW
CREAT BLD-MCNC: 0.98 MG/DL (ref 0.5–1.4)
DEPRECATED RDW RBC AUTO: 42.4 FL (ref 40–54)
EOSINOPHIL # BLD AUTO: 0.27 10*3/MM3 (ref 0–0.7)
EOSINOPHIL NFR BLD AUTO: 3 % (ref 0–4)
ERYTHROCYTE [DISTWIDTH] IN BLOOD BY AUTOMATED COUNT: 13 % (ref 12–15)
GFR SERPL CREATININE-BSD FRML MDRD: 55 ML/MIN/1.73
GLOBULIN UR ELPH-MCNC: 3.4 GM/DL
GLUCOSE BLD-MCNC: 101 MG/DL (ref 70–100)
GLUCOSE UR STRIP-MCNC: NEGATIVE MG/DL
HCT VFR BLD AUTO: 34.3 % (ref 37–47)
HGB BLD-MCNC: 11.6 G/DL (ref 12–16)
HGB UR QL STRIP.AUTO: NEGATIVE
IMM GRANULOCYTES # BLD AUTO: 0.1 10*3/MM3 (ref 0–0.05)
IMM GRANULOCYTES NFR BLD AUTO: 1.1 % (ref 0–5)
KETONES UR QL STRIP: NEGATIVE
LEUKOCYTE ESTERASE UR QL STRIP.AUTO: ABNORMAL
LYMPHOCYTES # BLD AUTO: 2.02 10*3/MM3 (ref 0.72–4.86)
LYMPHOCYTES NFR BLD AUTO: 22.5 % (ref 15–45)
MCH RBC QN AUTO: 30.4 PG (ref 28–32)
MCHC RBC AUTO-ENTMCNC: 33.8 G/DL (ref 33–36)
MCV RBC AUTO: 89.8 FL (ref 82–98)
MONOCYTES # BLD AUTO: 0.92 10*3/MM3 (ref 0.19–1.3)
MONOCYTES NFR BLD AUTO: 10.2 % (ref 4–12)
NEUTROPHILS # BLD AUTO: 5.64 10*3/MM3 (ref 1.87–8.4)
NEUTROPHILS NFR BLD AUTO: 62.8 % (ref 39–78)
NITRITE UR QL STRIP: NEGATIVE
NRBC BLD AUTO-RTO: 0 /100 WBC (ref 0–0.2)
PH UR STRIP.AUTO: <=5 [PH] (ref 5–8)
PLATELET # BLD AUTO: 351 10*3/MM3 (ref 130–400)
PMV BLD AUTO: 9.9 FL (ref 6–12)
POTASSIUM BLD-SCNC: 4.8 MMOL/L (ref 3.5–5.3)
PROT SERPL-MCNC: 7.5 G/DL (ref 6.3–8.7)
PROT UR QL STRIP: NEGATIVE
RBC # BLD AUTO: 3.82 10*6/MM3 (ref 4.2–5.4)
RBC # UR: ABNORMAL /HPF
REF LAB TEST METHOD: ABNORMAL
SODIUM BLD-SCNC: 140 MMOL/L (ref 135–145)
SP GR UR STRIP: 1.01 (ref 1–1.03)
SQUAMOUS #/AREA URNS HPF: ABNORMAL /HPF
STARCH GRANULES URNS QL MICRO: ABNORMAL /HPF
UROBILINOGEN UR QL STRIP: ABNORMAL
WBC NRBC COR # BLD: 8.99 10*3/MM3 (ref 4.8–10.8)
WBC UR QL AUTO: ABNORMAL /HPF
YEAST URNS QL MICRO: ABNORMAL /HPF

## 2019-06-02 PROCEDURE — 87086 URINE CULTURE/COLONY COUNT: CPT | Performed by: NURSE PRACTITIONER

## 2019-06-02 PROCEDURE — 85025 COMPLETE CBC W/AUTO DIFF WBC: CPT | Performed by: NURSE PRACTITIONER

## 2019-06-02 PROCEDURE — 80053 COMPREHEN METABOLIC PANEL: CPT | Performed by: NURSE PRACTITIONER

## 2019-06-02 PROCEDURE — 81001 URINALYSIS AUTO W/SCOPE: CPT | Performed by: NURSE PRACTITIONER

## 2019-06-04 LAB — BACTERIA SPEC AEROBE CULT: ABNORMAL

## 2019-06-05 ENCOUNTER — TELEPHONE (OUTPATIENT)
Dept: PRIMARY CARE CLINIC | Age: 74
End: 2019-06-05

## 2019-06-14 ENCOUNTER — LAB REQUISITION (OUTPATIENT)
Dept: LAB | Facility: HOSPITAL | Age: 74
End: 2019-06-14

## 2019-06-14 ENCOUNTER — TELEPHONE (OUTPATIENT)
Dept: PRIMARY CARE CLINIC | Age: 74
End: 2019-06-14

## 2019-06-14 DIAGNOSIS — Z00.00 ENCOUNTER FOR GENERAL ADULT MEDICAL EXAMINATION WITHOUT ABNORMAL FINDINGS: ICD-10-CM

## 2019-06-14 LAB
BACTERIA UR QL AUTO: ABNORMAL /HPF
BILIRUB UR QL STRIP: NEGATIVE
CLARITY UR: CLEAR
COLOR UR: YELLOW
GLUCOSE UR STRIP-MCNC: NEGATIVE MG/DL
HGB UR QL STRIP.AUTO: NEGATIVE
HYALINE CASTS UR QL AUTO: ABNORMAL /LPF
KETONES UR QL STRIP: NEGATIVE
LEUKOCYTE ESTERASE UR QL STRIP.AUTO: ABNORMAL
NITRITE UR QL STRIP: NEGATIVE
PH UR STRIP.AUTO: <=5 [PH] (ref 5–8)
PROT UR QL STRIP: NEGATIVE
RBC # UR: ABNORMAL /HPF
REF LAB TEST METHOD: ABNORMAL
SP GR UR STRIP: 1.02 (ref 1–1.03)
SQUAMOUS #/AREA URNS HPF: ABNORMAL /HPF
UROBILINOGEN UR QL STRIP: ABNORMAL
WBC UR QL AUTO: ABNORMAL /HPF
YEAST URNS QL MICRO: ABNORMAL /HPF

## 2019-06-14 PROCEDURE — 87086 URINE CULTURE/COLONY COUNT: CPT | Performed by: NURSE PRACTITIONER

## 2019-06-14 PROCEDURE — 81001 URINALYSIS AUTO W/SCOPE: CPT | Performed by: NURSE PRACTITIONER

## 2019-06-15 ENCOUNTER — HOSPITAL ENCOUNTER (INPATIENT)
Age: 74
LOS: 7 days | Discharge: NURSING, REHAB OR LONG-TERM CARE | DRG: 177 | End: 2019-06-22
Attending: EMERGENCY MEDICINE | Admitting: INTERNAL MEDICINE
Payer: MEDICARE

## 2019-06-15 ENCOUNTER — APPOINTMENT (OUTPATIENT)
Dept: GENERAL RADIOLOGY | Age: 74
DRG: 177 | End: 2019-06-15
Payer: MEDICARE

## 2019-06-15 ENCOUNTER — APPOINTMENT (OUTPATIENT)
Dept: CT IMAGING | Age: 74
DRG: 177 | End: 2019-06-15
Payer: MEDICARE

## 2019-06-15 DIAGNOSIS — N28.9 RENAL INSUFFICIENCY: ICD-10-CM

## 2019-06-15 DIAGNOSIS — R25.1 TREMOR: ICD-10-CM

## 2019-06-15 DIAGNOSIS — J18.9 HCAP (HEALTHCARE-ASSOCIATED PNEUMONIA): Primary | ICD-10-CM

## 2019-06-15 DIAGNOSIS — M43.16 SPONDYLOLISTHESIS AT L4-L5 LEVEL: ICD-10-CM

## 2019-06-15 DIAGNOSIS — R09.02 HYPOXIA: ICD-10-CM

## 2019-06-15 PROBLEM — J96.01 ACUTE RESPIRATORY FAILURE WITH HYPOXIA (HCC): Status: ACTIVE | Noted: 2019-06-15

## 2019-06-15 LAB
ALBUMIN SERPL-MCNC: 3.2 G/DL (ref 3.5–5.2)
ALP BLD-CCNC: 128 U/L (ref 35–104)
ALT SERPL-CCNC: 28 U/L (ref 5–33)
ANION GAP SERPL CALCULATED.3IONS-SCNC: 13 MMOL/L (ref 7–19)
AST SERPL-CCNC: 32 U/L (ref 5–32)
ATYPICAL LYMPHOCYTE RELATIVE PERCENT: 3 % (ref 0–8)
BANDED NEUTROPHILS RELATIVE PERCENT: 1 % (ref 0–5)
BASE EXCESS ARTERIAL: -2.2 MMOL/L (ref -2–2)
BASOPHILS ABSOLUTE: 0 K/UL (ref 0–0.2)
BASOPHILS RELATIVE PERCENT: 0 % (ref 0–1)
BILIRUB SERPL-MCNC: 0.5 MG/DL (ref 0.2–1.2)
BILIRUBIN URINE: NEGATIVE
BLOOD, URINE: NEGATIVE
BUN BLDV-MCNC: 43 MG/DL (ref 8–23)
CALCIUM SERPL-MCNC: 8.8 MG/DL (ref 8.8–10.2)
CARBOXYHEMOGLOBIN ARTERIAL: 2.4 % (ref 0–5)
CHLORIDE BLD-SCNC: 98 MMOL/L (ref 98–111)
CLARITY: CLEAR
CO2: 21 MMOL/L (ref 22–29)
COLOR: YELLOW
CREAT SERPL-MCNC: 1.5 MG/DL (ref 0.5–0.9)
EOSINOPHILS ABSOLUTE: 0.33 K/UL (ref 0–0.6)
EOSINOPHILS RELATIVE PERCENT: 2 % (ref 0–5)
GFR NON-AFRICAN AMERICAN: 34
GLUCOSE BLD-MCNC: 114 MG/DL (ref 74–109)
GLUCOSE URINE: NEGATIVE MG/DL
HCO3 ARTERIAL: 22.4 MMOL/L (ref 22–26)
HCT VFR BLD CALC: 35.6 % (ref 37–47)
HEMOGLOBIN, ART, EXTENDED: 11.8 G/DL (ref 12–16)
HEMOGLOBIN: 11.3 G/DL (ref 12–16)
KETONES, URINE: NEGATIVE MG/DL
LACTIC ACID: 0.8 MMOL/L (ref 0.5–1.9)
LEUKOCYTE ESTERASE, URINE: NEGATIVE
LYMPHOCYTES ABSOLUTE: 1.7 K/UL (ref 1.1–4.5)
LYMPHOCYTES RELATIVE PERCENT: 7 % (ref 20–40)
MCH RBC QN AUTO: 30.4 PG (ref 27–31)
MCHC RBC AUTO-ENTMCNC: 31.7 G/DL (ref 33–37)
MCV RBC AUTO: 95.7 FL (ref 81–99)
METHEMOGLOBIN ARTERIAL: 1.2 %
MICROCYTES: ABNORMAL
MONOCYTES ABSOLUTE: 1.7 K/UL (ref 0–0.9)
MONOCYTES RELATIVE PERCENT: 10 % (ref 0–10)
NEUTROPHILS ABSOLUTE: 12.9 K/UL (ref 1.5–7.5)
NEUTROPHILS RELATIVE PERCENT: 77 % (ref 50–65)
NITRITE, URINE: NEGATIVE
O2 CONTENT ARTERIAL: 14.2 ML/DL
O2 SAT, ARTERIAL: 85.5 %
O2 THERAPY: ABNORMAL
PCO2 ARTERIAL: 37 MMHG (ref 35–45)
PDW BLD-RTO: 13.8 % (ref 11.5–14.5)
PH ARTERIAL: 7.39 (ref 7.35–7.45)
PH UA: 5.5 (ref 5–8)
PLATELET # BLD: 208 K/UL (ref 130–400)
PLATELET SLIDE REVIEW: ADEQUATE
PMV BLD AUTO: 10 FL (ref 9.4–12.3)
PO2 ARTERIAL: 49 MMHG (ref 80–100)
POLYCHROMASIA: ABNORMAL
POTASSIUM SERPL-SCNC: 4.2 MMOL/L (ref 3.5–5)
POTASSIUM, WHOLE BLOOD: 4
PRO-BNP: 529 PG/ML (ref 0–1800)
PROTEIN UA: NEGATIVE MG/DL
RBC # BLD: 3.72 M/UL (ref 4.2–5.4)
SODIUM BLD-SCNC: 132 MMOL/L (ref 136–145)
SPECIFIC GRAVITY UA: 1.01 (ref 1–1.03)
STREP PNEUMONIAE ANTIGEN, URINE: NORMAL
TOTAL PROTEIN: 6.5 G/DL (ref 6.6–8.7)
TROPONIN: <0.01 NG/ML (ref 0–0.03)
URINE REFLEX TO CULTURE: NORMAL
UROBILINOGEN, URINE: 0.2 E.U./DL
WBC # BLD: 16.5 K/UL (ref 4.8–10.8)

## 2019-06-15 PROCEDURE — 87077 CULTURE AEROBIC IDENTIFY: CPT

## 2019-06-15 PROCEDURE — 6370000000 HC RX 637 (ALT 250 FOR IP): Performed by: NURSE PRACTITIONER

## 2019-06-15 PROCEDURE — 1210000000 HC MED SURG R&B

## 2019-06-15 PROCEDURE — 71046 X-RAY EXAM CHEST 2 VIEWS: CPT

## 2019-06-15 PROCEDURE — 2580000003 HC RX 258: Performed by: NURSE PRACTITIONER

## 2019-06-15 PROCEDURE — 85025 COMPLETE CBC W/AUTO DIFF WBC: CPT

## 2019-06-15 PROCEDURE — 84132 ASSAY OF SERUM POTASSIUM: CPT

## 2019-06-15 PROCEDURE — 99223 1ST HOSP IP/OBS HIGH 75: CPT | Performed by: INTERNAL MEDICINE

## 2019-06-15 PROCEDURE — 36600 WITHDRAWAL OF ARTERIAL BLOOD: CPT

## 2019-06-15 PROCEDURE — 81003 URINALYSIS AUTO W/O SCOPE: CPT

## 2019-06-15 PROCEDURE — 2700000000 HC OXYGEN THERAPY PER DAY

## 2019-06-15 PROCEDURE — 87186 SC STD MICRODIL/AGAR DIL: CPT

## 2019-06-15 PROCEDURE — 71275 CT ANGIOGRAPHY CHEST: CPT

## 2019-06-15 PROCEDURE — 82803 BLOOD GASES ANY COMBINATION: CPT

## 2019-06-15 PROCEDURE — 80053 COMPREHEN METABOLIC PANEL: CPT

## 2019-06-15 PROCEDURE — 87449 NOS EACH ORGANISM AG IA: CPT

## 2019-06-15 PROCEDURE — 93005 ELECTROCARDIOGRAM TRACING: CPT

## 2019-06-15 PROCEDURE — 84484 ASSAY OF TROPONIN QUANT: CPT

## 2019-06-15 PROCEDURE — 99285 EMERGENCY DEPT VISIT HI MDM: CPT | Performed by: NURSE PRACTITIONER

## 2019-06-15 PROCEDURE — 6360000002 HC RX W HCPCS: Performed by: NURSE PRACTITIONER

## 2019-06-15 PROCEDURE — 6360000002 HC RX W HCPCS: Performed by: INTERNAL MEDICINE

## 2019-06-15 PROCEDURE — 6370000000 HC RX 637 (ALT 250 FOR IP): Performed by: INTERNAL MEDICINE

## 2019-06-15 PROCEDURE — 87205 SMEAR GRAM STAIN: CPT

## 2019-06-15 PROCEDURE — 96374 THER/PROPH/DIAG INJ IV PUSH: CPT

## 2019-06-15 PROCEDURE — 83880 ASSAY OF NATRIURETIC PEPTIDE: CPT

## 2019-06-15 PROCEDURE — 2580000003 HC RX 258: Performed by: INTERNAL MEDICINE

## 2019-06-15 PROCEDURE — 83605 ASSAY OF LACTIC ACID: CPT

## 2019-06-15 PROCEDURE — 87070 CULTURE OTHR SPECIMN AEROBIC: CPT

## 2019-06-15 PROCEDURE — 94640 AIRWAY INHALATION TREATMENT: CPT

## 2019-06-15 PROCEDURE — 99285 EMERGENCY DEPT VISIT HI MDM: CPT

## 2019-06-15 PROCEDURE — 6360000004 HC RX CONTRAST MEDICATION: Performed by: NURSE PRACTITIONER

## 2019-06-15 PROCEDURE — 86403 PARTICLE AGGLUT ANTBDY SCRN: CPT

## 2019-06-15 PROCEDURE — 36415 COLL VENOUS BLD VENIPUNCTURE: CPT

## 2019-06-15 PROCEDURE — 87040 BLOOD CULTURE FOR BACTERIA: CPT

## 2019-06-15 RX ORDER — SODIUM CHLORIDE 0.9 % (FLUSH) 0.9 %
10 SYRINGE (ML) INJECTION PRN
Status: DISCONTINUED | OUTPATIENT
Start: 2019-06-15 | End: 2019-06-22 | Stop reason: HOSPADM

## 2019-06-15 RX ORDER — ACETAMINOPHEN 650 MG/1
650 SUPPOSITORY RECTAL EVERY 4 HOURS PRN
Status: DISCONTINUED | OUTPATIENT
Start: 2019-06-15 | End: 2019-06-22 | Stop reason: HOSPADM

## 2019-06-15 RX ORDER — PREGABALIN 150 MG/1
150 CAPSULE ORAL 2 TIMES DAILY
Status: ON HOLD | COMMUNITY
End: 2019-07-05 | Stop reason: HOSPADM

## 2019-06-15 RX ORDER — CEFEPIME HYDROCHLORIDE 2 G/50ML
2 INJECTION, SOLUTION INTRAVENOUS EVERY 12 HOURS
Status: DISCONTINUED | OUTPATIENT
Start: 2019-06-15 | End: 2019-06-15 | Stop reason: SDUPTHER

## 2019-06-15 RX ORDER — POTASSIUM CHLORIDE 7.45 MG/ML
10 INJECTION INTRAVENOUS PRN
Status: DISCONTINUED | OUTPATIENT
Start: 2019-06-15 | End: 2019-06-22 | Stop reason: HOSPADM

## 2019-06-15 RX ORDER — DOXEPIN HYDROCHLORIDE 50 MG/1
100 CAPSULE ORAL NIGHTLY
Status: DISCONTINUED | OUTPATIENT
Start: 2019-06-15 | End: 2019-06-22 | Stop reason: HOSPADM

## 2019-06-15 RX ORDER — LORAZEPAM 1 MG/1
1 TABLET ORAL 3 TIMES DAILY
Status: DISCONTINUED | OUTPATIENT
Start: 2019-06-15 | End: 2019-06-17

## 2019-06-15 RX ORDER — FERROUS SULFATE 325(65) MG
324 TABLET ORAL
Status: DISCONTINUED | OUTPATIENT
Start: 2019-06-16 | End: 2019-06-20

## 2019-06-15 RX ORDER — PREGABALIN 150 MG/1
150 CAPSULE ORAL 2 TIMES DAILY
Status: DISCONTINUED | OUTPATIENT
Start: 2019-06-15 | End: 2019-06-22 | Stop reason: HOSPADM

## 2019-06-15 RX ORDER — AMLODIPINE BESYLATE 5 MG/1
5 TABLET ORAL DAILY
Status: DISCONTINUED | OUTPATIENT
Start: 2019-06-16 | End: 2019-06-19

## 2019-06-15 RX ORDER — SENNA PLUS 8.6 MG/1
2 TABLET ORAL 3 TIMES DAILY
COMMUNITY
End: 2019-09-11

## 2019-06-15 RX ORDER — LOSARTAN POTASSIUM 50 MG/1
50 TABLET ORAL DAILY
Status: DISCONTINUED | OUTPATIENT
Start: 2019-06-16 | End: 2019-06-19

## 2019-06-15 RX ORDER — ALLOPURINOL 100 MG/1
100 TABLET ORAL DAILY
Status: DISCONTINUED | OUTPATIENT
Start: 2019-06-16 | End: 2019-06-22 | Stop reason: HOSPADM

## 2019-06-15 RX ORDER — OXYCODONE AND ACETAMINOPHEN 7.5; 325 MG/1; MG/1
1 TABLET ORAL EVERY 6 HOURS PRN
Status: ON HOLD | COMMUNITY
End: 2019-07-05 | Stop reason: HOSPADM

## 2019-06-15 RX ORDER — CLONIDINE HYDROCHLORIDE 0.1 MG/1
0.1 TABLET ORAL 2 TIMES DAILY
Status: DISCONTINUED | OUTPATIENT
Start: 2019-06-15 | End: 2019-06-17

## 2019-06-15 RX ORDER — LEVOFLOXACIN 5 MG/ML
750 INJECTION, SOLUTION INTRAVENOUS EVERY 24 HOURS
Status: DISCONTINUED | OUTPATIENT
Start: 2019-06-15 | End: 2019-06-15

## 2019-06-15 RX ORDER — ONDANSETRON 2 MG/ML
4 INJECTION INTRAMUSCULAR; INTRAVENOUS EVERY 6 HOURS PRN
Status: DISCONTINUED | OUTPATIENT
Start: 2019-06-15 | End: 2019-06-22 | Stop reason: HOSPADM

## 2019-06-15 RX ORDER — ALBUTEROL SULFATE 2.5 MG/3ML
2.5 SOLUTION RESPIRATORY (INHALATION) EVERY 6 HOURS PRN
Status: DISCONTINUED | OUTPATIENT
Start: 2019-06-15 | End: 2019-06-22 | Stop reason: HOSPADM

## 2019-06-15 RX ORDER — POTASSIUM CHLORIDE 20 MEQ/1
40 TABLET, EXTENDED RELEASE ORAL PRN
Status: DISCONTINUED | OUTPATIENT
Start: 2019-06-15 | End: 2019-06-22 | Stop reason: HOSPADM

## 2019-06-15 RX ORDER — HEPARIN SODIUM 5000 [USP'U]/ML
5000 INJECTION, SOLUTION INTRAVENOUS; SUBCUTANEOUS EVERY 8 HOURS SCHEDULED
Status: DISCONTINUED | OUTPATIENT
Start: 2019-06-15 | End: 2019-06-22 | Stop reason: HOSPADM

## 2019-06-15 RX ORDER — BUPROPION HYDROCHLORIDE 150 MG/1
150 TABLET ORAL EVERY MORNING
Status: DISCONTINUED | OUTPATIENT
Start: 2019-06-16 | End: 2019-06-22 | Stop reason: HOSPADM

## 2019-06-15 RX ORDER — AMLODIPINE AND OLMESARTAN MEDOXOMIL 5; 20 MG/1; MG/1
1 TABLET ORAL DAILY
Status: DISCONTINUED | OUTPATIENT
Start: 2019-06-16 | End: 2019-06-15 | Stop reason: CLARIF

## 2019-06-15 RX ORDER — 0.9 % SODIUM CHLORIDE 0.9 %
500 INTRAVENOUS SOLUTION INTRAVENOUS ONCE
Status: DISCONTINUED | OUTPATIENT
Start: 2019-06-15 | End: 2019-06-22 | Stop reason: HOSPADM

## 2019-06-15 RX ORDER — SENNA PLUS 8.6 MG/1
2 TABLET ORAL 3 TIMES DAILY
Status: DISCONTINUED | OUTPATIENT
Start: 2019-06-15 | End: 2019-06-22 | Stop reason: HOSPADM

## 2019-06-15 RX ORDER — PANTOPRAZOLE SODIUM 40 MG/1
40 TABLET, DELAYED RELEASE ORAL DAILY
Status: DISCONTINUED | OUTPATIENT
Start: 2019-06-16 | End: 2019-06-22 | Stop reason: HOSPADM

## 2019-06-15 RX ORDER — IPRATROPIUM BROMIDE AND ALBUTEROL SULFATE 2.5; .5 MG/3ML; MG/3ML
1 SOLUTION RESPIRATORY (INHALATION) ONCE
Status: COMPLETED | OUTPATIENT
Start: 2019-06-15 | End: 2019-06-15

## 2019-06-15 RX ORDER — SODIUM CHLORIDE 0.9 % (FLUSH) 0.9 %
10 SYRINGE (ML) INJECTION EVERY 12 HOURS SCHEDULED
Status: DISCONTINUED | OUTPATIENT
Start: 2019-06-15 | End: 2019-06-22 | Stop reason: HOSPADM

## 2019-06-15 RX ORDER — LEVOTHYROXINE SODIUM 0.1 MG/1
100 TABLET ORAL DAILY
Status: DISCONTINUED | OUTPATIENT
Start: 2019-06-16 | End: 2019-06-22 | Stop reason: HOSPADM

## 2019-06-15 RX ORDER — ACETAMINOPHEN 325 MG/1
650 TABLET ORAL EVERY 6 HOURS PRN
Status: DISCONTINUED | OUTPATIENT
Start: 2019-06-15 | End: 2019-06-22 | Stop reason: HOSPADM

## 2019-06-15 RX ORDER — POLYETHYLENE GLYCOL 3350 17 G/17G
17 POWDER, FOR SOLUTION ORAL DAILY PRN
Status: ON HOLD | COMMUNITY
End: 2022-01-01 | Stop reason: HOSPADM

## 2019-06-15 RX ORDER — SIMVASTATIN 20 MG
20 TABLET ORAL NIGHTLY
Status: DISCONTINUED | OUTPATIENT
Start: 2019-06-15 | End: 2019-06-22 | Stop reason: HOSPADM

## 2019-06-15 RX ORDER — CARVEDILOL 12.5 MG/1
12.5 TABLET ORAL 2 TIMES DAILY WITH MEALS
Status: DISCONTINUED | OUTPATIENT
Start: 2019-06-15 | End: 2019-06-20

## 2019-06-15 RX ADMIN — IOPAMIDOL 90 ML: 755 INJECTION, SOLUTION INTRAVENOUS at 14:14

## 2019-06-15 RX ADMIN — IPRATROPIUM BROMIDE AND ALBUTEROL SULFATE 1 AMPULE: .5; 3 SOLUTION RESPIRATORY (INHALATION) at 13:42

## 2019-06-15 RX ADMIN — Medication 2 G: at 16:11

## 2019-06-15 RX ADMIN — ACETAMINOPHEN 650 MG: 650 SUPPOSITORY RECTAL at 21:50

## 2019-06-15 RX ADMIN — Medication 500 MG: at 16:11

## 2019-06-15 RX ADMIN — HEPARIN SODIUM 5000 UNITS: 5000 INJECTION INTRAVENOUS; SUBCUTANEOUS at 21:50

## 2019-06-15 RX ADMIN — Medication 1000 MG: at 19:59

## 2019-06-15 ASSESSMENT — ENCOUNTER SYMPTOMS
COUGH: 1
SHORTNESS OF BREATH: 1

## 2019-06-15 ASSESSMENT — PAIN SCALES - GENERAL
PAINLEVEL_OUTOF10: 3
PAINLEVEL_OUTOF10: 8
PAINLEVEL_OUTOF10: 2

## 2019-06-15 NOTE — H&P
/ IMPRESSION & PLAN:          Principal Problem:    Acute respiratory failure with hypoxia (HCC)  Active Problems:    Hypertension    Hyperlipidemia    Hypothyroid    HCAP (healthcare-associated pneumonia)  Resolved Problems:    * No resolved hospital problems.  *            Acute hypoxic respiratory failure, secondary to Healthcare Associated Pneumonia  - Presented to the shortness of breath and productive cough   -Hypoxic, with SPO2 of 88% on room air  -Hypoxemic with PaO2 of 49 mmHg on initial ABG  -  Afebrile  -  Leukocytosis (WBC: 16.5)  Lactic acid, proBNP, and initial troponin all within lab reference range  - Initial Chest x-ray reporting bilateral hazy opacities  -CTA of chest reporting findings suggestive of left lower lobe pneumonia, with stable fibrotic changes in left lung apex    - Blood culture   - Sputum culture    -Empiric antibiotic: Vancomycin (to cover likely MRSA), cefepime (cover other gram-negative, including Pseudomonas), azithromycin, to cover atypicals    - Urine legionella antigen  - Urine strep antigen  - Consider discontinuing atypical coverage (Azithromycin), pending Strep and Legionella Urine Antigen  - Oxygen supplementation to keep SPO2 > 92%  - Bronchodilators as needed  - Dextromethorphan/Guaifenesin for symptomatic relief  - Acetaminophen 650 mg PO Q6H/PRN for fever  - Continue to monitor    Sepsis   SIRS (3/4) Positive) on presentation:   --> Afebrile  --> Leukocytosis: WBC of 16.5 K/uL,    --> Tachycardia :HR of up to 117  --> Tachypneic: RR of up to 24, a    - Initial Lactic acid level of 0.8 mg/dL  - Source of infection: HCAP  - Blood culture X2   - UA unremarkable  - Initial Chest x-ray reporting bilateral hazy opacities  -CTA of chest reporting findings suggestive of left lower lobe pneumonia, with stable fibrotic changes in left lung apex  - Continue empiric antibiotics vancomycin, cefepime, and azithromycin          Continue management of other chronic medical conditions -

## 2019-06-15 NOTE — ED PROVIDER NOTES
mouth 2 times daily as needed. LEVOTHYROXINE (SYNTHROID) 100 MCG TABLET    Take 100 mcg by mouth Daily    LORAZEPAM (ATIVAN) 1 MG TABLET    Take 1 mg by mouth 3 times daily. MOMETASONE-FORMOTEROL (DULERA) 100-5 MCG/ACT INHALER    Inhale 2 puffs into the lungs 2 times daily     OMEGA-3 FATTY ACIDS (GNP FISH OIL PO)    Take 520 mg by mouth nightly    OXYBUTYNIN (DITROPAN-XL) 10 MG EXTENDED RELEASE TABLET    Take 1 tablet by mouth daily    OXYCODONE-ACETAMINOPHEN (PERCOCET) 7.5-325 MG PER TABLET    Take 1 tablet by mouth every 6 hours as needed for Pain. PANTOPRAZOLE (PROTONIX) 40 MG TABLET    Take 40 mg by mouth daily    POLYETHYLENE GLYCOL (GLYCOLAX) POWDER    Take 17 g by mouth daily    PREGABALIN (LYRICA) 150 MG CAPSULE    Take 150 mg by mouth 2 times daily.     SENNA (SENOKOT) 8.6 MG TABLET    Take 2 tablets by mouth 3 times daily    SIMVASTATIN (ZOCOR) 20 MG TABLET    Take 20 mg by mouth nightly       ALLERGIES     Penicillins and Sulfa antibiotics    FAMILY HISTORY       Family History   Problem Relation Age of Onset    High Blood Pressure Mother     Stroke Mother     Dementia Mother         late onset   Kaplan Depression Mother     Anxiety Disorder Mother     Kidney Disease Mother     Osteoporosis Mother     Substance Abuse Father         Alcohol    Kidney Disease Father         dialysis    Diabetes Maternal Aunt     Kidney Disease Maternal Aunt     Cancer Maternal Grandmother         Colon Cancer    Osteoporosis Maternal Grandmother     Anxiety Disorder Maternal Grandmother     Depression Maternal Grandmother           SOCIAL HISTORY       Social History     Socioeconomic History    Marital status:      Spouse name: None    Number of children: None    Years of education: None    Highest education level: None   Occupational History    None   Social Needs    Financial resource strain: None    Food insecurity:     Worry: None     Inability: None    Transportation needs: Medical: None     Non-medical: None   Tobacco Use    Smoking status: Passive Smoke Exposure - Never Smoker    Smokeless tobacco: Never Used   Substance and Sexual Activity    Alcohol use: No    Drug use: No    Sexual activity: None   Lifestyle    Physical activity:     Days per week: None     Minutes per session: None    Stress: None   Relationships    Social connections:     Talks on phone: None     Gets together: None     Attends Congregation service: None     Active member of club or organization: None     Attends meetings of clubs or organizations: None     Relationship status: None    Intimate partner violence:     Fear of current or ex partner: None     Emotionally abused: None     Physically abused: None     Forced sexual activity: None   Other Topics Concern    None   Social History Narrative    None       SCREENINGS             PHYSICAL EXAM    (up to 7 for level 4, 8 or more for level 5)     ED Triage Vitals   BP Temp Temp src Pulse Resp SpO2 Height Weight   -- -- -- -- -- -- -- --     Vitals:    06/15/19 1302 06/15/19 1332 06/15/19 1431 06/15/19 1532   BP: 128/64 106/77 (!) 164/127 (!) 170/103   Pulse:    103   Resp:    20   Temp:    98.3 °F (36.8 °C)   TempSrc:    Oral   SpO2: 92% (!) 88% 94% 92%   Weight:       Height:             Physical Exam   Constitutional: She is oriented to person, place, and time. She appears well-nourished. 66 yr old female appears in no respiratory distress   HENT:   Head: Normocephalic. Right Ear: External ear normal.   Left Ear: External ear normal.   Mouth/Throat: Oropharynx is clear and moist.   Eyes: Pupils are equal, round, and reactive to light. Conjunctivae and EOM are normal.   Neck: Normal range of motion. Cardiovascular: Normal rate, regular rhythm, normal heart sounds and intact distal pulses. Pulmonary/Chest: Effort normal. No respiratory distress. Coarse rhonchi throughout    Abdominal: Soft. Bowel sounds are normal. There is no tenderness.

## 2019-06-16 LAB
ANION GAP SERPL CALCULATED.3IONS-SCNC: 14 MMOL/L (ref 7–19)
BACTERIA SPEC AEROBE CULT: ABNORMAL
BACTERIA SPEC AEROBE CULT: ABNORMAL
BANDED NEUTROPHILS RELATIVE PERCENT: 7 % (ref 0–5)
BASOPHILS ABSOLUTE: 0 K/UL (ref 0–0.2)
BASOPHILS RELATIVE PERCENT: 0 % (ref 0–1)
BUN BLDV-MCNC: 29 MG/DL (ref 8–23)
CALCIUM SERPL-MCNC: 9.2 MG/DL (ref 8.8–10.2)
CHLORIDE BLD-SCNC: 98 MMOL/L (ref 98–111)
CO2: 22 MMOL/L (ref 22–29)
CREAT SERPL-MCNC: 1.2 MG/DL (ref 0.5–0.9)
EOSINOPHILS ABSOLUTE: 0 K/UL (ref 0–0.6)
EOSINOPHILS RELATIVE PERCENT: 0 % (ref 0–5)
GFR NON-AFRICAN AMERICAN: 44
GLUCOSE BLD-MCNC: 143 MG/DL (ref 74–109)
HCT VFR BLD CALC: 34.1 % (ref 37–47)
HEMOGLOBIN: 11.2 G/DL (ref 12–16)
LYMPHOCYTES ABSOLUTE: 0.9 K/UL (ref 1.1–4.5)
LYMPHOCYTES RELATIVE PERCENT: 4 % (ref 20–40)
MAGNESIUM: 1.7 MG/DL (ref 1.6–2.4)
MCH RBC QN AUTO: 30.6 PG (ref 27–31)
MCHC RBC AUTO-ENTMCNC: 32.8 G/DL (ref 33–37)
MCV RBC AUTO: 93.2 FL (ref 81–99)
MONOCYTES ABSOLUTE: 1.4 K/UL (ref 0–0.9)
MONOCYTES RELATIVE PERCENT: 6 % (ref 0–10)
NEUTROPHILS ABSOLUTE: 20.9 K/UL (ref 1.5–7.5)
NEUTROPHILS RELATIVE PERCENT: 83 % (ref 50–65)
PDW BLD-RTO: 13.6 % (ref 11.5–14.5)
PLATELET # BLD: 197 K/UL (ref 130–400)
PLATELET SLIDE REVIEW: ADEQUATE
PMV BLD AUTO: 10.2 FL (ref 9.4–12.3)
POTASSIUM REFLEX MAGNESIUM: 4 MMOL/L (ref 3.5–5)
RBC # BLD: 3.66 M/UL (ref 4.2–5.4)
RBC # BLD: NORMAL 10*6/UL
SODIUM BLD-SCNC: 134 MMOL/L (ref 136–145)
WBC # BLD: 23.2 K/UL (ref 4.8–10.8)

## 2019-06-16 PROCEDURE — 1210000000 HC MED SURG R&B

## 2019-06-16 PROCEDURE — 6360000002 HC RX W HCPCS: Performed by: INTERNAL MEDICINE

## 2019-06-16 PROCEDURE — 2700000000 HC OXYGEN THERAPY PER DAY

## 2019-06-16 PROCEDURE — 99232 SBSQ HOSP IP/OBS MODERATE 35: CPT | Performed by: FAMILY MEDICINE

## 2019-06-16 PROCEDURE — 6370000000 HC RX 637 (ALT 250 FOR IP): Performed by: FAMILY MEDICINE

## 2019-06-16 PROCEDURE — 6370000000 HC RX 637 (ALT 250 FOR IP): Performed by: INTERNAL MEDICINE

## 2019-06-16 PROCEDURE — 51701 INSERT BLADDER CATHETER: CPT

## 2019-06-16 PROCEDURE — 36415 COLL VENOUS BLD VENIPUNCTURE: CPT

## 2019-06-16 PROCEDURE — 2580000003 HC RX 258: Performed by: INTERNAL MEDICINE

## 2019-06-16 PROCEDURE — 83735 ASSAY OF MAGNESIUM: CPT

## 2019-06-16 PROCEDURE — 51798 US URINE CAPACITY MEASURE: CPT

## 2019-06-16 PROCEDURE — 80048 BASIC METABOLIC PNL TOTAL CA: CPT

## 2019-06-16 PROCEDURE — 85025 COMPLETE CBC W/AUTO DIFF WBC: CPT

## 2019-06-16 PROCEDURE — 89220 SPUTUM SPECIMEN COLLECTION: CPT

## 2019-06-16 RX ADMIN — Medication 10 ML: at 09:09

## 2019-06-16 RX ADMIN — ALLOPURINOL 100 MG: 100 TABLET ORAL at 09:10

## 2019-06-16 RX ADMIN — LORAZEPAM 1 MG: 1 TABLET ORAL at 12:59

## 2019-06-16 RX ADMIN — PANTOPRAZOLE SODIUM 40 MG: 40 TABLET, DELAYED RELEASE ORAL at 09:09

## 2019-06-16 RX ADMIN — Medication 1000 MG: at 22:26

## 2019-06-16 RX ADMIN — LORAZEPAM 1 MG: 1 TABLET ORAL at 09:10

## 2019-06-16 RX ADMIN — PREGABALIN 150 MG: 150 CAPSULE ORAL at 19:47

## 2019-06-16 RX ADMIN — CARVEDILOL 12.5 MG: 12.5 TABLET, FILM COATED ORAL at 09:10

## 2019-06-16 RX ADMIN — HEPARIN SODIUM 5000 UNITS: 5000 INJECTION INTRAVENOUS; SUBCUTANEOUS at 06:33

## 2019-06-16 RX ADMIN — STANDARDIZED SENNA CONCENTRATE 17.2 MG: 8.6 TABLET ORAL at 19:47

## 2019-06-16 RX ADMIN — AMLODIPINE BESYLATE 5 MG: 5 TABLET ORAL at 09:10

## 2019-06-16 RX ADMIN — FERROUS SULFATE TAB 325 MG (65 MG ELEMENTAL FE) 324 MG: 325 (65 FE) TAB at 09:10

## 2019-06-16 RX ADMIN — LOSARTAN POTASSIUM 50 MG: 50 TABLET ORAL at 09:10

## 2019-06-16 RX ADMIN — LORAZEPAM 1 MG: 1 TABLET ORAL at 19:47

## 2019-06-16 RX ADMIN — CARVEDILOL 12.5 MG: 12.5 TABLET, FILM COATED ORAL at 16:13

## 2019-06-16 RX ADMIN — PREGABALIN 150 MG: 150 CAPSULE ORAL at 09:09

## 2019-06-16 RX ADMIN — HEPARIN SODIUM 5000 UNITS: 5000 INJECTION INTRAVENOUS; SUBCUTANEOUS at 22:26

## 2019-06-16 RX ADMIN — STANDARDIZED SENNA CONCENTRATE 17.2 MG: 8.6 TABLET ORAL at 09:09

## 2019-06-16 RX ADMIN — Medication 10 ML: at 19:46

## 2019-06-16 RX ADMIN — DOXEPIN HYDROCHLORIDE 100 MG: 50 CAPSULE ORAL at 19:47

## 2019-06-16 RX ADMIN — LEVOTHYROXINE SODIUM 100 MCG: 0.1 TABLET ORAL at 06:33

## 2019-06-16 RX ADMIN — STANDARDIZED SENNA CONCENTRATE 17.2 MG: 8.6 TABLET ORAL at 12:59

## 2019-06-16 RX ADMIN — CLONIDINE HYDROCHLORIDE 0.1 MG: 0.1 TABLET ORAL at 19:47

## 2019-06-16 RX ADMIN — Medication 2 G: at 05:28

## 2019-06-16 RX ADMIN — CLONIDINE HYDROCHLORIDE 0.1 MG: 0.1 TABLET ORAL at 09:11

## 2019-06-16 RX ADMIN — SIMVASTATIN 20 MG: 20 TABLET, FILM COATED ORAL at 19:46

## 2019-06-16 RX ADMIN — Medication 5 ML: at 16:15

## 2019-06-16 RX ADMIN — AZITHROMYCIN MONOHYDRATE 500 MG: 500 INJECTION, POWDER, LYOPHILIZED, FOR SOLUTION INTRAVENOUS at 16:13

## 2019-06-16 RX ADMIN — HEPARIN SODIUM 5000 UNITS: 5000 INJECTION INTRAVENOUS; SUBCUTANEOUS at 12:58

## 2019-06-16 RX ADMIN — Medication 2 G: at 15:44

## 2019-06-16 RX ADMIN — ACETAMINOPHEN 650 MG: 325 TABLET ORAL at 16:19

## 2019-06-16 RX ADMIN — BUPROPION HYDROCHLORIDE 150 MG: 150 TABLET, FILM COATED, EXTENDED RELEASE ORAL at 09:10

## 2019-06-16 ASSESSMENT — PAIN SCALES - GENERAL
PAINLEVEL_OUTOF10: 8
PAINLEVEL_OUTOF10: 0

## 2019-06-16 NOTE — PROGRESS NOTES
 sodium chloride  500 mL Intravenous Once    vancomycin (VANCOCIN) intermittent dosing (placeholder)   Other RX Placeholder    vancomycin  1,000 mg Intravenous Q24H    cefepime  2 g Intravenous Q12H    azithromycin  500 mg Intravenous Q24H    allopurinol  100 mg Oral Daily    carvedilol  12.5 mg Oral BID WC    cloNIDine  0.1 mg Oral BID    doxepin  100 mg Oral Nightly    ferrous sulfate  324 mg Oral Daily with breakfast    levothyroxine  100 mcg Oral Daily    LORazepam  1 mg Oral TID    pantoprazole  40 mg Oral Daily    pregabalin  150 mg Oral BID    simvastatin  20 mg Oral Nightly    senna  2 tablet Oral TID    sodium chloride flush  10 mL Intravenous 2 times per day    heparin (porcine)  5,000 Units Subcutaneous 3 times per day    buPROPion  150 mg Oral QAM    amLODIPine  5 mg Oral Daily    And    losartan  50 mg Oral Daily     albuterol, sodium chloride flush, potassium chloride **OR** potassium alternative oral replacement **OR** potassium chloride, magnesium hydroxide, ondansetron, acetaminophen, acetaminophen  DIET CARDIAC;     DVT Prophylaxis: Heparin    Lab and other Data:     Recent Labs     06/15/19  1250 06/16/19  0331   WBC 16.5* 23.2*   HGB 11.3* 11.2*    197     Recent Labs     06/15/19  1250 06/15/19  1340 06/16/19  0331   *  --  134*   K 4.2 4.0 4.0   CL 98  --  98   CO2 21*  --  22   BUN 43*  --  29*   CREATININE 1.5*  --  1.2*   GLUCOSE 114*  --  143*     Recent Labs     06/15/19  1250   AST 32   ALT 28   BILITOT 0.5   ALKPHOS 128*     Troponin T:   Recent Labs     06/15/19  1250   TROPONINI <0.01     Pro-BNP: No results for input(s): BNP in the last 72 hours. INR: No results for input(s): INR in the last 72 hours.   ABGs:   Lab Results   Component Value Date    PHART 7.390 06/15/2019    PO2ART 49.0 06/15/2019    CNB2BFW 37.0 06/15/2019     UA:  Recent Labs     06/15/19  1323   COLORU YELLOW   PHUR 5.5   CLARITYU Clear   SPECGRAV 1.015   LEUKOCYTESUR Negative orders. 2. Continue Vancomycin/Cefepime  3. Check for Influenza  4. Home medications reviewed  5. Prognosis guarded  6. Disposition continue care  7.  Discharge disposition > SNF      Durga Barajas MD  Hospitalist Service  6/16/2019  10:35 AM

## 2019-06-17 LAB
ANION GAP SERPL CALCULATED.3IONS-SCNC: 14 MMOL/L (ref 7–19)
BASOPHILS ABSOLUTE: 0.1 K/UL (ref 0–0.2)
BASOPHILS RELATIVE PERCENT: 0.3 % (ref 0–1)
BUN BLDV-MCNC: 28 MG/DL (ref 8–23)
CALCIUM SERPL-MCNC: 8.7 MG/DL (ref 8.8–10.2)
CHLORIDE BLD-SCNC: 100 MMOL/L (ref 98–111)
CO2: 21 MMOL/L (ref 22–29)
CREAT SERPL-MCNC: 1.3 MG/DL (ref 0.5–0.9)
EKG P AXIS: 58 DEGREES
EKG P-R INTERVAL: 176 MS
EKG Q-T INTERVAL: 302 MS
EKG QRS DURATION: 94 MS
EKG QTC CALCULATION (BAZETT): 400 MS
EKG T AXIS: 88 DEGREES
EOSINOPHILS ABSOLUTE: 0.4 K/UL (ref 0–0.6)
EOSINOPHILS RELATIVE PERCENT: 2.2 % (ref 0–5)
GFR NON-AFRICAN AMERICAN: 40
GLUCOSE BLD-MCNC: 103 MG/DL (ref 74–109)
HCT VFR BLD CALC: 31.6 % (ref 37–47)
HEMOGLOBIN: 10.4 G/DL (ref 12–16)
L. PNEUMOPHILA SEROGP 1 UR AG: NEGATIVE
LYMPHOCYTES ABSOLUTE: 1.2 K/UL (ref 1.1–4.5)
LYMPHOCYTES RELATIVE PERCENT: 7.7 % (ref 20–40)
MCH RBC QN AUTO: 30.5 PG (ref 27–31)
MCHC RBC AUTO-ENTMCNC: 32.9 G/DL (ref 33–37)
MCV RBC AUTO: 92.7 FL (ref 81–99)
MONOCYTES ABSOLUTE: 1.6 K/UL (ref 0–0.9)
MONOCYTES RELATIVE PERCENT: 9.6 % (ref 0–10)
NEUTROPHILS ABSOLUTE: 12.8 K/UL (ref 1.5–7.5)
NEUTROPHILS RELATIVE PERCENT: 79.5 % (ref 50–65)
PDW BLD-RTO: 13.6 % (ref 11.5–14.5)
PLATELET # BLD: 181 K/UL (ref 130–400)
PMV BLD AUTO: 10.4 FL (ref 9.4–12.3)
POTASSIUM REFLEX MAGNESIUM: 4.1 MMOL/L (ref 3.5–5)
RAPID INFLUENZA  B AGN: NEGATIVE
RAPID INFLUENZA A AGN: NEGATIVE
RBC # BLD: 3.41 M/UL (ref 4.2–5.4)
SODIUM BLD-SCNC: 135 MMOL/L (ref 136–145)
WBC # BLD: 16.1 K/UL (ref 4.8–10.8)

## 2019-06-17 PROCEDURE — 99233 SBSQ HOSP IP/OBS HIGH 50: CPT | Performed by: INTERNAL MEDICINE

## 2019-06-17 PROCEDURE — 1210000000 HC MED SURG R&B

## 2019-06-17 PROCEDURE — 2580000003 HC RX 258: Performed by: INTERNAL MEDICINE

## 2019-06-17 PROCEDURE — 36415 COLL VENOUS BLD VENIPUNCTURE: CPT

## 2019-06-17 PROCEDURE — 85025 COMPLETE CBC W/AUTO DIFF WBC: CPT

## 2019-06-17 PROCEDURE — 87804 INFLUENZA ASSAY W/OPTIC: CPT

## 2019-06-17 PROCEDURE — 6360000002 HC RX W HCPCS: Performed by: INTERNAL MEDICINE

## 2019-06-17 PROCEDURE — 6370000000 HC RX 637 (ALT 250 FOR IP): Performed by: INTERNAL MEDICINE

## 2019-06-17 PROCEDURE — 80048 BASIC METABOLIC PNL TOTAL CA: CPT

## 2019-06-17 PROCEDURE — 94640 AIRWAY INHALATION TREATMENT: CPT

## 2019-06-17 PROCEDURE — 6370000000 HC RX 637 (ALT 250 FOR IP): Performed by: HOSPITALIST

## 2019-06-17 PROCEDURE — 2700000000 HC OXYGEN THERAPY PER DAY

## 2019-06-17 RX ORDER — LORAZEPAM 0.5 MG/1
0.5 TABLET ORAL 3 TIMES DAILY
Status: DISCONTINUED | OUTPATIENT
Start: 2019-06-17 | End: 2019-06-22 | Stop reason: HOSPADM

## 2019-06-17 RX ORDER — GUAIFENESIN 600 MG/1
600 TABLET, EXTENDED RELEASE ORAL 2 TIMES DAILY
Status: DISCONTINUED | OUTPATIENT
Start: 2019-06-17 | End: 2019-06-22 | Stop reason: HOSPADM

## 2019-06-17 RX ADMIN — IPRATROPIUM BROMIDE 0.5 MG: 0.5 SOLUTION RESPIRATORY (INHALATION) at 19:19

## 2019-06-17 RX ADMIN — HEPARIN SODIUM 5000 UNITS: 5000 INJECTION INTRAVENOUS; SUBCUTANEOUS at 15:52

## 2019-06-17 RX ADMIN — STANDARDIZED SENNA CONCENTRATE 17.2 MG: 8.6 TABLET ORAL at 09:13

## 2019-06-17 RX ADMIN — ALLOPURINOL 100 MG: 100 TABLET ORAL at 09:13

## 2019-06-17 RX ADMIN — Medication 2 G: at 15:51

## 2019-06-17 RX ADMIN — CARVEDILOL 12.5 MG: 12.5 TABLET, FILM COATED ORAL at 09:13

## 2019-06-17 RX ADMIN — LOSARTAN POTASSIUM 50 MG: 50 TABLET ORAL at 09:13

## 2019-06-17 RX ADMIN — PANTOPRAZOLE SODIUM 40 MG: 40 TABLET, DELAYED RELEASE ORAL at 09:13

## 2019-06-17 RX ADMIN — LORAZEPAM 1 MG: 1 TABLET ORAL at 15:52

## 2019-06-17 RX ADMIN — FERROUS SULFATE TAB 325 MG (65 MG ELEMENTAL FE) 324 MG: 325 (65 FE) TAB at 09:13

## 2019-06-17 RX ADMIN — HEPARIN SODIUM 5000 UNITS: 5000 INJECTION INTRAVENOUS; SUBCUTANEOUS at 21:03

## 2019-06-17 RX ADMIN — IPRATROPIUM BROMIDE 0.5 MG: 0.5 SOLUTION RESPIRATORY (INHALATION) at 14:48

## 2019-06-17 RX ADMIN — GUAIFENESIN 600 MG: 600 TABLET, EXTENDED RELEASE ORAL at 20:56

## 2019-06-17 RX ADMIN — CLONIDINE HYDROCHLORIDE 0.1 MG: 0.1 TABLET ORAL at 09:14

## 2019-06-17 RX ADMIN — LORAZEPAM 1 MG: 1 TABLET ORAL at 09:13

## 2019-06-17 RX ADMIN — BUPROPION HYDROCHLORIDE 150 MG: 150 TABLET, FILM COATED, EXTENDED RELEASE ORAL at 09:13

## 2019-06-17 RX ADMIN — AZITHROMYCIN MONOHYDRATE 500 MG: 500 INJECTION, POWDER, LYOPHILIZED, FOR SOLUTION INTRAVENOUS at 15:51

## 2019-06-17 RX ADMIN — Medication 10 ML: at 09:15

## 2019-06-17 RX ADMIN — STANDARDIZED SENNA CONCENTRATE 17.2 MG: 8.6 TABLET ORAL at 20:56

## 2019-06-17 RX ADMIN — STANDARDIZED SENNA CONCENTRATE 17.2 MG: 8.6 TABLET ORAL at 15:52

## 2019-06-17 RX ADMIN — PREGABALIN 150 MG: 150 CAPSULE ORAL at 09:13

## 2019-06-17 RX ADMIN — DOXEPIN HYDROCHLORIDE 100 MG: 50 CAPSULE ORAL at 20:56

## 2019-06-17 RX ADMIN — PREGABALIN 150 MG: 150 CAPSULE ORAL at 20:57

## 2019-06-17 RX ADMIN — HEPARIN SODIUM 5000 UNITS: 5000 INJECTION INTRAVENOUS; SUBCUTANEOUS at 05:40

## 2019-06-17 RX ADMIN — SIMVASTATIN 20 MG: 20 TABLET, FILM COATED ORAL at 20:57

## 2019-06-17 RX ADMIN — Medication 10 ML: at 20:57

## 2019-06-17 RX ADMIN — AMLODIPINE BESYLATE 5 MG: 5 TABLET ORAL at 09:13

## 2019-06-17 RX ADMIN — LEVOTHYROXINE SODIUM 100 MCG: 0.1 TABLET ORAL at 05:41

## 2019-06-17 RX ADMIN — GUAIFENESIN 600 MG: 600 TABLET, EXTENDED RELEASE ORAL at 12:33

## 2019-06-17 RX ADMIN — CARVEDILOL 12.5 MG: 12.5 TABLET, FILM COATED ORAL at 17:50

## 2019-06-17 RX ADMIN — LORAZEPAM 0.5 MG: 0.5 TABLET ORAL at 20:57

## 2019-06-17 RX ADMIN — Medication 1000 MG: at 17:50

## 2019-06-17 RX ADMIN — IPRATROPIUM BROMIDE 0.5 MG: 0.5 SOLUTION RESPIRATORY (INHALATION) at 11:21

## 2019-06-17 RX ADMIN — Medication 2 G: at 05:40

## 2019-06-17 ASSESSMENT — ENCOUNTER SYMPTOMS
COUGH: 1
CONSTIPATION: 0
VOMITING: 0
SHORTNESS OF BREATH: 1
DIARRHEA: 0
BACK PAIN: 0
NAUSEA: 0

## 2019-06-17 NOTE — PROGRESS NOTES
technique and low depth of inspiration. There is no focal consolidation. Signed by Dr Marilynn Jacinto on 6/15/2019 1:31 PM    Cta Pulmonary W Contrast    Result Date: 6/15/2019  CTA PULMONARY W CONTRAST 6/15/2019 1:15 PM HISTORY: Shortness of air and hypoxia COMPARISON: 2/6/2009. DLP: 667 mGy cm. In order to have a CT radiation dose as low as reasonably achievable, Automated Exposure Control was utilized for adjustment of the mA and/or KV according to patient size. TECHNIQUE: Helical tomographic images of the chest were obtained after the administration of intravenous contrast following angiogram protocol. Additionally, 3D MIP reconstructions in the coronal and sagittal planes were provided. FINDINGS:  Pulmonary arteries: Evaluation of the lower lobar pulmonary arteries is limited due to timing of the contrast bolus. The majority of the contrast is in the arterial system and SVC. Within limits, no central filling defects are identified. The pulmonary vessels are within normal limits for size. Aorta and great vessels: The aorta is well opacified and demonstrates no aneurysm, stenosis or dissection. The great vessels are normal in appearance. Coronary artery calcifications are visualized. Neck base: The imaged portion of the base of the neck appears unremarkable. Lungs: There is consolidation in the left lower lobe. Lung volumes are decreased. Evaluation of the pulmonary parenchyma is limited due to respiratory motion. Fibrosis is again noted in the left lung apex. Dependent changes are seen in the right lung. The trachea and bronchial tree are patent. Heart: The heart is normal in size. There is no pericardial effusion. Mediastinum and lymph nodes: No pathologically enlarged mediastinal, hilar, or axillary lymph nodes are present. Bones and soft tissues: The osseous structures of the thorax and surrounding soft tissues demonstrate no acute process. Upper abdomen:  The imaged portion of the upper abdomen demonstrates

## 2019-06-18 PROBLEM — Z51.5 PALLIATIVE CARE PATIENT: Status: ACTIVE | Noted: 2019-06-18

## 2019-06-18 LAB
ANION GAP SERPL CALCULATED.3IONS-SCNC: 15 MMOL/L (ref 7–19)
BASOPHILS ABSOLUTE: 0 K/UL (ref 0–0.2)
BASOPHILS RELATIVE PERCENT: 0.3 % (ref 0–1)
BUN BLDV-MCNC: 25 MG/DL (ref 8–23)
CALCIUM SERPL-MCNC: 8.8 MG/DL (ref 8.8–10.2)
CHLORIDE BLD-SCNC: 96 MMOL/L (ref 98–111)
CO2: 20 MMOL/L (ref 22–29)
CREAT SERPL-MCNC: 1 MG/DL (ref 0.5–0.9)
CULTURE, RESPIRATORY: ABNORMAL
EOSINOPHILS ABSOLUTE: 0.4 K/UL (ref 0–0.6)
EOSINOPHILS RELATIVE PERCENT: 2.8 % (ref 0–5)
GFR NON-AFRICAN AMERICAN: 54
GLUCOSE BLD-MCNC: 110 MG/DL (ref 74–109)
GRAM STAIN RESULT: ABNORMAL
HCT VFR BLD CALC: 31.7 % (ref 37–47)
HEMOGLOBIN: 10.6 G/DL (ref 12–16)
LYMPHOCYTES ABSOLUTE: 1.2 K/UL (ref 1.1–4.5)
LYMPHOCYTES RELATIVE PERCENT: 9.3 % (ref 20–40)
MCH RBC QN AUTO: 31.2 PG (ref 27–31)
MCHC RBC AUTO-ENTMCNC: 33.4 G/DL (ref 33–37)
MCV RBC AUTO: 93.2 FL (ref 81–99)
MONOCYTES ABSOLUTE: 1.7 K/UL (ref 0–0.9)
MONOCYTES RELATIVE PERCENT: 12.7 % (ref 0–10)
NEUTROPHILS ABSOLUTE: 9.7 K/UL (ref 1.5–7.5)
NEUTROPHILS RELATIVE PERCENT: 74.1 % (ref 50–65)
ORGANISM: ABNORMAL
ORGANISM: ABNORMAL
PDW BLD-RTO: 13.5 % (ref 11.5–14.5)
PLATELET # BLD: 171 K/UL (ref 130–400)
PMV BLD AUTO: 10.4 FL (ref 9.4–12.3)
POTASSIUM REFLEX MAGNESIUM: 4.3 MMOL/L (ref 3.5–5)
RBC # BLD: 3.4 M/UL (ref 4.2–5.4)
SODIUM BLD-SCNC: 131 MMOL/L (ref 136–145)
VANCOMYCIN TROUGH: 15.5 UG/ML (ref 10–20)
WBC # BLD: 13 K/UL (ref 4.8–10.8)

## 2019-06-18 PROCEDURE — 6360000002 HC RX W HCPCS: Performed by: INTERNAL MEDICINE

## 2019-06-18 PROCEDURE — 6370000000 HC RX 637 (ALT 250 FOR IP): Performed by: INTERNAL MEDICINE

## 2019-06-18 PROCEDURE — 94640 AIRWAY INHALATION TREATMENT: CPT

## 2019-06-18 PROCEDURE — 6370000000 HC RX 637 (ALT 250 FOR IP): Performed by: HOSPITALIST

## 2019-06-18 PROCEDURE — 2580000003 HC RX 258: Performed by: INTERNAL MEDICINE

## 2019-06-18 PROCEDURE — 36415 COLL VENOUS BLD VENIPUNCTURE: CPT

## 2019-06-18 PROCEDURE — 80202 ASSAY OF VANCOMYCIN: CPT

## 2019-06-18 PROCEDURE — 1210000000 HC MED SURG R&B

## 2019-06-18 PROCEDURE — 2700000000 HC OXYGEN THERAPY PER DAY

## 2019-06-18 PROCEDURE — 85025 COMPLETE CBC W/AUTO DIFF WBC: CPT

## 2019-06-18 PROCEDURE — 99233 SBSQ HOSP IP/OBS HIGH 50: CPT | Performed by: INTERNAL MEDICINE

## 2019-06-18 PROCEDURE — 99221 1ST HOSP IP/OBS SF/LOW 40: CPT | Performed by: NURSE PRACTITIONER

## 2019-06-18 PROCEDURE — 80048 BASIC METABOLIC PNL TOTAL CA: CPT

## 2019-06-18 RX ADMIN — IPRATROPIUM BROMIDE 0.5 MG: 0.5 SOLUTION RESPIRATORY (INHALATION) at 14:51

## 2019-06-18 RX ADMIN — IPRATROPIUM BROMIDE 0.5 MG: 0.5 SOLUTION RESPIRATORY (INHALATION) at 11:07

## 2019-06-18 RX ADMIN — Medication 2 G: at 05:00

## 2019-06-18 RX ADMIN — DOXEPIN HYDROCHLORIDE 100 MG: 50 CAPSULE ORAL at 20:05

## 2019-06-18 RX ADMIN — BUPROPION HYDROCHLORIDE 150 MG: 150 TABLET, FILM COATED, EXTENDED RELEASE ORAL at 08:23

## 2019-06-18 RX ADMIN — LORAZEPAM 0.5 MG: 0.5 TABLET ORAL at 08:24

## 2019-06-18 RX ADMIN — GUAIFENESIN 600 MG: 600 TABLET, EXTENDED RELEASE ORAL at 08:24

## 2019-06-18 RX ADMIN — GUAIFENESIN 600 MG: 600 TABLET, EXTENDED RELEASE ORAL at 20:05

## 2019-06-18 RX ADMIN — LORAZEPAM 0.5 MG: 0.5 TABLET ORAL at 14:18

## 2019-06-18 RX ADMIN — CARVEDILOL 12.5 MG: 12.5 TABLET, FILM COATED ORAL at 16:20

## 2019-06-18 RX ADMIN — Medication 10 ML: at 08:24

## 2019-06-18 RX ADMIN — PREGABALIN 150 MG: 150 CAPSULE ORAL at 08:23

## 2019-06-18 RX ADMIN — LORAZEPAM 0.5 MG: 0.5 TABLET ORAL at 20:05

## 2019-06-18 RX ADMIN — IPRATROPIUM BROMIDE 0.5 MG: 0.5 SOLUTION RESPIRATORY (INHALATION) at 07:18

## 2019-06-18 RX ADMIN — Medication 2 G: at 16:14

## 2019-06-18 RX ADMIN — LEVOTHYROXINE SODIUM 100 MCG: 0.1 TABLET ORAL at 05:43

## 2019-06-18 RX ADMIN — SIMVASTATIN 20 MG: 20 TABLET, FILM COATED ORAL at 20:05

## 2019-06-18 RX ADMIN — HEPARIN SODIUM 5000 UNITS: 5000 INJECTION INTRAVENOUS; SUBCUTANEOUS at 20:04

## 2019-06-18 RX ADMIN — PREGABALIN 150 MG: 150 CAPSULE ORAL at 20:05

## 2019-06-18 RX ADMIN — AZITHROMYCIN MONOHYDRATE 500 MG: 500 INJECTION, POWDER, LYOPHILIZED, FOR SOLUTION INTRAVENOUS at 16:14

## 2019-06-18 RX ADMIN — HEPARIN SODIUM 5000 UNITS: 5000 INJECTION INTRAVENOUS; SUBCUTANEOUS at 05:43

## 2019-06-18 RX ADMIN — STANDARDIZED SENNA CONCENTRATE 17.2 MG: 8.6 TABLET ORAL at 14:18

## 2019-06-18 RX ADMIN — LOSARTAN POTASSIUM 50 MG: 50 TABLET ORAL at 08:24

## 2019-06-18 RX ADMIN — Medication 1000 MG: at 20:04

## 2019-06-18 RX ADMIN — ALBUTEROL SULFATE 2.5 MG: 2.5 SOLUTION RESPIRATORY (INHALATION) at 11:06

## 2019-06-18 RX ADMIN — ALLOPURINOL 100 MG: 100 TABLET ORAL at 08:23

## 2019-06-18 RX ADMIN — FERROUS SULFATE TAB 325 MG (65 MG ELEMENTAL FE) 324 MG: 325 (65 FE) TAB at 08:24

## 2019-06-18 RX ADMIN — STANDARDIZED SENNA CONCENTRATE 17.2 MG: 8.6 TABLET ORAL at 08:24

## 2019-06-18 RX ADMIN — CARVEDILOL 12.5 MG: 12.5 TABLET, FILM COATED ORAL at 08:23

## 2019-06-18 RX ADMIN — IPRATROPIUM BROMIDE 0.5 MG: 0.5 SOLUTION RESPIRATORY (INHALATION) at 19:28

## 2019-06-18 RX ADMIN — STANDARDIZED SENNA CONCENTRATE 17.2 MG: 8.6 TABLET ORAL at 20:05

## 2019-06-18 RX ADMIN — PANTOPRAZOLE SODIUM 40 MG: 40 TABLET, DELAYED RELEASE ORAL at 08:24

## 2019-06-18 RX ADMIN — HEPARIN SODIUM 5000 UNITS: 5000 INJECTION INTRAVENOUS; SUBCUTANEOUS at 14:18

## 2019-06-18 RX ADMIN — Medication 10 ML: at 20:05

## 2019-06-18 RX ADMIN — AMLODIPINE BESYLATE 5 MG: 5 TABLET ORAL at 08:23

## 2019-06-18 ASSESSMENT — ENCOUNTER SYMPTOMS
NAUSEA: 0
SHORTNESS OF BREATH: 0
GASTROINTESTINAL NEGATIVE: 1
CHEST TIGHTNESS: 0
BACK PAIN: 0
CONSTIPATION: 0
COUGH: 1
VOMITING: 0
VOICE CHANGE: 1
EYES NEGATIVE: 1
COUGH: 0
DIARRHEA: 0
SHORTNESS OF BREATH: 1

## 2019-06-18 NOTE — CONSULTS
allowing us to participate in the care of the patient.       CounselingTopics: Goals of care    Time Spent Counselin min                                       Total Face to Face Time: 30 min  Time Spent Reviewing Records/Provider Communication: 10 min  Total Time Spent: 40 min  Electronically signed by KEYONA Cortez on 2019 at 2:00 PM    (Please note that portions of this note were completed with a voice recognition program.  Efforts were made to edit the dictations but occasionally words are mis-transcribed.)

## 2019-06-18 NOTE — PROGRESS NOTES
650 mg  650 mg Oral Q6H PRN Brown Soriano MD   650 mg at 06/16/19 1619    heparin (porcine) injection 5,000 Units  5,000 Units Subcutaneous 3 times per day Brown Soriano MD   5,000 Units at 06/18/19 0543    buPROPion (WELLBUTRIN XL) extended release tablet 150 mg  150 mg Oral QAM Brown Soriano MD   150 mg at 06/18/19 2001    amLODIPine (NORVASC) tablet 5 mg  5 mg Oral Daily Brown Soriano MD   5 mg at 06/18/19 8251    And    losartan (COZAAR) tablet 50 mg  50 mg Oral Daily Brown Soriano MD   50 mg at 06/18/19 1062    acetaminophen (TYLENOL) suppository 650 mg  650 mg Rectal Q4H PRN Brown Soriano MD   650 mg at 06/15/19 2150       Past Medical History:  Past Medical History:   Diagnosis Date    Allergic rhinitis     Anxiety     Arthritis     Asthma     Chronic back pain     Chronic kidney disease     DDD (degenerative disc disease), lumbar 5/21/2019    Depression     GERD (gastroesophageal reflux disease)     Hyperlipidemia     Hypertension     Hypothyroidism     Irritable bowel syndrome     Restless legs syndrome     Spondylolisthesis at L4-L5 level 5/21/2019       Past Surgical History:  Past Surgical History:   Procedure Laterality Date    ANKLE FRACTURE SURGERY Left     metal plate & screws    CERVICAL SPINE SURGERY  x 2    WITH HARDWARE    CHOLECYSTECTOMY  02/2015    Dr. Yoli Funes Left 02/2015    Taya correction - Dr. Dana Lamar Left 2/2007    Thumb implant    HYSTERECTOMY      JOINT REPLACEMENT Bilateral     TKR    JOINT REPLACEMENT Right     TSR    JOINT REPLACEMENT Left     THUMB    LUMBAR FUSION Left 5/21/2019    LEFT L4-5 LLIF WITH POSTERIOR SPINAL FUSION WITH INSTRUMENTATION performed by Mildred Mendiola MD at [address] [place] [address] Road Right 3/2010    [place] [address] INCL FLUOR GDNCE DX W/CELL WASHG [facility] N/A 7/17/2018    BRONCHOSCOPY AND BAL with Nurse sedation performed by Shari Harvey MD at [address] [place] [address] Endoscopy    SHOULDER tissues demonstrate no acute abnormality. 1. Low lung volumes and bilateral hazy opacities. Findings are likely due to technique and low depth of inspiration. There is no focal consolidation. Signed by Dr Devorah Simpson on 6/15/2019 1:31 PM    Cta Pulmonary W Contrast    Result Date: 6/15/2019  CTA PULMONARY W CONTRAST 6/15/2019 1:15 PM HISTORY: Shortness of air and hypoxia COMPARISON: 2/6/2009. DLP: 667 mGy cm. In order to have a CT radiation dose as low as reasonably achievable, Automated Exposure Control was utilized for adjustment of the mA and/or KV according to patient size. TECHNIQUE: Helical tomographic images of the chest were obtained after the administration of intravenous contrast following angiogram protocol. Additionally, 3D MIP reconstructions in the coronal and sagittal planes were provided. FINDINGS:  Pulmonary arteries: Evaluation of the lower lobar pulmonary arteries is limited due to timing of the contrast bolus. The majority of the contrast is in the arterial system and SVC. Within limits, no central filling defects are identified. The pulmonary vessels are within normal limits for size. Aorta and great vessels: The aorta is well opacified and demonstrates no aneurysm, stenosis or dissection. The great vessels are normal in appearance. Coronary artery calcifications are visualized. Neck base: The imaged portion of the base of the neck appears unremarkable. Lungs: There is consolidation in the left lower lobe. Lung volumes are decreased. Evaluation of the pulmonary parenchyma is limited due to respiratory motion. Fibrosis is again noted in the left lung apex. Dependent changes are seen in the right lung. The trachea and bronchial tree are patent. Heart: The heart is normal in size. There is no pericardial effusion. Mediastinum and lymph nodes: No pathologically enlarged mediastinal, hilar, or axillary lymph nodes are present. Bones and soft tissues:  The osseous structures of the thorax and

## 2019-06-19 LAB
ANION GAP SERPL CALCULATED.3IONS-SCNC: 13 MMOL/L (ref 7–19)
BASOPHILS ABSOLUTE: 0 K/UL (ref 0–0.2)
BASOPHILS RELATIVE PERCENT: 0.2 % (ref 0–1)
BUN BLDV-MCNC: 24 MG/DL (ref 8–23)
CALCIUM SERPL-MCNC: 9.3 MG/DL (ref 8.8–10.2)
CHLORIDE BLD-SCNC: 98 MMOL/L (ref 98–111)
CO2: 21 MMOL/L (ref 22–29)
CREAT SERPL-MCNC: 1.1 MG/DL (ref 0.5–0.9)
EOSINOPHILS ABSOLUTE: 0.4 K/UL (ref 0–0.6)
EOSINOPHILS RELATIVE PERCENT: 3 % (ref 0–5)
GFR NON-AFRICAN AMERICAN: 48
GLUCOSE BLD-MCNC: 101 MG/DL (ref 74–109)
HCT VFR BLD CALC: 32.6 % (ref 37–47)
HEMOGLOBIN: 10.5 G/DL (ref 12–16)
LYMPHOCYTES ABSOLUTE: 1.4 K/UL (ref 1.1–4.5)
LYMPHOCYTES RELATIVE PERCENT: 11.2 % (ref 20–40)
MCH RBC QN AUTO: 30.3 PG (ref 27–31)
MCHC RBC AUTO-ENTMCNC: 32.2 G/DL (ref 33–37)
MCV RBC AUTO: 93.9 FL (ref 81–99)
MONOCYTES ABSOLUTE: 1.9 K/UL (ref 0–0.9)
MONOCYTES RELATIVE PERCENT: 15 % (ref 0–10)
NEUTROPHILS ABSOLUTE: 8.9 K/UL (ref 1.5–7.5)
NEUTROPHILS RELATIVE PERCENT: 69.1 % (ref 50–65)
PDW BLD-RTO: 13.5 % (ref 11.5–14.5)
PLATELET # BLD: 172 K/UL (ref 130–400)
PMV BLD AUTO: 10.9 FL (ref 9.4–12.3)
POTASSIUM REFLEX MAGNESIUM: 4.3 MMOL/L (ref 3.5–5)
RBC # BLD: 3.47 M/UL (ref 4.2–5.4)
SODIUM BLD-SCNC: 132 MMOL/L (ref 136–145)
TSH SERPL DL<=0.05 MIU/L-ACNC: 1.32 UIU/ML (ref 0.27–4.2)
WBC # BLD: 12.9 K/UL (ref 4.8–10.8)

## 2019-06-19 PROCEDURE — 6360000002 HC RX W HCPCS: Performed by: INTERNAL MEDICINE

## 2019-06-19 PROCEDURE — 99232 SBSQ HOSP IP/OBS MODERATE 35: CPT | Performed by: NURSE PRACTITIONER

## 2019-06-19 PROCEDURE — 99233 SBSQ HOSP IP/OBS HIGH 50: CPT | Performed by: INTERNAL MEDICINE

## 2019-06-19 PROCEDURE — 1210000000 HC MED SURG R&B

## 2019-06-19 PROCEDURE — 2580000003 HC RX 258: Performed by: INTERNAL MEDICINE

## 2019-06-19 PROCEDURE — 80048 BASIC METABOLIC PNL TOTAL CA: CPT

## 2019-06-19 PROCEDURE — 84443 ASSAY THYROID STIM HORMONE: CPT

## 2019-06-19 PROCEDURE — 36415 COLL VENOUS BLD VENIPUNCTURE: CPT

## 2019-06-19 PROCEDURE — 2700000000 HC OXYGEN THERAPY PER DAY

## 2019-06-19 PROCEDURE — 94640 AIRWAY INHALATION TREATMENT: CPT

## 2019-06-19 PROCEDURE — 6370000000 HC RX 637 (ALT 250 FOR IP): Performed by: HOSPITALIST

## 2019-06-19 PROCEDURE — 85025 COMPLETE CBC W/AUTO DIFF WBC: CPT

## 2019-06-19 PROCEDURE — 6370000000 HC RX 637 (ALT 250 FOR IP): Performed by: INTERNAL MEDICINE

## 2019-06-19 RX ORDER — AMLODIPINE BESYLATE 10 MG/1
10 TABLET ORAL DAILY
Status: DISCONTINUED | OUTPATIENT
Start: 2019-06-20 | End: 2019-06-21

## 2019-06-19 RX ORDER — BUDESONIDE 0.5 MG/2ML
0.5 INHALANT ORAL 2 TIMES DAILY
Status: DISCONTINUED | OUTPATIENT
Start: 2019-06-19 | End: 2019-06-22 | Stop reason: HOSPADM

## 2019-06-19 RX ORDER — AZITHROMYCIN 250 MG/1
500 TABLET, FILM COATED ORAL DAILY
Status: DISCONTINUED | OUTPATIENT
Start: 2019-06-19 | End: 2019-06-20

## 2019-06-19 RX ORDER — BENZONATATE 100 MG/1
100 CAPSULE ORAL 3 TIMES DAILY PRN
Status: DISCONTINUED | OUTPATIENT
Start: 2019-06-19 | End: 2019-06-22 | Stop reason: HOSPADM

## 2019-06-19 RX ORDER — DOXAZOSIN MESYLATE 1 MG/1
1 TABLET ORAL EVERY EVENING
Status: DISCONTINUED | OUTPATIENT
Start: 2019-06-19 | End: 2019-06-22 | Stop reason: HOSPADM

## 2019-06-19 RX ORDER — LOSARTAN POTASSIUM 50 MG/1
50 TABLET ORAL DAILY
Status: DISCONTINUED | OUTPATIENT
Start: 2019-06-20 | End: 2019-06-21

## 2019-06-19 RX ORDER — DOXYCYCLINE HYCLATE 100 MG/1
100 CAPSULE ORAL EVERY 12 HOURS SCHEDULED
Status: DISCONTINUED | OUTPATIENT
Start: 2019-06-19 | End: 2019-06-22 | Stop reason: HOSPADM

## 2019-06-19 RX ADMIN — CARVEDILOL 12.5 MG: 12.5 TABLET, FILM COATED ORAL at 18:01

## 2019-06-19 RX ADMIN — BUDESONIDE 500 MCG: 0.5 INHALANT RESPIRATORY (INHALATION) at 14:20

## 2019-06-19 RX ADMIN — ACETAMINOPHEN 650 MG: 325 TABLET ORAL at 18:22

## 2019-06-19 RX ADMIN — GUAIFENESIN 600 MG: 600 TABLET, EXTENDED RELEASE ORAL at 22:23

## 2019-06-19 RX ADMIN — BENZONATATE 100 MG: 100 CAPSULE ORAL at 12:17

## 2019-06-19 RX ADMIN — IPRATROPIUM BROMIDE 0.5 MG: 0.5 SOLUTION RESPIRATORY (INHALATION) at 10:48

## 2019-06-19 RX ADMIN — IPRATROPIUM BROMIDE 0.5 MG: 0.5 SOLUTION RESPIRATORY (INHALATION) at 14:20

## 2019-06-19 RX ADMIN — DOXYCYCLINE HYCLATE 100 MG: 100 CAPSULE ORAL at 22:28

## 2019-06-19 RX ADMIN — AZITHROMYCIN 500 MG: 250 TABLET, FILM COATED ORAL at 14:59

## 2019-06-19 RX ADMIN — LORAZEPAM 0.5 MG: 0.5 TABLET ORAL at 14:59

## 2019-06-19 RX ADMIN — BENZONATATE 100 MG: 100 CAPSULE ORAL at 19:09

## 2019-06-19 RX ADMIN — Medication 2 G: at 18:05

## 2019-06-19 RX ADMIN — LEVOTHYROXINE SODIUM 100 MCG: 0.1 TABLET ORAL at 04:50

## 2019-06-19 RX ADMIN — LORAZEPAM 0.5 MG: 0.5 TABLET ORAL at 22:23

## 2019-06-19 RX ADMIN — IPRATROPIUM BROMIDE 0.5 MG: 0.5 SOLUTION RESPIRATORY (INHALATION) at 19:49

## 2019-06-19 RX ADMIN — IPRATROPIUM BROMIDE 0.5 MG: 0.5 SOLUTION RESPIRATORY (INHALATION) at 06:48

## 2019-06-19 RX ADMIN — PREGABALIN 150 MG: 150 CAPSULE ORAL at 22:23

## 2019-06-19 RX ADMIN — DOXAZOSIN 1 MG: 1 TABLET ORAL at 19:09

## 2019-06-19 RX ADMIN — HEPARIN SODIUM 5000 UNITS: 5000 INJECTION INTRAVENOUS; SUBCUTANEOUS at 22:28

## 2019-06-19 RX ADMIN — HEPARIN SODIUM 5000 UNITS: 5000 INJECTION INTRAVENOUS; SUBCUTANEOUS at 14:59

## 2019-06-19 RX ADMIN — Medication 10 ML: at 08:50

## 2019-06-19 RX ADMIN — Medication 2 G: at 04:49

## 2019-06-19 RX ADMIN — Medication 10 ML: at 22:24

## 2019-06-19 RX ADMIN — HEPARIN SODIUM 5000 UNITS: 5000 INJECTION INTRAVENOUS; SUBCUTANEOUS at 04:50

## 2019-06-19 RX ADMIN — STANDARDIZED SENNA CONCENTRATE 17.2 MG: 8.6 TABLET ORAL at 22:23

## 2019-06-19 RX ADMIN — BUDESONIDE 500 MCG: 0.5 INHALANT RESPIRATORY (INHALATION) at 19:49

## 2019-06-19 RX ADMIN — SIMVASTATIN 20 MG: 20 TABLET, FILM COATED ORAL at 22:23

## 2019-06-19 RX ADMIN — DOXEPIN HYDROCHLORIDE 100 MG: 50 CAPSULE ORAL at 22:23

## 2019-06-19 ASSESSMENT — PAIN SCALES - GENERAL: PAINLEVEL_OUTOF10: 10

## 2019-06-19 ASSESSMENT — ENCOUNTER SYMPTOMS
NAUSEA: 0
SHORTNESS OF BREATH: 0
DIARRHEA: 0
CONSTIPATION: 0
BACK PAIN: 0
COUGH: 1
VOMITING: 0

## 2019-06-19 NOTE — PROGRESS NOTES
TROPONINI in the last 72 hours. INR: No results for input(s): INR in the last 72 hours. Objective:   Vitals: BP (!) 149/82   Pulse 100   Temp 97.7 °F (36.5 °C) (Temporal)   Resp 20   Ht 5' 5\" (1.651 m)   Wt 184 lb (83.5 kg)   SpO2 90%   BMI 30.62 kg/m²   24HR INTAKE/OUTPUT:      Intake/Output Summary (Last 24 hours) at 6/19/2019 6189  Last data filed at 6/19/2019 0356  Gross per 24 hour   Intake 450 ml   Output 1100 ml   Net -650 ml     Physical Exam     General appearance: alert and cooperative with exam, vital signs stable, well nourished, well developed, ears ill  HEENT: atraumatic, eyes with clear conjunctiva and normal lids, pupils and irises normal, external ears and nose are normal,lips normal.  Neck: without masses, lymphadenopathy, supple, no JVD  Lungs: no increased work of breathing, mild wheezes noted bilaterally, rales on the left, supplemental oxygen in place  Heart: regular rate and rhythm and S1, S2 normal  Abdomen: soft, non-tender; bowel sounds normal; no masses,  no organomegaly  Genitourinary: No bladder fullness, masses, or tenderness  Extremities: extremities normal, atraumatic, no cyanosis or edema  Neurologic: No focal neurologic deficits, normal sensation, no tremor, alert and oriented  Psychiatric: Alert and oriented, no recent or remote memory deficits, good judgement, mood and affect appropriate  Skin: no rashes,lesions, or nodules. Assessment and Plan:   Principal Problem:    Acute respiratory failure with hypoxia (HCC)  Active Problems:    Hypertension    Hyperlipidemia    Hypothyroid    HCAP (healthcare-associated pneumonia)    Palliative care patient  Resolved Problems:    * No resolved hospital problems. *        Visit Summary: I saw the patient at the bedside with family present. Patient is resting in bed and appears improved from my visit yesterday. She is much more alert but continues to have fatigue, generalized weakness, and cough.  We discussed goals of care,

## 2019-06-19 NOTE — PROGRESS NOTES
3/2010    [place] [address] INCL FLUOR GDNCE DX W/CELL WASHG SPX N/A 7/17/2018    BRONCHOSCOPY AND BAL with Nurse sedation performed by Baldomero Curiel MD at [address] [facility] Endoscopy    SHOULDER SURGERY Right 03/06/2018    Dr. Codi Sahu - Total reversal replacement    TOTAL KNEE ARTHROPLASTY Bilateral     at two different times       Family History  Family History   Problem Relation Age of Onset    High Blood Pressure Mother     Stroke Mother     Dementia Mother         late onset   Kaplan Depression Mother     Anxiety Disorder Mother     Kidney Disease Mother     Osteoporosis Mother     Substance Abuse Father         Alcohol    Kidney Disease Father         dialysis    Diabetes Maternal Aunt     Kidney Disease Maternal Aunt     Cancer Maternal Grandmother         Colon Cancer    Osteoporosis Maternal Grandmother     Anxiety Disorder Maternal Grandmother     Depression Maternal Grandmother        Social History  Social History     Socioeconomic History    Marital status:      Spouse name: Not on file    Number of children: Not on file    Years of education: Not on file    Highest education level: Not on file   Occupational History    Not on file   Social Needs    Financial resource strain: Not on file    Food insecurity:     Worry: Not on file     Inability: Not on file    Transportation needs:     Medical: Not on file     Non-medical: Not on file   Tobacco Use    Smoking status: Passive Smoke Exposure - Never Smoker    Smokeless tobacco: Never Used   Substance and Sexual Activity    Alcohol use: No    Drug use: No    Sexual activity: Not on file   Lifestyle    Physical activity:     Days per week: Not on file     Minutes per session: Not on file    Stress: Not on file   Relationships    Social connections:     Talks on phone: Not on file     Gets together: Not on file     Attends [employer] service: Not on file     Active member of club or organization: Not on file     Attends meetings of clubs or organizations: Not on file     Relationship status: Not on file    Intimate partner violence:     Fear of current or ex partner: Not on file     Emotionally abused: Not on file     Physically abused: Not on file     Forced sexual activity: Not on file   Other Topics Concern    Not on file   Social History Narrative    Not on file         Review of Systems:    Review of Systems   Constitutional: Negative for activity change and fatigue. Respiratory: Positive for cough. Negative for shortness of breath. Cardiovascular: Negative for chest pain and leg swelling. Gastrointestinal: Negative for constipation, diarrhea, nausea and vomiting. Genitourinary: Negative for difficulty urinating and dysuria. Musculoskeletal: Negative for arthralgias and back pain. Neurological: Negative for dizziness and headaches. Objective:  Blood pressure (!) 149/82, pulse 100, temperature 97.7 °F (36.5 °C), temperature source Temporal, resp. rate 20, height 5' 5\" (1.651 m), weight 184 lb (83.5 kg), SpO2 90 %. Intake/Output Summary (Last 24 hours) at 6/19/2019 1224  Last data filed at 6/19/2019 0356  Gross per 24 hour   Intake 450 ml   Output 1100 ml   Net -650 ml       Physical Exam   Constitutional: She is oriented to person, place, and time. She appears well-developed and well-nourished. HENT:   Head: Normocephalic and atraumatic. Mouth/Throat: Oropharynx is clear and moist.   Eyes: Pupils are equal, round, and reactive to light. Conjunctivae are normal.   Cardiovascular: Normal rate, regular rhythm and normal heart sounds. Pulmonary/Chest: Effort normal. She has rales. Abdominal: Soft. Musculoskeletal: Normal range of motion. Neurological: She is alert and oriented to person, place, and time. Skin: Skin is warm and dry. Vitals reviewed.       Labs:  BMP:   Recent Labs     06/17/19  0317 06/18/19  0355 06/19/19  0338   * 131* 132*   K 4.1 4.3 4.3    96* 98   CO2 21* 20* 21*   BUN 28* 25* 24*   CREATININE 1.3* 1.0* 1.1*   CALCIUM 8.7* 8.8 9.3     CBC:   Recent Labs     06/17/19  0317 06/18/19  0355 06/19/19  0338   WBC 16.1* 13.0* 12.9*   HGB 10.4* 10.6* 10.5*   HCT 31.6* 31.7* 32.6*   MCV 92.7 93.2 93.9    171 172     LIVER PROFILE: No results for input(s): AST, ALT, LIPASE, BILIDIR, BILITOT, ALKPHOS in the last 72 hours. Invalid input(s): AMYLASE,  ALB  PT/INR: No results for input(s): PROTIME, INR in the last 72 hours. APTT: No results for input(s): APTT in the last 72 hours. BNP:  No results for input(s): BNP in the last 72 hours. Ionized Calcium:No results for input(s): IONCA in the last 72 hours. Magnesium:No results for input(s): MG in the last 72 hours. Phosphorus:No results for input(s): PHOS in the last 72 hours. HgbA1C: No results for input(s): LABA1C in the last 72 hours. Hepatic: No results for input(s): ALKPHOS, ALT, AST, PROT, BILITOT, BILIDIR, LABALBU in the last 72 hours. Lactic Acid: No results for input(s): LACTA in the last 72 hours. Troponin: No results for input(s): CKTOTAL, CKMB, TROPONINT in the last 72 hours. ABGs: No results for input(s): PH, PCO2, PO2, HCO3, O2SAT in the last 72 hours. CRP:  No results for input(s): CRP in the last 72 hours. Sed Rate:  No results for input(s): SEDRATE in the last 72 hours. Cultures:   No results for input(s): CULTURE in the last 72 hours. No results for input(s): BCJose in the last 72 hours. No results for input(s): CXSURG in the last 72 hours. Radiology reports as per the Radiologist  Radiology: Xr Chest Standard (2 Vw)    Result Date: 6/15/2019  XR CHEST (2 VW) 6/15/2019 12:00 PM HISTORY: Cough and shortness of air COMPARISON: 5/2/2019. FINDINGS: Frontal and lateral views of the chest were obtained. Lung volumes are decreased. Hazy opacities are likely due to body habitus and poor penetration. The cardiomediastinal silhouette and pulmonary vascularity are unchanged.  The osseous structures and

## 2019-06-20 ENCOUNTER — APPOINTMENT (OUTPATIENT)
Dept: GENERAL RADIOLOGY | Age: 74
DRG: 177 | End: 2019-06-20
Payer: MEDICARE

## 2019-06-20 LAB
ANION GAP SERPL CALCULATED.3IONS-SCNC: 12 MMOL/L (ref 7–19)
BASOPHILS ABSOLUTE: 0.1 K/UL (ref 0–0.2)
BASOPHILS RELATIVE PERCENT: 0.4 % (ref 0–1)
BLOOD CULTURE, ROUTINE: NORMAL
BUN BLDV-MCNC: 21 MG/DL (ref 8–23)
CALCIUM SERPL-MCNC: 9.9 MG/DL (ref 8.8–10.2)
CHLORIDE BLD-SCNC: 99 MMOL/L (ref 98–111)
CO2: 22 MMOL/L (ref 22–29)
CREAT SERPL-MCNC: 1.1 MG/DL (ref 0.5–0.9)
CULTURE, BLOOD 2: NORMAL
EOSINOPHILS ABSOLUTE: 0.4 K/UL (ref 0–0.6)
EOSINOPHILS RELATIVE PERCENT: 2.8 % (ref 0–5)
GFR NON-AFRICAN AMERICAN: 48
GLUCOSE BLD-MCNC: 106 MG/DL (ref 74–109)
HCT VFR BLD CALC: 33.9 % (ref 37–47)
HEMOGLOBIN: 10.9 G/DL (ref 12–16)
LYMPHOCYTES ABSOLUTE: 1.5 K/UL (ref 1.1–4.5)
LYMPHOCYTES RELATIVE PERCENT: 11.5 % (ref 20–40)
MCH RBC QN AUTO: 30.1 PG (ref 27–31)
MCHC RBC AUTO-ENTMCNC: 32.2 G/DL (ref 33–37)
MCV RBC AUTO: 93.6 FL (ref 81–99)
MONOCYTES ABSOLUTE: 2 K/UL (ref 0–0.9)
MONOCYTES RELATIVE PERCENT: 14.8 % (ref 0–10)
NEUTROPHILS ABSOLUTE: 9.1 K/UL (ref 1.5–7.5)
NEUTROPHILS RELATIVE PERCENT: 67.7 % (ref 50–65)
PDW BLD-RTO: 13.2 % (ref 11.5–14.5)
PLATELET # BLD: 193 K/UL (ref 130–400)
PMV BLD AUTO: 10.7 FL (ref 9.4–12.3)
POTASSIUM REFLEX MAGNESIUM: 4.4 MMOL/L (ref 3.5–5)
RBC # BLD: 3.62 M/UL (ref 4.2–5.4)
SODIUM BLD-SCNC: 133 MMOL/L (ref 136–145)
WBC # BLD: 13.4 K/UL (ref 4.8–10.8)

## 2019-06-20 PROCEDURE — 94640 AIRWAY INHALATION TREATMENT: CPT

## 2019-06-20 PROCEDURE — 2580000003 HC RX 258: Performed by: INTERNAL MEDICINE

## 2019-06-20 PROCEDURE — 6370000000 HC RX 637 (ALT 250 FOR IP): Performed by: INTERNAL MEDICINE

## 2019-06-20 PROCEDURE — 80048 BASIC METABOLIC PNL TOTAL CA: CPT

## 2019-06-20 PROCEDURE — 6360000002 HC RX W HCPCS: Performed by: INTERNAL MEDICINE

## 2019-06-20 PROCEDURE — 1210000000 HC MED SURG R&B

## 2019-06-20 PROCEDURE — 2700000000 HC OXYGEN THERAPY PER DAY

## 2019-06-20 PROCEDURE — 71046 X-RAY EXAM CHEST 2 VIEWS: CPT

## 2019-06-20 PROCEDURE — 6370000000 HC RX 637 (ALT 250 FOR IP): Performed by: HOSPITALIST

## 2019-06-20 PROCEDURE — 36415 COLL VENOUS BLD VENIPUNCTURE: CPT

## 2019-06-20 PROCEDURE — 99233 SBSQ HOSP IP/OBS HIGH 50: CPT | Performed by: INTERNAL MEDICINE

## 2019-06-20 PROCEDURE — 85025 COMPLETE CBC W/AUTO DIFF WBC: CPT

## 2019-06-20 RX ORDER — CARVEDILOL 25 MG/1
25 TABLET ORAL 2 TIMES DAILY WITH MEALS
Status: DISCONTINUED | OUTPATIENT
Start: 2019-06-20 | End: 2019-06-20 | Stop reason: SDUPTHER

## 2019-06-20 RX ORDER — CARVEDILOL 25 MG/1
25 TABLET ORAL 2 TIMES DAILY WITH MEALS
Status: DISCONTINUED | OUTPATIENT
Start: 2019-06-20 | End: 2019-06-22 | Stop reason: HOSPADM

## 2019-06-20 RX ORDER — AZITHROMYCIN 250 MG/1
500 TABLET, FILM COATED ORAL ONCE
Status: COMPLETED | OUTPATIENT
Start: 2019-06-20 | End: 2019-06-20

## 2019-06-20 RX ADMIN — BUPROPION HYDROCHLORIDE 150 MG: 150 TABLET, FILM COATED, EXTENDED RELEASE ORAL at 10:21

## 2019-06-20 RX ADMIN — Medication 10 ML: at 13:16

## 2019-06-20 RX ADMIN — IPRATROPIUM BROMIDE 0.5 MG: 0.5 SOLUTION RESPIRATORY (INHALATION) at 10:22

## 2019-06-20 RX ADMIN — STANDARDIZED SENNA CONCENTRATE 17.2 MG: 8.6 TABLET ORAL at 10:19

## 2019-06-20 RX ADMIN — AZITHROMYCIN 500 MG: 250 TABLET, FILM COATED ORAL at 10:47

## 2019-06-20 RX ADMIN — HEPARIN SODIUM 5000 UNITS: 5000 INJECTION INTRAVENOUS; SUBCUTANEOUS at 05:37

## 2019-06-20 RX ADMIN — DOXYCYCLINE HYCLATE 100 MG: 100 CAPSULE ORAL at 20:15

## 2019-06-20 RX ADMIN — Medication 2 G: at 05:37

## 2019-06-20 RX ADMIN — HEPARIN SODIUM 5000 UNITS: 5000 INJECTION INTRAVENOUS; SUBCUTANEOUS at 22:03

## 2019-06-20 RX ADMIN — DOXAZOSIN 1 MG: 1 TABLET ORAL at 17:58

## 2019-06-20 RX ADMIN — LOSARTAN POTASSIUM 50 MG: 50 TABLET ORAL at 10:19

## 2019-06-20 RX ADMIN — ALLOPURINOL 100 MG: 100 TABLET ORAL at 13:10

## 2019-06-20 RX ADMIN — Medication 10 ML: at 23:03

## 2019-06-20 RX ADMIN — CARVEDILOL 25 MG: 25 TABLET, FILM COATED ORAL at 10:47

## 2019-06-20 RX ADMIN — LEVOTHYROXINE SODIUM 100 MCG: 0.1 TABLET ORAL at 05:37

## 2019-06-20 RX ADMIN — GUAIFENESIN 600 MG: 600 TABLET, EXTENDED RELEASE ORAL at 20:15

## 2019-06-20 RX ADMIN — BUDESONIDE 500 MCG: 0.5 INHALANT RESPIRATORY (INHALATION) at 06:27

## 2019-06-20 RX ADMIN — HEPARIN SODIUM 5000 UNITS: 5000 INJECTION INTRAVENOUS; SUBCUTANEOUS at 16:44

## 2019-06-20 RX ADMIN — DOXYCYCLINE HYCLATE 100 MG: 100 CAPSULE ORAL at 10:47

## 2019-06-20 RX ADMIN — STANDARDIZED SENNA CONCENTRATE 17.2 MG: 8.6 TABLET ORAL at 16:44

## 2019-06-20 RX ADMIN — SIMVASTATIN 20 MG: 20 TABLET, FILM COATED ORAL at 20:14

## 2019-06-20 RX ADMIN — AMLODIPINE BESYLATE 10 MG: 10 TABLET ORAL at 10:18

## 2019-06-20 RX ADMIN — BENZONATATE 100 MG: 100 CAPSULE ORAL at 05:37

## 2019-06-20 RX ADMIN — CEFEPIME HYDROCHLORIDE 2 G: 2 INJECTION, POWDER, FOR SOLUTION INTRAVENOUS at 23:03

## 2019-06-20 RX ADMIN — PANTOPRAZOLE SODIUM 40 MG: 40 TABLET, DELAYED RELEASE ORAL at 10:19

## 2019-06-20 RX ADMIN — CARVEDILOL 25 MG: 25 TABLET, FILM COATED ORAL at 17:56

## 2019-06-20 RX ADMIN — IPRATROPIUM BROMIDE 0.5 MG: 0.5 SOLUTION RESPIRATORY (INHALATION) at 19:41

## 2019-06-20 RX ADMIN — STANDARDIZED SENNA CONCENTRATE 17.2 MG: 8.6 TABLET ORAL at 20:15

## 2019-06-20 RX ADMIN — CEFEPIME HYDROCHLORIDE 2 G: 2 INJECTION, POWDER, FOR SOLUTION INTRAVENOUS at 13:42

## 2019-06-20 RX ADMIN — ACETAMINOPHEN 650 MG: 325 TABLET ORAL at 20:15

## 2019-06-20 RX ADMIN — IPRATROPIUM BROMIDE 0.5 MG: 0.5 SOLUTION RESPIRATORY (INHALATION) at 06:27

## 2019-06-20 RX ADMIN — PREGABALIN 150 MG: 150 CAPSULE ORAL at 20:14

## 2019-06-20 RX ADMIN — LORAZEPAM 0.5 MG: 0.5 TABLET ORAL at 10:19

## 2019-06-20 RX ADMIN — PREGABALIN 150 MG: 150 CAPSULE ORAL at 10:18

## 2019-06-20 RX ADMIN — LORAZEPAM 0.5 MG: 0.5 TABLET ORAL at 20:15

## 2019-06-20 RX ADMIN — GUAIFENESIN 600 MG: 600 TABLET, EXTENDED RELEASE ORAL at 10:47

## 2019-06-20 RX ADMIN — IPRATROPIUM BROMIDE 0.5 MG: 0.5 SOLUTION RESPIRATORY (INHALATION) at 14:35

## 2019-06-20 RX ADMIN — BENZONATATE 100 MG: 100 CAPSULE ORAL at 17:56

## 2019-06-20 RX ADMIN — DOXEPIN HYDROCHLORIDE 100 MG: 50 CAPSULE ORAL at 20:15

## 2019-06-20 RX ADMIN — BUDESONIDE 500 MCG: 0.5 INHALANT RESPIRATORY (INHALATION) at 19:41

## 2019-06-20 ASSESSMENT — ENCOUNTER SYMPTOMS
COUGH: 0
SHORTNESS OF BREATH: 0
BACK PAIN: 0
VOMITING: 0
NAUSEA: 0
CONSTIPATION: 0
DIARRHEA: 0

## 2019-06-20 ASSESSMENT — PAIN SCALES - GENERAL
PAINLEVEL_OUTOF10: 4
PAINLEVEL_OUTOF10: 0

## 2019-06-20 NOTE — PROGRESS NOTES
demonstrate no acute abnormality. 1. Low lung volumes and bilateral hazy opacities. Findings are likely due to technique and low depth of inspiration. There is no focal consolidation. Signed by Dr Mahad Werner on 6/15/2019 1:31 PM    Cta Pulmonary W Contrast    Result Date: 6/15/2019  CTA PULMONARY W CONTRAST 6/15/2019 1:15 PM HISTORY: Shortness of air and hypoxia COMPARISON: 2/6/2009. DLP: 667 mGy cm. In order to have a CT radiation dose as low as reasonably achievable, Automated Exposure Control was utilized for adjustment of the mA and/or KV according to patient size. TECHNIQUE: Helical tomographic images of the chest were obtained after the administration of intravenous contrast following angiogram protocol. Additionally, 3D MIP reconstructions in the coronal and sagittal planes were provided. FINDINGS:  Pulmonary arteries: Evaluation of the lower lobar pulmonary arteries is limited due to timing of the contrast bolus. The majority of the contrast is in the arterial system and SVC. Within limits, no central filling defects are identified. The pulmonary vessels are within normal limits for size. Aorta and great vessels: The aorta is well opacified and demonstrates no aneurysm, stenosis or dissection. The great vessels are normal in appearance. Coronary artery calcifications are visualized. Neck base: The imaged portion of the base of the neck appears unremarkable. Lungs: There is consolidation in the left lower lobe. Lung volumes are decreased. Evaluation of the pulmonary parenchyma is limited due to respiratory motion. Fibrosis is again noted in the left lung apex. Dependent changes are seen in the right lung. The trachea and bronchial tree are patent. Heart: The heart is normal in size. There is no pericardial effusion. Mediastinum and lymph nodes: No pathologically enlarged mediastinal, hilar, or axillary lymph nodes are present. Bones and soft tissues:  The osseous structures of the thorax and

## 2019-06-21 PROBLEM — J96.01 ACUTE RESPIRATORY FAILURE WITH HYPOXIA (HCC): Status: RESOLVED | Noted: 2019-06-15 | Resolved: 2019-06-21

## 2019-06-21 PROBLEM — J18.9 HCAP (HEALTHCARE-ASSOCIATED PNEUMONIA): Status: RESOLVED | Noted: 2019-06-15 | Resolved: 2019-06-21

## 2019-06-21 PROCEDURE — 94640 AIRWAY INHALATION TREATMENT: CPT

## 2019-06-21 PROCEDURE — 1210000000 HC MED SURG R&B

## 2019-06-21 PROCEDURE — 6370000000 HC RX 637 (ALT 250 FOR IP): Performed by: INTERNAL MEDICINE

## 2019-06-21 PROCEDURE — 2700000000 HC OXYGEN THERAPY PER DAY

## 2019-06-21 PROCEDURE — 99233 SBSQ HOSP IP/OBS HIGH 50: CPT | Performed by: INTERNAL MEDICINE

## 2019-06-21 PROCEDURE — 6360000002 HC RX W HCPCS: Performed by: INTERNAL MEDICINE

## 2019-06-21 PROCEDURE — 97535 SELF CARE MNGMENT TRAINING: CPT

## 2019-06-21 PROCEDURE — 6370000000 HC RX 637 (ALT 250 FOR IP): Performed by: HOSPITALIST

## 2019-06-21 PROCEDURE — 97161 PT EVAL LOW COMPLEX 20 MIN: CPT

## 2019-06-21 PROCEDURE — 97530 THERAPEUTIC ACTIVITIES: CPT

## 2019-06-21 PROCEDURE — 97166 OT EVAL MOD COMPLEX 45 MIN: CPT

## 2019-06-21 PROCEDURE — 2580000003 HC RX 258: Performed by: INTERNAL MEDICINE

## 2019-06-21 PROCEDURE — 51701 INSERT BLADDER CATHETER: CPT

## 2019-06-21 PROCEDURE — 51798 US URINE CAPACITY MEASURE: CPT

## 2019-06-21 RX ORDER — ALBUTEROL SULFATE 2.5 MG/3ML
2.5 SOLUTION RESPIRATORY (INHALATION) EVERY 6 HOURS PRN
Qty: 120 EACH | Refills: 3 | Status: ON HOLD | OUTPATIENT
Start: 2019-06-21 | End: 2019-07-05 | Stop reason: HOSPADM

## 2019-06-21 RX ORDER — LOSARTAN POTASSIUM 50 MG/1
50 TABLET ORAL DAILY
Status: DISCONTINUED | OUTPATIENT
Start: 2019-06-22 | End: 2019-06-22 | Stop reason: HOSPADM

## 2019-06-21 RX ORDER — BENZONATATE 100 MG/1
100 CAPSULE ORAL 3 TIMES DAILY PRN
Qty: 21 CAPSULE | Refills: 1 | Status: ON HOLD | OUTPATIENT
Start: 2019-06-21 | End: 2019-07-05 | Stop reason: HOSPADM

## 2019-06-21 RX ORDER — AMLODIPINE BESYLATE 5 MG/1
5 TABLET ORAL DAILY
Status: DISCONTINUED | OUTPATIENT
Start: 2019-06-22 | End: 2019-06-22 | Stop reason: HOSPADM

## 2019-06-21 RX ORDER — CARVEDILOL 25 MG/1
25 TABLET ORAL 2 TIMES DAILY WITH MEALS
Qty: 60 TABLET | Refills: 3 | Status: ON HOLD | OUTPATIENT
Start: 2019-06-21 | End: 2019-07-16 | Stop reason: HOSPADM

## 2019-06-21 RX ORDER — GUAIFENESIN 600 MG/1
600 TABLET, EXTENDED RELEASE ORAL 2 TIMES DAILY
Qty: 60 TABLET | Refills: 0 | Status: ON HOLD | OUTPATIENT
Start: 2019-06-21 | End: 2019-07-05 | Stop reason: HOSPADM

## 2019-06-21 RX ADMIN — IPRATROPIUM BROMIDE 0.5 MG: 0.5 SOLUTION RESPIRATORY (INHALATION) at 11:07

## 2019-06-21 RX ADMIN — Medication 10 ML: at 21:35

## 2019-06-21 RX ADMIN — CEFEPIME HYDROCHLORIDE 2 G: 2 INJECTION, POWDER, FOR SOLUTION INTRAVENOUS at 10:59

## 2019-06-21 RX ADMIN — Medication 10 ML: at 09:26

## 2019-06-21 RX ADMIN — HEPARIN SODIUM 5000 UNITS: 5000 INJECTION INTRAVENOUS; SUBCUTANEOUS at 15:19

## 2019-06-21 RX ADMIN — LORAZEPAM 0.5 MG: 0.5 TABLET ORAL at 09:25

## 2019-06-21 RX ADMIN — ACETAMINOPHEN 650 MG: 325 TABLET ORAL at 04:57

## 2019-06-21 RX ADMIN — PREGABALIN 150 MG: 150 CAPSULE ORAL at 09:25

## 2019-06-21 RX ADMIN — HEPARIN SODIUM 5000 UNITS: 5000 INJECTION INTRAVENOUS; SUBCUTANEOUS at 05:24

## 2019-06-21 RX ADMIN — CEFEPIME HYDROCHLORIDE 2 G: 2 INJECTION, POWDER, FOR SOLUTION INTRAVENOUS at 23:02

## 2019-06-21 RX ADMIN — BENZONATATE 100 MG: 100 CAPSULE ORAL at 04:58

## 2019-06-21 RX ADMIN — PANTOPRAZOLE SODIUM 40 MG: 40 TABLET, DELAYED RELEASE ORAL at 09:24

## 2019-06-21 RX ADMIN — BUDESONIDE 500 MCG: 0.5 INHALANT RESPIRATORY (INHALATION) at 19:57

## 2019-06-21 RX ADMIN — DOXYCYCLINE HYCLATE 100 MG: 100 CAPSULE ORAL at 09:26

## 2019-06-21 RX ADMIN — IPRATROPIUM BROMIDE 0.5 MG: 0.5 SOLUTION RESPIRATORY (INHALATION) at 19:56

## 2019-06-21 RX ADMIN — STANDARDIZED SENNA CONCENTRATE 17.2 MG: 8.6 TABLET ORAL at 15:18

## 2019-06-21 RX ADMIN — CARVEDILOL 25 MG: 25 TABLET, FILM COATED ORAL at 17:11

## 2019-06-21 RX ADMIN — DOXEPIN HYDROCHLORIDE 100 MG: 50 CAPSULE ORAL at 21:36

## 2019-06-21 RX ADMIN — GUAIFENESIN 600 MG: 600 TABLET, EXTENDED RELEASE ORAL at 21:36

## 2019-06-21 RX ADMIN — SIMVASTATIN 20 MG: 20 TABLET, FILM COATED ORAL at 21:36

## 2019-06-21 RX ADMIN — STANDARDIZED SENNA CONCENTRATE 17.2 MG: 8.6 TABLET ORAL at 21:36

## 2019-06-21 RX ADMIN — DOXAZOSIN 1 MG: 1 TABLET ORAL at 17:11

## 2019-06-21 RX ADMIN — ALLOPURINOL 100 MG: 100 TABLET ORAL at 09:26

## 2019-06-21 RX ADMIN — LORAZEPAM 0.5 MG: 0.5 TABLET ORAL at 21:36

## 2019-06-21 RX ADMIN — CARVEDILOL 25 MG: 25 TABLET, FILM COATED ORAL at 09:25

## 2019-06-21 RX ADMIN — IPRATROPIUM BROMIDE 0.5 MG: 0.5 SOLUTION RESPIRATORY (INHALATION) at 15:07

## 2019-06-21 RX ADMIN — HEPARIN SODIUM 5000 UNITS: 5000 INJECTION INTRAVENOUS; SUBCUTANEOUS at 21:35

## 2019-06-21 RX ADMIN — BUDESONIDE 500 MCG: 0.5 INHALANT RESPIRATORY (INHALATION) at 07:06

## 2019-06-21 RX ADMIN — GUAIFENESIN 600 MG: 600 TABLET, EXTENDED RELEASE ORAL at 09:26

## 2019-06-21 RX ADMIN — DOXYCYCLINE HYCLATE 100 MG: 100 CAPSULE ORAL at 21:36

## 2019-06-21 RX ADMIN — LEVOTHYROXINE SODIUM 100 MCG: 0.1 TABLET ORAL at 05:24

## 2019-06-21 RX ADMIN — STANDARDIZED SENNA CONCENTRATE 17.2 MG: 8.6 TABLET ORAL at 09:25

## 2019-06-21 RX ADMIN — BUPROPION HYDROCHLORIDE 150 MG: 150 TABLET, FILM COATED, EXTENDED RELEASE ORAL at 09:25

## 2019-06-21 RX ADMIN — IPRATROPIUM BROMIDE 0.5 MG: 0.5 SOLUTION RESPIRATORY (INHALATION) at 07:07

## 2019-06-21 RX ADMIN — LOSARTAN POTASSIUM 50 MG: 50 TABLET ORAL at 09:26

## 2019-06-21 RX ADMIN — PREGABALIN 150 MG: 150 CAPSULE ORAL at 21:35

## 2019-06-21 RX ADMIN — LORAZEPAM 0.5 MG: 0.5 TABLET ORAL at 15:18

## 2019-06-21 ASSESSMENT — PAIN SCALES - GENERAL
PAINLEVEL_OUTOF10: 0
PAINLEVEL_OUTOF10: 4
PAINLEVEL_OUTOF10: 0

## 2019-06-21 ASSESSMENT — ENCOUNTER SYMPTOMS
SHORTNESS OF BREATH: 0
CONSTIPATION: 0
NAUSEA: 0
COUGH: 0
DIARRHEA: 0
VOMITING: 0
BACK PAIN: 0

## 2019-06-21 NOTE — PROGRESS NOTES
Nutrition Assessment (Low Risk)    Type and Reason for Visit: Initial(LOS)     Nutrition Assessment:  Patient assessed for nutritional risk. Deemed to be at low risk at this time. Will continue to monitor for changes in status. Pt is stable from nutritional standpoint, has fair appetite and intake.      Malnutrition Assessment:  · Malnutrition Status: No malnutrition    Nutrition Risk Level   Risk Level: Low    Nutrition Diagnosis:   · Problem: Inadequate oral intake  · Etiology: Acute injury/trauma, Early satiety    Signs and symptoms: Intake 50-75%    Nutrition Intervention:  Food and/or Delivery: Continue current diet  Nutrition Education/Counseling/Coordination of Care:  Continued Inpatient Monitoring      Electronically signed by Db Bourgeois MS, RD, LD on 6/21/19 at 1:27 PM    Contact Number: [phone]

## 2019-06-21 NOTE — PROGRESS NOTES
Occupational Therapy   Occupational Therapy Initial Assessment  Date: 2019   Patient Name: Lynda Jimenez  MRN: [id number]     : 1945    Date of Service: 2019    Discharge Recommendations:  Patient would benefit from continued therapy after discharge       Assessment   Performance deficits / Impairments: Decreased functional mobility ; Decreased ADL status; Decreased ROM; Decreased strength;Decreased cognition;Decreased endurance;Decreased high-level IADLs;Decreased safe awareness  Assessment: Evaluation and tx completed. The patient would benefit from further therapy to upgrade functional independence with ADL and mobility  Treatment Diagnosis: Acute Respiratory Failure, PNA  History: LLIF L4-5 May 2019 Dr. Misa Montanez; generalized weakness, Adult Failure to Thrive  REQUIRES OT FOLLOW UP: Yes  Activity Tolerance  Activity Tolerance: Patient Tolerated treatment well  Safety Devices  Safety Devices in place: Yes  Type of devices: Call light within reach; Chair alarm in place; Left in chair;Nurse notified           Patient Diagnosis(es): The primary encounter diagnosis was HCAP (healthcare-associated pneumonia). Diagnoses of Hypoxia and Renal insufficiency were also pertinent to this visit. has a past medical history of Allergic rhinitis, Anxiety, Arthritis, Asthma, Chronic back pain, Chronic kidney disease, DDD (degenerative disc disease), lumbar, Depression, GERD (gastroesophageal reflux disease), Hyperlipidemia, Hypertension, Hypothyroidism, Irritable bowel syndrome, Palliative care patient, Restless legs syndrome, and Spondylolisthesis at L4-L5 level.   has a past surgical history that includes Hysterectomy; Cervical spine surgery (x 2); Ankle fracture surgery (Left); Hand surgery (Left, 2007); Ovary removal (Right, 3/2010); Cholecystectomy (2015); Foot surgery (Left, 2015);  Total knee arthroplasty (Bilateral); shoulder surgery (Right, 2018); pr [facility] incl fluor gdnce dx w/cell washg spx (N/A,

## 2019-06-22 VITALS
HEIGHT: 65 IN | HEART RATE: 86 BPM | RESPIRATION RATE: 16 BRPM | OXYGEN SATURATION: 95 % | BODY MASS INDEX: 30.66 KG/M2 | TEMPERATURE: 97 F | SYSTOLIC BLOOD PRESSURE: 126 MMHG | WEIGHT: 184 LBS | DIASTOLIC BLOOD PRESSURE: 71 MMHG

## 2019-06-22 PROCEDURE — 2700000000 HC OXYGEN THERAPY PER DAY

## 2019-06-22 PROCEDURE — 2580000003 HC RX 258: Performed by: INTERNAL MEDICINE

## 2019-06-22 PROCEDURE — 6370000000 HC RX 637 (ALT 250 FOR IP): Performed by: INTERNAL MEDICINE

## 2019-06-22 PROCEDURE — 94640 AIRWAY INHALATION TREATMENT: CPT

## 2019-06-22 PROCEDURE — 6360000002 HC RX W HCPCS: Performed by: INTERNAL MEDICINE

## 2019-06-22 PROCEDURE — 99239 HOSP IP/OBS DSCHRG MGMT >30: CPT | Performed by: INTERNAL MEDICINE

## 2019-06-22 PROCEDURE — 6370000000 HC RX 637 (ALT 250 FOR IP): Performed by: HOSPITALIST

## 2019-06-22 RX ORDER — LORAZEPAM 1 MG/1
1 TABLET ORAL EVERY 8 HOURS PRN
Qty: 90 TABLET | Refills: 0 | Status: ON HOLD | OUTPATIENT
Start: 2019-06-22 | End: 2019-07-05 | Stop reason: HOSPADM

## 2019-06-22 RX ORDER — OXYCODONE AND ACETAMINOPHEN 7.5; 325 MG/1; MG/1
1 TABLET ORAL EVERY 6 HOURS PRN
Qty: 120 TABLET | Refills: 0 | Status: ON HOLD | OUTPATIENT
Start: 2019-06-22 | End: 2019-07-05 | Stop reason: HOSPADM

## 2019-06-22 RX ADMIN — DOXYCYCLINE HYCLATE 100 MG: 100 CAPSULE ORAL at 08:21

## 2019-06-22 RX ADMIN — GUAIFENESIN 600 MG: 600 TABLET, EXTENDED RELEASE ORAL at 08:21

## 2019-06-22 RX ADMIN — HEPARIN SODIUM 5000 UNITS: 5000 INJECTION INTRAVENOUS; SUBCUTANEOUS at 05:56

## 2019-06-22 RX ADMIN — BUPROPION HYDROCHLORIDE 150 MG: 150 TABLET, FILM COATED, EXTENDED RELEASE ORAL at 08:21

## 2019-06-22 RX ADMIN — CARVEDILOL 25 MG: 25 TABLET, FILM COATED ORAL at 08:21

## 2019-06-22 RX ADMIN — LORAZEPAM 0.5 MG: 0.5 TABLET ORAL at 08:21

## 2019-06-22 RX ADMIN — LORAZEPAM 0.5 MG: 0.5 TABLET ORAL at 13:17

## 2019-06-22 RX ADMIN — AMLODIPINE BESYLATE 5 MG: 5 TABLET ORAL at 08:21

## 2019-06-22 RX ADMIN — BUDESONIDE 500 MCG: 0.5 INHALANT RESPIRATORY (INHALATION) at 06:27

## 2019-06-22 RX ADMIN — PREGABALIN 150 MG: 150 CAPSULE ORAL at 08:20

## 2019-06-22 RX ADMIN — LOSARTAN POTASSIUM 50 MG: 50 TABLET ORAL at 08:20

## 2019-06-22 RX ADMIN — CEFEPIME HYDROCHLORIDE 2 G: 2 INJECTION, POWDER, FOR SOLUTION INTRAVENOUS at 11:28

## 2019-06-22 RX ADMIN — Medication 10 ML: at 08:21

## 2019-06-22 RX ADMIN — IPRATROPIUM BROMIDE 0.5 MG: 0.5 SOLUTION RESPIRATORY (INHALATION) at 06:27

## 2019-06-22 RX ADMIN — LEVOTHYROXINE SODIUM 100 MCG: 0.1 TABLET ORAL at 05:56

## 2019-06-22 RX ADMIN — STANDARDIZED SENNA CONCENTRATE 17.2 MG: 8.6 TABLET ORAL at 08:21

## 2019-06-22 RX ADMIN — STANDARDIZED SENNA CONCENTRATE 17.2 MG: 8.6 TABLET ORAL at 13:17

## 2019-06-22 RX ADMIN — IPRATROPIUM BROMIDE 0.5 MG: 0.5 SOLUTION RESPIRATORY (INHALATION) at 10:03

## 2019-06-22 RX ADMIN — PANTOPRAZOLE SODIUM 40 MG: 40 TABLET, DELAYED RELEASE ORAL at 08:21

## 2019-06-22 RX ADMIN — ACETAMINOPHEN 650 MG: 325 TABLET ORAL at 01:07

## 2019-06-22 RX ADMIN — ALLOPURINOL 100 MG: 100 TABLET ORAL at 08:21

## 2019-06-22 RX ADMIN — HEPARIN SODIUM 5000 UNITS: 5000 INJECTION INTRAVENOUS; SUBCUTANEOUS at 13:17

## 2019-06-22 RX ADMIN — IPRATROPIUM BROMIDE 0.5 MG: 0.5 SOLUTION RESPIRATORY (INHALATION) at 14:23

## 2019-06-22 ASSESSMENT — PAIN DESCRIPTION - PROGRESSION: CLINICAL_PROGRESSION: NOT CHANGED

## 2019-06-22 ASSESSMENT — PAIN SCALES - GENERAL
PAINLEVEL_OUTOF10: 0
PAINLEVEL_OUTOF10: 4

## 2019-06-22 ASSESSMENT — PAIN - FUNCTIONAL ASSESSMENT: PAIN_FUNCTIONAL_ASSESSMENT: ACTIVITIES ARE NOT PREVENTED

## 2019-06-22 ASSESSMENT — PAIN DESCRIPTION - ORIENTATION: ORIENTATION: RIGHT

## 2019-06-22 ASSESSMENT — PAIN DESCRIPTION - DESCRIPTORS: DESCRIPTORS: ACHING

## 2019-06-22 ASSESSMENT — PAIN DESCRIPTION - DIRECTION: RADIATING_TOWARDS: NO

## 2019-06-22 ASSESSMENT — PAIN DESCRIPTION - PAIN TYPE: TYPE: CHRONIC PAIN

## 2019-06-22 ASSESSMENT — PAIN DESCRIPTION - LOCATION: LOCATION: LEG

## 2019-06-22 ASSESSMENT — PAIN DESCRIPTION - ONSET: ONSET: ON-GOING

## 2019-06-22 ASSESSMENT — PAIN DESCRIPTION - FREQUENCY: FREQUENCY: INTERMITTENT

## 2019-06-22 NOTE — PLAN OF CARE
Problem: Falls - Risk of:  Goal: Will remain free from falls  Description  Will remain free from falls  Outcome: Ongoing  Goal: Absence of physical injury  Description  Absence of physical injury  Outcome: Ongoing     Problem: Risk for Impaired Skin Integrity  Goal: Tissue integrity - skin and mucous membranes  Description  Structural intactness and normal physiological function of skin and  mucous membranes.   Outcome: Ongoing     Problem: Confusion - Acute:  Goal: Absence of continued neurological deterioration signs and symptoms  Description  Absence of continued neurological deterioration signs and symptoms  Outcome: Ongoing  Goal: Mental status will be restored to baseline  Description  Mental status will be restored to baseline  Outcome: Ongoing     Problem: Discharge Planning:  Goal: Ability to perform activities of daily living will improve  Description  Ability to perform activities of daily living will improve  Outcome: Ongoing  Goal: Participates in care planning  Description  Participates in care planning  Outcome: Ongoing     Problem: Injury - Risk of, Physical Injury:  Goal: Will remain free from falls  Description  Will remain free from falls  Outcome: Ongoing  Goal: Absence of physical injury  Description  Absence of physical injury  Outcome: Ongoing     Problem: Mood - Altered:  Goal: Mood stable  Description  Mood stable  Outcome: Ongoing  Goal: Absence of abusive behavior  Description  Absence of abusive behavior  Outcome: Ongoing  Goal: Verbalizations of feeling emotionally comfortable while being cared for will increase  Description  Verbalizations of feeling emotionally comfortable while being cared for will increase  Outcome: Ongoing     Problem: Psychomotor Activity - Altered:  Goal: Absence of psychomotor disturbance signs and symptoms  Description  Absence of psychomotor disturbance signs and symptoms  Outcome: Ongoing     Problem: Sensory Perception - Impaired:  Goal: Demonstrations of
Ongoing  6/22/2019 0445 by Maria G Fajardo LPN  Outcome: Ongoing  Goal: Demonstrates accurate environmental perceptions  Description  Demonstrates accurate environmental perceptions  6/22/2019 0828 by Marrianne Leyden, RN  Outcome: Ongoing  6/22/2019 0445 by Maria G Fajardo LPN  Outcome: Ongoing  Goal: Able to distinguish between reality-based and nonreality-based thinking  Description  Able to distinguish between reality-based and nonreality-based thinking  6/22/2019 0828 by Marrianne Leyden, RN  Outcome: Ongoing  6/22/2019 0445 by Maria G Fajardo LPN  Outcome: Ongoing  Goal: Able to interrupt nonreality-based thinking  Description  Able to interrupt nonreality-based thinking  6/22/2019 0828 by Marrianne Leyden, RN  Outcome: Ongoing  6/22/2019 0445 by Maria G Fajardo LPN  Outcome: Ongoing     Problem: Sleep Pattern Disturbance:  Goal: Appears well-rested  Description  Appears well-rested  6/22/2019 0828 by Marrianne Leyden, RN  Outcome: Ongoing  6/22/2019 0445 by Maria G Fajardo LPN  Outcome: Ongoing     Problem: Pain:  Goal: Pain level will decrease  Description  Pain level will decrease  6/22/2019 0828 by Marrianne Leyden, RN  Outcome: Ongoing  6/22/2019 0445 by Maria G Fajardo LPN  Outcome: Ongoing  Goal: Control of acute pain  Description  Control of acute pain  6/22/2019 0828 by Marrianne Leyden, RN  Outcome: Ongoing  6/22/2019 0445 by Maria G Fajardo LPN  Outcome: Ongoing  Goal: Control of chronic pain  Description  Control of chronic pain  6/22/2019 0828 by Marrianne Leyden, RN  Outcome: Ongoing  6/22/2019 0445 by Maria G Fajardo LPN  Outcome: Ongoing

## 2019-06-22 NOTE — DISCHARGE INSTR - COC
Continuity of Care Form    Patient Name: Danica Andino   :  1945  MRN:  [id number]    Admit date:  6/15/2019  Discharge date:  19    Code Status Order: Full Code   Advance Directives:   [facility] Documentation     Date/Time Healthcare Directive Type of Healthcare Directive Copy in [address] Agent's Name Healthcare Agent's Phone Number    06/15/19 193  Yes, patient has an advance directive for healthcare treatment  Living will  Yes, copy in chart  Adult [address]  --          Admitting Physician:  Claudio Youngblood DO  PCP: Ludmila Gibson MD    Discharging Nurse:  [address] [place] [address] Unit/Room#: 0430/430-01  Discharging Unit Phone Number: [phone]    Emergency Contact:   Extended Emergency Contact Information  [facility] [address]  Phone: [phone]  Mobile Phone: [phone]  Relation: Child  Secondary Emergency Contact: La Kenyon  [address]  Phone: [phone]  Relation: Child    Past Surgical History:  Past Surgical History:   Procedure Laterality Date    ANKLE FRACTURE SURGERY Left     metal plate & screws    CERVICAL SPINE SURGERY  x 2    WITH HARDWARE    CHOLECYSTECTOMY  2015    Dr. Fernanda Askew Left 2015    Taya correction - Dr. Fred Penn HAND SURGERY Left 2007    Thumb implant    HYSTERECTOMY      JOINT REPLACEMENT Bilateral     TKR    JOINT REPLACEMENT Right     TSR    JOINT REPLACEMENT Left     THUMB    LUMBAR FUSION Left 2019    LEFT L4-5 LLIF WITH POSTERIOR SPINAL FUSION WITH INSTRUMENTATION performed by Flavai Vo MD at [address] [place] [address] Right 3/2010    [place] [address] INCL FLUOR GDNCE DX W/CELL [facility] SPX N/A 2018    BRONCHOSCOPY AND BAL with Nurse sedation performed by Escobar Vergara MD at [facility]    SHOULDER SURGERY Right 2018    Dr. Dallas Segura - Total reversal replacement    TOTAL KNEE Care Documentation and Therapy:        Elimination:  Continence:   · Bowel: No  · Bladder: No  Urinary Catheter: None   Colostomy/Ileostomy/Ileal Conduit: No       Date of Last BM: 6/22/19    Intake/Output Summary (Last 24 hours) at 6/22/2019 0953  Last data filed at 6/22/2019 0826  Gross per 24 hour   Intake 1680 ml   Output 975 ml   Net 705 ml     I/O last 3 completed shifts: In: 4773 [P.O.:1650; I.V.:20]  Out: 975 [Urine:975]    Safety Concerns: At Risk for Falls    Impairments/Disabilities:      Vision    Nutrition Therapy:  Current Nutrition Therapy:   - Oral Diet:  Cardiac    Routes of Feeding: Oral  Liquids: No restrictions  Daily Fluid Restriction: no  Last Modified Barium Swallow with Video (Video Swallowing Test): not done    Treatments at the Time of Hospital Discharge:   Respiratory Treatments: nebulizers  Oxygen Therapy:  is on oxygen at 3 L/min per nasal cannula.   Ventilator:    - No ventilator support    Rehab Therapies: Physical Therapy, Occupational Therapy and Speech/Language Therapy  Weight Bearing Status/Restrictions: No weight bearing restirctions  Other Medical Equipment (for information only, NOT a DME order):  bedside commode and hospital bed  Other Treatments: n/a    Patient's personal belongings (please select all that are sent with patient):  None    RN SIGNATURE:  Electronically signed by Anna Marie Rizvi RN on 6/22/19 at 9:58 AM    CASE MANAGEMENT/SOCIAL WORK SECTION    Inpatient Status Date: ***    Readmission Risk Assessment Score:  Readmission Risk              Risk of Unplanned Readmission:        18           Discharging to Facility/ Agency   · Name:   · Address:  · Phone:  · Fax:    Dialysis Facility (if applicable)   · Name:  · Address:  · Dialysis Schedule:  · Phone:  · Fax:    / signature: {Esignature:[id number]}    PHYSICIAN SECTION    Prognosis: {Prognosis:2268859132}    Condition at Discharge: [address] [place] [address] Patient Condition:065854659}    Rehab Potential (if transferring to Rehab): {Prognosis:0838483309}    Recommended Labs or Other Treatments After Discharge: ***    Physician Certification: I certify the above information and transfer of Lynda Jimenez  is necessary for the continuing treatment of the diagnosis listed and that she requires {Admit to Appropriate Level of Care:38046} for {GREATER/LESS:186340837} 30 days.      Update Admission H&P: {CHP DME Changes in LTNCJ:572615391}    PHYSICIAN SIGNATURE:  {Esignature:[id number]}

## 2019-06-22 NOTE — DISCHARGE SUMMARY
Discharge Summary    Stacia Hamman  :  1945  MRN:  [id number]    Admit date:  6/15/2019  Discharge date:  19    Admitting Physician:  Valerie Andrews DO    Advance Directive: Full Code    Consults: none    Primary Care Physician:  Nelson Bryant MD    Discharge Diagnoses:  Principal Problem (Resolved):    Acute respiratory failure with hypoxia [facility])  Active Problems:    Hypertension    Hyperlipidemia    Hypothyroid    Palliative care patient  Resolved Problems:    HCAP (healthcare-associated pneumonia)      Significant Diagnostic Studies:   Xr Chest Standard (2 Vw)    Result Date: 6/15/2019  XR CHEST (2 VW) 6/15/2019 12:00 PM HISTORY: Cough and shortness of air COMPARISON: 2019. FINDINGS: Frontal and lateral views of the chest were obtained. Lung volumes are decreased. Hazy opacities are likely due to body habitus and poor penetration. The cardiomediastinal silhouette and pulmonary vascularity are unchanged. The osseous structures and surrounding soft tissues demonstrate no acute abnormality. 1. Low lung volumes and bilateral hazy opacities. Findings are likely due to technique and low depth of inspiration. There is no focal consolidation. Signed by Dr Murphy Drivers on 6/15/2019 1:31 PM    Cta Pulmonary W Contrast    Result Date: 6/15/2019  CTA PULMONARY W CONTRAST 6/15/2019 1:15 PM HISTORY: Shortness of air and hypoxia COMPARISON: 2009. DLP: 667 mGy cm. In order to have a CT radiation dose as low as reasonably achievable, Automated Exposure Control was utilized for adjustment of the mA and/or KV according to patient size. TECHNIQUE: Helical tomographic images of the chest were obtained after the administration of intravenous contrast following angiogram protocol. Additionally, 3D MIP reconstructions in the coronal and sagittal planes were provided. FINDINGS:  Pulmonary arteries: Evaluation of the lower lobar pulmonary arteries is limited due to timing of the contrast bolus.  The majority of capsule by mouth 3 times daily as needed for Cough             buPROPion (WELLBUTRIN XL) 150 MG extended release tablet  Take 150 mg by mouth every morning             carvedilol (COREG) 25 MG tablet  Take 1 tablet by mouth 2 times daily (with meals)             cetirizine (ZYRTEC) 10 MG tablet  Take 10 mg by mouth daily             Cholecalciferol (VITAMIN D3) 2000 units CAPS  Take 1 capsule by mouth nightly             doxepin (SINEQUAN) 50 MG capsule  Take 2 capsules by mouth nightly             EPINEPHrine (EPIPEN) 0.3 MG/0.3ML SOAJ injection  Inject 0.3 mg into the muscle as needed. Use as directed for allergic reaction             FLUoxetine (PROZAC) 40 MG capsule  Take 40 mg by mouth daily             guaiFENesin (MUCINEX) 600 MG extended release tablet  Take 1 tablet by mouth 2 times daily             levothyroxine (SYNTHROID) 100 MCG tablet  Take 100 mcg by mouth Daily             LORazepam (ATIVAN) 1 MG tablet  Take 0.5 mg by mouth 3 times daily. mometasone-formoterol (DULERA) 100-5 MCG/ACT inhaler  Inhale 2 puffs into the lungs 2 times daily              Omega-3 Fatty Acids (GNP FISH OIL PO)  Take 520 mg by mouth nightly             oxybutynin (DITROPAN-XL) 10 MG extended release tablet  Take 1 tablet by mouth daily             oxyCODONE-acetaminophen (PERCOCET) 7.5-325 MG per tablet  Take 1 tablet by mouth every 6 hours as needed for Pain.             pantoprazole (PROTONIX) 40 MG tablet  Take 40 mg by mouth daily             polyethylene glycol (GLYCOLAX) powder  Take 17 g by mouth daily             pregabalin (LYRICA) 150 MG capsule  Take 150 mg by mouth 2 times daily. senna (SENOKOT) 8.6 MG tablet  Take 2 tablets by mouth 3 times daily             simvastatin (ZOCOR) 20 MG tablet  Take 20 mg by mouth nightly                 Discharge Instructions: Follow up with Kim Mcnulty MD in 3-5 days. Take medications as directed. Resume activity as tolerated.     Diet: DIET CARDIAC; Disposition: Patient is medically stable and will be discharged to [address] [place] [address]. Time spent on discharge >30 minutes.     Signed:  Juan Wen DO

## 2019-06-23 ENCOUNTER — APPOINTMENT (OUTPATIENT)
Dept: CT IMAGING | Age: 74
DRG: 196 | End: 2019-06-23
Payer: MEDICARE

## 2019-06-23 ENCOUNTER — APPOINTMENT (OUTPATIENT)
Dept: GENERAL RADIOLOGY | Age: 74
DRG: 196 | End: 2019-06-23
Payer: MEDICARE

## 2019-06-23 ENCOUNTER — HOSPITAL ENCOUNTER (INPATIENT)
Age: 74
LOS: 12 days | Discharge: HOME | DRG: 196 | End: 2019-07-05
Attending: EMERGENCY MEDICINE | Admitting: INTERNAL MEDICINE
Payer: MEDICARE

## 2019-06-23 DIAGNOSIS — G89.29 CHRONIC NECK AND BACK PAIN: ICD-10-CM

## 2019-06-23 DIAGNOSIS — M54.9 CHRONIC NECK AND BACK PAIN: ICD-10-CM

## 2019-06-23 DIAGNOSIS — R09.02 HYPOXIA: ICD-10-CM

## 2019-06-23 DIAGNOSIS — N17.9 ACUTE KIDNEY INJURY (HCC): Primary | ICD-10-CM

## 2019-06-23 DIAGNOSIS — I95.9 HYPOTENSION, UNSPECIFIED HYPOTENSION TYPE: ICD-10-CM

## 2019-06-23 DIAGNOSIS — R55 SYNCOPE, UNSPECIFIED SYNCOPE TYPE: ICD-10-CM

## 2019-06-23 DIAGNOSIS — M54.2 CHRONIC NECK AND BACK PAIN: ICD-10-CM

## 2019-06-23 DIAGNOSIS — M51.36 DDD (DEGENERATIVE DISC DISEASE), LUMBAR: ICD-10-CM

## 2019-06-23 DIAGNOSIS — J18.9 PNEUMONIA DUE TO ORGANISM: ICD-10-CM

## 2019-06-23 PROBLEM — N18.30 ACUTE RENAL FAILURE SUPERIMPOSED ON STAGE 3 CHRONIC KIDNEY DISEASE (HCC): Status: ACTIVE | Noted: 2019-06-23

## 2019-06-23 PROBLEM — E87.5 HYPERKALEMIA: Status: ACTIVE | Noted: 2019-06-23

## 2019-06-23 LAB
ALBUMIN SERPL-MCNC: 3.3 G/DL (ref 3.5–5.2)
ALP BLD-CCNC: 129 U/L (ref 35–104)
ALT SERPL-CCNC: 31 U/L (ref 5–33)
ANION GAP SERPL CALCULATED.3IONS-SCNC: 15 MMOL/L (ref 7–19)
AST SERPL-CCNC: 39 U/L (ref 5–32)
BACTERIA: NEGATIVE /HPF
BASE EXCESS ARTERIAL: -9.3 MMOL/L (ref -2–2)
BASOPHILS ABSOLUTE: 0.1 K/UL (ref 0–0.2)
BASOPHILS RELATIVE PERCENT: 0.8 % (ref 0–1)
BILIRUB SERPL-MCNC: <0.2 MG/DL (ref 0.2–1.2)
BILIRUBIN URINE: NEGATIVE
BLOOD, URINE: ABNORMAL
BUN BLDV-MCNC: 48 MG/DL (ref 8–23)
CALCIUM SERPL-MCNC: 10.7 MG/DL (ref 8.8–10.2)
CARBOXYHEMOGLOBIN ARTERIAL: 1.6 % (ref 0–5)
CHLORIDE BLD-SCNC: 102 MMOL/L (ref 98–111)
CLARITY: ABNORMAL
CO2: 18 MMOL/L (ref 22–29)
COLOR: YELLOW
CREAT SERPL-MCNC: 2.8 MG/DL (ref 0.5–0.9)
EOSINOPHILS ABSOLUTE: 0.3 K/UL (ref 0–0.6)
EOSINOPHILS RELATIVE PERCENT: 2 % (ref 0–5)
EPITHELIAL CELLS, UA: 0 /HPF (ref 0–5)
GFR NON-AFRICAN AMERICAN: 16
GLUCOSE BLD-MCNC: 135 MG/DL (ref 74–109)
GLUCOSE URINE: NEGATIVE MG/DL
HCO3 ARTERIAL: 16.2 MMOL/L (ref 22–26)
HCT VFR BLD CALC: 35.4 % (ref 37–47)
HEMOGLOBIN, ART, EXTENDED: 10.8 G/DL (ref 12–16)
HEMOGLOBIN: 11.1 G/DL (ref 12–16)
HYALINE CASTS: 0 /HPF (ref 0–8)
KETONES, URINE: NEGATIVE MG/DL
LACTIC ACID: 1.3 MMOL/L (ref 0.5–1.9)
LEUKOCYTE ESTERASE, URINE: NEGATIVE
LYMPHOCYTES ABSOLUTE: 1.9 K/UL (ref 1.1–4.5)
LYMPHOCYTES RELATIVE PERCENT: 12.2 % (ref 20–40)
MCH RBC QN AUTO: 30.2 PG (ref 27–31)
MCHC RBC AUTO-ENTMCNC: 31.4 G/DL (ref 33–37)
MCV RBC AUTO: 96.5 FL (ref 81–99)
METHEMOGLOBIN ARTERIAL: 0.6 %
MONOCYTES ABSOLUTE: 1.6 K/UL (ref 0–0.9)
MONOCYTES RELATIVE PERCENT: 10.7 % (ref 0–10)
NEUTROPHILS ABSOLUTE: 10.3 K/UL (ref 1.5–7.5)
NEUTROPHILS RELATIVE PERCENT: 67.9 % (ref 50–65)
NITRITE, URINE: NEGATIVE
O2 CONTENT ARTERIAL: 14.1 ML/DL
O2 SAT, ARTERIAL: 92.5 %
O2 THERAPY: ABNORMAL
PCO2 ARTERIAL: 33 MMHG (ref 35–45)
PDW BLD-RTO: 13.4 % (ref 11.5–14.5)
PH ARTERIAL: 7.3 (ref 7.35–7.45)
PH UA: 6 (ref 5–8)
PLATELET # BLD: 292 K/UL (ref 130–400)
PMV BLD AUTO: 10.8 FL (ref 9.4–12.3)
PO2 ARTERIAL: 68 MMHG (ref 80–100)
POTASSIUM REFLEX MAGNESIUM: 5.4 MMOL/L (ref 3.5–5)
POTASSIUM, WHOLE BLOOD: 4.6
PRO-BNP: 258 PG/ML (ref 0–1800)
PROTEIN UA: ABNORMAL MG/DL
RBC # BLD: 3.67 M/UL (ref 4.2–5.4)
RBC UA: 5 /HPF (ref 0–4)
SODIUM BLD-SCNC: 135 MMOL/L (ref 136–145)
SPECIFIC GRAVITY UA: 1.01 (ref 1–1.03)
TOTAL PROTEIN: 7.8 G/DL (ref 6.6–8.7)
TROPONIN: <0.01 NG/ML (ref 0–0.03)
TSH SERPL DL<=0.05 MIU/L-ACNC: 10.59 UIU/ML (ref 0.27–4.2)
URINE REFLEX TO CULTURE: ABNORMAL
UROBILINOGEN, URINE: 0.2 E.U./DL
WBC # BLD: 15.2 K/UL (ref 4.8–10.8)
WBC UA: 0 /HPF (ref 0–5)

## 2019-06-23 PROCEDURE — 99223 1ST HOSP IP/OBS HIGH 75: CPT | Performed by: INTERNAL MEDICINE

## 2019-06-23 PROCEDURE — 70450 CT HEAD/BRAIN W/O DYE: CPT

## 2019-06-23 PROCEDURE — 1210000000 HC MED SURG R&B

## 2019-06-23 PROCEDURE — 80053 COMPREHEN METABOLIC PANEL: CPT

## 2019-06-23 PROCEDURE — 2700000000 HC OXYGEN THERAPY PER DAY

## 2019-06-23 PROCEDURE — 2580000003 HC RX 258: Performed by: EMERGENCY MEDICINE

## 2019-06-23 PROCEDURE — 84132 ASSAY OF SERUM POTASSIUM: CPT

## 2019-06-23 PROCEDURE — 6360000002 HC RX W HCPCS: Performed by: INTERNAL MEDICINE

## 2019-06-23 PROCEDURE — 99285 EMERGENCY DEPT VISIT HI MDM: CPT | Performed by: EMERGENCY MEDICINE

## 2019-06-23 PROCEDURE — 96365 THER/PROPH/DIAG IV INF INIT: CPT

## 2019-06-23 PROCEDURE — 83880 ASSAY OF NATRIURETIC PEPTIDE: CPT

## 2019-06-23 PROCEDURE — 85025 COMPLETE CBC W/AUTO DIFF WBC: CPT

## 2019-06-23 PROCEDURE — 6360000002 HC RX W HCPCS: Performed by: EMERGENCY MEDICINE

## 2019-06-23 PROCEDURE — 2580000003 HC RX 258: Performed by: INTERNAL MEDICINE

## 2019-06-23 PROCEDURE — 93005 ELECTROCARDIOGRAM TRACING: CPT

## 2019-06-23 PROCEDURE — 81001 URINALYSIS AUTO W/SCOPE: CPT

## 2019-06-23 PROCEDURE — 36600 WITHDRAWAL OF ARTERIAL BLOOD: CPT

## 2019-06-23 PROCEDURE — 84484 ASSAY OF TROPONIN QUANT: CPT

## 2019-06-23 PROCEDURE — 71045 X-RAY EXAM CHEST 1 VIEW: CPT

## 2019-06-23 PROCEDURE — 6370000000 HC RX 637 (ALT 250 FOR IP): Performed by: INTERNAL MEDICINE

## 2019-06-23 PROCEDURE — 36415 COLL VENOUS BLD VENIPUNCTURE: CPT

## 2019-06-23 PROCEDURE — 99285 EMERGENCY DEPT VISIT HI MDM: CPT

## 2019-06-23 PROCEDURE — 87040 BLOOD CULTURE FOR BACTERIA: CPT

## 2019-06-23 PROCEDURE — 83605 ASSAY OF LACTIC ACID: CPT

## 2019-06-23 PROCEDURE — 84443 ASSAY THYROID STIM HORMONE: CPT

## 2019-06-23 PROCEDURE — 82803 BLOOD GASES ANY COMBINATION: CPT

## 2019-06-23 RX ORDER — OXYBUTYNIN CHLORIDE 5 MG/1
10 TABLET, EXTENDED RELEASE ORAL DAILY
Status: DISCONTINUED | OUTPATIENT
Start: 2019-06-24 | End: 2019-06-28

## 2019-06-23 RX ORDER — CEFEPIME HYDROCHLORIDE 2 G/50ML
2 INJECTION, SOLUTION INTRAVENOUS ONCE
Status: DISCONTINUED | OUTPATIENT
Start: 2019-06-23 | End: 2019-06-23 | Stop reason: SDUPTHER

## 2019-06-23 RX ORDER — BUDESONIDE 0.25 MG/2ML
0.25 INHALANT ORAL 2 TIMES DAILY
Status: DISCONTINUED | OUTPATIENT
Start: 2019-06-23 | End: 2019-07-05 | Stop reason: HOSPADM

## 2019-06-23 RX ORDER — LEVOTHYROXINE SODIUM 0.1 MG/1
100 TABLET ORAL DAILY
Status: DISCONTINUED | OUTPATIENT
Start: 2019-06-24 | End: 2019-07-05 | Stop reason: HOSPADM

## 2019-06-23 RX ORDER — 0.9 % SODIUM CHLORIDE 0.9 %
500 INTRAVENOUS SOLUTION INTRAVENOUS ONCE
Status: COMPLETED | OUTPATIENT
Start: 2019-06-23 | End: 2019-06-23

## 2019-06-23 RX ORDER — FLUOXETINE HYDROCHLORIDE 20 MG/1
40 CAPSULE ORAL DAILY
Status: DISCONTINUED | OUTPATIENT
Start: 2019-06-24 | End: 2019-06-24

## 2019-06-23 RX ORDER — SODIUM CHLORIDE 0.9 % (FLUSH) 0.9 %
10 SYRINGE (ML) INJECTION EVERY 12 HOURS SCHEDULED
Status: DISCONTINUED | OUTPATIENT
Start: 2019-06-23 | End: 2019-07-05 | Stop reason: HOSPADM

## 2019-06-23 RX ORDER — PANTOPRAZOLE SODIUM 40 MG/1
40 TABLET, DELAYED RELEASE ORAL DAILY
Status: DISCONTINUED | OUTPATIENT
Start: 2019-06-24 | End: 2019-07-05 | Stop reason: HOSPADM

## 2019-06-23 RX ORDER — SIMVASTATIN 20 MG
20 TABLET ORAL NIGHTLY
Status: DISCONTINUED | OUTPATIENT
Start: 2019-06-23 | End: 2019-07-05 | Stop reason: HOSPADM

## 2019-06-23 RX ORDER — SODIUM CHLORIDE 9 MG/ML
INJECTION, SOLUTION INTRAVENOUS CONTINUOUS
Status: DISCONTINUED | OUTPATIENT
Start: 2019-06-23 | End: 2019-06-28

## 2019-06-23 RX ORDER — ONDANSETRON 2 MG/ML
4 INJECTION INTRAMUSCULAR; INTRAVENOUS EVERY 6 HOURS PRN
Status: DISCONTINUED | OUTPATIENT
Start: 2019-06-23 | End: 2019-07-05 | Stop reason: HOSPADM

## 2019-06-23 RX ORDER — SODIUM CHLORIDE 0.9 % (FLUSH) 0.9 %
10 SYRINGE (ML) INJECTION PRN
Status: DISCONTINUED | OUTPATIENT
Start: 2019-06-23 | End: 2019-07-05 | Stop reason: HOSPADM

## 2019-06-23 RX ORDER — POLYETHYLENE GLYCOL 3350 17 G/17G
17 POWDER, FOR SOLUTION ORAL DAILY
Status: DISCONTINUED | OUTPATIENT
Start: 2019-06-24 | End: 2019-07-05 | Stop reason: HOSPADM

## 2019-06-23 RX ORDER — IPRATROPIUM BROMIDE AND ALBUTEROL SULFATE 2.5; .5 MG/3ML; MG/3ML
1 SOLUTION RESPIRATORY (INHALATION)
Status: DISCONTINUED | OUTPATIENT
Start: 2019-06-24 | End: 2019-07-05 | Stop reason: HOSPADM

## 2019-06-23 RX ORDER — PREGABALIN 150 MG/1
150 CAPSULE ORAL DAILY
Status: DISCONTINUED | OUTPATIENT
Start: 2019-06-24 | End: 2019-06-28

## 2019-06-23 RX ORDER — GUAIFENESIN 600 MG/1
600 TABLET, EXTENDED RELEASE ORAL 2 TIMES DAILY
Status: DISCONTINUED | OUTPATIENT
Start: 2019-06-23 | End: 2019-06-28

## 2019-06-23 RX ORDER — BUPROPION HYDROCHLORIDE 150 MG/1
150 TABLET ORAL EVERY MORNING
Status: DISCONTINUED | OUTPATIENT
Start: 2019-06-24 | End: 2019-06-24

## 2019-06-23 RX ADMIN — SODIUM CHLORIDE 500 ML: 9 INJECTION, SOLUTION INTRAVENOUS at 17:12

## 2019-06-23 RX ADMIN — Medication 1000 MG: at 19:02

## 2019-06-23 RX ADMIN — SODIUM CHLORIDE: 9 INJECTION, SOLUTION INTRAVENOUS at 20:36

## 2019-06-23 RX ADMIN — CEFEPIME HYDROCHLORIDE 2 G: 2 INJECTION, POWDER, FOR SOLUTION INTRAVENOUS at 19:02

## 2019-06-23 RX ADMIN — ENOXAPARIN SODIUM 30 MG: 30 INJECTION SUBCUTANEOUS at 20:36

## 2019-06-23 ASSESSMENT — ENCOUNTER SYMPTOMS
CHOKING: 0
NAUSEA: 0
CONSTIPATION: 0
VOICE CHANGE: 0
EYE DISCHARGE: 0
SORE THROAT: 0
APNEA: 0
DIARRHEA: 0
SINUS PRESSURE: 0
SHORTNESS OF BREATH: 1
FACIAL SWELLING: 0
BLOOD IN STOOL: 0

## 2019-06-23 NOTE — LETTER
including skilled nursing facilities, home health agencies, inpatient rehabilitation facilities, and long term care hospitals. [facility] is working closely with the doctors and other health care providers that care for you during and following your hospital stay and for a period of time after you leave the hospital. By working together, the health care providers are trying to more efficiently provide well-managed, high quality, patient-centered care as you undergo treatment. Hospitals, doctors, and other health care providers that care for you following a hospital stay may receive an additional payment for providing better, more coordinated health care. Medicare will monitor your care to make sure you and others get high quality care. Your feedback is important     Medicare may also ask you to answer a survey about the services and care you received from [address] will be mailed to you. Your feedback will improve care for all people with Medicare who receive care from [facility]. Completion of this survey is optional.     Get more information     For more information about the Bundled Payments for [facility], you can:    · Visit the CMS BPCI Advanced Website at http://campbell-taylor.net/ initiatives/bpci-advanced   · Call the [facility] team at (075) 007-2375. · Call 1-800-MEDICARE (9-285.226.2239). TTY users can call 8-913.348.2597     If you have concerns or complaints about your care, talk to your health care provider, or contact your Beneficiary and Family Centered Quality Improvement Organization PRITESH TOLENTINO [facility]). To get your [facility]'s phone number, visit Medicare.gov/contacts or call 1-800-MEDICARE. · To find a different hospital, visit www. hospitalcompare.[facility].gov or call 1-[employer] MEDICARE (1-130.653.4999). TTY users should call 4-590.866.5081.

## 2019-06-24 PROBLEM — G93.41 ACUTE METABOLIC ENCEPHALOPATHY: Status: ACTIVE | Noted: 2019-06-24

## 2019-06-24 LAB
ALBUMIN SERPL-MCNC: 3 G/DL (ref 3.5–5.2)
ALP BLD-CCNC: 115 U/L (ref 35–104)
ALT SERPL-CCNC: 34 U/L (ref 5–33)
ANION GAP SERPL CALCULATED.3IONS-SCNC: 16 MMOL/L (ref 7–19)
AST SERPL-CCNC: 62 U/L (ref 5–32)
BASOPHILS ABSOLUTE: 0.1 K/UL (ref 0–0.2)
BASOPHILS RELATIVE PERCENT: 0.5 % (ref 0–1)
BILIRUB SERPL-MCNC: <0.2 MG/DL (ref 0.2–1.2)
BUN BLDV-MCNC: 51 MG/DL (ref 8–23)
CALCIUM SERPL-MCNC: 9.5 MG/DL (ref 8.8–10.2)
CHLORIDE BLD-SCNC: 107 MMOL/L (ref 98–111)
CO2: 16 MMOL/L (ref 22–29)
CREAT SERPL-MCNC: 2.4 MG/DL (ref 0.5–0.9)
EKG P AXIS: 68 DEGREES
EKG P-R INTERVAL: 192 MS
EKG Q-T INTERVAL: 384 MS
EKG QRS DURATION: 102 MS
EKG QTC CALCULATION (BAZETT): 417 MS
EKG T AXIS: 80 DEGREES
EOSINOPHILS ABSOLUTE: 0.1 K/UL (ref 0–0.6)
EOSINOPHILS RELATIVE PERCENT: 1.2 % (ref 0–5)
GFR NON-AFRICAN AMERICAN: 20
GLUCOSE BLD-MCNC: 92 MG/DL (ref 74–109)
HCT VFR BLD CALC: 32.8 % (ref 37–47)
HEMOGLOBIN: 10.2 G/DL (ref 12–16)
LYMPHOCYTES ABSOLUTE: 1.3 K/UL (ref 1.1–4.5)
LYMPHOCYTES RELATIVE PERCENT: 12.1 % (ref 20–40)
MCH RBC QN AUTO: 29.7 PG (ref 27–31)
MCHC RBC AUTO-ENTMCNC: 31.1 G/DL (ref 33–37)
MCV RBC AUTO: 95.3 FL (ref 81–99)
MONOCYTES ABSOLUTE: 1 K/UL (ref 0–0.9)
MONOCYTES RELATIVE PERCENT: 9.3 % (ref 0–10)
NEUTROPHILS ABSOLUTE: 8.1 K/UL (ref 1.5–7.5)
NEUTROPHILS RELATIVE PERCENT: 73.7 % (ref 50–65)
PDW BLD-RTO: 13.3 % (ref 11.5–14.5)
PLATELET # BLD: 263 K/UL (ref 130–400)
PMV BLD AUTO: 10.4 FL (ref 9.4–12.3)
POTASSIUM REFLEX MAGNESIUM: 4.2 MMOL/L (ref 3.5–5)
RBC # BLD: 3.44 M/UL (ref 4.2–5.4)
SODIUM BLD-SCNC: 139 MMOL/L (ref 136–145)
TOTAL PROTEIN: 7.3 G/DL (ref 6.6–8.7)
WBC # BLD: 11 K/UL (ref 4.8–10.8)

## 2019-06-24 PROCEDURE — 36415 COLL VENOUS BLD VENIPUNCTURE: CPT

## 2019-06-24 PROCEDURE — 6370000000 HC RX 637 (ALT 250 FOR IP): Performed by: INTERNAL MEDICINE

## 2019-06-24 PROCEDURE — 6360000002 HC RX W HCPCS: Performed by: INTERNAL MEDICINE

## 2019-06-24 PROCEDURE — 99232 SBSQ HOSP IP/OBS MODERATE 35: CPT | Performed by: PHYSICIAN ASSISTANT

## 2019-06-24 PROCEDURE — 1210000000 HC MED SURG R&B

## 2019-06-24 PROCEDURE — 6370000000 HC RX 637 (ALT 250 FOR IP): Performed by: PHYSICIAN ASSISTANT

## 2019-06-24 PROCEDURE — 85025 COMPLETE CBC W/AUTO DIFF WBC: CPT

## 2019-06-24 PROCEDURE — 2580000003 HC RX 258: Performed by: INTERNAL MEDICINE

## 2019-06-24 PROCEDURE — 2700000000 HC OXYGEN THERAPY PER DAY

## 2019-06-24 PROCEDURE — 94640 AIRWAY INHALATION TREATMENT: CPT

## 2019-06-24 PROCEDURE — 80053 COMPREHEN METABOLIC PANEL: CPT

## 2019-06-24 RX ORDER — QUETIAPINE FUMARATE 50 MG/1
25 TABLET, FILM COATED ORAL DAILY PRN
Status: DISCONTINUED | OUTPATIENT
Start: 2019-06-24 | End: 2019-07-05 | Stop reason: HOSPADM

## 2019-06-24 RX ADMIN — SODIUM CHLORIDE: 9 INJECTION, SOLUTION INTRAVENOUS at 04:51

## 2019-06-24 RX ADMIN — IPRATROPIUM BROMIDE AND ALBUTEROL SULFATE 1 AMPULE: .5; 3 SOLUTION RESPIRATORY (INHALATION) at 15:15

## 2019-06-24 RX ADMIN — BUDESONIDE 250 MCG: 0.25 INHALANT RESPIRATORY (INHALATION) at 18:38

## 2019-06-24 RX ADMIN — ENOXAPARIN SODIUM 30 MG: 30 INJECTION SUBCUTANEOUS at 19:31

## 2019-06-24 RX ADMIN — OXYBUTYNIN CHLORIDE 10 MG: 5 TABLET, EXTENDED RELEASE ORAL at 09:33

## 2019-06-24 RX ADMIN — IPRATROPIUM BROMIDE AND ALBUTEROL SULFATE 1 AMPULE: .5; 3 SOLUTION RESPIRATORY (INHALATION) at 18:37

## 2019-06-24 RX ADMIN — GUAIFENESIN 600 MG: 600 TABLET, EXTENDED RELEASE ORAL at 09:33

## 2019-06-24 RX ADMIN — Medication 10 ML: at 07:06

## 2019-06-24 RX ADMIN — IPRATROPIUM BROMIDE AND ALBUTEROL SULFATE 1 AMPULE: .5; 3 SOLUTION RESPIRATORY (INHALATION) at 10:40

## 2019-06-24 RX ADMIN — FLUOXETINE HYDROCHLORIDE 40 MG: 20 CAPSULE ORAL at 09:32

## 2019-06-24 RX ADMIN — Medication 1 G: at 17:18

## 2019-06-24 RX ADMIN — BUDESONIDE 250 MCG: 0.25 INHALANT RESPIRATORY (INHALATION) at 06:30

## 2019-06-24 RX ADMIN — PANTOPRAZOLE SODIUM 40 MG: 40 TABLET, DELAYED RELEASE ORAL at 09:33

## 2019-06-24 RX ADMIN — PREGABALIN 150 MG: 150 CAPSULE ORAL at 09:33

## 2019-06-24 RX ADMIN — IPRATROPIUM BROMIDE AND ALBUTEROL SULFATE 1 AMPULE: .5; 3 SOLUTION RESPIRATORY (INHALATION) at 06:30

## 2019-06-24 RX ADMIN — LEVOTHYROXINE SODIUM 100 MCG: 0.1 TABLET ORAL at 09:33

## 2019-06-24 RX ADMIN — BUPROPION HYDROCHLORIDE 150 MG: 150 TABLET, FILM COATED, EXTENDED RELEASE ORAL at 09:32

## 2019-06-24 NOTE — H&P
SPINAL FUSION WITH INSTRUMENTATION performed by Marybeth Smith MD at [address] [place] [address] Road Right 3/2010    [place] [address] INCL FLUOR GDNCE DX W/CELL [facility] N/A 7/17/2018    BRONCHOSCOPY AND BAL with Nurse sedation performed by López Sy MD at [address] [facility]    SHOULDER SURGERY Right 03/06/2018    Dr. Dianna Rudolph - Total reversal replacement    TOTAL KNEE ARTHROPLASTY Bilateral     at two different times       Home Medications:  Prior to Admission medications    Medication Sig Start Date End Date Taking? Authorizing Provider   LORazepam (ATIVAN) 1 MG tablet Take 1 tablet by mouth every 8 hours as needed for Anxiety for up to 30 days. 6/22/19 7/22/19  Robert De Luna, DO   oxyCODONE-acetaminophen (PERCOCET) 7.5-325 MG per tablet Take 1 tablet by mouth every 6 hours as needed for Pain for up to 30 days. 6/22/19 7/22/19  Robert De Luna, DO   albuterol (PROVENTIL) (2.5 MG/3ML) 0.083% nebulizer solution Take 3 mLs by nebulization every 6 hours as needed for Wheezing or Shortness of Breath 6/21/19   Robert De Luna, DO   carvedilol (COREG) 25 MG tablet Take 1 tablet by mouth 2 times daily (with meals) 6/21/19   Robert De Luna, DO   benzonatate (TESSALON) 100 MG capsule Take 1 capsule by mouth 3 times daily as needed for Cough 6/21/19 6/28/19  Robert De Luna, DO   guaiFENesin [facility]) 600 MG extended release tablet Take 1 tablet by mouth 2 times daily 6/21/19   Robert De Luna, DO   senna (SENOKOT) 8.6 MG tablet Take 2 tablets by mouth 3 times daily    Historical Provider, MD   polyethylene glycol (GLYCOLAX) powder Take 17 g by mouth daily    Historical Provider, MD   pregabalin (LYRICA) 150 MG capsule Take 150 mg by mouth 2 times daily. Historical Provider, MD   oxyCODONE-acetaminophen (PERCOCET) 7.5-325 MG per tablet Take 1 tablet by mouth every 6 hours as needed for Pain.     Historical Provider, MD   oxybutynin (DITROPAN-XL) 10 MG extended release tablet Take 1 tablet by mouth daily chronic kidney disease ([facility] [N17.9, N18.3] 06/23/2019    Hyperkalemia [E87.5] 06/23/2019    Chronic neck and back pain [M54.2, M54.9, G89.29] 10/26/2015    Hypothyroid [E03.9] 02/23/2015    Hyperlipidemia [E78.5] 10/14/2014    Hypertension [I10] 10/14/2014       IMPRESSION / PLAN:  Principal Problem:    Healthcare-associated pneumonia - second bout this month. Was improving last stay. ? If not treated for long enough duration and infection bounced back. Admit on broad abx w/ cefepime and vanco. Supportive care. Check sputum culture. Wean 02 as able. F/u blood cultures    Active Problems:    Hypertension - resume bp meds as appropriate, hold ARB in setting of coty      Hyperlipidemia - statin      Hypothyroid - synthroid, check tsh      Chronic neck and back pain - hold pain meds for now, ? Some role in delirium encephalopathy vs infection or dementia. Pt currently confused      Acute renal failure superimposed on stage 3 chronic kidney disease ([facility] - appears dry, ivf resuscitate and recheck in AM. Avoid nephrotoxic meds. Monitor vanc troughs      Hyperkalemia - 5.4, in setting of COTY and ARB, hold ARB, monitor on tele, ivf hydrate, recheck in AM    AMS - ? Polypharmacy vs metabolic enceph from infection vs delirium vs dementia. CT head non-acute on arrival. Pt non-focal. Her home meds since surgery include ativan, percocet 7.5, lyrica, doxepin, Prozac, and Wellbutrin. Hold for now, treat underlying infection, f/u cultures, monitor for ongoing symptoms or clearing.        Emma Rodriguez, DO  6/23/2019

## 2019-06-25 LAB
AMMONIA: 40 UMOL/L (ref 11–51)
ANION GAP SERPL CALCULATED.3IONS-SCNC: 13 MMOL/L (ref 7–19)
BASE EXCESS ARTERIAL: -8.5 MMOL/L (ref -2–2)
BUN BLDV-MCNC: 50 MG/DL (ref 8–23)
CALCIUM SERPL-MCNC: 9.9 MG/DL (ref 8.8–10.2)
CARBOXYHEMOGLOBIN ARTERIAL: 1 % (ref 0–5)
CHLORIDE BLD-SCNC: 109 MMOL/L (ref 98–111)
CO2: 18 MMOL/L (ref 22–29)
CREAT SERPL-MCNC: 2.4 MG/DL (ref 0.5–0.9)
GFR NON-AFRICAN AMERICAN: 20
GLUCOSE BLD-MCNC: 82 MG/DL (ref 74–109)
HCO3 ARTERIAL: 16.2 MMOL/L (ref 22–26)
HCT VFR BLD CALC: 33.5 % (ref 37–47)
HEMOGLOBIN, ART, EXTENDED: 11.1 G/DL (ref 12–16)
HEMOGLOBIN: 10.6 G/DL (ref 12–16)
MCH RBC QN AUTO: 30.5 PG (ref 27–31)
MCHC RBC AUTO-ENTMCNC: 31.6 G/DL (ref 33–37)
MCV RBC AUTO: 96.5 FL (ref 81–99)
METHEMOGLOBIN ARTERIAL: 0.9 %
O2 CONTENT ARTERIAL: 15.4 ML/DL
O2 SAT, ARTERIAL: 97.1 %
O2 THERAPY: ABNORMAL
PCO2 ARTERIAL: 30 MMHG (ref 35–45)
PDW BLD-RTO: 13.7 % (ref 11.5–14.5)
PH ARTERIAL: 7.34 (ref 7.35–7.45)
PLATELET # BLD: 283 K/UL (ref 130–400)
PMV BLD AUTO: 11 FL (ref 9.4–12.3)
PO2 ARTERIAL: 124 MMHG (ref 80–100)
POTASSIUM SERPL-SCNC: 4.5 MMOL/L (ref 3.5–5)
POTASSIUM, WHOLE BLOOD: 4.4
RBC # BLD: 3.47 M/UL (ref 4.2–5.4)
SODIUM BLD-SCNC: 140 MMOL/L (ref 136–145)
VANCOMYCIN TROUGH: 10.6 UG/ML (ref 10–20)
WBC # BLD: 8.7 K/UL (ref 4.8–10.8)

## 2019-06-25 PROCEDURE — 99232 SBSQ HOSP IP/OBS MODERATE 35: CPT | Performed by: PHYSICIAN ASSISTANT

## 2019-06-25 PROCEDURE — 1210000000 HC MED SURG R&B

## 2019-06-25 PROCEDURE — 94640 AIRWAY INHALATION TREATMENT: CPT

## 2019-06-25 PROCEDURE — 36415 COLL VENOUS BLD VENIPUNCTURE: CPT

## 2019-06-25 PROCEDURE — 80202 ASSAY OF VANCOMYCIN: CPT

## 2019-06-25 PROCEDURE — 6370000000 HC RX 637 (ALT 250 FOR IP): Performed by: INTERNAL MEDICINE

## 2019-06-25 PROCEDURE — 05HB33Z INSERTION OF INFUSION DEVICE INTO RIGHT BASILIC VEIN, PERCUTANEOUS APPROACH: ICD-10-PCS | Performed by: HOSPITALIST

## 2019-06-25 PROCEDURE — 80048 BASIC METABOLIC PNL TOTAL CA: CPT

## 2019-06-25 PROCEDURE — 6360000002 HC RX W HCPCS: Performed by: PSYCHIATRY & NEUROLOGY

## 2019-06-25 PROCEDURE — 82140 ASSAY OF AMMONIA: CPT

## 2019-06-25 PROCEDURE — 84132 ASSAY OF SERUM POTASSIUM: CPT

## 2019-06-25 PROCEDURE — 76937 US GUIDE VASCULAR ACCESS: CPT

## 2019-06-25 PROCEDURE — 6360000002 HC RX W HCPCS: Performed by: INTERNAL MEDICINE

## 2019-06-25 PROCEDURE — 92610 EVALUATE SWALLOWING FUNCTION: CPT

## 2019-06-25 PROCEDURE — 85027 COMPLETE CBC AUTOMATED: CPT

## 2019-06-25 PROCEDURE — 95951 HC EEG MONITOR/VIDEO LESS THAN 24: CPT

## 2019-06-25 PROCEDURE — 36569 INSJ PICC 5 YR+ W/O IMAGING: CPT

## 2019-06-25 PROCEDURE — 2700000000 HC OXYGEN THERAPY PER DAY

## 2019-06-25 PROCEDURE — 51798 US URINE CAPACITY MEASURE: CPT

## 2019-06-25 PROCEDURE — 2580000003 HC RX 258: Performed by: INTERNAL MEDICINE

## 2019-06-25 PROCEDURE — 36600 WITHDRAWAL OF ARTERIAL BLOOD: CPT

## 2019-06-25 PROCEDURE — 99223 1ST HOSP IP/OBS HIGH 75: CPT | Performed by: PSYCHIATRY & NEUROLOGY

## 2019-06-25 PROCEDURE — 51702 INSERT TEMP BLADDER CATH: CPT

## 2019-06-25 PROCEDURE — 82803 BLOOD GASES ANY COMBINATION: CPT

## 2019-06-25 PROCEDURE — C1751 CATH, INF, PER/CENT/MIDLINE: HCPCS

## 2019-06-25 PROCEDURE — 2580000003 HC RX 258: Performed by: PSYCHIATRY & NEUROLOGY

## 2019-06-25 PROCEDURE — 2580000003 HC RX 258: Performed by: PHYSICIAN ASSISTANT

## 2019-06-25 RX ORDER — VANCOMYCIN HYDROCHLORIDE 1 G/200ML
1000 INJECTION, SOLUTION INTRAVENOUS ONCE
Status: DISCONTINUED | OUTPATIENT
Start: 2019-06-25 | End: 2019-06-25

## 2019-06-25 RX ORDER — FOSPHENYTOIN SODIUM 50 MG/ML
100 INJECTION, SOLUTION INTRAMUSCULAR; INTRAVENOUS EVERY 8 HOURS
Status: DISCONTINUED | OUTPATIENT
Start: 2019-06-25 | End: 2019-06-27

## 2019-06-25 RX ORDER — LORAZEPAM 2 MG/ML
1 INJECTION INTRAMUSCULAR ONCE
Status: COMPLETED | OUTPATIENT
Start: 2019-06-25 | End: 2019-06-25

## 2019-06-25 RX ORDER — LORAZEPAM 2 MG/ML
1 INJECTION INTRAMUSCULAR ONCE
Status: COMPLETED | OUTPATIENT
Start: 2019-06-26 | End: 2019-06-26

## 2019-06-25 RX ORDER — FOSPHENYTOIN SODIUM 50 MG/ML
100 INJECTION, SOLUTION INTRAMUSCULAR; INTRAVENOUS EVERY 8 HOURS
Status: DISCONTINUED | OUTPATIENT
Start: 2019-06-25 | End: 2019-06-25

## 2019-06-25 RX ORDER — LIDOCAINE HYDROCHLORIDE 10 MG/ML
5 INJECTION, SOLUTION EPIDURAL; INFILTRATION; INTRACAUDAL; PERINEURAL ONCE
Status: DISCONTINUED | OUTPATIENT
Start: 2019-06-25 | End: 2019-07-05 | Stop reason: HOSPADM

## 2019-06-25 RX ORDER — SODIUM CHLORIDE 0.9 % (FLUSH) 0.9 %
10 SYRINGE (ML) INJECTION EVERY 12 HOURS SCHEDULED
Status: DISCONTINUED | OUTPATIENT
Start: 2019-06-25 | End: 2019-07-05 | Stop reason: HOSPADM

## 2019-06-25 RX ORDER — SODIUM CHLORIDE 0.9 % (FLUSH) 0.9 %
10 SYRINGE (ML) INJECTION PRN
Status: DISCONTINUED | OUTPATIENT
Start: 2019-06-25 | End: 2019-07-05 | Stop reason: HOSPADM

## 2019-06-25 RX ADMIN — ENOXAPARIN SODIUM 30 MG: 30 INJECTION SUBCUTANEOUS at 20:06

## 2019-06-25 RX ADMIN — SIMVASTATIN 20 MG: 20 TABLET, FILM COATED ORAL at 20:06

## 2019-06-25 RX ADMIN — IPRATROPIUM BROMIDE AND ALBUTEROL SULFATE 1 AMPULE: .5; 3 SOLUTION RESPIRATORY (INHALATION) at 16:04

## 2019-06-25 RX ADMIN — POLYETHYLENE GLYCOL 3350 17 G: 17 POWDER, FOR SOLUTION ORAL at 08:13

## 2019-06-25 RX ADMIN — Medication 10 ML: at 12:10

## 2019-06-25 RX ADMIN — PREGABALIN 150 MG: 150 CAPSULE ORAL at 08:13

## 2019-06-25 RX ADMIN — IPRATROPIUM BROMIDE AND ALBUTEROL SULFATE 1 AMPULE: .5; 3 SOLUTION RESPIRATORY (INHALATION) at 19:14

## 2019-06-25 RX ADMIN — Medication 10 ML: at 08:14

## 2019-06-25 RX ADMIN — IPRATROPIUM BROMIDE AND ALBUTEROL SULFATE 1 AMPULE: .5; 3 SOLUTION RESPIRATORY (INHALATION) at 07:35

## 2019-06-25 RX ADMIN — DEXTROSE MONOHYDRATE 1670 MG PE: 50 INJECTION, SOLUTION INTRAVENOUS at 16:49

## 2019-06-25 RX ADMIN — BUDESONIDE 250 MCG: 0.25 INHALANT RESPIRATORY (INHALATION) at 07:36

## 2019-06-25 RX ADMIN — PANTOPRAZOLE SODIUM 40 MG: 40 TABLET, DELAYED RELEASE ORAL at 08:13

## 2019-06-25 RX ADMIN — FOSPHENYTOIN SODIUM 100 MG PE: 50 INJECTION, SOLUTION INTRAMUSCULAR; INTRAVENOUS at 20:06

## 2019-06-25 RX ADMIN — IPRATROPIUM BROMIDE AND ALBUTEROL SULFATE 1 AMPULE: .5; 3 SOLUTION RESPIRATORY (INHALATION) at 11:01

## 2019-06-25 RX ADMIN — GUAIFENESIN 600 MG: 600 TABLET, EXTENDED RELEASE ORAL at 20:06

## 2019-06-25 RX ADMIN — VANCOMYCIN HYDROCHLORIDE 1000 MG: 10 INJECTION, POWDER, LYOPHILIZED, FOR SOLUTION INTRAVENOUS at 05:42

## 2019-06-25 RX ADMIN — Medication 10 ML: at 20:06

## 2019-06-25 RX ADMIN — LORAZEPAM 1 MG: 2 INJECTION INTRAMUSCULAR; INTRAVENOUS at 16:00

## 2019-06-25 RX ADMIN — GUAIFENESIN 600 MG: 600 TABLET, EXTENDED RELEASE ORAL at 08:14

## 2019-06-25 RX ADMIN — Medication 1 G: at 17:33

## 2019-06-25 RX ADMIN — LEVOTHYROXINE SODIUM 100 MCG: 0.1 TABLET ORAL at 08:13

## 2019-06-25 RX ADMIN — BUDESONIDE 250 MCG: 0.25 INHALANT RESPIRATORY (INHALATION) at 19:14

## 2019-06-25 RX ADMIN — SODIUM CHLORIDE: 9 INJECTION, SOLUTION INTRAVENOUS at 20:06

## 2019-06-25 RX ADMIN — LORAZEPAM 1 MG: 2 INJECTION INTRAMUSCULAR; INTRAVENOUS at 16:22

## 2019-06-25 RX ADMIN — OXYBUTYNIN CHLORIDE 10 MG: 5 TABLET, EXTENDED RELEASE ORAL at 08:13

## 2019-06-25 NOTE — PROGRESS NOTES
Speech Language Pathology  Facility/Department: [facility] 4 ONCOLOGY UNIT   CLINICAL BEDSIDE SWALLOW EVALUATION    NAME: Ashley Kc  : 1945  MRN: [id number]    ADMISSION DATE: 2019  ADMITTING DIAGNOSIS: has Hypertension; Hyperlipidemia; Hypothyroid; Multiple allergies; Chronic neck and back pain; Seasonal allergies; Dysarthria; MVP (mitral valve prolapse); Tremor; Gait abnormality; Pulmonary infiltrate in left lung on chest x-ray; Spondylolisthesis at L4-L5 level; DDD (degenerative disc disease), lumbar; Chronic low back pain without sciatica; Palliative care patient; Healthcare-associated pneumonia; Acute renal failure superimposed on stage 3 chronic kidney disease ([facility] Hyperkalemia; and Acute metabolic encephalopathy on their problem list.    Date of Eval: 2019  Evaluating Therapist: Johnnie Rayo    Current Diet level:  Current Diet : Regular  Current Liquid Diet : Thin    Reason for Referral  Ashley Kc was referred for a bedside swallow evaluation to assess the efficiency of her swallow function, identify signs and symptoms of aspiration and make recommendations regarding safe dietary consistencies, effective compensatory strategies, and safe eating environment. Impression  SLP assessed the patient's swallow function. Oral prep characterized by vertical mastication with puree texture. Oral transit measured 1-2 seconds in length with all food/drink trials. Moderate oral pocketing of breakfast foods prior to evaluation. SLP cleared patient's mouth of residue with toothette. The patient presented with sluggish and moderate/severely decreased laryngeal elevation with ice chips, puree, pudding-thick, honey-thick, and nectar-thick liquid for adequate airway protection during swallows. Even so, no S/S of penetration/aspiration noted with any food/drink texture. At this time, would recommend a Dysphagia I diet and honey-thick liquids; secondary to oral pocketing and increased confusion.  Meds

## 2019-06-25 NOTE — PROGRESS NOTES
Pharmacy Renal Adjustment    Stacia Hamman is a 66 y.o. female. Pharmacy has renally adjusted medications per protocol. Recent Labs     06/24/19  0926 06/25/19  0321   BUN 51* 50*       Recent Labs     06/24/19  0926 06/25/19  0321   CREATININE 2.4* 2.4*       Estimated Creatinine Clearance: 21 mL/min (A) (based on SCr of 2.4 mg/dL (H)). Height:   Ht Readings from Last 1 Encounters:   06/23/19 5' 5\" (1.651 m)     Weight:  Wt Readings from Last 1 Encounters:   06/23/19 184 lb (83.5 kg)       Plan: Adjust the following medications based on renal function and indication:           Maxipime to 2 gm IV every 24 hours.     Electronically signed by Morteza Glover, PharmD, BCPS on 6/25/2019 at 6:39 PM

## 2019-06-25 NOTE — PROGRESS NOTES
Pharmacy Vancomycin Consult     Vancomycin Day: 3  Current Dosing: pulse dosing    Temp max:  97.6    Recent Labs     06/23/19  1524 06/24/19  0926   BUN 48* 51*       Recent Labs     06/23/19  1524 06/24/19  0926   CREATININE 2.8* 2.4*       Recent Labs     06/23/19  1524 06/24/19  0926   WBC 15.2* 11.0*         Intake/Output Summary (Last 24 hours) at 6/25/2019 0448  Last data filed at 6/24/2019 1936  Gross per 24 hour   Intake 2155.44 ml   Output --   Net 2155.44 ml       Culture Date Source Results   06/23/19 Blood No growth       Ht Readings from Last 1 Encounters:   06/23/19 5' 5\" (1.651 m)        Wt Readings from Last 1 Encounters:   06/23/19 184 lb (83.5 kg)         Body mass index is 30.62 kg/m². Estimated Creatinine Clearance: 21 mL/min (A) (based on SCr of 2.4 mg/dL (H)). Random level: 10.6    Assessment/Plan: Will give vancomycin 1000 mg IV x 1. Random level ordered ~36 hours after dose.     Electronically signed by PORFIRIO Knapp [facility] on 6/25/2019 at 4:48 AM

## 2019-06-25 NOTE — PROGRESS NOTES
representing increased pneumonia. Signed by Dr Girish Young on 6/23/2019 4:42 PM    Micro: Blood cultures negative to date  Sputum culture pending    Assessment/Plan   Principal Problem:    Healthcare-associated pneumonia  Active Problems:    Hypertension    Hyperlipidemia    Hypothyroid    Chronic neck and back pain    Acute renal failure superimposed on stage 3 chronic kidney disease (HCC)    Hyperkalemia    Acute metabolic encephalopathy  Resolved Problems:    * No resolved hospital problems. *     Labs still pending this AM. History of chronic pulmonary infiltrate and has undergone Bronchoscopy 07/2018 per Dr. Jessee Hinojosa with BAL and findings positive for Stenotrophomonas treated with Levaquin. No known pulmonary fibrosis at that time. Sputum culture here pending. Continue empiric antibiotics for now. Check ABG's. Check Ammonia as well given mental status changes along with MRI, EEG and Neuro consult as this has been waxing/waning since surgery 05/2019 per sister present in the room. Nursing staff who have cared for patient previous admissions confirm this has been an ongoing issue. Further orders per clinical course. She has lost IV access again. Will order midline.      Antibiotic: Vancomycin, Cefepime    DVT Prophylaxis: Lovenox    GI prophylaxis: Protonix    Nkechi Valenzuela PA-C

## 2019-06-26 ENCOUNTER — APPOINTMENT (OUTPATIENT)
Dept: MRI IMAGING | Age: 74
DRG: 196 | End: 2019-06-26
Payer: MEDICARE

## 2019-06-26 LAB
ANION GAP SERPL CALCULATED.3IONS-SCNC: 15 MMOL/L (ref 7–19)
BUN BLDV-MCNC: 49 MG/DL (ref 8–23)
CALCIUM SERPL-MCNC: 9.3 MG/DL (ref 8.8–10.2)
CHLORIDE BLD-SCNC: 113 MMOL/L (ref 98–111)
CO2: 16 MMOL/L (ref 22–29)
CREAT SERPL-MCNC: 2.4 MG/DL (ref 0.5–0.9)
GFR NON-AFRICAN AMERICAN: 20
GLUCOSE BLD-MCNC: 103 MG/DL (ref 74–109)
HCT VFR BLD CALC: 31.3 % (ref 37–47)
HEMOGLOBIN: 9.7 G/DL (ref 12–16)
MCH RBC QN AUTO: 29.6 PG (ref 27–31)
MCHC RBC AUTO-ENTMCNC: 31 G/DL (ref 33–37)
MCV RBC AUTO: 95.4 FL (ref 81–99)
PDW BLD-RTO: 13.7 % (ref 11.5–14.5)
PHENYTOIN LEVEL: 10.7 UG/ML (ref 10–20)
PLATELET # BLD: 244 K/UL (ref 130–400)
PMV BLD AUTO: 11.2 FL (ref 9.4–12.3)
POTASSIUM SERPL-SCNC: 4.7 MMOL/L (ref 3.5–5)
RBC # BLD: 3.28 M/UL (ref 4.2–5.4)
SODIUM BLD-SCNC: 144 MMOL/L (ref 136–145)
VANCOMYCIN RANDOM: 16.3 UG/ML
WBC # BLD: 9.1 K/UL (ref 4.8–10.8)

## 2019-06-26 PROCEDURE — 6360000002 HC RX W HCPCS: Performed by: INTERNAL MEDICINE

## 2019-06-26 PROCEDURE — 2580000003 HC RX 258: Performed by: INTERNAL MEDICINE

## 2019-06-26 PROCEDURE — 80202 ASSAY OF VANCOMYCIN: CPT

## 2019-06-26 PROCEDURE — 6370000000 HC RX 637 (ALT 250 FOR IP): Performed by: INTERNAL MEDICINE

## 2019-06-26 PROCEDURE — 80185 ASSAY OF PHENYTOIN TOTAL: CPT

## 2019-06-26 PROCEDURE — 1210000000 HC MED SURG R&B

## 2019-06-26 PROCEDURE — 6370000000 HC RX 637 (ALT 250 FOR IP): Performed by: PHYSICIAN ASSISTANT

## 2019-06-26 PROCEDURE — 85027 COMPLETE CBC AUTOMATED: CPT

## 2019-06-26 PROCEDURE — 99233 SBSQ HOSP IP/OBS HIGH 50: CPT | Performed by: PSYCHIATRY & NEUROLOGY

## 2019-06-26 PROCEDURE — 70551 MRI BRAIN STEM W/O DYE: CPT

## 2019-06-26 PROCEDURE — 2580000003 HC RX 258: Performed by: PHYSICIAN ASSISTANT

## 2019-06-26 PROCEDURE — 94640 AIRWAY INHALATION TREATMENT: CPT

## 2019-06-26 PROCEDURE — 6360000002 HC RX W HCPCS: Performed by: PSYCHIATRY & NEUROLOGY

## 2019-06-26 PROCEDURE — 99232 SBSQ HOSP IP/OBS MODERATE 35: CPT | Performed by: PHYSICIAN ASSISTANT

## 2019-06-26 PROCEDURE — 2580000003 HC RX 258: Performed by: EMERGENCY MEDICINE

## 2019-06-26 PROCEDURE — 80048 BASIC METABOLIC PNL TOTAL CA: CPT

## 2019-06-26 PROCEDURE — 6360000002 HC RX W HCPCS: Performed by: EMERGENCY MEDICINE

## 2019-06-26 PROCEDURE — 36415 COLL VENOUS BLD VENIPUNCTURE: CPT

## 2019-06-26 PROCEDURE — 2700000000 HC OXYGEN THERAPY PER DAY

## 2019-06-26 RX ORDER — AMLODIPINE AND OLMESARTAN MEDOXOMIL 5; 20 MG/1; MG/1
1 TABLET ORAL DAILY
Status: DISCONTINUED | OUTPATIENT
Start: 2019-06-26 | End: 2019-06-26

## 2019-06-26 RX ORDER — BISACODYL 10 MG
10 SUPPOSITORY, RECTAL RECTAL DAILY PRN
Status: DISCONTINUED | OUTPATIENT
Start: 2019-06-26 | End: 2019-07-03

## 2019-06-26 RX ORDER — CARVEDILOL 25 MG/1
25 TABLET ORAL 2 TIMES DAILY WITH MEALS
Status: DISCONTINUED | OUTPATIENT
Start: 2019-06-26 | End: 2019-07-05 | Stop reason: HOSPADM

## 2019-06-26 RX ORDER — DOCUSATE SODIUM 100 MG/1
100 CAPSULE, LIQUID FILLED ORAL 2 TIMES DAILY
Status: DISCONTINUED | OUTPATIENT
Start: 2019-06-26 | End: 2019-06-28

## 2019-06-26 RX ADMIN — PANTOPRAZOLE SODIUM 40 MG: 40 TABLET, DELAYED RELEASE ORAL at 09:52

## 2019-06-26 RX ADMIN — GUAIFENESIN 600 MG: 600 TABLET, EXTENDED RELEASE ORAL at 20:29

## 2019-06-26 RX ADMIN — CEFEPIME HYDROCHLORIDE 2 G: 2 INJECTION, POWDER, FOR SOLUTION INTRAVENOUS at 17:34

## 2019-06-26 RX ADMIN — VANCOMYCIN HYDROCHLORIDE 1000 MG: 10 INJECTION, POWDER, LYOPHILIZED, FOR SOLUTION INTRAVENOUS at 20:27

## 2019-06-26 RX ADMIN — GUAIFENESIN 600 MG: 600 TABLET, EXTENDED RELEASE ORAL at 09:51

## 2019-06-26 RX ADMIN — FOSPHENYTOIN SODIUM 100 MG PE: 50 INJECTION, SOLUTION INTRAMUSCULAR; INTRAVENOUS at 12:46

## 2019-06-26 RX ADMIN — OXYBUTYNIN CHLORIDE 10 MG: 5 TABLET, EXTENDED RELEASE ORAL at 09:52

## 2019-06-26 RX ADMIN — POLYETHYLENE GLYCOL 3350 17 G: 17 POWDER, FOR SOLUTION ORAL at 09:53

## 2019-06-26 RX ADMIN — LEVOTHYROXINE SODIUM 100 MCG: 0.1 TABLET ORAL at 05:23

## 2019-06-26 RX ADMIN — ENOXAPARIN SODIUM 30 MG: 30 INJECTION SUBCUTANEOUS at 20:29

## 2019-06-26 RX ADMIN — CARVEDILOL 25 MG: 25 TABLET, FILM COATED ORAL at 16:38

## 2019-06-26 RX ADMIN — DOCUSATE SODIUM 100 MG: 100 CAPSULE, LIQUID FILLED ORAL at 20:29

## 2019-06-26 RX ADMIN — FOSPHENYTOIN SODIUM 100 MG PE: 50 INJECTION, SOLUTION INTRAMUSCULAR; INTRAVENOUS at 21:16

## 2019-06-26 RX ADMIN — FOSPHENYTOIN SODIUM 100 MG PE: 50 INJECTION, SOLUTION INTRAMUSCULAR; INTRAVENOUS at 04:00

## 2019-06-26 RX ADMIN — SIMVASTATIN 20 MG: 20 TABLET, FILM COATED ORAL at 20:29

## 2019-06-26 RX ADMIN — BUDESONIDE 250 MCG: 0.25 INHALANT RESPIRATORY (INHALATION) at 06:50

## 2019-06-26 RX ADMIN — IPRATROPIUM BROMIDE AND ALBUTEROL SULFATE 1 AMPULE: .5; 3 SOLUTION RESPIRATORY (INHALATION) at 06:50

## 2019-06-26 RX ADMIN — Medication 10 ML: at 09:54

## 2019-06-26 RX ADMIN — PREGABALIN 150 MG: 150 CAPSULE ORAL at 09:52

## 2019-06-26 RX ADMIN — IPRATROPIUM BROMIDE AND ALBUTEROL SULFATE 1 AMPULE: .5; 3 SOLUTION RESPIRATORY (INHALATION) at 14:46

## 2019-06-26 RX ADMIN — DOCUSATE SODIUM 100 MG: 100 CAPSULE, LIQUID FILLED ORAL at 15:30

## 2019-06-26 RX ADMIN — IPRATROPIUM BROMIDE AND ALBUTEROL SULFATE 1 AMPULE: .5; 3 SOLUTION RESPIRATORY (INHALATION) at 20:34

## 2019-06-26 RX ADMIN — SODIUM CHLORIDE: 9 INJECTION, SOLUTION INTRAVENOUS at 20:29

## 2019-06-26 RX ADMIN — BUDESONIDE 250 MCG: 0.25 INHALANT RESPIRATORY (INHALATION) at 20:34

## 2019-06-26 RX ADMIN — LORAZEPAM 1 MG: 2 INJECTION INTRAMUSCULAR; INTRAVENOUS at 07:30

## 2019-06-26 RX ADMIN — IPRATROPIUM BROMIDE AND ALBUTEROL SULFATE 1 AMPULE: .5; 3 SOLUTION RESPIRATORY (INHALATION) at 10:49

## 2019-06-26 NOTE — CARE COORDINATION
[address] Transitions Interview     2019    Patient: Ashley Kc Patient : 1945   MRN: [id number]  Reason for Admission: pneumonia    RARS: Readmission Risk Score: 32         Spoke with: Ashley Kc and sister Jonah Verdugo      Readmission Risk  Patient Active Problem List   Diagnosis    Hypertension    Hyperlipidemia    Hypothyroid    Multiple allergies    Chronic neck and back pain    Seasonal allergies    Dysarthria    MVP (mitral valve prolapse)    Tremor    Gait abnormality    Pulmonary infiltrate in left lung on chest x-ray    Spondylolisthesis at L4-L5 level    DDD (degenerative disc disease), lumbar    Chronic low back pain without sciatica    Palliative care patient    Pneumonia due to organism    Acute renal failure superimposed on stage 3 chronic kidney disease (HCC)    Hyperkalemia    Acute metabolic encephalopathy    Acute kidney injury ([facility]    Hypoxia       Inpatient Assessment  Care Transitions Summary    Care Transitions Inpatient Review  Medication Review  Are you able to afford your medications?:  Yes  How often do you have difficulty taking your medications?:  I always take them as prescribed.   Housing Review  Who do you live with?:  Alone  Are you an active caregiver in your home?:  No  Social Support  Do you have a ?:  No  Do you have a [address] [place] [address]?:  No  Durable Medical Equipment  Patient DME:  Straight cane, Walker, Wheelchair  Patient Home Equipment:  Oxygen  Functional Review  Ability to seek help/take action for Emergent/Urgent situations i.e. fire, crime, inclement weather or health crisis.:  Needs Assistance  Ability handle personal hygiene needs (bathing/dressing/grooming):  Needs Assistance  Ability to manage medications:  Dependent  Ability to prepare food:  Dependent  Ability to maintain home (clean home, laundry):  Dependent  Ability to drive and/or has transportation:  Dependent  Ability to do shopping:

## 2019-06-26 NOTE — PROGRESS NOTES
Grandmother     Anxiety Disorder Maternal Grandmother     Depression Maternal Grandmother          PHYSICAL EXAM:  BP (!) 188/93   Pulse 86   Temp 97.1 °F (36.2 °C) (Temporal)   Resp 20   Ht 5' 5\" (1.651 m)   Wt 184 lb (83.5 kg)   SpO2 91%   BMI 30.62 kg/m²       Constitutional - well developed, well nourished. Eyes - conjunctiva normal.   Ear, nose, throat - No scars, masses, or lesions over external nose or ears, no atrophy of tongue  Neck-symmetric, no masses noted, no jugular vein distension  Respiration- chest wall appears symmetric, good expansion,   normal effort without use of accessory muscles  Musculoskeletal - no significant wasting of muscles noted, no bony deformities  Extremities-no clubbing, cyanosis or edema  Skin - warm, dry, and intact. No rash, erythema, or pallor. Psychiatric - mood, affect, and behavior appear normal.      Neurological exam  Awake, alert, fluent oriented appropriate affect  Attention and concentration appear appropriate  Recent and remote memory appears unremarkable  Speech normal without dysarthria  No clear issues with language of fund of knowledge     Cranial Nerve Exam     CN III, IV,VI-EOMI, No nystagmus, conjugate eye movements, no ptosis    CN VII-no facial assymetry       Motor Exam  antigravity throughout upper and lower extremities bilaterally      Tremors- no tremors in hands or head noted     Gait  Not tested      Nursing/pcp notes, imaging,labs and vitals reviewed.      PT,OT and/or speech notes reviewed    Lab Results   Component Value Date    WBC 9.1 06/26/2019    HGB 9.7 (L) 06/26/2019    HCT 31.3 (L) 06/26/2019    MCV 95.4 06/26/2019     06/26/2019     Lab Results   Component Value Date     06/26/2019    K 4.7 06/26/2019     (H) 06/26/2019    CO2 16 (L) 06/26/2019    BUN 49 (H) 06/26/2019    CREATININE 2.4 (H) 06/26/2019    GLUCOSE 103 06/26/2019    CALCIUM 9.3 06/26/2019    PROT 7.3 06/24/2019    LABALBU 3.0 (L) 06/24/2019

## 2019-06-26 NOTE — PLAN OF CARE
Problem: Risk for Impaired Skin Integrity  Goal: Tissue integrity - skin and mucous membranes  Description  Structural intactness and normal physiological function of skin and  mucous membranes.   6/26/2019 1239 by Dixie Interiano RN  Outcome: Ongoing  6/26/2019 0225 by Finn Siddiqi RN  Outcome: Ongoing     Problem: Falls - Risk of:  Goal: Will remain free from falls  Description  Will remain free from falls  6/26/2019 1239 by Dixie Interiano RN  Outcome: Ongoing  6/26/2019 0225 by Finn Siddiqi RN  Outcome: Ongoing  Goal: Absence of physical injury  Description  Absence of physical injury  6/26/2019 1239 by Dixie Interiano RN  Outcome: Ongoing  6/26/2019 0225 by Finn Siddiqi RN  Outcome: Ongoing

## 2019-06-26 NOTE — PROGRESS NOTES
[address] [facility]      Patient:    Eldon Levi  YOB: 1945  Date of Service:  6/26/2019  MRN:    [id number]    Room:   [address] [place] [address]   PCP:    Emely Serrano MD     Chief Complaint:  Altered mental status    Subjective:  A little less confused today per sister at bedside. Patient able to tell me she is in a hospital, denies pain or discomfort. No new changes/events overnight. Complains of burning from hutchins catheter. Cumulative Hospital Course: The patient is a 65 y/o female who presents to the hospital with shortness of breath. There is also report of waxing/waning mental status. CXR demonstrated worsening pneumonia, noted to be hypoxic. Chel Barnhart She was admitted to the hospital. Placed on broad spectrum antibiotics. Neurology consulted for mental status, found to be in status, non convulsive. Noted to have COTY on admission as well. Review of Systems:   Unable to obtain due to patient mental status. Objective:   VITALS:  BP (!) 188/93   Pulse 86   Temp 97.1 °F (36.2 °C) (Temporal)   Resp 20   Ht 5' 5\" (1.651 m)   Wt 184 lb (83.5 kg)   SpO2 91%   BMI 30.62 kg/m²   24HR INTAKE/OUTPUT:      Intake/Output Summary (Last 24 hours) at 6/26/2019 0804  Last data filed at 6/26/2019 7525  Gross per 24 hour   Intake 1025 ml   Output 1250 ml   Net -225 ml     General appearance: alert, appears stated age, cooperative and no distress  Head: Normocephalic, without obvious abnormality, atraumatic  Eyes: conjunctivae/corneas clear. PERRL, EOM's intact.    Ears: normal external ears and nose, throat without exudate  Neck: Supple, no JVD, no tracheal deviation  Lungs: Normal respiratory effort, clear to auscultation bilaterally,no rales or wheezes   Heart: Regular rate and rhythm, S1, S2 normal, no murmur  Abdomen: Soft, non-tender; non-distended, normal bowel sounds no masses, no organomegaly  Extremities: No lower extremity edema,  No erythema, no tenderness to palpation  Skin: Skin color, texture, turgor normal. visualized. Neck base: The imaged portion of the base of the neck appears unremarkable. Lungs: There is consolidation in the left lower lobe. Lung volumes are decreased. Evaluation of the pulmonary parenchyma is limited due to respiratory motion. Fibrosis is again noted in the left lung apex. Dependent changes are seen in the right lung. The trachea and bronchial tree are patent. Heart: The heart is normal in size. There is no pericardial effusion. Mediastinum and lymph nodes: No pathologically enlarged mediastinal, hilar, or axillary lymph nodes are present. Bones and soft tissues: The osseous structures of the thorax and surrounding soft tissues demonstrate no acute process. Upper abdomen: The imaged portion of the upper abdomen demonstrates no acute process. 1. Limited evaluation of the chest due to timing of the contrast bolus and respiratory motion. 2. Within limits, findings are suggestive of left lower lobe pneumonia. 3. Stable fibrotic changes in the left lung apex. Signed by Dr Mahad Werner on 6/15/2019 3:06 PM    Mri Brain Wo Contrast    Result Date: 6/26/2019  EXAM: MR BRAIN WITHOUT IV CONTRAST 6/26/2019 COMPARISON: Head CT dated 6/23/2019 HISTORY: 66years-old Female. Decreased alertness TECHNIQUE: Ultrafast pulse sequences of the brain were obtained without IV contrast. REPORT: Mild-to-moderate generalized cerebral volume loss. Mild-to-moderate chronic microvascular changes. There is no evidence of mass-effect or midline shift. No reduced diffusivity is demonstrated. The brainstem, sella, pituitary, cerebellum are unremarkable. The basal cisterns are preserved. No acute osseous lesion. Prior cataract surgery. The flow voids are preserved. The visualized portions of the paranasal sinuses and mastoid air cells are unremarkable. 1.  No acute intracranial abnormality.  2.  Mild-to-moderate generalized cerebral volume loss and mild to moderate chronic microvascular changes Signed by Dr Nadiya Damian

## 2019-06-26 NOTE — PROGRESS NOTES
Follow up:  Pt currently receiving a neb treatment. Spoke with her family at bedside. They report she has continued to be quite confused today. Not eating, pt's sister says she has only had a few bites today. PT/OT and speech following. Family hopeful pt will be able to return to the [facility] and resume her rehab. Will follow POC and support family. Continue to evaluate goals of care.     Electronically signed by Pablito Trna RN on 6/26/2019 at 2:50 PM

## 2019-06-27 ENCOUNTER — APPOINTMENT (OUTPATIENT)
Dept: ULTRASOUND IMAGING | Age: 74
DRG: 196 | End: 2019-06-27
Payer: MEDICARE

## 2019-06-27 LAB
ANION GAP SERPL CALCULATED.3IONS-SCNC: 14 MMOL/L (ref 7–19)
BUN BLDV-MCNC: 42 MG/DL (ref 8–23)
CALCIUM SERPL-MCNC: 9.3 MG/DL (ref 8.8–10.2)
CHLORIDE BLD-SCNC: 111 MMOL/L (ref 98–111)
CO2: 17 MMOL/L (ref 22–29)
CREAT SERPL-MCNC: 2.1 MG/DL (ref 0.5–0.9)
GFR NON-AFRICAN AMERICAN: 23
GLUCOSE BLD-MCNC: 102 MG/DL (ref 74–109)
HCT VFR BLD CALC: 31.7 % (ref 37–47)
HEMOGLOBIN: 10 G/DL (ref 12–16)
MCH RBC QN AUTO: 29.7 PG (ref 27–31)
MCHC RBC AUTO-ENTMCNC: 31.5 G/DL (ref 33–37)
MCV RBC AUTO: 94.1 FL (ref 81–99)
PDW BLD-RTO: 13.7 % (ref 11.5–14.5)
PHENYTOIN LEVEL: 11.4 UG/ML (ref 10–20)
PLATELET # BLD: 227 K/UL (ref 130–400)
PMV BLD AUTO: 11 FL (ref 9.4–12.3)
POTASSIUM SERPL-SCNC: 4.3 MMOL/L (ref 3.5–5)
RBC # BLD: 3.37 M/UL (ref 4.2–5.4)
SODIUM BLD-SCNC: 142 MMOL/L (ref 136–145)
VANCOMYCIN RANDOM: 21.7 UG/ML
WBC # BLD: 9.7 K/UL (ref 4.8–10.8)

## 2019-06-27 PROCEDURE — 99233 SBSQ HOSP IP/OBS HIGH 50: CPT | Performed by: PSYCHIATRY & NEUROLOGY

## 2019-06-27 PROCEDURE — 6370000000 HC RX 637 (ALT 250 FOR IP): Performed by: INTERNAL MEDICINE

## 2019-06-27 PROCEDURE — 85027 COMPLETE CBC AUTOMATED: CPT

## 2019-06-27 PROCEDURE — 94640 AIRWAY INHALATION TREATMENT: CPT

## 2019-06-27 PROCEDURE — 80048 BASIC METABOLIC PNL TOTAL CA: CPT

## 2019-06-27 PROCEDURE — 6370000000 HC RX 637 (ALT 250 FOR IP): Performed by: PSYCHIATRY & NEUROLOGY

## 2019-06-27 PROCEDURE — 2580000003 HC RX 258: Performed by: PHYSICIAN ASSISTANT

## 2019-06-27 PROCEDURE — 6360000002 HC RX W HCPCS: Performed by: INTERNAL MEDICINE

## 2019-06-27 PROCEDURE — 76770 US EXAM ABDO BACK WALL COMP: CPT

## 2019-06-27 PROCEDURE — 6370000000 HC RX 637 (ALT 250 FOR IP): Performed by: PHYSICIAN ASSISTANT

## 2019-06-27 PROCEDURE — 2580000003 HC RX 258: Performed by: INTERNAL MEDICINE

## 2019-06-27 PROCEDURE — 1210000000 HC MED SURG R&B

## 2019-06-27 PROCEDURE — 2700000000 HC OXYGEN THERAPY PER DAY

## 2019-06-27 PROCEDURE — 36415 COLL VENOUS BLD VENIPUNCTURE: CPT

## 2019-06-27 PROCEDURE — 92526 ORAL FUNCTION THERAPY: CPT

## 2019-06-27 PROCEDURE — 6360000002 HC RX W HCPCS: Performed by: PSYCHIATRY & NEUROLOGY

## 2019-06-27 PROCEDURE — 80202 ASSAY OF VANCOMYCIN: CPT

## 2019-06-27 PROCEDURE — 80185 ASSAY OF PHENYTOIN TOTAL: CPT

## 2019-06-27 RX ORDER — VANCOMYCIN HYDROCHLORIDE 1 G/200ML
1000 INJECTION, SOLUTION INTRAVENOUS ONCE
Status: DISCONTINUED | OUTPATIENT
Start: 2019-06-28 | End: 2019-06-27 | Stop reason: SDUPTHER

## 2019-06-27 RX ORDER — PHENYTOIN SODIUM 100 MG/1
100 CAPSULE, EXTENDED RELEASE ORAL EVERY 8 HOURS
Status: DISCONTINUED | OUTPATIENT
Start: 2019-06-27 | End: 2019-07-05 | Stop reason: HOSPADM

## 2019-06-27 RX ADMIN — GUAIFENESIN 600 MG: 600 TABLET, EXTENDED RELEASE ORAL at 21:42

## 2019-06-27 RX ADMIN — QUETIAPINE FUMARATE 25 MG: 50 TABLET ORAL at 21:49

## 2019-06-27 RX ADMIN — DOCUSATE SODIUM 100 MG: 100 CAPSULE, LIQUID FILLED ORAL at 09:46

## 2019-06-27 RX ADMIN — DOCUSATE SODIUM 100 MG: 100 CAPSULE, LIQUID FILLED ORAL at 21:42

## 2019-06-27 RX ADMIN — POLYETHYLENE GLYCOL 3350 17 G: 17 POWDER, FOR SOLUTION ORAL at 09:46

## 2019-06-27 RX ADMIN — ENOXAPARIN SODIUM 30 MG: 30 INJECTION SUBCUTANEOUS at 21:41

## 2019-06-27 RX ADMIN — PHENYTOIN SODIUM 100 MG: 100 CAPSULE ORAL at 21:42

## 2019-06-27 RX ADMIN — IPRATROPIUM BROMIDE AND ALBUTEROL SULFATE 1 AMPULE: .5; 3 SOLUTION RESPIRATORY (INHALATION) at 06:05

## 2019-06-27 RX ADMIN — SODIUM CHLORIDE: 9 INJECTION, SOLUTION INTRAVENOUS at 06:20

## 2019-06-27 RX ADMIN — LEVOTHYROXINE SODIUM 100 MCG: 0.1 TABLET ORAL at 06:18

## 2019-06-27 RX ADMIN — CARVEDILOL 25 MG: 25 TABLET, FILM COATED ORAL at 17:54

## 2019-06-27 RX ADMIN — IPRATROPIUM BROMIDE AND ALBUTEROL SULFATE 1 AMPULE: .5; 3 SOLUTION RESPIRATORY (INHALATION) at 19:56

## 2019-06-27 RX ADMIN — PREGABALIN 150 MG: 150 CAPSULE ORAL at 09:46

## 2019-06-27 RX ADMIN — SIMVASTATIN 20 MG: 20 TABLET, FILM COATED ORAL at 21:42

## 2019-06-27 RX ADMIN — BUDESONIDE 250 MCG: 0.25 INHALANT RESPIRATORY (INHALATION) at 19:56

## 2019-06-27 RX ADMIN — CEFEPIME HYDROCHLORIDE 2 G: 2 INJECTION, POWDER, FOR SOLUTION INTRAVENOUS at 17:54

## 2019-06-27 RX ADMIN — BUDESONIDE 250 MCG: 0.25 INHALANT RESPIRATORY (INHALATION) at 06:05

## 2019-06-27 RX ADMIN — IPRATROPIUM BROMIDE AND ALBUTEROL SULFATE 1 AMPULE: .5; 3 SOLUTION RESPIRATORY (INHALATION) at 09:48

## 2019-06-27 RX ADMIN — PHENYTOIN SODIUM 100 MG: 100 CAPSULE ORAL at 13:48

## 2019-06-27 RX ADMIN — SODIUM CHLORIDE: 9 INJECTION, SOLUTION INTRAVENOUS at 18:34

## 2019-06-27 RX ADMIN — PANTOPRAZOLE SODIUM 40 MG: 40 TABLET, DELAYED RELEASE ORAL at 09:46

## 2019-06-27 RX ADMIN — CARVEDILOL 25 MG: 25 TABLET, FILM COATED ORAL at 09:46

## 2019-06-27 RX ADMIN — OXYBUTYNIN CHLORIDE 10 MG: 5 TABLET, EXTENDED RELEASE ORAL at 09:46

## 2019-06-27 RX ADMIN — IPRATROPIUM BROMIDE AND ALBUTEROL SULFATE 2 AMPULE: .5; 3 SOLUTION RESPIRATORY (INHALATION) at 14:11

## 2019-06-27 RX ADMIN — FOSPHENYTOIN SODIUM 100 MG PE: 50 INJECTION, SOLUTION INTRAMUSCULAR; INTRAVENOUS at 04:15

## 2019-06-27 RX ADMIN — GUAIFENESIN 600 MG: 600 TABLET, EXTENDED RELEASE ORAL at 09:46

## 2019-06-27 NOTE — PROGRESS NOTES
Patient:   Eduardo Robles  MR#:    [id number]   Room:    [facility]   YOB: 1945  Date of Progress Note: 6/27/2019  Time of Note                           9:29 AM  Consulting Physician:   Sorin Sheppard M.D. Attending Physician:  Carles Siemens, DO     Chief complaint AMS    S:This 66 y.o. female  seen for altered mental status. The patient was admitted about a month ago following back surgery and discharged to nursing home. She is readmitted on 6/23 with shortness of breath. Apparently the patient has not been the same since surgery as per the family noted by nursing as the family cannot provide any history. She does have worsening hypoxia and pneumonia on this admission. She was on multiple sedation meds prior to admission which were stopped. She is on broad spectrum antibiotics and has renal dysfunction. CT of the head on admission was unremarkable. No new issues overnight.         REVIEW OF SYSTEMS:  Constitutional: No fevers No chills  Neck:No stiffness  Respiratory: No shortness of breath  Cardiovascular: No chest pain No palpitations  Gastrointestinal: No abdominal pain    Genitourinary: No Dysuria  Neurological: No headache, + confusion    Past Medical History:      Diagnosis Date    Allergic rhinitis     Anxiety     Arthritis     Asthma     Chronic back pain     Chronic kidney disease     DDD (degenerative disc disease), lumbar 5/21/2019    Depression     GERD (gastroesophageal reflux disease)     Hyperlipidemia     Hypertension     Hypothyroidism     Irritable bowel syndrome     Palliative care patient 06/18/2019    Restless legs syndrome     Spondylolisthesis at L4-L5 level 5/21/2019       Past Surgical History:      Procedure Laterality Date    ANKLE FRACTURE SURGERY Left     metal plate & screws    CERVICAL SPINE SURGERY  x 2    WITH HARDWARE    CHOLECYSTECTOMY  02/2015    Dr. Diane Vieira Left 02/2015    [facility] Abdon [facility] 2/[facility]

## 2019-06-27 NOTE — PROGRESS NOTES
[facility]      Patient:    Buck Marmolejo  YOB: 1945  Date of Service:  6/27/2019  MRN:    [id number]    Room:   [address] [place] [address]   PCP:    Patria Nino MD     Chief Complaint:  Altered mental status    Subjective:  More alert, less confused today. Denies shortness of breath, chest pain, or chest discomfort. Branch catheter removed earlier, has yet to urinate. No new complaints/events/changes. Cumulative Hospital Course: The patient is a 67 y/o female who presents to the hospital with shortness of breath. There is also report of waxing/waning mental status. CXR demonstrated worsening pneumonia, noted to be hypoxic. Annamary Ripa She was admitted to the hospital. Placed on broad spectrum antibiotics. Neurology consulted for mental status, found to be in status, non convulsive. Noted to have COTY on admission as well. Found to be in non convulsive status, improved following administration of ativan and Cerebryx. Cerebryx changed to oral dilantin. MRI of the brain obtained with no acute changes. Renal ultrasound obtained and unremarkable. Renal parameters gradually improving. PT/OT/SLP consulted. The patient is from [facility], expected to return upon discharge home. Review of Systems:   Unable to obtain due to patient mental status. Objective:   VITALS:  BP (!) 152/86   Pulse 85   Temp 97 °F (36.1 °C) (Temporal)   Resp 20   Ht 5' 5\" (1.651 m)   Wt 184 lb (83.5 kg)   SpO2 93%   BMI 30.62 kg/m²   24HR INTAKE/OUTPUT:      Intake/Output Summary (Last 24 hours) at 6/27/2019 1351  Last data filed at 6/27/2019 2722  Gross per 24 hour   Intake 2176 ml   Output 1850 ml   Net 326 ml     General appearance: 67 y/o female, awake, alert, cooperative, no acute distress. Head: Normocephalic, without obvious abnormality, atraumatic  Eyes: conjunctivae/corneas clear. PERRL, EOM's intact.    Ears: normal external ears and nose, throat without exudate  Neck: Supple, no JVD, no tracheal paranasal sinuses are clear and normally aerated. The mastoid air cells are clear. The brain and ventricles have an age-appropriate appearance. Stable mild atrophy and small vessel disease. There is no hemorrhage or mass-effect. No acute infarct is seen. No calvarial abnormality. 1. No acute intracranial abnormality is seen. Signed by Dr Roz Hameed on 6/23/2019 4:49 PM    Xr Chest Portable    Result Date: 6/23/2019  XR CHEST PORTABLE 6/23/2019 4:40 PM History: Pneumonia. Worsening shortness of breath. Portable chest x-ray compared with 6/20/2019. Patchy infiltrate within the left lung is increased now within the left upper lobe and left lower lobe. There is also now some mild patchy infiltrate within the base of the right upper lobe. Underlying chronic interstitial disease. No pneumothorax. Heart size is within normal limits. 1. Chronic lung changes with patchy bilateral infiltrate representing increased pneumonia. Signed by Dr Roz Hameed on 6/23/2019 4:42 PM    Cta Pulmonary W Contrast    Result Date: 6/15/2019  CTA PULMONARY W CONTRAST 6/15/2019 1:15 PM HISTORY: Shortness of air and hypoxia COMPARISON: 2/6/2009. DLP: 667 mGy cm. In order to have a CT radiation dose as low as reasonably achievable, Automated Exposure Control was utilized for adjustment of the mA and/or KV according to patient size. TECHNIQUE: Helical tomographic images of the chest were obtained after the administration of intravenous contrast following angiogram protocol. Additionally, 3D MIP reconstructions in the coronal and sagittal planes were provided. FINDINGS:  Pulmonary arteries: Evaluation of the lower lobar pulmonary arteries is limited due to timing of the contrast bolus. The majority of the contrast is in the arterial system and SVC. Within limits, no central filling defects are identified. The pulmonary vessels are within normal limits for size. Aorta and great vessels:  The aorta is well opacified and ? D/C to SNF in 1-2 days.     Advance Directive:  Full Code   Antibiotic:  Cefepime, Vancomycin   DVT Prophylaxis:  Lovenox  GI prophylaxis:  Protonix    Geo Ribera PA-C  6/27/2019

## 2019-06-27 NOTE — PROGRESS NOTES
Speech Language Pathology  Facility/Department: [facility] 4 ONCOLOGY UNIT   CLINICAL BEDSIDE SWALLOW EVALUATION    NAME: Janessa Alvarez  : 1945  MRN: [id number]    ADMISSION DATE: 2019  ADMITTING DIAGNOSIS: has Hypertension; Hyperlipidemia; Hypothyroid; Multiple allergies; Chronic neck and back pain; Seasonal allergies; Dysarthria; MVP (mitral valve prolapse); Tremor; Gait abnormality; Pulmonary infiltrate in left lung on chest x-ray; Spondylolisthesis at L4-L5 level; DDD (degenerative disc disease), lumbar; Chronic low back pain without sciatica; Palliative care patient; Pneumonia due to organism; Acute renal failure superimposed on stage 3 chronic kidney disease ([address] Hyperkalemia; Acute metabolic encephalopathy; Acute kidney injury ([address] Hypoxia; and Seizure ([address] on their problem list.    Date of Eval: 2019  Evaluating Therapist: Colleen Olson    Current Diet level:  Current Diet : Puree  Current Liquid Diet : Honey    Reason for Referral  Janessa Alvarez was referred for a bedside swallow evaluation to assess the efficiency of her swallow function, identify signs and symptoms of aspiration and make recommendations regarding safe dietary consistencies, effective compensatory strategies, and safe eating environment. Impression  SLP assessed the patient's swallow function. Oral prep characterized by frontal vertical mastication with more solid food textures. Just 1 instance of honey-thick H20 traveled fast; suspect this was secondary to patient positioning. Transit measured 1-2 seconds in length with all other food/drink trials. Oral cavity residue observed with regular solid food post swallows. Patient cleared with administration of puree texture. The patient presented with sluggish and moderate/severely decreased laryngeal elevation with ice chips, pudding and puree textures, regular solids, mechanical soft, and honey-thick H20 for adequate airway protection during swallows.  Patient exhibited 1

## 2019-06-28 ENCOUNTER — APPOINTMENT (OUTPATIENT)
Dept: GENERAL RADIOLOGY | Age: 74
DRG: 196 | End: 2019-06-28
Payer: MEDICARE

## 2019-06-28 PROBLEM — N18.30 CHRONIC KIDNEY DISEASE, STAGE 3 (HCC): Status: ACTIVE | Noted: 2019-06-28

## 2019-06-28 PROBLEM — N17.9 ACUTE RENAL FAILURE SUPERIMPOSED ON STAGE 3 CHRONIC KIDNEY DISEASE (HCC): Status: RESOLVED | Noted: 2019-06-23 | Resolved: 2019-06-28

## 2019-06-28 PROBLEM — E87.20 METABOLIC ACIDOSIS: Status: ACTIVE | Noted: 2019-06-28

## 2019-06-28 PROBLEM — E87.5 HYPERKALEMIA: Status: RESOLVED | Noted: 2019-06-23 | Resolved: 2019-06-28

## 2019-06-28 PROBLEM — N18.30 ACUTE RENAL FAILURE SUPERIMPOSED ON STAGE 3 CHRONIC KIDNEY DISEASE (HCC): Status: RESOLVED | Noted: 2019-06-23 | Resolved: 2019-06-28

## 2019-06-28 LAB
ANION GAP SERPL CALCULATED.3IONS-SCNC: 13 MMOL/L (ref 7–19)
BILIRUBIN URINE: NEGATIVE
BLOOD CULTURE, ROUTINE: NORMAL
BLOOD, URINE: ABNORMAL
BUN BLDV-MCNC: 36 MG/DL (ref 8–23)
CALCIUM SERPL-MCNC: 9.3 MG/DL (ref 8.8–10.2)
CHLORIDE BLD-SCNC: 109 MMOL/L (ref 98–111)
CLARITY: ABNORMAL
CO2: 17 MMOL/L (ref 22–29)
COLOR: YELLOW
CREAT SERPL-MCNC: 2 MG/DL (ref 0.5–0.9)
CULTURE, BLOOD 2: NORMAL
EPITHELIAL CELLS, UA: ABNORMAL /HPF
FOLATE: 5.7 NG/ML (ref 4.8–37.3)
GFR NON-AFRICAN AMERICAN: 24
GLUCOSE BLD-MCNC: 105 MG/DL (ref 74–109)
GLUCOSE URINE: NEGATIVE MG/DL
HCT VFR BLD CALC: 30.6 % (ref 37–47)
HEMOGLOBIN: 9.6 G/DL (ref 12–16)
KETONES, URINE: NEGATIVE MG/DL
LEUKOCYTE ESTERASE, URINE: ABNORMAL
MCH RBC QN AUTO: 30.1 PG (ref 27–31)
MCHC RBC AUTO-ENTMCNC: 31.4 G/DL (ref 33–37)
MCV RBC AUTO: 95.9 FL (ref 81–99)
NITRITE, URINE: NEGATIVE
PDW BLD-RTO: 13.6 % (ref 11.5–14.5)
PH UA: 5.5 (ref 5–8)
PHENYTOIN LEVEL: 12.2 UG/ML (ref 10–20)
PLATELET # BLD: 211 K/UL (ref 130–400)
PMV BLD AUTO: 11.2 FL (ref 9.4–12.3)
POTASSIUM SERPL-SCNC: 4 MMOL/L (ref 3.5–5)
PROTEIN UA: ABNORMAL MG/DL
RBC # BLD: 3.19 M/UL (ref 4.2–5.4)
RBC UA: ABNORMAL /HPF (ref 0–2)
SODIUM BLD-SCNC: 139 MMOL/L (ref 136–145)
SPECIFIC GRAVITY UA: 1.01 (ref 1–1.03)
UROBILINOGEN, URINE: 0.2 E.U./DL
WBC # BLD: 10.7 K/UL (ref 4.8–10.8)
WBC UA: ABNORMAL /HPF (ref 0–5)
YEAST: ABNORMAL /HPF

## 2019-06-28 PROCEDURE — 1210000000 HC MED SURG R&B

## 2019-06-28 PROCEDURE — 87086 URINE CULTURE/COLONY COUNT: CPT

## 2019-06-28 PROCEDURE — 99233 SBSQ HOSP IP/OBS HIGH 50: CPT | Performed by: PSYCHIATRY & NEUROLOGY

## 2019-06-28 PROCEDURE — 82746 ASSAY OF FOLIC ACID SERUM: CPT

## 2019-06-28 PROCEDURE — 6360000002 HC RX W HCPCS: Performed by: INTERNAL MEDICINE

## 2019-06-28 PROCEDURE — 2580000003 HC RX 258: Performed by: INTERNAL MEDICINE

## 2019-06-28 PROCEDURE — 6370000000 HC RX 637 (ALT 250 FOR IP): Performed by: INTERNAL MEDICINE

## 2019-06-28 PROCEDURE — 36415 COLL VENOUS BLD VENIPUNCTURE: CPT

## 2019-06-28 PROCEDURE — 6360000002 HC RX W HCPCS: Performed by: HOSPITALIST

## 2019-06-28 PROCEDURE — 2700000000 HC OXYGEN THERAPY PER DAY

## 2019-06-28 PROCEDURE — 92526 ORAL FUNCTION THERAPY: CPT

## 2019-06-28 PROCEDURE — 85027 COMPLETE CBC AUTOMATED: CPT

## 2019-06-28 PROCEDURE — 97530 THERAPEUTIC ACTIVITIES: CPT

## 2019-06-28 PROCEDURE — 99232 SBSQ HOSP IP/OBS MODERATE 35: CPT | Performed by: PHYSICIAN ASSISTANT

## 2019-06-28 PROCEDURE — 80185 ASSAY OF PHENYTOIN TOTAL: CPT

## 2019-06-28 PROCEDURE — 81001 URINALYSIS AUTO W/SCOPE: CPT

## 2019-06-28 PROCEDURE — 6370000000 HC RX 637 (ALT 250 FOR IP): Performed by: HOSPITALIST

## 2019-06-28 PROCEDURE — 94640 AIRWAY INHALATION TREATMENT: CPT

## 2019-06-28 PROCEDURE — 97166 OT EVAL MOD COMPLEX 45 MIN: CPT

## 2019-06-28 PROCEDURE — 6370000000 HC RX 637 (ALT 250 FOR IP): Performed by: PHYSICIAN ASSISTANT

## 2019-06-28 PROCEDURE — 71046 X-RAY EXAM CHEST 2 VIEWS: CPT

## 2019-06-28 PROCEDURE — 80048 BASIC METABOLIC PNL TOTAL CA: CPT

## 2019-06-28 PROCEDURE — 6370000000 HC RX 637 (ALT 250 FOR IP): Performed by: PSYCHIATRY & NEUROLOGY

## 2019-06-28 RX ORDER — SODIUM BICARBONATE 650 MG/1
325 TABLET ORAL 2 TIMES DAILY
Status: DISCONTINUED | OUTPATIENT
Start: 2019-06-28 | End: 2019-07-05 | Stop reason: HOSPADM

## 2019-06-28 RX ORDER — CYANOCOBALAMIN 1000 UG/ML
1000 INJECTION INTRAMUSCULAR; SUBCUTANEOUS ONCE
Status: COMPLETED | OUTPATIENT
Start: 2019-06-28 | End: 2019-06-28

## 2019-06-28 RX ORDER — CEFDINIR 300 MG/1
300 CAPSULE ORAL DAILY
Status: DISCONTINUED | OUTPATIENT
Start: 2019-06-29 | End: 2019-06-29

## 2019-06-28 RX ORDER — CEFDINIR 300 MG/1
300 CAPSULE ORAL EVERY 12 HOURS SCHEDULED
Status: DISCONTINUED | OUTPATIENT
Start: 2019-06-28 | End: 2019-06-28

## 2019-06-28 RX ORDER — PREGABALIN 50 MG/1
100 CAPSULE ORAL DAILY
Status: DISCONTINUED | OUTPATIENT
Start: 2019-06-28 | End: 2019-07-05 | Stop reason: HOSPADM

## 2019-06-28 RX ORDER — DOXYCYCLINE HYCLATE 100 MG/1
100 CAPSULE ORAL EVERY 12 HOURS SCHEDULED
Status: DISCONTINUED | OUTPATIENT
Start: 2019-06-28 | End: 2019-07-05 | Stop reason: HOSPADM

## 2019-06-28 RX ORDER — THIAMINE HYDROCHLORIDE 100 MG/ML
100 INJECTION, SOLUTION INTRAMUSCULAR; INTRAVENOUS DAILY
Status: DISCONTINUED | OUTPATIENT
Start: 2019-06-28 | End: 2019-07-05 | Stop reason: HOSPADM

## 2019-06-28 RX ORDER — OXYCODONE HYDROCHLORIDE AND ACETAMINOPHEN 5; 325 MG/1; MG/1
1 TABLET ORAL EVERY 4 HOURS PRN
Status: DISCONTINUED | OUTPATIENT
Start: 2019-06-28 | End: 2019-07-05 | Stop reason: HOSPADM

## 2019-06-28 RX ADMIN — THIAMINE HYDROCHLORIDE 100 MG: 100 INJECTION, SOLUTION INTRAMUSCULAR; INTRAVENOUS at 08:37

## 2019-06-28 RX ADMIN — IPRATROPIUM BROMIDE AND ALBUTEROL SULFATE 1 AMPULE: .5; 3 SOLUTION RESPIRATORY (INHALATION) at 07:34

## 2019-06-28 RX ADMIN — ENOXAPARIN SODIUM 30 MG: 30 INJECTION SUBCUTANEOUS at 19:57

## 2019-06-28 RX ADMIN — CEFDINIR 300 MG: 300 CAPSULE ORAL at 13:22

## 2019-06-28 RX ADMIN — SODIUM BICARBONATE 325 MG: 650 TABLET ORAL at 13:22

## 2019-06-28 RX ADMIN — IPRATROPIUM BROMIDE AND ALBUTEROL SULFATE 1 AMPULE: .5; 3 SOLUTION RESPIRATORY (INHALATION) at 11:12

## 2019-06-28 RX ADMIN — PHENYTOIN SODIUM 100 MG: 100 CAPSULE ORAL at 05:51

## 2019-06-28 RX ADMIN — DOXYCYCLINE HYCLATE 100 MG: 100 CAPSULE ORAL at 13:22

## 2019-06-28 RX ADMIN — IPRATROPIUM BROMIDE AND ALBUTEROL SULFATE 1 AMPULE: .5; 3 SOLUTION RESPIRATORY (INHALATION) at 15:07

## 2019-06-28 RX ADMIN — PREGABALIN 100 MG: 50 CAPSULE ORAL at 08:36

## 2019-06-28 RX ADMIN — IPRATROPIUM BROMIDE AND ALBUTEROL SULFATE 1 AMPULE: .5; 3 SOLUTION RESPIRATORY (INHALATION) at 19:34

## 2019-06-28 RX ADMIN — PHENYTOIN SODIUM 100 MG: 100 CAPSULE ORAL at 13:20

## 2019-06-28 RX ADMIN — BUDESONIDE 250 MCG: 0.25 INHALANT RESPIRATORY (INHALATION) at 19:35

## 2019-06-28 RX ADMIN — POLYETHYLENE GLYCOL 3350 17 G: 17 POWDER, FOR SOLUTION ORAL at 08:36

## 2019-06-28 RX ADMIN — CYANOCOBALAMIN 1000 MCG: 1000 INJECTION, SOLUTION INTRAMUSCULAR; SUBCUTANEOUS at 08:50

## 2019-06-28 RX ADMIN — CARVEDILOL 25 MG: 25 TABLET, FILM COATED ORAL at 08:36

## 2019-06-28 RX ADMIN — PANTOPRAZOLE SODIUM 40 MG: 40 TABLET, DELAYED RELEASE ORAL at 08:36

## 2019-06-28 RX ADMIN — VANCOMYCIN HYDROCHLORIDE 1000 MG: 10 INJECTION, POWDER, LYOPHILIZED, FOR SOLUTION INTRAVENOUS at 08:32

## 2019-06-28 RX ADMIN — SODIUM BICARBONATE 325 MG: 650 TABLET ORAL at 19:57

## 2019-06-28 RX ADMIN — CARVEDILOL 25 MG: 25 TABLET, FILM COATED ORAL at 19:57

## 2019-06-28 RX ADMIN — SIMVASTATIN 20 MG: 20 TABLET, FILM COATED ORAL at 19:57

## 2019-06-28 RX ADMIN — PHENYTOIN SODIUM 100 MG: 100 CAPSULE ORAL at 19:57

## 2019-06-28 RX ADMIN — BUDESONIDE 250 MCG: 0.25 INHALANT RESPIRATORY (INHALATION) at 07:33

## 2019-06-28 RX ADMIN — OXYCODONE HYDROCHLORIDE AND ACETAMINOPHEN 1 TABLET: 5; 325 TABLET ORAL at 11:49

## 2019-06-28 RX ADMIN — LEVOTHYROXINE SODIUM 100 MCG: 0.1 TABLET ORAL at 05:51

## 2019-06-28 ASSESSMENT — PAIN SCALES - GENERAL: PAINLEVEL_OUTOF10: 5

## 2019-06-28 NOTE — PROGRESS NOTES
[facility]      Patient:    Loyda Muñoz  YOB: 1945  Date of Service:  6/28/2019  MRN:    [id number]    Room:   [address] [place] [address]   PCP:    Basim Hunt MD     Chief Complaint:  Altered mental status    Subjective: Patient feeling better, sitting up in bedside chair. States she does not feel as confused today. Remains on supplemental oxygen. Asked nursing staff to attempt to wean, found to drop to 80s on room air. She is afebrile. WBC is improving. Cumulative Hospital Course: The patient is a 65 y/o female who presents to the hospital with shortness of breath. There is also report of waxing/waning mental status. CXR demonstrated worsening pneumonia, noted to be hypoxic. Danie Dos Santos She was admitted to the hospital. Placed on broad spectrum antibiotics. Neurology consulted for mental status, found to be in status, non convulsive. Noted to have COTY on admission as well. Found to be in non convulsive status, improved following administration of ativan and Cerebryx. Cerebryx changed to oral dilantin. MRI of the brain obtained with no acute changes. Renal ultrasound obtained and unremarkable. Renal parameters gradually improving. PT/OT/SLP consulted. The patient is from [facility], expected to return upon discharge home. 6/28:  Noted to have continued slow improvement in renal parameters. She is acidotic, will add oral sodium bicarbonate. Given persistent hypoxia, will obtain repeat CXR. Transition to oral antibiotics and monitor temperature trend. Review of Systems:   Unable to obtain due to patient mental status.     Objective:   VITALS:  BP (!) 155/81   Pulse 79   Temp 97.6 °F (36.4 °C) (Temporal)   Resp 20   Ht 5' 5\" (1.651 m)   Wt 184 lb (83.5 kg)   SpO2 92%   BMI 30.62 kg/m²   24HR INTAKE/OUTPUT:      Intake/Output Summary (Last 24 hours) at 6/28/2019 1324  Last data filed at 6/28/2019 1140  Gross per 24 hour   Intake 1908 ml   Output 225 ml   Net 1683 ml     General 244 227 211     Recent Labs     06/26/19  0336 06/27/19  0345 06/28/19  0240    142 139   K 4.7 4.3 4.0   * 111 109   CO2 16* 17* 17*   BUN 49* 42* 36*   CREATININE 2.4* 2.1* 2.0*   GLUCOSE 103 102 105     No results for input(s): AST, ALT, ALB, BILITOT, ALKPHOS in the last 72 hours. Troponin T:   No results for input(s): TROPONINI in the last 72 hours. ABGs:   Lab Results   Component Value Date    PHART 7.340 06/25/2019    PO2ART 124.0 06/25/2019    XFN7JFB 30.0 06/25/2019       RAD:   Xr Chest Standard (2 Vw)    Result Date: 6/20/2019  XR CHEST (2 VW) 6/20/2019 2:10 PM History: Pneumonia. Two-view chest x-ray compared with 6/15/2019. Low lung volumes though slightly better expansion as compared with 5 days ago. Chronic appearing interstitial disease with scattered calcified granulomas. Patchy infiltrate within the posterior left lower lobe is again seen. No pleural effusion or pneumothorax. 1. Chronic lung changes with persistent left lower lobe infiltrate. Signed by Dr Radha Maria on 6/20/2019 2:12 PM    Xr Chest Standard (2 Vw)    Result Date: 6/15/2019  XR CHEST (2 VW) 6/15/2019 12:00 PM HISTORY: Cough and shortness of air COMPARISON: 5/2/2019. FINDINGS: Frontal and lateral views of the chest were obtained. Lung volumes are decreased. Hazy opacities are likely due to body habitus and poor penetration. The cardiomediastinal silhouette and pulmonary vascularity are unchanged. The osseous structures and surrounding soft tissues demonstrate no acute abnormality. 1. Low lung volumes and bilateral hazy opacities. Findings are likely due to technique and low depth of inspiration. There is no focal consolidation. Signed by Dr Ayan New on 6/15/2019 1:31 PM    Ct Head Wo Contrast    Result Date: 6/23/2019  CT HEAD WO CONTRAST 6/23/2019 4:48 PM History: Altered mental status. Syncope. Speech difficulty. Weakness and headache. Noncontrast head CT compared with 12/28/2018.  In order to have

## 2019-06-28 NOTE — PROGRESS NOTES
Patient:   Louellen Schwab  MR#:    [id number]   Room:    [facility]   YOB: 1945  Date of Progress Note: 6/28/2019  Time of Note                           12:07 PM  Consulting Physician:   Saad Jaime M.D. Attending Physician:  [employer], *     Chief complaint AMS    S:This 66 y.o. female  seen for altered mental status. The patient was admitted about a month ago following back surgery and discharged to nursing home. She is readmitted on 6/23 with shortness of breath. Apparently the patient has not been the same since surgery as per the family noted by nursing as the family cannot provide any history. She does have worsening hypoxia and pneumonia on this admission. She was on multiple sedation meds prior to admission which were stopped. She is on broad spectrum antibiotics and has renal dysfunction. CT of the head on admission was unremarkable. No acute issues overnight. Doing better.         REVIEW OF SYSTEMS:  Constitutional: No fevers No chills  Neck:No stiffness  Respiratory: No shortness of breath  Cardiovascular: No chest pain No palpitations  Gastrointestinal: No abdominal pain    Genitourinary: No Dysuria  Neurological: No headache, + confusion    Past Medical History:      Diagnosis Date    Allergic rhinitis     Anxiety     Arthritis     Asthma     Chronic back pain     Chronic kidney disease     DDD (degenerative disc disease), lumbar 5/21/2019    Depression     GERD (gastroesophageal reflux disease)     Hyperlipidemia     Hypertension     Hypothyroidism     Irritable bowel syndrome     Palliative care patient 06/18/2019    Restless legs syndrome     Spondylolisthesis at L4-L5 level 5/21/2019       Past Surgical History:      Procedure Laterality Date    ANKLE FRACTURE SURGERY Left     metal plate & screws    CERVICAL SPINE SURGERY  x 2    WITH HARDWARE    CHOLECYSTECTOMY  02/2015    Dr. Tobias Metzger Left 02/2015    [facility] correction - Dr. Nik Falcon flush 0.9 % injection 10 mL, 10 mL, Intravenous, 2 times per day, Demetrius Collins PA-C, 10 mL at 06/26/19 0954    sodium chloride flush 0.9 % injection 10 mL, 10 mL, Intravenous, PRN, Demetrius Collins PA-C    QUEtiapine (SEROQUEL) tablet 25 mg, 25 mg, Oral, Daily PRN, Demetrius Collins PA-C, 25 mg at 06/27/19 2149    levothyroxine (SYNTHROID) tablet 100 mcg, 100 mcg, Oral, Daily, Bigg Farah DO, 100 mcg at 06/28/19 0551    pantoprazole (PROTONIX) tablet 40 mg, 40 mg, Oral, Daily, Bigg Farah DO, 40 mg at 06/28/19 0836    polyethylene glycol (GLYCOLAX) packet 17 g, 17 g, Oral, Daily, Bigg Farah DO, 17 g at 06/28/19 0836    simvastatin (ZOCOR) tablet 20 mg, 20 mg, Oral, Nightly, Bigg Farah DO, 20 mg at 06/27/19 2142    sodium chloride flush 0.9 % injection 10 mL, 10 mL, Intravenous, 2 times per day, Lorena Pacific, DO, 10 mL at 06/26/19 0409    sodium chloride flush 0.9 % injection 10 mL, 10 mL, Intravenous, PRN, Bigg Farah DO    magnesium hydroxide (MILK OF MAGNESIA) 400 MG/5ML suspension 30 mL, 30 mL, Oral, Daily PRN, Bigg Farah DO    ondansetron [facility]) injection 4 mg, 4 mg, Intravenous, Q6H PRN, Bigg Farah DO    enoxaparin (LOVENOX) injection 30 mg, 30 mg, Subcutaneous, Nightly, Bigg Farah DO, 30 mg at 06/27/19 2141    ipratropium-albuterol (DUONEB) nebulizer solution 1 ampule, 1 ampule, Inhalation, Q4H WA, Ron Farah DO, 1 ampule at 06/28/19 1112    0.9 % sodium chloride infusion, , Intravenous, Continuous, Susan Ritter PA-C, Last Rate: 50 mL/hr at 06/27/19 1834    budesonide (PULMICORT) nebulizer suspension 250 mcg, 0.25 mg, Nebulization, BID, Lorena Shipman DO, 250 mcg at 06/28/19 9911    Allergies:  Penicillins and Sulfa antibiotics    Social History:   TOBACCO:   reports that she is a non-smoker but has been exposed to tobacco smoke. She has never used smokeless tobacco.  ETOH:   reports that she does not drink alcohol.     Family History:

## 2019-06-28 NOTE — PROGRESS NOTES
Occupational Therapy   Occupational Therapy Initial Assessment  Date: 2019   Patient Name: Cookie Kim  MRN: [id number]     : 1945    Date of Service: 2019    Discharge Recommendations:  Patient would benefit from continued therapy after discharge       Assessment   Performance deficits / Impairments: Decreased ADL status; Decreased functional mobility ; Decreased ROM; Decreased strength;Decreased cognition;Decreased endurance  Assessment: The patient will need skilled therapy intervention to progress with functional independence  Treatment Diagnosis: Pneumonia  History: LLIF L4-5 May 2019, readmit for PNA  REQUIRES OT FOLLOW UP: Yes  Activity Tolerance  Activity Tolerance: Patient Tolerated treatment well  Safety Devices  Safety Devices in place: Yes  Type of devices: Bed alarm in place;Call light within reach; Left in bed           Patient Diagnosis(es): The primary encounter diagnosis was Acute kidney injury ([facility] [address] Diagnoses of Pneumonia due to organism, Hypoxia, Hypotension, unspecified hypotension type, and Syncope, unspecified syncope type were also pertinent to this visit. has a past medical history of Allergic rhinitis, Anxiety, Arthritis, Asthma, Chronic back pain, Chronic kidney disease, DDD (degenerative disc disease), lumbar, Depression, GERD (gastroesophageal reflux disease), Hyperlipidemia, Hypertension, Hypothyroidism, Irritable bowel syndrome, Palliative care patient, Restless legs syndrome, and Spondylolisthesis at L4-L5 level.   has a past surgical history that includes Hysterectomy; Cervical spine surgery (x 2); Ankle fracture surgery (Left); Hand surgery (Left, 2007); Ovary removal (Right, 3/2010); Cholecystectomy (2015); Foot surgery (Left, 2015);  Total knee arthroplasty (Bilateral); shoulder surgery (Right, 2018); pr [facility] incl fluor gdnce dx w/cell washg spx (N/A, 2018); joint replacement (Bilateral); joint replacement (Right); joint replacement (Left); and

## 2019-06-29 LAB
ANION GAP SERPL CALCULATED.3IONS-SCNC: 15 MMOL/L (ref 7–19)
BUN BLDV-MCNC: 32 MG/DL (ref 8–23)
CALCIUM SERPL-MCNC: 9.5 MG/DL (ref 8.8–10.2)
CHLORIDE BLD-SCNC: 110 MMOL/L (ref 98–111)
CO2: 18 MMOL/L (ref 22–29)
CREAT SERPL-MCNC: 2.2 MG/DL (ref 0.5–0.9)
GFR NON-AFRICAN AMERICAN: 22
GLUCOSE BLD-MCNC: 111 MG/DL (ref 74–109)
HCT VFR BLD CALC: 31.9 % (ref 37–47)
HEMOGLOBIN: 9.8 G/DL (ref 12–16)
MAGNESIUM: 1.2 MG/DL (ref 1.6–2.4)
MCH RBC QN AUTO: 29.1 PG (ref 27–31)
MCHC RBC AUTO-ENTMCNC: 30.7 G/DL (ref 33–37)
MCV RBC AUTO: 94.7 FL (ref 81–99)
PDW BLD-RTO: 13.9 % (ref 11.5–14.5)
PLATELET # BLD: 221 K/UL (ref 130–400)
PMV BLD AUTO: 11.3 FL (ref 9.4–12.3)
POTASSIUM SERPL-SCNC: 3.8 MMOL/L (ref 3.5–5)
RBC # BLD: 3.37 M/UL (ref 4.2–5.4)
SODIUM BLD-SCNC: 143 MMOL/L (ref 136–145)
T4 FREE: 1.4 NG/DL (ref 0.9–1.7)
VANCOMYCIN RANDOM: 20.7 UG/ML
WBC # BLD: 9.6 K/UL (ref 4.8–10.8)

## 2019-06-29 PROCEDURE — 99232 SBSQ HOSP IP/OBS MODERATE 35: CPT | Performed by: HOSPITALIST

## 2019-06-29 PROCEDURE — 6360000002 HC RX W HCPCS: Performed by: INTERNAL MEDICINE

## 2019-06-29 PROCEDURE — 80048 BASIC METABOLIC PNL TOTAL CA: CPT

## 2019-06-29 PROCEDURE — 2580000003 HC RX 258: Performed by: PHYSICIAN ASSISTANT

## 2019-06-29 PROCEDURE — 2700000000 HC OXYGEN THERAPY PER DAY

## 2019-06-29 PROCEDURE — 84439 ASSAY OF FREE THYROXINE: CPT

## 2019-06-29 PROCEDURE — 6360000002 HC RX W HCPCS: Performed by: HOSPITALIST

## 2019-06-29 PROCEDURE — 6370000000 HC RX 637 (ALT 250 FOR IP): Performed by: INTERNAL MEDICINE

## 2019-06-29 PROCEDURE — 99233 SBSQ HOSP IP/OBS HIGH 50: CPT | Performed by: PSYCHIATRY & NEUROLOGY

## 2019-06-29 PROCEDURE — 80202 ASSAY OF VANCOMYCIN: CPT

## 2019-06-29 PROCEDURE — 1210000000 HC MED SURG R&B

## 2019-06-29 PROCEDURE — 85027 COMPLETE CBC AUTOMATED: CPT

## 2019-06-29 PROCEDURE — 6370000000 HC RX 637 (ALT 250 FOR IP): Performed by: PSYCHIATRY & NEUROLOGY

## 2019-06-29 PROCEDURE — 83735 ASSAY OF MAGNESIUM: CPT

## 2019-06-29 PROCEDURE — 6370000000 HC RX 637 (ALT 250 FOR IP): Performed by: HOSPITALIST

## 2019-06-29 PROCEDURE — 6370000000 HC RX 637 (ALT 250 FOR IP): Performed by: PHYSICIAN ASSISTANT

## 2019-06-29 PROCEDURE — 97161 PT EVAL LOW COMPLEX 20 MIN: CPT

## 2019-06-29 PROCEDURE — 94640 AIRWAY INHALATION TREATMENT: CPT

## 2019-06-29 PROCEDURE — 36415 COLL VENOUS BLD VENIPUNCTURE: CPT

## 2019-06-29 RX ORDER — GUAIFENESIN 600 MG/1
600 TABLET, EXTENDED RELEASE ORAL 2 TIMES DAILY
Status: DISCONTINUED | OUTPATIENT
Start: 2019-06-29 | End: 2019-07-03

## 2019-06-29 RX ORDER — MAGNESIUM SULFATE 1 G/100ML
1 INJECTION INTRAVENOUS ONCE
Status: COMPLETED | OUTPATIENT
Start: 2019-06-29 | End: 2019-06-29

## 2019-06-29 RX ORDER — CEFDINIR 300 MG/1
300 CAPSULE ORAL DAILY
Status: DISCONTINUED | OUTPATIENT
Start: 2019-06-30 | End: 2019-07-05 | Stop reason: HOSPADM

## 2019-06-29 RX ADMIN — IPRATROPIUM BROMIDE AND ALBUTEROL SULFATE 1 AMPULE: .5; 3 SOLUTION RESPIRATORY (INHALATION) at 10:15

## 2019-06-29 RX ADMIN — PHENYTOIN SODIUM 100 MG: 100 CAPSULE ORAL at 13:25

## 2019-06-29 RX ADMIN — LEVOTHYROXINE SODIUM 100 MCG: 0.1 TABLET ORAL at 05:13

## 2019-06-29 RX ADMIN — DOXYCYCLINE HYCLATE 100 MG: 100 CAPSULE ORAL at 20:25

## 2019-06-29 RX ADMIN — MAGNESIUM SULFATE HEPTAHYDRATE 1 G: 1 INJECTION, SOLUTION INTRAVENOUS at 16:56

## 2019-06-29 RX ADMIN — OXYCODONE HYDROCHLORIDE AND ACETAMINOPHEN 1 TABLET: 5; 325 TABLET ORAL at 18:31

## 2019-06-29 RX ADMIN — PREGABALIN 100 MG: 50 CAPSULE ORAL at 10:23

## 2019-06-29 RX ADMIN — CARVEDILOL 25 MG: 25 TABLET, FILM COATED ORAL at 10:23

## 2019-06-29 RX ADMIN — CEFDINIR 300 MG: 300 CAPSULE ORAL at 10:22

## 2019-06-29 RX ADMIN — PHENYTOIN SODIUM 100 MG: 100 CAPSULE ORAL at 20:25

## 2019-06-29 RX ADMIN — THIAMINE HYDROCHLORIDE 100 MG: 100 INJECTION, SOLUTION INTRAMUSCULAR; INTRAVENOUS at 10:22

## 2019-06-29 RX ADMIN — BUDESONIDE 250 MCG: 0.25 INHALANT RESPIRATORY (INHALATION) at 19:24

## 2019-06-29 RX ADMIN — SODIUM BICARBONATE 325 MG: 650 TABLET ORAL at 10:22

## 2019-06-29 RX ADMIN — DOXYCYCLINE HYCLATE 100 MG: 100 CAPSULE ORAL at 10:22

## 2019-06-29 RX ADMIN — CARVEDILOL 25 MG: 25 TABLET, FILM COATED ORAL at 17:04

## 2019-06-29 RX ADMIN — Medication 10 ML: at 10:23

## 2019-06-29 RX ADMIN — IPRATROPIUM BROMIDE AND ALBUTEROL SULFATE 1 AMPULE: .5; 3 SOLUTION RESPIRATORY (INHALATION) at 19:24

## 2019-06-29 RX ADMIN — PANTOPRAZOLE SODIUM 40 MG: 40 TABLET, DELAYED RELEASE ORAL at 10:22

## 2019-06-29 RX ADMIN — OXYCODONE HYDROCHLORIDE AND ACETAMINOPHEN 1 TABLET: 5; 325 TABLET ORAL at 01:32

## 2019-06-29 RX ADMIN — IPRATROPIUM BROMIDE AND ALBUTEROL SULFATE 1 AMPULE: .5; 3 SOLUTION RESPIRATORY (INHALATION) at 06:26

## 2019-06-29 RX ADMIN — PHENYTOIN SODIUM 100 MG: 100 CAPSULE ORAL at 05:13

## 2019-06-29 RX ADMIN — IPRATROPIUM BROMIDE AND ALBUTEROL SULFATE 1 AMPULE: .5; 3 SOLUTION RESPIRATORY (INHALATION) at 14:11

## 2019-06-29 RX ADMIN — POLYETHYLENE GLYCOL 3350 17 G: 17 POWDER, FOR SOLUTION ORAL at 10:22

## 2019-06-29 RX ADMIN — OXYCODONE HYDROCHLORIDE AND ACETAMINOPHEN 1 TABLET: 5; 325 TABLET ORAL at 13:25

## 2019-06-29 RX ADMIN — ENOXAPARIN SODIUM 30 MG: 30 INJECTION SUBCUTANEOUS at 20:25

## 2019-06-29 RX ADMIN — GUAIFENESIN 600 MG: 600 TABLET, EXTENDED RELEASE ORAL at 20:25

## 2019-06-29 RX ADMIN — SODIUM BICARBONATE 325 MG: 650 TABLET ORAL at 20:25

## 2019-06-29 RX ADMIN — SIMVASTATIN 20 MG: 20 TABLET, FILM COATED ORAL at 20:26

## 2019-06-29 RX ADMIN — BUDESONIDE 250 MCG: 0.25 INHALANT RESPIRATORY (INHALATION) at 06:28

## 2019-06-29 ASSESSMENT — PAIN SCALES - GENERAL
PAINLEVEL_OUTOF10: 9
PAINLEVEL_OUTOF10: 1
PAINLEVEL_OUTOF10: 10
PAINLEVEL_OUTOF10: 10

## 2019-06-29 NOTE — PROGRESS NOTES
HOSPITAL MEDICINE  - PROGRESS NOTE    Admit Date: 6/23/2019         CC: dyspnea     Subjective: dyspnea     Objective: mild distress    Diet: DIET CARDIAC; Dysphagia III Advanced;  Nectar Thick  Pain is:None  Nausea:None  Bowel Movement/Flatus yes    Data:   Scheduled Meds: Reviewed  Current Facility-Administered Medications   Medication Dose Route Frequency Provider Last Rate Last Dose    guaiFENesin (MUCINEX) extended release tablet 600 mg  600 mg Oral BID Kate Bruce MD        [START ON 6/30/2019] cefdinir (OMNICEF) capsule 300 mg  300 mg Oral Daily Kate Bruce MD        vancomycin [facility]) intermittent dosing (placeholder)   Other RX Placeholder Kate Bruce MD        thiamine (B-1) injection 100 mg  100 mg Intravenous Daily Kate Bruce MD   100 mg at 06/29/19 1022    pregabalin (LYRICA) capsule 100 mg  100 mg Oral Daily Kate Bruce MD   100 mg at 06/29/19 1023    sodium chloride 0.9 % 3,061 mL with folic acid 1 mg, adult multi-vitamin with vitamin k 10 mL, thiamine 100 mg   Intravenous Once Kate Bruce MD        sodium bicarbonate tablet 325 mg  325 mg Oral BID Reyes Curling, PA-C   325 mg at 06/29/19 1022    doxycycline hyclate (VIBRAMYCIN) capsule 100 mg  100 mg Oral 2 times per day Reyes Curling, PA-C   100 mg at 06/29/19 1022    oxyCODONE-acetaminophen (PERCOCET) 5-325 MG per tablet 1 tablet  1 tablet Oral Q4H PRN Kate Bruce MD   1 tablet at 06/29/19 1325    phenytoin (DILANTIN) ER capsule 100 mg  100 mg Oral Q8H Jhonny Prieto MD   100 mg at 06/29/19 1325    bisacodyl (DULCOLAX) suppository 10 mg  10 mg Rectal Daily PRN Reyes Curling, PA-C        carvedilol (COREG) tablet 25 mg  25 mg Oral BID WC Reyes Curling, PA-C   25 mg at 06/29/19 1704    lidocaine PF 1 % injection 5 mL  5 mL Intradermal Once Mary Albarran PA-C        sodium chloride flush 0.9 % injection 10 mL  10 mL Intravenous 2 times per day Nkechi Valenzuela PA-C   10 mL at 06/29/19 1023    sodium chloride flush 0.9 % injection 10 mL  10 mL Intravenous PRN Nkechi Valenzuela PA-C        QUEtiapine (SEROQUEL) tablet 25 mg  25 mg Oral Daily PRN Nkechi Valenzuela PA-C   25 mg at 06/27/19 2149    levothyroxine (SYNTHROID) tablet 100 mcg  100 mcg Oral Daily Jesse Marines Sofi, DO   100 mcg at 06/29/19 0513    pantoprazole (PROTONIX) tablet 40 mg  40 mg Oral Daily Jesse Marines Sofi, DO   40 mg at 06/29/19 1022    polyethylene glycol (GLYCOLAX) packet 17 g  17 g Oral Daily Jesse Diamonds Sofi, DO   17 g at 06/29/19 1022    simvastatin (ZOCOR) tablet 20 mg  20 mg Oral Nightly Jesse Diamonds Sofi, DO   20 mg at 06/28/19 1957    sodium chloride flush 0.9 % injection 10 mL  10 mL Intravenous 2 times per day Oley Constantia, DO   10 mL at 06/26/19 5191    sodium chloride flush 0.9 % injection 10 mL  10 mL Intravenous PRN Jesse Marines Sofi, DO        magnesium hydroxide (MILK OF MAGNESIA) 400 MG/5ML suspension 30 mL  30 mL Oral Daily PRN Jesse Marines Sofi, DO        ondansetron [facility]) injection 4 mg  4 mg Intravenous Q6H PRN Jesse Diamonds Sofi, DO        enoxaparin (LOVENOX) injection 30 mg  30 mg Subcutaneous Nightly Jesse Diamonds Sofi, DO   30 mg at 06/28/19 1957    ipratropium-albuterol (DUONEB) nebulizer solution 1 ampule  1 ampule Inhalation Q4H WA Jesse Gabbi Sofi, DO   1 ampule at 06/29/19 1411    budesonide (PULMICORT) nebulizer suspension 250 mcg  0.25 mg Nebulization BID Jesse Diamonds Sofi, DO   250 mcg at 06/29/19 9099     DVT Prophylaxis: Lovenox 40 mg sq daily    Continuous Infusions:    Intake/Output Summary (Last 24 hours) at 6/29/2019 1804  Last data filed at 6/29/2019 1058  Gross per 24 hour   Intake 240 ml   Output --   Net 240 ml     CBC:   Recent Labs     06/27/19  0345 06/28/19  0240 06/29/19  0239   WBC 9.7 10.7 9.6   HGB 10.0* 9.6* 9.8*    211 221     BMP:  Recent Labs     06/27/19  0345

## 2019-06-29 NOTE — PROGRESS NOTES
Patient:   Geraldo Reyes  MR#:    [id number]   Room:    7406/167-51   YOB: 1945  Date of Progress Note: 6/29/2019  Time of Note                           4:26 PM  Consulting Physician:   Ceferino Wooten M.D. Attending Physician:  Deniz Goff, *     Chief complaint AMS    S:This 66 y.o. female  seen for altered mental status. The patient was admitted about a month ago following back surgery and discharged to nursing home. She is readmitted on 6/23 with shortness of breath. Apparently the patient has not been the same since surgery as per the family noted by nursing as the family cannot provide any history. She does have worsening hypoxia and pneumonia on this admission. She was on multiple sedation meds prior to admission which were stopped. She is on broad spectrum antibiotics and has renal dysfunction. CT of the head on admission was unremarkable. No changes  overnight.         REVIEW OF SYSTEMS:  Constitutional: No fevers No chills  Neck:No stiffness  Respiratory: No shortness of breath  Cardiovascular: No chest pain No palpitations  Gastrointestinal: No abdominal pain    Genitourinary: No Dysuria  Neurological: No headache, + confusion    Past Medical History:      Diagnosis Date    Allergic rhinitis     Anxiety     Arthritis     Asthma     Chronic back pain     Chronic kidney disease     DDD (degenerative disc disease), lumbar 5/21/2019    Depression     GERD (gastroesophageal reflux disease)     Hyperlipidemia     Hypertension     Hypothyroidism     Irritable bowel syndrome     Palliative care patient 06/18/2019    Restless legs syndrome     Spondylolisthesis at L4-L5 level 5/21/2019       Past Surgical History:      Procedure Laterality Date    ANKLE FRACTURE SURGERY Left     metal plate & screws    CERVICAL SPINE SURGERY  x 2    WITH HARDWARE    CHOLECYSTECTOMY  02/2015    Dr. Wil Rojo Left 02/2015    [place] correction - Dr. Marialuisa Aguayo

## 2019-06-30 LAB
ANION GAP SERPL CALCULATED.3IONS-SCNC: 13 MMOL/L (ref 7–19)
BUN BLDV-MCNC: 34 MG/DL (ref 8–23)
CALCIUM SERPL-MCNC: 9.5 MG/DL (ref 8.8–10.2)
CHLORIDE BLD-SCNC: 106 MMOL/L (ref 98–111)
CO2: 21 MMOL/L (ref 22–29)
CREAT SERPL-MCNC: 2.4 MG/DL (ref 0.5–0.9)
GFR NON-AFRICAN AMERICAN: 20
GLUCOSE BLD-MCNC: 103 MG/DL (ref 74–109)
HCT VFR BLD CALC: 32.6 % (ref 37–47)
HEMOGLOBIN: 10.4 G/DL (ref 12–16)
MAGNESIUM: 1.2 MG/DL (ref 1.6–2.4)
MAGNESIUM: 1.3 MG/DL (ref 1.6–2.4)
MCH RBC QN AUTO: 30.1 PG (ref 27–31)
MCHC RBC AUTO-ENTMCNC: 31.9 G/DL (ref 33–37)
MCV RBC AUTO: 94.2 FL (ref 81–99)
PDW BLD-RTO: 13.9 % (ref 11.5–14.5)
PLATELET # BLD: 218 K/UL (ref 130–400)
PMV BLD AUTO: 11.3 FL (ref 9.4–12.3)
POTASSIUM SERPL-SCNC: 3.8 MMOL/L (ref 3.5–5)
RBC # BLD: 3.46 M/UL (ref 4.2–5.4)
SODIUM BLD-SCNC: 140 MMOL/L (ref 136–145)
URINE CULTURE, ROUTINE: NORMAL
VANCOMYCIN TROUGH: 17.7 UG/ML (ref 10–20)
WBC # BLD: 7.5 K/UL (ref 4.8–10.8)

## 2019-06-30 PROCEDURE — 94640 AIRWAY INHALATION TREATMENT: CPT

## 2019-06-30 PROCEDURE — 80202 ASSAY OF VANCOMYCIN: CPT

## 2019-06-30 PROCEDURE — 36415 COLL VENOUS BLD VENIPUNCTURE: CPT

## 2019-06-30 PROCEDURE — 2580000003 HC RX 258: Performed by: INTERNAL MEDICINE

## 2019-06-30 PROCEDURE — 6370000000 HC RX 637 (ALT 250 FOR IP): Performed by: PHYSICIAN ASSISTANT

## 2019-06-30 PROCEDURE — 80048 BASIC METABOLIC PNL TOTAL CA: CPT

## 2019-06-30 PROCEDURE — 83735 ASSAY OF MAGNESIUM: CPT

## 2019-06-30 PROCEDURE — 6370000000 HC RX 637 (ALT 250 FOR IP): Performed by: INTERNAL MEDICINE

## 2019-06-30 PROCEDURE — 6370000000 HC RX 637 (ALT 250 FOR IP): Performed by: PSYCHIATRY & NEUROLOGY

## 2019-06-30 PROCEDURE — 99233 SBSQ HOSP IP/OBS HIGH 50: CPT | Performed by: PSYCHIATRY & NEUROLOGY

## 2019-06-30 PROCEDURE — 1210000000 HC MED SURG R&B

## 2019-06-30 PROCEDURE — 6370000000 HC RX 637 (ALT 250 FOR IP): Performed by: HOSPITALIST

## 2019-06-30 PROCEDURE — 6360000002 HC RX W HCPCS: Performed by: INTERNAL MEDICINE

## 2019-06-30 PROCEDURE — 2700000000 HC OXYGEN THERAPY PER DAY

## 2019-06-30 PROCEDURE — 2580000003 HC RX 258: Performed by: HOSPITALIST

## 2019-06-30 PROCEDURE — 85027 COMPLETE CBC AUTOMATED: CPT

## 2019-06-30 PROCEDURE — 6360000002 HC RX W HCPCS: Performed by: HOSPITALIST

## 2019-06-30 PROCEDURE — 99232 SBSQ HOSP IP/OBS MODERATE 35: CPT | Performed by: HOSPITALIST

## 2019-06-30 RX ORDER — VANCOMYCIN HYDROCHLORIDE 1 G/200ML
1000 INJECTION, SOLUTION INTRAVENOUS ONCE
Status: DISCONTINUED | OUTPATIENT
Start: 2019-06-30 | End: 2019-06-30 | Stop reason: SDUPTHER

## 2019-06-30 RX ADMIN — VANCOMYCIN HYDROCHLORIDE 1000 MG: 10 INJECTION, POWDER, LYOPHILIZED, FOR SOLUTION INTRAVENOUS at 11:43

## 2019-06-30 RX ADMIN — SODIUM BICARBONATE 325 MG: 650 TABLET ORAL at 21:06

## 2019-06-30 RX ADMIN — CEFDINIR 300 MG: 300 CAPSULE ORAL at 08:30

## 2019-06-30 RX ADMIN — IPRATROPIUM BROMIDE AND ALBUTEROL SULFATE 1 AMPULE: .5; 3 SOLUTION RESPIRATORY (INHALATION) at 10:07

## 2019-06-30 RX ADMIN — POLYETHYLENE GLYCOL 3350 17 G: 17 POWDER, FOR SOLUTION ORAL at 08:20

## 2019-06-30 RX ADMIN — IPRATROPIUM BROMIDE AND ALBUTEROL SULFATE 1 AMPULE: .5; 3 SOLUTION RESPIRATORY (INHALATION) at 14:07

## 2019-06-30 RX ADMIN — LEVOTHYROXINE SODIUM 100 MCG: 0.1 TABLET ORAL at 05:57

## 2019-06-30 RX ADMIN — QUETIAPINE FUMARATE 25 MG: 50 TABLET ORAL at 21:03

## 2019-06-30 RX ADMIN — PHENYTOIN SODIUM 100 MG: 100 CAPSULE ORAL at 05:57

## 2019-06-30 RX ADMIN — SIMVASTATIN 20 MG: 20 TABLET, FILM COATED ORAL at 21:04

## 2019-06-30 RX ADMIN — SODIUM BICARBONATE 325 MG: 650 TABLET ORAL at 08:20

## 2019-06-30 RX ADMIN — IPRATROPIUM BROMIDE AND ALBUTEROL SULFATE 1 AMPULE: .5; 3 SOLUTION RESPIRATORY (INHALATION) at 19:33

## 2019-06-30 RX ADMIN — BUDESONIDE 250 MCG: 0.25 INHALANT RESPIRATORY (INHALATION) at 19:33

## 2019-06-30 RX ADMIN — BUDESONIDE 250 MCG: 0.25 INHALANT RESPIRATORY (INHALATION) at 06:23

## 2019-06-30 RX ADMIN — DOXYCYCLINE HYCLATE 100 MG: 100 CAPSULE ORAL at 08:18

## 2019-06-30 RX ADMIN — Medication 10 ML: at 08:33

## 2019-06-30 RX ADMIN — GUAIFENESIN 600 MG: 600 TABLET, EXTENDED RELEASE ORAL at 21:03

## 2019-06-30 RX ADMIN — OXYCODONE HYDROCHLORIDE AND ACETAMINOPHEN 1 TABLET: 5; 325 TABLET ORAL at 18:37

## 2019-06-30 RX ADMIN — PANTOPRAZOLE SODIUM 40 MG: 40 TABLET, DELAYED RELEASE ORAL at 08:18

## 2019-06-30 RX ADMIN — PREGABALIN 100 MG: 50 CAPSULE ORAL at 08:19

## 2019-06-30 RX ADMIN — ENOXAPARIN SODIUM 30 MG: 30 INJECTION SUBCUTANEOUS at 21:03

## 2019-06-30 RX ADMIN — THIAMINE HYDROCHLORIDE 100 MG: 100 INJECTION, SOLUTION INTRAMUSCULAR; INTRAVENOUS at 08:31

## 2019-06-30 RX ADMIN — Medication 10 ML: at 21:03

## 2019-06-30 RX ADMIN — DOXYCYCLINE HYCLATE 100 MG: 100 CAPSULE ORAL at 21:04

## 2019-06-30 RX ADMIN — CARVEDILOL 25 MG: 25 TABLET, FILM COATED ORAL at 08:18

## 2019-06-30 RX ADMIN — PHENYTOIN SODIUM 100 MG: 100 CAPSULE ORAL at 21:04

## 2019-06-30 RX ADMIN — PHENYTOIN SODIUM 100 MG: 100 CAPSULE ORAL at 11:43

## 2019-06-30 RX ADMIN — CARVEDILOL 25 MG: 25 TABLET, FILM COATED ORAL at 16:52

## 2019-06-30 RX ADMIN — GUAIFENESIN 600 MG: 600 TABLET, EXTENDED RELEASE ORAL at 08:18

## 2019-06-30 RX ADMIN — IPRATROPIUM BROMIDE AND ALBUTEROL SULFATE 1 AMPULE: .5; 3 SOLUTION RESPIRATORY (INHALATION) at 06:23

## 2019-06-30 ASSESSMENT — PAIN SCALES - GENERAL: PAINLEVEL_OUTOF10: 10

## 2019-06-30 NOTE — PLAN OF CARE
Problem: Risk for Impaired Skin Integrity  Goal: Tissue integrity - skin and mucous membranes  Description  Structural intactness and normal physiological function of skin and  mucous membranes.   Outcome: Ongoing     Problem: Falls - Risk of:  Goal: Will remain free from falls  Description  Will remain free from falls  Outcome: Ongoing  Goal: Absence of physical injury  Description  Absence of physical injury  Outcome: Ongoing     Problem: Nutrition  Goal: Optimal nutrition therapy  Description  Nutrition Problem: Inadequate oral intake  Intervention: Food and/or Nutrient Delivery: Continue current diet  Nutritional Goals: PO 50% or more     Outcome: Ongoing

## 2019-06-30 NOTE — PROGRESS NOTES
Sofi , 250 mcg at 06/30/19 9308    Allergies:  Penicillins and Sulfa antibiotics    Social History:   TOBACCO:   reports that she is a non-smoker but has been exposed to tobacco smoke. She has never used smokeless tobacco.  ETOH:   reports that she does not drink alcohol. Family History:       Problem Relation Age of Onset    High Blood Pressure Mother     Stroke Mother     Dementia Mother         late onset   Rukhsana Ray Depression Mother     Anxiety Disorder Mother     Kidney Disease Mother     Osteoporosis Mother     Substance Abuse Father         Alcohol    Kidney Disease Father         dialysis    Diabetes Maternal Aunt     Kidney Disease Maternal Aunt     Cancer Maternal Grandmother         Colon Cancer    Osteoporosis Maternal Grandmother     Anxiety Disorder Maternal Grandmother     Depression Maternal Grandmother          PHYSICAL EXAM:  BP (!) 173/74   Pulse 86   Temp 98.7 °F (37.1 °C) (Temporal)   Resp 20   Ht 5' 5\" (1.651 m)   Wt 184 lb (83.5 kg)   SpO2 97%   BMI 30.62 kg/m²       Constitutional - well developed, well nourished. Eyes - conjunctiva normal.   Ear, nose, throat - No scars, masses, or lesions over external nose or ears, no atrophy of tongue  Neck-symmetric, no masses noted, no jugular vein distension  Respiration- chest wall appears symmetric, good expansion,   normal effort without use of accessory muscles  Musculoskeletal - no significant wasting of muscles noted, no bony deformities  Extremities-no clubbing, cyanosis or edema  Skin - warm, dry, and intact. No rash, erythema, or pallor.   Psychiatric - mood, affect, and behavior appear normal.      Neurological exam  Awake, alert, fluent oriented x2  Slow at times  Attention and concentration appear impaired  Recent and remote memory appears impaired  Speech with dysarthria  No clear issues with language of fund of knowledge     Cranial Nerve Exam     CN III, IV,VI-EOMI, No nystagmus, conjugate eye movements, no

## 2019-07-01 LAB
ANION GAP SERPL CALCULATED.3IONS-SCNC: 16 MMOL/L (ref 7–19)
BUN BLDV-MCNC: 37 MG/DL (ref 8–23)
CALCIUM SERPL-MCNC: 9.5 MG/DL (ref 8.8–10.2)
CHLORIDE BLD-SCNC: 106 MMOL/L (ref 98–111)
CO2: 20 MMOL/L (ref 22–29)
CREAT SERPL-MCNC: 2.5 MG/DL (ref 0.5–0.9)
GFR NON-AFRICAN AMERICAN: 19
GLUCOSE BLD-MCNC: 102 MG/DL (ref 74–109)
HCT VFR BLD CALC: 33.5 % (ref 37–47)
HEMOGLOBIN: 10.4 G/DL (ref 12–16)
LV EF: 63 %
LVEF MODALITY: NORMAL
MAGNESIUM: 1.3 MG/DL (ref 1.6–2.4)
MCH RBC QN AUTO: 29.7 PG (ref 27–31)
MCHC RBC AUTO-ENTMCNC: 31 G/DL (ref 33–37)
MCV RBC AUTO: 95.7 FL (ref 81–99)
PDW BLD-RTO: 14 % (ref 11.5–14.5)
PLATELET # BLD: 214 K/UL (ref 130–400)
PMV BLD AUTO: 11.2 FL (ref 9.4–12.3)
POTASSIUM SERPL-SCNC: 4.2 MMOL/L (ref 3.5–5)
RBC # BLD: 3.5 M/UL (ref 4.2–5.4)
SODIUM BLD-SCNC: 142 MMOL/L (ref 136–145)
WBC # BLD: 8.3 K/UL (ref 4.8–10.8)

## 2019-07-01 PROCEDURE — 99232 SBSQ HOSP IP/OBS MODERATE 35: CPT | Performed by: HOSPITALIST

## 2019-07-01 PROCEDURE — 6360000002 HC RX W HCPCS: Performed by: INTERNAL MEDICINE

## 2019-07-01 PROCEDURE — 92526 ORAL FUNCTION THERAPY: CPT

## 2019-07-01 PROCEDURE — 93306 TTE W/DOPPLER COMPLETE: CPT

## 2019-07-01 PROCEDURE — 1210000000 HC MED SURG R&B

## 2019-07-01 PROCEDURE — 6370000000 HC RX 637 (ALT 250 FOR IP): Performed by: HOSPITALIST

## 2019-07-01 PROCEDURE — 80048 BASIC METABOLIC PNL TOTAL CA: CPT

## 2019-07-01 PROCEDURE — 36415 COLL VENOUS BLD VENIPUNCTURE: CPT

## 2019-07-01 PROCEDURE — 6360000002 HC RX W HCPCS: Performed by: HOSPITALIST

## 2019-07-01 PROCEDURE — 94761 N-INVAS EAR/PLS OXIMETRY MLT: CPT

## 2019-07-01 PROCEDURE — 6370000000 HC RX 637 (ALT 250 FOR IP): Performed by: PHYSICIAN ASSISTANT

## 2019-07-01 PROCEDURE — 94640 AIRWAY INHALATION TREATMENT: CPT

## 2019-07-01 PROCEDURE — 6370000000 HC RX 637 (ALT 250 FOR IP): Performed by: INTERNAL MEDICINE

## 2019-07-01 PROCEDURE — 97530 THERAPEUTIC ACTIVITIES: CPT

## 2019-07-01 PROCEDURE — 2700000000 HC OXYGEN THERAPY PER DAY

## 2019-07-01 PROCEDURE — 2580000003 HC RX 258: Performed by: PHYSICIAN ASSISTANT

## 2019-07-01 PROCEDURE — 85027 COMPLETE CBC AUTOMATED: CPT

## 2019-07-01 PROCEDURE — 6370000000 HC RX 637 (ALT 250 FOR IP): Performed by: PSYCHIATRY & NEUROLOGY

## 2019-07-01 PROCEDURE — 83735 ASSAY OF MAGNESIUM: CPT

## 2019-07-01 RX ORDER — MAGNESIUM SULFATE 1 G/100ML
1 INJECTION INTRAVENOUS ONCE
Status: COMPLETED | OUTPATIENT
Start: 2019-07-01 | End: 2019-07-01

## 2019-07-01 RX ADMIN — PANTOPRAZOLE SODIUM 40 MG: 40 TABLET, DELAYED RELEASE ORAL at 08:44

## 2019-07-01 RX ADMIN — BUDESONIDE 250 MCG: 0.25 INHALANT RESPIRATORY (INHALATION) at 07:13

## 2019-07-01 RX ADMIN — Medication 10 ML: at 09:12

## 2019-07-01 RX ADMIN — IPRATROPIUM BROMIDE AND ALBUTEROL SULFATE 1 AMPULE: .5; 3 SOLUTION RESPIRATORY (INHALATION) at 10:50

## 2019-07-01 RX ADMIN — PHENYTOIN SODIUM 100 MG: 100 CAPSULE ORAL at 14:07

## 2019-07-01 RX ADMIN — THIAMINE HYDROCHLORIDE 100 MG: 100 INJECTION, SOLUTION INTRAMUSCULAR; INTRAVENOUS at 08:44

## 2019-07-01 RX ADMIN — ENOXAPARIN SODIUM 30 MG: 30 INJECTION SUBCUTANEOUS at 19:40

## 2019-07-01 RX ADMIN — SODIUM BICARBONATE 325 MG: 650 TABLET ORAL at 19:39

## 2019-07-01 RX ADMIN — POLYETHYLENE GLYCOL 3350 17 G: 17 POWDER, FOR SOLUTION ORAL at 09:11

## 2019-07-01 RX ADMIN — PREGABALIN 100 MG: 50 CAPSULE ORAL at 08:43

## 2019-07-01 RX ADMIN — CEFDINIR 300 MG: 300 CAPSULE ORAL at 08:43

## 2019-07-01 RX ADMIN — Medication 10 ML: at 19:41

## 2019-07-01 RX ADMIN — CARVEDILOL 25 MG: 25 TABLET, FILM COATED ORAL at 08:43

## 2019-07-01 RX ADMIN — PHENYTOIN SODIUM 100 MG: 100 CAPSULE ORAL at 05:34

## 2019-07-01 RX ADMIN — IPRATROPIUM BROMIDE AND ALBUTEROL SULFATE 1 AMPULE: .5; 3 SOLUTION RESPIRATORY (INHALATION) at 19:01

## 2019-07-01 RX ADMIN — DOXYCYCLINE HYCLATE 100 MG: 100 CAPSULE ORAL at 08:43

## 2019-07-01 RX ADMIN — GUAIFENESIN 600 MG: 600 TABLET, EXTENDED RELEASE ORAL at 19:39

## 2019-07-01 RX ADMIN — IPRATROPIUM BROMIDE AND ALBUTEROL SULFATE 1 AMPULE: .5; 3 SOLUTION RESPIRATORY (INHALATION) at 07:14

## 2019-07-01 RX ADMIN — CARVEDILOL 25 MG: 25 TABLET, FILM COATED ORAL at 18:20

## 2019-07-01 RX ADMIN — PHENYTOIN SODIUM 100 MG: 100 CAPSULE ORAL at 19:39

## 2019-07-01 RX ADMIN — IPRATROPIUM BROMIDE AND ALBUTEROL SULFATE 1 AMPULE: .5; 3 SOLUTION RESPIRATORY (INHALATION) at 15:11

## 2019-07-01 RX ADMIN — GUAIFENESIN 600 MG: 600 TABLET, EXTENDED RELEASE ORAL at 08:43

## 2019-07-01 RX ADMIN — BUDESONIDE 250 MCG: 0.25 INHALANT RESPIRATORY (INHALATION) at 19:01

## 2019-07-01 RX ADMIN — SIMVASTATIN 20 MG: 20 TABLET, FILM COATED ORAL at 19:40

## 2019-07-01 RX ADMIN — DOXYCYCLINE HYCLATE 100 MG: 100 CAPSULE ORAL at 19:39

## 2019-07-01 RX ADMIN — MAGNESIUM SULFATE HEPTAHYDRATE 1 G: 1 INJECTION, SOLUTION INTRAVENOUS at 09:21

## 2019-07-01 RX ADMIN — SODIUM BICARBONATE 325 MG: 650 TABLET ORAL at 08:44

## 2019-07-01 RX ADMIN — LEVOTHYROXINE SODIUM 100 MCG: 0.1 TABLET ORAL at 05:34

## 2019-07-01 NOTE — PROGRESS NOTES
HOSPITAL MEDICINE  - PROGRESS NOTE- late entry     Admit Date: 6/23/2019         CC: dyspnea     Subjective: dyspnea     Objective: mild distress    Diet: DIET CARDIAC; Dysphagia III Advanced;  Nectar Thick  Pain is:None  Nausea:None  Bowel Movement/Flatus yes    Data:   Scheduled Meds: Reviewed  Current Facility-Administered Medications   Medication Dose Route Frequency Provider Last Rate Last Dose    magnesium sulfate 1 g in dextrose 5% 100 mL IVPB  1 g Intravenous Once MD Jeff Barahona [facility]) extended release tablet 600 mg  600 mg Oral BID Yonathan Barnard MD   600 mg at 06/30/19 2103    cefdinir (OMNICEF) capsule 300 mg  300 mg Oral Daily Yonathan Barnard MD   300 mg at 06/30/19 0830    vancomycin (VANCOCIN) intermittent dosing (placeholder)   Other RX Placeholder Yonathan Barnard MD        thiamine (B-1) injection 100 mg  100 mg Intravenous Daily Yonathan Barnard MD   100 mg at 06/30/19 0831    pregabalin (LYRICA) capsule 100 mg  100 mg Oral Daily Yonathan Barnard MD   100 mg at 06/30/19 4979    sodium chloride 0.9 % 3,175 mL with folic acid 1 mg, adult multi-vitamin with vitamin k 10 mL, thiamine 100 mg   Intravenous Once Yonathan Barnard MD        sodium bicarbonate tablet 325 mg  325 mg Oral BID Nehal Evans PA-C   325 mg at 06/30/19 2106    doxycycline hyclate (VIBRAMYCIN) capsule 100 mg  100 mg Oral 2 times per day Nehal Evans PA-C   100 mg at 06/30/19 2104    oxyCODONE-acetaminophen (PERCOCET) 5-325 MG per tablet 1 tablet  1 tablet Oral Q4H PRN Yonathan Barnard MD   1 tablet at 06/30/19 1837    phenytoin (DILANTIN) ER capsule 100 mg  100 mg Oral Q8H Ronnell Mcclellan MD   100 mg at 07/01/19 0534    bisacodyl (DULCOLAX) suppository 10 mg  10 mg Rectal Daily PRN Nehal Evans PA-C        carvedilol (COREG) tablet 25 mg  25 mg Oral BID  Nehal Evans PA-C

## 2019-07-01 NOTE — PROGRESS NOTES
 sodium chloride flush 0.9 % injection 10 mL  10 mL Intravenous 2 times per day Alexis España PA-C   10 mL at 07/01/19 0912    sodium chloride flush 0.9 % injection 10 mL  10 mL Intravenous PRN Erchristine España PA-C        QUEtiapine (SEROQUEL) tablet 25 mg  25 mg Oral Daily PRN Erchristine España PA-C   25 mg at 06/30/19 2103    levothyroxine (SYNTHROID) tablet 100 mcg  100 mcg Oral Daily Deretha Specter Sofi, DO   100 mcg at 07/01/19 0534    pantoprazole (PROTONIX) tablet 40 mg  40 mg Oral Daily Deretha Specter Sofi, DO   40 mg at 07/01/19 0844    polyethylene glycol (GLYCOLAX) packet 17 g  17 g Oral Daily Deretha Specter Sofi, DO   17 g at 07/01/19 9822    simvastatin (ZOCOR) tablet 20 mg  20 mg Oral Nightly Deretha Specter Sofi, DO   20 mg at 06/30/19 2104    sodium chloride flush 0.9 % injection 10 mL  10 mL Intravenous 2 times per day Jeffrey Shall, DO   10 mL at 06/30/19 2103    sodium chloride flush 0.9 % injection 10 mL  10 mL Intravenous PRN Deretha Specter Sofi, DO        magnesium hydroxide (MILK OF MAGNESIA) 400 MG/5ML suspension 30 mL  30 mL Oral Daily PRN Deretha Specter Sofi, DO        ondansetron [facility]) injection 4 mg  4 mg Intravenous Q6H PRN Deretha Specter Sofi, DO        enoxaparin (LOVENOX) injection 30 mg  30 mg Subcutaneous Nightly Deretha Specter Sofi, DO   30 mg at 06/30/19 2103    ipratropium-albuterol (DUONEB) nebulizer solution 1 ampule  1 ampule Inhalation Q4H WA Deretha Specter Sofi, DO   1 ampule at 07/01/19 1511    budesonide (PULMICORT) nebulizer suspension 250 mcg  0.25 mg Nebulization BID Jeffrey Shall, DO   250 mcg at 07/01/19 0227     DVT Prophylaxis: Lovenox 40 mg sq daily    Continuous Infusions:    Intake/Output Summary (Last 24 hours) at 7/1/2019 1856  Last data filed at 7/1/2019 1804  Gross per 24 hour   Intake 360 ml   Output 250 ml   Net 110 ml     CBC:   Recent Labs     06/29/19  0239 06/30/19  0259 07/01/19  0254   WBC 9.6 7.5 8.3   HGB 9.8* 10.4* 10.4*    218 214     BMP:  Recent Labs     06/29/19  0239 06/30/19  0259 07/01/19  0254    140 142   K 3.8 3.8 4.2    106 106   CO2 18* 21* 20*   BUN 32* 34* 37*   CREATININE 2.2* 2.4* 2.5*   GLUCOSE 111* 103 102     ABGs:   Lab Results   Component Value Date    PHART 7.340 06/25/2019    PO2ART 124.0 06/25/2019    UZY6IVM 30.0 06/25/2019     INR: No results for input(s): INR in the last 72 hours. Objective:   Vitals: BP (!) 148/80   Pulse 73   Temp 97.5 °F (36.4 °C) (Temporal)   Resp 20   Ht 5' 5\" (1.651 m)   Wt 184 lb (83.5 kg)   SpO2 99%   BMI 30.62 kg/m²   General appearance: alert, appears stated age and cooperative  Skin: Skin color, texture, turgor normal.   HEENT: Head: Normocephalic, no lesions, without obvious abnormality.   Neck: no adenopathy, no carotid bruit, no JVD and supple, symmetrical, trachea midline  Lungs: clear to auscultation bilaterally  Heart: regular rate and rhythm, S1, S2 normal, no murmur, click, rub or gallop  Abdomen: soft, non-tender; bowel sounds normal; no masses,  no organomegaly  Extremities: extremities normal, atraumatic, no cyanosis or edema  Lymphatic: No significant lymph node enlargement papable  Neurologic: Mental status: Alert, oriented, thought content appropriate        Assessment & Plan:    · Right lung PNA - on iv ab   · Dyspnea - echo pending- dyspnea not getting better     Hypertension     Hyperlipidemia     Hypothyroid     Seasonal allergies     MVP (mitral valve prolapse)     Gait abnormality     DDD (degenerative disc disease), lumbar     Seizure (HCC)     Chronic kidney disease, stage 3 (HCC)          Disposition: to SNF when better     Crescent Men

## 2019-07-01 NOTE — PROGRESS NOTES
Speech Language Pathology  Facility/Department: [facility] 4 ONCOLOGY UNIT  SWALLOW THERAPY     NAME: Elidia Fuller  : 1945  MRN: [id number]    ADMISSION DATE: 2019  ADMITTING DIAGNOSIS: has Hypertension; Hyperlipidemia; Hypothyroid; Multiple allergies; Chronic neck and back pain; Seasonal allergies; Dysarthria; MVP (mitral valve prolapse); Tremor; Gait abnormality; Pulmonary infiltrate in left lung on chest x-ray; Spondylolisthesis at L4-L5 level; DDD (degenerative disc disease), lumbar; Chronic low back pain without sciatica; Palliative care patient; Pneumonia due to organism; Acute metabolic encephalopathy; Hypoxia; Seizure ([facility] [address] Chronic kidney disease, stage 3 (HCC); and Metabolic acidosis on their problem list.    Date of Treat: 2019  Evaluating Therapist: Delbert Eckert    Current Diet level:  Current Diet : Soft regular  Current Liquid Diet : Nectar    Reason for Referral  Elidia Fuller was referred for a bedside swallow evaluation to assess the efficiency of her swallow function, identify signs and symptoms of aspiration and make recommendations regarding safe dietary consistencies, effective compensatory strategies, and safe eating environment. Impression  Observed patient's swallow function. Patient exhibits slow, decreased oral prep and transit of more solid consistencies (regular solid consistency residue cleared from the mouth with added gravy texture and additional swallows), inconsistently fast oral transit and suspected swallow delay with thin liquids, and sluggish, moderately decreased laryngeal elevation for swallow airway protection. Even so, no outward S/S penetration/aspiration was noted with any regular solid consistency trial, nectar thick liquid presentation, or thin H2O trial administered during treatment session this date. At this time, would recommend continuation current diet. Meds in puree consistency. Assist during meals.      Treatment Plan  Requires SLP Intervention: Yes    Recommended Diet and Intervention  Diet Solids Recommendation: Dysphagia III Advanced  Liquid Consistency Recommendation: Nectar  Recommended Form of Meds: Meds in puree  Therapeutic Interventions: Patient/Family education;Diet tolerance monitoring; Therapeutic PO trials with SLP    Compensatory Swallowing Strategies  Compensatory Swallowing Strategies: Upright as possible for all oral intake;Small bites/sips;Eat/Feed slowly; Remain upright for 30-45 minutes after meals;Assist feed    Treatment/Goals  Timeframe for Short-term Goals: 1x a day for 2-3 days. Goal 1: Patient will tolerate dysphagia III consistency and nectar thick liquids with min S/S penetration/aspiration noted during PO intake. Goal 2: Patient staff will follow swallow safety recommendations to decrease risk of penetration/aspiration during PO intake. Goal 3: Re-assessment of swallow function for potential diet upgrade. General  Chart Reviewed: Yes  Behavior/Cognition: Alert; Cooperative; Requires cueing;Pleasant mood  Communication Observation: (SLP ranked functional intelligibility of speech for unfamiliar listeners at 100% in verbalizations. )  Follows Directions: Simple  Patient Positioning: Upright in bed  Consistencies Administered: Reg solid; Nectar - cup; Thin - cup    Observed patient's swallowing function with the following observations noted:    Oral Phase Dysfunction  Oral Phase: Exceptions  Oral Phase Dysfunction  Impaired Mastication: Reg Solid(Patient exhibited slow oral prep of regular solid consistency trials presented by SLP. )  Decreased Anterior to Posterior Transit: Reg solid  Suspected Premature Bolus Loss:  Thin - cup(Patient exhibited inconsistently fast oral transit of thin H2O trials presented via cup by SLP. )  Lingual/Palatal Residue: Reg solid(Min-moderate oral cavity residue was noted post swallows; residue cleared with added gravy texture and additional swallows. )  Oral Phase - Comment: Oral transit of regular

## 2019-07-02 ENCOUNTER — OUTSIDE FACILITY SERVICE (OUTPATIENT)
Dept: PULMONOLOGY | Facility: CLINIC | Age: 74
End: 2019-07-02

## 2019-07-02 ENCOUNTER — APPOINTMENT (OUTPATIENT)
Dept: CT IMAGING | Age: 74
DRG: 196 | End: 2019-07-02
Payer: MEDICARE

## 2019-07-02 LAB
ANION GAP SERPL CALCULATED.3IONS-SCNC: 17 MMOL/L (ref 7–19)
BUN BLDV-MCNC: 43 MG/DL (ref 8–23)
CALCIUM SERPL-MCNC: 9.4 MG/DL (ref 8.8–10.2)
CHLORIDE BLD-SCNC: 105 MMOL/L (ref 98–111)
CO2: 20 MMOL/L (ref 22–29)
CREAT SERPL-MCNC: 2.5 MG/DL (ref 0.5–0.9)
GFR NON-AFRICAN AMERICAN: 19
GLUCOSE BLD-MCNC: 107 MG/DL (ref 74–109)
HCT VFR BLD CALC: 33.7 % (ref 37–47)
HEMOGLOBIN: 10.8 G/DL (ref 12–16)
MAGNESIUM: 1.6 MG/DL (ref 1.6–2.4)
MCH RBC QN AUTO: 30.2 PG (ref 27–31)
MCHC RBC AUTO-ENTMCNC: 32 G/DL (ref 33–37)
MCV RBC AUTO: 94.1 FL (ref 81–99)
PDW BLD-RTO: 13.9 % (ref 11.5–14.5)
PLATELET # BLD: 237 K/UL (ref 130–400)
PMV BLD AUTO: 11.1 FL (ref 9.4–12.3)
POTASSIUM SERPL-SCNC: 4 MMOL/L (ref 3.5–5)
PRO-BNP: 939 PG/ML (ref 0–1800)
RBC # BLD: 3.58 M/UL (ref 4.2–5.4)
SODIUM BLD-SCNC: 142 MMOL/L (ref 136–145)
VANCOMYCIN RANDOM: 18 UG/ML
WBC # BLD: 8.2 K/UL (ref 4.8–10.8)

## 2019-07-02 PROCEDURE — 36415 COLL VENOUS BLD VENIPUNCTURE: CPT

## 2019-07-02 PROCEDURE — 6370000000 HC RX 637 (ALT 250 FOR IP): Performed by: HOSPITALIST

## 2019-07-02 PROCEDURE — 71250 CT THORAX DX C-: CPT

## 2019-07-02 PROCEDURE — 85027 COMPLETE CBC AUTOMATED: CPT

## 2019-07-02 PROCEDURE — 1210000000 HC MED SURG R&B

## 2019-07-02 PROCEDURE — 99223 1ST HOSP IP/OBS HIGH 75: CPT | Performed by: INTERNAL MEDICINE

## 2019-07-02 PROCEDURE — 6370000000 HC RX 637 (ALT 250 FOR IP): Performed by: PHYSICIAN ASSISTANT

## 2019-07-02 PROCEDURE — 6360000002 HC RX W HCPCS: Performed by: HOSPITALIST

## 2019-07-02 PROCEDURE — 80048 BASIC METABOLIC PNL TOTAL CA: CPT

## 2019-07-02 PROCEDURE — 6370000000 HC RX 637 (ALT 250 FOR IP): Performed by: INTERNAL MEDICINE

## 2019-07-02 PROCEDURE — 2580000003 HC RX 258: Performed by: PHYSICIAN ASSISTANT

## 2019-07-02 PROCEDURE — 2580000003 HC RX 258: Performed by: INTERNAL MEDICINE

## 2019-07-02 PROCEDURE — 36592 COLLECT BLOOD FROM PICC: CPT

## 2019-07-02 PROCEDURE — 83735 ASSAY OF MAGNESIUM: CPT

## 2019-07-02 PROCEDURE — 83880 ASSAY OF NATRIURETIC PEPTIDE: CPT

## 2019-07-02 PROCEDURE — 80202 ASSAY OF VANCOMYCIN: CPT

## 2019-07-02 PROCEDURE — 2500000003 HC RX 250 WO HCPCS: Performed by: HOSPITALIST

## 2019-07-02 PROCEDURE — 2700000000 HC OXYGEN THERAPY PER DAY

## 2019-07-02 PROCEDURE — 6370000000 HC RX 637 (ALT 250 FOR IP): Performed by: PSYCHIATRY & NEUROLOGY

## 2019-07-02 PROCEDURE — 6360000002 HC RX W HCPCS: Performed by: INTERNAL MEDICINE

## 2019-07-02 PROCEDURE — 92526 ORAL FUNCTION THERAPY: CPT

## 2019-07-02 PROCEDURE — 99232 SBSQ HOSP IP/OBS MODERATE 35: CPT | Performed by: HOSPITALIST

## 2019-07-02 PROCEDURE — 94640 AIRWAY INHALATION TREATMENT: CPT

## 2019-07-02 PROCEDURE — 2580000003 HC RX 258: Performed by: HOSPITALIST

## 2019-07-02 RX ORDER — METHYLPREDNISOLONE SODIUM SUCCINATE 125 MG/2ML
80 INJECTION, POWDER, LYOPHILIZED, FOR SOLUTION INTRAMUSCULAR; INTRAVENOUS EVERY 8 HOURS
Status: DISCONTINUED | OUTPATIENT
Start: 2019-07-02 | End: 2019-07-04

## 2019-07-02 RX ADMIN — SODIUM BICARBONATE 325 MG: 650 TABLET ORAL at 20:22

## 2019-07-02 RX ADMIN — PHENYTOIN SODIUM 100 MG: 100 CAPSULE ORAL at 12:25

## 2019-07-02 RX ADMIN — VANCOMYCIN HYDROCHLORIDE 1000 MG: 10 INJECTION, POWDER, LYOPHILIZED, FOR SOLUTION INTRAVENOUS at 16:20

## 2019-07-02 RX ADMIN — GUAIFENESIN 600 MG: 600 TABLET, EXTENDED RELEASE ORAL at 09:06

## 2019-07-02 RX ADMIN — IPRATROPIUM BROMIDE AND ALBUTEROL SULFATE 1 AMPULE: .5; 3 SOLUTION RESPIRATORY (INHALATION) at 15:58

## 2019-07-02 RX ADMIN — IPRATROPIUM BROMIDE AND ALBUTEROL SULFATE 1 AMPULE: .5; 3 SOLUTION RESPIRATORY (INHALATION) at 07:52

## 2019-07-02 RX ADMIN — ENOXAPARIN SODIUM 30 MG: 30 INJECTION SUBCUTANEOUS at 20:22

## 2019-07-02 RX ADMIN — BUDESONIDE 250 MCG: 0.25 INHALANT RESPIRATORY (INHALATION) at 19:54

## 2019-07-02 RX ADMIN — GUAIFENESIN 600 MG: 600 TABLET, EXTENDED RELEASE ORAL at 20:22

## 2019-07-02 RX ADMIN — THIAMINE HYDROCHLORIDE 100 MG: 100 INJECTION, SOLUTION INTRAMUSCULAR; INTRAVENOUS at 09:06

## 2019-07-02 RX ADMIN — DOXYCYCLINE HYCLATE 100 MG: 100 CAPSULE ORAL at 20:22

## 2019-07-02 RX ADMIN — PANTOPRAZOLE SODIUM 40 MG: 40 TABLET, DELAYED RELEASE ORAL at 09:06

## 2019-07-02 RX ADMIN — CARVEDILOL 25 MG: 25 TABLET, FILM COATED ORAL at 16:20

## 2019-07-02 RX ADMIN — MICONAZOLE NITRATE: 20 POWDER TOPICAL at 20:22

## 2019-07-02 RX ADMIN — MICONAZOLE NITRATE: 20 POWDER TOPICAL at 09:05

## 2019-07-02 RX ADMIN — Medication 10 ML: at 09:07

## 2019-07-02 RX ADMIN — POLYETHYLENE GLYCOL 3350 17 G: 17 POWDER, FOR SOLUTION ORAL at 09:06

## 2019-07-02 RX ADMIN — DOXYCYCLINE HYCLATE 100 MG: 100 CAPSULE ORAL at 09:06

## 2019-07-02 RX ADMIN — LEVOTHYROXINE SODIUM 100 MCG: 0.1 TABLET ORAL at 05:11

## 2019-07-02 RX ADMIN — PHENYTOIN SODIUM 100 MG: 100 CAPSULE ORAL at 05:11

## 2019-07-02 RX ADMIN — IPRATROPIUM BROMIDE AND ALBUTEROL SULFATE 1 AMPULE: .5; 3 SOLUTION RESPIRATORY (INHALATION) at 19:54

## 2019-07-02 RX ADMIN — SIMVASTATIN 20 MG: 20 TABLET, FILM COATED ORAL at 20:23

## 2019-07-02 RX ADMIN — IPRATROPIUM BROMIDE AND ALBUTEROL SULFATE 1 AMPULE: .5; 3 SOLUTION RESPIRATORY (INHALATION) at 11:31

## 2019-07-02 RX ADMIN — BUDESONIDE 250 MCG: 0.25 INHALANT RESPIRATORY (INHALATION) at 07:52

## 2019-07-02 RX ADMIN — PHENYTOIN SODIUM 100 MG: 100 CAPSULE ORAL at 20:23

## 2019-07-02 RX ADMIN — CARVEDILOL 25 MG: 25 TABLET, FILM COATED ORAL at 09:06

## 2019-07-02 RX ADMIN — Medication 10 ML: at 22:32

## 2019-07-02 RX ADMIN — SODIUM BICARBONATE 325 MG: 650 TABLET ORAL at 09:06

## 2019-07-02 RX ADMIN — METHYLPREDNISOLONE SODIUM SUCCINATE 80 MG: 125 INJECTION, POWDER, FOR SOLUTION INTRAMUSCULAR; INTRAVENOUS at 22:32

## 2019-07-02 RX ADMIN — CEFDINIR 300 MG: 300 CAPSULE ORAL at 09:06

## 2019-07-02 RX ADMIN — PREGABALIN 100 MG: 50 CAPSULE ORAL at 09:06

## 2019-07-02 RX ADMIN — Medication 10 ML: at 20:27

## 2019-07-02 ASSESSMENT — ENCOUNTER SYMPTOMS
SHORTNESS OF BREATH: 1
PHOTOPHOBIA: 0
VOMITING: 0
COLOR CHANGE: 0
EYE REDNESS: 1
SINUS PRESSURE: 0
CHOKING: 0
COUGH: 0
SINUS PAIN: 0
NAUSEA: 0
STRIDOR: 0
TROUBLE SWALLOWING: 0

## 2019-07-02 NOTE — PROGRESS NOTES
· Evaluation: Progressing toward goals   · Goals: PO 50% or more    · Monitoring: Meal Intake, Supplement Intake, Mental Status/Confusion, Weight, Pertinent Labs, Chewing/Swallowing, Diet Tolerance      Electronically signed by Gwenette Denver, MS, RD, LD on 7/2/19 at 1:43 PM    Contact Number: [phone]

## 2019-07-03 ENCOUNTER — OUTSIDE FACILITY SERVICE (OUTPATIENT)
Dept: PULMONOLOGY | Facility: CLINIC | Age: 74
End: 2019-07-03

## 2019-07-03 LAB
ANION GAP SERPL CALCULATED.3IONS-SCNC: 17 MMOL/L (ref 7–19)
BUN BLDV-MCNC: 50 MG/DL (ref 8–23)
CALCIUM SERPL-MCNC: 9.4 MG/DL (ref 8.8–10.2)
CHLORIDE BLD-SCNC: 106 MMOL/L (ref 98–111)
CO2: 20 MMOL/L (ref 22–29)
CREAT SERPL-MCNC: 2.4 MG/DL (ref 0.5–0.9)
GFR NON-AFRICAN AMERICAN: 20
GLUCOSE BLD-MCNC: 143 MG/DL (ref 74–109)
HCT VFR BLD CALC: 31.9 % (ref 37–47)
HEMOGLOBIN: 10.1 G/DL (ref 12–16)
MAGNESIUM: 1.5 MG/DL (ref 1.6–2.4)
MCH RBC QN AUTO: 29.4 PG (ref 27–31)
MCHC RBC AUTO-ENTMCNC: 31.7 G/DL (ref 33–37)
MCV RBC AUTO: 93 FL (ref 81–99)
PDW BLD-RTO: 14.1 % (ref 11.5–14.5)
PLATELET # BLD: 251 K/UL (ref 130–400)
PMV BLD AUTO: 11.4 FL (ref 9.4–12.3)
POTASSIUM SERPL-SCNC: 4.2 MMOL/L (ref 3.5–5)
RBC # BLD: 3.43 M/UL (ref 4.2–5.4)
SODIUM BLD-SCNC: 143 MMOL/L (ref 136–145)
WBC # BLD: 6.7 K/UL (ref 4.8–10.8)

## 2019-07-03 PROCEDURE — 6360000002 HC RX W HCPCS: Performed by: INTERNAL MEDICINE

## 2019-07-03 PROCEDURE — 6360000002 HC RX W HCPCS: Performed by: HOSPITALIST

## 2019-07-03 PROCEDURE — 94640 AIRWAY INHALATION TREATMENT: CPT

## 2019-07-03 PROCEDURE — 6370000000 HC RX 637 (ALT 250 FOR IP): Performed by: PSYCHIATRY & NEUROLOGY

## 2019-07-03 PROCEDURE — 6370000000 HC RX 637 (ALT 250 FOR IP): Performed by: NURSE PRACTITIONER

## 2019-07-03 PROCEDURE — 2700000000 HC OXYGEN THERAPY PER DAY

## 2019-07-03 PROCEDURE — 6370000000 HC RX 637 (ALT 250 FOR IP): Performed by: INTERNAL MEDICINE

## 2019-07-03 PROCEDURE — 6370000000 HC RX 637 (ALT 250 FOR IP): Performed by: PHYSICIAN ASSISTANT

## 2019-07-03 PROCEDURE — 83735 ASSAY OF MAGNESIUM: CPT

## 2019-07-03 PROCEDURE — 99232 SBSQ HOSP IP/OBS MODERATE 35: CPT | Performed by: HOSPITALIST

## 2019-07-03 PROCEDURE — 92526 ORAL FUNCTION THERAPY: CPT

## 2019-07-03 PROCEDURE — 6370000000 HC RX 637 (ALT 250 FOR IP): Performed by: HOSPITALIST

## 2019-07-03 PROCEDURE — 99232 SBSQ HOSP IP/OBS MODERATE 35: CPT | Performed by: INTERNAL MEDICINE

## 2019-07-03 PROCEDURE — 2580000003 HC RX 258: Performed by: PHYSICIAN ASSISTANT

## 2019-07-03 PROCEDURE — 80048 BASIC METABOLIC PNL TOTAL CA: CPT

## 2019-07-03 PROCEDURE — 85027 COMPLETE CBC AUTOMATED: CPT

## 2019-07-03 PROCEDURE — 1210000000 HC MED SURG R&B

## 2019-07-03 RX ORDER — SENNA PLUS 8.6 MG/1
1 TABLET ORAL NIGHTLY
Status: DISCONTINUED | OUTPATIENT
Start: 2019-07-03 | End: 2019-07-05 | Stop reason: HOSPADM

## 2019-07-03 RX ORDER — MAGNESIUM SULFATE 1 G/100ML
1 INJECTION INTRAVENOUS ONCE
Status: COMPLETED | OUTPATIENT
Start: 2019-07-03 | End: 2019-07-03

## 2019-07-03 RX ORDER — BISACODYL 10 MG
10 SUPPOSITORY, RECTAL RECTAL DAILY
Status: DISCONTINUED | OUTPATIENT
Start: 2019-07-03 | End: 2019-07-05 | Stop reason: HOSPADM

## 2019-07-03 RX ORDER — GUAIFENESIN 600 MG/1
1200 TABLET, EXTENDED RELEASE ORAL 2 TIMES DAILY
Status: DISCONTINUED | OUTPATIENT
Start: 2019-07-03 | End: 2019-07-05 | Stop reason: HOSPADM

## 2019-07-03 RX ADMIN — IPRATROPIUM BROMIDE AND ALBUTEROL SULFATE 1 AMPULE: .5; 3 SOLUTION RESPIRATORY (INHALATION) at 07:10

## 2019-07-03 RX ADMIN — BUDESONIDE 250 MCG: 0.25 INHALANT RESPIRATORY (INHALATION) at 19:32

## 2019-07-03 RX ADMIN — THIAMINE HYDROCHLORIDE 100 MG: 100 INJECTION, SOLUTION INTRAMUSCULAR; INTRAVENOUS at 09:46

## 2019-07-03 RX ADMIN — PHENYTOIN SODIUM 100 MG: 100 CAPSULE ORAL at 19:51

## 2019-07-03 RX ADMIN — PANTOPRAZOLE SODIUM 40 MG: 40 TABLET, DELAYED RELEASE ORAL at 09:46

## 2019-07-03 RX ADMIN — DOXYCYCLINE HYCLATE 100 MG: 100 CAPSULE ORAL at 09:59

## 2019-07-03 RX ADMIN — PHENYTOIN SODIUM 100 MG: 100 CAPSULE ORAL at 14:00

## 2019-07-03 RX ADMIN — LEVOTHYROXINE SODIUM 100 MCG: 0.1 TABLET ORAL at 05:01

## 2019-07-03 RX ADMIN — Medication 10 ML: at 09:46

## 2019-07-03 RX ADMIN — CARVEDILOL 25 MG: 25 TABLET, FILM COATED ORAL at 09:46

## 2019-07-03 RX ADMIN — POLYETHYLENE GLYCOL 3350 17 G: 17 POWDER, FOR SOLUTION ORAL at 09:46

## 2019-07-03 RX ADMIN — CEFDINIR 300 MG: 300 CAPSULE ORAL at 09:45

## 2019-07-03 RX ADMIN — METHYLPREDNISOLONE SODIUM SUCCINATE 80 MG: 125 INJECTION, POWDER, FOR SOLUTION INTRAMUSCULAR; INTRAVENOUS at 19:55

## 2019-07-03 RX ADMIN — ENOXAPARIN SODIUM 30 MG: 30 INJECTION SUBCUTANEOUS at 19:51

## 2019-07-03 RX ADMIN — MAGNESIUM SULFATE HEPTAHYDRATE 1 G: 1 INJECTION, SOLUTION INTRAVENOUS at 09:47

## 2019-07-03 RX ADMIN — IPRATROPIUM BROMIDE AND ALBUTEROL SULFATE 1 AMPULE: .5; 3 SOLUTION RESPIRATORY (INHALATION) at 14:54

## 2019-07-03 RX ADMIN — MICONAZOLE NITRATE: 20 POWDER TOPICAL at 19:52

## 2019-07-03 RX ADMIN — BUDESONIDE 250 MCG: 0.25 INHALANT RESPIRATORY (INHALATION) at 07:10

## 2019-07-03 RX ADMIN — Medication 10 ML: at 19:59

## 2019-07-03 RX ADMIN — CARVEDILOL 25 MG: 25 TABLET, FILM COATED ORAL at 17:39

## 2019-07-03 RX ADMIN — DOXYCYCLINE HYCLATE 100 MG: 100 CAPSULE ORAL at 19:51

## 2019-07-03 RX ADMIN — SODIUM BICARBONATE 325 MG: 650 TABLET ORAL at 19:51

## 2019-07-03 RX ADMIN — GUAIFENESIN 600 MG: 600 TABLET, EXTENDED RELEASE ORAL at 09:45

## 2019-07-03 RX ADMIN — GUAIFENESIN 1200 MG: 600 TABLET, EXTENDED RELEASE ORAL at 19:52

## 2019-07-03 RX ADMIN — PREGABALIN 100 MG: 50 CAPSULE ORAL at 09:46

## 2019-07-03 RX ADMIN — MICONAZOLE NITRATE: 20 POWDER TOPICAL at 09:47

## 2019-07-03 RX ADMIN — SIMVASTATIN 20 MG: 20 TABLET, FILM COATED ORAL at 19:52

## 2019-07-03 RX ADMIN — IPRATROPIUM BROMIDE AND ALBUTEROL SULFATE 1 AMPULE: .5; 3 SOLUTION RESPIRATORY (INHALATION) at 11:02

## 2019-07-03 RX ADMIN — METHYLPREDNISOLONE SODIUM SUCCINATE 80 MG: 125 INJECTION, POWDER, FOR SOLUTION INTRAMUSCULAR; INTRAVENOUS at 05:02

## 2019-07-03 RX ADMIN — Medication 8.6 MG: at 19:51

## 2019-07-03 RX ADMIN — PHENYTOIN SODIUM 100 MG: 100 CAPSULE ORAL at 05:01

## 2019-07-03 RX ADMIN — IPRATROPIUM BROMIDE AND ALBUTEROL SULFATE 1 AMPULE: .5; 3 SOLUTION RESPIRATORY (INHALATION) at 19:32

## 2019-07-03 RX ADMIN — METHYLPREDNISOLONE SODIUM SUCCINATE 80 MG: 125 INJECTION, POWDER, FOR SOLUTION INTRAMUSCULAR; INTRAVENOUS at 13:59

## 2019-07-03 RX ADMIN — SODIUM BICARBONATE 325 MG: 650 TABLET ORAL at 09:45

## 2019-07-03 ASSESSMENT — ENCOUNTER SYMPTOMS
GASTROINTESTINAL NEGATIVE: 1
EYES NEGATIVE: 1
SHORTNESS OF BREATH: 1

## 2019-07-03 NOTE — PROGRESS NOTES
PRN Jania ASHLEY Adkins        carvedilol (COREG) tablet 25 mg  25 mg Oral BID  JaniaSHANNAN JesusC   25 mg at 07/03/19 1739    lidocaine PF 1 % injection 5 mL  5 mL Intradermal Once Valerie White PA-C        sodium chloride flush 0.9 % injection 10 mL  10 mL Intravenous 2 times per day SHANNAN WaldenC   10 mL at 07/03/19 0946    sodium chloride flush 0.9 % injection 10 mL  10 mL Intravenous PRN Valerie White PA-C        QUEtiapine (SEROQUEL) tablet 25 mg  25 mg Oral Daily PRN SHANNAN WaldenC   25 mg at 06/30/19 2103    levothyroxine (SYNTHROID) tablet 100 mcg  100 mcg Oral Daily Alvenia Jessi Sofi, DO   100 mcg at 07/03/19 0501    pantoprazole (PROTONIX) tablet 40 mg  40 mg Oral Daily Alvenia Jessi Sofi, DO   40 mg at 07/03/19 9491    polyethylene glycol (GLYCOLAX) packet 17 g  17 g Oral Daily Alvenia Jessi Sofi, DO   17 g at 07/03/19 7369    simvastatin (ZOCOR) tablet 20 mg  20 mg Oral Nightly Alvenia Jessi Sofi, DO   20 mg at 07/02/19 2023    sodium chloride flush 0.9 % injection 10 mL  10 mL Intravenous 2 times per day Gray Stevenson, DO   10 mL at 07/02/19 2027    sodium chloride flush 0.9 % injection 10 mL  10 mL Intravenous PRN Alvenia Jessi Sofi, DO        magnesium hydroxide (MILK OF MAGNESIA) 400 MG/5ML suspension 30 mL  30 mL Oral Daily PRN Alvenia Jessi Sofi, DO        ondansetron [facility]) injection 4 mg  4 mg Intravenous Q6H PRN Alvenia Jessi Sofi, DO        enoxaparin (LOVENOX) injection 30 mg  30 mg Subcutaneous Nightly Alvenia Jessi Sofi, DO   30 mg at 07/02/19 2022    ipratropium-albuterol (DUONEB) nebulizer solution 1 ampule  1 ampule Inhalation Q4H WA Alvenia Jessi Sofi, DO   1 ampule at 07/03/19 1454    budesonide (PULMICORT) nebulizer suspension 250 mcg  0.25 mg Nebulization BID Alvenia Jessi Farah DO   250 mcg at 07/03/19 0710     DVT Prophylaxis: Lovenox 40 mg sq daily    Continuous Infusions:    Intake/Output Summary (Last 24 hours) at 7/3/2019 3218  Last data filed at 7/3/2019

## 2019-07-03 NOTE — PROGRESS NOTES
Strategies  Compensatory Swallowing Strategies: Upright as possible for all oral intake;Small bites/sips;Eat/Feed slowly; Remain upright for 30-45 minutes after meals    General  Chart Reviewed: Yes  Behavior/Cognition: Alert; Cooperative;Pleasant mood  Communication Observation: (SLP ranked functional intelligibility of speech for unfamiliar listeners at 100% in verbalizations. )  Follows Directions: Simple  Patient Positioning: Upright in bed  Consistencies Administered: Reg solid; Thin - cup    Observed patient's swallowing function with the following observations noted:    Oral Phase  Mastication: Reg Solid(Patient exhibited slow oral prep of regular solid consistency trials presented independently. )  Oral Phase - Comment: Oral transit of regular solid consistency trials primarily measured 1-2 seconds in length and min oral cavity residue was noted post swallows; residue cleared with additional dry swallows. Oral transit of thin liquid presentations, administered independently via cup, primarily measured 1 second in length. Pharyngeal Phase  Laryngeal Elevation: (Patient exhibited sluggish, mild-moderately decreased laryngeal elevation for swallow airway protection.)  Pharyngeal: No outward S/S penetration/aspiration was noted with any regular solid consistency trial or thin liquid presentation administered during treatment session this date. At this time, recommend diet upgrade to regular solid consistency. Continue thin liquids. Meds in puree consistency. Assist in meal set-up. No further acute speech therapy is felt to be warranted. Patient to be D/C.      Electronically signed by BRIANNA Amin on 7/3/2019 at 9:58 AM

## 2019-07-04 ENCOUNTER — OUTSIDE FACILITY SERVICE (OUTPATIENT)
Dept: PULMONOLOGY | Facility: CLINIC | Age: 74
End: 2019-07-04

## 2019-07-04 LAB
ANION GAP SERPL CALCULATED.3IONS-SCNC: 15 MMOL/L (ref 7–19)
BUN BLDV-MCNC: 61 MG/DL (ref 8–23)
CALCIUM SERPL-MCNC: 9.3 MG/DL (ref 8.8–10.2)
CHLORIDE BLD-SCNC: 107 MMOL/L (ref 98–111)
CO2: 21 MMOL/L (ref 22–29)
CREAT SERPL-MCNC: 2.3 MG/DL (ref 0.5–0.9)
GFR NON-AFRICAN AMERICAN: 21
GLUCOSE BLD-MCNC: 130 MG/DL (ref 74–109)
HCT VFR BLD CALC: 34.2 % (ref 37–47)
HEMOGLOBIN: 10.8 G/DL (ref 12–16)
MAGNESIUM: 1.8 MG/DL (ref 1.6–2.4)
MCH RBC QN AUTO: 29.7 PG (ref 27–31)
MCHC RBC AUTO-ENTMCNC: 31.6 G/DL (ref 33–37)
MCV RBC AUTO: 94 FL (ref 81–99)
PDW BLD-RTO: 13.8 % (ref 11.5–14.5)
PLATELET # BLD: 287 K/UL (ref 130–400)
PMV BLD AUTO: 11 FL (ref 9.4–12.3)
POTASSIUM SERPL-SCNC: 4.2 MMOL/L (ref 3.5–5)
RBC # BLD: 3.64 M/UL (ref 4.2–5.4)
SODIUM BLD-SCNC: 143 MMOL/L (ref 136–145)
VANCOMYCIN RANDOM: 18.8 UG/ML
WBC # BLD: 8.2 K/UL (ref 4.8–10.8)

## 2019-07-04 PROCEDURE — 36415 COLL VENOUS BLD VENIPUNCTURE: CPT

## 2019-07-04 PROCEDURE — 6360000002 HC RX W HCPCS: Performed by: INTERNAL MEDICINE

## 2019-07-04 PROCEDURE — 97530 THERAPEUTIC ACTIVITIES: CPT

## 2019-07-04 PROCEDURE — 6370000000 HC RX 637 (ALT 250 FOR IP): Performed by: PHYSICIAN ASSISTANT

## 2019-07-04 PROCEDURE — 99232 SBSQ HOSP IP/OBS MODERATE 35: CPT | Performed by: HOSPITALIST

## 2019-07-04 PROCEDURE — 2580000003 HC RX 258: Performed by: HOSPITALIST

## 2019-07-04 PROCEDURE — 6360000002 HC RX W HCPCS: Performed by: HOSPITALIST

## 2019-07-04 PROCEDURE — 6370000000 HC RX 637 (ALT 250 FOR IP): Performed by: HOSPITALIST

## 2019-07-04 PROCEDURE — 99232 SBSQ HOSP IP/OBS MODERATE 35: CPT | Performed by: INTERNAL MEDICINE

## 2019-07-04 PROCEDURE — 94640 AIRWAY INHALATION TREATMENT: CPT

## 2019-07-04 PROCEDURE — 6370000000 HC RX 637 (ALT 250 FOR IP): Performed by: NURSE PRACTITIONER

## 2019-07-04 PROCEDURE — 6370000000 HC RX 637 (ALT 250 FOR IP): Performed by: INTERNAL MEDICINE

## 2019-07-04 PROCEDURE — 80048 BASIC METABOLIC PNL TOTAL CA: CPT

## 2019-07-04 PROCEDURE — 1210000000 HC MED SURG R&B

## 2019-07-04 PROCEDURE — 85027 COMPLETE CBC AUTOMATED: CPT

## 2019-07-04 PROCEDURE — 2580000003 HC RX 258: Performed by: PHYSICIAN ASSISTANT

## 2019-07-04 PROCEDURE — 2700000000 HC OXYGEN THERAPY PER DAY

## 2019-07-04 PROCEDURE — 83735 ASSAY OF MAGNESIUM: CPT

## 2019-07-04 PROCEDURE — 80202 ASSAY OF VANCOMYCIN: CPT

## 2019-07-04 PROCEDURE — 6370000000 HC RX 637 (ALT 250 FOR IP): Performed by: PSYCHIATRY & NEUROLOGY

## 2019-07-04 RX ORDER — SODIUM CHLORIDE 9 MG/ML
INJECTION, SOLUTION INTRAVENOUS CONTINUOUS
Status: DISCONTINUED | OUTPATIENT
Start: 2019-07-04 | End: 2019-07-05

## 2019-07-04 RX ORDER — METHYLPREDNISOLONE SODIUM SUCCINATE 40 MG/ML
40 INJECTION, POWDER, LYOPHILIZED, FOR SOLUTION INTRAMUSCULAR; INTRAVENOUS EVERY 12 HOURS
Status: DISCONTINUED | OUTPATIENT
Start: 2019-07-04 | End: 2019-07-05

## 2019-07-04 RX ORDER — METHYLPREDNISOLONE SODIUM SUCCINATE 40 MG/ML
40 INJECTION, POWDER, LYOPHILIZED, FOR SOLUTION INTRAMUSCULAR; INTRAVENOUS EVERY 8 HOURS
Status: DISCONTINUED | OUTPATIENT
Start: 2019-07-04 | End: 2019-07-04

## 2019-07-04 RX ORDER — AMLODIPINE BESYLATE 5 MG/1
5 TABLET ORAL DAILY
Status: DISCONTINUED | OUTPATIENT
Start: 2019-07-04 | End: 2019-07-05

## 2019-07-04 RX ORDER — VANCOMYCIN HYDROCHLORIDE 1 G/200ML
1000 INJECTION, SOLUTION INTRAVENOUS ONCE
Status: DISCONTINUED | OUTPATIENT
Start: 2019-07-04 | End: 2019-07-04

## 2019-07-04 RX ADMIN — PHENYTOIN SODIUM 100 MG: 100 CAPSULE ORAL at 20:34

## 2019-07-04 RX ADMIN — PREGABALIN 100 MG: 50 CAPSULE ORAL at 08:46

## 2019-07-04 RX ADMIN — MICONAZOLE NITRATE: 20 POWDER TOPICAL at 08:47

## 2019-07-04 RX ADMIN — GUAIFENESIN 1200 MG: 600 TABLET, EXTENDED RELEASE ORAL at 08:46

## 2019-07-04 RX ADMIN — VANCOMYCIN HYDROCHLORIDE 1000 MG: 10 INJECTION, POWDER, LYOPHILIZED, FOR SOLUTION INTRAVENOUS at 16:52

## 2019-07-04 RX ADMIN — SODIUM CHLORIDE: 9 INJECTION, SOLUTION INTRAVENOUS at 08:47

## 2019-07-04 RX ADMIN — BUDESONIDE 250 MCG: 0.25 INHALANT RESPIRATORY (INHALATION) at 19:37

## 2019-07-04 RX ADMIN — CEFDINIR 300 MG: 300 CAPSULE ORAL at 08:46

## 2019-07-04 RX ADMIN — BISACODYL 10 MG: 10 SUPPOSITORY RECTAL at 08:47

## 2019-07-04 RX ADMIN — PANTOPRAZOLE SODIUM 40 MG: 40 TABLET, DELAYED RELEASE ORAL at 08:46

## 2019-07-04 RX ADMIN — PHENYTOIN SODIUM 100 MG: 100 CAPSULE ORAL at 12:02

## 2019-07-04 RX ADMIN — METHYLPREDNISOLONE SODIUM SUCCINATE 80 MG: 125 INJECTION, POWDER, FOR SOLUTION INTRAMUSCULAR; INTRAVENOUS at 05:18

## 2019-07-04 RX ADMIN — SIMVASTATIN 20 MG: 20 TABLET, FILM COATED ORAL at 20:35

## 2019-07-04 RX ADMIN — CARVEDILOL 25 MG: 25 TABLET, FILM COATED ORAL at 08:46

## 2019-07-04 RX ADMIN — SODIUM BICARBONATE 325 MG: 650 TABLET ORAL at 20:34

## 2019-07-04 RX ADMIN — MICONAZOLE NITRATE: 20 POWDER TOPICAL at 20:34

## 2019-07-04 RX ADMIN — Medication 8.6 MG: at 20:34

## 2019-07-04 RX ADMIN — PHENYTOIN SODIUM 100 MG: 100 CAPSULE ORAL at 05:17

## 2019-07-04 RX ADMIN — AMLODIPINE BESYLATE 5 MG: 5 TABLET ORAL at 08:46

## 2019-07-04 RX ADMIN — METHYLPREDNISOLONE SODIUM SUCCINATE 40 MG: 40 INJECTION, POWDER, FOR SOLUTION INTRAMUSCULAR; INTRAVENOUS at 14:28

## 2019-07-04 RX ADMIN — CARVEDILOL 25 MG: 25 TABLET, FILM COATED ORAL at 16:52

## 2019-07-04 RX ADMIN — IPRATROPIUM BROMIDE AND ALBUTEROL SULFATE 1 AMPULE: .5; 3 SOLUTION RESPIRATORY (INHALATION) at 10:19

## 2019-07-04 RX ADMIN — IPRATROPIUM BROMIDE AND ALBUTEROL SULFATE 1 AMPULE: .5; 3 SOLUTION RESPIRATORY (INHALATION) at 19:37

## 2019-07-04 RX ADMIN — SODIUM BICARBONATE 325 MG: 650 TABLET ORAL at 08:46

## 2019-07-04 RX ADMIN — DOXYCYCLINE HYCLATE 100 MG: 100 CAPSULE ORAL at 20:34

## 2019-07-04 RX ADMIN — POLYETHYLENE GLYCOL 3350 17 G: 17 POWDER, FOR SOLUTION ORAL at 08:46

## 2019-07-04 RX ADMIN — BUDESONIDE 250 MCG: 0.25 INHALANT RESPIRATORY (INHALATION) at 06:28

## 2019-07-04 RX ADMIN — DOXYCYCLINE HYCLATE 100 MG: 100 CAPSULE ORAL at 08:46

## 2019-07-04 RX ADMIN — GUAIFENESIN 1200 MG: 600 TABLET, EXTENDED RELEASE ORAL at 20:35

## 2019-07-04 RX ADMIN — ENOXAPARIN SODIUM 30 MG: 30 INJECTION SUBCUTANEOUS at 20:35

## 2019-07-04 RX ADMIN — Medication 10 ML: at 08:48

## 2019-07-04 RX ADMIN — LEVOTHYROXINE SODIUM 100 MCG: 0.1 TABLET ORAL at 05:17

## 2019-07-04 RX ADMIN — THIAMINE HYDROCHLORIDE 100 MG: 100 INJECTION, SOLUTION INTRAMUSCULAR; INTRAVENOUS at 08:46

## 2019-07-04 RX ADMIN — IPRATROPIUM BROMIDE AND ALBUTEROL SULFATE 1 AMPULE: .5; 3 SOLUTION RESPIRATORY (INHALATION) at 06:25

## 2019-07-04 ASSESSMENT — ENCOUNTER SYMPTOMS
VOMITING: 0
NAUSEA: 0

## 2019-07-04 NOTE — PROGRESS NOTES
63, temperature 97.2 °F (36.2 °C), temperature source Temporal, resp. rate 18, height 5' 5\" (1.651 m), weight 184 lb (83.5 kg), SpO2 96 %. Intake/Output Summary (Last 24 hours) at 7/4/2019 1216  Last data filed at 7/4/2019 0355  Gross per 24 hour   Intake 680 ml   Output 1050 ml   Net -370 ml     Physical Exam   Constitutional: She appears well-developed and well-nourished. No distress. Nasal cannula in place. HENT:   Head: Normocephalic and atraumatic. Nose: Nose normal.   Eyes: EOM are normal. No scleral icterus. Neck: Normal range of motion. Neck supple. Cardiovascular: Regular rhythm. No murmur heard. Pulmonary/Chest: Effort normal. She has rhonchi (Scattered). She has no rales. Abdominal: Soft. Bowel sounds are normal.   Neurological: She is alert. Skin: Skin is warm and dry. Psychiatric: She has a normal mood and affect. Her behavior is normal.   Nursing note and vitals reviewed. Recent Labs     07/02/19 0347 07/03/19  0300 07/04/19  0645   WBC 8.2 6.7 8.2   RBC 3.58* 3.43* 3.64*   HGB 10.8* 10.1* 10.8*   HCT 33.7* 31.9* 34.2*    251 287   MCV 94.1 93.0 94.0   MCH 30.2 29.4 29.7   MCHC 32.0* 31.7* 31.6*   RDW 13.9 14.1 13.8      Recent Labs     07/02/19 0347 07/03/19  0300 07/04/19  0645    143 143   K 4.0 4.2 4.2    106 107   CO2 20* 20* 21*   BUN 43* 50* 61*   CREATININE 2.5* 2.4* 2.3*   CALCIUM 9.4 9.4 9.3   GLUCOSE 107 143* 130*   MG 1.6 1.5* 1.8          Pulmonary Assessment:    1. NSIP under treatment with steroids, improving  2. Postop recent back surgery stable  3. Microvascular cerebral changes, stable  4. Chronic kidney disease stage III, stable  5.  Anemia, stable    Recommend:   · Continue supplemental oxygen to keep O2 sats greater than 92%  · Taper steroids  · Continue bronchodilators and Mucinex  · Oral antibiotics with Drena Mutton and doxycycline      Electronically signed by Doug Moreno, on 7/4/2019, 12:16 PM

## 2019-07-04 NOTE — PROGRESS NOTES
at 07/04/19 0846    doxycycline hyclate (VIBRAMYCIN) capsule 100 mg  100 mg Oral 2 times per day Buck Bravo PA-C   100 mg at 07/04/19 0846    oxyCODONE-acetaminophen (PERCOCET) 5-325 MG per tablet 1 tablet  1 tablet Oral Q4H PRN Laura Miller MD   1 tablet at 06/30/19 1837    phenytoin (DILANTIN) ER capsule 100 mg  100 mg Oral Q8H Saad Jaime MD   100 mg at 07/04/19 0517    carvedilol (COREG) tablet 25 mg  25 mg Oral BID  Buck Bravo PA-C   25 mg at 07/04/19 0846    lidocaine PF 1 % injection 5 mL  5 mL Intradermal Once Lisa Banks PA-C        sodium chloride flush 0.9 % injection 10 mL  10 mL Intravenous 2 times per day Lisa Banks PA-C   10 mL at 07/04/19 0848    sodium chloride flush 0.9 % injection 10 mL  10 mL Intravenous PRN Lisa Banks PA-C        QUEtiapine (SEROQUEL) tablet 25 mg  25 mg Oral Daily PRN Lisa Banks PA-C   25 mg at 06/30/19 2103    levothyroxine (SYNTHROID) tablet 100 mcg  100 mcg Oral Daily [place] Slimmer Sofi, DO   100 mcg at 07/04/19 0517    pantoprazole (PROTONIX) tablet 40 mg  40 mg Oral Daily [place] Slimmer Sofi, DO   40 mg at 07/04/19 0846    polyethylene glycol (GLYCOLAX) packet 17 g  17 g Oral Daily [place] Slimmer Sofi, DO   17 g at 07/04/19 0497    simvastatin (ZOCOR) tablet 20 mg  20 mg Oral Nightly Blanche Slimmer Sofi, DO   20 mg at 07/03/19 1952    sodium chloride flush 0.9 % injection 10 mL  10 mL Intravenous 2 times per day [employer], DO   10 mL at 07/02/19 2027    sodium chloride flush 0.9 % injection 10 mL  10 mL Intravenous PRN [place] Slimmer Sofi, DO        magnesium hydroxide (MILK OF MAGNESIA) 400 MG/5ML suspension 30 mL  30 mL Oral Daily PRN [place] Slimmer Sofi, DO        ondansetron [facility]) injection 4 mg  4 mg Intravenous Q6H PRN Blanche Farah DO        enoxaparin (LOVENOX) injection 30 mg  30 mg Subcutaneous Nightly Blanche Farah DO   30 mg at 07/03/19 1951    ipratropium-albuterol (DUONEB) nebulizer solution 1 ampule  1

## 2019-07-05 ENCOUNTER — OUTSIDE FACILITY SERVICE (OUTPATIENT)
Dept: PULMONOLOGY | Facility: CLINIC | Age: 74
End: 2019-07-05

## 2019-07-05 VITALS
OXYGEN SATURATION: 95 % | DIASTOLIC BLOOD PRESSURE: 82 MMHG | WEIGHT: 184 LBS | HEART RATE: 68 BPM | HEIGHT: 65 IN | RESPIRATION RATE: 20 BRPM | SYSTOLIC BLOOD PRESSURE: 184 MMHG | BODY MASS INDEX: 30.66 KG/M2 | TEMPERATURE: 97.9 F

## 2019-07-05 LAB
ANION GAP SERPL CALCULATED.3IONS-SCNC: 17 MMOL/L (ref 7–19)
BACTERIA: ABNORMAL /HPF
BILIRUBIN URINE: NEGATIVE
BLOOD, URINE: NEGATIVE
BUN BLDV-MCNC: 69 MG/DL (ref 8–23)
CALCIUM SERPL-MCNC: 8.9 MG/DL (ref 8.8–10.2)
CHLORIDE BLD-SCNC: 107 MMOL/L (ref 98–111)
CLARITY: ABNORMAL
CO2: 18 MMOL/L (ref 22–29)
COLOR: YELLOW
CREAT SERPL-MCNC: 2.3 MG/DL (ref 0.5–0.9)
EOSINOPHIL,URINE: NORMAL
EPITHELIAL CELLS, UA: ABNORMAL /HPF
GFR NON-AFRICAN AMERICAN: 21
GLUCOSE BLD-MCNC: 92 MG/DL (ref 74–109)
GLUCOSE URINE: NEGATIVE MG/DL
HCT VFR BLD CALC: 32.1 % (ref 37–47)
HEMOGLOBIN: 10.1 G/DL (ref 12–16)
KETONES, URINE: NEGATIVE MG/DL
LACTIC ACID: 1.4 MMOL/L (ref 0.5–1.9)
LEUKOCYTE ESTERASE, URINE: ABNORMAL
MAGNESIUM: 1.7 MG/DL (ref 1.6–2.4)
MCH RBC QN AUTO: 29.4 PG (ref 27–31)
MCHC RBC AUTO-ENTMCNC: 31.5 G/DL (ref 33–37)
MCV RBC AUTO: 93.6 FL (ref 81–99)
NITRITE, URINE: NEGATIVE
PDW BLD-RTO: 14 % (ref 11.5–14.5)
PH UA: 5.5 (ref 5–8)
PLATELET # BLD: 296 K/UL (ref 130–400)
PMV BLD AUTO: 11.4 FL (ref 9.4–12.3)
POTASSIUM SERPL-SCNC: 4 MMOL/L (ref 3.5–5)
PRO-BNP: 899 PG/ML (ref 0–1800)
PROTEIN UA: NEGATIVE MG/DL
RBC # BLD: 3.43 M/UL (ref 4.2–5.4)
RBC UA: ABNORMAL /HPF (ref 0–2)
SODIUM BLD-SCNC: 142 MMOL/L (ref 136–145)
SPECIFIC GRAVITY UA: 1.02 (ref 1–1.03)
UROBILINOGEN, URINE: 0.2 E.U./DL
WBC # BLD: 11.8 K/UL (ref 4.8–10.8)
WBC UA: ABNORMAL /HPF (ref 0–5)
YEAST: ABNORMAL /HPF

## 2019-07-05 PROCEDURE — 94640 AIRWAY INHALATION TREATMENT: CPT

## 2019-07-05 PROCEDURE — 95951 PR EEG MONITORING/VIDEORECORD: CPT | Performed by: PSYCHIATRY & NEUROLOGY

## 2019-07-05 PROCEDURE — 6360000002 HC RX W HCPCS: Performed by: INTERNAL MEDICINE

## 2019-07-05 PROCEDURE — 2700000000 HC OXYGEN THERAPY PER DAY

## 2019-07-05 PROCEDURE — 83605 ASSAY OF LACTIC ACID: CPT

## 2019-07-05 PROCEDURE — 6370000000 HC RX 637 (ALT 250 FOR IP): Performed by: INTERNAL MEDICINE

## 2019-07-05 PROCEDURE — 80048 BASIC METABOLIC PNL TOTAL CA: CPT

## 2019-07-05 PROCEDURE — 85027 COMPLETE CBC AUTOMATED: CPT

## 2019-07-05 PROCEDURE — 36415 COLL VENOUS BLD VENIPUNCTURE: CPT

## 2019-07-05 PROCEDURE — 6360000002 HC RX W HCPCS: Performed by: HOSPITALIST

## 2019-07-05 PROCEDURE — 99232 SBSQ HOSP IP/OBS MODERATE 35: CPT | Performed by: INTERNAL MEDICINE

## 2019-07-05 PROCEDURE — 99239 HOSP IP/OBS DSCHRG MGMT >30: CPT | Performed by: HOSPITALIST

## 2019-07-05 PROCEDURE — 81001 URINALYSIS AUTO W/SCOPE: CPT

## 2019-07-05 PROCEDURE — 87205 SMEAR GRAM STAIN: CPT

## 2019-07-05 PROCEDURE — 6370000000 HC RX 637 (ALT 250 FOR IP): Performed by: NURSE PRACTITIONER

## 2019-07-05 PROCEDURE — 6370000000 HC RX 637 (ALT 250 FOR IP): Performed by: PSYCHIATRY & NEUROLOGY

## 2019-07-05 PROCEDURE — 6370000000 HC RX 637 (ALT 250 FOR IP): Performed by: HOSPITALIST

## 2019-07-05 PROCEDURE — 6370000000 HC RX 637 (ALT 250 FOR IP): Performed by: PHYSICIAN ASSISTANT

## 2019-07-05 PROCEDURE — 83880 ASSAY OF NATRIURETIC PEPTIDE: CPT

## 2019-07-05 PROCEDURE — 83735 ASSAY OF MAGNESIUM: CPT

## 2019-07-05 RX ORDER — PHENYTOIN SODIUM 100 MG/1
100 CAPSULE, EXTENDED RELEASE ORAL EVERY 8 HOURS
Qty: 90 CAPSULE | Refills: 0
Start: 2019-07-05 | End: 2019-08-22 | Stop reason: SDUPTHER

## 2019-07-05 RX ORDER — DOXYCYCLINE HYCLATE 100 MG/1
100 CAPSULE ORAL EVERY 12 HOURS SCHEDULED
Qty: 14 CAPSULE | Refills: 0 | Status: ON HOLD
Start: 2019-07-05 | End: 2019-07-12

## 2019-07-05 RX ORDER — PREGABALIN 100 MG/1
100 CAPSULE ORAL DAILY
Qty: 3 CAPSULE | Refills: 0 | Status: ON HOLD | OUTPATIENT
Start: 2019-07-06 | End: 2019-07-16 | Stop reason: HOSPADM

## 2019-07-05 RX ORDER — PREDNISONE 10 MG/1
TABLET ORAL
Qty: 150 TABLET | Refills: 0
Start: 2019-07-05 | End: 2019-08-22 | Stop reason: SDUPTHER

## 2019-07-05 RX ORDER — GUAIFENESIN 600 MG/1
1200 TABLET, EXTENDED RELEASE ORAL 2 TIMES DAILY
Qty: 20 TABLET | Refills: 0
Start: 2019-07-05 | End: 2019-07-10

## 2019-07-05 RX ORDER — PREDNISONE 20 MG/1
40 TABLET ORAL DAILY
Status: DISCONTINUED | OUTPATIENT
Start: 2019-07-05 | End: 2019-07-05 | Stop reason: HOSPADM

## 2019-07-05 RX ORDER — AMLODIPINE BESYLATE 10 MG/1
10 TABLET ORAL DAILY
Status: DISCONTINUED | OUTPATIENT
Start: 2019-07-05 | End: 2019-07-05 | Stop reason: HOSPADM

## 2019-07-05 RX ORDER — OXYCODONE HYDROCHLORIDE AND ACETAMINOPHEN 5; 325 MG/1; MG/1
1 TABLET ORAL EVERY 8 HOURS PRN
Qty: 9 TABLET | Refills: 0 | Status: SHIPPED | OUTPATIENT
Start: 2019-07-05 | End: 2019-07-17 | Stop reason: SDUPTHER

## 2019-07-05 RX ORDER — CEFDINIR 300 MG/1
300 CAPSULE ORAL DAILY
Qty: 7 CAPSULE | Refills: 0 | Status: ON HOLD
Start: 2019-07-06 | End: 2019-07-12

## 2019-07-05 RX ORDER — IPRATROPIUM BROMIDE AND ALBUTEROL SULFATE 2.5; .5 MG/3ML; MG/3ML
3 SOLUTION RESPIRATORY (INHALATION)
Qty: 360 ML | Refills: 0
Start: 2019-07-05 | End: 2022-01-01

## 2019-07-05 RX ADMIN — PANTOPRAZOLE SODIUM 40 MG: 40 TABLET, DELAYED RELEASE ORAL at 07:52

## 2019-07-05 RX ADMIN — CEFDINIR 300 MG: 300 CAPSULE ORAL at 07:52

## 2019-07-05 RX ADMIN — PHENYTOIN SODIUM 100 MG: 100 CAPSULE ORAL at 12:13

## 2019-07-05 RX ADMIN — ONDANSETRON 4 MG: 2 INJECTION INTRAMUSCULAR; INTRAVENOUS at 09:04

## 2019-07-05 RX ADMIN — IPRATROPIUM BROMIDE AND ALBUTEROL SULFATE 1 AMPULE: .5; 3 SOLUTION RESPIRATORY (INHALATION) at 15:19

## 2019-07-05 RX ADMIN — BISACODYL 10 MG: 10 SUPPOSITORY RECTAL at 07:53

## 2019-07-05 RX ADMIN — BUDESONIDE 250 MCG: 0.25 INHALANT RESPIRATORY (INHALATION) at 06:48

## 2019-07-05 RX ADMIN — GUAIFENESIN 1200 MG: 600 TABLET, EXTENDED RELEASE ORAL at 07:52

## 2019-07-05 RX ADMIN — PREGABALIN 100 MG: 50 CAPSULE ORAL at 07:52

## 2019-07-05 RX ADMIN — SODIUM BICARBONATE 325 MG: 650 TABLET ORAL at 07:52

## 2019-07-05 RX ADMIN — IPRATROPIUM BROMIDE AND ALBUTEROL SULFATE 1 AMPULE: .5; 3 SOLUTION RESPIRATORY (INHALATION) at 10:21

## 2019-07-05 RX ADMIN — LEVOTHYROXINE SODIUM 100 MCG: 0.1 TABLET ORAL at 05:45

## 2019-07-05 RX ADMIN — CARVEDILOL 25 MG: 25 TABLET, FILM COATED ORAL at 16:20

## 2019-07-05 RX ADMIN — AMLODIPINE BESYLATE 5 MG: 5 TABLET ORAL at 07:52

## 2019-07-05 RX ADMIN — THIAMINE HYDROCHLORIDE 100 MG: 100 INJECTION, SOLUTION INTRAMUSCULAR; INTRAVENOUS at 07:53

## 2019-07-05 RX ADMIN — METHYLPREDNISOLONE SODIUM SUCCINATE 40 MG: 40 INJECTION, POWDER, FOR SOLUTION INTRAMUSCULAR; INTRAVENOUS at 01:19

## 2019-07-05 RX ADMIN — CARVEDILOL 25 MG: 25 TABLET, FILM COATED ORAL at 07:52

## 2019-07-05 RX ADMIN — PREDNISONE 40 MG: 20 TABLET ORAL at 12:13

## 2019-07-05 RX ADMIN — PHENYTOIN SODIUM 100 MG: 100 CAPSULE ORAL at 05:45

## 2019-07-05 RX ADMIN — MICONAZOLE NITRATE: 20 POWDER TOPICAL at 08:02

## 2019-07-05 RX ADMIN — DOXYCYCLINE HYCLATE 100 MG: 100 CAPSULE ORAL at 07:52

## 2019-07-05 RX ADMIN — POLYETHYLENE GLYCOL 3350 17 G: 17 POWDER, FOR SOLUTION ORAL at 07:53

## 2019-07-05 RX ADMIN — IPRATROPIUM BROMIDE AND ALBUTEROL SULFATE 1 AMPULE: .5; 3 SOLUTION RESPIRATORY (INHALATION) at 06:48

## 2019-07-05 ASSESSMENT — ENCOUNTER SYMPTOMS
COUGH: 0
NAUSEA: 0
SHORTNESS OF BREATH: 0
VOMITING: 0
CONSTIPATION: 1

## 2019-07-05 ASSESSMENT — PAIN SCALES - GENERAL: PAINLEVEL_OUTOF10: 0

## 2019-07-05 NOTE — DISCHARGE SUMMARY
daily for 15 days. pregabalin (LYRICA) 100 MG capsule  Take 1 capsule by mouth daily for 3 days. senna (SENOKOT) 8.6 MG tablet  Take 2 tablets by mouth 3 times daily             simvastatin (ZOCOR) 20 MG tablet  Take 20 mg by mouth nightly                   Discharge Instructions: Follow up with Palak Hernandez MD in 7 days. Take medications as directed. Resume activity as tolerated. With Dr Roman Urias in 2 weeks, with Dr Pedro Mckeon in one month    Diet: Dietary Nutrition Supplements: Frozen Oral Supplement  DIET CARDIAC; Increase water intake     Disposition: Patient is medically stable and will be discharged to SNF.     Dc time 38 min     Addendum   Nephrology consult was obtained due to family request - pt will need to follow up with nephrology as OP

## 2019-07-05 NOTE — DISCHARGE INSTR - COC
Continuity of Care Form    Patient Name: Casandra Barros   :  1945  MRN:  [id number]    Admit date:  2019  Discharge date:  19     Code Status Order: Full Code   Advance Directives:   [facility] Documentation     Date/Time Healthcare Directive Type of Healthcare Directive Copy in [address] Agent's Name Healthcare Agent's Phone Number    19  No, patient does not have an advance directive for healthcare treatment --  Yes, copy in chart -- -- --          Admitting Physician:  Ant Hernandez MD  PCP: Melanie Overton MD    Discharging Nurse: [facility] Unit/Room#: 3617/793-05  Discharging Unit Phone Number: [phone]    Emergency Contact:   Extended Emergency Contact Information  Primary Emergency Contact: Rodriguezfrantz MeltonGillian of [address] Phone: [phone]  Mobile Phone: [phone]  Relation: Child  Secondary Emergency Contact: Joaquina Kenyon of [address] Phone: [phone]  Relation: Child    Past Surgical History:  Past Surgical History:   Procedure Laterality Date    ANKLE FRACTURE SURGERY Left     metal plate & screws    CERVICAL SPINE SURGERY  x 2    WITH HARDWARE    CHOLECYSTECTOMY  2015    Dr. Evaristo Robles Left 2015    Taya correction - Dr. Marin Klein Left 2007    Thumb implant    HYSTERECTOMY      JOINT REPLACEMENT Bilateral     TKR    JOINT REPLACEMENT Right     TSR    JOINT REPLACEMENT Left     THUMB    LUMBAR FUSION Left 2019    LEFT L4-5 LLIF WITH POSTERIOR SPINAL FUSION WITH INSTRUMENTATION performed by Marybeth Smith MD at [address] [place] [address] Road Right 3/2010    [place] [address] INCL FLUOR GDNCE DX W/CELL [facility] N/A 2018    BRONCHOSCOPY AND BAL with Nurse sedation performed by López Sy MD at [address] [place] [address] Endoscopy    SHOULDER SURGERY Right 2018    Dr. Dianna Rudolph - Total reversal replacement    TOTAL KNEE ARTHROPLASTY Bilateral intact and able to concentrate and follow conversation    IV Access:  - None    Nursing Mobility/ADLs:  Walking   Dependent  Transfer  Dependent  Bathing  Dependent  Dressing  Assisted  [address] [facility] 22 Delivery   whole    Wound Care Documentation and Therapy:        Elimination:  Continence:   · Bowel: Yes  · Bladder: Yes  Urinary Catheter: None   Colostomy/Ileostomy/Ileal Conduit: No       Date of Last BM: 07-05-19    Intake/Output Summary (Last 24 hours) at 7/5/2019 1102  Last data filed at 7/5/2019 0809  Gross per 24 hour   Intake 1846 ml   Output 450 ml   Net 1396 ml     I/O last 3 completed shifts: In: 8711 [I.V.:1665]  Out: 250 [Urine:250]    Safety Concerns: At Risk for Falls    Impairments/Disabilities:      None    Nutrition Therapy:  Current Nutrition Therapy:   - Oral Diet:  Cardiac    Routes of Feeding: Oral  Liquids: No Restrictions  Daily Fluid Restriction: no  Last Modified Barium Swallow with Video (Video Swallowing Test): not done    Treatments at the Time of Hospital Discharge:   Respiratory Treatments: Yes  Oxygen Therapy:  is on oxygen at 2 L/min per nasal cannula.   Ventilator:    - No ventilator support    Rehab Therapies: N/A  Weight Bearing Status/Restrictions: No weight bearing restirctions  Other Medical Equipment (for information only, NOT a DME order):  bath bench, bedside commode and hospital bed  Other Treatments: N/A    Patient's personal belongings (please select all that are sent with patient):  Glasses, Dentures upper    RN SIGNATURE:  Electronically signed by Lana Hernandez RN on 7/5/19 at 11:07 AM    CASE MANAGEMENT/SOCIAL WORK SECTION    Inpatient Status Date: ***    Readmission Risk Assessment Score:  Readmission Risk              Risk of Unplanned Readmission:        35           Discharging to Facility/ Agency   · Name:   · Address:  · Phone:  · Fax:    Dialysis Facility (if applicable)

## 2019-07-05 NOTE — CONSULTS
4895  senna (SENOKOT) tablet 8.6 mg, 1 tablet, Oral, Nightly, Kunal Blake MD, 8.6 mg at 07/04/19 2034  bisacodyl (DULCOLAX) suppository 10 mg, 10 mg, Rectal, Daily, Kunal Blake MD, 10 mg at 07/05/19 0753  miconazole (MICOTIN) 2 % powder, , Topical, BID, Kunal Blake MD  cefdinir (OMNICEF) capsule 300 mg, 300 mg, Oral, Daily, Kunal Blake MD, 300 mg at 07/05/19 0752  vancomycin (VANCOCIN) intermittent dosing (placeholder), , Other, RX Placeholder, Kunal Blake MD  thiamine (B-1) injection 100 mg, 100 mg, Intravenous, Daily, Kunal Blake MD, 100 mg at 07/05/19 0753  pregabalin (LYRICA) capsule 100 mg, 100 mg, Oral, Daily, Kunal Blake MD, 100 mg at 07/05/19 0752  sodium chloride 0.9 % 5,218 mL with folic acid 1 mg, adult multi-vitamin with vitamin k 10 mL, thiamine 100 mg, , Intravenous, Once, Kunal Blake MD  sodium bicarbonate tablet 325 mg, 325 mg, Oral, BID, Giuseppe Carvalho PA-C, 325 mg at 07/05/19 3562  doxycycline hyclate (VIBRAMYCIN) capsule 100 mg, 100 mg, Oral, 2 times per day, Giuseppe Carvalho PA-C, 100 mg at 07/05/19 0752  oxyCODONE-acetaminophen (PERCOCET) 5-325 MG per tablet 1 tablet, 1 tablet, Oral, Q4H PRN, Kunal Blake MD, 1 tablet at 06/30/19 1837  phenytoin (DILANTIN) ER capsule 100 mg, 100 mg, Oral, Q8H, Agustin Stevens MD, 100 mg at 07/05/19 1213  carvedilol (COREG) tablet 25 mg, 25 mg, Oral, BID WC, Giuseppe Carvalho PA-C, 25 mg at 07/05/19 0752  lidocaine PF 1 % injection 5 mL, 5 mL, Intradermal, Once, Lina White PA-C  sodium chloride flush 0.9 % injection 10 mL, 10 mL, Intravenous, 2 times per day, Lina White PA-C, 10 mL at 07/04/19 0848  sodium chloride flush 0.9 % injection 10 mL, 10 mL, Intravenous, PRN, Lina White PA-C  QUEtiapine (SEROQUEL) tablet 25 mg, 25 mg, Oral, Daily PRN, Lina White PA-C, 25 mg at 06/30/19 210  levothyroxine (SYNTHROID) tablet 100
Alcohol    Kidney Disease Father         dialysis    Diabetes Maternal Aunt     Kidney Disease Maternal Aunt     Cancer Maternal Grandmother         Colon Cancer    Osteoporosis Maternal Grandmother     Anxiety Disorder Maternal Grandmother     Depression Maternal Grandmother            Physical Exam:    Vitals: BP (!) 154/88   Pulse 93   Temp 97.4 °F (36.3 °C) (Temporal)   Resp 24   Ht 5' 5\" (1.651 m)   Wt 184 lb (83.5 kg)   SpO2 99% Comment: decreased to 2lpm  BMI 30.62 kg/m²     Constitutional - well developed, well nourished. Eyes - conjunctiva normal.    Ear, nose, throat -No scars, masses, or lesions over external nose or ears, no atrophy of tongue  Neck-symmetric, no masses noted, no jugular vein distension  Respiration- chest wall appears symmetric, good expansion,   normal effort without use of accessory muscles  Musculoskeletal - no significant wasting of muscles noted, no bony deformities  Extremities-no clubbing, cyanosis or edema  Skin - warm, dry, and intact. No rash, erythema, or pallor. Psychiatric - mood, affect, and behavior appear normal.      Neurological exam  Eyes open appears confused and encephalopathic  Oriented x 1 but unable to follow commands  Picking at sheets with hands  Conjugate eye movements  Moving all extremities spontaneously        CBC:   Recent Labs     06/23/19  1524 06/24/19  0926 06/25/19  0321   WBC 15.2* 11.0* 8.7   HGB 11.1* 10.2* 10.6*    263 283       BMP:    Recent Labs     06/23/19  1524  06/24/19  0926 06/25/19  0321 06/25/19  0952   *  --  139 140  --    K 5.4*   < > 4.2 4.5 4.4     --  107 109  --    CO2 18*  --  16* 18*  --    BUN 48*  --  51* 50*  --    CREATININE 2.8*  --  2.4* 2.4*  --    GLUCOSE 135*  --  92 82  --     < > = values in this interval not displayed.        Hepatic:   Recent Labs     06/23/19  1524 06/24/19  0926   AST 39* 62*   ALT 31 34*   BILITOT <0.2 <0.2   ALKPHOS 129* 115*       Lipids: No results for

## 2019-07-05 NOTE — PROGRESS NOTES
Nutrition Assessment    Type and Reason for Visit: Reassess    Nutrition Assessment: Pt remains at risk for malnutrition d/t poor PO intake. Pt c/o nausea, dizziness, and sore bottom. Encouraged ONS; pt could not remember trying. Pt diet upgraded 07/03. Will monitor. Malnutrition Assessment:  · Malnutrition Status: At risk for malnutrition  · Context: Acute illness or injury  · Findings of the 6 clinical characteristics of malnutrition (Minimum of 2 out of 6 clinical characteristics is required to make the diagnosis of moderate or severe Protein Calorie Malnutrition based on AND/ASPEN Guidelines):  1. Energy Intake-Less than or equal to 50% of estimated energy requirement, Greater than or equal to 7 days    2. Weight Loss-No significant weight loss,    3. Fat Loss-No significant subcutaneous fat loss,    4. Muscle Loss-No significant muscle mass loss,    5. Fluid Accumulation-No significant fluid accumulation,    6.   Strength-Not measured    Nutrition Risk Level: High    Nutrient Needs:  · Estimated Daily Total Kcal: 0861-6832(15-18)  · Estimated Daily Protein (g): 68-86(1.2-1.5 g/kg)  · Estimated Daily Total Fluid (ml/day): 2199-0198(25-30 ml/kg)    Nutrition Diagnosis:   · Problem: Inadequate oral intake  · Etiology: related to Nausea     Signs and symptoms:  as evidenced by Intake 25-50%    Objective Information:  · Nutrition-Focused Physical Findings:    · Wound Type: (Excoriation to dorene area)  · Current Nutrition Therapies:  · Oral Diet Orders: Cardiac(Meds in pudding or applesauce)   · Oral Diet intake: 26-50%  · Oral Nutrition Supplement (ONS) Orders: Frozen Oral Supplement  · ONS intake: Unable to assess  · Anthropometric Measures:  · Ht: 5' 5\" (165.1 cm)   · Current Body Wt:    · Admission Body Wt: 184 lb (83.5 kg)  · Ideal Body Wt: 125 lb (56.7 kg),  · BMI Classification: BMI 30.0 - 34.9 Obese Class I    Nutrition Interventions:   Continue current diet, Continue current ONS  Continued

## 2019-07-09 ENCOUNTER — LAB REQUISITION (OUTPATIENT)
Dept: LAB | Facility: HOSPITAL | Age: 74
End: 2019-07-09

## 2019-07-09 DIAGNOSIS — G03.9 MENINGITIS: ICD-10-CM

## 2019-07-09 DIAGNOSIS — J96.01 ACUTE RESPIRATORY FAILURE WITH HYPOXIA (HCC): ICD-10-CM

## 2019-07-09 DIAGNOSIS — J18.9 PNEUMONIA: ICD-10-CM

## 2019-07-09 LAB
ALBUMIN SERPL-MCNC: 3 G/DL (ref 3.5–5)
ALBUMIN/GLOB SERPL: 0.8 G/DL (ref 1.1–2.5)
ALP SERPL-CCNC: 132 U/L (ref 24–120)
ALT SERPL W P-5'-P-CCNC: 49 U/L (ref 0–54)
ANION GAP SERPL CALCULATED.3IONS-SCNC: 10 MMOL/L (ref 4–13)
AST SERPL-CCNC: 45 U/L (ref 7–45)
BASOPHILS # BLD AUTO: 0.07 10*3/MM3 (ref 0–0.2)
BASOPHILS NFR BLD AUTO: 0.5 % (ref 0–2)
BILIRUB SERPL-MCNC: 0.3 MG/DL (ref 0.1–1)
BUN BLD-MCNC: 73 MG/DL (ref 5–21)
BUN/CREAT SERPL: 30.4 (ref 7–25)
CALCIUM SPEC-SCNC: 9.5 MG/DL (ref 8.4–10.4)
CHLORIDE SERPL-SCNC: 114 MMOL/L (ref 98–110)
CO2 SERPL-SCNC: 23 MMOL/L (ref 24–31)
CREAT BLD-MCNC: 2.4 MG/DL (ref 0.5–1.4)
DEPRECATED RDW RBC AUTO: 47.8 FL (ref 40–54)
EOSINOPHIL # BLD AUTO: 0.2 10*3/MM3 (ref 0–0.7)
EOSINOPHIL NFR BLD AUTO: 1.5 % (ref 0–4)
ERYTHROCYTE [DISTWIDTH] IN BLOOD BY AUTOMATED COUNT: 14 % (ref 12–15)
GFR SERPL CREATININE-BSD FRML MDRD: 20 ML/MIN/1.73
GLOBULIN UR ELPH-MCNC: 3.6 GM/DL
GLUCOSE BLD-MCNC: 101 MG/DL (ref 70–100)
HCT VFR BLD AUTO: 35.6 % (ref 37–47)
HGB BLD-MCNC: 11.3 G/DL (ref 12–16)
IMM GRANULOCYTES # BLD AUTO: 0.06 10*3/MM3 (ref 0–0.05)
IMM GRANULOCYTES NFR BLD AUTO: 0.5 % (ref 0–5)
LYMPHOCYTES # BLD AUTO: 2.95 10*3/MM3 (ref 0.72–4.86)
LYMPHOCYTES NFR BLD AUTO: 22.9 % (ref 15–45)
MCH RBC QN AUTO: 29.5 PG (ref 28–32)
MCHC RBC AUTO-ENTMCNC: 31.7 G/DL (ref 33–36)
MCV RBC AUTO: 93 FL (ref 82–98)
MONOCYTES # BLD AUTO: 1.64 10*3/MM3 (ref 0.19–1.3)
MONOCYTES NFR BLD AUTO: 12.7 % (ref 4–12)
NEUTROPHILS # BLD AUTO: 7.99 10*3/MM3 (ref 1.87–8.4)
NEUTROPHILS NFR BLD AUTO: 61.9 % (ref 39–78)
NRBC BLD AUTO-RTO: 0 /100 WBC (ref 0–0.2)
PLATELET # BLD AUTO: 308 10*3/MM3 (ref 130–400)
PMV BLD AUTO: 11.6 FL (ref 6–12)
POTASSIUM BLD-SCNC: 4.3 MMOL/L (ref 3.5–5.3)
PROT SERPL-MCNC: 6.6 G/DL (ref 6.3–8.7)
RBC # BLD AUTO: 3.83 10*6/MM3 (ref 4.2–5.4)
SODIUM BLD-SCNC: 147 MMOL/L (ref 135–145)
WBC NRBC COR # BLD: 12.91 10*3/MM3 (ref 4.8–10.8)

## 2019-07-09 PROCEDURE — 85025 COMPLETE CBC W/AUTO DIFF WBC: CPT | Performed by: NURSE PRACTITIONER

## 2019-07-09 PROCEDURE — 80053 COMPREHEN METABOLIC PANEL: CPT | Performed by: NURSE PRACTITIONER

## 2019-07-09 PROCEDURE — 36415 COLL VENOUS BLD VENIPUNCTURE: CPT | Performed by: NURSE PRACTITIONER

## 2019-07-11 ENCOUNTER — APPOINTMENT (OUTPATIENT)
Dept: CT IMAGING | Age: 74
DRG: 438 | End: 2019-07-11
Payer: MEDICARE

## 2019-07-11 ENCOUNTER — APPOINTMENT (OUTPATIENT)
Dept: GENERAL RADIOLOGY | Age: 74
DRG: 438 | End: 2019-07-11
Payer: MEDICARE

## 2019-07-11 ENCOUNTER — LAB REQUISITION (OUTPATIENT)
Dept: LAB | Facility: HOSPITAL | Age: 74
End: 2019-07-11

## 2019-07-11 ENCOUNTER — HOSPITAL ENCOUNTER (INPATIENT)
Age: 74
LOS: 5 days | Discharge: NURSING, REHAB OR LONG-TERM CARE | DRG: 438 | End: 2019-07-16
Attending: EMERGENCY MEDICINE | Admitting: FAMILY MEDICINE
Payer: MEDICARE

## 2019-07-11 DIAGNOSIS — N17.9 ACUTE RENAL FAILURE SUPERIMPOSED ON CHRONIC KIDNEY DISEASE, UNSPECIFIED CKD STAGE, UNSPECIFIED ACUTE RENAL FAILURE TYPE (HCC): Primary | ICD-10-CM

## 2019-07-11 DIAGNOSIS — N18.9 ACUTE RENAL FAILURE SUPERIMPOSED ON CHRONIC KIDNEY DISEASE, UNSPECIFIED CKD STAGE, UNSPECIFIED ACUTE RENAL FAILURE TYPE (HCC): Primary | ICD-10-CM

## 2019-07-11 DIAGNOSIS — K85.90 ACUTE PANCREATITIS, UNSPECIFIED COMPLICATION STATUS, UNSPECIFIED PANCREATITIS TYPE: ICD-10-CM

## 2019-07-11 DIAGNOSIS — Z00.00 ENCOUNTER FOR GENERAL ADULT MEDICAL EXAMINATION WITHOUT ABNORMAL FINDINGS: ICD-10-CM

## 2019-07-11 LAB
ALBUMIN SERPL-MCNC: 2.6 G/DL (ref 3.5–5.2)
ALP BLD-CCNC: 143 U/L (ref 35–104)
ALT SERPL-CCNC: 33 U/L (ref 5–33)
AMYLASE: 599 U/L (ref 28–100)
ANION GAP SERPL CALCULATED.3IONS-SCNC: 14 MMOL/L (ref 7–19)
ANION GAP SERPL CALCULATED.3IONS-SCNC: 9 MMOL/L (ref 4–13)
AST SERPL-CCNC: 27 U/L (ref 5–32)
BASOPHILS # BLD AUTO: 0.05 10*3/MM3 (ref 0–0.2)
BASOPHILS ABSOLUTE: 0.1 K/UL (ref 0–0.2)
BASOPHILS NFR BLD AUTO: 0.3 % (ref 0–2)
BASOPHILS RELATIVE PERCENT: 0.4 % (ref 0–1)
BILIRUB SERPL-MCNC: 0.3 MG/DL (ref 0.2–1.2)
BILIRUBIN URINE: NEGATIVE
BLOOD, URINE: NEGATIVE
BUN BLD-MCNC: 95 MG/DL (ref 5–21)
BUN BLDV-MCNC: 101 MG/DL (ref 8–23)
BUN/CREAT SERPL: 28.7 (ref 7–25)
CALCIUM SERPL-MCNC: 8.7 MG/DL (ref 8.8–10.2)
CALCIUM SPEC-SCNC: 8.8 MG/DL (ref 8.4–10.4)
CHLORIDE BLD-SCNC: 109 MMOL/L (ref 98–111)
CHLORIDE SERPL-SCNC: 111 MMOL/L (ref 98–110)
CLARITY: ABNORMAL
CO2 SERPL-SCNC: 22 MMOL/L (ref 24–31)
CO2: 19 MMOL/L (ref 22–29)
COLOR: YELLOW
CREAT BLD-MCNC: 3.31 MG/DL (ref 0.5–1.4)
CREAT SERPL-MCNC: 3.6 MG/DL (ref 0.5–0.9)
DEPRECATED RDW RBC AUTO: 48 FL (ref 40–54)
EOSINOPHIL # BLD AUTO: 0.1 10*3/MM3 (ref 0–0.7)
EOSINOPHIL NFR BLD AUTO: 0.5 % (ref 0–4)
EOSINOPHILS ABSOLUTE: 0 K/UL (ref 0–0.6)
EOSINOPHILS RELATIVE PERCENT: 0.2 % (ref 0–5)
ERYTHROCYTE [DISTWIDTH] IN BLOOD BY AUTOMATED COUNT: 14.2 % (ref 12–15)
GFR NON-AFRICAN AMERICAN: 12
GFR SERPL CREATININE-BSD FRML MDRD: 14 ML/MIN/1.73
GFR SERPL CREATININE-BSD FRML MDRD: ABNORMAL ML/MIN/1.73
GLUCOSE BLD-MCNC: 131 MG/DL (ref 70–100)
GLUCOSE BLD-MCNC: 162 MG/DL (ref 74–109)
GLUCOSE URINE: NEGATIVE MG/DL
HCT VFR BLD AUTO: 34.6 % (ref 37–47)
HCT VFR BLD CALC: 39.7 % (ref 37–47)
HEMOGLOBIN: 11.2 G/DL (ref 12–16)
HGB BLD-MCNC: 11 G/DL (ref 12–16)
HYPOCHROMIA: ABNORMAL
IMM GRANULOCYTES # BLD AUTO: 0.07 10*3/MM3 (ref 0–0.05)
IMM GRANULOCYTES NFR BLD AUTO: 0.4 % (ref 0–5)
KETONES, URINE: NEGATIVE MG/DL
LEUKOCYTE ESTERASE, URINE: NEGATIVE
LIPASE: 799 U/L (ref 13–60)
LYMPHOCYTES # BLD AUTO: 3.02 10*3/MM3 (ref 0.72–4.86)
LYMPHOCYTES ABSOLUTE: 1.7 K/UL (ref 1.1–4.5)
LYMPHOCYTES NFR BLD AUTO: 16.3 % (ref 15–45)
LYMPHOCYTES RELATIVE PERCENT: 10.3 % (ref 20–40)
MCH RBC QN AUTO: 29.5 PG (ref 28–32)
MCH RBC QN AUTO: 29.8 PG (ref 27–31)
MCHC RBC AUTO-ENTMCNC: 28.2 G/DL (ref 33–37)
MCHC RBC AUTO-ENTMCNC: 31.8 G/DL (ref 33–36)
MCV RBC AUTO: 105.6 FL (ref 81–99)
MCV RBC AUTO: 92.8 FL (ref 82–98)
MONOCYTES # BLD AUTO: 2.37 10*3/MM3 (ref 0.19–1.3)
MONOCYTES ABSOLUTE: 1.5 K/UL (ref 0–0.9)
MONOCYTES NFR BLD AUTO: 12.8 % (ref 4–12)
MONOCYTES RELATIVE PERCENT: 9 % (ref 0–10)
NEUTROPHILS # BLD AUTO: 12.94 10*3/MM3 (ref 1.87–8.4)
NEUTROPHILS ABSOLUTE: 12.8 K/UL (ref 1.5–7.5)
NEUTROPHILS NFR BLD AUTO: 69.7 % (ref 39–78)
NEUTROPHILS RELATIVE PERCENT: 78.9 % (ref 50–65)
NITRITE, URINE: NEGATIVE
NRBC BLD AUTO-RTO: 0 /100 WBC (ref 0–0.2)
PDW BLD-RTO: 14.3 % (ref 11.5–14.5)
PH UA: 5.5 (ref 5–8)
PHENYTOIN LEVEL: 16.6 UG/ML (ref 10–20)
PLATELET # BLD AUTO: 217 10*3/MM3 (ref 130–400)
PLATELET # BLD: 171 K/UL (ref 130–400)
PLATELET SLIDE REVIEW: ADEQUATE
PMV BLD AUTO: 10.7 FL (ref 9.4–12.3)
PMV BLD AUTO: 12.2 FL (ref 6–12)
POTASSIUM BLD-SCNC: 4.6 MMOL/L (ref 3.5–5.3)
POTASSIUM REFLEX MAGNESIUM: 5 MMOL/L (ref 3.5–5)
PRO-BNP: 224 PG/ML (ref 0–1800)
PROTEIN UA: ABNORMAL MG/DL
RBC # BLD AUTO: 3.73 10*6/MM3 (ref 4.2–5.4)
RBC # BLD: 3.76 M/UL (ref 4.2–5.4)
REASON FOR REJECTION: NORMAL
REJECTED TEST: NORMAL
SODIUM BLD-SCNC: 142 MMOL/L (ref 135–145)
SODIUM BLD-SCNC: 142 MMOL/L (ref 136–145)
SPECIFIC GRAVITY UA: 1.02 (ref 1–1.03)
TOTAL PROTEIN: 6.5 G/DL (ref 6.6–8.7)
TRIGL SERPL-MCNC: 55 MG/DL (ref 0–149)
TROPONIN: <0.01 NG/ML (ref 0–0.03)
URINE REFLEX TO CULTURE: ABNORMAL
UROBILINOGEN, URINE: 0.2 E.U./DL
WBC # BLD: 16.3 K/UL (ref 4.8–10.8)
WBC NRBC COR # BLD: 18.55 10*3/MM3 (ref 4.8–10.8)

## 2019-07-11 PROCEDURE — 71045 X-RAY EXAM CHEST 1 VIEW: CPT

## 2019-07-11 PROCEDURE — 99285 EMERGENCY DEPT VISIT HI MDM: CPT

## 2019-07-11 PROCEDURE — 84484 ASSAY OF TROPONIN QUANT: CPT

## 2019-07-11 PROCEDURE — 2700000000 HC OXYGEN THERAPY PER DAY

## 2019-07-11 PROCEDURE — 99285 EMERGENCY DEPT VISIT HI MDM: CPT | Performed by: EMERGENCY MEDICINE

## 2019-07-11 PROCEDURE — 2580000003 HC RX 258: Performed by: EMERGENCY MEDICINE

## 2019-07-11 PROCEDURE — 85025 COMPLETE CBC W/AUTO DIFF WBC: CPT | Performed by: NURSE PRACTITIONER

## 2019-07-11 PROCEDURE — 80053 COMPREHEN METABOLIC PANEL: CPT

## 2019-07-11 PROCEDURE — 6360000002 HC RX W HCPCS: Performed by: INTERNAL MEDICINE

## 2019-07-11 PROCEDURE — 83615 LACTATE (LD) (LDH) ENZYME: CPT

## 2019-07-11 PROCEDURE — 1210000000 HC MED SURG R&B

## 2019-07-11 PROCEDURE — 84478 ASSAY OF TRIGLYCERIDES: CPT

## 2019-07-11 PROCEDURE — 36415 COLL VENOUS BLD VENIPUNCTURE: CPT

## 2019-07-11 PROCEDURE — 83880 ASSAY OF NATRIURETIC PEPTIDE: CPT

## 2019-07-11 PROCEDURE — 80185 ASSAY OF PHENYTOIN TOTAL: CPT

## 2019-07-11 PROCEDURE — 93005 ELECTROCARDIOGRAM TRACING: CPT

## 2019-07-11 PROCEDURE — 99223 1ST HOSP IP/OBS HIGH 75: CPT | Performed by: INTERNAL MEDICINE

## 2019-07-11 PROCEDURE — 80048 BASIC METABOLIC PNL TOTAL CA: CPT | Performed by: NURSE PRACTITIONER

## 2019-07-11 PROCEDURE — 81003 URINALYSIS AUTO W/O SCOPE: CPT

## 2019-07-11 PROCEDURE — 36415 COLL VENOUS BLD VENIPUNCTURE: CPT | Performed by: NURSE PRACTITIONER

## 2019-07-11 PROCEDURE — 85025 COMPLETE CBC W/AUTO DIFF WBC: CPT

## 2019-07-11 PROCEDURE — 83690 ASSAY OF LIPASE: CPT

## 2019-07-11 PROCEDURE — 82150 ASSAY OF AMYLASE: CPT

## 2019-07-11 PROCEDURE — 74176 CT ABD & PELVIS W/O CONTRAST: CPT

## 2019-07-11 RX ORDER — POTASSIUM CHLORIDE 20 MEQ/1
40 TABLET, EXTENDED RELEASE ORAL PRN
Status: DISCONTINUED | OUTPATIENT
Start: 2019-07-11 | End: 2019-07-12

## 2019-07-11 RX ORDER — SODIUM CHLORIDE 0.9 % (FLUSH) 0.9 %
10 SYRINGE (ML) INJECTION PRN
Status: DISCONTINUED | OUTPATIENT
Start: 2019-07-11 | End: 2019-07-16 | Stop reason: HOSPADM

## 2019-07-11 RX ORDER — ONDANSETRON 2 MG/ML
4 INJECTION INTRAMUSCULAR; INTRAVENOUS EVERY 6 HOURS PRN
Status: DISCONTINUED | OUTPATIENT
Start: 2019-07-11 | End: 2019-07-16 | Stop reason: HOSPADM

## 2019-07-11 RX ORDER — GUAIFENESIN 600 MG/1
1200 TABLET, EXTENDED RELEASE ORAL 2 TIMES DAILY
COMMUNITY
End: 2019-08-22 | Stop reason: SDUPTHER

## 2019-07-11 RX ORDER — ACETAMINOPHEN 325 MG/1
650 TABLET ORAL EVERY 6 HOURS PRN
Status: DISCONTINUED | OUTPATIENT
Start: 2019-07-11 | End: 2019-07-16 | Stop reason: HOSPADM

## 2019-07-11 RX ORDER — SODIUM CHLORIDE 0.9 % (FLUSH) 0.9 %
10 SYRINGE (ML) INJECTION EVERY 12 HOURS SCHEDULED
Status: DISCONTINUED | OUTPATIENT
Start: 2019-07-11 | End: 2019-07-16 | Stop reason: HOSPADM

## 2019-07-11 RX ORDER — 0.9 % SODIUM CHLORIDE 0.9 %
1000 INTRAVENOUS SOLUTION INTRAVENOUS ONCE
Status: COMPLETED | OUTPATIENT
Start: 2019-07-11 | End: 2019-07-12

## 2019-07-11 RX ORDER — OXYCODONE HYDROCHLORIDE AND ACETAMINOPHEN 5; 325 MG/1; MG/1
1 TABLET ORAL EVERY 4 HOURS PRN
Status: ON HOLD | COMMUNITY
End: 2019-07-16 | Stop reason: HOSPADM

## 2019-07-11 RX ORDER — HEPARIN SODIUM 5000 [USP'U]/ML
5000 INJECTION, SOLUTION INTRAVENOUS; SUBCUTANEOUS EVERY 8 HOURS SCHEDULED
Status: DISCONTINUED | OUTPATIENT
Start: 2019-07-11 | End: 2019-07-16 | Stop reason: HOSPADM

## 2019-07-11 RX ORDER — POTASSIUM CHLORIDE 7.45 MG/ML
10 INJECTION INTRAVENOUS PRN
Status: DISCONTINUED | OUTPATIENT
Start: 2019-07-11 | End: 2019-07-12

## 2019-07-11 RX ADMIN — SODIUM CHLORIDE 1000 ML: 9 INJECTION, SOLUTION INTRAVENOUS at 17:17

## 2019-07-11 RX ADMIN — HEPARIN SODIUM 5000 UNITS: 5000 INJECTION, SOLUTION INTRAVENOUS; SUBCUTANEOUS at 21:33

## 2019-07-11 ASSESSMENT — PAIN SCALES - GENERAL
PAINLEVEL_OUTOF10: 0
PAINLEVEL_OUTOF10: 0

## 2019-07-11 ASSESSMENT — PAIN DESCRIPTION - LOCATION: LOCATION: ABDOMEN

## 2019-07-11 ASSESSMENT — ENCOUNTER SYMPTOMS
NAUSEA: 0
ABDOMINAL PAIN: 1
VOMITING: 0

## 2019-07-11 NOTE — H&P
 Hyperlipidemia     Hypertension     Hypothyroidism     Irritable bowel syndrome     Palliative care patient 06/18/2019    Restless legs syndrome     Spondylolisthesis at L4-L5 level 5/21/2019         Past Surgical History:      Procedure Laterality Date    ANKLE FRACTURE SURGERY Left     metal plate & screws    CERVICAL SPINE SURGERY  x 2    WITH HARDWARE    CHOLECYSTECTOMY  02/2015    Dr. Monster Steven Left 02/2015    [place] correction - Dr. Brock Bowen HAND SURGERY Left 2/2007    Thumb implant    HYSTERECTOMY      JOINT REPLACEMENT Bilateral     TKR    JOINT REPLACEMENT Right     TSR    JOINT REPLACEMENT Left     THUMB    LUMBAR FUSION Left 5/21/2019    LEFT L4-5 LLIF WITH POSTERIOR SPINAL FUSION WITH INSTRUMENTATION performed by Erich Betancourt MD at [address] [place] [address] Road Right 3/2010    [place] [address] INCL FLUOR GDNCE DX W/CELL WASHG [address] [facility] N/A 7/17/2018    BRONCHOSCOPY AND BAL with Nurse sedation performed by Evie Barnett MD at [facility] Endoscopy    SHOULDER SURGERY Right 03/06/2018    Dr. Morgan Amato - Total reversal replacement    TOTAL KNEE ARTHROPLASTY Bilateral     at two different times          SOCIAL & FAMILY HISTORY:    Social History:   TOBACCO:   reports that she is a non-smoker but has been exposed to tobacco smoke. She has never used smokeless tobacco.  ETOH:   reports that she does not drink alcohol.     Family History:       Problem Relation Age of Onset    High Blood Pressure Mother     Stroke Mother     Dementia Mother         late onset   Kimberley Sandman Depression Mother     Anxiety Disorder Mother     Kidney Disease Mother     Osteoporosis Mother     Substance Abuse Father         Alcohol    Kidney Disease Father         dialysis    Diabetes Maternal Aunt     Kidney Disease Maternal Aunt     Cancer Maternal Grandmother         Colon Cancer    Osteoporosis Maternal Grandmother     Anxiety Disorder Maternal Grandmother     Depression Maternal Grandmother         MEDICATIONS:

## 2019-07-11 NOTE — ED NOTES
Bed: 03  Expected date:   Expected time:   Means of arrival:   Comments:  EMS     Patel Zamudio RN  07/11/19 7712

## 2019-07-11 NOTE — ED PROVIDER NOTES
disease)     Hyperlipidemia     Hypertension     Hypothyroidism     Irritable bowel syndrome     Palliative care patient 06/18/2019    Restless legs syndrome     Spondylolisthesis at L4-L5 level 5/21/2019         SURGICALHISTORY       Past Surgical History:   Procedure Laterality Date    ANKLE FRACTURE SURGERY Left     metal plate & screws    CERVICAL SPINE SURGERY  x 2    WITH HARDWARE    CHOLECYSTECTOMY  02/2015    Dr. Lawson Abts Left 02/2015    Taya correction - Dr. Marcie Carrasco HAND SURGERY Left 2/2007    Thumb implant    HYSTERECTOMY      JOINT REPLACEMENT Bilateral     TKR    JOINT REPLACEMENT Right     TSR    JOINT REPLACEMENT Left     THUMB    LUMBAR FUSION Left 5/21/2019    LEFT L4-5 LLIF WITH POSTERIOR SPINAL FUSION WITH INSTRUMENTATION performed by Keny Velasco MD at [address] [place] [address] Road Right 3/2010    [place] [address] INCL FLUOR GDNCE DX W/CELL WASHG 100 [facility] N/A 7/17/2018    BRONCHOSCOPY AND BAL with Nurse sedation performed by Saint Kemps, MD at [address] [facility] Endoscopy    SHOULDER SURGERY Right 03/06/2018    Dr. Palomo Hoof - Total reversal replacement    TOTAL KNEE ARTHROPLASTY Bilateral     at two different times         CURRENT MEDICATIONS       Previous Medications    ALLOPURINOL (ZYLOPRIM) 100 MG TABLET    Take 100 mg by mouth daily    AMLODIPINE-OLMESARTAN (WILL) 5-20 MG PER TABLET    Take 1 tablet by mouth daily    BUPROPION (WELLBUTRIN XL) 150 MG EXTENDED RELEASE TABLET    Take 150 mg by mouth every morning    CARVEDILOL (COREG) 25 MG TABLET    Take 1 tablet by mouth 2 times daily (with meals)    CEFDINIR (OMNICEF) 300 MG CAPSULE    Take 1 capsule by mouth daily for 7 days    CETIRIZINE (ZYRTEC) 10 MG TABLET    Take 10 mg by mouth daily    CHOLECALCIFEROL (VITAMIN D3) 2000 UNITS CAPS    Take 1 capsule by mouth nightly    DOXYCYCLINE HYCLATE (VIBRAMYCIN) 100 MG CAPSULE    Take 1 capsule by mouth every 12 hours for 7 days    EPINEPHRINE (EPIPEN) 0.3 MG/0.3ML SOAJ INJECTION She has a normal mood and affect. Nursing note and vitals reviewed. DIAGNOSTIC RESULTS     EKG: All EKG's are interpreted by the Emergency Department Physician who either signs or Co-signsthis chart in the absence of a cardiologist.    EKG: sinus, VR 64, prob LVH change, no ST elevation or depression    RADIOLOGY:   Non-plain filmimages such as CT, Ultrasound and MRI are read by the radiologist. Plain radiographic images are visualized and preliminarily interpreted by the emergency physician with the below findings:        Interpretation per the Radiologist below, if available at the time ofthis note:    CT ABDOMEN PELVIS WO CONTRAST Additional Contrast? None   Final Result   1. New peripancreatic inflammatory change most prominent along the   head of the pancreas suggesting underlying pancreatitis. No pseudocyst   collection. 2. Similar colonic diverticulosis but no radiographic evidence of   acute diverticulitis. 3. Patchy groundglass basilar opacities likely due to atelectasis. Pneumonia considered. 4. Previous cholecystectomy and hysterectomy. Signed by Dr Verna Bass on 7/11/2019 12:17 PM      XR CHEST PORTABLE   Final Result   1. Improving left lung opacities since the CT chest of 7/2/2019. Residual interstitial densities of the left lung due to persist with   linear atelectasis or scarring of the right upper lobe. Continued   follow-up recommended. .   Signed by Dr Verna Bass on 7/11/2019 12:11 PM            ED BEDSIDE ULTRASOUND:   Performed by ED Physician - none    LABS:  Labs Reviewed   URINE RT REFLEX TO CULTURE - Abnormal; Notable for the following components:       Result Value    Clarity, UA CLOUDY (*)     Protein, UA TRACE (*)     All other components within normal limits   CBC WITH AUTO DIFFERENTIAL - Abnormal; Notable for the following components:    WBC 16.3 (*)     RBC 3.76 (*)     Hemoglobin 11.2 (*)     .6 (*)     MCHC 28.2 (*)     Neutrophils % 78.9 (*)

## 2019-07-11 NOTE — ED NOTES
Pt states not in pain but mostly sick to her stomach with indigestion for 3 days and was given mom this am     Robles Sequeira, RN  07/11/19 [address], RN  07/11/19 5199

## 2019-07-12 PROBLEM — D53.9 MACROCYTIC ANEMIA: Status: ACTIVE | Noted: 2019-07-12

## 2019-07-12 PROBLEM — E44.0 MODERATE PROTEIN-CALORIE MALNUTRITION (HCC): Status: ACTIVE | Noted: 2019-07-12

## 2019-07-12 LAB
ANION GAP SERPL CALCULATED.3IONS-SCNC: 13 MMOL/L (ref 7–19)
BASOPHILS ABSOLUTE: 0.1 K/UL (ref 0–0.2)
BASOPHILS RELATIVE PERCENT: 0.4 % (ref 0–1)
BUN BLDV-MCNC: 82 MG/DL (ref 8–23)
CALCIUM SERPL-MCNC: 8.1 MG/DL (ref 8.8–10.2)
CHLORIDE BLD-SCNC: 111 MMOL/L (ref 98–111)
CO2: 18 MMOL/L (ref 22–29)
CREAT SERPL-MCNC: 2.7 MG/DL (ref 0.5–0.9)
EOSINOPHILS ABSOLUTE: 0.2 K/UL (ref 0–0.6)
EOSINOPHILS RELATIVE PERCENT: 1.3 % (ref 0–5)
GFR NON-AFRICAN AMERICAN: 17
GLUCOSE BLD-MCNC: 171 MG/DL (ref 74–109)
HCT VFR BLD CALC: 40.8 % (ref 37–47)
HEMOGLOBIN: 11.5 G/DL (ref 12–16)
HYPOCHROMIA: ABNORMAL
LACTATE DEHYDROGENASE: 299 U/L (ref 91–215)
LYMPHOCYTES ABSOLUTE: 2.2 K/UL (ref 1.1–4.5)
LYMPHOCYTES RELATIVE PERCENT: 13.8 % (ref 20–40)
MACROCYTES: ABNORMAL
MCH RBC QN AUTO: 29.9 PG (ref 27–31)
MCHC RBC AUTO-ENTMCNC: 28.2 G/DL (ref 33–37)
MCV RBC AUTO: 106 FL (ref 81–99)
MONOCYTES ABSOLUTE: 2 K/UL (ref 0–0.9)
MONOCYTES RELATIVE PERCENT: 12.4 % (ref 0–10)
NEUTROPHILS ABSOLUTE: 11.4 K/UL (ref 1.5–7.5)
NEUTROPHILS RELATIVE PERCENT: 71.6 % (ref 50–65)
PDW BLD-RTO: 14.3 % (ref 11.5–14.5)
PLATELET # BLD: 165 K/UL (ref 130–400)
PLATELET SLIDE REVIEW: ADEQUATE
PMV BLD AUTO: 11.6 FL (ref 9.4–12.3)
POLYCHROMASIA: ABNORMAL
POTASSIUM SERPL-SCNC: 4.4 MMOL/L (ref 3.5–5)
RBC # BLD: 3.85 M/UL (ref 4.2–5.4)
SODIUM BLD-SCNC: 142 MMOL/L (ref 136–145)
TEAR DROP CELLS: ABNORMAL
WBC # BLD: 15.9 K/UL (ref 4.8–10.8)

## 2019-07-12 PROCEDURE — 97530 THERAPEUTIC ACTIVITIES: CPT

## 2019-07-12 PROCEDURE — 6370000000 HC RX 637 (ALT 250 FOR IP): Performed by: FAMILY MEDICINE

## 2019-07-12 PROCEDURE — 2700000000 HC OXYGEN THERAPY PER DAY

## 2019-07-12 PROCEDURE — 94640 AIRWAY INHALATION TREATMENT: CPT

## 2019-07-12 PROCEDURE — 97165 OT EVAL LOW COMPLEX 30 MIN: CPT

## 2019-07-12 PROCEDURE — 97161 PT EVAL LOW COMPLEX 20 MIN: CPT

## 2019-07-12 PROCEDURE — 6360000002 HC RX W HCPCS: Performed by: INTERNAL MEDICINE

## 2019-07-12 PROCEDURE — 36415 COLL VENOUS BLD VENIPUNCTURE: CPT

## 2019-07-12 PROCEDURE — 2580000003 HC RX 258: Performed by: FAMILY MEDICINE

## 2019-07-12 PROCEDURE — 1210000000 HC MED SURG R&B

## 2019-07-12 PROCEDURE — 99233 SBSQ HOSP IP/OBS HIGH 50: CPT | Performed by: FAMILY MEDICINE

## 2019-07-12 PROCEDURE — 80048 BASIC METABOLIC PNL TOTAL CA: CPT

## 2019-07-12 PROCEDURE — 2580000003 HC RX 258: Performed by: INTERNAL MEDICINE

## 2019-07-12 PROCEDURE — 85025 COMPLETE CBC W/AUTO DIFF WBC: CPT

## 2019-07-12 RX ORDER — IPRATROPIUM BROMIDE AND ALBUTEROL SULFATE 2.5; .5 MG/3ML; MG/3ML
3 SOLUTION RESPIRATORY (INHALATION)
Status: DISCONTINUED | OUTPATIENT
Start: 2019-07-12 | End: 2019-07-16 | Stop reason: HOSPADM

## 2019-07-12 RX ORDER — PANTOPRAZOLE SODIUM 40 MG/1
40 TABLET, DELAYED RELEASE ORAL DAILY
Status: DISCONTINUED | OUTPATIENT
Start: 2019-07-12 | End: 2019-07-16 | Stop reason: HOSPADM

## 2019-07-12 RX ORDER — OMEGA-3 FATTY ACIDS/FISH OIL 435-880MG
520 CAPSULE ORAL NIGHTLY
Status: DISCONTINUED | OUTPATIENT
Start: 2019-07-12 | End: 2019-07-12

## 2019-07-12 RX ORDER — AMLODIPINE BESYLATE 5 MG/1
5 TABLET ORAL DAILY
Status: DISCONTINUED | OUTPATIENT
Start: 2019-07-12 | End: 2019-07-15

## 2019-07-12 RX ORDER — PREGABALIN 50 MG/1
100 CAPSULE ORAL DAILY
Status: DISCONTINUED | OUTPATIENT
Start: 2019-07-12 | End: 2019-07-16 | Stop reason: HOSPADM

## 2019-07-12 RX ORDER — CARVEDILOL 25 MG/1
25 TABLET ORAL 2 TIMES DAILY WITH MEALS
Status: DISCONTINUED | OUTPATIENT
Start: 2019-07-12 | End: 2019-07-16 | Stop reason: HOSPADM

## 2019-07-12 RX ORDER — AMLODIPINE AND OLMESARTAN MEDOXOMIL 5; 20 MG/1; MG/1
1 TABLET ORAL DAILY
Status: DISCONTINUED | OUTPATIENT
Start: 2019-07-12 | End: 2019-07-12 | Stop reason: CLARIF

## 2019-07-12 RX ORDER — LEVOTHYROXINE SODIUM 0.1 MG/1
100 TABLET ORAL DAILY
Status: DISCONTINUED | OUTPATIENT
Start: 2019-07-12 | End: 2019-07-16 | Stop reason: HOSPADM

## 2019-07-12 RX ORDER — CETIRIZINE HYDROCHLORIDE 10 MG/1
5 TABLET ORAL DAILY
Status: DISCONTINUED | OUTPATIENT
Start: 2019-07-12 | End: 2019-07-16 | Stop reason: HOSPADM

## 2019-07-12 RX ORDER — PREDNISONE 10 MG/1
10 TABLET ORAL DAILY
Status: DISCONTINUED | OUTPATIENT
Start: 2019-07-12 | End: 2019-07-12

## 2019-07-12 RX ORDER — DEXTROSE AND SODIUM CHLORIDE 5; .45 G/100ML; G/100ML
INJECTION, SOLUTION INTRAVENOUS CONTINUOUS
Status: DISCONTINUED | OUTPATIENT
Start: 2019-07-12 | End: 2019-07-13

## 2019-07-12 RX ORDER — POLYETHYLENE GLYCOL 3350 17 G/17G
17 POWDER, FOR SOLUTION ORAL DAILY
Status: DISCONTINUED | OUTPATIENT
Start: 2019-07-12 | End: 2019-07-12

## 2019-07-12 RX ORDER — POLYETHYLENE GLYCOL 3350 17 G/17G
17 POWDER, FOR SOLUTION ORAL DAILY
Status: DISCONTINUED | OUTPATIENT
Start: 2019-07-12 | End: 2019-07-16 | Stop reason: HOSPADM

## 2019-07-12 RX ORDER — PREDNISONE 10 MG/1
20 TABLET ORAL DAILY
Status: DISCONTINUED | OUTPATIENT
Start: 2019-07-12 | End: 2019-07-16 | Stop reason: HOSPADM

## 2019-07-12 RX ORDER — ALLOPURINOL 100 MG/1
100 TABLET ORAL DAILY
Status: DISCONTINUED | OUTPATIENT
Start: 2019-07-12 | End: 2019-07-16 | Stop reason: HOSPADM

## 2019-07-12 RX ORDER — SENNA PLUS 8.6 MG/1
2 TABLET ORAL 3 TIMES DAILY
Status: DISCONTINUED | OUTPATIENT
Start: 2019-07-12 | End: 2019-07-16 | Stop reason: HOSPADM

## 2019-07-12 RX ORDER — PHENYTOIN SODIUM 100 MG/1
100 CAPSULE, EXTENDED RELEASE ORAL EVERY 8 HOURS
Status: DISCONTINUED | OUTPATIENT
Start: 2019-07-12 | End: 2019-07-16 | Stop reason: HOSPADM

## 2019-07-12 RX ORDER — GUAIFENESIN 600 MG/1
1200 TABLET, EXTENDED RELEASE ORAL 2 TIMES DAILY
Status: DISCONTINUED | OUTPATIENT
Start: 2019-07-12 | End: 2019-07-16 | Stop reason: HOSPADM

## 2019-07-12 RX ORDER — SIMVASTATIN 20 MG
20 TABLET ORAL NIGHTLY
Status: DISCONTINUED | OUTPATIENT
Start: 2019-07-12 | End: 2019-07-16 | Stop reason: HOSPADM

## 2019-07-12 RX ORDER — MORPHINE SULFATE 2 MG/ML
2 INJECTION, SOLUTION INTRAMUSCULAR; INTRAVENOUS EVERY 4 HOURS PRN
Status: DISCONTINUED | OUTPATIENT
Start: 2019-07-12 | End: 2019-07-16 | Stop reason: HOSPADM

## 2019-07-12 RX ORDER — LOSARTAN POTASSIUM 50 MG/1
50 TABLET ORAL DAILY
Status: DISCONTINUED | OUTPATIENT
Start: 2019-07-12 | End: 2019-07-15

## 2019-07-12 RX ADMIN — LEVOTHYROXINE SODIUM 100 MCG: 0.1 TABLET ORAL at 11:57

## 2019-07-12 RX ADMIN — Medication 10 ML: at 11:54

## 2019-07-12 RX ADMIN — CARVEDILOL 25 MG: 25 TABLET, FILM COATED ORAL at 17:28

## 2019-07-12 RX ADMIN — POLYETHYLENE GLYCOL (3350) 17 G: 17 POWDER, FOR SOLUTION ORAL at 11:54

## 2019-07-12 RX ADMIN — PREGABALIN 100 MG: 50 CAPSULE ORAL at 11:55

## 2019-07-12 RX ADMIN — LOSARTAN POTASSIUM 50 MG: 50 TABLET ORAL at 11:55

## 2019-07-12 RX ADMIN — CARVEDILOL 25 MG: 25 TABLET, FILM COATED ORAL at 11:55

## 2019-07-12 RX ADMIN — DEXTROSE AND SODIUM CHLORIDE: 5; 450 INJECTION, SOLUTION INTRAVENOUS at 12:04

## 2019-07-12 RX ADMIN — PHENYTOIN SODIUM 100 MG: 100 CAPSULE ORAL at 20:20

## 2019-07-12 RX ADMIN — PHENYTOIN SODIUM 100 MG: 100 CAPSULE ORAL at 11:55

## 2019-07-12 RX ADMIN — CETIRIZINE HYDROCHLORIDE 5 MG: 10 TABLET, FILM COATED ORAL at 11:57

## 2019-07-12 RX ADMIN — Medication 17.2 MG: at 20:20

## 2019-07-12 RX ADMIN — SIMVASTATIN 20 MG: 20 TABLET, FILM COATED ORAL at 20:20

## 2019-07-12 RX ADMIN — PREDNISONE 20 MG: 10 TABLET ORAL at 11:54

## 2019-07-12 RX ADMIN — GUAIFENESIN 1200 MG: 600 TABLET, EXTENDED RELEASE ORAL at 20:20

## 2019-07-12 RX ADMIN — AMLODIPINE BESYLATE 5 MG: 5 TABLET ORAL at 11:55

## 2019-07-12 RX ADMIN — ALLOPURINOL 100 MG: 100 TABLET ORAL at 11:55

## 2019-07-12 RX ADMIN — IPRATROPIUM BROMIDE AND ALBUTEROL SULFATE 3 ML: .5; 3 SOLUTION RESPIRATORY (INHALATION) at 18:16

## 2019-07-12 RX ADMIN — IPRATROPIUM BROMIDE AND ALBUTEROL SULFATE 3 ML: .5; 3 SOLUTION RESPIRATORY (INHALATION) at 15:22

## 2019-07-12 RX ADMIN — DEXTROSE AND SODIUM CHLORIDE: 5; 450 INJECTION, SOLUTION INTRAVENOUS at 20:19

## 2019-07-12 RX ADMIN — GUAIFENESIN 1200 MG: 600 TABLET, EXTENDED RELEASE ORAL at 11:55

## 2019-07-12 RX ADMIN — VITAMIN D, TAB 1000IU (100/BT) 2000 UNITS: 25 TAB at 20:20

## 2019-07-12 RX ADMIN — HEPARIN SODIUM 5000 UNITS: 5000 INJECTION, SOLUTION INTRAVENOUS; SUBCUTANEOUS at 15:36

## 2019-07-12 RX ADMIN — Medication 17.2 MG: at 15:35

## 2019-07-12 RX ADMIN — HEPARIN SODIUM 5000 UNITS: 5000 INJECTION, SOLUTION INTRAVENOUS; SUBCUTANEOUS at 07:05

## 2019-07-12 RX ADMIN — PANTOPRAZOLE SODIUM 40 MG: 40 TABLET, DELAYED RELEASE ORAL at 11:55

## 2019-07-12 RX ADMIN — HEPARIN SODIUM 5000 UNITS: 5000 INJECTION, SOLUTION INTRAVENOUS; SUBCUTANEOUS at 20:20

## 2019-07-12 ASSESSMENT — PAIN SCALES - GENERAL
PAINLEVEL_OUTOF10: 0
PAINLEVEL_OUTOF10: 5

## 2019-07-12 ASSESSMENT — PAIN DESCRIPTION - LOCATION: LOCATION: ABDOMEN

## 2019-07-12 NOTE — CARE COORDINATION
[address] Transitions Interview     2019    Patient: Brandi Lawton Patient : 1945   MRN: [id number]  Reason for Admission: acute pancreatitis   RARS: Readmission Risk Score: 27         Spoke with: Brandi Lawton        Readmission Risk  Patient Active Problem List   Diagnosis    Hypertension    Hyperlipidemia    Hypothyroid    Multiple allergies    Chronic neck and back pain    Seasonal allergies    Dysarthria    MVP (mitral valve prolapse)    Tremor    Gait abnormality    Pulmonary infiltrate in left lung on chest x-ray    Spondylolisthesis at L4-L5 level    DDD (degenerative disc disease), lumbar    Chronic low back pain without sciatica    Palliative care patient    Healthcare-associated pneumonia    Acute metabolic encephalopathy    Acute kidney injury superimposed on chronic kidney disease ([facility] [address]    Hypoxia    Seizure (HCC)    Chronic kidney disease, stage 3 (HCC)    Metabolic acidosis    Dyspnea    Acute pancreatitis    Moderate protein-calorie malnutrition (HCC)    Macrocytic anemia       Inpatient Assessment  Care Transitions Summary    Care Transitions Inpatient Review  Medication Review  Are you able to afford your medications?:  Yes  How often do you have difficulty taking your medications?:  I always take them as prescribed.   Housing Review  Who do you live with?:  Alone  Are you an active caregiver in your home?:  No  Social Support  Do you have a ?:  No  Do you have a [address] [place] [address]?:  No  Durable Medical Equipment  Patient DME:  Straight cane, Lanelitae Tifton, Wheelchair  Patient Home Equipment:  Oxygen  Functional Review  Ability to seek help/take action for Emergent/Urgent situations i.e. fire, crime, inclement weather or health crisis.:  Needs Assistance  Ability handle personal hygiene needs (bathing/dressing/grooming):  Needs Assistance  Ability to manage medications:  Dependent  Ability to prepare food:  Dependent  Ability to maintain

## 2019-07-12 NOTE — PROGRESS NOTES
Nutrition Assessment    Type and Reason for Visit: Initial, Positive Nutrition Screen    Nutrition Recommendations: follow for advancing diet or alternate source of nutritioon    Nutrition Assessment: Positive nutrition screen for wounds. At present time pt is NPO. Malnutrition Assessment:  · Malnutrition Status: Meets the criteria for moderate malnutrition  · Context: Acute illness or injury  · Findings of the 6 clinical characteristics of malnutrition (Minimum of 2 out of 6 clinical characteristics is required to make the diagnosis of moderate or severe Protein Calorie Malnutrition based on AND/ASPEN Guidelines):  1. Energy Intake- , Unable to assess    2. Weight Loss-10% loss or greater, (5 months)  3. Fat Loss-Mild subcutaneous fat loss,    4. Muscle Loss-Mild muscle mass loss,    5. Fluid Accumulation-No significant fluid accumulation,    6.  Strength-Not measured    Nutrition Risk Level:  Moderate    Nutrient Needs:  · Estimated Daily Total Kcal: 7785-9512 kcals (20-25kcals/kg)  · Estimated Daily Protein (g): 73-88g  · Estimated Daily Total Fluid (ml/day): 1452-1815ml    Nutrition Diagnosis:   · Problem: Inadequate oral intake  · Etiology: related to Acute injury/trauma     Signs and symptoms:  as evidenced by NPO status due to medical condition    Objective Information:  · Nutrition-Focused Physical Findings: well nourished appearing female  · Wound Type: Stage II  · Current Nutrition Therapies:  · Oral Diet Orders: NPO   · Oral Diet intake: NPO  · Oral Nutrition Supplement (ONS) Orders: None  · ONS intake: NPO     · Anthropometric Measures:  · Ht: 5' 5\" (165.1 cm)   · Current Body Wt: 160 lb (72.6 kg)  · Admission Body Wt: 160 lb (72.6 kg)(stated)  · Usual Body Wt: 193 lb (87.5 kg)(2/2019)  · % Weight Change:  ,  15.8% weight decrease in 5 months  · Ideal Body Wt: 125 lb (56.7 kg), % Ideal Body 128%  · BMI Classification: BMI 25.0 - 29.9 Overweight    Nutrition Interventions:   Continue

## 2019-07-12 NOTE — PLAN OF CARE
Nutrition Problem: Inadequate oral intake  Intervention: Food and/or Nutrient Delivery: Continue NPO  Nutritional Goals: meet nutritional needs through po intake or alternate source of nutrition

## 2019-07-13 ENCOUNTER — APPOINTMENT (OUTPATIENT)
Dept: ULTRASOUND IMAGING | Age: 74
DRG: 438 | End: 2019-07-13
Payer: MEDICARE

## 2019-07-13 LAB
ANION GAP SERPL CALCULATED.3IONS-SCNC: 11 MMOL/L (ref 7–19)
BASE EXCESS ARTERIAL: -4.2 MMOL/L (ref -2–2)
BASOPHILS ABSOLUTE: 0 K/UL (ref 0–0.2)
BASOPHILS RELATIVE PERCENT: 0.2 % (ref 0–1)
BUN BLDV-MCNC: 69 MG/DL (ref 8–23)
CALCIUM SERPL-MCNC: 8 MG/DL (ref 8.8–10.2)
CARBOXYHEMOGLOBIN ARTERIAL: 2.5 % (ref 0–5)
CHLORIDE BLD-SCNC: 111 MMOL/L (ref 98–111)
CO2: 15 MMOL/L (ref 22–29)
CREAT SERPL-MCNC: 2.5 MG/DL (ref 0.5–0.9)
EOSINOPHILS ABSOLUTE: 0.1 K/UL (ref 0–0.6)
EOSINOPHILS RELATIVE PERCENT: 0.6 % (ref 0–5)
GFR NON-AFRICAN AMERICAN: 19
GLUCOSE BLD-MCNC: 196 MG/DL (ref 74–109)
HCO3 ARTERIAL: 19.5 MMOL/L (ref 22–26)
HCT VFR BLD CALC: 33.4 % (ref 37–47)
HEMOGLOBIN, ART, EXTENDED: 9.6 G/DL (ref 12–16)
HEMOGLOBIN: 9.7 G/DL (ref 12–16)
LYMPHOCYTES ABSOLUTE: 2.2 K/UL (ref 1.1–4.5)
LYMPHOCYTES RELATIVE PERCENT: 15 % (ref 20–40)
MCH RBC QN AUTO: 29.5 PG (ref 27–31)
MCHC RBC AUTO-ENTMCNC: 29 G/DL (ref 33–37)
MCV RBC AUTO: 101.5 FL (ref 81–99)
METHEMOGLOBIN ARTERIAL: 0.5 %
MONOCYTES ABSOLUTE: 1.5 K/UL (ref 0–0.9)
MONOCYTES RELATIVE PERCENT: 10.8 % (ref 0–10)
NEUTROPHILS ABSOLUTE: 10.4 K/UL (ref 1.5–7.5)
NEUTROPHILS RELATIVE PERCENT: 72.9 % (ref 50–65)
O2 CONTENT ARTERIAL: 13 ML/DL
O2 SAT, ARTERIAL: 95.9 %
O2 THERAPY: ABNORMAL
PARATHYROID HORMONE INTACT: 24.3 PG/ML (ref 15–65)
PCO2 ARTERIAL: 30 MMHG (ref 35–45)
PDW BLD-RTO: 14 % (ref 11.5–14.5)
PH ARTERIAL: 7.42 (ref 7.35–7.45)
PLATELET # BLD: 145 K/UL (ref 130–400)
PMV BLD AUTO: 11.8 FL (ref 9.4–12.3)
PO2 ARTERIAL: 69 MMHG (ref 80–100)
POTASSIUM SERPL-SCNC: 4.4 MMOL/L (ref 3.5–5)
POTASSIUM, WHOLE BLOOD: 4.1
RBC # BLD: 3.29 M/UL (ref 4.2–5.4)
REASON FOR REJECTION: NORMAL
REASON FOR REJECTION: NORMAL
REJECTED TEST: NORMAL
REJECTED TEST: NORMAL
SODIUM BLD-SCNC: 137 MMOL/L (ref 136–145)
WBC # BLD: 14.3 K/UL (ref 4.8–10.8)

## 2019-07-13 PROCEDURE — 2580000003 HC RX 258: Performed by: INTERNAL MEDICINE

## 2019-07-13 PROCEDURE — 85025 COMPLETE CBC W/AUTO DIFF WBC: CPT

## 2019-07-13 PROCEDURE — 6370000000 HC RX 637 (ALT 250 FOR IP): Performed by: FAMILY MEDICINE

## 2019-07-13 PROCEDURE — 2700000000 HC OXYGEN THERAPY PER DAY

## 2019-07-13 PROCEDURE — 1210000000 HC MED SURG R&B

## 2019-07-13 PROCEDURE — 2500000003 HC RX 250 WO HCPCS: Performed by: INTERNAL MEDICINE

## 2019-07-13 PROCEDURE — 84132 ASSAY OF SERUM POTASSIUM: CPT

## 2019-07-13 PROCEDURE — 99232 SBSQ HOSP IP/OBS MODERATE 35: CPT | Performed by: INTERNAL MEDICINE

## 2019-07-13 PROCEDURE — 83970 ASSAY OF PARATHORMONE: CPT

## 2019-07-13 PROCEDURE — 80048 BASIC METABOLIC PNL TOTAL CA: CPT

## 2019-07-13 PROCEDURE — 36415 COLL VENOUS BLD VENIPUNCTURE: CPT

## 2019-07-13 PROCEDURE — 94640 AIRWAY INHALATION TREATMENT: CPT

## 2019-07-13 PROCEDURE — 76770 US EXAM ABDO BACK WALL COMP: CPT

## 2019-07-13 PROCEDURE — 36600 WITHDRAWAL OF ARTERIAL BLOOD: CPT

## 2019-07-13 PROCEDURE — 82803 BLOOD GASES ANY COMBINATION: CPT

## 2019-07-13 PROCEDURE — 6360000002 HC RX W HCPCS: Performed by: INTERNAL MEDICINE

## 2019-07-13 PROCEDURE — 99232 SBSQ HOSP IP/OBS MODERATE 35: CPT | Performed by: FAMILY MEDICINE

## 2019-07-13 RX ADMIN — PREDNISONE 20 MG: 10 TABLET ORAL at 09:20

## 2019-07-13 RX ADMIN — PREGABALIN 100 MG: 50 CAPSULE ORAL at 09:19

## 2019-07-13 RX ADMIN — GUAIFENESIN 1200 MG: 600 TABLET, EXTENDED RELEASE ORAL at 09:20

## 2019-07-13 RX ADMIN — HEPARIN SODIUM 5000 UNITS: 5000 INJECTION, SOLUTION INTRAVENOUS; SUBCUTANEOUS at 14:43

## 2019-07-13 RX ADMIN — SODIUM BICARBONATE: 84 INJECTION INTRAVENOUS at 12:35

## 2019-07-13 RX ADMIN — PANTOPRAZOLE SODIUM 40 MG: 40 TABLET, DELAYED RELEASE ORAL at 09:20

## 2019-07-13 RX ADMIN — Medication 17.2 MG: at 14:43

## 2019-07-13 RX ADMIN — Medication 17.2 MG: at 09:20

## 2019-07-13 RX ADMIN — AMLODIPINE BESYLATE 5 MG: 5 TABLET ORAL at 09:20

## 2019-07-13 RX ADMIN — SODIUM BICARBONATE: 84 INJECTION INTRAVENOUS at 22:27

## 2019-07-13 RX ADMIN — SIMVASTATIN 20 MG: 20 TABLET, FILM COATED ORAL at 20:04

## 2019-07-13 RX ADMIN — PHENYTOIN SODIUM 100 MG: 100 CAPSULE ORAL at 20:04

## 2019-07-13 RX ADMIN — PHENYTOIN SODIUM 100 MG: 100 CAPSULE ORAL at 04:26

## 2019-07-13 RX ADMIN — VITAMIN D, TAB 1000IU (100/BT) 2000 UNITS: 25 TAB at 20:04

## 2019-07-13 RX ADMIN — Medication 17.2 MG: at 20:04

## 2019-07-13 RX ADMIN — GUAIFENESIN 1200 MG: 600 TABLET, EXTENDED RELEASE ORAL at 20:04

## 2019-07-13 RX ADMIN — LEVOTHYROXINE SODIUM 100 MCG: 0.1 TABLET ORAL at 06:21

## 2019-07-13 RX ADMIN — ALLOPURINOL 100 MG: 100 TABLET ORAL at 09:20

## 2019-07-13 RX ADMIN — Medication 10 ML: at 22:28

## 2019-07-13 RX ADMIN — LOSARTAN POTASSIUM 50 MG: 50 TABLET ORAL at 09:20

## 2019-07-13 RX ADMIN — CETIRIZINE HYDROCHLORIDE 5 MG: 10 TABLET, FILM COATED ORAL at 09:19

## 2019-07-13 RX ADMIN — IPRATROPIUM BROMIDE AND ALBUTEROL SULFATE 3 ML: .5; 3 SOLUTION RESPIRATORY (INHALATION) at 07:03

## 2019-07-13 RX ADMIN — Medication 10 ML: at 09:20

## 2019-07-13 RX ADMIN — PHENYTOIN SODIUM 100 MG: 100 CAPSULE ORAL at 12:35

## 2019-07-13 RX ADMIN — HEPARIN SODIUM 5000 UNITS: 5000 INJECTION, SOLUTION INTRAVENOUS; SUBCUTANEOUS at 20:04

## 2019-07-13 RX ADMIN — CARVEDILOL 25 MG: 25 TABLET, FILM COATED ORAL at 17:14

## 2019-07-13 RX ADMIN — POLYETHYLENE GLYCOL (3350) 17 G: 17 POWDER, FOR SOLUTION ORAL at 09:19

## 2019-07-13 RX ADMIN — IPRATROPIUM BROMIDE AND ALBUTEROL SULFATE 3 ML: .5; 3 SOLUTION RESPIRATORY (INHALATION) at 18:22

## 2019-07-13 RX ADMIN — CARVEDILOL 25 MG: 25 TABLET, FILM COATED ORAL at 09:20

## 2019-07-13 NOTE — PLAN OF CARE
Problem: Falls - Risk of:  Goal: Will remain free from falls  Description  Will remain free from falls  Outcome: Ongoing  Goal: Absence of physical injury  Description  Absence of physical injury  Outcome: Ongoing     Problem: Risk for Impaired Skin Integrity  Goal: Tissue integrity - skin and mucous membranes  Description  Structural intactness and normal physiological function of skin and  mucous membranes.   Outcome: Ongoing     Problem: Nutrition  Goal: Optimal nutrition therapy  7/12/2019 2003 by Fiorella Villalpando RN  Outcome: Ongoing  7/12/2019 1010 by Vertie Shone, MS, RD, LD  Outcome: Ongoing

## 2019-07-13 NOTE — CONSULTS
Inpatient consult to Nephrology  Consult performed by: Paul York MD  Consult ordered by: Reji Vasquez DO  Assessment/Recommendations: Nephrology [facility]) Consult Note      Patient:  Elza Hughes  YOB: 1945  Date of Service: 7/13/2019  MRN: [id number]   Acct: [de-identified]   Primary Care Physician: Elma Ingram MD  Advance Directive: Full Code  Admit Date: 7/11/2019       Hospital Day: 2  Referring Provider: Reji Vasquez DO    Patient independently seen and examined, Chart, Consults, Notes, Operative notes, Labs, Cardiology, and Radiology studies reviewed as able. Chief complaint: Abnormal labs. Subjective:  Elza Hughes is a 66 y.o. female  whom we were consulted for acute kidney injury/chronic kidney disease. She has stage III chronic kidney disease baseline. Patient was recently hospitalized with pneumonia and was sent to nursing home. She was sent back from nursing home to the hospital as she has decline in renal function and was complaining of abdominal pain. CT scan of the abdomen shows patient has acute pancreatitis. Her baseline serum creatinine is 1.0 mg, now admitted for the treatment of acute pancreatitis and acute kidney injury.     Allergies:  Penicillins and Sulfa antibiotics    Medicines:  Current Facility-Administered Medications:  dextrose 5 % and 0.45 % sodium chloride infusion, , Intravenous, Continuous, Reji Vasquez DO, Last Rate: 125 mL/hr at 07/12/19 2019  morphine injection 2 mg, 2 mg, Intravenous, Q4H PRN, Reji Vasquez DO  allopurinol (ZYLOPRIM) tablet 100 mg, 100 mg, Oral, Daily, [place] Blackwood, DO, 100 mg at 07/13/19 0920  carvedilol (COREG) tablet 25 mg, 25 mg, Oral, BID WC, Daniel Blackwood, DO, 25 mg at 07/13/19 0920  cetirizine (ZYRTEC) tablet 5 mg, 5 mg, Oral, Daily, Reji Box, DO, 5 mg at 07/13/19 0919  vitamin D (CHOLECALCIFEROL) tablet 2,000 Units, 2,000 Units, Oral, Nightly, Daniel Blackwood, DO, 2,000 Units at 07/12/19 2020  guaiFENesin ([place] [address]) extended tenderness otherwise soft and positive bowel sounds. Extremities: no cyanosis or edema  Skin: warm and dry without rash      Labs:  BMP:                 07/11/19 07/12/19 07/13/19                       1545          2122          0728          NA           142          142           --           K            5.0          4.4          4.1           CL           109          111           --           CO2          19*          18*           --           BUN          101*         82*           --           CREATININE   3.6*         2.7*          --           CALCIUM      8.7*         8.1*          --           CBC:                 07/11/19 07/12/19 07/13/19                       1423          0703          0259          WBC          16.3*        15.9*        14.3*         HGB          11.2*        11.5*        9.7*          HCT          39.7         40.8         33.4*         MCV          105.6*       106.0*       101.5*        PLT          171          165          145           LIVER PROFILE:                 07/11/19                       1545          AST          27            ALT          33            LIPASE       799*          BILITOT      0.3           ALKPHOS      143*          PT/INR: No results for input(s): PROTIME, INR in the last 72 hours. APTT: No results for input(s): APTT in the last 72 hours. BNP:  No results for input(s): BNP in the last 72 hours. Ionized Calcium:No results for input(s): IONCA in the last 72 hours. Magnesium:No results for input(s): MG in the last 72 hours. Phosphorus:No results for input(s): PHOS in the last 72 hours. HgbA1C: No results for input(s): LABA1C in the last 72 hours.   Hepatic:                 07/11/19                       1545          ALKPHOS      143*          ALT          33            AST          27            PROT         6.5*          BILITOT      0.3           LABALBU      2.6*          Lactic Acid: No results for input(s): LACTA in the the consult, we appreciate the opportunity to provide care to your patients. Feel free to contact me if I can be of any further assistance.       Alix Blakely MD  07/13/19  9:37 AM

## 2019-07-14 LAB
ALBUMIN SERPL-MCNC: 2.2 G/DL (ref 3.5–5.2)
ALP BLD-CCNC: 157 U/L (ref 35–104)
ALT SERPL-CCNC: 32 U/L (ref 5–33)
ANION GAP SERPL CALCULATED.3IONS-SCNC: 11 MMOL/L (ref 7–19)
ANION GAP SERPL CALCULATED.3IONS-SCNC: 12 MMOL/L (ref 7–19)
AST SERPL-CCNC: 36 U/L (ref 5–32)
BASOPHILS ABSOLUTE: 0 K/UL (ref 0–0.2)
BASOPHILS RELATIVE PERCENT: 0.3 % (ref 0–1)
BILIRUB SERPL-MCNC: <0.2 MG/DL (ref 0.2–1.2)
BUN BLDV-MCNC: 48 MG/DL (ref 8–23)
BUN BLDV-MCNC: 56 MG/DL (ref 8–23)
CALCIUM SERPL-MCNC: 7.9 MG/DL (ref 8.8–10.2)
CALCIUM SERPL-MCNC: 8 MG/DL (ref 8.8–10.2)
CHLORIDE BLD-SCNC: 108 MMOL/L (ref 98–111)
CHLORIDE BLD-SCNC: 110 MMOL/L (ref 98–111)
CO2: 23 MMOL/L (ref 22–29)
CO2: 25 MMOL/L (ref 22–29)
CREAT SERPL-MCNC: 2 MG/DL (ref 0.5–0.9)
CREAT SERPL-MCNC: 2.3 MG/DL (ref 0.5–0.9)
EKG P AXIS: 47 DEGREES
EKG P-R INTERVAL: 162 MS
EKG Q-T INTERVAL: 416 MS
EKG QRS DURATION: 102 MS
EKG QTC CALCULATION (BAZETT): 423 MS
EKG T AXIS: 38 DEGREES
EOSINOPHILS ABSOLUTE: 0.2 K/UL (ref 0–0.6)
EOSINOPHILS RELATIVE PERCENT: 1.3 % (ref 0–5)
GFR NON-AFRICAN AMERICAN: 21
GFR NON-AFRICAN AMERICAN: 24
GLUCOSE BLD-MCNC: 146 MG/DL (ref 74–109)
GLUCOSE BLD-MCNC: 155 MG/DL (ref 74–109)
HCT VFR BLD CALC: 31.1 % (ref 37–47)
HCT VFR BLD CALC: 32.9 % (ref 37–47)
HEMOGLOBIN: 9.5 G/DL (ref 12–16)
HEMOGLOBIN: 9.6 G/DL (ref 12–16)
LACTIC ACID: 1.2 MMOL/L (ref 0.5–1.9)
LIPASE: 195 U/L (ref 13–60)
LYMPHOCYTES ABSOLUTE: 1.7 K/UL (ref 1.1–4.5)
LYMPHOCYTES RELATIVE PERCENT: 14.5 % (ref 20–40)
MCH RBC QN AUTO: 29.6 PG (ref 27–31)
MCH RBC QN AUTO: 29.6 PG (ref 27–31)
MCHC RBC AUTO-ENTMCNC: 29.2 G/DL (ref 33–37)
MCHC RBC AUTO-ENTMCNC: 30.5 G/DL (ref 33–37)
MCV RBC AUTO: 101.5 FL (ref 81–99)
MCV RBC AUTO: 96.9 FL (ref 81–99)
MONOCYTES ABSOLUTE: 1.3 K/UL (ref 0–0.9)
MONOCYTES RELATIVE PERCENT: 11.3 % (ref 0–10)
NEUTROPHILS ABSOLUTE: 8.5 K/UL (ref 1.5–7.5)
NEUTROPHILS RELATIVE PERCENT: 72.2 % (ref 50–65)
PDW BLD-RTO: 13.7 % (ref 11.5–14.5)
PDW BLD-RTO: 13.7 % (ref 11.5–14.5)
PLATELET # BLD: 150 K/UL (ref 130–400)
PLATELET # BLD: 159 K/UL (ref 130–400)
PMV BLD AUTO: 11.9 FL (ref 9.4–12.3)
PMV BLD AUTO: 12.3 FL (ref 9.4–12.3)
POTASSIUM REFLEX MAGNESIUM: 4 MMOL/L (ref 3.5–5)
POTASSIUM SERPL-SCNC: 4 MMOL/L (ref 3.5–5)
POTASSIUM SERPL-SCNC: 4.3 MMOL/L (ref 3.5–5)
RBC # BLD: 3.21 M/UL (ref 4.2–5.4)
RBC # BLD: 3.24 M/UL (ref 4.2–5.4)
SODIUM BLD-SCNC: 144 MMOL/L (ref 136–145)
SODIUM BLD-SCNC: 145 MMOL/L (ref 136–145)
TOTAL PROTEIN: 5.6 G/DL (ref 6.6–8.7)
TROPONIN: <0.01 NG/ML (ref 0–0.03)
WBC # BLD: 11.7 K/UL (ref 4.8–10.8)
WBC # BLD: 9 K/UL (ref 4.8–10.8)

## 2019-07-14 PROCEDURE — 99232 SBSQ HOSP IP/OBS MODERATE 35: CPT | Performed by: INTERNAL MEDICINE

## 2019-07-14 PROCEDURE — 83605 ASSAY OF LACTIC ACID: CPT

## 2019-07-14 PROCEDURE — 83690 ASSAY OF LIPASE: CPT

## 2019-07-14 PROCEDURE — 85025 COMPLETE CBC W/AUTO DIFF WBC: CPT

## 2019-07-14 PROCEDURE — 1210000000 HC MED SURG R&B

## 2019-07-14 PROCEDURE — 6370000000 HC RX 637 (ALT 250 FOR IP): Performed by: FAMILY MEDICINE

## 2019-07-14 PROCEDURE — 2500000003 HC RX 250 WO HCPCS: Performed by: INTERNAL MEDICINE

## 2019-07-14 PROCEDURE — 6360000002 HC RX W HCPCS: Performed by: INTERNAL MEDICINE

## 2019-07-14 PROCEDURE — 80053 COMPREHEN METABOLIC PANEL: CPT

## 2019-07-14 PROCEDURE — 2580000003 HC RX 258: Performed by: INTERNAL MEDICINE

## 2019-07-14 PROCEDURE — 36415 COLL VENOUS BLD VENIPUNCTURE: CPT

## 2019-07-14 PROCEDURE — 85027 COMPLETE CBC AUTOMATED: CPT

## 2019-07-14 PROCEDURE — 99232 SBSQ HOSP IP/OBS MODERATE 35: CPT | Performed by: FAMILY MEDICINE

## 2019-07-14 PROCEDURE — 6360000002 HC RX W HCPCS: Performed by: FAMILY MEDICINE

## 2019-07-14 PROCEDURE — 94640 AIRWAY INHALATION TREATMENT: CPT

## 2019-07-14 PROCEDURE — 2700000000 HC OXYGEN THERAPY PER DAY

## 2019-07-14 PROCEDURE — 84484 ASSAY OF TROPONIN QUANT: CPT

## 2019-07-14 RX ADMIN — LEVOTHYROXINE SODIUM 100 MCG: 0.1 TABLET ORAL at 06:34

## 2019-07-14 RX ADMIN — GUAIFENESIN 1200 MG: 600 TABLET, EXTENDED RELEASE ORAL at 08:26

## 2019-07-14 RX ADMIN — GUAIFENESIN 1200 MG: 600 TABLET, EXTENDED RELEASE ORAL at 20:01

## 2019-07-14 RX ADMIN — IPRATROPIUM BROMIDE AND ALBUTEROL SULFATE 3 ML: .5; 3 SOLUTION RESPIRATORY (INHALATION) at 06:58

## 2019-07-14 RX ADMIN — IPRATROPIUM BROMIDE AND ALBUTEROL SULFATE 3 ML: .5; 3 SOLUTION RESPIRATORY (INHALATION) at 10:53

## 2019-07-14 RX ADMIN — VITAMIN D, TAB 1000IU (100/BT) 2000 UNITS: 25 TAB at 20:01

## 2019-07-14 RX ADMIN — ALLOPURINOL 100 MG: 100 TABLET ORAL at 08:27

## 2019-07-14 RX ADMIN — Medication 10 ML: at 20:01

## 2019-07-14 RX ADMIN — Medication 17.2 MG: at 20:01

## 2019-07-14 RX ADMIN — LOSARTAN POTASSIUM 50 MG: 50 TABLET ORAL at 08:27

## 2019-07-14 RX ADMIN — HEPARIN SODIUM 5000 UNITS: 5000 INJECTION, SOLUTION INTRAVENOUS; SUBCUTANEOUS at 13:55

## 2019-07-14 RX ADMIN — Medication 17.2 MG: at 08:27

## 2019-07-14 RX ADMIN — SIMVASTATIN 20 MG: 20 TABLET, FILM COATED ORAL at 20:01

## 2019-07-14 RX ADMIN — SODIUM BICARBONATE: 84 INJECTION INTRAVENOUS at 18:19

## 2019-07-14 RX ADMIN — HEPARIN SODIUM 5000 UNITS: 5000 INJECTION, SOLUTION INTRAVENOUS; SUBCUTANEOUS at 20:01

## 2019-07-14 RX ADMIN — PHENYTOIN SODIUM 100 MG: 100 CAPSULE ORAL at 20:01

## 2019-07-14 RX ADMIN — IPRATROPIUM BROMIDE AND ALBUTEROL SULFATE 3 ML: .5; 3 SOLUTION RESPIRATORY (INHALATION) at 18:51

## 2019-07-14 RX ADMIN — Medication 10 ML: at 08:26

## 2019-07-14 RX ADMIN — AMLODIPINE BESYLATE 5 MG: 5 TABLET ORAL at 08:27

## 2019-07-14 RX ADMIN — ONDANSETRON 4 MG: 2 INJECTION INTRAMUSCULAR; INTRAVENOUS at 17:54

## 2019-07-14 RX ADMIN — PHENYTOIN SODIUM 100 MG: 100 CAPSULE ORAL at 12:09

## 2019-07-14 RX ADMIN — CARVEDILOL 25 MG: 25 TABLET, FILM COATED ORAL at 08:27

## 2019-07-14 RX ADMIN — PREGABALIN 100 MG: 50 CAPSULE ORAL at 08:26

## 2019-07-14 RX ADMIN — CARVEDILOL 25 MG: 25 TABLET, FILM COATED ORAL at 16:14

## 2019-07-14 RX ADMIN — CETIRIZINE HYDROCHLORIDE 5 MG: 10 TABLET, FILM COATED ORAL at 08:27

## 2019-07-14 RX ADMIN — PHENYTOIN SODIUM 100 MG: 100 CAPSULE ORAL at 03:46

## 2019-07-14 RX ADMIN — PANTOPRAZOLE SODIUM 40 MG: 40 TABLET, DELAYED RELEASE ORAL at 08:27

## 2019-07-14 RX ADMIN — SODIUM BICARBONATE: 84 INJECTION INTRAVENOUS at 08:19

## 2019-07-14 RX ADMIN — MORPHINE SULFATE 2 MG: 2 INJECTION, SOLUTION INTRAMUSCULAR; INTRAVENOUS at 03:46

## 2019-07-14 RX ADMIN — Medication 17.2 MG: at 13:55

## 2019-07-14 RX ADMIN — PREDNISONE 20 MG: 10 TABLET ORAL at 08:27

## 2019-07-14 RX ADMIN — HEPARIN SODIUM 5000 UNITS: 5000 INJECTION, SOLUTION INTRAVENOUS; SUBCUTANEOUS at 06:34

## 2019-07-14 RX ADMIN — POLYETHYLENE GLYCOL (3350) 17 G: 17 POWDER, FOR SOLUTION ORAL at 08:27

## 2019-07-14 ASSESSMENT — PAIN DESCRIPTION - PAIN TYPE
TYPE: ACUTE PAIN
TYPE: ACUTE PAIN

## 2019-07-14 ASSESSMENT — PAIN SCALES - GENERAL
PAINLEVEL_OUTOF10: 8
PAINLEVEL_OUTOF10: 3
PAINLEVEL_OUTOF10: 10
PAINLEVEL_OUTOF10: 0

## 2019-07-14 ASSESSMENT — PAIN DESCRIPTION - ORIENTATION
ORIENTATION: MID;UPPER
ORIENTATION: MID;UPPER

## 2019-07-14 ASSESSMENT — PAIN DESCRIPTION - LOCATION
LOCATION: ABDOMEN
LOCATION: ABDOMEN

## 2019-07-14 NOTE — PROGRESS NOTES
Nothing tastes right trying to take Cream of Wheat  And Yogurt later. Still reports nausea, but no abdominal pain or vomiting now. Suspect some depression. Afebrile, normal bs, no distention,  epigastric tenderness resolved. Acute pancreatitis clinically and biochemically better with lipase 799 down to 195. No bump in serial bilirubin levels. Leukocytosis improved. Anemia attributed to renal disease. Renal function improving. Continue Protonix for acid related disease. Offer EGD if bpatient fails to feed.

## 2019-07-15 ENCOUNTER — APPOINTMENT (OUTPATIENT)
Dept: GENERAL RADIOLOGY | Age: 74
DRG: 438 | End: 2019-07-15
Payer: MEDICARE

## 2019-07-15 LAB
BASOPHILS ABSOLUTE: 0 K/UL (ref 0–0.2)
BASOPHILS RELATIVE PERCENT: 0.3 % (ref 0–1)
CREATININE URINE: 66.3 MG/DL (ref 4.2–622)
EOSINOPHILS ABSOLUTE: 0.2 K/UL (ref 0–0.6)
EOSINOPHILS RELATIVE PERCENT: 2.4 % (ref 0–5)
HCT VFR BLD CALC: 32.4 % (ref 37–47)
HEMOGLOBIN: 10 G/DL (ref 12–16)
LIPASE: 107 U/L (ref 13–60)
LYMPHOCYTES ABSOLUTE: 1.5 K/UL (ref 1.1–4.5)
LYMPHOCYTES RELATIVE PERCENT: 15.1 % (ref 20–40)
MCH RBC QN AUTO: 29.5 PG (ref 27–31)
MCHC RBC AUTO-ENTMCNC: 30.9 G/DL (ref 33–37)
MCV RBC AUTO: 95.6 FL (ref 81–99)
MICROALBUMIN UR-MCNC: 2.2 MG/DL (ref 0–19)
MICROALBUMIN/CREAT UR-RTO: 33.2 MG/G
MONOCYTES ABSOLUTE: 1.2 K/UL (ref 0–0.9)
MONOCYTES RELATIVE PERCENT: 11.8 % (ref 0–10)
NEUTROPHILS ABSOLUTE: 7.1 K/UL (ref 1.5–7.5)
NEUTROPHILS RELATIVE PERCENT: 69.8 % (ref 50–65)
PDW BLD-RTO: 13.6 % (ref 11.5–14.5)
PLATELET # BLD: 164 K/UL (ref 130–400)
PMV BLD AUTO: 12 FL (ref 9.4–12.3)
POTASSIUM REFLEX MAGNESIUM: 3.7 MMOL/L (ref 3.5–5)
RBC # BLD: 3.39 M/UL (ref 4.2–5.4)
SODIUM URINE: 91 MMOL/L
WBC # BLD: 10.2 K/UL (ref 4.8–10.8)

## 2019-07-15 PROCEDURE — 1210000000 HC MED SURG R&B

## 2019-07-15 PROCEDURE — 82570 ASSAY OF URINE CREATININE: CPT

## 2019-07-15 PROCEDURE — 87086 URINE CULTURE/COLONY COUNT: CPT

## 2019-07-15 PROCEDURE — 85025 COMPLETE CBC W/AUTO DIFF WBC: CPT

## 2019-07-15 PROCEDURE — 97110 THERAPEUTIC EXERCISES: CPT

## 2019-07-15 PROCEDURE — 97530 THERAPEUTIC ACTIVITIES: CPT

## 2019-07-15 PROCEDURE — 2700000000 HC OXYGEN THERAPY PER DAY

## 2019-07-15 PROCEDURE — 87040 BLOOD CULTURE FOR BACTERIA: CPT

## 2019-07-15 PROCEDURE — 82043 UR ALBUMIN QUANTITATIVE: CPT

## 2019-07-15 PROCEDURE — 6360000002 HC RX W HCPCS: Performed by: INTERNAL MEDICINE

## 2019-07-15 PROCEDURE — 6370000000 HC RX 637 (ALT 250 FOR IP): Performed by: FAMILY MEDICINE

## 2019-07-15 PROCEDURE — 80053 COMPREHEN METABOLIC PANEL: CPT

## 2019-07-15 PROCEDURE — 83690 ASSAY OF LIPASE: CPT

## 2019-07-15 PROCEDURE — 94640 AIRWAY INHALATION TREATMENT: CPT

## 2019-07-15 PROCEDURE — 2500000003 HC RX 250 WO HCPCS: Performed by: INTERNAL MEDICINE

## 2019-07-15 PROCEDURE — 74018 RADEX ABDOMEN 1 VIEW: CPT

## 2019-07-15 PROCEDURE — 99232 SBSQ HOSP IP/OBS MODERATE 35: CPT | Performed by: INTERNAL MEDICINE

## 2019-07-15 PROCEDURE — 2580000003 HC RX 258: Performed by: INTERNAL MEDICINE

## 2019-07-15 PROCEDURE — 51701 INSERT BLADDER CATHETER: CPT

## 2019-07-15 PROCEDURE — 99232 SBSQ HOSP IP/OBS MODERATE 35: CPT | Performed by: HOSPITALIST

## 2019-07-15 PROCEDURE — 36415 COLL VENOUS BLD VENIPUNCTURE: CPT

## 2019-07-15 PROCEDURE — 6370000000 HC RX 637 (ALT 250 FOR IP): Performed by: HOSPITALIST

## 2019-07-15 PROCEDURE — 84300 ASSAY OF URINE SODIUM: CPT

## 2019-07-15 RX ORDER — BISACODYL 10 MG
10 SUPPOSITORY, RECTAL RECTAL DAILY PRN
Status: COMPLETED | OUTPATIENT
Start: 2019-07-15 | End: 2019-07-15

## 2019-07-15 RX ORDER — SODIUM CHLORIDE, SODIUM LACTATE, POTASSIUM CHLORIDE, CALCIUM CHLORIDE 600; 310; 30; 20 MG/100ML; MG/100ML; MG/100ML; MG/100ML
INJECTION, SOLUTION INTRAVENOUS CONTINUOUS
Status: DISCONTINUED | OUTPATIENT
Start: 2019-07-15 | End: 2019-07-16 | Stop reason: HOSPADM

## 2019-07-15 RX ADMIN — Medication 10 ML: at 08:20

## 2019-07-15 RX ADMIN — GUAIFENESIN 1200 MG: 600 TABLET, EXTENDED RELEASE ORAL at 08:20

## 2019-07-15 RX ADMIN — SIMVASTATIN 20 MG: 20 TABLET, FILM COATED ORAL at 20:50

## 2019-07-15 RX ADMIN — CARVEDILOL 25 MG: 25 TABLET, FILM COATED ORAL at 08:20

## 2019-07-15 RX ADMIN — SODIUM CHLORIDE, POTASSIUM CHLORIDE, SODIUM LACTATE AND CALCIUM CHLORIDE: 600; 310; 30; 20 INJECTION, SOLUTION INTRAVENOUS at 12:38

## 2019-07-15 RX ADMIN — HEPARIN SODIUM 5000 UNITS: 5000 INJECTION, SOLUTION INTRAVENOUS; SUBCUTANEOUS at 22:42

## 2019-07-15 RX ADMIN — IPRATROPIUM BROMIDE AND ALBUTEROL SULFATE 3 ML: .5; 3 SOLUTION RESPIRATORY (INHALATION) at 11:00

## 2019-07-15 RX ADMIN — PHENYTOIN SODIUM 100 MG: 100 CAPSULE ORAL at 20:50

## 2019-07-15 RX ADMIN — PHENYTOIN SODIUM 100 MG: 100 CAPSULE ORAL at 11:41

## 2019-07-15 RX ADMIN — Medication 17.2 MG: at 08:20

## 2019-07-15 RX ADMIN — LEVOTHYROXINE SODIUM 100 MCG: 0.1 TABLET ORAL at 06:23

## 2019-07-15 RX ADMIN — IPRATROPIUM BROMIDE AND ALBUTEROL SULFATE 3 ML: .5; 3 SOLUTION RESPIRATORY (INHALATION) at 15:22

## 2019-07-15 RX ADMIN — Medication 17.2 MG: at 20:50

## 2019-07-15 RX ADMIN — GUAIFENESIN 1200 MG: 600 TABLET, EXTENDED RELEASE ORAL at 20:49

## 2019-07-15 RX ADMIN — POLYETHYLENE GLYCOL (3350) 17 G: 17 POWDER, FOR SOLUTION ORAL at 08:20

## 2019-07-15 RX ADMIN — Medication 17.2 MG: at 14:22

## 2019-07-15 RX ADMIN — IPRATROPIUM BROMIDE AND ALBUTEROL SULFATE 3 ML: .5; 3 SOLUTION RESPIRATORY (INHALATION) at 07:22

## 2019-07-15 RX ADMIN — HEPARIN SODIUM 5000 UNITS: 5000 INJECTION, SOLUTION INTRAVENOUS; SUBCUTANEOUS at 06:22

## 2019-07-15 RX ADMIN — VITAMIN D, TAB 1000IU (100/BT) 2000 UNITS: 25 TAB at 20:50

## 2019-07-15 RX ADMIN — PREDNISONE 20 MG: 10 TABLET ORAL at 08:20

## 2019-07-15 RX ADMIN — PHENYTOIN SODIUM 100 MG: 100 CAPSULE ORAL at 04:12

## 2019-07-15 RX ADMIN — CETIRIZINE HYDROCHLORIDE 5 MG: 10 TABLET, FILM COATED ORAL at 08:20

## 2019-07-15 RX ADMIN — PREGABALIN 100 MG: 50 CAPSULE ORAL at 08:20

## 2019-07-15 RX ADMIN — PANTOPRAZOLE SODIUM 40 MG: 40 TABLET, DELAYED RELEASE ORAL at 08:20

## 2019-07-15 RX ADMIN — SODIUM BICARBONATE: 84 INJECTION INTRAVENOUS at 08:18

## 2019-07-15 RX ADMIN — CARVEDILOL 25 MG: 25 TABLET, FILM COATED ORAL at 17:31

## 2019-07-15 RX ADMIN — ALLOPURINOL 100 MG: 100 TABLET ORAL at 08:20

## 2019-07-15 RX ADMIN — BISACODYL 10 MG: 10 SUPPOSITORY RECTAL at 11:41

## 2019-07-15 RX ADMIN — HEPARIN SODIUM 5000 UNITS: 5000 INJECTION, SOLUTION INTRAVENOUS; SUBCUTANEOUS at 14:22

## 2019-07-15 ASSESSMENT — PAIN SCALES - GENERAL: PAINLEVEL_OUTOF10: 0

## 2019-07-15 NOTE — PROGRESS NOTES
Nutrition Assessment    Type and Reason for Visit: Reassess    Nutrition Recommendations: continue to follow for advancing diet and decreasing nausea    Nutrition Assessment: Diet has been advanced to Full liquid. Still c/o nausea and \"nothing tastes right. Even water tastes awful. \"  Pt remains nutritionally compromised    Malnutrition Assessment:  · Malnutrition Status: Meets the criteria for moderate malnutrition  · Context: Acute illness or injury  · Findings of the 6 clinical characteristics of malnutrition (Minimum of 2 out of 6 clinical characteristics is required to make the diagnosis of moderate or severe Protein Calorie Malnutrition based on AND/ASPEN Guidelines):  1. Energy Intake- , Unable to assess    2. Weight Loss-10% loss or greater, (5 months)  3. Fat Loss-Mild subcutaneous fat loss,    4. Muscle Loss-Mild muscle mass loss,    5. Fluid Accumulation-No significant fluid accumulation,    6.  Strength-Not measured    Nutrition Risk Level:  Moderate    Nutrient Needs:  · Estimated Daily Total Kcal: 5756-5287 kcals (20-25kcals/kg)  · Estimated Daily Protein (g): 73-88g  · Estimated Daily Total Fluid (ml/day): 1452-1815ml    Nutrition Diagnosis:   · Problem: Inadequate oral intake  · Etiology: related to Acute injury/trauma     Signs and symptoms:  as evidenced by Intake 0-25%, Nausea    Objective Information:  · Nutrition-Focused Physical Findings: well nourished appearing female  · Wound Type: Stage II  · Current Nutrition Therapies:  · Oral Diet Orders: Full Liquid   · Oral Diet intake: 0%, 1-25%  · Oral Nutrition Supplement (ONS) Orders: Standard High Calorie Oral Supplement, Renal Oral Supplement  · ONS intake: Unable to assess(did not try due to nausea)     · Anthropometric Measures:  · Ht: 5' 5\" (165.1 cm)   · Current Body Wt: 160 lb (72.6 kg)  · Admission Body Wt: 160 lb (72.6 kg)(stated)  · Usual Body Wt: 193 lb (87.5 kg)(2/2019)  · % Weight Change:  ,  15.8% weight decrease in 5 months  · Ideal Body Wt: 125 lb (56.7 kg), % Ideal Body 128%  · BMI Classification: BMI 25.0 - 29.9 Overweight    Nutrition Interventions:   Continue current diet, Continue current ONS  Continued Inpatient Monitoring    Nutrition Evaluation:   · Evaluation: No progress toward goals   · Goals: meet nutritional needs through po intake or alternate source of nutrition    · Monitoring: Nutrition Progression, Meal Intake, Supplement Intake, Diet Tolerance, Skin Integrity, Wound Healing, Weight, Pertinent Labs, Nausea or Vomiting      Electronically signed by Winter Phelps, MS, RD, LD on 7/15/19 at 9:34 AM    Contact Number: [phone]

## 2019-07-15 NOTE — PLAN OF CARE
Problem: Falls - Risk of:  Goal: Will remain free from falls  Description  Will remain free from falls  Outcome: Ongoing  Goal: Absence of physical injury  Description  Absence of physical injury  Outcome: Ongoing     Problem: Risk for Impaired Skin Integrity  Goal: Tissue integrity - skin and mucous membranes  Description  Structural intactness and normal physiological function of skin and  mucous membranes.   Outcome: Ongoing     Problem: Nutrition  Goal: Optimal nutrition therapy  Outcome: Ongoing     Problem: Pain:  Goal: Control of acute pain  Description  Control of acute pain  Outcome: Ongoing     Problem: Nausea/Vomiting:  Goal: Absence of nausea/vomiting  Description  Absence of nausea/vomiting  Outcome: Ongoing  Goal: Able to eat  Description  Able to eat  Outcome: Ongoing

## 2019-07-15 NOTE — PROGRESS NOTES
Nephrology ([facility]) Progress Note    Patient:  Nancy Catalan  YOB: 1945  Date of Service: 7/15/2019  MRN: [id number]   Acct: [de-identified]   Primary Care Physician: Augustine Angulo MD  Advance Directive: Full Code  Admit Date: 7/11/2019       Hospital Day: 4  Referring Provider: Riskalyze, *    Patient independently seen and examined, Chart, Consults, Notes, Operative notes, Labs, Cardiology, and Radiology studies reviewed as able. Subjective:  Nancy Catalan is a 66 y.o. female  whom we were consulted for acute kidney injury/chronic kidney disease. She has stage III chronic kidney disease at baseline. She was recently admitted with pneumonia and acute kidney injury. At discharge, her creatinine was 1. Presented back with abdominal pain and was diagnosed with acute pancreatitis/acute kidney injury. Patient was given IV fluid and had slow improvement of renal function.     Today, no overnight events. Family present. She notes continued nausea and poor appetite. Denies abdominal pain.     Allergies:  Penicillins and Sulfa antibiotics    Medicines:  Current Facility-Administered Medications   Medication Dose Route Frequency Provider Last Rate Last Dose    sodium bicarbonate 75 mEq in dextrose 5 % and 0.45 % NaCl 1,000 mL infusion   Intravenous Continuous Aniket Cherry  mL/hr at 07/15/19 0818      morphine injection 2 mg  2 mg Intravenous Q4H PRN Daniel Blackwood, DO   2 mg at 07/14/19 0346    allopurinol (ZYLOPRIM) tablet 100 mg  100 mg Oral Daily Daniel Blackwood, DO   100 mg at 07/15/19 0820    carvedilol (COREG) tablet 25 mg  25 mg Oral BID WC Daniel Blackwood, DO   25 mg at 07/15/19 0820    cetirizine (ZYRTEC) tablet 5 mg  5 mg Oral Daily Daniel Blackwood, DO   5 mg at 07/15/19 0820    vitamin D (CHOLECALCIFEROL) tablet 2,000 Units  2,000 Units Oral Nightly Daniel Blackwood, DO   2,000 Units at 07/14/19 2001    guaiFENesin [facility]) extended release tablet 1,200 mg  1,200 mg Oral BID [employer] Blackwood, DO   1,200 mg at 07/15/19 0820    ipratropium-albuterol (DUONEB) nebulizer solution 3 mL  3 mL Inhalation Q4H WA [place] Blackwood, DO   3 mL at 07/15/19 1100    levothyroxine (SYNTHROID) tablet 100 mcg  100 mcg Oral Daily [place] Blackwood, DO   100 mcg at 07/15/19 3027    pantoprazole (PROTONIX) tablet 40 mg  40 mg Oral Daily [place] Blackwood, DO   40 mg at 07/15/19 0820    phenytoin (DILANTIN) ER capsule 100 mg  100 mg Oral Q8H [place] Blackwood, DO   100 mg at 07/15/19 1141    pregabalin (LYRICA) capsule 100 mg  100 mg Oral Daily [place] Blackwood, DO   100 mg at 07/15/19 0820    senna (SENOKOT) tablet 17.2 mg  2 tablet Oral TID Lazara Bobbi, DO   17.2 mg at 07/15/19 0820    simvastatin (ZOCOR) tablet 20 mg  20 mg Oral Nightly [place] Blackwood, DO   20 mg at 07/14/19 2001    predniSONE (DELTASONE) tablet 20 mg  20 mg Oral Daily Orlando Blackwood, DO   20 mg at 07/15/19 0820    [Held by provider] amLODIPine (NORVASC) tablet 5 mg  5 mg Oral Daily Orlando Blackwood, DO   5 mg at 07/14/19 0827    And    [Held by provider] losartan (COZAAR) tablet 50 mg  50 mg Oral Daily Orlando Blackwood, DO   50 mg at 07/14/19 0827    polyethylene glycol (GLYCOLAX) packet 17 g  17 g Oral Daily Orlando Blackwood, DO   17 g at 07/15/19 0820    sodium chloride flush 0.9 % injection 10 mL  10 mL Intravenous 2 times per day Josephine Olivo MD   10 mL at 07/15/19 0820    sodium chloride flush 0.9 % injection 10 mL  10 mL Intravenous PRN Josephine Olivo MD        magnesium hydroxide (MILK OF MAGNESIA) 400 MG/5ML suspension 30 mL  30 mL Oral Daily PRN Josephine Olivo MD        ondansetron [facility]) injection 4 mg  4 mg Intravenous Q6H PRN Josephine Olivo MD   4 mg at 07/14/19 1274    acetaminophen (TYLENOL) tablet 650 mg  650 mg Oral Q6H PRN Josephine Olivo MD        heparin (porcine) injection 5,000 Units  5,000 Units Subcutaneous 3 times per day Josephine Olivo MD   5,000 Units at 07/15/19 1520       Past Medical History:  Past Medical History:   Diagnosis Date lateral lower lobe opacities. There are residual interstitial densities of the left upper and lower lung. Mild linear atelectasis or scarring considered within the right upper lobe. There are granulomatous calcifications. Cardiac mediastinal contours are unremarkable. There is no pleural effusion or pneumothorax. Postoperative changes cervical spine with left shoulder prosthesis. 1. Improving left lung opacities since the CT chest of 7/2/2019. Residual interstitial densities of the left lung due to persist with linear atelectasis or scarring of the right upper lobe. Continued follow-up recommended. . Signed by Dr Bria Fountain on 7/11/2019 12:11 PM    Xr Chest Portable    Result Date: 6/23/2019  XR CHEST PORTABLE 6/23/2019 4:40 PM History: Pneumonia. Worsening shortness of breath. Portable chest x-ray compared with 6/20/2019. Patchy infiltrate within the left lung is increased now within the left upper lobe and left lower lobe. There is also now some mild patchy infiltrate within the base of the right upper lobe. Underlying chronic interstitial disease. No pneumothorax. Heart size is within normal limits. 1. Chronic lung changes with patchy bilateral infiltrate representing increased pneumonia. Signed by Dr Isaias Barba on 6/23/2019 4:42 PM    Cta Pulmonary W Contrast    Result Date: 6/15/2019  CTA PULMONARY W CONTRAST 6/15/2019 1:15 PM HISTORY: Shortness of air and hypoxia COMPARISON: 2/6/2009. DLP: 667 mGy cm. In order to have a CT radiation dose as low as reasonably achievable, Automated Exposure Control was utilized for adjustment of the mA and/or KV according to patient size. TECHNIQUE: Helical tomographic images of the chest were obtained after the administration of intravenous contrast following angiogram protocol. Additionally, 3D MIP reconstructions in the coronal and sagittal planes were provided.    FINDINGS:  Pulmonary arteries: Evaluation of the lower lobar pulmonary arteries is limited due

## 2019-07-16 VITALS
SYSTOLIC BLOOD PRESSURE: 145 MMHG | WEIGHT: 160 LBS | HEIGHT: 65 IN | OXYGEN SATURATION: 94 % | RESPIRATION RATE: 16 BRPM | TEMPERATURE: 97.5 F | HEART RATE: 69 BPM | DIASTOLIC BLOOD PRESSURE: 72 MMHG | BODY MASS INDEX: 26.66 KG/M2

## 2019-07-16 PROCEDURE — 94760 N-INVAS EAR/PLS OXIMETRY 1: CPT

## 2019-07-16 PROCEDURE — 2580000003 HC RX 258: Performed by: INTERNAL MEDICINE

## 2019-07-16 PROCEDURE — 2700000000 HC OXYGEN THERAPY PER DAY

## 2019-07-16 PROCEDURE — 6360000002 HC RX W HCPCS: Performed by: INTERNAL MEDICINE

## 2019-07-16 PROCEDURE — 6370000000 HC RX 637 (ALT 250 FOR IP): Performed by: FAMILY MEDICINE

## 2019-07-16 PROCEDURE — 99239 HOSP IP/OBS DSCHRG MGMT >30: CPT | Performed by: HOSPITALIST

## 2019-07-16 PROCEDURE — 94640 AIRWAY INHALATION TREATMENT: CPT

## 2019-07-16 RX ORDER — AMLODIPINE BESYLATE 5 MG/1
5 TABLET ORAL DAILY
Qty: 30 TABLET | Refills: 0
Start: 2019-07-16 | End: 2019-08-23 | Stop reason: SDUPTHER

## 2019-07-16 RX ADMIN — IPRATROPIUM BROMIDE AND ALBUTEROL SULFATE 3 ML: .5; 3 SOLUTION RESPIRATORY (INHALATION) at 10:15

## 2019-07-16 RX ADMIN — ALLOPURINOL 100 MG: 100 TABLET ORAL at 08:27

## 2019-07-16 RX ADMIN — IPRATROPIUM BROMIDE AND ALBUTEROL SULFATE 3 ML: .5; 3 SOLUTION RESPIRATORY (INHALATION) at 14:05

## 2019-07-16 RX ADMIN — Medication 10 ML: at 08:27

## 2019-07-16 RX ADMIN — LEVOTHYROXINE SODIUM 100 MCG: 0.1 TABLET ORAL at 05:49

## 2019-07-16 RX ADMIN — PHENYTOIN SODIUM 100 MG: 100 CAPSULE ORAL at 13:06

## 2019-07-16 RX ADMIN — CARVEDILOL 25 MG: 25 TABLET, FILM COATED ORAL at 08:26

## 2019-07-16 RX ADMIN — GUAIFENESIN 1200 MG: 600 TABLET, EXTENDED RELEASE ORAL at 08:26

## 2019-07-16 RX ADMIN — HEPARIN SODIUM 5000 UNITS: 5000 INJECTION, SOLUTION INTRAVENOUS; SUBCUTANEOUS at 05:49

## 2019-07-16 RX ADMIN — PREGABALIN 100 MG: 50 CAPSULE ORAL at 08:27

## 2019-07-16 RX ADMIN — PREDNISONE 20 MG: 10 TABLET ORAL at 08:27

## 2019-07-16 RX ADMIN — PANTOPRAZOLE SODIUM 40 MG: 40 TABLET, DELAYED RELEASE ORAL at 08:27

## 2019-07-16 RX ADMIN — CETIRIZINE HYDROCHLORIDE 5 MG: 10 TABLET, FILM COATED ORAL at 08:27

## 2019-07-16 RX ADMIN — PHENYTOIN SODIUM 100 MG: 100 CAPSULE ORAL at 04:04

## 2019-07-16 NOTE — DISCHARGE SUMMARY
Cholecalciferol (VITAMIN D3) 2000 units CAPS  Take 1 capsule by mouth nightly             EPINEPHrine (EPIPEN) 0.3 MG/0.3ML SOAJ injection  Inject 0.3 mg into the muscle as needed. Use as directed for allergic reaction             guaiFENesin (MUCINEX) 600 MG extended release tablet  Take 1,200 mg by mouth 2 times daily             ipratropium-albuterol (DUONEB) 0.5-2.5 (3) MG/3ML SOLN nebulizer solution  Inhale 3 mLs into the lungs every 4 hours (while awake)             levothyroxine (SYNTHROID) 100 MCG tablet  Take 100 mcg by mouth Daily             Omega-3 Fatty Acids (GNP FISH OIL PO)  Take 520 mg by mouth nightly             pantoprazole (PROTONIX) 40 MG tablet  Take 40 mg by mouth daily             phenytoin (DILANTIN) 100 MG ER capsule  Take 1 capsule by mouth every 8 hours             polyethylene glycol (GLYCOLAX) powder  Take 17 g by mouth daily             predniSONE (DELTASONE) 10 MG tablet  Take 4 tablets by mouth daily for 15 days, THEN 3 tablets daily for 15 days, THEN 2 tablets daily for 15 days, THEN 1 tablet daily for 15 days. senna (SENOKOT) 8.6 MG tablet  Take 2 tablets by mouth 3 times daily             simvastatin (ZOCOR) 20 MG tablet  Take 20 mg by mouth nightly                   Discharge Instructions: Follow up with Augustine Angulo MD in 7 days. Take medications as directed. Resume activity as tolerated.  With Dr Marlena Salamanca in 7 days    Diet: Dietary Nutrition Supplements: Standard High Calorie Oral Supplement, Renal Oral Supplement  DIET CARDIAC; advance as tolerated     Disposition: Patient is medically stable and will be discharged to SNF    Dc time 32 min    addendum  Diet was advanced

## 2019-07-16 NOTE — PROGRESS NOTES
1,200 mg at 07/16/19 0826    ipratropium-albuterol (DUONEB) nebulizer solution 3 mL  3 mL Inhalation Q4H WA [place] Blackwood, DO   3 mL at 07/16/19 1015    levothyroxine (SYNTHROID) tablet 100 mcg  100 mcg Oral Daily [place] Blackwood, DO   100 mcg at 07/16/19 0549    pantoprazole (PROTONIX) tablet 40 mg  40 mg Oral Daily [place] Blackwood, DO   40 mg at 07/16/19 0827    phenytoin (DILANTIN) ER capsule 100 mg  100 mg Oral Q8H [place] Blackwood, DO   100 mg at 07/16/19 0404    pregabalin (LYRICA) capsule 100 mg  100 mg Oral Daily [place] Blackwood, DO   100 mg at 07/16/19 0827    senna (SENOKOT) tablet 17.2 mg  2 tablet Oral TID [facility], DO   17.2 mg at 07/15/19 2050    simvastatin (ZOCOR) tablet 20 mg  20 mg Oral Nightly [place] Blackwood, DO   20 mg at 07/15/19 2050    predniSONE (DELTASONE) tablet 20 mg  20 mg Oral Daily [place] Blackwood, DO   20 mg at 07/16/19 0827    polyethylene glycol (GLYCOLAX) packet 17 g  17 g Oral Daily [place] Blackwood, DO   17 g at 07/15/19 0820    sodium chloride flush 0.9 % injection 10 mL  10 mL Intravenous 2 times per day Anastasiya Cordero MD   10 mL at 07/16/19 0827    sodium chloride flush 0.9 % injection 10 mL  10 mL Intravenous PRN Anastasiya Cordero MD        magnesium hydroxide (MILK OF MAGNESIA) 400 MG/5ML suspension 30 mL  30 mL Oral Daily PRN Anastasiya Cordero MD        [facility]dansetron [facility]) injection 4 mg  4 mg Intravenous Q6H PRN Anastasiya Cordero MD   4 mg at 07/14/19 1754    acetaminophen (TYLENOL) tablet 650 mg  650 mg Oral Q6H PRN Anastasiya Cordero MD        heparin (porcine) injection 5,000 Units  5,000 Units Subcutaneous 3 times per day Anastasiya Cordero MD   5,000 Units at 07/16/19 0549       Past Medical History:  Past Medical History:   Diagnosis Date    Allergic rhinitis     Anxiety     Arthritis     Asthma     Chronic back pain     Chronic kidney disease     DDD (degenerative disc disease), lumbar 5/21/2019    Depression     GERD (gastroesophageal reflux disease)     Hyperlipidemia basilar opacities likely due to atelectasis. Pneumonia considered. 4. Previous cholecystectomy and hysterectomy. Signed by Dr Luiza Ross on 7/11/2019 12:17 PM    Xr Chest Standard (2 Vw)    Result Date: 6/28/2019  XR CHEST (2 VW) 6/28/2019 12:30 PM Two-view chest: History: Pneumonia Frontal and lateral projection chest radiograph obtained. Comparison:  6/23/2019, 6/20/2019 6/15/2019 and 5/2/2019 Findings: The level inspiration is shallow with diminished lung volumes. There is patchy airspace and interstitial opacities diffusely in the lungs compatible with chronic lung changes with superimposed acute infiltrates questioned. There is With increasing airspace opacities suspected along the right lateral lung. The heart is likely normal in size without evidence of overt heart failure. Changes from right shoulder arthroplasty and cervical fusion noted. .                                                                                    Impression: 1. Shallow inspiration with diminished lung volumes. 2. Increasing airspace opacities/pneumonia along the right lateral lung questioned. Signed by Dr Karen Simons on 6/28/2019 3:00 PM    Xr Chest Standard (2 Vw)    Result Date: 6/20/2019  XR CHEST (2 VW) 6/20/2019 2:10 PM History: Pneumonia. Two-view chest x-ray compared with 6/15/2019. Low lung volumes though slightly better expansion as compared with 5 days ago. Chronic appearing interstitial disease with scattered calcified granulomas. Patchy infiltrate within the posterior left lower lobe is again seen. No pleural effusion or pneumothorax. 1. Chronic lung changes with persistent left lower lobe infiltrate. Signed by Dr Ihsan Dunaway on 6/20/2019 2:12 PM    Xr Chest Standard (2 Vw)    Result Date: 6/15/2019  XR CHEST (2 VW) 6/15/2019 12:00 PM HISTORY: Cough and shortness of air COMPARISON: 5/2/2019. FINDINGS: Frontal and lateral views of the chest were obtained. Lung volumes are decreased.  Hazy opacities are dimension. There is good corticomedullary differentiation. There is no evidence of hydronephrosis. There are no cystic lesions or masses. No echogenic stone. Left Kidney: The left kidney measures 10.3 cm in longitudinal dimension. There is good corticomedullary differentiation. There is no evidence of hydronephrosis. There are no cystic lesions or masses. No echogenic stone. Bladder: The brain is in the bladder. .    1.  Small right kidney. 2.  Debris is in the bladder 3. Otherwise, no acute abnormalities. Signed by Dr Dina Talamantes on 7/13/2019 12:26 PM    Us Renal Complete    Result Date: 6/27/2019  EXAMINATION:  US RENAL COMPLETE  6/27/2019 8:28 AM HISTORY: Acute kidney injury on chronic kidney disease COMPARISON: No comparison study. TECHNIQUE: Bilateral renal ultrasound was performed. FINDINGS: The right kidney measures 9.7 cm in [id number] length. The left kidney measures 10.0 cm in [id number]-[id number] length. There is cortical thinning and pelvic lipomatosis compatible with age. Echogenicity of the renal cortex is within normal limits. There are no renal masses. There is no pelvic caliectasis or obstructive uropathy change. There are no perinephric abnormality is. Urine bladder is decompressed with a Branch catheter. No bladder wall abnormality observed. 1. Negative bilateral renal ultrasound Signed by Dr Jory Humphreys on 6/27/2019 8:29 AM    Xr Chest Portable    Result Date: 7/11/2019  XR CHEST PORTABLE 7/11/2019 10:45 AM HISTORY: Pneumonia COMPARISON: 6/28/2019 chest x-ray. CT chest 7/2/2019 CXR: A single frontal view of the chest is performed. Findings: There has been improvement in the left upper lateral lower lobe opacities. There are residual interstitial densities of the left upper and lower lung. Mild linear atelectasis or scarring considered within the right upper lobe. There are granulomatous calcifications. Cardiac mediastinal contours are unremarkable. There is no pleural effusion or pneumothorax.

## 2019-07-17 DIAGNOSIS — M54.2 CHRONIC NECK AND BACK PAIN: ICD-10-CM

## 2019-07-17 DIAGNOSIS — G89.29 CHRONIC NECK AND BACK PAIN: ICD-10-CM

## 2019-07-17 DIAGNOSIS — M54.9 CHRONIC NECK AND BACK PAIN: ICD-10-CM

## 2019-07-17 LAB — URINE CULTURE, ROUTINE: NORMAL

## 2019-07-17 RX ORDER — OXYCODONE HYDROCHLORIDE AND ACETAMINOPHEN 5; 325 MG/1; MG/1
1 TABLET ORAL EVERY 4 HOURS PRN
Qty: 120 TABLET | Refills: 0 | Status: SHIPPED | OUTPATIENT
Start: 2019-07-17 | End: 2019-08-22 | Stop reason: SDUPTHER

## 2019-07-19 ENCOUNTER — TELEPHONE (OUTPATIENT)
Dept: FAMILY MEDICINE CLINIC | Age: 74
End: 2019-07-19

## 2019-07-20 LAB
BLOOD CULTURE, ROUTINE: NORMAL
CULTURE, BLOOD 2: NORMAL

## 2019-07-24 DIAGNOSIS — F41.9 ANXIETY: Primary | ICD-10-CM

## 2019-07-24 RX ORDER — LORAZEPAM 0.5 MG/1
0.25 TABLET ORAL 2 TIMES DAILY
Qty: 30 TABLET | Refills: 5 | Status: SHIPPED | OUTPATIENT
Start: 2019-07-24 | End: 2019-08-22 | Stop reason: SDUPTHER

## 2019-07-26 ENCOUNTER — LAB REQUISITION (OUTPATIENT)
Dept: LAB | Facility: HOSPITAL | Age: 74
End: 2019-07-26

## 2019-07-26 DIAGNOSIS — Z00.00 ROUTINE GENERAL MEDICAL EXAMINATION AT A HEALTH CARE FACILITY: ICD-10-CM

## 2019-07-26 LAB
ALBUMIN SERPL-MCNC: 2.9 G/DL (ref 3.5–5)
ALBUMIN/GLOB SERPL: 0.9 G/DL (ref 1.1–2.5)
ALP SERPL-CCNC: 160 U/L (ref 24–120)
ALT SERPL W P-5'-P-CCNC: 45 U/L (ref 0–54)
ANION GAP SERPL CALCULATED.3IONS-SCNC: 7 MMOL/L (ref 4–13)
AST SERPL-CCNC: 33 U/L (ref 7–45)
BASOPHILS # BLD AUTO: 0.05 10*3/MM3 (ref 0–0.2)
BASOPHILS NFR BLD AUTO: 0.5 % (ref 0–2)
BILIRUB SERPL-MCNC: 0.2 MG/DL (ref 0.1–1)
BUN BLD-MCNC: 10 MG/DL (ref 5–21)
BUN/CREAT SERPL: 11.4 (ref 7–25)
CALCIUM SPEC-SCNC: 7.7 MG/DL (ref 8.4–10.4)
CHLORIDE SERPL-SCNC: 103 MMOL/L (ref 98–110)
CO2 SERPL-SCNC: 27 MMOL/L (ref 24–31)
CREAT BLD-MCNC: 0.88 MG/DL (ref 0.5–1.4)
DEPRECATED RDW RBC AUTO: 49 FL (ref 40–54)
EOSINOPHIL # BLD AUTO: 0.39 10*3/MM3 (ref 0–0.7)
EOSINOPHIL NFR BLD AUTO: 4.2 % (ref 0–4)
ERYTHROCYTE [DISTWIDTH] IN BLOOD BY AUTOMATED COUNT: 14.5 % (ref 12–15)
GFR SERPL CREATININE-BSD FRML MDRD: 62 ML/MIN/1.73
GLOBULIN UR ELPH-MCNC: 3.2 GM/DL
GLUCOSE BLD-MCNC: 210 MG/DL (ref 70–100)
HCT VFR BLD AUTO: 29.9 % (ref 37–47)
HGB BLD-MCNC: 9.5 G/DL (ref 12–16)
IMM GRANULOCYTES # BLD AUTO: 0.07 10*3/MM3 (ref 0–0.05)
IMM GRANULOCYTES NFR BLD AUTO: 0.7 % (ref 0–5)
LYMPHOCYTES # BLD AUTO: 1.44 10*3/MM3 (ref 0.72–4.86)
LYMPHOCYTES NFR BLD AUTO: 15.4 % (ref 15–45)
MCH RBC QN AUTO: 29.7 PG (ref 28–32)
MCHC RBC AUTO-ENTMCNC: 31.8 G/DL (ref 33–36)
MCV RBC AUTO: 93.4 FL (ref 82–98)
MONOCYTES # BLD AUTO: 1.04 10*3/MM3 (ref 0.19–1.3)
MONOCYTES NFR BLD AUTO: 11.1 % (ref 4–12)
NEUTROPHILS # BLD AUTO: 6.36 10*3/MM3 (ref 1.87–8.4)
NEUTROPHILS NFR BLD AUTO: 68.1 % (ref 39–78)
NRBC BLD AUTO-RTO: 0 /100 WBC (ref 0–0.2)
PHENYTOIN SERPL-MCNC: 23.7 MCG/ML (ref 10–20)
PLATELET # BLD AUTO: 396 10*3/MM3 (ref 130–400)
PMV BLD AUTO: 10.3 FL (ref 6–12)
POTASSIUM BLD-SCNC: 2.7 MMOL/L (ref 3.5–5.3)
PROT SERPL-MCNC: 6.1 G/DL (ref 6.3–8.7)
RBC # BLD AUTO: 3.2 10*6/MM3 (ref 4.2–5.4)
SODIUM BLD-SCNC: 137 MMOL/L (ref 135–145)
WBC NRBC COR # BLD: 9.35 10*3/MM3 (ref 4.8–10.8)

## 2019-07-26 PROCEDURE — 80053 COMPREHEN METABOLIC PANEL: CPT

## 2019-07-26 PROCEDURE — 80185 ASSAY OF PHENYTOIN TOTAL: CPT

## 2019-07-26 PROCEDURE — 85025 COMPLETE CBC W/AUTO DIFF WBC: CPT

## 2019-08-01 ENCOUNTER — TELEPHONE (OUTPATIENT)
Dept: PRIMARY CARE CLINIC | Age: 74
End: 2019-08-01

## 2019-08-08 ENCOUNTER — HOSPITAL ENCOUNTER (OUTPATIENT)
Dept: GENERAL RADIOLOGY | Age: 74
Discharge: HOME | End: 2019-08-08
Payer: MEDICARE

## 2019-08-08 DIAGNOSIS — M54.50 LOW BACK PAIN, NON-SPECIFIC: ICD-10-CM

## 2019-08-08 PROCEDURE — 72131 CT LUMBAR SPINE W/O DYE: CPT

## 2019-08-22 ENCOUNTER — PROCEDURE VISIT (OUTPATIENT)
Dept: PULMONOLOGY | Facility: CLINIC | Age: 74
End: 2019-08-22

## 2019-08-22 ENCOUNTER — OFFICE VISIT (OUTPATIENT)
Dept: PULMONOLOGY | Facility: CLINIC | Age: 74
End: 2019-08-22

## 2019-08-22 VITALS
BODY MASS INDEX: 27.49 KG/M2 | HEIGHT: 65 IN | DIASTOLIC BLOOD PRESSURE: 80 MMHG | SYSTOLIC BLOOD PRESSURE: 142 MMHG | HEART RATE: 98 BPM | OXYGEN SATURATION: 98 % | WEIGHT: 165 LBS

## 2019-08-22 DIAGNOSIS — F41.9 ANXIETY: ICD-10-CM

## 2019-08-22 DIAGNOSIS — J47.9 TRACTION BRONCHIECTASIS (HCC): Primary | ICD-10-CM

## 2019-08-22 DIAGNOSIS — J45.20 MILD INTERMITTENT ASTHMA WITHOUT COMPLICATION: ICD-10-CM

## 2019-08-22 DIAGNOSIS — M54.9 CHRONIC NECK AND BACK PAIN: ICD-10-CM

## 2019-08-22 DIAGNOSIS — R11.0 NAUSEA: Primary | ICD-10-CM

## 2019-08-22 DIAGNOSIS — G89.29 CHRONIC NECK AND BACK PAIN: ICD-10-CM

## 2019-08-22 DIAGNOSIS — M54.2 CHRONIC NECK AND BACK PAIN: ICD-10-CM

## 2019-08-22 DIAGNOSIS — K21.9 GASTROESOPHAGEAL REFLUX DISEASE WITHOUT ESOPHAGITIS: ICD-10-CM

## 2019-08-22 LAB
DIFF CAP.CO: NORMAL ML/MMHG SEC
FEV1/FVC: NORMAL %
FEV1: NORMAL LITERS
FVC VOL RESPIRATORY: NORMAL LITERS

## 2019-08-22 PROCEDURE — 99214 OFFICE O/P EST MOD 30 MIN: CPT | Performed by: INTERNAL MEDICINE

## 2019-08-22 PROCEDURE — 94729 DIFFUSING CAPACITY: CPT | Performed by: INTERNAL MEDICINE

## 2019-08-22 PROCEDURE — 94010 BREATHING CAPACITY TEST: CPT | Performed by: INTERNAL MEDICINE

## 2019-08-22 RX ORDER — ACETAMINOPHEN 325 MG/1
650 TABLET ORAL EVERY 6 HOURS PRN
COMMUNITY

## 2019-08-22 RX ORDER — POLYETHYLENE GLYCOL 3350 17 G/17G
17 POWDER, FOR SOLUTION ORAL DAILY PRN
COMMUNITY

## 2019-08-22 RX ORDER — CALCIUM CARBONATE 200(500)MG
1 TABLET,CHEWABLE ORAL DAILY
COMMUNITY
End: 2020-02-27 | Stop reason: ALTCHOICE

## 2019-08-22 RX ORDER — PHENYTOIN SODIUM 100 MG/1
100 CAPSULE, EXTENDED RELEASE ORAL EVERY 8 HOURS
Qty: 90 CAPSULE | Refills: 0 | Status: SHIPPED | OUTPATIENT
Start: 2019-08-22 | End: 2019-09-21 | Stop reason: ALTCHOICE

## 2019-08-22 RX ORDER — GUAIFENESIN 600 MG/1
1200 TABLET, EXTENDED RELEASE ORAL 2 TIMES DAILY
COMMUNITY
Start: 2019-08-22 | End: 2022-01-01 | Stop reason: HOSPADM

## 2019-08-22 RX ORDER — IPRATROPIUM BROMIDE AND ALBUTEROL SULFATE 2.5; .5 MG/3ML; MG/3ML
3 SOLUTION RESPIRATORY (INHALATION)
COMMUNITY
Start: 2019-07-05

## 2019-08-22 RX ORDER — LORAZEPAM 0.5 MG/1
0.5 TABLET ORAL 2 TIMES DAILY
Qty: 60 TABLET | Refills: 2 | Status: SHIPPED | OUTPATIENT
Start: 2019-08-22 | End: 2019-10-15 | Stop reason: SDUPTHER

## 2019-08-22 RX ORDER — SENNA PLUS 8.6 MG/1
2 TABLET ORAL
Status: ON HOLD | COMMUNITY
End: 2022-01-01

## 2019-08-22 RX ORDER — ECHINACEA PURPUREA EXTRACT 125 MG
1 TABLET ORAL AS NEEDED
Status: ON HOLD | COMMUNITY
End: 2022-01-01

## 2019-08-22 RX ORDER — OXYCODONE HYDROCHLORIDE AND ACETAMINOPHEN 5; 325 MG/1; MG/1
1 TABLET ORAL
COMMUNITY
Start: 2019-08-22 | End: 2019-09-21

## 2019-08-22 RX ORDER — OLMESARTAN MEDOXOMIL 40 MG/1
40 TABLET ORAL DAILY
COMMUNITY
End: 2022-01-01 | Stop reason: HOSPADM

## 2019-08-22 RX ORDER — OXYCODONE HYDROCHLORIDE AND ACETAMINOPHEN 5; 325 MG/1; MG/1
1 TABLET ORAL EVERY 8 HOURS PRN
Qty: 60 TABLET | Refills: 0 | Status: SHIPPED | OUTPATIENT
Start: 2019-08-22 | End: 2019-09-05

## 2019-08-22 RX ORDER — FOLIC ACID 1 MG/1
1 TABLET ORAL DAILY
Status: ON HOLD | COMMUNITY
End: 2022-01-01

## 2019-08-22 RX ORDER — PHENYTOIN SODIUM 100 MG/1
100 CAPSULE, EXTENDED RELEASE ORAL
COMMUNITY
Start: 2019-08-22 | End: 2019-09-21

## 2019-08-22 RX ORDER — GUAIFENESIN 600 MG/1
1200 TABLET, EXTENDED RELEASE ORAL 2 TIMES DAILY
Qty: 60 TABLET | Refills: 5 | Status: ON HOLD | OUTPATIENT
Start: 2019-08-22 | End: 2022-01-01 | Stop reason: SDUPTHER

## 2019-08-22 RX ORDER — PROMETHAZINE HYDROCHLORIDE 25 MG/1
25 TABLET ORAL
COMMUNITY
Start: 2019-08-22 | End: 2019-09-21

## 2019-08-22 RX ORDER — PREDNISONE 10 MG/1
10 TABLET ORAL 2 TIMES DAILY
COMMUNITY
End: 2021-01-01

## 2019-08-22 RX ORDER — PREDNISONE 10 MG/1
10 TABLET ORAL DAILY
Qty: 12 TABLET | Refills: 0 | Status: SHIPPED | OUTPATIENT
Start: 2019-08-22 | End: 2019-09-03

## 2019-08-22 RX ORDER — AMLODIPINE BESYLATE 5 MG/1
5 TABLET ORAL DAILY
COMMUNITY

## 2019-08-22 RX ORDER — CHLORAL HYDRATE 500 MG
1000 CAPSULE ORAL
COMMUNITY
End: 2022-01-01 | Stop reason: HOSPADM

## 2019-08-22 RX ORDER — SUCRALFATE ORAL 1 G/10ML
1 SUSPENSION ORAL 4 TIMES DAILY PRN
COMMUNITY
End: 2022-01-01 | Stop reason: HOSPADM

## 2019-08-22 RX ORDER — PROMETHAZINE HYDROCHLORIDE 25 MG/1
25 TABLET ORAL EVERY 8 HOURS PRN
Qty: 60 TABLET | Refills: 1 | Status: SHIPPED | OUTPATIENT
Start: 2019-08-22 | End: 2019-09-21

## 2019-08-22 RX ORDER — FLUTICASONE PROPIONATE 50 MCG
2 SPRAY, SUSPENSION (ML) NASAL DAILY
COMMUNITY
End: 2022-01-01 | Stop reason: HOSPADM

## 2019-08-22 NOTE — PROGRESS NOTES
Subjective   Hanny Ivy is a 74 y.o. female.     Background: a patient with mild intermittent asthma, developed pulmonary infiltrates fall 2018  dysphagia study negative.    Chief Complaint   Patient presents with   • follow up on asthma        History of Present Illness   She has been in [facility] since the hospital stay.  She has been on allergy shots, off those for a while and is planning to restart that soon.  She is having some gerd symptoms, wakes up at 3 am nauseated and sick.  She lost her  and daughter back in December.  She had 2 cxr at the NH and the latest one was negative.  Cough is unchanged, moderate to severe, intermittent in chest for the past 2 months aggravated by nothing, alleviated by nothing, associated with acid reflux and some nasal drainage    Medical/Family/Social History   has a past medical history of Anxiety and depression, Asthma, Back pain, Cataract, Dermatochalasis of both upper eyelids, GERD (gastroesophageal reflux disease), Hypertension, Hypothyroidism, Kidney disease, Lipoma, Osteoarthritis, and Visual field defect, unspecified.   has a past surgical history that includes Replacement total knee; Ankle surgery; Other surgical history; Cervical fusion; Hysterectomy; Total shoulder arthroplasty w/ distal clavicle excision (Right, 3/6/2018); Appendectomy; Bronchoscopy; Cholecystectomy; and Excision Mass Leg (Left, 3/4/2019).  family history includes Cancer in her maternal grandmother; Heart attack in her father; Hypertension in her mother; Stroke in her mother.   reports that she has never smoked. She has never used smokeless tobacco. She reports that she does not drink alcohol or use drugs.  Allergies   Allergen Reactions   • Penicillins Rash and Other (See Comments)     Whelps, itching   • Sulfa Antibiotics Rash and Other (See Comments)     Whelps,l itching     Medications    Current Outpatient Medications:   •  acetaminophen (TYLENOL) 325 MG tablet, Take 650 mg by  mouth Every 6 (Six) Hours As Needed for Mild Pain ., Disp: , Rfl:   •  allopurinol (ZYLOPRIM) 100 MG tablet, Take 1 tablet by mouth daily, Disp: , Rfl:   •  amLODIPine (NORVASC) 5 MG tablet, Take 5 mg by mouth Daily., Disp: , Rfl:   •  buPROPion SR (WELLBUTRIN SR) 150 MG 12 hr tablet, Take  by mouth Daily., Disp: , Rfl:   •  calcium carbonate (TUMS) 500 MG chewable tablet, Chew 1 tablet Daily., Disp: , Rfl:   •  Cetirizine HCl (ZYRTEC ALLERGY) 10 MG capsule, Take 10 mg by mouth Daily., Disp: , Rfl:   •  CloNIDine (CATAPRES) 0.1 MG tablet, Take 0.1 mg by mouth 2 (Two) Times a Day., Disp: , Rfl:   •  DULERA 100-5 MCG/ACT inhaler, Inhale 2 (Two) Times a Day., Disp: , Rfl:   •  ferrous sulfate 324 (65 Fe) MG tablet delayed-release EC tablet, TAKE ONE TABLET BY MOUTH DAILY, Disp: , Rfl:   •  FLUoxetine (PROzac) 40 MG capsule, TAKE ONE CAPSULE BY MOUTH DAILY ** MAY CAUSE DROWSINESS, Disp: , Rfl:   •  fluticasone (FLONASE) 50 MCG/ACT nasal spray, 2 sprays into the nostril(s) as directed by provider Daily., Disp: , Rfl:   •  folic acid (FOLVITE) 1 MG tablet, Take 1 mg by mouth Daily., Disp: , Rfl:   •  guaiFENesin (MUCINEX) 600 MG 12 hr tablet, Take 1,200 mg by mouth., Disp: , Rfl:   •  ipratropium-albuterol (DUO-NEB) 0.5-2.5 mg/3 ml nebulizer, Inhale 3 mL., Disp: , Rfl:   •  levothyroxine (SYNTHROID, LEVOTHROID) 100 MCG tablet, Take 1 tablet by mouth Q Morning, Disp: , Rfl:   •  LORazepam (ATIVAN) 1 MG tablet, 3 (Three) Times a Day As Needed for Anxiety., Disp: , Rfl:   •  olmesartan (BENICAR) 40 MG tablet, Take 40 mg by mouth Daily., Disp: , Rfl:   •  Omega-3 Fatty Acids (FISH OIL) 1000 MG capsule capsule, Take  by mouth Daily With Breakfast., Disp: , Rfl:   •  oxyCODONE-acetaminophen (PERCOCET) 5-325 MG per tablet, Take 1 tablet by mouth., Disp: , Rfl:   •  pantoprazole (PROTONIX) 40 MG EC tablet, TAKE ONE TABLET BY MOUTH TWICE A DAY, Disp: , Rfl:   •  phenytoin (DILANTIN) 100 MG ER capsule, Take 100 mg by mouth., Disp:  , Rfl:   •  polyethylene glycol (MIRALAX) powder, Take 17 g by mouth., Disp: , Rfl:   •  predniSONE (DELTASONE) 10 MG tablet, Take 10 mg by mouth 2 (Two) Times a Day., Disp: , Rfl:   •  promethazine (PHENERGAN) 25 MG tablet, Take 25 mg by mouth., Disp: , Rfl:   •  senna (SENOKOT) 8.6 MG tablet, Take 2 tablets by mouth., Disp: , Rfl:   •  simvastatin (ZOCOR) 20 MG tablet, simvastatin 20 mg tablet, Disp: , Rfl:   •  sodium chloride 0.65 % nasal spray, 1 spray into the nostril(s) as directed by provider As Needed for Congestion., Disp: , Rfl:   •  sucralfate (CARAFATE) 1 GM/10ML suspension, Take 1 g by mouth 4 (Four) Times a Day., Disp: , Rfl:   •  amlodipine-olmesartan (KESHAV) 10-40 MG per tablet, Take 1 tablet by mouth Daily., Disp: , Rfl:   •  carisoprodol (SOMA) 350 MG tablet, 3 (Three) Times a Day As Needed., Disp: , Rfl:   •  carvedilol (COREG) 12.5 MG tablet, TAKE ONE TABLET BY MOUTH TWICE A DAY WTIH MEALS ** MAY CAUSEDROWSINESS, Disp: , Rfl:   •  celecoxib (CeleBREX) 200 MG capsule, celecoxib 200 mg capsule  prn, Disp: , Rfl:   •  dicyclomine (BENTYL) 10 MG capsule, Take 10 mg by mouth 4 (Four) Times a Day As Needed (stomach)., Disp: , Rfl:   •  doxepin (SINEquan) 50 MG capsule, Take 100 mg by mouth Every Night., Disp: , Rfl:   •  EPINEPHrine (EPIPEN 2-ABRIL) 0.3 MG/0.3ML solution auto-injector injection, Inject 0.3 mg into the muscle as needed. Use as directed for allergic reaction, Disp: , Rfl:   •  gabapentin (NEURONTIN) 300 MG capsule, TAKE TWO CAPSULES BY MOUTH THREE TIMES DAILY AS NEEDED ** MAY CAUSEDROWSINESS, Disp: , Rfl:   •  HYDROcodone-acetaminophen (NORCO) 7.5-325 MG per tablet, Take 1 tablet by mouth Every 4 (Four) Hours As Needed for Moderate Pain  (Pain)., Disp: 15 tablet, Rfl: 0  •  mometasone (NASONEX) 50 MCG/ACT nasal spray, 2 sprays into each nostril Daily., Disp: , Rfl:     Review of Systems   Constitutional: Negative for chills and fever.   HENT: Positive for postnasal drip and rhinorrhea.   "  Cardiovascular: Negative for chest pain.   Gastrointestinal: Negative for nausea and vomiting.         Objective   /80   Pulse 98   Ht 163.8 cm (64.5\")   Wt 74.8 kg (165 lb)   SpO2 98% Comment: RA  Breastfeeding? No   BMI 27.88 kg/m²   Physical Exam   Constitutional: She appears well-developed. She does not appear ill. No distress.   HENT:   Head: Atraumatic.   Nose: Nose normal.   Eyes: Conjunctivae and EOM are normal. No scleral icterus.   Neck: Neck supple.   Cardiovascular: Normal rate, regular rhythm, S1 normal and S2 normal.   Pulmonary/Chest: Effort normal and breath sounds normal.   Abdominal: Soft. She exhibits no distension. There is no tenderness.   Musculoskeletal: She exhibits no deformity.   Lymphadenopathy:     She has no cervical adenopathy.   Neurological: She is alert.   Skin: Skin is warm. No rash noted.   Psychiatric: She has a normal mood and affect.        -----------------------------------------------------------------------------------------------  Recent Imaging:   at [facility]:  8/2019 negative (follow up from prior film showing rul infiltrate  -----------------------------------------------------------------------------------------------  Pulmonary Functions Testing Results:  FEV1   Date Value Ref Range Status   08/22/2019 81% liters Final     FVC   Date Value Ref Range Status   08/22/2019 70% liters Final     FEV1/FVC   Date Value Ref Range Status   08/22/2019 89.43% % Final     DLCO   Date Value Ref Range Status   08/22/2019 47% ml/mmHg sec Final      My interpretation of PFT: mild restriction  -----------------------------------------------------------------------------------------------  Assessment/Plan   Problem List Items Addressed This Visit        Respiratory    Mild intermittent asthma without complication    Relevant Medications    ipratropium-albuterol (DUO-NEB) 0.5-2.5 mg/3 ml nebulizer    Traction bronchiectasis (CMS/HCC) - Primary    Relevant Medications    " guaiFENesin (MUCINEX) 600 MG 12 hr tablet    ipratropium-albuterol (DUO-NEB) 0.5-2.5 mg/3 ml nebulizer    promethazine (PHENERGAN) 25 MG tablet    fluticasone (FLONASE) 50 MCG/ACT nasal spray    sodium chloride 0.65 % nasal spray    Other Relevant Orders    Pulmonary Function Test (Completed)       Digestive    Gastroesophageal reflux disease without esophagitis (Chronic)    Relevant Medications    sucralfate (CARAFATE) 1 GM/10ML suspension    calcium carbonate (TUMS) 500 MG chewable tablet        Patient's Body mass index is 27.88 kg/m². BMI is within normal parameters. No follow-up required..      I think the reflux and allergies are aggravating the cough.  Asthma under tx and she has correct medicines now. We discussed borderline but adequate pft.  Continue inhalers.  Continue tx gerd and allergies.  Bronchiectasis described on ct stable clinically.       Electronically signed by Jaylen Olvera MD, 8/22/2019, 4:53 PM

## 2019-08-23 RX ORDER — POTASSIUM CHLORIDE 20 MEQ/1
20 TABLET, EXTENDED RELEASE ORAL DAILY
Qty: 30 TABLET | Refills: 5 | Status: SHIPPED | OUTPATIENT
Start: 2019-08-23 | End: 2019-10-30 | Stop reason: ALTCHOICE

## 2019-08-23 RX ORDER — FOLIC ACID 1 MG/1
1 TABLET ORAL DAILY
Qty: 30 TABLET | Refills: 5 | Status: SHIPPED | OUTPATIENT
Start: 2019-08-23 | End: 2019-09-06 | Stop reason: ALTCHOICE

## 2019-08-23 RX ORDER — OLMESARTAN MEDOXOMIL 40 MG/1
40 TABLET ORAL DAILY
Qty: 30 TABLET | Refills: 5 | Status: SHIPPED | OUTPATIENT
Start: 2019-08-23 | End: 2020-01-15 | Stop reason: SDUPTHER

## 2019-08-23 RX ORDER — AMLODIPINE BESYLATE 5 MG/1
5 TABLET ORAL DAILY
Qty: 30 TABLET | Refills: 5 | Status: SHIPPED | OUTPATIENT
Start: 2019-08-23 | End: 2020-01-15 | Stop reason: SDUPTHER

## 2019-08-27 ENCOUNTER — TELEPHONE (OUTPATIENT)
Dept: FAMILY MEDICINE CLINIC | Age: 74
End: 2019-08-27

## 2019-08-27 DIAGNOSIS — J01.90 ACUTE SINUSITIS, RECURRENCE NOT SPECIFIED, UNSPECIFIED LOCATION: Primary | ICD-10-CM

## 2019-08-27 RX ORDER — CEPHALEXIN 500 MG/1
500 CAPSULE ORAL 3 TIMES DAILY
Qty: 30 CAPSULE | Refills: 0 | Status: SHIPPED | OUTPATIENT
Start: 2019-08-27 | End: 2019-09-05

## 2019-08-28 ENCOUNTER — TELEPHONE (OUTPATIENT)
Dept: FAMILY MEDICINE CLINIC | Age: 74
End: 2019-08-28

## 2019-08-28 DIAGNOSIS — F41.9 ANXIETY: Primary | ICD-10-CM

## 2019-08-28 DIAGNOSIS — F32.A DEPRESSION, UNSPECIFIED DEPRESSION TYPE: ICD-10-CM

## 2019-08-28 DIAGNOSIS — F43.21 GRIEF: ICD-10-CM

## 2019-08-30 DIAGNOSIS — E03.9 HYPOTHYROIDISM, UNSPECIFIED TYPE: ICD-10-CM

## 2019-08-30 DIAGNOSIS — E78.5 HYPERLIPIDEMIA, UNSPECIFIED HYPERLIPIDEMIA TYPE: ICD-10-CM

## 2019-08-30 DIAGNOSIS — F41.9 ANXIETY: Primary | ICD-10-CM

## 2019-08-30 DIAGNOSIS — G25.0 ESSENTIAL TREMOR: ICD-10-CM

## 2019-08-30 DIAGNOSIS — E79.0 ELEVATED URIC ACID IN BLOOD: ICD-10-CM

## 2019-08-30 DIAGNOSIS — I10 ESSENTIAL HYPERTENSION: ICD-10-CM

## 2019-09-03 ENCOUNTER — TELEPHONE (OUTPATIENT)
Dept: FAMILY MEDICINE CLINIC | Age: 74
End: 2019-09-03

## 2019-09-03 DIAGNOSIS — Z87.2 HX OF SKIN ULCER: Primary | ICD-10-CM

## 2019-09-03 RX ORDER — FLUTICASONE PROPIONATE 50 MCG
2 SPRAY, SUSPENSION (ML) NASAL DAILY
Qty: 1 BOTTLE | Refills: 5 | Status: SHIPPED | OUTPATIENT
Start: 2019-09-03 | End: 2020-02-08 | Stop reason: SDUPTHER

## 2019-09-03 RX ORDER — SUCRALFATE ORAL 1 G/10ML
1 SUSPENSION ORAL
Qty: 420 ML | Refills: 3 | Status: SHIPPED | OUTPATIENT
Start: 2019-09-03 | End: 2019-09-21 | Stop reason: ALTCHOICE

## 2019-09-05 ENCOUNTER — TELEPHONE (OUTPATIENT)
Dept: FAMILY MEDICINE CLINIC | Age: 74
End: 2019-09-05

## 2019-09-05 ENCOUNTER — APPOINTMENT (OUTPATIENT)
Dept: CT IMAGING | Age: 74
End: 2019-09-05
Payer: MEDICARE

## 2019-09-05 ENCOUNTER — HOSPITAL ENCOUNTER (EMERGENCY)
Age: 74
Discharge: HOME | End: 2019-09-05
Payer: MEDICARE

## 2019-09-05 ENCOUNTER — APPOINTMENT (OUTPATIENT)
Dept: GENERAL RADIOLOGY | Age: 74
End: 2019-09-05
Payer: MEDICARE

## 2019-09-05 VITALS
DIASTOLIC BLOOD PRESSURE: 95 MMHG | TEMPERATURE: 97.3 F | SYSTOLIC BLOOD PRESSURE: 156 MMHG | BODY MASS INDEX: 27.66 KG/M2 | RESPIRATION RATE: 19 BRPM | HEART RATE: 93 BPM | OXYGEN SATURATION: 96 % | WEIGHT: 166 LBS | HEIGHT: 65 IN

## 2019-09-05 DIAGNOSIS — S09.90XA INJURY OF HEAD, INITIAL ENCOUNTER: ICD-10-CM

## 2019-09-05 DIAGNOSIS — S93.401A SPRAIN OF RIGHT ANKLE, UNSPECIFIED LIGAMENT, INITIAL ENCOUNTER: Primary | ICD-10-CM

## 2019-09-05 PROCEDURE — 70450 CT HEAD/BRAIN W/O DYE: CPT

## 2019-09-05 PROCEDURE — 73502 X-RAY EXAM HIP UNI 2-3 VIEWS: CPT

## 2019-09-05 PROCEDURE — 71045 X-RAY EXAM CHEST 1 VIEW: CPT

## 2019-09-05 PROCEDURE — 72125 CT NECK SPINE W/O DYE: CPT

## 2019-09-05 PROCEDURE — 73560 X-RAY EXAM OF KNEE 1 OR 2: CPT

## 2019-09-05 PROCEDURE — 73610 X-RAY EXAM OF ANKLE: CPT

## 2019-09-05 PROCEDURE — 99283 EMERGENCY DEPT VISIT LOW MDM: CPT

## 2019-09-05 NOTE — TELEPHONE ENCOUNTER
John Perales with [facility] called stating patient is c/o rash and itching under stomach fold and between her legs in her groin area. John Perales thinks she may need some nystatin powder. Pt also c/o of itching all over. No visible signs of rash anywhere else. Any recommendations?

## 2019-09-06 RX ORDER — VENLAFAXINE HYDROCHLORIDE 37.5 MG/1
37.5 CAPSULE, EXTENDED RELEASE ORAL DAILY
Qty: 7 CAPSULE | Refills: 0 | Status: SHIPPED | OUTPATIENT
Start: 2019-09-06 | End: 2019-09-21 | Stop reason: ALTCHOICE

## 2019-09-06 RX ORDER — NYSTATIN 100000 [USP'U]/G
POWDER TOPICAL
Qty: 60 G | Refills: 1 | Status: SHIPPED | OUTPATIENT
Start: 2019-09-06 | End: 2020-02-08 | Stop reason: SDUPTHER

## 2019-09-06 RX ORDER — VENLAFAXINE HYDROCHLORIDE 75 MG/1
75 CAPSULE, EXTENDED RELEASE ORAL DAILY
Qty: 30 CAPSULE | Refills: 5 | Status: SHIPPED | OUTPATIENT
Start: 2019-09-06 | End: 2019-10-30 | Stop reason: SDUPTHER

## 2019-09-06 RX ORDER — FLUOXETINE HYDROCHLORIDE 20 MG/1
20 CAPSULE ORAL DAILY
Qty: 7 CAPSULE | Refills: 0 | Status: SHIPPED | OUTPATIENT
Start: 2019-09-06 | End: 2019-09-21 | Stop reason: ALTCHOICE

## 2019-09-06 NOTE — ED PROVIDER NOTES
[facility]  eMERGENCYdEPARTMENT eNCOUnter      Pt Name: Luis F Brady  MRN: [id number]  [place] 1945  Date of evaluation: 9/5/2019  KEYONA De La Torre NP    CHIEF COMPLAINT       Chief Complaint   Patient presents with    Fall     right side; hit head but no loc    Ankle Pain    Knee Injury    Hip Pain         HISTORY OF PRESENT ILLNESS  (Location/Symptom, Timing/Onset, Context/Setting, Quality, Duration, Modifying Factors, Severity.)   Luis F Brady is a 66 y.o. female who presents to the emergency department for evaluation following a fall. Patient lives at home with family and has recently been discharged from the nursing home following knee replacement. Patient says she lost her balance tonight and was in the bathroom and fell onto the floor hitting her head on the commode and landing on the right side. Says she hit the back of her head but but denies loss of consciousness. Patient is complaining of right hip pain, right knee pain, right ankle pain as well as generalized headache and neck pain. Denies any new back pain. Patient has been standing but was afraid to walk on the right leg until it was evaluated because she was having pain in the knee and ankle. Patient denies being on any anticoagulant therapy and I do not see record of this in the chart. The history is provided by the patient. Nursing Notes were reviewed and I agree. REVIEW OF SYSTEMS    (2-9 systems for level 4, 10 or more for level 5)     Review of Systems   Musculoskeletal: Positive for arthralgias (Right hip, right knee, right ankle pain.) and neck pain. Neurological: Positive for headaches. Except as noted above the remainder of the review of systems was reviewed and negative.        PAST MEDICAL HISTORY     Past Medical History:   Diagnosis Date    Allergic rhinitis     Anxiety     Arthritis     Asthma     Chronic back pain     Chronic kidney disease     DDD (degenerative disc Mother     Anxiety Disorder Mother     Kidney Disease Mother     Osteoporosis Mother     Substance Abuse Father         Alcohol    Kidney Disease Father         dialysis    Diabetes Maternal Aunt     Kidney Disease Maternal Aunt     Cancer Maternal Grandmother         Colon Cancer    Osteoporosis Maternal Grandmother     Anxiety Disorder Maternal Grandmother     Depression Maternal Grandmother           SOCIAL HISTORY       Social History     Socioeconomic History    Marital status:      Spouse name: None    Number of children: None    Years of education: None    Highest education level: None   Occupational History    None   Social Needs    Financial resource strain: None    Food insecurity:     Worry: None     Inability: None    Transportation needs:     Medical: None     Non-medical: None   Tobacco Use    Smoking status: Passive Smoke Exposure - Never Smoker    Smokeless tobacco: Never Used   Substance and Sexual Activity    Alcohol use: No    Drug use: No    Sexual activity: None   Lifestyle    Physical activity:     Days per week: None     Minutes per session: None    Stress: None   Relationships    Social connections:     Talks on phone: None     Gets together: None     Attends Spiritism service: None     Active member of club or organization: None     Attends meetings of clubs or organizations: None     Relationship status: None    Intimate partner violence:     Fear of current or ex partner: None     Emotionally abused: None     Physically abused: None     Forced sexual activity: None   Other Topics Concern    None   Social History Narrative    None       SCREENINGS           PHYSICAL EXAM    (up to 7 forlevel 4, 8 or more for level 5)     ED Triage Vitals [09/05/19 2039]   BP Temp Temp src Pulse Resp SpO2 Height Weight   (!) 156/95 97.3 °F (36.3 °C) -- 93 19 96 % 5' 5\" (1.651 m) 166 lb (75.3 kg)       Physical Exam   Constitutional: She is oriented to person, place, and

## 2019-09-09 ENCOUNTER — TELEPHONE (OUTPATIENT)
Dept: FAMILY MEDICINE CLINIC | Age: 74
End: 2019-09-09

## 2019-09-09 RX ORDER — ONDANSETRON 4 MG/1
4 TABLET, FILM COATED ORAL EVERY 6 HOURS PRN
Qty: 90 TABLET | Refills: 0 | Status: SHIPPED | OUTPATIENT
Start: 2019-09-09

## 2019-09-09 RX ORDER — DOXEPIN HYDROCHLORIDE 25 MG/1
25 CAPSULE ORAL NIGHTLY
Qty: 30 CAPSULE | Refills: 3 | Status: SHIPPED | OUTPATIENT
Start: 2019-09-09 | End: 2019-09-21 | Stop reason: SDUPTHER

## 2019-09-09 NOTE — TELEPHONE ENCOUNTER
Zofran 0.4 every 6 as needed.   Doxepin 25 nightly and increase by 25 mg weekly if no response to reach 100 mg

## 2019-09-09 NOTE — TELEPHONE ENCOUNTER
Patient called requesting something to help her sleep at night. She used to take doxepin 100 mg nightly prior to back surgery. Not sure if she needs this or something else. Also, patient is having issues with upset stomach. Phenergan no help. She states the 0.5 Ativan helps for about an hour. She is currently taking the Ativan 0.5 mg tid. She is also currently cross-tapering her prozac and effexor. Not sure if that has anything to do with it. Any suggestions?

## 2019-09-11 ENCOUNTER — TELEPHONE (OUTPATIENT)
Dept: FAMILY MEDICINE CLINIC | Age: 74
End: 2019-09-11

## 2019-09-11 RX ORDER — GABAPENTIN 100 MG/1
100 CAPSULE ORAL 3 TIMES DAILY
COMMUNITY
End: 2019-11-13 | Stop reason: SDUPTHER

## 2019-09-11 RX ORDER — FLUCONAZOLE 150 MG/1
150 TABLET ORAL DAILY
Qty: 3 TABLET | Refills: 0 | Status: SHIPPED | OUTPATIENT
Start: 2019-09-11 | End: 2019-09-14

## 2019-09-21 RX ORDER — SUCRALFATE 1 G/1
1 TABLET ORAL 2 TIMES DAILY
Qty: 60 TABLET | Refills: 5 | Status: SHIPPED | OUTPATIENT
Start: 2019-09-21 | End: 2020-10-08 | Stop reason: SDUPTHER

## 2019-09-21 RX ORDER — DOXEPIN HYDROCHLORIDE 25 MG/1
75 CAPSULE ORAL NIGHTLY
Qty: 90 CAPSULE | Refills: 5 | Status: SHIPPED | OUTPATIENT
Start: 2019-09-21 | End: 2020-01-15 | Stop reason: SDUPTHER

## 2019-10-07 DIAGNOSIS — Z95.828 PORT-A-CATH IN PLACE: Primary | ICD-10-CM

## 2019-10-07 RX ORDER — SODIUM CHLORIDE 0.9 % (FLUSH) 0.9 %
10 SYRINGE (ML) INJECTION PRN
Qty: 5 SYRINGE | Refills: 5 | Status: SHIPPED | OUTPATIENT
Start: 2019-10-07 | End: 2021-08-25

## 2019-10-10 ENCOUNTER — OFFICE VISIT (OUTPATIENT)
Dept: FAMILY MEDICINE CLINIC | Age: 74
End: 2019-10-10
Payer: MEDICARE

## 2019-10-10 VITALS
HEART RATE: 133 BPM | TEMPERATURE: 98.2 F | DIASTOLIC BLOOD PRESSURE: 98 MMHG | OXYGEN SATURATION: 94 % | SYSTOLIC BLOOD PRESSURE: 140 MMHG

## 2019-10-10 DIAGNOSIS — I10 ESSENTIAL HYPERTENSION: ICD-10-CM

## 2019-10-10 DIAGNOSIS — R00.0 TACHYCARDIA: Primary | ICD-10-CM

## 2019-10-10 DIAGNOSIS — Z23 NEED FOR VACCINATION: ICD-10-CM

## 2019-10-10 DIAGNOSIS — R00.0 HEART RATE FAST: ICD-10-CM

## 2019-10-10 PROCEDURE — 1036F TOBACCO NON-USER: CPT | Performed by: FAMILY MEDICINE

## 2019-10-10 PROCEDURE — G8482 FLU IMMUNIZE ORDER/ADMIN: HCPCS | Performed by: FAMILY MEDICINE

## 2019-10-10 PROCEDURE — 1090F PRES/ABSN URINE INCON ASSESS: CPT | Performed by: FAMILY MEDICINE

## 2019-10-10 PROCEDURE — 90662 IIV NO PRSV INCREASED AG IM: CPT | Performed by: FAMILY MEDICINE

## 2019-10-10 PROCEDURE — G8399 PT W/DXA RESULTS DOCUMENT: HCPCS | Performed by: FAMILY MEDICINE

## 2019-10-10 PROCEDURE — G8417 CALC BMI ABV UP PARAM F/U: HCPCS | Performed by: FAMILY MEDICINE

## 2019-10-10 PROCEDURE — 99213 OFFICE O/P EST LOW 20 MIN: CPT | Performed by: FAMILY MEDICINE

## 2019-10-10 PROCEDURE — 93000 ELECTROCARDIOGRAM COMPLETE: CPT | Performed by: FAMILY MEDICINE

## 2019-10-10 PROCEDURE — G8427 DOCREV CUR MEDS BY ELIG CLIN: HCPCS | Performed by: FAMILY MEDICINE

## 2019-10-10 PROCEDURE — G0008 ADMIN INFLUENZA VIRUS VAC: HCPCS | Performed by: FAMILY MEDICINE

## 2019-10-10 PROCEDURE — 4040F PNEUMOC VAC/ADMIN/RCVD: CPT | Performed by: FAMILY MEDICINE

## 2019-10-10 PROCEDURE — G8598 ASA/ANTIPLAT THER USED: HCPCS | Performed by: FAMILY MEDICINE

## 2019-10-10 PROCEDURE — 1123F ACP DISCUSS/DSCN MKR DOCD: CPT | Performed by: FAMILY MEDICINE

## 2019-10-10 RX ORDER — RANITIDINE 150 MG/1
150 TABLET ORAL 2 TIMES DAILY
COMMUNITY
End: 2019-10-30 | Stop reason: ALTCHOICE

## 2019-10-10 RX ORDER — LEVOTHYROXINE SODIUM 0.1 MG/1
100 TABLET ORAL DAILY
COMMUNITY
End: 2019-12-17 | Stop reason: SDUPTHER

## 2019-10-10 RX ORDER — CARVEDILOL 12.5 MG/1
12.5 TABLET ORAL 2 TIMES DAILY
Qty: 60 TABLET | Refills: 3 | Status: SHIPPED | OUTPATIENT
Start: 2019-10-10 | End: 2019-12-02

## 2019-10-15 DIAGNOSIS — F41.9 ANXIETY: ICD-10-CM

## 2019-10-15 RX ORDER — LORAZEPAM 0.5 MG/1
0.5 TABLET ORAL 3 TIMES DAILY PRN
Qty: 90 TABLET | Refills: 2 | Status: SHIPPED | OUTPATIENT
Start: 2019-10-15 | End: 2020-01-16 | Stop reason: SDUPTHER

## 2019-10-16 ENCOUNTER — HOSPITAL ENCOUNTER (OUTPATIENT)
Dept: GENERAL RADIOLOGY | Age: 74
Discharge: HOME | End: 2019-10-16
Payer: MEDICARE

## 2019-10-16 DIAGNOSIS — S43.004A SHOULDER DISLOCATION, RIGHT, INITIAL ENCOUNTER: ICD-10-CM

## 2019-10-16 DIAGNOSIS — S43.004A SHOULDER DISLOCATION, RIGHT, INITIAL ENCOUNTER: Primary | ICD-10-CM

## 2019-10-16 PROCEDURE — 73030 X-RAY EXAM OF SHOULDER: CPT

## 2019-10-30 ENCOUNTER — TELEPHONE (OUTPATIENT)
Dept: FAMILY MEDICINE CLINIC | Age: 74
End: 2019-10-30

## 2019-10-30 DIAGNOSIS — D53.9 MACROCYTIC ANEMIA: ICD-10-CM

## 2019-10-30 DIAGNOSIS — G89.29 CHRONIC PAIN OF RIGHT KNEE: ICD-10-CM

## 2019-10-30 DIAGNOSIS — F32.A DEPRESSION, UNSPECIFIED DEPRESSION TYPE: Primary | ICD-10-CM

## 2019-10-30 DIAGNOSIS — M25.561 CHRONIC PAIN OF RIGHT KNEE: ICD-10-CM

## 2019-10-30 RX ORDER — LANOLIN ALCOHOL/MO/W.PET/CERES
325 CREAM (GRAM) TOPICAL
COMMUNITY
End: 2019-10-30 | Stop reason: SDUPTHER

## 2019-10-30 RX ORDER — VENLAFAXINE HYDROCHLORIDE 75 MG/1
75 CAPSULE, EXTENDED RELEASE ORAL DAILY
Qty: 60 CAPSULE | Refills: 5 | Status: SHIPPED | OUTPATIENT
Start: 2019-10-30 | End: 2019-11-13 | Stop reason: DRUGHIGH

## 2019-10-30 RX ORDER — LANOLIN ALCOHOL/MO/W.PET/CERES
325 CREAM (GRAM) TOPICAL
Qty: 30 TABLET | Refills: 5 | Status: SHIPPED | OUTPATIENT
Start: 2019-10-30 | End: 2020-01-02 | Stop reason: SDUPTHER

## 2019-11-04 ASSESSMENT — ENCOUNTER SYMPTOMS
BACK PAIN: 0
VOMITING: 0
EYE DISCHARGE: 0
ABDOMINAL PAIN: 0
DIARRHEA: 0
CONSTIPATION: 0
RHINORRHEA: 0
EYE PAIN: 0
SHORTNESS OF BREATH: 0
COUGH: 0
NAUSEA: 0

## 2019-11-07 ENCOUNTER — CARE COORDINATION (OUTPATIENT)
Dept: CARE COORDINATION | Age: 74
End: 2019-11-07

## 2019-11-08 ENCOUNTER — CARE COORDINATION (OUTPATIENT)
Dept: CARE COORDINATION | Age: 74
End: 2019-11-08

## 2019-11-13 DIAGNOSIS — M54.9 CHRONIC NECK AND BACK PAIN: Primary | ICD-10-CM

## 2019-11-13 DIAGNOSIS — G89.29 CHRONIC NECK AND BACK PAIN: Primary | ICD-10-CM

## 2019-11-13 DIAGNOSIS — M54.2 CHRONIC NECK AND BACK PAIN: Primary | ICD-10-CM

## 2019-11-13 RX ORDER — GABAPENTIN 100 MG/1
100 CAPSULE ORAL 3 TIMES DAILY
Qty: 90 CAPSULE | Refills: 2 | Status: CANCELLED | OUTPATIENT
Start: 2019-11-13 | End: 2020-02-11

## 2019-11-13 RX ORDER — ESOMEPRAZOLE MAGNESIUM 40 MG/1
40 CAPSULE, DELAYED RELEASE ORAL
COMMUNITY
End: 2019-11-13 | Stop reason: SDUPTHER

## 2019-11-13 RX ORDER — ESOMEPRAZOLE MAGNESIUM 40 MG/1
40 CAPSULE, DELAYED RELEASE ORAL
Qty: 30 CAPSULE | Refills: 5 | Status: SHIPPED | OUTPATIENT
Start: 2019-11-13 | End: 2020-01-15 | Stop reason: SDUPTHER

## 2019-11-13 RX ORDER — GABAPENTIN 100 MG/1
100 CAPSULE ORAL 3 TIMES DAILY
Qty: 90 CAPSULE | Refills: 2 | Status: SHIPPED | OUTPATIENT
Start: 2019-11-13 | End: 2020-01-15 | Stop reason: SDUPTHER

## 2019-11-13 RX ORDER — VENLAFAXINE 75 MG/1
75 TABLET ORAL 2 TIMES DAILY
COMMUNITY
End: 2019-11-20 | Stop reason: DRUGHIGH

## 2019-11-14 ENCOUNTER — HOSPITAL ENCOUNTER (OUTPATIENT)
Dept: GENERAL RADIOLOGY | Age: 74
Discharge: HOME | End: 2019-11-14
Payer: MEDICARE

## 2019-11-14 DIAGNOSIS — M25.561 CHRONIC PAIN OF RIGHT KNEE: ICD-10-CM

## 2019-11-14 DIAGNOSIS — G89.29 CHRONIC PAIN OF RIGHT KNEE: ICD-10-CM

## 2019-11-14 PROCEDURE — 73560 X-RAY EXAM OF KNEE 1 OR 2: CPT

## 2019-11-20 ENCOUNTER — TELEPHONE (OUTPATIENT)
Dept: FAMILY MEDICINE CLINIC | Age: 74
End: 2019-11-20

## 2019-11-20 RX ORDER — VENLAFAXINE HYDROCHLORIDE 150 MG/1
150 CAPSULE, EXTENDED RELEASE ORAL DAILY
COMMUNITY
End: 2019-11-20 | Stop reason: SDUPTHER

## 2019-11-20 RX ORDER — VENLAFAXINE HYDROCHLORIDE 150 MG/1
150 CAPSULE, EXTENDED RELEASE ORAL DAILY
Qty: 30 CAPSULE | Refills: 5 | Status: SHIPPED | OUTPATIENT
Start: 2019-11-20 | End: 2020-01-15 | Stop reason: SDUPTHER

## 2019-12-02 ENCOUNTER — TELEPHONE (OUTPATIENT)
Dept: FAMILY MEDICINE CLINIC | Age: 74
End: 2019-12-02

## 2019-12-02 DIAGNOSIS — R00.0 TACHYCARDIA: ICD-10-CM

## 2019-12-02 DIAGNOSIS — I10 ESSENTIAL HYPERTENSION: ICD-10-CM

## 2019-12-02 RX ORDER — CARVEDILOL 25 MG/1
25 TABLET ORAL 2 TIMES DAILY
Qty: 60 TABLET | Refills: 5 | Status: SHIPPED | OUTPATIENT
Start: 2019-12-02 | End: 2020-01-02 | Stop reason: SDUPTHER

## 2019-12-06 ENCOUNTER — TELEPHONE (OUTPATIENT)
Dept: FAMILY MEDICINE CLINIC | Age: 74
End: 2019-12-06

## 2019-12-06 DIAGNOSIS — I10 ESSENTIAL HYPERTENSION: Primary | ICD-10-CM

## 2019-12-06 RX ORDER — CLONIDINE HYDROCHLORIDE 0.1 MG/1
0.1 TABLET ORAL 2 TIMES DAILY PRN
COMMUNITY

## 2019-12-06 RX ORDER — CHLORTHALIDONE 25 MG/1
25 TABLET ORAL DAILY
Qty: 30 TABLET | Refills: 5 | Status: SHIPPED | OUTPATIENT
Start: 2019-12-06 | End: 2020-01-02 | Stop reason: SDUPTHER

## 2019-12-17 RX ORDER — LEVOTHYROXINE SODIUM 0.1 MG/1
100 TABLET ORAL DAILY
Qty: 30 TABLET | Refills: 5 | Status: SHIPPED | OUTPATIENT
Start: 2019-12-17 | End: 2020-01-15 | Stop reason: SDUPTHER

## 2020-01-02 RX ORDER — CARVEDILOL 25 MG/1
25 TABLET ORAL 2 TIMES DAILY
Qty: 180 TABLET | Refills: 3 | Status: SHIPPED | OUTPATIENT
Start: 2020-01-02 | End: 2020-10-02

## 2020-01-02 RX ORDER — CHLORTHALIDONE 25 MG/1
25 TABLET ORAL DAILY
Qty: 90 TABLET | Refills: 3 | Status: SHIPPED | OUTPATIENT
Start: 2020-01-02 | End: 2021-02-19 | Stop reason: SDUPTHER

## 2020-01-02 RX ORDER — LANOLIN ALCOHOL/MO/W.PET/CERES
325 CREAM (GRAM) TOPICAL
Qty: 90 TABLET | Refills: 3 | Status: SHIPPED | OUTPATIENT
Start: 2020-01-02 | End: 2020-10-02

## 2020-01-02 NOTE — TELEPHONE ENCOUNTER
Requesting refill on medications to [facility] - new drug insurance with \[facility]\"".

## 2020-01-14 LAB
CHOLESTEROL, TOTAL: 215 MG/DL
CHOLESTEROL/HDL RATIO: NORMAL
HDLC SERPL-MCNC: 37 MG/DL (ref 35–70)
LDL CHOLESTEROL CALCULATED: 152 MG/DL (ref 0–160)
TRIGL SERPL-MCNC: 132 MG/DL
VLDLC SERPL CALC-MCNC: 26 MG/DL

## 2020-01-15 RX ORDER — DOXEPIN HYDROCHLORIDE 75 MG/1
75 CAPSULE ORAL NIGHTLY
Qty: 90 CAPSULE | Refills: 3 | Status: SHIPPED | OUTPATIENT
Start: 2020-01-15 | End: 2020-02-20 | Stop reason: SDUPTHER

## 2020-01-15 RX ORDER — LEVOTHYROXINE SODIUM 0.1 MG/1
100 TABLET ORAL DAILY
Qty: 90 TABLET | Refills: 3 | Status: SHIPPED | OUTPATIENT
Start: 2020-01-15 | End: 2020-10-16

## 2020-01-15 RX ORDER — AMLODIPINE BESYLATE 5 MG/1
5 TABLET ORAL DAILY
Qty: 90 TABLET | Refills: 3 | Status: SHIPPED | OUTPATIENT
Start: 2020-01-15 | End: 2020-10-16

## 2020-01-15 RX ORDER — ESOMEPRAZOLE MAGNESIUM 40 MG/1
40 CAPSULE, DELAYED RELEASE ORAL
Qty: 90 CAPSULE | Refills: 3 | Status: SHIPPED | OUTPATIENT
Start: 2020-01-15 | End: 2020-02-20 | Stop reason: SDUPTHER

## 2020-01-15 RX ORDER — VENLAFAXINE HYDROCHLORIDE 150 MG/1
150 CAPSULE, EXTENDED RELEASE ORAL DAILY
Qty: 90 CAPSULE | Refills: 3 | Status: SHIPPED | OUTPATIENT
Start: 2020-01-15 | End: 2020-10-16

## 2020-01-15 RX ORDER — OLMESARTAN MEDOXOMIL 40 MG/1
40 TABLET ORAL DAILY
Qty: 90 TABLET | Refills: 3 | Status: SHIPPED | OUTPATIENT
Start: 2020-01-15 | End: 2020-10-16

## 2020-01-16 RX ORDER — GABAPENTIN 100 MG/1
100 CAPSULE ORAL 3 TIMES DAILY
Qty: 270 CAPSULE | Refills: 1 | Status: SHIPPED | OUTPATIENT
Start: 2020-01-16 | End: 2020-04-20

## 2020-01-16 RX ORDER — LORAZEPAM 0.5 MG/1
0.5 TABLET ORAL 3 TIMES DAILY PRN
Qty: 90 TABLET | Refills: 2 | Status: SHIPPED | OUTPATIENT
Start: 2020-01-16 | End: 2020-05-15

## 2020-02-08 RX ORDER — FLUTICASONE PROPIONATE 50 MCG
2 SPRAY, SUSPENSION (ML) NASAL DAILY
Qty: 3 BOTTLE | Refills: 3 | Status: SHIPPED | OUTPATIENT
Start: 2020-02-08 | End: 2021-01-14 | Stop reason: SDUPTHER

## 2020-02-08 RX ORDER — NYSTATIN 100000 [USP'U]/G
POWDER TOPICAL
Qty: 60 G | Refills: 5 | Status: SHIPPED | OUTPATIENT
Start: 2020-02-08 | End: 2020-10-08 | Stop reason: SDUPTHER

## 2020-02-18 ENCOUNTER — HOSPITAL ENCOUNTER (OUTPATIENT)
Dept: GENERAL RADIOLOGY | Age: 75
Discharge: HOME | End: 2020-02-18
Payer: MEDICARE

## 2020-02-18 ENCOUNTER — OFFICE VISIT (OUTPATIENT)
Dept: FAMILY MEDICINE CLINIC | Age: 75
End: 2020-02-18
Payer: MEDICARE

## 2020-02-18 VITALS
HEART RATE: 86 BPM | OXYGEN SATURATION: 92 % | DIASTOLIC BLOOD PRESSURE: 78 MMHG | TEMPERATURE: 96.8 F | SYSTOLIC BLOOD PRESSURE: 122 MMHG

## 2020-02-18 PROBLEM — N18.4 CHRONIC KIDNEY DISEASE (CKD), STAGE IV (SEVERE) (HCC): Status: ACTIVE | Noted: 2020-02-18

## 2020-02-18 PROCEDURE — G8482 FLU IMMUNIZE ORDER/ADMIN: HCPCS | Performed by: FAMILY MEDICINE

## 2020-02-18 PROCEDURE — 99214 OFFICE O/P EST MOD 30 MIN: CPT | Performed by: FAMILY MEDICINE

## 2020-02-18 PROCEDURE — G8399 PT W/DXA RESULTS DOCUMENT: HCPCS | Performed by: FAMILY MEDICINE

## 2020-02-18 PROCEDURE — G8427 DOCREV CUR MEDS BY ELIG CLIN: HCPCS | Performed by: FAMILY MEDICINE

## 2020-02-18 PROCEDURE — 1036F TOBACCO NON-USER: CPT | Performed by: FAMILY MEDICINE

## 2020-02-18 PROCEDURE — G8417 CALC BMI ABV UP PARAM F/U: HCPCS | Performed by: FAMILY MEDICINE

## 2020-02-18 PROCEDURE — 4040F PNEUMOC VAC/ADMIN/RCVD: CPT | Performed by: FAMILY MEDICINE

## 2020-02-18 PROCEDURE — 1123F ACP DISCUSS/DSCN MKR DOCD: CPT | Performed by: FAMILY MEDICINE

## 2020-02-18 PROCEDURE — 71046 X-RAY EXAM CHEST 2 VIEWS: CPT

## 2020-02-18 PROCEDURE — 1090F PRES/ABSN URINE INCON ASSESS: CPT | Performed by: FAMILY MEDICINE

## 2020-02-18 RX ORDER — SIMVASTATIN 20 MG
20 TABLET ORAL NIGHTLY
Qty: 90 TABLET | Refills: 3 | Status: SHIPPED | OUTPATIENT
Start: 2020-02-18 | End: 2021-02-19 | Stop reason: SDUPTHER

## 2020-02-18 RX ORDER — WHEELCHAIR
EACH MISCELLANEOUS
Qty: 1 EACH | Refills: 0 | Status: SHIPPED | OUTPATIENT
Start: 2020-02-18 | End: 2021-08-25

## 2020-02-18 RX ORDER — LEVOFLOXACIN 500 MG/1
500 TABLET, FILM COATED ORAL DAILY
Qty: 10 TABLET | Refills: 0 | Status: SHIPPED | OUTPATIENT
Start: 2020-02-18 | End: 2020-02-28

## 2020-02-18 RX ORDER — METHYLPREDNISOLONE 4 MG/1
TABLET ORAL
Qty: 1 KIT | Refills: 0 | Status: SHIPPED | OUTPATIENT
Start: 2020-02-18 | End: 2020-02-24

## 2020-02-18 ASSESSMENT — ENCOUNTER SYMPTOMS
GASTROINTESTINAL NEGATIVE: 1
COUGH: 1
ALLERGIC/IMMUNOLOGIC NEGATIVE: 1
BACK PAIN: 1
EYES NEGATIVE: 1
WHEEZING: 1

## 2020-02-18 ASSESSMENT — PATIENT HEALTH QUESTIONNAIRE - PHQ9
SUM OF ALL RESPONSES TO PHQ QUESTIONS 1-9: 0
1. LITTLE INTEREST OR PLEASURE IN DOING THINGS: 0
SUM OF ALL RESPONSES TO PHQ QUESTIONS 1-9: 0
2. FEELING DOWN, DEPRESSED OR HOPELESS: 0
SUM OF ALL RESPONSES TO PHQ9 QUESTIONS 1 & 2: 0

## 2020-02-18 NOTE — PROGRESS NOTES
mouth nightly      mometasone-formoterol (DULERA) 200-5 MCG/ACT inhaler Inhale 2 puffs into the lungs 2 times daily      sodium chloride flush 0.9 % injection Infuse 10 mLs intravenously as needed for Line Care [facility] care) 5 Syringe 5    Heparin Lock Flush (HEPARIN FLUSH, 100 UNITS/ML,) 100 UNIT/ML injection Infuse 5 mLs intravenously as needed for Line Care (port care) 5 Syringe 5    sucralfate (CARAFATE) 1 GM tablet Take 1 tablet by mouth 2 times daily 60 tablet 5    ondansetron (ZOFRAN) 4 MG tablet Take 1 tablet by mouth every 6 hours as needed for Nausea or Vomiting 90 tablet 0    guaiFENesin (MUCINEX) 600 MG extended release tablet Take 2 tablets by mouth 2 times daily (Patient taking differently: Take 600 mg by mouth 2 times daily ) 60 tablet 5    polyethylene glycol (GLYCOLAX) powder Take 17 g by mouth daily      Omega-3 Fatty Acids (GNP FISH OIL PO) Take 520 mg by mouth nightly      Cholecalciferol (VITAMIN D3) 2000 units CAPS Take 1 capsule by mouth nightly      cetirizine (ZYRTEC) 10 MG tablet Take 10 mg by mouth daily      EPINEPHrine (EPIPEN) 0.3 MG/0.3ML SOAJ injection Inject 0.3 mg into the muscle as needed. Use as directed for allergic reaction      ipratropium-albuterol (DUONEB) 0.5-2.5 (3) MG/3ML SOLN nebulizer solution Inhale 3 mLs into the lungs every 4 hours (while awake) 360 mL 0     No current facility-administered medications for this visit. Allergies   Allergen Reactions    Penicillins     Sulfa Antibiotics        Review of Systems   Constitutional: Negative. HENT: Positive for congestion. Eyes: Negative. Respiratory: Positive for cough and wheezing. Cardiovascular: Negative. Gastrointestinal: Negative. Endocrine: Negative. Genitourinary: Negative. Musculoskeletal: Positive for back pain. Skin: Negative. Allergic/Immunologic: Negative. Neurological: Negative. Hematological: Negative. Psychiatric/Behavioral: Negative. OBJECTIVE:    Physical Exam  Constitutional:       Appearance: Normal appearance. She is well-developed and well-groomed. HENT:      Head: Normocephalic and atraumatic. Right Ear: Tympanic membrane, ear canal and external ear normal. There is no impacted cerumen. Left Ear: Tympanic membrane, ear canal and external ear normal. There is no impacted cerumen. Nose: Nose normal.      Mouth/Throat:      Lips: Pink. Mouth: Mucous membranes are moist.      Dentition: Normal dentition. Pharynx: Oropharynx is clear. Uvula midline. Eyes:      General: Lids are normal.         Right eye: No discharge. Left eye: No discharge. Extraocular Movements: Extraocular movements intact. Conjunctiva/sclera: Conjunctivae normal.      Right eye: Right conjunctiva is not injected. Left eye: Left conjunctiva is not injected. Pupils: Pupils are equal, round, and reactive to light. Neck:      Musculoskeletal: Full passive range of motion without pain, normal range of motion and neck supple. Thyroid: No thyromegaly. Vascular: No carotid bruit or JVD. Cardiovascular:      Rate and Rhythm: Normal rate and regular rhythm. Pulses: Normal pulses. Carotid pulses are 2+ on the right side and 2+ on the left side. Radial pulses are 2+ on the right side and 2+ on the left side. Heart sounds: Normal heart sounds, S1 normal and S2 normal. No murmur. Pulmonary:      Effort: Pulmonary effort is normal.      Breath sounds: Decreased breath sounds and wheezing present. Abdominal:      General: Bowel sounds are normal. There is no distension or abdominal bruit. Palpations: Abdomen is soft. There is no mass. Hernia: No hernia is present. Musculoskeletal:      Right hip: She exhibits decreased range of motion. Left hip: She exhibits decreased range of motion.       Cervical back: She exhibits decreased range of motion, tenderness, pain and spasm.      Lumbar back: She exhibits decreased range of motion. Right lower leg: No edema. Left lower leg: No edema. Lymphadenopathy:      Cervical: No cervical adenopathy. Right cervical: No superficial cervical adenopathy. Left cervical: No superficial cervical adenopathy. Upper Body:      Right upper body: No supraclavicular adenopathy. Left upper body: No supraclavicular adenopathy. Skin:     General: Skin is warm and dry. Coloration: Skin is not pale. Findings: No lesion or rash. Nails: There is no clubbing. Comments: Multiple irregular hyperpigmented lesions   Neurological:      Mental Status: She is alert and oriented to person, place, and time. Motor: No weakness or tremor. Coordination: Coordination normal.      Deep Tendon Reflexes: Reflexes are normal and symmetric. Psychiatric:         Attention and Perception: Attention normal.         Mood and Affect: Mood normal.         Speech: Speech normal.         Behavior: Behavior normal. Behavior is cooperative. Thought Content: Thought content normal.         Cognition and Memory: Cognition and memory normal.         Judgment: Judgment normal.        /78 (Site: Right Upper Arm, Position: Sitting, Cuff Size: Medium Adult)   Pulse 86   Temp 96.8 °F (36 °C) (Temporal)   SpO2 92%      ASSESSMENT:     Diagnosis Orders   1. Acute exacerbation of bronchiectasis (HCC)  levofloxacin (LEVAQUIN) 500 MG tablet    methylPREDNISolone (MEDROL DOSEPACK) 4 MG tablet    XR CHEST STANDARD (2 VW)   2. Atypical mole-more than likely benign External Referral To Dermatology   3. Moderate protein-calorie malnutrition ([address] resolved    4. Chronic kidney disease (CKD), stage IV (severe) (HCC) improved to stage II    5. Convulsions, unspecified convulsion type ([address] resolved    6. Hyperlipidemia, unspecified hyperlipidemia type-no control simvastatin (ZOCOR) 20 MG tablet   7. Anxiety-stable    8. Chronic neck and back pain-ongoing Misc. Devices [facility]) MISC   9. Physical deconditioning-ongoing Misc. Devices [facility]) MISC   10. Encounter for screening mammogram for malignant neoplasm of breast  LISA DIGITAL SCREEN W CAD BILATERAL        PLAN:    1. Levaquin. Chest x-ray. Medrol Dosepak. 2.  Dermatology consultation  3. Encourage healthy diet  4.  5.  Continue treatment plan  6. Restart Zocor   7. Louetta Curtis. Continue medication  8./9 wheelchair  10.   Mammography  Follow-up 3 to 6 months

## 2020-02-20 RX ORDER — ESOMEPRAZOLE MAGNESIUM 40 MG/1
40 CAPSULE, DELAYED RELEASE ORAL
Qty: 90 CAPSULE | Refills: 3 | Status: SHIPPED | OUTPATIENT
Start: 2020-02-20 | End: 2020-08-12 | Stop reason: SDUPTHER

## 2020-02-20 RX ORDER — CETIRIZINE HYDROCHLORIDE 10 MG/1
10 TABLET ORAL DAILY
Qty: 90 TABLET | Refills: 3 | Status: SHIPPED | OUTPATIENT
Start: 2020-02-20 | End: 2020-11-25

## 2020-02-20 RX ORDER — DOXEPIN HYDROCHLORIDE 75 MG/1
75 CAPSULE ORAL NIGHTLY
Qty: 90 CAPSULE | Refills: 3 | Status: SHIPPED | OUTPATIENT
Start: 2020-02-20 | End: 2020-08-12 | Stop reason: SDUPTHER

## 2020-02-27 ENCOUNTER — OFFICE VISIT (OUTPATIENT)
Dept: PULMONOLOGY | Facility: CLINIC | Age: 75
End: 2020-02-27

## 2020-02-27 VITALS
DIASTOLIC BLOOD PRESSURE: 62 MMHG | SYSTOLIC BLOOD PRESSURE: 112 MMHG | HEIGHT: 65 IN | WEIGHT: 175 LBS | HEART RATE: 96 BPM | BODY MASS INDEX: 29.16 KG/M2 | OXYGEN SATURATION: 97 %

## 2020-02-27 DIAGNOSIS — J45.20 MILD INTERMITTENT ASTHMA WITHOUT COMPLICATION: Primary | ICD-10-CM

## 2020-02-27 DIAGNOSIS — J47.9 TRACTION BRONCHIECTASIS (HCC): ICD-10-CM

## 2020-02-27 DIAGNOSIS — R91.8 PULMONARY INFILTRATE IN LEFT LUNG ON CHEST X-RAY: ICD-10-CM

## 2020-02-27 PROCEDURE — 99213 OFFICE O/P EST LOW 20 MIN: CPT | Performed by: INTERNAL MEDICINE

## 2020-02-27 RX ORDER — VENLAFAXINE HYDROCHLORIDE 150 MG/1
150 CAPSULE, EXTENDED RELEASE ORAL DAILY
COMMUNITY
Start: 2020-01-15

## 2020-02-27 RX ORDER — ESOMEPRAZOLE MAGNESIUM 40 MG/1
40 CAPSULE, DELAYED RELEASE ORAL
COMMUNITY
Start: 2020-02-20 | End: 2022-01-01 | Stop reason: HOSPADM

## 2020-02-27 RX ORDER — LEVOFLOXACIN 500 MG/1
500 TABLET, FILM COATED ORAL
COMMUNITY
Start: 2020-02-18 | End: 2020-02-28

## 2020-02-27 NOTE — PATIENT INSTRUCTIONS
Dextromethorphan oral suspension  What is this medicine?  DEXTROMETHORPHAN (dex troe meth OR fan) is used to help relieve cough.  This medicine may be used for other purposes; ask your health care provider or pharmacist if you have questions.  COMMON BRAND NAME(S): AeroTuss, Flores's Cough Suppressant, Flores's DM, Flores's Mixture, Cough DM, Delsym, Delsym Children's, Delsym Children's Cough Relief, [facility] [place] Cough DM, Robitussin Children's Cough, Robitussin Cough  What should I tell my health care provider before I take this medicine?  They need to know if you have any of these conditions:  -asthma  -emphysema  -large amount of mucus  -liver disease  -smoker  -an unusual or allergic reaction to dextromethorphan, other medicines, bromides, foods, dyes, or preservatives  -pregnant or trying to get pregnant  -breast-feeding  How should I use this medicine?  Take this medicine by mouth. Follow the directions on the prescription label. Shake well before using. Use a specially marked spoon or dropper to measure each dose. Ask your pharmacist if you do not have one. Household spoons are not accurate. Take your medicine at regular intervals. Do not take it more often than directed.  Talk to your pediatrician regarding the use of this medicine in children. While this drug may be prescribed for children as young as 2 years old for selected conditions, precautions do apply.  Overdosage: If you think you have taken too much of this medicine contact a poison control center or emergency room at once.  NOTE: This medicine is only for you. Do not share this medicine with others.  What if I miss a dose?  If you miss a dose, take it as soon as you can. If it is almost time for your next dose, take only that dose. Do not take double or extra doses.  What may interact with this medicine?  Do not take this medicine with any of the following medications:  -MAOIs like Carbex, Eldepryl, Marplan, Nardil, and Parnate  This medicine  may also interact with the following medications:  -medicines for depression, anxiety, or psychotic disturbances  -other medicines for allergies or cold  -procarbazine  This list may not describe all possible interactions. Give your health care provider a list of all the medicines, herbs, non-prescription drugs, or dietary supplements you use. Also tell them if you smoke, drink alcohol, or use illegal drugs. Some items may interact with your medicine.  What should I watch for while using this medicine?  Do not treat yourself for a cough for more than 1 week without consulting your doctor or health care professional. If you have a high fever, skin rash, lasting headache, or sore throat, see your doctor.  You may get drowsy or dizzy. Do not drive, use machinery, or do anything that needs mental alertness until you know how this medicine affects you. Do not stand or sit up quickly, especially if you are an older patient. This reduces the risk of dizzy or fainting spells. Alcohol may interfere with the effect of this medicine. Avoid alcoholic drinks.  What side effects may I notice from receiving this medicine?  Side effects that you should report to your doctor or health care professional as soon as possible:  -allergic reactions like skin rash, itching or hives, swelling of the face, lips, or tongue  -breathing problems  -confusion  -excitement, nervousness, restlessness, or irritability  -seizure  -slurred speech  Side effects that usually do not require medical attention (report to your doctor or health care professional if they continue or are bothersome):  -headache  -stomach upset  -tiredness  This list may not describe all possible side effects. Call your doctor for medical advice about side effects. You may report side effects to FDA at 7-343-FDA-1215.  Where should I keep my medicine?  Keep out of the reach of children.  Store at room temperature between 20 and 25 degrees C (68 and 77 degrees F) unless  otherwise directed. Protect from light. Do not freeze. Throw away any unused medicine after the expiration date.  NOTE: This sheet is a summary. It may not cover all possible information. If you have questions about this medicine, talk to your doctor, pharmacist, or health care provider.  © 2020 Elsevier/Gold Standard (2009-04-09 16:57:31)

## 2020-02-27 NOTE — PROGRESS NOTES
Subjective   Hanny Ivy is a 75 y.o. female.     Background: a patient with mild intermittent asthma, developed pulmonary infiltrates fall 2018  dysphagia study negative.    Chief Complaint   Patient presents with   • Bronchitis   • Cough        History of Present Illness   She has been coughing and congested in the chest for 2 weeks alleviated by prednisone and antibiotics.  She was dizzy, but no fever.  She takes mucinex and uses the dulera 200 mcg inhaler.    Medical/Family/Social History   has a past medical history of Anxiety and depression, Asthma, Back pain, Cataract, Dermatochalasis of both upper eyelids, GERD (gastroesophageal reflux disease), Hypertension, Hypothyroidism, Kidney disease, Lipoma, Osteoarthritis, and Visual field defect, unspecified.   has a past surgical history that includes Replacement total knee; Ankle surgery; Other surgical history; Cervical fusion; Hysterectomy; Total shoulder arthroplasty w/ distal clavicle excision (Right, 3/6/2018); Appendectomy; Bronchoscopy; Cholecystectomy; and Excision Mass Leg (Left, 3/4/2019).  family history includes Cancer in her maternal grandmother; Heart attack in her father; Hypertension in her mother; Stroke in her mother.   reports that she has never smoked. She has never used smokeless tobacco. She reports that she does not drink alcohol or use drugs.  Allergies   Allergen Reactions   • Penicillins Rash and Other (See Comments)     Whelps, itching   • Sulfa Antibiotics Rash and Other (See Comments)     Whelps,l itching     Medications    Current Outpatient Medications:   •  acetaminophen (TYLENOL) 325 MG tablet, Take 650 mg by mouth Every 6 (Six) Hours As Needed for Mild Pain ., Disp: , Rfl:   •  allopurinol (ZYLOPRIM) 100 MG tablet, Take 1 tablet by mouth daily, Disp: , Rfl:   •  amLODIPine (NORVASC) 5 MG tablet, Take 5 mg by mouth Daily., Disp: , Rfl:   •  carisoprodol (SOMA) 350 MG tablet, 3 (Three) Times a Day As Needed., Disp: , Rfl:   •   carvedilol (COREG) 12.5 MG tablet, TAKE ONE TABLET BY MOUTH TWICE A DAY WTIH MEALS ** MAY CAUSEDROWSINESS, Disp: , Rfl:   •  Cetirizine HCl (ZYRTEC ALLERGY) 10 MG capsule, Take 10 mg by mouth Daily., Disp: , Rfl:   •  CloNIDine (CATAPRES) 0.1 MG tablet, Take 0.1 mg by mouth 2 (Two) Times a Day., Disp: , Rfl:   •  doxepin (SINEquan) 50 MG capsule, Take 100 mg by mouth Every Night., Disp: , Rfl:   •  DULERA 100-5 MCG/ACT inhaler, Inhale 2 (Two) Times a Day., Disp: , Rfl:   •  EPINEPHrine (EPIPEN 2-ABRIL) 0.3 MG/0.3ML solution auto-injector injection, Inject 0.3 mg into the muscle as needed. Use as directed for allergic reaction, Disp: , Rfl:   •  esomeprazole (nexIUM) 40 MG capsule, Take 40 mg by mouth., Disp: , Rfl:   •  ferrous sulfate 324 (65 Fe) MG tablet delayed-release EC tablet, TAKE ONE TABLET BY MOUTH DAILY, Disp: , Rfl:   •  fluticasone (FLONASE) 50 MCG/ACT nasal spray, 2 sprays into the nostril(s) as directed by provider Daily., Disp: , Rfl:   •  folic acid (FOLVITE) 1 MG tablet, Take 1 mg by mouth Daily., Disp: , Rfl:   •  gabapentin (NEURONTIN) 300 MG capsule, TAKE TWO CAPSULES BY MOUTH THREE TIMES DAILY AS NEEDED ** MAY CAUSEDROWSINESS, Disp: , Rfl:   •  guaiFENesin (MUCINEX) 600 MG 12 hr tablet, Take 1,200 mg by mouth., Disp: , Rfl:   •  ipratropium-albuterol (DUO-NEB) 0.5-2.5 mg/3 ml nebulizer, Inhale 3 mL., Disp: , Rfl:   •  levoFLOXacin (LEVAQUIN) 500 MG tablet, Take 500 mg by mouth., Disp: , Rfl:   •  levothyroxine (SYNTHROID, LEVOTHROID) 100 MCG tablet, Take 1 tablet by mouth Q Morning, Disp: , Rfl:   •  LORazepam (ATIVAN) 1 MG tablet, 3 (Three) Times a Day As Needed for Anxiety., Disp: , Rfl:   •  mometasone (NASONEX) 50 MCG/ACT nasal spray, 2 sprays into each nostril Daily., Disp: , Rfl:   •  olmesartan (BENICAR) 40 MG tablet, Take 40 mg by mouth Daily., Disp: , Rfl:   •  Omega-3 Fatty Acids (FISH OIL) 1000 MG capsule capsule, Take  by mouth Daily With Breakfast., Disp: , Rfl:   •  polyethylene  "glycol (MIRALAX) powder, Take 17 g by mouth As Needed., Disp: , Rfl:   •  predniSONE (DELTASONE) 10 MG tablet, Take 10 mg by mouth 2 (Two) Times a Day., Disp: , Rfl:   •  senna (SENOKOT) 8.6 MG tablet, Take 2 tablets by mouth., Disp: , Rfl:   •  simvastatin (ZOCOR) 20 MG tablet, simvastatin 20 mg tablet, Disp: , Rfl:   •  sodium chloride 0.65 % nasal spray, 1 spray into the nostril(s) as directed by provider As Needed for Congestion., Disp: , Rfl:   •  sucralfate (CARAFATE) 1 GM/10ML suspension, Take 1 g by mouth 4 (Four) Times a Day., Disp: , Rfl:   •  venlafaxine XR (EFFEXOR-XR) 150 MG 24 hr capsule, Take 150 mg by mouth., Disp: , Rfl:   •  HYDROcodone-acetaminophen (NORCO) 7.5-325 MG per tablet, Take 1 tablet by mouth Every 4 (Four) Hours As Needed for Moderate Pain  (Pain)., Disp: 15 tablet, Rfl: 0    Review of Systems   Constitutional: Negative for chills and fever.   Respiratory: Positive for cough. Negative for wheezing.    Cardiovascular: Negative for chest pain.   Gastrointestinal: Negative for nausea and vomiting.         Objective   /62   Pulse 96   Ht 163.8 cm (64.5\")   Wt 79.4 kg (175 lb)   SpO2 97% Comment: RA  Breastfeeding No   BMI 29.57 kg/m²   Physical Exam   Constitutional: She appears well-developed. She does not appear ill. No distress.   HENT:   Head: Atraumatic.   Nose: Nose normal.   Eyes: Conjunctivae and EOM are normal. No scleral icterus.   Neck: Neck supple.   Cardiovascular: Normal rate, regular rhythm, S1 normal and S2 normal.   Pulmonary/Chest: Effort normal and breath sounds normal. She has no wheezes. She has no rales.   Abdominal: Soft. She exhibits no distension. There is no tenderness.   Musculoskeletal: She exhibits no deformity.   Lymphadenopathy:     She has no cervical adenopathy.   Neurological: She is alert.   Skin: Skin is warm. No rash noted.   Psychiatric: She has a normal mood and affect.      "   -----------------------------------------------------------------------------------------------  XR CHEST PORTABLE 7/11/2019 10:45 AM  HISTORY: Pneumonia  COMPARISON: 6/28/2019 chest x-ray. CT chest 7/2/2019  CXR: A single frontal view of the chest is performed.   Findings:   There has been improvement in the left upper lateral lower lobe  opacities. There are residual interstitial densities of the left upper  and lower lung. Mild linear atelectasis or scarring considered within  the right upper lobe. There are granulomatous calcifications. Cardiac  mediastinal contours are unremarkable. There is no pleural effusion or  pneumothorax. Postoperative changes cervical spine with left shoulder  prosthesis.  IMPRESSION:  1. Improving left lung opacities since the CT chest of 7/2/2019.  Residual interstitial densities of the left lung due to persist with  linear atelectasis or scarring of the right upper lobe. Continued  follow-up recommended..  Signed by Dr Isabel Kat on 7/11/2019 12:11 PM     -----------------------------------------------------------------------------------------------  PFT Values        Some values may be hidden. Unless noted otherwise, only the newest values recorded on each date are displayed.         Old Values PFT Results 8/22/19   FVC 70%   FEV1 81%   FEV1/FVC 89.43%   DLCO 47%      Pre Drug PFT Results 8/22/19   No data to display.      Post Drug PFT Results 8/22/19   No data to display.      Other Tests PFT Results 8/22/19   No data to display.                -----------------------------------------------------------------------------------------------  Assessment/Plan   Problem List Items Addressed This Visit        Pulmonary Problems    Mild intermittent asthma without complication - Primary    Pulmonary infiltrate in left lung on chest x-ray    Traction bronchiectasis (CMS/HCC)        Patient's Body mass index is 29.57 kg/m². BMI is within normal parameters. No follow-up  required..      Continue supportive care  Continue dulera 200 mcg  Call if worsens or wheezing develops; would warrant additional steroids.       Electronically signed by Jaylen Olvera MD, 2/27/2020, 5:21 PM

## 2020-03-03 ENCOUNTER — TELEPHONE (OUTPATIENT)
Dept: FAMILY MEDICINE CLINIC | Age: 75
End: 2020-03-03

## 2020-03-03 RX ORDER — OSELTAMIVIR PHOSPHATE 75 MG/1
75 CAPSULE ORAL DAILY
Qty: 10 CAPSULE | Refills: 0 | Status: SHIPPED | OUTPATIENT
Start: 2020-03-03 | End: 2020-03-13

## 2020-03-16 ENCOUNTER — TELEPHONE (OUTPATIENT)
Dept: FAMILY MEDICINE CLINIC | Age: 75
End: 2020-03-16

## 2020-03-16 RX ORDER — METHYLPREDNISOLONE 4 MG/1
TABLET ORAL
Qty: 1 KIT | Refills: 0 | Status: SHIPPED | OUTPATIENT
Start: 2020-03-16 | End: 2020-03-22

## 2020-04-20 RX ORDER — GABAPENTIN 100 MG/1
CAPSULE ORAL
Qty: 270 CAPSULE | Refills: 1 | Status: SHIPPED | OUTPATIENT
Start: 2020-04-20 | End: 2020-10-15 | Stop reason: SDUPTHER

## 2020-05-15 RX ORDER — LORAZEPAM 0.5 MG/1
TABLET ORAL
Qty: 90 TABLET | Refills: 2 | Status: SHIPPED | OUTPATIENT
Start: 2020-05-15 | End: 2020-07-10 | Stop reason: SDUPTHER

## 2020-06-04 DIAGNOSIS — E03.9 HYPOTHYROIDISM, UNSPECIFIED TYPE: ICD-10-CM

## 2020-06-04 DIAGNOSIS — D53.9 MACROCYTIC ANEMIA: ICD-10-CM

## 2020-06-04 DIAGNOSIS — E78.5 HYPERLIPIDEMIA, UNSPECIFIED HYPERLIPIDEMIA TYPE: ICD-10-CM

## 2020-06-04 DIAGNOSIS — I10 ESSENTIAL HYPERTENSION: ICD-10-CM

## 2020-06-04 DIAGNOSIS — F41.9 ANXIETY: ICD-10-CM

## 2020-06-04 LAB
ALBUMIN SERPL-MCNC: 4.4 G/DL (ref 3.5–5.2)
ALP BLD-CCNC: 95 U/L (ref 35–104)
ALT SERPL-CCNC: 27 U/L (ref 5–33)
ANION GAP SERPL CALCULATED.3IONS-SCNC: 16 MMOL/L (ref 7–19)
AST SERPL-CCNC: 28 U/L (ref 5–32)
BILIRUB SERPL-MCNC: 0.3 MG/DL (ref 0.2–1.2)
BUN BLDV-MCNC: 15 MG/DL (ref 8–23)
CALCIUM SERPL-MCNC: 9.6 MG/DL (ref 8.8–10.2)
CHLORIDE BLD-SCNC: 102 MMOL/L (ref 98–111)
CHOLESTEROL, TOTAL: 161 MG/DL (ref 160–199)
CO2: 22 MMOL/L (ref 22–29)
CREAT SERPL-MCNC: 0.9 MG/DL (ref 0.5–0.9)
FOLATE: 11.4 NG/ML (ref 4.8–37.3)
GFR NON-AFRICAN AMERICAN: >60
GLUCOSE BLD-MCNC: 99 MG/DL (ref 74–109)
HCT VFR BLD CALC: 38.8 % (ref 37–47)
HDLC SERPL-MCNC: 37 MG/DL (ref 65–121)
HEMOGLOBIN: 12.6 G/DL (ref 12–16)
IRON SATURATION: 31 % (ref 14–50)
IRON: 71 UG/DL (ref 37–145)
LDL CHOLESTEROL CALCULATED: 88 MG/DL
MCH RBC QN AUTO: 31.3 PG (ref 27–31)
MCHC RBC AUTO-ENTMCNC: 32.5 G/DL (ref 33–37)
MCV RBC AUTO: 96.3 FL (ref 81–99)
PDW BLD-RTO: 12.2 % (ref 11.5–14.5)
PLATELET # BLD: 205 K/UL (ref 130–400)
PMV BLD AUTO: 10.4 FL (ref 9.4–12.3)
POTASSIUM SERPL-SCNC: 4 MMOL/L (ref 3.5–5)
RBC # BLD: 4.03 M/UL (ref 4.2–5.4)
SODIUM BLD-SCNC: 140 MMOL/L (ref 136–145)
TOTAL IRON BINDING CAPACITY: 228 UG/DL (ref 250–400)
TOTAL PROTEIN: 7.2 G/DL (ref 6.6–8.7)
TRIGL SERPL-MCNC: 182 MG/DL (ref 0–149)
TSH SERPL DL<=0.05 MIU/L-ACNC: 1.11 UIU/ML (ref 0.27–4.2)
VITAMIN B-12: 508 PG/ML (ref 211–946)
WBC # BLD: 6.3 K/UL (ref 4.8–10.8)

## 2020-06-22 ENCOUNTER — TELEPHONE (OUTPATIENT)
Dept: FAMILY MEDICINE CLINIC | Age: 75
End: 2020-06-22

## 2020-06-22 RX ORDER — METHYLPREDNISOLONE 4 MG/1
TABLET ORAL
Qty: 1 KIT | Refills: 0 | Status: SHIPPED | OUTPATIENT
Start: 2020-06-22 | End: 2020-06-28

## 2020-07-10 RX ORDER — LORAZEPAM 0.5 MG/1
TABLET ORAL
Qty: 90 TABLET | Refills: 2 | Status: SHIPPED | OUTPATIENT
Start: 2020-07-10 | End: 2020-11-25

## 2020-08-12 RX ORDER — ESOMEPRAZOLE MAGNESIUM 40 MG/1
40 CAPSULE, DELAYED RELEASE ORAL
Qty: 90 CAPSULE | Refills: 3 | Status: SHIPPED | OUTPATIENT
Start: 2020-08-12 | End: 2021-01-13 | Stop reason: SDUPTHER

## 2020-08-12 RX ORDER — DOXEPIN HYDROCHLORIDE 75 MG/1
75 CAPSULE ORAL NIGHTLY
Qty: 90 CAPSULE | Refills: 3 | Status: SHIPPED | OUTPATIENT
Start: 2020-08-12 | End: 2021-04-05

## 2020-10-02 RX ORDER — CARVEDILOL 25 MG/1
TABLET ORAL
Qty: 180 TABLET | Refills: 3 | Status: SHIPPED | OUTPATIENT
Start: 2020-10-02 | End: 2021-06-28

## 2020-10-02 RX ORDER — PNV NO.95/FERROUS FUM/FOLIC AC 28MG-0.8MG
TABLET ORAL
Qty: 90 TABLET | Refills: 3 | Status: SHIPPED | OUTPATIENT
Start: 2020-10-02 | End: 2021-01-13 | Stop reason: SDUPTHER

## 2020-10-08 ENCOUNTER — TELEPHONE (OUTPATIENT)
Dept: FAMILY MEDICINE CLINIC | Age: 75
End: 2020-10-08

## 2020-10-08 RX ORDER — CLINDAMYCIN HYDROCHLORIDE 150 MG/1
600 CAPSULE ORAL ONCE
Qty: 4 CAPSULE | Refills: 2 | Status: SHIPPED | OUTPATIENT
Start: 2020-10-08 | End: 2021-01-14 | Stop reason: SDUPTHER

## 2020-10-08 RX ORDER — SUCRALFATE 1 G/1
1 TABLET ORAL 2 TIMES DAILY
Qty: 60 TABLET | Refills: 5 | Status: SHIPPED | OUTPATIENT
Start: 2020-10-08 | End: 2022-01-01 | Stop reason: SDUPTHER

## 2020-10-08 RX ORDER — NYSTATIN 100000 [USP'U]/G
POWDER TOPICAL
Qty: 60 G | Refills: 5 | Status: SHIPPED | OUTPATIENT
Start: 2020-10-08

## 2020-10-08 NOTE — TELEPHONE ENCOUNTER
Pt has an upcoming dentist appointment and needs her prophylactic antibiotic to [facility].

## 2020-10-15 RX ORDER — LORAZEPAM 0.5 MG/1
TABLET ORAL
Qty: 90 TABLET | Refills: 2 | Status: SHIPPED | OUTPATIENT
Start: 2020-10-15 | End: 2020-11-15

## 2020-10-15 RX ORDER — TRAMADOL HYDROCHLORIDE 50 MG/1
50 TABLET ORAL EVERY 6 HOURS PRN
Qty: 120 TABLET | Refills: 5 | Status: SHIPPED | OUTPATIENT
Start: 2020-10-15 | End: 2020-11-14

## 2020-10-15 RX ORDER — GABAPENTIN 100 MG/1
CAPSULE ORAL
Qty: 270 CAPSULE | Refills: 1 | Status: SHIPPED | OUTPATIENT
Start: 2020-10-15 | End: 2021-01-21 | Stop reason: SDUPTHER

## 2020-10-15 NOTE — TELEPHONE ENCOUNTER
Requesting med refills to [facility]. Pt is also requesting something a little stronger when she has flare ups with her back pain to use prn. She currently takes 2 tylenol at bedtime and once or twice a day as needed right now. Pt does not want Norco or Percocet, they make her itch is she takes a very high dose.

## 2020-10-16 RX ORDER — AMLODIPINE BESYLATE 5 MG/1
TABLET ORAL
Qty: 90 TABLET | Refills: 3 | Status: SHIPPED | OUTPATIENT
Start: 2020-10-16 | End: 2021-01-13 | Stop reason: SDUPTHER

## 2020-10-16 RX ORDER — OLMESARTAN MEDOXOMIL 40 MG/1
TABLET ORAL
Qty: 90 TABLET | Refills: 3 | Status: SHIPPED | OUTPATIENT
Start: 2020-10-16 | End: 2021-01-13 | Stop reason: SDUPTHER

## 2020-10-16 RX ORDER — VENLAFAXINE HYDROCHLORIDE 150 MG/1
CAPSULE, EXTENDED RELEASE ORAL
Qty: 90 CAPSULE | Refills: 3 | Status: SHIPPED | OUTPATIENT
Start: 2020-10-16 | End: 2021-01-13 | Stop reason: SDUPTHER

## 2020-10-16 RX ORDER — LEVOTHYROXINE SODIUM 0.1 MG/1
TABLET ORAL
Qty: 90 TABLET | Refills: 3 | Status: SHIPPED | OUTPATIENT
Start: 2020-10-16 | End: 2021-01-13 | Stop reason: SDUPTHER

## 2020-10-21 ENCOUNTER — TELEPHONE (OUTPATIENT)
Dept: FAMILY MEDICINE CLINIC | Age: 75
End: 2020-10-21

## 2020-10-21 RX ORDER — METHYLPREDNISOLONE 4 MG/1
TABLET ORAL
Qty: 1 KIT | Refills: 0 | Status: SHIPPED | OUTPATIENT
Start: 2020-10-21 | End: 2020-10-27

## 2020-10-28 ENCOUNTER — VIRTUAL VISIT (OUTPATIENT)
Dept: FAMILY MEDICINE CLINIC | Age: 75
End: 2020-10-28
Payer: MEDICARE

## 2020-10-28 VITALS
WEIGHT: 175 LBS | BODY MASS INDEX: 29.16 KG/M2 | HEIGHT: 65 IN | SYSTOLIC BLOOD PRESSURE: 149 MMHG | DIASTOLIC BLOOD PRESSURE: 74 MMHG

## 2020-10-28 PROCEDURE — 1123F ACP DISCUSS/DSCN MKR DOCD: CPT | Performed by: FAMILY MEDICINE

## 2020-10-28 PROCEDURE — G0438 PPPS, INITIAL VISIT: HCPCS | Performed by: FAMILY MEDICINE

## 2020-10-28 PROCEDURE — 4040F PNEUMOC VAC/ADMIN/RCVD: CPT | Performed by: FAMILY MEDICINE

## 2020-10-28 ASSESSMENT — PATIENT HEALTH QUESTIONNAIRE - PHQ9
SUM OF ALL RESPONSES TO PHQ QUESTIONS 1-9: 1
1. LITTLE INTEREST OR PLEASURE IN DOING THINGS: 0
SUM OF ALL RESPONSES TO PHQ QUESTIONS 1-9: 1
2. FEELING DOWN, DEPRESSED OR HOPELESS: 1
SUM OF ALL RESPONSES TO PHQ QUESTIONS 1-9: 1
SUM OF ALL RESPONSES TO PHQ9 QUESTIONS 1 & 2: 1

## 2020-10-28 ASSESSMENT — LIFESTYLE VARIABLES: HOW OFTEN DO YOU HAVE A DRINK CONTAINING ALCOHOL: 0

## 2020-10-28 NOTE — PROGRESS NOTES
Medicare Annual Wellness Visit  Name: Bunny Wills Date: 10/28/2020   MRN: [id number] Sex: Female   Age: 66 y.o. Ethnicity: Non-/Non    : 1945 Race: Theresa Mckeon is here for Medicare AWV    Screenings for behavioral, psychosocial and functional/safety risks, and cognitive dysfunction are all negative except as indicated below. These results, as well as other patient data from the [address] form, are documented in Flowsheets linked to this Encounter. Allergies   Allergen Reactions    Penicillins     Sulfa Antibiotics        Prior to Visit Medications    Medication Sig Taking? Authorizing Provider   amLODIPine (NORVASC) 5 MG tablet TAKE ONE TABLET BY MOUTH DAILY Yes Blessing Ruano MD   levothyroxine (SYNTHROID) 100 MCG tablet TAKE ONE TABLET BY MOUTH DAILY Yes Blessing Ruano MD   venlafaxine (EFFEXOR XR) 150 MG extended release capsule TAKE ONE CAPSULE BY MOUTH DAILY Yes Blessing Ruano MD   olmesartan (BENICAR) 40 MG tablet TAKE ONE TABLET BY MOUTH DAILY Yes Blessing Ruano MD   gabapentin (NEURONTIN) 100 MG capsule TAKE ONE CAPSULE BY MOUTH THREE TIMES DAILY Yes Blessing Ruano MD   LORazepam (ATIVAN) 0.5 MG tablet TAKE ONE TABLET BY MOUTH THREE TIMES DAILY AS NEEDED FOR ANXIETY Yes Blessing Ruano MD   traMADol (ULTRAM) 50 MG tablet Take 1 tablet by mouth every 6 hours as needed for Pain for up to 30 days.  Yes Blessing Ruano MD   nystatin (MYCOSTATIN) 780206 UNIT/GM powder Apply 3 times daily to stomach and groin Yes Blessing Ruano MD   sucralfate (CARAFATE) 1 GM tablet Take 1 tablet by mouth 2 times daily Yes Blessing Ruano MD   Ferrous Sulfate (IRON) 325 (65 Fe) MG TABS TAKE ONE TABLET BY MOUTH DAILY WITH BREAKFAST Yes Blessing Ruano MD   carvedilol (COREG) 25 MG tablet TAKE ONE TABLET BY MOUTH TWICE DAILY Yes Blessing Ruano MD   doxepin (SINEQUAN) 75 MG capsule Take 1 capsule by mouth nightly Yes Blessing Ruano MD   esomeprazole ([address]) 40 MG delayed release capsule Take 1 capsule by MG/3ML SOLN nebulizer solution Inhale 3 mLs into the lungs every 4 hours (while awake)  Michael Gilmore MD       Past Medical History:   Diagnosis Date    Allergic rhinitis     Anxiety     Arthritis     Asthma     Chronic back pain     Chronic kidney disease     DDD (degenerative disc disease), lumbar 5/21/2019    Depression     GERD (gastroesophageal reflux disease)     Hyperlipidemia     Hypertension     Hypothyroidism     Irritable bowel syndrome     Palliative care patient 06/18/2019    Restless legs syndrome     Spondylolisthesis at L4-L5 level 5/21/2019       Past Surgical History:   Procedure Laterality Date    ANKLE FRACTURE SURGERY Left     metal plate & screws    CERVICAL SPINE SURGERY  x 2    WITH HARDWARE    CHOLECYSTECTOMY  02/2015    Dr. Juan Carlos Crews Left 02/2015    Hammertoe correction - Dr. Janet Perez Left 2/2007    Thumb implant    HYSTERECTOMY      JOINT REPLACEMENT Bilateral     TKR    JOINT REPLACEMENT Right     TSR    JOINT REPLACEMENT Left     THUMB    LUMBAR FUSION Left 5/21/2019    LEFT L4-5 LLIF WITH POSTERIOR SPINAL FUSION WITH INSTRUMENTATION performed by Janiya Epps MD at [address] [place] [address] Road Right 3/2010    [place] [address] INCL FLUOR GDNCE DX W/CELL WASHG [facility] N/A 7/17/2018    BRONCHOSCOPY AND BAL with Nurse sedation performed by Oswald Callahan MD at [address] [facility]    SHOULDER SURGERY Right 03/06/2018    Dr. Krishan Ramos - Total reversal replacement    TOTAL KNEE ARTHROPLASTY Bilateral     at two different times       Family History   Problem Relation Age of Onset    High Blood Pressure Mother     Stroke Mother     Dementia Mother         late onset   Jackie Segura Depression Mother     Anxiety Disorder Mother     Kidney Disease Mother     Osteoporosis Mother     Substance Abuse Father         Alcohol    Kidney Disease Father         dialysis    Diabetes Maternal Aunt     Kidney Disease Maternal Aunt     Cancer Maternal Grandmother Colon Cancer    Osteoporosis Maternal Grandmother     Anxiety Disorder Maternal Grandmother     Depression Maternal Grandmother        CareTeam (Including outside providers/suppliers regularly involved in providing care):   Patient Care Team:  Eren Smith MD as PCP - General (Family Medicine)  Eren Smith MD as PCP - [facility] Empaneled Provider  Mendel Provost, MD as Consulting Physician (Nephrology)  Devin Clay as Consulting Physician (Orthopedic Surgery)  Sarmad Rivers DO as Consulting Physician (Gastroenterology)  Amy Finn MD as Consulting Physician (Ophthalmology)    Wt Readings from Last 3 Encounters:   10/28/20 175 lb (79.4 kg)   09/05/19 166 lb (75.3 kg)   07/11/19 160 lb (72.6 kg)     Vitals:    10/28/20 1433   BP: (!) 149/74   Weight: 175 lb (79.4 kg)   Height: 5' 5\" (1.651 m)     Body mass index is 29.12 kg/m². Based upon direct observation of the patient, evaluation of cognition reveals recent and remote memory intact. Patient's complete Health Risk Assessment and screening values have been reviewed and are found in Flowsheets. The following problems were reviewed today and where indicated follow up appointments were made and/or referrals ordered. Positive Risk Factor Screenings with Interventions:     Fall Risk:  Timed Up and Go Test > 12 seconds? (Complete if either Fall Risk answers are Yes): no  2 or more falls in past year?: (!) yes(Has lost balance while transferring from w/c to chair and lost balance while walking.)  Fall with injury in past year?: no  Fall Risk Interventions:    · Home safety tips provided    General Health and ACP:  General  In general, how would you say your health is?: Fair  In the past 7 days, have you experienced any of the following?  New or Increased Pain, New or Increased Fatigue, Loneliness, Social Isolation, Stress or Anger?: (!) Loneliness  Do you get the social and emotional support that you need?: Yes  Do you have a Living Will?: Yes  Advance Directives shopping, telephone use, food preparation, transportation, or taking medications?: [employer], Housekeeping, Laundry, [employer], Shopping, Taking Medications(Daughter provided care.)  ADL Interventions:  · Patient declines any further evaluation/treatment for this issue    Personalized Preventive Plan   Current Health Maintenance Status  Immunization History   Administered Date(s) Administered    Influenza Virus Vaccine 10/14/2014    Influenza, High Dose (Fluzone 65 yrs and older) 10/09/2015, 10/11/2017, 09/12/2018, 10/10/2019, 09/17/2020    Influenza, Quadv, IM, (6 mo and older Fluzone, Flulaval, Fluarix and 3 yrs and older Afluria) 10/04/2016    Pneumococcal Conjugate 13-valent ([id number]) 10/26/2015    Pneumococcal Polysaccharide ([id number]) 10/26/2010        Health Maintenance   Topic Date Due    DTaP/Tdap/Td vaccine (1 - Tdap) 02/13/1962    Shingles Vaccine (1 of 2) 02/13/1993    Annual Wellness Visit (AWV)  05/29/2019    TSH testing  06/04/2021    Potassium monitoring  06/04/2021    Creatinine monitoring  06/04/2021    Flu vaccine  Completed    Pneumococcal 65+ yrs at Risk Vaccine  Completed    Hepatitis A vaccine  Aged Out    Hepatitis B vaccine  Aged Out    Hib vaccine  Aged Out    Meningococcal (ACWY) vaccine  Aged Out     Recommendations for [place] Due: see orders and patient instructions/AVS.  Health maintenance plan reviewed with patient. .  Recommended screening schedule for the next 5-10 years is provided to the patient in written form: see Patient Instructions/AVS.    Melissa LEE LPN, 75/73/2656, performed the documented evaluation under the direct supervision of the attending physician. Pieter Salguero is a 66 y.o. female evaluated via telephone on 10/28/2020.       Consent:  She and/or health care decision maker is aware that that she may receive a bill for this telephone service, depending on her insurance coverage, and has provided verbal consent to proceed: Yes      Documentation:  I communicated with the patient and/or health care decision maker about AWV. Details of this discussion including any medical advice provided. I affirm this is a Patient Initiated Episode with a Patient who has not had a related appointment within my department in the past 7 days or scheduled within the next 24 hours.     Patient identification was verified at the start of the visit: Yes    Total Time: minutes: 21-30 minutes    Note: not billable if this call serves to triage the patient into an appointment for the relevant concern      Preethi Koch

## 2020-10-28 NOTE — PATIENT INSTRUCTIONS
Personalized Preventive Plan for Jarvis Cherry - 10/28/2020  Medicare offers a range of preventive health benefits. Some of the tests and screenings are paid in full while other may be subject to a deductible, co-insurance, and/or copay. Some of these benefits include a comprehensive review of your medical history including lifestyle, illnesses that may run in your family, and various assessments and screenings as appropriate. After reviewing your medical record and screening and assessments performed today your provider may have ordered immunizations, labs, imaging, and/or referrals for you. A list of these orders (if applicable) as well as your Preventive Care list are included within your After Visit Summary for your review. Other Preventive Recommendations:    · A preventive eye exam performed by an eye specialist is recommended every 1-2 years to screen for glaucoma; cataracts, macular degeneration, and other eye disorders. · A preventive dental visit is recommended every 6 months. · Try to get at least 150 minutes of exercise per week or 10,000 steps per day on a pedometer . · Order or download the FREE \"Exercise & Physical Activity: Your Everyday Guide\" from [employer]. Call 8-412.830.9248 or search [employer] online. · You need 2364-3239 mg of calcium and 1164-1344 IU of vitamin D per day. It is possible to meet your calcium requirement with diet alone, but a vitamin D supplement is usually necessary to meet this goal.  · When exposed to the sun, use a sunscreen that protects against both UVA and UVB radiation with an SPF of 30 or greater. Reapply every 2 to 3 hours or after sweating, drying off with a towel, or swimming. · Always wear a seat belt when traveling in a car. Always wear a helmet when riding a bicycle or motorcycle.

## 2020-11-13 DIAGNOSIS — N18.4 CHRONIC KIDNEY DISEASE (CKD), STAGE IV (SEVERE) (HCC): ICD-10-CM

## 2020-11-13 DIAGNOSIS — I10 ESSENTIAL HYPERTENSION: ICD-10-CM

## 2020-11-13 DIAGNOSIS — E03.9 HYPOTHYROIDISM, UNSPECIFIED TYPE: ICD-10-CM

## 2020-11-13 DIAGNOSIS — K21.9 GASTROESOPHAGEAL REFLUX DISEASE WITHOUT ESOPHAGITIS: ICD-10-CM

## 2020-11-13 DIAGNOSIS — F41.9 ANXIETY: ICD-10-CM

## 2020-11-13 LAB
ALBUMIN SERPL-MCNC: 4.2 G/DL (ref 3.5–5.2)
ALP BLD-CCNC: 87 U/L (ref 35–104)
ALT SERPL-CCNC: 28 U/L (ref 5–33)
ANION GAP SERPL CALCULATED.3IONS-SCNC: 11 MMOL/L (ref 7–19)
AST SERPL-CCNC: 28 U/L (ref 5–32)
BACTERIA: NEGATIVE /HPF
BILIRUB SERPL-MCNC: 0.3 MG/DL (ref 0.2–1.2)
BILIRUBIN URINE: NEGATIVE
BLOOD, URINE: NEGATIVE
BUN BLDV-MCNC: 11 MG/DL (ref 8–23)
CALCIUM SERPL-MCNC: 9.2 MG/DL (ref 8.8–10.2)
CHLORIDE BLD-SCNC: 101 MMOL/L (ref 98–111)
CHOLESTEROL, TOTAL: 144 MG/DL (ref 160–199)
CLARITY: CLEAR
CO2: 24 MMOL/L (ref 22–29)
COLOR: YELLOW
CREAT SERPL-MCNC: 0.8 MG/DL (ref 0.5–0.9)
CRYSTALS, UA: ABNORMAL /HPF
EPITHELIAL CELLS, UA: 8 /HPF (ref 0–5)
GFR AFRICAN AMERICAN: >59
GFR NON-AFRICAN AMERICAN: >60
GLUCOSE BLD-MCNC: 95 MG/DL (ref 74–109)
GLUCOSE URINE: NEGATIVE MG/DL
HCT VFR BLD CALC: 39.6 % (ref 37–47)
HDLC SERPL-MCNC: 41 MG/DL (ref 65–121)
HEMOGLOBIN: 13.1 G/DL (ref 12–16)
HYALINE CASTS: 3 /HPF (ref 0–8)
KETONES, URINE: NEGATIVE MG/DL
LDL CHOLESTEROL CALCULATED: 79 MG/DL
LEUKOCYTE ESTERASE, URINE: ABNORMAL
MCH RBC QN AUTO: 31.1 PG (ref 27–31)
MCHC RBC AUTO-ENTMCNC: 33.1 G/DL (ref 33–37)
MCV RBC AUTO: 94.1 FL (ref 81–99)
NITRITE, URINE: NEGATIVE
PDW BLD-RTO: 12.2 % (ref 11.5–14.5)
PH UA: 6.5 (ref 5–8)
PLATELET # BLD: 233 K/UL (ref 130–400)
PMV BLD AUTO: 10.7 FL (ref 9.4–12.3)
POTASSIUM SERPL-SCNC: 3.7 MMOL/L (ref 3.5–5)
PROTEIN UA: NEGATIVE MG/DL
RBC # BLD: 4.21 M/UL (ref 4.2–5.4)
RBC UA: 3 /HPF (ref 0–4)
SODIUM BLD-SCNC: 136 MMOL/L (ref 136–145)
SPECIFIC GRAVITY UA: 1.01 (ref 1–1.03)
TOTAL PROTEIN: 6.9 G/DL (ref 6.6–8.7)
TRIGL SERPL-MCNC: 122 MG/DL (ref 0–149)
TSH SERPL DL<=0.05 MIU/L-ACNC: 0.91 UIU/ML (ref 0.27–4.2)
UROBILINOGEN, URINE: 0.2 E.U./DL
VITAMIN B-12: 403 PG/ML (ref 211–946)
VITAMIN D 25-HYDROXY: 31.7 NG/ML
WBC # BLD: 8.2 K/UL (ref 4.8–10.8)
WBC UA: 2 /HPF (ref 0–5)

## 2020-11-25 RX ORDER — LORAZEPAM 0.5 MG/1
TABLET ORAL
Qty: 90 TABLET | Refills: 2 | Status: SHIPPED | OUTPATIENT
Start: 2020-11-25 | End: 2021-01-21 | Stop reason: SDUPTHER

## 2020-11-25 RX ORDER — CETIRIZINE HYDROCHLORIDE 10 MG/1
TABLET ORAL
Qty: 90 TABLET | Refills: 1 | Status: SHIPPED | OUTPATIENT
Start: 2020-11-25 | End: 2021-02-19 | Stop reason: SDUPTHER

## 2021-01-01 ENCOUNTER — TELEPHONE (OUTPATIENT)
Dept: FAMILY MEDICINE CLINIC | Age: 76
End: 2021-01-01

## 2021-01-01 ENCOUNTER — TRANSCRIBE ORDERS (OUTPATIENT)
Dept: LAB | Facility: HOSPITAL | Age: 76
End: 2021-01-01

## 2021-01-01 ENCOUNTER — TELEPHONE (OUTPATIENT)
Dept: OBSTETRICS AND GYNECOLOGY | Facility: CLINIC | Age: 76
End: 2021-01-01

## 2021-01-01 ENCOUNTER — NURSE ONLY (OUTPATIENT)
Dept: PRIMARY CARE CLINIC | Age: 76
End: 2021-01-01
Payer: MEDICARE

## 2021-01-01 ENCOUNTER — PATIENT ROUNDING (BHMG ONLY) (OUTPATIENT)
Dept: OBSTETRICS AND GYNECOLOGY | Facility: CLINIC | Age: 76
End: 2021-01-01

## 2021-01-01 ENCOUNTER — VIRTUAL VISIT (OUTPATIENT)
Dept: FAMILY MEDICINE CLINIC | Age: 76
End: 2021-01-01
Payer: MEDICARE

## 2021-01-01 ENCOUNTER — HOSPITAL ENCOUNTER (OUTPATIENT)
Dept: GENERAL RADIOLOGY | Age: 76
Discharge: HOME | End: 2021-12-10
Payer: MEDICARE

## 2021-01-01 ENCOUNTER — OFFICE VISIT (OUTPATIENT)
Dept: OBSTETRICS AND GYNECOLOGY | Facility: CLINIC | Age: 76
End: 2021-01-01

## 2021-01-01 VITALS
WEIGHT: 184 LBS | BODY MASS INDEX: 31.41 KG/M2 | SYSTOLIC BLOOD PRESSURE: 102 MMHG | DIASTOLIC BLOOD PRESSURE: 64 MMHG | HEIGHT: 64 IN

## 2021-01-01 VITALS
BODY MASS INDEX: 31.41 KG/M2 | HEIGHT: 64 IN | WEIGHT: 184 LBS | SYSTOLIC BLOOD PRESSURE: 136 MMHG | DIASTOLIC BLOOD PRESSURE: 74 MMHG

## 2021-01-01 VITALS
DIASTOLIC BLOOD PRESSURE: 72 MMHG | SYSTOLIC BLOOD PRESSURE: 128 MMHG | WEIGHT: 184 LBS | HEIGHT: 64 IN | BODY MASS INDEX: 31.41 KG/M2

## 2021-01-01 DIAGNOSIS — N81.10 CYSTOCELE WITH RECTOCELE: ICD-10-CM

## 2021-01-01 DIAGNOSIS — N81.11 MIDLINE CYSTOCELE: Primary | ICD-10-CM

## 2021-01-01 DIAGNOSIS — N39.0 URINARY TRACT INFECTION WITHOUT HEMATURIA, SITE UNSPECIFIED: ICD-10-CM

## 2021-01-01 DIAGNOSIS — N39.46 MIXED INCONTINENCE: ICD-10-CM

## 2021-01-01 DIAGNOSIS — R31.9 HEMATURIA, UNSPECIFIED TYPE: ICD-10-CM

## 2021-01-01 DIAGNOSIS — Z46.89 FITTING AND ADJUSTMENT OF PESSARY: Primary | ICD-10-CM

## 2021-01-01 DIAGNOSIS — Z87.440 HISTORY OF UTI: Primary | ICD-10-CM

## 2021-01-01 DIAGNOSIS — E66.09 CLASS 1 OBESITY DUE TO EXCESS CALORIES WITHOUT SERIOUS COMORBIDITY WITH BODY MASS INDEX (BMI) OF 31.0 TO 31.9 IN ADULT: ICD-10-CM

## 2021-01-01 DIAGNOSIS — R82.90 ABNORMAL URINE ODOR: ICD-10-CM

## 2021-01-01 DIAGNOSIS — N81.6 CYSTOCELE WITH RECTOCELE: ICD-10-CM

## 2021-01-01 DIAGNOSIS — Z00.00 ROUTINE GENERAL MEDICAL EXAMINATION AT A HEALTH CARE FACILITY: Primary | ICD-10-CM

## 2021-01-01 DIAGNOSIS — H44.113 PANUVEITIS, BILATERAL: ICD-10-CM

## 2021-01-01 DIAGNOSIS — Z23 NEED FOR COVID-19 VACCINE: Primary | ICD-10-CM

## 2021-01-01 DIAGNOSIS — H20.9 UVEITIS: ICD-10-CM

## 2021-01-01 DIAGNOSIS — I34.1 MVP (MITRAL VALVE PROLAPSE): ICD-10-CM

## 2021-01-01 DIAGNOSIS — N39.41 URGE INCONTINENCE: ICD-10-CM

## 2021-01-01 DIAGNOSIS — Z88.9 MULTIPLE ALLERGIES: ICD-10-CM

## 2021-01-01 DIAGNOSIS — Z01.818 PREOPERATIVE TESTING: Primary | ICD-10-CM

## 2021-01-01 DIAGNOSIS — N30.01 ACUTE CYSTITIS WITH HEMATURIA: Primary | ICD-10-CM

## 2021-01-01 LAB
ALBUMIN SERPL-MCNC: 3.9 G/DL (ref 3.5–5.2)
ALP BLD-CCNC: 101 U/L (ref 35–104)
ALT SERPL-CCNC: 16 U/L (ref 5–33)
ANGIOTENSIN CONVERTING ENZYME: 34 U/L (ref 9–67)
ANION GAP SERPL CALCULATED.3IONS-SCNC: 11 MMOL/L (ref 7–19)
APPEARANCE UR: ABNORMAL
APPEARANCE UR: ABNORMAL
AST SERPL-CCNC: 18 U/L (ref 5–32)
BACTERIA #/AREA URNS HPF: ABNORMAL /HPF
BACTERIA #/AREA URNS HPF: ABNORMAL /HPF
BACTERIA UR CULT: ABNORMAL
BASOPHILS ABSOLUTE: 0.1 K/UL (ref 0–0.2)
BASOPHILS RELATIVE PERCENT: 0.8 % (ref 0–1)
BILIRUB BLD-MCNC: NEGATIVE MG/DL
BILIRUB SERPL-MCNC: <0.2 MG/DL (ref 0.2–1.2)
BILIRUB UR QL STRIP: NEGATIVE
BILIRUB UR QL STRIP: NEGATIVE
BUN BLDV-MCNC: 21 MG/DL (ref 8–23)
CALCIUM SERPL-MCNC: 8.8 MG/DL (ref 8.8–10.2)
CASTS URNS MICRO: ABNORMAL
CASTS URNS MICRO: ABNORMAL
CHLORIDE BLD-SCNC: 103 MMOL/L (ref 98–111)
CLARITY, POC: ABNORMAL
CLARITY, POC: ABNORMAL
CLARITY, POC: CLEAR
CO2: 22 MMOL/L (ref 22–29)
COLOR UR: YELLOW
CREAT SERPL-MCNC: 1.6 MG/DL (ref 0.5–0.9)
EOSINOPHILS ABSOLUTE: 0.2 K/UL (ref 0–0.6)
EOSINOPHILS RELATIVE PERCENT: 2.6 % (ref 0–5)
EPI CELLS #/AREA URNS HPF: ABNORMAL /HPF
EPI CELLS #/AREA URNS HPF: ABNORMAL /HPF
GFR AFRICAN AMERICAN: 38
GFR NON-AFRICAN AMERICAN: 31
GLUCOSE BLD-MCNC: 109 MG/DL (ref 74–109)
GLUCOSE UR QL: NEGATIVE
GLUCOSE UR QL: NEGATIVE
GLUCOSE UR STRIP-MCNC: NEGATIVE MG/DL
HCT VFR BLD CALC: 36.3 % (ref 37–47)
HEMOGLOBIN: 11.7 G/DL (ref 12–16)
HGB UR QL STRIP: ABNORMAL
HGB UR QL STRIP: ABNORMAL
IMMATURE GRANULOCYTES #: 0.1 K/UL
KETONES UR QL STRIP: NEGATIVE
KETONES UR QL STRIP: NEGATIVE
KETONES UR QL: NEGATIVE
LEUKOCYTE EST, POC: ABNORMAL
LEUKOCYTE ESTERASE UR QL STRIP: ABNORMAL
LEUKOCYTE ESTERASE UR QL STRIP: ABNORMAL
LYMPHOCYTES ABSOLUTE: 2.2 K/UL (ref 1.1–4.5)
LYMPHOCYTES RELATIVE PERCENT: 28.3 % (ref 20–40)
MCH RBC QN AUTO: 31 PG (ref 27–31)
MCHC RBC AUTO-ENTMCNC: 32.2 G/DL (ref 33–37)
MCV RBC AUTO: 96.3 FL (ref 81–99)
MONOCYTES ABSOLUTE: 1 K/UL (ref 0–0.9)
MONOCYTES RELATIVE PERCENT: 12.5 % (ref 0–10)
NEUTROPHILS ABSOLUTE: 4.3 K/UL (ref 1.5–7.5)
NEUTROPHILS RELATIVE PERCENT: 55.2 % (ref 50–65)
NITRITE UR QL STRIP: NEGATIVE
NITRITE UR QL STRIP: NEGATIVE
NITRITE UR-MCNC: NEGATIVE MG/ML
NITRITE UR-MCNC: POSITIVE MG/ML
NITRITE UR-MCNC: POSITIVE MG/ML
OTHER ANTIBIOTIC SUSC ISLT: ABNORMAL
OTHER ANTIBIOTIC SUSC ISLT: ABNORMAL
PDW BLD-RTO: 12.8 % (ref 11.5–14.5)
PH UR STRIP: 6 [PH] (ref 5–8)
PH UR STRIP: 6.5 [PH] (ref 5–8)
PH UR: 5 [PH] (ref 5–8)
PLATELET # BLD: 235 K/UL (ref 130–400)
PMV BLD AUTO: 9.8 FL (ref 9.4–12.3)
POTASSIUM SERPL-SCNC: 4 MMOL/L (ref 3.5–5)
PROT UR QL STRIP: ABNORMAL
PROT UR QL STRIP: ABNORMAL
PROT UR STRIP-MCNC: ABNORMAL MG/DL
PROT UR STRIP-MCNC: ABNORMAL MG/DL
PROT UR STRIP-MCNC: NEGATIVE MG/DL
QUANTI TB GOLD PLUS: NEGATIVE
QUANTI TB1 MINUS NIL: 0 IU/ML (ref 0–0.34)
QUANTI TB2 MINUS NIL: 0.01 IU/ML (ref 0–0.34)
QUANTIFERON MITOGEN: 7.24 IU/ML
QUANTIFERON NIL: 0.01 IU/ML
RBC # BLD: 3.77 M/UL (ref 4.2–5.4)
RBC # UR STRIP: ABNORMAL /UL
RBC # UR STRIP: NEGATIVE /UL
RBC # UR STRIP: NEGATIVE /UL
RBC #/AREA URNS HPF: ABNORMAL /HPF
RBC #/AREA URNS HPF: ABNORMAL /HPF
RPR: NON REACTIVE
RPR: NORMAL
SODIUM BLD-SCNC: 136 MMOL/L (ref 136–145)
SP GR UR: 1 (ref 1–1.03)
SP GR UR: 1.01 (ref 1–1.03)
TOTAL PROTEIN: 7 G/DL (ref 6.6–8.7)
UROBILINOGEN UR QL: NORMAL
UROBILINOGEN UR STRIP-MCNC: ABNORMAL MG/DL
UROBILINOGEN UR STRIP-MCNC: ABNORMAL MG/DL
WBC # BLD: 7.8 K/UL (ref 4.8–10.8)
WBC #/AREA URNS HPF: ABNORMAL /HPF
WBC #/AREA URNS HPF: ABNORMAL /HPF

## 2021-01-01 PROCEDURE — 57160 INSERT PESSARY/OTHER DEVICE: CPT | Performed by: NURSE PRACTITIONER

## 2021-01-01 PROCEDURE — 81002 URINALYSIS NONAUTO W/O SCOPE: CPT | Performed by: NURSE PRACTITIONER

## 2021-01-01 PROCEDURE — 99203 OFFICE O/P NEW LOW 30 MIN: CPT | Performed by: NURSE PRACTITIONER

## 2021-01-01 PROCEDURE — A4562 PESSARY, NON RUBBER,ANY TYPE: HCPCS | Performed by: NURSE PRACTITIONER

## 2021-01-01 PROCEDURE — 4040F PNEUMOC VAC/ADMIN/RCVD: CPT | Performed by: FAMILY MEDICINE

## 2021-01-01 PROCEDURE — 71046 X-RAY EXAM CHEST 2 VIEWS: CPT

## 2021-01-01 PROCEDURE — G8482 FLU IMMUNIZE ORDER/ADMIN: HCPCS | Performed by: FAMILY MEDICINE

## 2021-01-01 PROCEDURE — 99213 OFFICE O/P EST LOW 20 MIN: CPT | Performed by: NURSE PRACTITIONER

## 2021-01-01 PROCEDURE — 1036F TOBACCO NON-USER: CPT | Performed by: NURSE PRACTITIONER

## 2021-01-01 PROCEDURE — 1123F ACP DISCUSS/DSCN MKR DOCD: CPT | Performed by: FAMILY MEDICINE

## 2021-01-01 PROCEDURE — G0439 PPPS, SUBSEQ VISIT: HCPCS | Performed by: FAMILY MEDICINE

## 2021-01-01 PROCEDURE — 91300 COVID-19, PFIZER VACCINE 30MCG/0.3ML DOSE: CPT | Performed by: PEDIATRICS

## 2021-01-01 PROCEDURE — 0004A COVID-19, PFIZER VACCINE 30MCG/0.3ML DOSE: CPT | Performed by: PEDIATRICS

## 2021-01-01 RX ORDER — TRAMADOL HYDROCHLORIDE 50 MG/1
50 TABLET ORAL
COMMUNITY
Start: 2021-10-10 | End: 2021-01-01

## 2021-01-01 RX ORDER — DOXEPIN HYDROCHLORIDE 75 MG/1
75 CAPSULE ORAL
COMMUNITY
Start: 2021-09-20 | End: 2022-01-01 | Stop reason: HOSPADM

## 2021-01-01 RX ORDER — GABAPENTIN 100 MG/1
200 CAPSULE ORAL 2 TIMES DAILY
COMMUNITY
Start: 2021-01-01 | End: 2022-01-01 | Stop reason: HOSPADM

## 2021-01-01 RX ORDER — PREDNISOLONE ACETATE 10 MG/ML
SUSPENSION/ DROPS OPHTHALMIC
Status: ON HOLD | COMMUNITY
Start: 2021-01-01 | End: 2022-01-01

## 2021-01-01 RX ORDER — CLINDAMYCIN HYDROCHLORIDE 150 MG/1
600 CAPSULE ORAL ONCE
Qty: 4 CAPSULE | Refills: 2 | Status: SHIPPED | OUTPATIENT
Start: 2021-01-01 | End: 2021-01-01

## 2021-01-01 RX ORDER — OXYBUTYNIN CHLORIDE 5 MG/1
TABLET, EXTENDED RELEASE ORAL
Qty: 30 TABLET | Refills: 1 | Status: ON HOLD | OUTPATIENT
Start: 2021-01-01 | End: 2022-01-01

## 2021-01-01 RX ORDER — MOMETASONE FUROATE AND FORMOTEROL FUMARATE DIHYDRATE 200; 5 UG/1; UG/1
AEROSOL RESPIRATORY (INHALATION)
Status: ON HOLD | COMMUNITY
Start: 2021-09-20 | End: 2022-01-01

## 2021-01-01 RX ORDER — CIPROFLOXACIN 250 MG/1
250 TABLET, FILM COATED ORAL EVERY 12 HOURS
Qty: 6 TABLET | Refills: 0 | Status: SHIPPED | OUTPATIENT
Start: 2021-01-01 | End: 2021-01-01

## 2021-01-01 RX ORDER — LORAZEPAM 0.5 MG/1
0.5 TABLET ORAL 2 TIMES DAILY
COMMUNITY
Start: 2021-09-20 | End: 2022-01-01 | Stop reason: HOSPADM

## 2021-01-01 RX ORDER — CHLORTHALIDONE 25 MG/1
25 TABLET ORAL DAILY
Status: ON HOLD | COMMUNITY
Start: 2021-09-20 | End: 2022-01-01

## 2021-01-01 RX ORDER — LORAZEPAM 0.5 MG/1
0.5 TABLET ORAL 3 TIMES DAILY PRN
COMMUNITY
Start: 2021-09-20 | End: 2022-01-01

## 2021-01-01 RX ORDER — OXYBUTYNIN CHLORIDE 5 MG/1
1 TABLET, EXTENDED RELEASE ORAL DAILY
COMMUNITY
Start: 2021-01-01 | End: 2022-01-01 | Stop reason: ALTCHOICE

## 2021-01-01 RX ORDER — ACETAMINOPHEN 325 MG/1
650 TABLET ORAL NIGHTLY
COMMUNITY

## 2021-01-01 RX ORDER — CARVEDILOL 25 MG/1
25 TABLET ORAL 2 TIMES DAILY
COMMUNITY
Start: 2021-09-20 | End: 2022-01-01 | Stop reason: HOSPADM

## 2021-01-01 RX ORDER — DEXAMETHASONE 0.5 MG/5ML
ELIXIR ORAL
Status: ON HOLD | COMMUNITY
Start: 2021-01-01 | End: 2022-01-01

## 2021-01-01 RX ORDER — PREDNISOLONE ACETATE 10 MG/ML
1 SUSPENSION/ DROPS OPHTHALMIC 4 TIMES DAILY
COMMUNITY
Start: 2021-01-01 | End: 2022-01-01 | Stop reason: ALTCHOICE

## 2021-01-01 RX ORDER — FLUTICASONE PROPIONATE 50 MCG
SPRAY, SUSPENSION (ML) NASAL
Qty: 32 G | Refills: 3 | Status: SHIPPED | OUTPATIENT
Start: 2021-01-01

## 2021-01-01 RX ORDER — TRAMADOL HYDROCHLORIDE 50 MG/1
1 TABLET ORAL 3 TIMES DAILY PRN
Status: ON HOLD | COMMUNITY
End: 2022-01-01 | Stop reason: SDUPTHER

## 2021-01-01 RX ORDER — NITROFURANTOIN 25; 75 MG/1; MG/1
CAPSULE ORAL
Qty: 14 CAPSULE | Refills: 0 | Status: SHIPPED | OUTPATIENT
Start: 2021-01-01 | End: 2021-01-01

## 2021-01-01 RX ORDER — CIPROFLOXACIN 250 MG/1
TABLET, FILM COATED ORAL
Qty: 7 TABLET | Refills: 0 | Status: ON HOLD | OUTPATIENT
Start: 2021-01-01 | End: 2022-01-01

## 2021-01-01 RX ORDER — CETIRIZINE HYDROCHLORIDE 10 MG/1
10 TABLET ORAL DAILY
COMMUNITY
Start: 2021-09-20

## 2021-01-01 SDOH — ECONOMIC STABILITY: FOOD INSECURITY: WITHIN THE PAST 12 MONTHS, YOU WORRIED THAT YOUR FOOD WOULD RUN OUT BEFORE YOU GOT MONEY TO BUY MORE.: NEVER TRUE

## 2021-01-01 SDOH — ECONOMIC STABILITY: FOOD INSECURITY: WITHIN THE PAST 12 MONTHS, THE FOOD YOU BOUGHT JUST DIDN'T LAST AND YOU DIDN'T HAVE MONEY TO GET MORE.: NEVER TRUE

## 2021-01-01 ASSESSMENT — PATIENT HEALTH QUESTIONNAIRE - PHQ9
SUM OF ALL RESPONSES TO PHQ QUESTIONS 1-9: 0
SUM OF ALL RESPONSES TO PHQ9 QUESTIONS 1 & 2: 0
1. LITTLE INTEREST OR PLEASURE IN DOING THINGS: 0
SUM OF ALL RESPONSES TO PHQ QUESTIONS 1-9: 0
SUM OF ALL RESPONSES TO PHQ QUESTIONS 1-9: 0
2. FEELING DOWN, DEPRESSED OR HOPELESS: 0

## 2021-01-01 ASSESSMENT — SOCIAL DETERMINANTS OF HEALTH (SDOH): HOW HARD IS IT FOR YOU TO PAY FOR THE VERY BASICS LIKE FOOD, HOUSING, MEDICAL CARE, AND HEATING?: NOT HARD AT ALL

## 2021-01-01 ASSESSMENT — LIFESTYLE VARIABLES: HOW OFTEN DO YOU HAVE A DRINK CONTAINING ALCOHOL: 0

## 2021-01-13 DIAGNOSIS — F32.A DEPRESSION, UNSPECIFIED DEPRESSION TYPE: ICD-10-CM

## 2021-01-13 DIAGNOSIS — E03.9 HYPOTHYROIDISM, UNSPECIFIED TYPE: ICD-10-CM

## 2021-01-13 DIAGNOSIS — K21.9 GASTROESOPHAGEAL REFLUX DISEASE WITHOUT ESOPHAGITIS: ICD-10-CM

## 2021-01-13 DIAGNOSIS — D53.9 MACROCYTIC ANEMIA: ICD-10-CM

## 2021-01-13 DIAGNOSIS — I10 ESSENTIAL HYPERTENSION: ICD-10-CM

## 2021-01-13 RX ORDER — AMLODIPINE BESYLATE 5 MG/1
5 TABLET ORAL DAILY
Qty: 90 TABLET | Refills: 3 | Status: SHIPPED | OUTPATIENT
Start: 2021-01-13 | End: 2021-09-20

## 2021-01-13 RX ORDER — VENLAFAXINE HYDROCHLORIDE 150 MG/1
150 CAPSULE, EXTENDED RELEASE ORAL DAILY
Qty: 90 CAPSULE | Refills: 3 | Status: SHIPPED | OUTPATIENT
Start: 2021-01-13 | End: 2021-09-20

## 2021-01-13 RX ORDER — ESOMEPRAZOLE MAGNESIUM 40 MG/1
40 CAPSULE, DELAYED RELEASE ORAL
Qty: 90 CAPSULE | Refills: 3 | Status: SHIPPED | OUTPATIENT
Start: 2021-01-13 | End: 2021-04-05

## 2021-01-13 RX ORDER — OLMESARTAN MEDOXOMIL 40 MG/1
40 TABLET ORAL DAILY
Qty: 90 TABLET | Refills: 3 | Status: SHIPPED | OUTPATIENT
Start: 2021-01-13 | End: 2021-09-20

## 2021-01-13 RX ORDER — LEVOTHYROXINE SODIUM 0.1 MG/1
100 TABLET ORAL DAILY
Qty: 90 TABLET | Refills: 3 | Status: SHIPPED | OUTPATIENT
Start: 2021-01-13 | End: 2021-09-20

## 2021-01-13 RX ORDER — PNV NO.95/FERROUS FUM/FOLIC AC 28MG-0.8MG
1 TABLET ORAL DAILY
Qty: 90 TABLET | Refills: 3 | Status: SHIPPED | OUTPATIENT
Start: 2021-01-13 | End: 2021-06-28

## 2021-01-14 ENCOUNTER — TELEPHONE (OUTPATIENT)
Dept: FAMILY MEDICINE CLINIC | Age: 76
End: 2021-01-14

## 2021-01-14 DIAGNOSIS — I34.1 MVP (MITRAL VALVE PROLAPSE): ICD-10-CM

## 2021-01-14 DIAGNOSIS — Z88.9 MULTIPLE ALLERGIES: ICD-10-CM

## 2021-01-14 RX ORDER — FLUTICASONE PROPIONATE 50 MCG
2 SPRAY, SUSPENSION (ML) NASAL DAILY
Qty: 3 BOTTLE | Refills: 3 | Status: SHIPPED | OUTPATIENT
Start: 2021-01-14 | End: 2021-01-01

## 2021-01-14 RX ORDER — CLINDAMYCIN HYDROCHLORIDE 150 MG/1
600 CAPSULE ORAL ONCE
Qty: 4 CAPSULE | Refills: 2 | Status: SHIPPED | OUTPATIENT
Start: 2021-01-14 | End: 2021-01-01 | Stop reason: SDUPTHER

## 2021-01-21 DIAGNOSIS — M54.2 CHRONIC NECK AND BACK PAIN: ICD-10-CM

## 2021-01-21 DIAGNOSIS — M54.9 CHRONIC NECK AND BACK PAIN: ICD-10-CM

## 2021-01-21 DIAGNOSIS — G89.29 CHRONIC NECK AND BACK PAIN: ICD-10-CM

## 2021-01-21 DIAGNOSIS — F41.9 ANXIETY: ICD-10-CM

## 2021-01-21 RX ORDER — LORAZEPAM 0.5 MG/1
TABLET ORAL
Qty: 90 TABLET | Refills: 2 | Status: SHIPPED | OUTPATIENT
Start: 2021-01-21 | End: 2021-05-05

## 2021-01-21 RX ORDER — GABAPENTIN 100 MG/1
CAPSULE ORAL
Qty: 270 CAPSULE | Refills: 1 | Status: SHIPPED | OUTPATIENT
Start: 2021-01-21 | End: 2021-04-22

## 2021-02-10 ENCOUNTER — IMMUNIZATION (OUTPATIENT)
Age: 76
End: 2021-02-10
Payer: MEDICARE

## 2021-02-10 PROCEDURE — 91300 COVID-19, PFIZER VACCINE 30MCG/0.3ML DOSE: CPT | Performed by: FAMILY MEDICINE

## 2021-02-10 PROCEDURE — 0001A PR IMM ADMN SARSCOV2 30MCG/0.3ML DIL RECON 1ST DOSE: CPT | Performed by: FAMILY MEDICINE

## 2021-02-19 DIAGNOSIS — E78.5 HYPERLIPIDEMIA, UNSPECIFIED HYPERLIPIDEMIA TYPE: ICD-10-CM

## 2021-02-19 DIAGNOSIS — I10 ESSENTIAL HYPERTENSION: ICD-10-CM

## 2021-02-19 RX ORDER — SIMVASTATIN 20 MG
20 TABLET ORAL NIGHTLY
Qty: 90 TABLET | Refills: 3 | Status: SHIPPED | OUTPATIENT
Start: 2021-02-19 | End: 2021-09-20

## 2021-02-19 RX ORDER — CHLORTHALIDONE 25 MG/1
25 TABLET ORAL DAILY
Qty: 90 TABLET | Refills: 3 | Status: SHIPPED | OUTPATIENT
Start: 2021-02-19 | End: 2021-09-20

## 2021-02-19 RX ORDER — CETIRIZINE HYDROCHLORIDE 10 MG/1
TABLET ORAL
Qty: 90 TABLET | Refills: 3 | Status: SHIPPED | OUTPATIENT
Start: 2021-02-19 | End: 2021-04-05

## 2021-02-25 ENCOUNTER — TELEPHONE (OUTPATIENT)
Dept: FAMILY MEDICINE CLINIC | Age: 76
End: 2021-02-25

## 2021-02-25 DIAGNOSIS — G89.29 CHRONIC NECK AND BACK PAIN: Primary | ICD-10-CM

## 2021-02-25 DIAGNOSIS — M54.2 CHRONIC NECK AND BACK PAIN: Primary | ICD-10-CM

## 2021-02-25 DIAGNOSIS — M54.9 CHRONIC NECK AND BACK PAIN: Primary | ICD-10-CM

## 2021-02-25 RX ORDER — METHYLPREDNISOLONE 4 MG/1
TABLET ORAL
Qty: 1 KIT | Refills: 0 | Status: SHIPPED | OUTPATIENT
Start: 2021-02-25 | End: 2021-03-03

## 2021-03-03 ENCOUNTER — IMMUNIZATION (OUTPATIENT)
Age: 76
End: 2021-03-03
Payer: MEDICARE

## 2021-03-03 PROCEDURE — 91300 COVID-19, PFIZER VACCINE 30MCG/0.3ML DOSE: CPT | Performed by: FAMILY MEDICINE

## 2021-03-03 PROCEDURE — 0002A PR IMM ADMN SARSCOV2 30MCG/0.3ML DIL RECON 2ND DOSE: CPT | Performed by: FAMILY MEDICINE

## 2021-04-05 DIAGNOSIS — F41.9 ANXIETY: ICD-10-CM

## 2021-04-05 DIAGNOSIS — K21.9 GASTROESOPHAGEAL REFLUX DISEASE WITHOUT ESOPHAGITIS: ICD-10-CM

## 2021-04-05 RX ORDER — DOXEPIN HYDROCHLORIDE 75 MG/1
CAPSULE ORAL
Qty: 90 CAPSULE | Refills: 3 | Status: SHIPPED | OUTPATIENT
Start: 2021-04-05 | End: 2022-01-01

## 2021-04-05 RX ORDER — CETIRIZINE HYDROCHLORIDE 10 MG/1
TABLET ORAL
Qty: 90 TABLET | Refills: 1 | Status: SHIPPED | OUTPATIENT
Start: 2021-04-05 | End: 2021-09-20

## 2021-04-05 RX ORDER — ESOMEPRAZOLE MAGNESIUM 40 MG/1
CAPSULE, DELAYED RELEASE ORAL
Qty: 90 CAPSULE | Refills: 3 | Status: SHIPPED | OUTPATIENT
Start: 2021-04-05 | End: 2022-01-01

## 2021-04-22 DIAGNOSIS — M54.2 CHRONIC NECK AND BACK PAIN: ICD-10-CM

## 2021-04-22 DIAGNOSIS — M54.9 CHRONIC NECK AND BACK PAIN: ICD-10-CM

## 2021-04-22 DIAGNOSIS — G89.29 CHRONIC NECK AND BACK PAIN: ICD-10-CM

## 2021-04-23 RX ORDER — GABAPENTIN 100 MG/1
CAPSULE ORAL
Qty: 270 CAPSULE | Refills: 2 | Status: SHIPPED | OUTPATIENT
Start: 2021-04-23 | End: 2022-01-01 | Stop reason: SDUPTHER

## 2021-05-05 DIAGNOSIS — F41.9 ANXIETY: ICD-10-CM

## 2021-05-05 RX ORDER — LORAZEPAM 0.5 MG/1
TABLET ORAL
Qty: 90 TABLET | Refills: 2 | Status: SHIPPED | OUTPATIENT
Start: 2021-05-05 | End: 2021-08-25 | Stop reason: SDUPTHER

## 2021-06-23 DIAGNOSIS — I10 ESSENTIAL HYPERTENSION: ICD-10-CM

## 2021-06-23 DIAGNOSIS — E78.5 HYPERLIPIDEMIA, UNSPECIFIED HYPERLIPIDEMIA TYPE: ICD-10-CM

## 2021-06-23 DIAGNOSIS — E03.9 HYPOTHYROIDISM, UNSPECIFIED TYPE: ICD-10-CM

## 2021-06-23 DIAGNOSIS — F41.9 ANXIETY: Primary | ICD-10-CM

## 2021-06-23 DIAGNOSIS — E55.9 VITAMIN D DEFICIENCY: ICD-10-CM

## 2021-06-23 DIAGNOSIS — F41.9 ANXIETY: ICD-10-CM

## 2021-06-23 LAB
ALBUMIN SERPL-MCNC: 4.2 G/DL (ref 3.5–5.2)
ALP BLD-CCNC: 97 U/L (ref 35–104)
ALT SERPL-CCNC: 24 U/L (ref 5–33)
ANION GAP SERPL CALCULATED.3IONS-SCNC: 12 MMOL/L (ref 7–19)
AST SERPL-CCNC: 23 U/L (ref 5–32)
BACTERIA: ABNORMAL /HPF
BILIRUB SERPL-MCNC: 0.3 MG/DL (ref 0.2–1.2)
BILIRUBIN URINE: NEGATIVE
BLOOD, URINE: NEGATIVE
BUN BLDV-MCNC: 18 MG/DL (ref 8–23)
CALCIUM SERPL-MCNC: 9.6 MG/DL (ref 8.8–10.2)
CHLORIDE BLD-SCNC: 99 MMOL/L (ref 98–111)
CHOLESTEROL, TOTAL: 150 MG/DL (ref 160–199)
CLARITY: CLEAR
CO2: 26 MMOL/L (ref 22–29)
COLOR: YELLOW
CREAT SERPL-MCNC: 1.2 MG/DL (ref 0.5–0.9)
CRYSTALS, UA: ABNORMAL /HPF
EPITHELIAL CELLS, UA: ABNORMAL /HPF
GFR AFRICAN AMERICAN: 53
GFR NON-AFRICAN AMERICAN: 43
GLUCOSE BLD-MCNC: 93 MG/DL (ref 74–109)
GLUCOSE URINE: NEGATIVE MG/DL
HCT VFR BLD CALC: 38.2 % (ref 37–47)
HDLC SERPL-MCNC: 41 MG/DL (ref 65–121)
HEMOGLOBIN: 12.3 G/DL (ref 12–16)
HYALINE CASTS: ABNORMAL /LPF (ref 0–5)
KETONES, URINE: NEGATIVE MG/DL
LDL CHOLESTEROL CALCULATED: 87 MG/DL
LEUKOCYTE ESTERASE, URINE: ABNORMAL
MCH RBC QN AUTO: 30.8 PG (ref 27–31)
MCHC RBC AUTO-ENTMCNC: 32.2 G/DL (ref 33–37)
MCV RBC AUTO: 95.7 FL (ref 81–99)
NITRITE, URINE: NEGATIVE
PDW BLD-RTO: 12.3 % (ref 11.5–14.5)
PH UA: 6 (ref 5–8)
PLATELET # BLD: 246 K/UL (ref 130–400)
PMV BLD AUTO: 9.9 FL (ref 9.4–12.3)
POTASSIUM SERPL-SCNC: 4.1 MMOL/L (ref 3.5–5)
PROTEIN UA: NEGATIVE MG/DL
RBC # BLD: 3.99 M/UL (ref 4.2–5.4)
RBC UA: ABNORMAL /HPF (ref 0–2)
SODIUM BLD-SCNC: 137 MMOL/L (ref 136–145)
SPECIFIC GRAVITY UA: 1.02 (ref 1–1.03)
TOTAL PROTEIN: 7.2 G/DL (ref 6.6–8.7)
TRIGL SERPL-MCNC: 112 MG/DL (ref 0–149)
TSH SERPL DL<=0.05 MIU/L-ACNC: 0.66 UIU/ML (ref 0.27–4.2)
UROBILINOGEN, URINE: 0.2 E.U./DL
VITAMIN B-12: 412 PG/ML (ref 211–946)
VITAMIN D 25-HYDROXY: 39.1 NG/ML
WBC # BLD: 6.7 K/UL (ref 4.8–10.8)
YEAST: PRESENT /HPF

## 2021-06-28 DIAGNOSIS — R00.0 TACHYCARDIA: ICD-10-CM

## 2021-06-28 DIAGNOSIS — D53.9 MACROCYTIC ANEMIA: ICD-10-CM

## 2021-06-28 DIAGNOSIS — I10 ESSENTIAL HYPERTENSION: ICD-10-CM

## 2021-06-28 RX ORDER — FERROUS SULFATE 325(65) MG
TABLET ORAL
Qty: 90 TABLET | Refills: 2 | Status: SHIPPED | OUTPATIENT
Start: 2021-06-28 | End: 2022-01-01

## 2021-06-28 RX ORDER — CARVEDILOL 25 MG/1
TABLET ORAL
Qty: 180 TABLET | Refills: 2 | Status: SHIPPED | OUTPATIENT
Start: 2021-06-28 | End: 2022-01-01

## 2021-07-30 ENCOUNTER — TELEPHONE (OUTPATIENT)
Dept: FAMILY MEDICINE CLINIC | Age: 76
End: 2021-07-30

## 2021-07-30 DIAGNOSIS — R39.15 URINARY URGENCY: ICD-10-CM

## 2021-07-30 DIAGNOSIS — R35.0 URINARY FREQUENCY: ICD-10-CM

## 2021-07-30 DIAGNOSIS — R35.0 URINARY FREQUENCY: Primary | ICD-10-CM

## 2021-07-30 LAB
BILIRUBIN URINE: NEGATIVE
BLOOD, URINE: NEGATIVE
CLARITY: CLEAR
COLOR: YELLOW
GLUCOSE URINE: NEGATIVE MG/DL
KETONES, URINE: NEGATIVE MG/DL
LEUKOCYTE ESTERASE, URINE: NEGATIVE
NITRITE, URINE: NEGATIVE
PH UA: 7 (ref 5–8)
PROTEIN UA: NEGATIVE MG/DL
SPECIFIC GRAVITY UA: 1.02 (ref 1–1.03)
URINE TYPE: NORMAL
UROBILINOGEN, URINE: 0.2 E.U./DL

## 2021-07-30 NOTE — TELEPHONE ENCOUNTER
Pt c/o urinary frequency and urgency. UA is negative. Can she try Myrbetriq with her current list of medications? Please advise.

## 2021-08-01 LAB — URINE CULTURE, ROUTINE: NORMAL

## 2021-08-25 ENCOUNTER — OFFICE VISIT (OUTPATIENT)
Dept: FAMILY MEDICINE CLINIC | Age: 76
End: 2021-08-25
Payer: MEDICARE

## 2021-08-25 VITALS
WEIGHT: 185 LBS | DIASTOLIC BLOOD PRESSURE: 60 MMHG | TEMPERATURE: 97.3 F | OXYGEN SATURATION: 98 % | HEIGHT: 64 IN | RESPIRATION RATE: 16 BRPM | SYSTOLIC BLOOD PRESSURE: 108 MMHG | HEART RATE: 86 BPM | BODY MASS INDEX: 31.58 KG/M2

## 2021-08-25 DIAGNOSIS — M54.2 CHRONIC NECK AND BACK PAIN: ICD-10-CM

## 2021-08-25 DIAGNOSIS — R56.9 SEIZURE (HCC): ICD-10-CM

## 2021-08-25 DIAGNOSIS — Z91.81 AT HIGH RISK FOR FALLS: ICD-10-CM

## 2021-08-25 DIAGNOSIS — F41.9 ANXIETY: ICD-10-CM

## 2021-08-25 DIAGNOSIS — M79.672 LEFT FOOT PAIN: ICD-10-CM

## 2021-08-25 DIAGNOSIS — M54.9 CHRONIC NECK AND BACK PAIN: ICD-10-CM

## 2021-08-25 DIAGNOSIS — J47.9 TRACTION BRONCHIECTASIS (HCC): ICD-10-CM

## 2021-08-25 DIAGNOSIS — G89.29 CHRONIC NECK AND BACK PAIN: ICD-10-CM

## 2021-08-25 DIAGNOSIS — N18.4 CHRONIC KIDNEY DISEASE (CKD), STAGE IV (SEVERE) (HCC): ICD-10-CM

## 2021-08-25 DIAGNOSIS — N81.6 RECTOCELE: Primary | ICD-10-CM

## 2021-08-25 PROBLEM — E87.20 METABOLIC ACIDOSIS: Status: RESOLVED | Noted: 2019-06-28 | Resolved: 2021-08-25

## 2021-08-25 PROBLEM — F13.29 SEDATIVE, HYPNOTIC OR ANXIOLYTIC DEPENDENCE WITH UNSPECIFIED SEDATIVE, HYPNOTIC OR ANXIOLYTIC-INDUCED DISORDER (HCC): Status: ACTIVE | Noted: 2021-08-25

## 2021-08-25 PROBLEM — F13.20 SEDATIVE, HYPNOTIC OR ANXIOLYTIC DEPENDENCE, UNCOMPLICATED (HCC): Status: ACTIVE | Noted: 2021-08-25

## 2021-08-25 PROBLEM — N18.30 CHRONIC KIDNEY DISEASE, STAGE 3 (HCC): Status: RESOLVED | Noted: 2019-06-28 | Resolved: 2021-08-25

## 2021-08-25 PROBLEM — R47.1 DYSARTHRIA: Status: RESOLVED | Noted: 2017-07-21 | Resolved: 2021-08-25

## 2021-08-25 PROBLEM — E44.0 MODERATE PROTEIN-CALORIE MALNUTRITION (HCC): Status: RESOLVED | Noted: 2019-07-12 | Resolved: 2021-08-25

## 2021-08-25 PROBLEM — R91.8 PULMONARY INFILTRATE IN LEFT LUNG ON CHEST X-RAY: Status: RESOLVED | Noted: 2018-07-17 | Resolved: 2021-08-25

## 2021-08-25 PROBLEM — J18.9 HEALTHCARE-ASSOCIATED PNEUMONIA: Status: RESOLVED | Noted: 2019-06-23 | Resolved: 2021-08-25

## 2021-08-25 PROBLEM — G93.41 ACUTE METABOLIC ENCEPHALOPATHY: Status: RESOLVED | Noted: 2019-06-24 | Resolved: 2021-08-25

## 2021-08-25 PROBLEM — J47.1 ACUTE EXACERBATION OF BRONCHIECTASIS (HCC): Status: ACTIVE | Noted: 2021-08-25

## 2021-08-25 PROBLEM — R25.1 TREMOR: Status: RESOLVED | Noted: 2017-07-21 | Resolved: 2021-08-25

## 2021-08-25 PROBLEM — F13.99 SEDATIVE, HYPNOTIC OR ANXIOLYTIC USE, UNSPECIFIED WITH UNSPECIFIED SEDATIVE, HYPNOTIC OR ANXIOLYTIC-INDUCED DISORDER (HCC): Status: ACTIVE | Noted: 2021-08-25

## 2021-08-25 PROBLEM — K85.90 ACUTE PANCREATITIS: Status: RESOLVED | Noted: 2019-07-11 | Resolved: 2021-08-25

## 2021-08-25 PROCEDURE — 1123F ACP DISCUSS/DSCN MKR DOCD: CPT | Performed by: FAMILY MEDICINE

## 2021-08-25 PROCEDURE — G8417 CALC BMI ABV UP PARAM F/U: HCPCS | Performed by: FAMILY MEDICINE

## 2021-08-25 PROCEDURE — 1036F TOBACCO NON-USER: CPT | Performed by: FAMILY MEDICINE

## 2021-08-25 PROCEDURE — 1090F PRES/ABSN URINE INCON ASSESS: CPT | Performed by: FAMILY MEDICINE

## 2021-08-25 PROCEDURE — G8427 DOCREV CUR MEDS BY ELIG CLIN: HCPCS | Performed by: FAMILY MEDICINE

## 2021-08-25 PROCEDURE — 99214 OFFICE O/P EST MOD 30 MIN: CPT | Performed by: FAMILY MEDICINE

## 2021-08-25 PROCEDURE — G8399 PT W/DXA RESULTS DOCUMENT: HCPCS | Performed by: FAMILY MEDICINE

## 2021-08-25 PROCEDURE — 4040F PNEUMOC VAC/ADMIN/RCVD: CPT | Performed by: FAMILY MEDICINE

## 2021-08-25 RX ORDER — LORAZEPAM 0.5 MG/1
TABLET ORAL
Qty: 90 TABLET | Refills: 2 | Status: SHIPPED | OUTPATIENT
Start: 2021-08-25 | End: 2021-09-25

## 2021-08-25 RX ORDER — OXYCODONE HYDROCHLORIDE AND ACETAMINOPHEN 5; 325 MG/1; MG/1
1 TABLET ORAL PRN
COMMUNITY
End: 2022-01-01 | Stop reason: ALTCHOICE

## 2021-08-25 RX ORDER — GABAPENTIN 100 MG/1
CAPSULE ORAL
Qty: 270 CAPSULE | Refills: 1 | Status: SHIPPED | OUTPATIENT
Start: 2021-08-25 | End: 2021-01-01 | Stop reason: ALTCHOICE

## 2021-08-25 RX ORDER — TRAMADOL HYDROCHLORIDE 50 MG/1
50 TABLET ORAL EVERY 6 HOURS PRN
COMMUNITY
End: 2021-10-08 | Stop reason: SDUPTHER

## 2021-08-25 ASSESSMENT — PATIENT HEALTH QUESTIONNAIRE - PHQ9
SUM OF ALL RESPONSES TO PHQ QUESTIONS 1-9: 0
SUM OF ALL RESPONSES TO PHQ9 QUESTIONS 1 & 2: 0
2. FEELING DOWN, DEPRESSED OR HOPELESS: 0
SUM OF ALL RESPONSES TO PHQ QUESTIONS 1-9: 0
SUM OF ALL RESPONSES TO PHQ QUESTIONS 1-9: 0
1. LITTLE INTEREST OR PLEASURE IN DOING THINGS: 0

## 2021-08-25 ASSESSMENT — ENCOUNTER SYMPTOMS
ALLERGIC/IMMUNOLOGIC NEGATIVE: 1
EYES NEGATIVE: 1
GASTROINTESTINAL NEGATIVE: 1
RESPIRATORY NEGATIVE: 1

## 2021-08-25 NOTE — PROGRESS NOTES
SUBJECTIVE:    Patient ID: Mayra Waddell is a 64 y. o.female. HPI:   Here for follow-up of multiple medical problems. Patient is a 25-year-old white female. Have left foot pain for a long time now. She have surgery on her toes before. She would like to be referred back to the podiatrist.  She also have a mass that goes to her vagina whenever she have a bowel movement. She been having this for a while. She also have some urgency and hesitancy. She have chronic neck and back pain. Take Neurontin. Medication is helpful. Request a refill of medication. She have anxiety and takes Ativan with good results. Denies medication side effect. She has history of chronic kidney disease. Her renal function has been stable. She avoids NSAIDs and dehydration. She is at high risk for falls. She also have history of bronchiectasis. She sees Dr. Parker Murry for that problem. Her condition has been stable. She has history of malnourishment. Does not have improved. She is eating a regular diet now. She also have history of seizures but no seizures in a long time. Past Medical History:   Diagnosis Date    Allergic rhinitis     Anxiety     Arthritis     Asthma     Chronic back pain     Chronic kidney disease     DDD (degenerative disc disease), lumbar 5/21/2019    Depression     GERD (gastroesophageal reflux disease)     Hyperlipidemia     Hypertension     Hypothyroidism     Irritable bowel syndrome     Palliative care patient 06/18/2019    Restless legs syndrome     Spondylolisthesis at L4-L5 level 5/21/2019      Current Outpatient Medications   Medication Sig Dispense Refill    mirabegron (MYRBETRIQ) 25 MG TB24 Take 25 mg by mouth daily      traMADol (ULTRAM) 50 MG tablet Take 50 mg by mouth every 6 hours as needed for Pain.  oxyCODONE-acetaminophen (PERCOCET) 5-325 MG per tablet Take 1 tablet by mouth as needed for Pain.       LORazepam (ATIVAN) 0.5 MG tablet TAKE ONE TABLET BY MOUTH THREE TIMES DAILY AS NEEDED FOR ANXIETY 90 tablet 2    gabapentin (NEURONTIN) 100 MG capsule TAKE ONE CAPSULE BY MOUTH THREE TIMES DAILY 270 capsule 1    carvedilol (COREG) 25 MG tablet TAKE ONE TABLET BY MOUTH TWICE DAILY 180 tablet 2    FEROSUL 325 (65 Fe) MG tablet TAKE ONE TABLET BY MOUTH DAILY WITH BREAKFAST 90 tablet 2    gabapentin (NEURONTIN) 100 MG capsule TAKE ONE CAPSULE BY MOUTH THREE TIMES DAILY 270 capsule 2    esomeprazole (NEXIUM) 40 MG delayed release capsule TAKE ONE CAPSULE BY MOUTH EVERY MORNING BEFORE BREAKFAST 90 capsule 3    cetirizine (ZYRTEC) 10 MG tablet TAKE ONE TABLET BY MOUTH DAILY 90 tablet 1    doxepin (SINEQUAN) 75 MG capsule TAKE ONE CAPSULE BY MOUTH nightly 90 capsule 3    simvastatin (ZOCOR) 20 MG tablet Take 1 tablet by mouth nightly 90 tablet 3    chlorthalidone (HYGROTON) 25 MG tablet Take 1 tablet by mouth daily 90 tablet 3    fluticasone (FLONASE) 50 MCG/ACT nasal spray 2 sprays by Each Nostril route daily 3 Bottle 3    levothyroxine (SYNTHROID) 100 MCG tablet Take 1 tablet by mouth Daily 90 tablet 3    olmesartan (BENICAR) 40 MG tablet Take 1 tablet by mouth daily 90 tablet 3    amLODIPine (NORVASC) 5 MG tablet Take 1 tablet by mouth daily 90 tablet 3    venlafaxine (EFFEXOR XR) 150 MG extended release capsule Take 1 capsule by mouth daily 90 capsule 3    mometasone-formoterol (DULERA) 200-5 MCG/ACT inhaler Inhale 2 puffs into the lungs 2 times daily 1 Inhaler 5    nystatin (MYCOSTATIN) 675084 UNIT/GM powder Apply 3 times daily to stomach and groin 60 g 5    sucralfate (CARAFATE) 1 GM tablet Take 1 tablet by mouth 2 times daily 60 tablet 5    cloNIDine (CATAPRES) 0.1 MG tablet Take 0.1 mg by mouth 2 times daily as needed for High Blood Pressure (systolic > 060)      SENNOSIDES-DOCUSATE SODIUM PO Take 3 tablets by mouth nightly      ondansetron (ZOFRAN) 4 MG tablet Take 1 tablet by mouth every 6 hours as needed for Nausea or Vomiting 90 tablet 0    guaiFENesin (MUCINEX) 600 MG extended release tablet Take 2 tablets by mouth 2 times daily (Patient taking differently: Take 600 mg by mouth daily ) 60 tablet 5    ipratropium-albuterol (DUONEB) 0.5-2.5 (3) MG/3ML SOLN nebulizer solution Inhale 3 mLs into the lungs every 4 hours (while awake) 360 mL 0    polyethylene glycol (GLYCOLAX) powder Take 17 g by mouth daily      Omega-3 Fatty Acids (GNP FISH OIL PO) Take 520 mg by mouth nightly      Cholecalciferol (VITAMIN D3) 2000 units CAPS Take 1 capsule by mouth nightly      EPINEPHrine (EPIPEN) 0.3 MG/0.3ML SOAJ injection Inject 0.3 mg into the muscle as needed. Use as directed for allergic reaction       No current facility-administered medications for this visit. Allergies   Allergen Reactions    Penicillins     Sulfa Antibiotics        Review of Systems   Constitutional: Negative. HENT: Negative. Eyes: Negative. Respiratory: Negative. Cardiovascular: Negative. Gastrointestinal: Negative. Endocrine: Negative. Genitourinary: Negative. Musculoskeletal: Negative. Skin: Negative. Allergic/Immunologic: Negative. Neurological: Negative. Hematological: Negative. Psychiatric/Behavioral: Negative. OBJECTIVE:    Physical Exam  Constitutional:       Appearance: Normal appearance. She is well-developed and well-groomed. HENT:      Head: Normocephalic and atraumatic. Right Ear: Tympanic membrane, ear canal and external ear normal. There is no impacted cerumen. Left Ear: Tympanic membrane, ear canal and external ear normal. There is no impacted cerumen. Nose: Nose normal.      Mouth/Throat:      Lips: Pink. Mouth: Mucous membranes are moist.      Dentition: Normal dentition. Pharynx: Oropharynx is clear. Uvula midline. Eyes:      General: Lids are normal.         Right eye: No discharge. Left eye: No discharge. Extraocular Movements: Extraocular movements intact.       Conjunctiva/sclera: Tendon Reflexes: Reflexes are normal and symmetric. Psychiatric:         Attention and Perception: Attention normal.         Mood and Affect: Mood normal.         Speech: Speech normal.         Behavior: Behavior normal. Behavior is cooperative. Thought Content: Thought content normal.         Cognition and Memory: Cognition and memory normal.         Judgment: Judgment normal.        /60   Pulse 86   Temp 97.3 °F (36.3 °C) (Skin)   Resp 16   Ht 5' 4\" (1.626 m)   Wt 185 lb (83.9 kg)   SpO2 98%   BMI 31.76 kg/m²      ASSESSMENT:     Diagnosis Orders   1. Rectocele-suspect that External Referral To Ob-Gyn   2. Left foot pain-osteoarthritis External Referral To Orthopedic Surgery   3. Chronic neck and back pain-stable gabapentin (NEURONTIN) 100 MG capsule   4. Anxiety-stable LORazepam (ATIVAN) 0.5 MG tablet   5. Chronic kidney disease (CKD), stage IV (severe) (HCC)-stable CBC    Comprehensive Metabolic Panel   6. At high risk for falls     7. Traction bronchiectasis (HCC)-stable    8. Seizure (HCC)-stable         PLAN:    1. Refer to GYN. 2.  Refer to podiatry  3. Elicia Kubas and continue medication  4. Elicia Kubas and continue medication  5. Blood work  6. Home tips for safety. She had done physical therapy multiple times  7. Follow-up with  [facility]  8. No further intervention  Follow-up 3 to 6 months  On the basis of positive falls risk screening, assessment and plan is as follows: home safety tips provided.

## 2021-09-20 DIAGNOSIS — E03.9 HYPOTHYROIDISM, UNSPECIFIED TYPE: ICD-10-CM

## 2021-09-20 DIAGNOSIS — E78.5 HYPERLIPIDEMIA, UNSPECIFIED HYPERLIPIDEMIA TYPE: ICD-10-CM

## 2021-09-20 DIAGNOSIS — F32.A DEPRESSION, UNSPECIFIED DEPRESSION TYPE: ICD-10-CM

## 2021-09-20 DIAGNOSIS — I10 ESSENTIAL HYPERTENSION: ICD-10-CM

## 2021-09-20 RX ORDER — SIMVASTATIN 20 MG
TABLET ORAL
Qty: 90 TABLET | Refills: 3 | Status: ON HOLD | OUTPATIENT
Start: 2021-09-20 | End: 2022-01-01 | Stop reason: HOSPADM

## 2021-09-20 RX ORDER — OLMESARTAN MEDOXOMIL 40 MG/1
TABLET ORAL
Qty: 90 TABLET | Refills: 3 | Status: SHIPPED | OUTPATIENT
Start: 2021-09-20

## 2021-09-20 RX ORDER — LEVOTHYROXINE SODIUM 0.1 MG/1
TABLET ORAL
Qty: 90 TABLET | Refills: 3 | Status: SHIPPED | OUTPATIENT
Start: 2021-09-20

## 2021-09-20 RX ORDER — VENLAFAXINE HYDROCHLORIDE 150 MG/1
CAPSULE, EXTENDED RELEASE ORAL
Qty: 90 CAPSULE | Refills: 3 | Status: SHIPPED | OUTPATIENT
Start: 2021-09-20

## 2021-09-20 RX ORDER — CHLORTHALIDONE 25 MG/1
TABLET ORAL
Qty: 90 TABLET | Refills: 3 | Status: SHIPPED | OUTPATIENT
Start: 2021-09-20 | End: 2022-01-01 | Stop reason: ALTCHOICE

## 2021-09-20 RX ORDER — AMLODIPINE BESYLATE 5 MG/1
TABLET ORAL
Qty: 90 TABLET | Refills: 3 | Status: SHIPPED | OUTPATIENT
Start: 2021-09-20

## 2021-09-20 RX ORDER — CETIRIZINE HYDROCHLORIDE 10 MG/1
TABLET ORAL
Qty: 90 TABLET | Refills: 3 | Status: SHIPPED | OUTPATIENT
Start: 2021-09-20

## 2021-10-08 DIAGNOSIS — M79.672 LEFT FOOT PAIN: Primary | ICD-10-CM

## 2021-10-10 RX ORDER — TRAMADOL HYDROCHLORIDE 50 MG/1
50 TABLET ORAL EVERY 6 HOURS PRN
Qty: 120 TABLET | Refills: 1 | Status: SHIPPED | OUTPATIENT
Start: 2021-10-10 | End: 2021-01-01

## 2021-10-15 NOTE — PROGRESS NOTES
October 15, 2021    Hello, may I speak with Hanny BRENDA Ivy?    My name is Mayte    I am  with [facility]  [address], [address]  [place] [facility] [id number] [phone].    Before we get started may I verify your date of birth? 1945    I am calling to officially welcome you to our practice and ask about your recent visit. Is this a good time to talk? yes    Tell me about your visit with us. What things went well?  good, everything is fine       We're always looking for ways to make our patients' experiences even better. Do you have recommendations on ways we may improve?  no    Overall were you satisfied with your first visit to our practice? yes       I appreciate you taking the time to speak with me today. Is there anything else I can do for you? no      Thank you, and have a great day.

## 2021-10-22 NOTE — PROGRESS NOTES
Hanny Ivy is a 76 y.o. No obstetric history on file.     Chief Complaint   Patient presents with   • Vaginal Prolapse     Patient here for possible rectocele. Patient states when she lays down she feels like she has to pee but than gets up and doesn't have to. Patient states she is up every 2 hours through the night. Patient voices no complaints or concerns at this time.           HPI - Patient is having vaginal pressure and believes she has pelvic prolapse.There is a bulge at the vaginal opening.  She is accompanied by her daughter.    The following portions of the patient's history were reviewed and updated as appropriate:vital signs, allergies, current medications, past family history, past medical history, past social history, past surgical history and problem list.      Current Outpatient Medications:   •  acetaminophen (TYLENOL) 325 MG tablet, Take 650 mg by mouth Every 6 (Six) Hours As Needed for Mild Pain ., Disp: , Rfl:   •  allopurinol (ZYLOPRIM) 100 MG tablet, Take 1 tablet by mouth daily, Disp: , Rfl:   •  amLODIPine (NORVASC) 5 MG tablet, Take 5 mg by mouth Daily., Disp: , Rfl:   •  carisoprodol (SOMA) 350 MG tablet, 3 (Three) Times a Day As Needed., Disp: , Rfl:   •  carvedilol (COREG) 12.5 MG tablet, TAKE ONE TABLET BY MOUTH TWICE A DAY WTIH MEALS ** MAY CAUSEDROWSINESS, Disp: , Rfl:   •  carvedilol (COREG) 25 MG tablet, Take 25 mg by mouth 2 (Two) Times a Day., Disp: , Rfl:   •  cetirizine (zyrTEC) 10 MG tablet, Take 10 mg by mouth Daily., Disp: , Rfl:   •  chlorthalidone (HYGROTON) 25 MG tablet, Take 25 mg by mouth Daily., Disp: , Rfl:   •  CloNIDine (CATAPRES) 0.1 MG tablet, Take 0.1 mg by mouth 2 (Two) Times a Day., Disp: , Rfl:   •  doxepin (SINEquan) 50 MG capsule, Take 100 mg by mouth Every Night., Disp: , Rfl:   •  doxepin (SINEquan) 75 MG capsule, Take 75 mg by mouth every night at bedtime., Disp: , Rfl:   •  Dulera 200-5 MCG/ACT inhaler, INHALE TWO PUFFS INTO THE LUNGS TWICE DAILY,  Disp: , Rfl:   •  EPINEPHrine (EPIPEN 2-ABRIL) 0.3 MG/0.3ML solution auto-injector injection, Inject 0.3 mg into the muscle as needed. Use as directed for allergic reaction, Disp: , Rfl:   •  esomeprazole (nexIUM) 40 MG capsule, Take 40 mg by mouth., Disp: , Rfl:   •  ferrous sulfate 324 (65 Fe) MG tablet delayed-release EC tablet, TAKE ONE TABLET BY MOUTH DAILY, Disp: , Rfl:   •  fluticasone (FLONASE) 50 MCG/ACT nasal spray, 2 sprays into the nostril(s) as directed by provider Daily., Disp: , Rfl:   •  folic acid (FOLVITE) 1 MG tablet, Take 1 mg by mouth Daily., Disp: , Rfl:   •  gabapentin (NEURONTIN) 100 MG capsule, Take 100 mg by mouth 3 (Three) Times a Day., Disp: , Rfl:   •  guaiFENesin (MUCINEX) 600 MG 12 hr tablet, Take 1,200 mg by mouth., Disp: , Rfl:   •  HYDROcodone-acetaminophen (NORCO) 7.5-325 MG per tablet, Take 1 tablet by mouth Every 4 (Four) Hours As Needed for Moderate Pain  (Pain)., Disp: 15 tablet, Rfl: 0  •  ipratropium-albuterol (DUO-NEB) 0.5-2.5 mg/3 ml nebulizer, Inhale 3 mL., Disp: , Rfl:   •  levothyroxine (SYNTHROID, LEVOTHROID) 100 MCG tablet, Take 1 tablet by mouth Q Morning, Disp: , Rfl:   •  LORazepam (ATIVAN) 0.5 MG tablet, Take 0.5 mg by mouth 3 (Three) Times a Day As Needed. for anxiety, Disp: , Rfl:   •  mometasone (NASONEX) 50 MCG/ACT nasal spray, 2 sprays into each nostril Daily., Disp: , Rfl:   •  olmesartan (BENICAR) 40 MG tablet, Take 40 mg by mouth Daily., Disp: , Rfl:   •  Omega-3 Fatty Acids (FISH OIL) 1000 MG capsule capsule, Take  by mouth Daily With Breakfast., Disp: , Rfl:   •  polyethylene glycol (MIRALAX) powder, Take 17 g by mouth As Needed., Disp: , Rfl:   •  predniSONE (DELTASONE) 10 MG tablet, Take 10 mg by mouth 2 (Two) Times a Day., Disp: , Rfl:   •  senna (SENOKOT) 8.6 MG tablet, Take 2 tablets by mouth., Disp: , Rfl:   •  simvastatin (ZOCOR) 20 MG tablet, simvastatin 20 mg tablet, Disp: , Rfl:   •  sodium chloride 0.65 % nasal spray, 1 spray into the nostril(s)  "as directed by provider As Needed for Congestion., Disp: , Rfl:   •  sucralfate (CARAFATE) 1 GM/10ML suspension, Take 1 g by mouth 4 (Four) Times a Day., Disp: , Rfl:   •  traMADol (ULTRAM) 50 MG tablet, Take 50 mg by mouth. AS NEEDED, Disp: , Rfl:   •  venlafaxine XR (EFFEXOR-XR) 150 MG 24 hr capsule, Take 150 mg by mouth., Disp: , Rfl:   •  Mirabegron ER (Myrbetriq) 50 MG tablet sustained-release 24 hour 24 hr tablet, Take 50 mg by mouth Daily., Disp: 30 tablet, Rfl: 0  •  nitrofurantoin, macrocrystal-monohydrate, (Macrobid) 100 MG capsule, 1 po bid x 7 days  With food, Disp: 14 capsule, Rfl: 0    Review of Systems   Constitutional: Positive for fatigue. Negative for appetite change and fever.   HENT: Negative for dental problem and nosebleeds.    Eyes: Negative for pain and discharge.   Respiratory: Negative for choking and wheezing.    Cardiovascular: Positive for leg swelling.        Hypertension   Gastrointestinal: Negative for abdominal distention, diarrhea and vomiting.   Endocrine:        Hypothyroid   Genitourinary: Positive for frequency, urgency and urinary incontinence. Negative for breast pain.        Vaginal pressure  Kidney disease   Musculoskeletal: Positive for arthralgias and back pain.   Neurological: Negative for syncope and facial asymmetry.   Psychiatric/Behavioral: Negative for agitation and negative for hyperactivity.         Objective      /72   Ht 163.8 cm (64.49\")   Wt 83.5 kg (184 lb)   Breastfeeding No   BMI 31.11 kg/m²       Physical Exam  Vitals and nursing note reviewed. Exam conducted with a chaperone present.   Constitutional:       General: She is not in acute distress.     Appearance: She is well-developed.   HENT:      Head: Normocephalic and atraumatic.   Neck:      Thyroid: No thyromegaly.   Cardiovascular:      Rate and Rhythm: Normal rate and regular rhythm.      Heart sounds: No murmur heard.      Pulmonary:      Effort: Pulmonary effort is normal.      Breath " sounds: Normal breath sounds.   Abdominal:      General: There is no distension.      Palpations: Abdomen is soft.      Tenderness: There is no abdominal tenderness.   Genitourinary:     Exam position: Lithotomy position.      Labia:         Right: No rash, tenderness or lesion.         Left: No rash, tenderness or lesion.       Vagina: Erythema present. No vaginal discharge or bleeding.      Uterus: Absent.       Adnexa:         Right: No tenderness or fullness.          Left: No tenderness or fullness.        Comments: Pt s/p hysterectomy, uterus and cervix surgically absent, vaginal wall week and there is cystocele and rectocele  Musculoskeletal:         General: Normal range of motion.      Cervical back: Normal range of motion and neck supple.   Skin:     General: Skin is warm and dry.   Neurological:      Mental Status: She is alert and oriented to person, place, and time.   Psychiatric:         Behavior: Behavior normal.         Judgment: Judgment normal.          Assessment/Plan     Diagnoses and all orders for this visit:    1. Fitting and adjustment of pessary (Primary)  Comments:  Patient is sized with #1 ring with floor, she abbulates and the pessary feels comfortable.  She is counseled to RTO in a few weeks and beaware if she has difficulty emptying her bladder or pain.    2. Mixed incontinence  -     Cancel: Urinalysis With Microscopic - Urine, Clean Catch  -     Cancel: Urine Culture, Comprehen ([facility]) - Urine, Clean Catch  -     POC Urinalysis Dipstick -     Mirabegron ER (Myrbetriq) 50 MG tablet sustained-release 24 hour 24 hr tablet; Take 50 mg by mouth Daily.  Dispense: 30 tablet; Refill: 0    3. Urinary tract infection without hematuria, site unspecified  -     nitrofurantoin, macrocrystal-monohydrate, (Macrobid) 100 MG capsule; 1 po bid x 7 days  With food  Dispense: 14 capsule; Refill: 0    4. Class 1 obesity due to excess calories without serious comorbidity with body mass index (BMI) of  31.0 to 31.9 in adult  Comments:  Patient is couseled that her BMI is outside the desired parameters and to decrease calories and carbs.    5. Cystocele with rectocele  Patient is couseled re: etiology of pelvic prlapse and the options of treatment.  The pessary trial is where we will start.  RTO in a few weeks and will see how she does with the 50 mgm Myrbetirq and the pessary.               Lexy Rodriguez, APRN  10/22/2021

## 2021-11-05 NOTE — PROGRESS NOTES
"Chief Complaint  Pessary Check (pt states that since having pessary, she can not tell much difference. )           Hanny Ivy presents to [facility]  History of Present Illness     Patient has increased frequency and urgency with urination and reported feeling a bulge from the vagina and had a pessary placed at her last visit.  She was also treated for a UTI and there was not enough urine for a culture to be done.  She also has leaking of urine when she stands up.    Objective   Vital Signs:   /74   Ht 162.6 cm (64\")   Wt 83.5 kg (184 lb)   BMI 31.58 kg/m²       ROS      Urgency and frequency   Incontinence          Cystocele    CKD  RESP; NO SOB  NEURO  NO SEIZURES, SYNCOPE  HEENT  NEGATIVE  CARDIAC  NO FLUTTERS OR CHEST PAIN  EYES  NO BLURRED VISION OR EYE PAIN        Physical Exam  Vitals and nursing note reviewed. Exam conducted with a chaperone present.   Constitutional:       General: She is not in acute distress.  HENT:      Head: Normocephalic.      Mouth/Throat:      Pharynx: No oropharyngeal exudate or posterior oropharyngeal erythema.   Eyes:      General: No scleral icterus.  Pulmonary:      Effort: Pulmonary effort is normal.   Abdominal:      General: Abdomen is flat.      Palpations: There is no mass.      Hernia: There is no hernia in the left inguinal area or right inguinal area.   Genitourinary:     Exam position: Lithotomy position.      Labia:         Right: No rash or tenderness.         Left: No rash or tenderness.       Vagina: No signs of injury and foreign body. Erythema present. No vaginal discharge or bleeding.      Uterus: Absent.       Adnexa:         Right: No mass or tenderness.          Left: No mass or tenderness.        Rectum: No mass.      Comments: PESSARY REMOVED, CLEANED AND REINSERTED  Skin:     General: Skin is warm and dry.   Neurological:      General: No focal deficit present.      Mental Status: She is alert and oriented to person, " place, and time.   Psychiatric:         Mood and Affect: Mood normal.                 Assessment and Plan      Diagnoses and all orders for this visit:      1. Midline cystocele (Primary)   Ring pessasry    2. Urinary tract infection without hematuria, site unspecified  -     POC Urinalysis Dipstick  Wbc's and nitrites positive  -     Urine Culture, Comprehen ([facility]) - Urine, Clean Catch  -     Urinalysis With Microscopic - Urine, Clean Catch  -     ciprofloxacin (Cipro) 250 MG tablet; Take 1 tablet by mouth Every 12 (Twelve) Hours.  Dispense: 6 tablet; Refill: 0    3. Urge incontinence  -     oxybutynin XL (Ditropan XL) 5 MG 24 hr tablet; 1 po daily  Dispense: 30 tablet; Refill: 1  Her pessary was reinserted as the continued leaking may be due to the UTI          Patient was given instructions and counseling regarding  taking the medication for her UTI

## 2021-11-08 NOTE — PROGRESS NOTES
Medicare Annual Wellness Visit  Name: Cristian Jackson Date: 2021   MRN: [id number] Sex: Female   Age: 64 y.o. Ethnicity: Non- / Non    : 1945 Race: White (non-)      Jennifer Friedman is here for Medicare AWV    Jose David Ledesma was called for the AWV per phone. She is doing fair. She is in a W/C to move about in her home, but states she has adjusted to being there and is doing well. Screenings for behavioral, psychosocial and functional/safety risks, and cognitive dysfunction are all negative except as indicated below. These results, as well as other patient data from the [address] form, are documented in Flowsheets linked to this Encounter. Allergies   Allergen Reactions    Penicillins     Sulfa Antibiotics        Prior to Visit Medications    Medication Sig Taking? Authorizing Provider   traMADol (ULTRAM) 50 MG tablet Take 1 tablet by mouth every 6 hours as needed for Pain for up to 30 days. Yes Mehnaz Moya MD   amLODIPine (NORVASC) 5 MG tablet TAKE ONE TABLET BY MOUTH EVERY DAY Yes Mehnaz Moya MD   levothyroxine (SYNTHROID) 100 MCG tablet TAKE ONE TABLET BY MOUTH EVERY DAY Yes Mehnaz Moya MD   simvastatin (ZOCOR) 20 MG tablet TAKE ONE TABLET BY MOUTH EVERY NIGHT Yes Mehnaz Moya MD   olmesartan (BENICAR) 40 MG tablet TAKE ONE TABLET BY MOUTH EVERY DAY Yes Mehnaz Moya MD   cetirizine (ZYRTEC) 10 MG tablet TAKE ONE TABLET BY MOUTH DAILY Yes Mehnaz Moya MD   venlafaxine (EFFEXOR XR) 150 MG extended release capsule TAKE ONE CAPSULE BY MOUTH EVERY DAY Yes Mehnaz Moya MD   chlorthalidone (HYGROTON) 25 MG tablet TAKE ONE TABLET BY MOUTH DAILY Yes Mehnaz Moya MD   mirabegron (MYRBETRIQ) 25 MG TB24 Take 25 mg by mouth daily Yes Historical Provider, MD   oxyCODONE-acetaminophen (PERCOCET) 5-325 MG per tablet Take 1 tablet by mouth as needed for Pain.  Yes Historical Provider, MD   gabapentin (NEURONTIN) 100 MG capsule TAKE ONE CAPSULE BY MOUTH THREE TIMES DAILY Yes Eloy Frankel Elana Santana MD   carvedilol (COREG) 25 MG tablet TAKE ONE TABLET BY MOUTH TWICE DAILY Yes Franklin Archer MD   FEROSUL 325 (65 Fe) MG tablet TAKE ONE TABLET BY MOUTH DAILY WITH BREAKFAST Yes Franklin Archer MD   gabapentin (NEURONTIN) 100 MG capsule TAKE ONE CAPSULE BY MOUTH THREE TIMES DAILY Yes Franklin Archer MD   esomeprazole ([address]) 40 MG delayed release capsule TAKE ONE CAPSULE BY MOUTH EVERY MORNING BEFORE BREAKFAST Yes Franklin Archer MD   doxepin (SINEQUAN) 75 MG capsule TAKE ONE CAPSULE BY MOUTH nightly Yes Franklin Archer MD   fluticasone (FLONASE) 50 MCG/ACT nasal spray 2 sprays by Each Nostril route daily Yes Franklin Archer MD   mometasone-formoterol [facility]) 200-5 MCG/ACT inhaler Inhale 2 puffs into the lungs 2 times daily Yes Franklin Archer MD   nystatin (MYCOSTATIN) 187422 UNIT/GM powder Apply 3 times daily to stomach and groin Yes Franklin Archer MD   sucralfate (CARAFATE) 1 GM tablet Take 1 tablet by mouth 2 times daily Yes Franklin Archer MD   cloNIDine (CATAPRES) 0.1 MG tablet Take 0.1 mg by mouth 2 times daily as needed for High Blood Pressure (systolic > 935) Yes Historical Provider, MD   SENNOSIDES-DOCUSATE SODIUM PO Take 3 tablets by mouth nightly Yes Historical Provider, MD   ondansetron (ZOFRAN) 4 MG tablet Take 1 tablet by mouth every 6 hours as needed for Nausea or Vomiting Yes Franklin Archer MD   guaiFENesin (MUCINEX) 600 MG extended release tablet Take 2 tablets by mouth 2 times daily  Patient taking differently: Take 600 mg by mouth daily  Yes Franklin Archer MD   ipratropium-albuterol (DUONEB) 0.5-2.5 (3) MG/3ML SOLN nebulizer solution Inhale 3 mLs into the lungs every 4 hours (while awake) Yes Laina Verduzco MD   polyethylene glycol (GLYCOLAX) powder Take 17 g by mouth daily Yes Historical Provider, MD   Omega-3 Fatty Acids (GNP FISH OIL PO) Take 520 mg by mouth nightly Yes Historical Provider, MD   Cholecalciferol (VITAMIN D3) 2000 units CAPS Take 1 capsule by mouth nightly Yes Historical Provider, MD EPINEPHrine (EPIPEN) 0.3 MG/0.3ML SOAJ injection Inject 0.3 mg into the muscle as needed.  Use as directed for allergic reaction Yes Historical Provider, MD       Past Medical History:   Diagnosis Date    Allergic rhinitis     Anxiety     Arthritis     Asthma     Chronic back pain     Chronic kidney disease     DDD (degenerative disc disease), lumbar 5/21/2019    Depression     GERD (gastroesophageal reflux disease)     Hyperlipidemia     Hypertension     Hypothyroidism     Irritable bowel syndrome     Palliative care patient 06/18/2019    Restless legs syndrome     Spondylolisthesis at L4-L5 level 5/21/2019       Past Surgical History:   Procedure Laterality Date    ANKLE FRACTURE SURGERY Left     metal plate & screws    CERVICAL SPINE SURGERY  x 2    WITH HARDWARE    CHOLECYSTECTOMY  02/2015    Dr. Moe Bhat Left 02/2015    Taya correction - Dr. Massiel Samayoa Left 2/2007    Thumb implant    HYSTERECTOMY      JOINT REPLACEMENT Bilateral     TKR    JOINT REPLACEMENT Right     TSR    JOINT REPLACEMENT Left     THUMB    LUMBAR FUSION Left 5/21/2019    LEFT L4-5 LLIF WITH POSTERIOR SPINAL FUSION WITH INSTRUMENTATION performed by Cristal Yanez MD at [address] [place] [address] Right 3/2010    DE [address] INCL FLUOR GDNCE DX W/CELL WASHG 100 [facility] N/A 7/17/2018    BRONCHOSCOPY AND BAL with Nurse sedation performed by Edson Bledsoe MD at [facility]    SHOULDER SURGERY Right 03/06/2018    Dr. Andrew Mariscal - Total reversal replacement    TOTAL KNEE ARTHROPLASTY Bilateral     at two different times       Family History   Problem Relation Age of Onset    High Blood Pressure Mother     Stroke Mother     Dementia Mother         late onset   Kaplan Depression Mother     Anxiety Disorder Mother     Kidney Disease Mother     Osteoporosis Mother     Substance Abuse Father         Alcohol    Kidney Disease Father         dialysis    Diabetes Maternal Aunt     Kidney Disease Maternal Aunt     Cancer Maternal Grandmother         Colon Cancer    Osteoporosis Maternal Grandmother     Anxiety Disorder Maternal Grandmother     Depression Maternal Grandmother        CareTeam (Including outside providers/suppliers regularly involved in providing care):   Patient Care Team:  Ronny Fothergill, MD as PCP - General (Family Medicine)  Ronny Fothergill, MD as PCP - [facility] Empaneled Provider  Ignacio Smith MD as Consulting Physician (Nephrology)  Felton Pardo as Consulting Physician (Orthopedic Surgery)  Jenny Beltran DO as Consulting Physician (Gastroenterology)  David Roberts MD as Consulting Physician (Ophthalmology)    Wt Readings from Last 3 Encounters:   08/25/21 185 lb (83.9 kg)   10/28/20 175 lb (79.4 kg)   09/05/19 166 lb (75.3 kg)     There were no vitals filed for this visit. There is no height or weight on file to calculate BMI. Based upon direct observation of the patient, evaluation of cognition reveals Recent memory intact. Clock drawing task not done due to phone visit. Patient's complete Health Risk Assessment and screening values have been reviewed and are found in Flowsheets. The following problems were reviewed today and where indicated follow up appointments were made and/or referrals ordered. Positive Risk Factor Screenings with Interventions:     Fall Risk:  Timed Up and Go Test > 12 seconds? (Complete if either Fall Risk answers are Yes): no  2 or more falls in past year?: (!) yes  Fall with injury in past year?: no  Fall Risk Interventions:    · Home safety tips provided  · Home exercises provided to promote strength and balance        General Health and ACP:  General  In general, how would you say your health is?: Very Good  In the past 7 days, have you experienced any of the following?  New or Increased Pain, New or Increased Fatigue, Loneliness, Social Isolation, Stress or Anger?: None of These  Do you get the social and emotional support that you need?: Yes  Do you have a Living Will?: (!) No  Advance Directives     Power of  Living Will ACP-Advance Directive ACP-Power of     Not on File [place] on 07/08/19 Karena Hernandez      General Health Risk Interventions:  · No Living Will: Advance Care Planning addressed with patient today    Health Habits/Nutrition:  Health Habits/Nutrition  Do you exercise for at least 20 minutes 2-3 times per week?: (!) No  Have you lost any weight without trying in the past 3 months?: No  Do you eat only one meal per day?: No  Have you seen the dentist within the past year?: Yes     Health Habits/Nutrition Interventions:  · Inadequate physical activity:  educational materials provided to promote increased physical activity, patient is not ready to increase his/her physical activity level at this time      ADL:  ADLs  In the past 7 days, did you need help from others to perform any of the following everyday activities? Eating, dressing, grooming, bathing, toileting, or walking/balance?: None  In the past 7 days, did you need help from others to take care of any of the following?  Laundry, housekeeping, banking/finances, shopping, telephone use, food preparation, transportation, or taking medications?: (!) Taking Medications, Transportation, Shopping, Housekeeping, Food Preparation  ADL Interventions:  · Patient declines any further evaluation/treatment for this issue    Personalized Preventive Plan   Current Health Maintenance Status  Immunization History   Administered Date(s) Administered    COVID-19, Pfizer, PF, 30mcg/0.3mL 02/10/2021, 03/03/2021    Influenza Virus Vaccine 10/14/2014    Influenza, High Dose (Fluzone 65 yrs and older) 10/09/2015, 10/11/2017, 09/12/2018, 10/10/2019, 09/17/2020, 10/07/2021    Influenza, Quadv, IM, (6 mo and older Fluzone, Flulaval, Fluarix and 3 yrs and older Afluria) 10/04/2016    Pneumococcal Conjugate 13-valent ([id number]) 10/26/2015    Pneumococcal Polysaccharide ([id number]) 10/26/2010    Zoster Recombinant (Shingrix) 10/07/2021        Health Maintenance   Topic Date Due    Hepatitis C screen  Never done    DTaP/Tdap/Td vaccine (1 - Tdap) Never done    COVID-19 Vaccine (3 - Booster for Leija Peter series) 09/03/2021    Annual Wellness Visit (AWV)  10/29/2021    Shingles Vaccine (2 of 2) 12/02/2021    Lipid screen  06/23/2022    TSH testing  06/23/2022    Potassium monitoring  08/25/2022    Creatinine monitoring  08/25/2022    DEXA (modify frequency per FRAX score)  Completed    Flu vaccine  Completed    Pneumococcal 65+ yrs at Risk Vaccine  Completed    Hepatitis A vaccine  Aged Out    Hepatitis B vaccine  Aged Out    Hib vaccine  Aged Out    Meningococcal (ACWY) vaccine  Aged Out     Recommendations for [employer] Due: see orders and patient instructions/AVS.  . Recommended screening schedule for the next 5-10 years is provided to the patient in written form: see Patient Instructions/AVS.    Pt is unable to exercise as she needs to due to her W/C status. She does move about \"pretty good\". Jennifer Destini, was evaluated through a synchronous (real-time) audio-video encounter. The patient (or guardian if applicable) is aware that this is a billable service. Verbal consent to proceed has been obtained within the past 12 months. The visit was conducted pursuant to the emergency declaration under the [facility], [address] [place] [address] authority and the [employer] Act. Patient identification was verified, and a caregiver was present when appropriate. The patient was located in a state where the provider was credentialed to provide care. Total time spent for this encounter: 20 minutes    --Rubén Lainez LPN on 23/8/7257 at 6:96 PM    An electronic signature was used to authenticate this note.     Cheng Ham LPN, 14/9/1053, performed the documented evaluation under the direct supervision of the attending physician.

## 2021-11-08 NOTE — PATIENT INSTRUCTIONS
Personalized Preventive Plan for Torie Weldon - 11/8/2021  Medicare offers a range of preventive health benefits. Some of the tests and screenings are paid in full while other may be subject to a deductible, co-insurance, and/or copay. Some of these benefits include a comprehensive review of your medical history including lifestyle, illnesses that may run in your family, and various assessments and screenings as appropriate. After reviewing your medical record and screening and assessments performed today your provider may have ordered immunizations, labs, imaging, and/or referrals for you. A list of these orders (if applicable) as well as your Preventive Care list are included within your After Visit Summary for your review. Other Preventive Recommendations:    · A preventive eye exam performed by an eye specialist is recommended every 1-2 years to screen for glaucoma; cataracts, macular degeneration, and other eye disorders. · A preventive dental visit is recommended every 6 months. · Try to get at least 150 minutes of exercise per week or 10,000 steps per day on a pedometer . · Order or download the FREE \"Exercise & Physical Activity: Your Everyday Guide\" from [employer]. Call 6-438.844.1641 or search [employer] online. · You need 7843-9476 mg of calcium and 4480-2530 IU of vitamin D per day. It is possible to meet your calcium requirement with diet alone, but a vitamin D supplement is usually necessary to meet this goal.  · When exposed to the sun, use a sunscreen that protects against both UVA and UVB radiation with an SPF of 30 or greater. Reapply every 2 to 3 hours or after sweating, drying off with a towel, or swimming. · Always wear a seat belt when traveling in a car. Always wear a helmet when riding a bicycle or motorcycle. Heart-Healthy Diet   Sodium, Fat, and Cholesterol Controlled Diet       What Is a Heart Healthy Diet?    A heart-healthy diet is one that limits sodium , certain types of fat , and cholesterol . This type of diet is recommended for:   People with any form of cardiovascular disease (eg, coronary heart disease , peripheral vascular disease , previous heart attack , previous stroke )   People with risk factors for cardiovascular disease, such as high blood pressure , high cholesterol , or diabetes   Anyone who wants to lower their risk of developing cardiovascular disease   Sodium    Sodium is a mineral found in many foods. In general, most people consume much more sodium than they need. Diets high in sodium can increase blood pressure and lead to edema (water retention). On a heart-healthy diet, you should consume no more than 2,300 mg (milligrams) of sodium per dayabout the amount in one teaspoon of table salt. The foods highest in sodium include table salt (about 50% sodium), processed foods, convenience foods, and preserved foods. Cholesterol    Cholesterol is a fat-like, waxy substance in your blood. Our bodies make some cholesterol. It is also found in animal products, with the highest amounts in fatty meat, egg yolks, whole milk, cheese, shellfish, and organ meats. On a heart-healthy diet, you should limit your cholesterol intake to less than 200 mg per day. It is normal and important to have some cholesterol in your bloodstream. But too much cholesterol can cause plaque to build up within your arteries, which can eventually lead to a heart attack or stroke. The two types of cholesterol that are most commonly referred to are:   Low-density lipoprotein (LDL) cholesterol  Also known as bad cholesterol, this is the cholesterol that tends to build up along your arteries. Bad cholesterol levels are increased by eating fats that are saturated or hydrogenated. Optimal level of this cholesterol is less than 100. Over 130 starts to get risky for heart disease.    High-density lipoprotein (HDL) cholesterol  Also known as good cholesterol, this type of cholesterol actually carries cholesterol away from your arteries and may, therefore, help lower your risk of having a heart attack. You want this level to be high (ideally greater than 60). It is a risk to have a level less than 40. You can raise this good cholesterol by eating olive oil, canola oil, avocados, or nuts. Exercise raises this level, too. Fat    Fat is calorie dense and packs a lot of calories into a small amount of food. Even though fats should be limited due to their high calorie content, not all fats are bad. In fact, some fats are quite healthful. Fat can be broken down into four main types. The good-for-you fats are:   Monounsaturated fat  found in oils such as olive and canola, avocados, and nuts and natural nut butters; can decrease cholesterol levels, while keeping levels of HDL cholesterol high   Polyunsaturated fat  found in oils such as safflower, sunflower, soybean, corn, and sesame; can decrease total cholesterol and LDL cholesterol   Omega-3 fatty acids  particularly those found in fatty fish (such as salmon, trout, tuna, mackerel, herring, and sardines); can decrease risk of arrhythmias, decrease triglyceride levels, and slightly lower blood pressure   The fats that you want to limit are:   Saturated fat  found in animal products, many fast foods, and a few vegetables; increases total blood cholesterol, including LDL levels   Animal fats that are saturated include: butter, lard, whole-milk dairy products, meat fat, and poultry skin   Vegetable fats that are saturated include: hydrogenated shortening, palm oil, coconut oil, cocoa butter   Hydrogenated or trans fat  found in margarine and vegetable shortening, most shelf stable snack foods, and fried foods; increases LDL and decreases HDL     It is generally recommended that you limit your total fat for the day to less than 30% of your total calories.  If you follow an 1800-calorie heart healthy diet, for week) Tofu Nuts or seeds (unsalted, dry-roasted), low-sodium peanut butter Dried peas, beans, and lentils   Any smoked, cured, salted, or canned meat, fish, or poultry (including riggins, chipped beef, cold cuts, hot dogs, sausages, sardines, and anchovies) Poultry skins Breaded and/or fried fish or meats Canned peas, beans, and lentils Salted nuts   Fats and Oils   Olive oil and canola oil Low-sodium, low-fat salad dressings and mayonnaise   Butter, margarine, coconut and palm oils, riggins fat   Snacks, Sweets, and Condiments   Low-sodium or unsalted versions of broths, soups, soy sauce, and condiments Pepper, herbs, and spices; vinegar, lemon, or lime juice Low-fat frozen desserts (yogurt, sherbet, fruit bars) Sugar, cocoa powder, honey, syrup, jam, and preserves Low-fat, trans-fat free cookies, cakes, and pies Shalom and animal crackers, fig bars, ish snaps   High-fat desserts Broth, soups, gravies, and sauces, made from instant mixes or other high-sodium ingredients Salted snack foods Canned olives Meat tenderizers, seasoning salt, and most flavored vinegars   Beverages   Low-sodium carbonated beverages Tea and coffee in moderation Soy milk   Commercially softened water   Suggestions   Make whole grains, fruits, and vegetables the base of your diet. Choose heart-healthy fats such as canola, olive, and flaxseed oil, and foods high in heart-healthy fats, such as nuts, seeds, soybeans, tofu, and fish. Eat fish at least twice per week; the fish highest in omega-3 fatty acids and lowest in mercury include salmon, herring, mackerel, sardines, and canned chunk light tuna. If you eat fish less than twice per week or have high triglycerides, talk to your doctor about taking fish oil supplements. Read food labels.    For products low in fat and cholesterol, look for fat free, low-fat, cholesterol free, saturated fat free, and trans fat freeAlso scan the Nutrition Facts Label, which lists saturated fat, trans fat, and cholesterol amounts. For products low in sodium, look for sodium free, very low sodium, low sodium, no added salt, and unsalted   Skip the salt when cooking or at the table; if food needs more flavor, get creative and try out different herbs and spices. Garlic and onion also add substantial flavor to foods. Trim any visible fat off meat and poultry before cooking, and drain the fat off after coleman. Use cooking methods that require little or no added fat, such as grilling, boiling, baking, poaching, broiling, roasting, steaming, stir-frying, and sauting. Avoid fast food and convenience food. They tend to be high in saturated and trans fat and have a lot of added salt. Talk to a registered dietitian for individualized diet advice. Last Reviewed: March 2011 Ani Krishnamurthy MS, MPH, RD   Updated: 3/29/2011   ·     Heart-Healthy Diet   Sodium, Fat, and Cholesterol Controlled Diet       What Is a Heart Healthy Diet? A heart-healthy diet is one that limits sodium , certain types of fat , and cholesterol . This type of diet is recommended for:   People with any form of cardiovascular disease (eg, coronary heart disease , peripheral vascular disease , previous heart attack , previous stroke )   People with risk factors for cardiovascular disease, such as high blood pressure , high cholesterol , or diabetes   Anyone who wants to lower their risk of developing cardiovascular disease   Sodium    Sodium is a mineral found in many foods. In general, most people consume much more sodium than they need. Diets high in sodium can increase blood pressure and lead to edema (water retention). On a heart-healthy diet, you should consume no more than 2,300 mg (milligrams) of sodium per dayabout the amount in one teaspoon of table salt. The foods highest in sodium include table salt (about 50% sodium), processed foods, convenience foods, and preserved foods.    Cholesterol    Cholesterol is a fat-like, waxy substance in your blood. Our bodies make some cholesterol. It is also found in animal products, with the highest amounts in fatty meat, egg yolks, whole milk, cheese, shellfish, and organ meats. On a heart-healthy diet, you should limit your cholesterol intake to less than 200 mg per day. It is normal and important to have some cholesterol in your bloodstream. But too much cholesterol can cause plaque to build up within your arteries, which can eventually lead to a heart attack or stroke. The two types of cholesterol that are most commonly referred to are:   Low-density lipoprotein (LDL) cholesterol  Also known as bad cholesterol, this is the cholesterol that tends to build up along your arteries. Bad cholesterol levels are increased by eating fats that are saturated or hydrogenated. Optimal level of this cholesterol is less than 100. Over 130 starts to get risky for heart disease. High-density lipoprotein (HDL) cholesterol  Also known as good cholesterol, this type of cholesterol actually carries cholesterol away from your arteries and may, therefore, help lower your risk of having a heart attack. You want this level to be high (ideally greater than 60). It is a risk to have a level less than 40. You can raise this good cholesterol by eating olive oil, canola oil, avocados, or nuts. Exercise raises this level, too. Fat    Fat is calorie dense and packs a lot of calories into a small amount of food. Even though fats should be limited due to their high calorie content, not all fats are bad. In fact, some fats are quite healthful. Fat can be broken down into four main types.    The good-for-you fats are:   Monounsaturated fat  found in oils such as olive and canola, avocados, and nuts and natural nut butters; can decrease cholesterol levels, while keeping levels of HDL cholesterol high   Polyunsaturated fat  found in oils such as safflower, sunflower, soybean, corn, and sesame; can decrease total cholesterol and LDL cholesterol   Omega-3 fatty acids  particularly those found in fatty fish (such as salmon, trout, tuna, mackerel, herring, and sardines); can decrease risk of arrhythmias, decrease triglyceride levels, and slightly lower blood pressure   The fats that you want to limit are:   Saturated fat  found in animal products, many fast foods, and a few vegetables; increases total blood cholesterol, including LDL levels   Animal fats that are saturated include: butter, lard, whole-milk dairy products, meat fat, and poultry skin   Vegetable fats that are saturated include: hydrogenated shortening, palm oil, coconut oil, cocoa butter   Hydrogenated or trans fat  found in margarine and vegetable shortening, most shelf stable snack foods, and fried foods; increases LDL and decreases HDL     It is generally recommended that you limit your total fat for the day to less than 30% of your total calories. If you follow an 1800-calorie heart healthy diet, for example, this would mean 60 grams of fat or less per day. Saturated fat and trans fat in your diet raises your blood cholesterol the most, much more than dietary cholesterol does. For this reason, on a heart-healthy diet, less than 7% of your calories should come from saturated fat and ideally 0% from trans fat. On an 1800-calorie diet, this translates into less than 14 grams of saturated fat per day, leaving 46 grams of fat to come from mono- and polyunsaturated fats.    Food Choices on a Heart Healthy Diet   Food Category   Foods Recommended   Foods to Avoid   Grains   Breads and rolls without salted tops Most dry and cooked cereals Unsalted crackers and breadsticks Low-sodium or homemade breadcrumbs or stuffing All rice and pastas   Breads, rolls, and crackers with salted tops High-fat baked goods (eg, muffins, donuts, pastries) Quick breads, self-rising flour, and biscuit mixes Regular bread crumbs Instant hot cereals Commercially prepared rice, pasta, or stuffing mixes Vegetables   Most fresh, frozen, and low-sodium canned vegetables Low-sodium and salt-free vegetable juices Canned vegetables if unsalted or rinsed   Regular canned vegetables and juices, including sauerkraut and pickled vegetables Frozen vegetables with sauces Commercially prepared potato and vegetable mixes   Fruits   Most fresh, frozen, and canned fruits All fruit juices   Fruits processed with salt or sodium   Milk   Nonfat or low-fat (1%) milk Nonfat or low-fat yogurt Cottage cheese, low-fat ricotta, cheeses labeled as low-fat and low-sodium   Whole milk Reduced-fat (2%) milk Malted and chocolate milk Full fat yogurt Most cheeses (unless low-fat and low salt) Buttermilk (no more than 1 cup per week)   Meats and Beans   Lean cuts of fresh or frozen beef, veal, lamb, or pork (look for the word loin) Fresh or frozen poultry without the skin Fresh or frozen fish and some shellfish Egg whites and egg substitutes (Limit whole eggs to three per week) Tofu Nuts or seeds (unsalted, dry-roasted), low-sodium peanut butter Dried peas, beans, and lentils   Any smoked, cured, salted, or canned meat, fish, or poultry (including riggins, chipped beef, cold cuts, hot dogs, sausages, sardines, and anchovies) Poultry skins Breaded and/or fried fish or meats Canned peas, beans, and lentils Salted nuts   Fats and Oils   Olive oil and canola oil Low-sodium, low-fat salad dressings and mayonnaise   Butter, margarine, coconut and palm oils, riggins fat   Snacks, Sweets, and Condiments   Low-sodium or unsalted versions of broths, soups, soy sauce, and condiments Pepper, herbs, and spices; vinegar, lemon, or lime juice Low-fat frozen desserts (yogurt, sherbet, fruit bars) Sugar, cocoa powder, honey, syrup, jam, and preserves Low-fat, trans-fat free cookies, cakes, and pies Shalom and animal crackers, fig bars, ish snaps   High-fat desserts Broth, soups, gravies, and sauces, made from instant mixes or other high-sodium ingredients Salted snack foods Canned olives Meat tenderizers, seasoning salt, and most flavored vinegars   Beverages   Low-sodium carbonated beverages Tea and coffee in moderation Soy milk   Commercially softened water   Suggestions   Make whole grains, fruits, and vegetables the base of your diet. Choose heart-healthy fats such as canola, olive, and flaxseed oil, and foods high in heart-healthy fats, such as nuts, seeds, soybeans, tofu, and fish. Eat fish at least twice per week; the fish highest in omega-3 fatty acids and lowest in mercury include salmon, herring, mackerel, sardines, and canned chunk light tuna. If you eat fish less than twice per week or have high triglycerides, talk to your doctor about taking fish oil supplements. Read food labels. For products low in fat and cholesterol, look for fat free, low-fat, cholesterol free, saturated fat free, and trans fat freeAlso scan the Nutrition Facts Label, which lists saturated fat, trans fat, and cholesterol amounts. For products low in sodium, look for sodium free, very low sodium, low sodium, no added salt, and unsalted   Skip the salt when cooking or at the table; if food needs more flavor, get creative and try out different herbs and spices. Garlic and onion also add substantial flavor to foods. Trim any visible fat off meat and poultry before cooking, and drain the fat off after coleman. Use cooking methods that require little or no added fat, such as grilling, boiling, baking, poaching, broiling, roasting, steaming, stir-frying, and sauting. Avoid fast food and convenience food. They tend to be high in saturated and trans fat and have a lot of added salt. Talk to a registered dietitian for individualized diet advice.       Last Reviewed: March 2011 Aranza Lay MS, MPH, RD   Updated: 3/29/2011   ·     Preventing Osteoporosis: After Your Visit  Your Care Instructions  Osteoporosis means the bones are weak and thin enough that they can break sunburned. Sunburn can increase your risk of skin cancer. Talk to your doctor about taking a calcium plus vitamin D supplement. Ask about what type of calcium is right for you, and how much to take at a time. Adults ages 23 to 48 should not get more than 2,500 mg of calcium and 4,000 IU of vitamin D each day, whether it is from supplements and/or food. Adults ages 46 and older should not get more than 2,000 mg of calcium and 4,000 IU of vitamin D each day from supplements and/or food. Get regular bone-building exercise. Weight-bearing and resistance exercises keep bones healthy by working the muscles and bones against gravity. Start out at an exercise level that feels right for you. Add a little at a time until you can do the following:  Do 30 minutes of weight-bearing exercise on most days of the week. Walking, jogging, stair climbing, and dancing are good choices. Do resistance exercises with weights or elastic bands 2 to 3 days a week. Limit alcohol. Drink no more than 1 alcohol drink a day if you are a woman. Drink no more than 2 alcohol drinks a day if you are a man. Do not smoke. Smoking can make bones thin faster. If you need help quitting, talk to your doctor about stop-smoking programs and medicines. These can increase your chances of quitting for good. When should you call for help? Watch closely for changes in your health, and be sure to contact your doctor if:  You need help with a healthy eating plan. You need help with an exercise plan    © [id number] [employer]. Care instructions adapted under license by [facility]. This care instruction is for use with your licensed healthcare professional. If you have questions about a medical condition or this instruction, always ask your healthcare professional. [place] [address] any warranty or liability for your use of this information. Content Version: 9.4.22443;  Last Revised: June 20, 2011              ·     Keep Your Memory Gia Failing       Many factors can affect your ability to remembera hectic lifestyle, aging, stress, chronic disease, and certain medicines. But, there are steps you can take to sharpen your mind and help preserve your memory. Challenge Your Brain   Regularly challenging your mind may help keeps it in top shape. Good mental exercises include:   Crossword puzzlesUse a dictionary if you need it; you will learn more that way. Brainteasers Try some! Crafts, such as wood working and sewing   Hobbies, such as gardening and building model airplanes   SocializingVisit old friends or join groups to meet new ones. Reading   Learning a new language   Taking a class, whether it be art history or gabi chi   TravelingExperience the food, history, and culture of your destination   Learning to use a computer   Going to museums, the theater, or thought-provoking movies   Changing things in your daily life, such as reversing your pattern in the grocery store or brushing your teeth using your nondominant hand   Use Memory Aids   There is no need to remember every detail on your own. These memory aids can help:   Calendars and day planners   Electronic organizers to store all sorts of helpful informationThese devices can \"beep\" to remind you of appointments. A book of days to record birthdays, anniversaries, and other occasions that occur on the same date every year   Detailed \"to-do\" lists and strategically placed sticky notes   Quick \"study\" sessionsBefore a gathering, review who will be there so their names will be fresh in your mind. Establish routinesFor example, keep your keys, wallet, and umbrella in the same place all the time or take medicine with your 8:00 AM glass of juice   Live a Healthy Life   Many actions that will keep your body strong will do the same for your mind.  For example:   Talk to Your Doctor About Herbs and Supplements    Malnutrition and vitamin deficiencies can impair your mental function. For example, vitamin B12 deficiency can cause a range of symptoms, including confusion. But, what if your nutritional needs are being met? Can herbs and supplements still offer a benefit? Researchers have investigated a range of natural remedies, such as ginkgo , ginseng , and the supplement phosphatidylserine (PS). So far, though, the evidence is inconsistent as to whether these products can improve memory or thinking. If you are interested in taking herbs and supplements, talk to your doctor first because they may interact with other medicines that you are taking. Exercise Regularly    Among the many benefits of regular exercise are increased blood flow to the brain and decreased risk of certain diseases that can interfere with memory function. One study found that even moderate exercise has a beneficial effect. Examples of \"moderate\" exercise include:   Playing 18 holes of golf once a week, without a cart   Playing tennis twice a week   Walking one mile per day   Manage Stress    It can be tough to remember what is important when your mind is cluttered. Make time for relaxation. Choose activities that calm you down, and make it routine. Manage Chronic Conditions    Side effects of high blood pressure , diabetes, and heart disease can interfere with mental function. Many of the lifestyle steps discussed here can help manage these conditions. Strive to eat a healthy diet, exercise regularly, get stress under control, and follow your doctor's advice for your condition. Minimize Medications    Talk to your doctor about the medicines that you take. Some may be unnecessary. Also, healthy lifestyle habits may lower the need for certain drugs. Last Reviewed: April 2010 Steve Willis MD   Updated: 4/13/2010   ·   Smoking Cessation  This document explains the best ways for you to quit smoking and new treatments to help.  It lists new medicines that can double or triple your chances of quitting and quitting for good. It also considers ways to avoid relapses and concerns you may have about quitting, including weight gain. NICOTINE: A POWERFUL ADDICTION  If you have tried to quit smoking, you know how hard it can be. It is hard because nicotine is a very addictive drug. For some people, it can be as addictive as heroin or cocaine. Usually, people make 2 or 3 tries, or more, before finally being able to quit. Each time you try to quit, you can learn about what helps and what hurts. Quitting takes hard work and a lot of effort, but you can quit smoking. QUITTING SMOKING IS ONE OF THE MOST IMPORTANT THINGS YOU WILL EVER DO. You will live longer, feel better, and live better. The impact on your body of quitting smoking is felt almost immediately:  Within 20 minutes, blood pressure decreases. Pulse returns to its normal level. After 8 hours, carbon monoxide levels in the blood return to normal. Oxygen level increases. After 24 hours, chance of heart attack starts to decrease. Breath, hair, and body stop smelling like smoke. After 48 hours, damaged nerve endings begin to recover. Sense of taste and smell improve. After 72 hours, the body is virtually free of nicotine. Bronchial tubes relax and breathing becomes easier. After 2 to 12 weeks, lungs can hold more air. Exercise becomes easier and circulation improves. Quitting will reduce your risk of having a heart attack, stroke, cancer, or lung disease:  After 1 year, the risk of coronary heart disease is cut in half. After 5 years, the risk of stroke falls to the same as a nonsmoker. After 10 years, the risk of lung cancer is cut in half and the risk of other cancers decreases significantly. After 15 years, the risk of coronary heart disease drops, usually to the level of a nonsmoker. If you are pregnant, quitting smoking will improve your chances of having a healthy baby.   The people you live with, especially your children, will be healthier. You will have extra money to spend on things other than cigarettes. FIVE KEYS TO QUITTING  Studies have shown that these 5 steps will help you quit smoking and quit for good. You have the best chances of quitting if you use them together:  Get ready. Get support and encouragement. Learn new skills and behaviors. Get medicine to reduce your nicotine addiction and use it correctly. Be prepared for relapse or difficult situations. Be determined to continue trying to quit, even if you do not succeed at first.  1. GET READY  Set a quit date. Change your environment. Get rid of ALL cigarettes, ashtrays, matches, and lighters in your home, car, and place of work. Do not let people smoke in your home. Review your past attempts to quit. Think about what worked and what did not. Once you quit, do not smoke. NOT EVEN A PUFF! 2. GET SUPPORT AND ENCOURAGEMENT  Studies have shown that you have a better chance of being successful if you have help. You can get support in many ways. Tell your family, friends, and coworkers that you are going to quit and need their support. Ask them not to smoke around you. Talk to your caregivers (doctor, dentist, nurse, pharmacist, psychologist, and/or smoking counselor). Get individual, group, or telephone counseling and support. The more counseling you have, the better your chances are of quitting. Programs are available at [facility]. Call your local health department for information about programs in your area. Spiritual beliefs and practices may help some smokers quit. Quit meters are small computer programs online or downloadable that keep track of quit statistics, such as amount of \"quit-time,\" cigarettes not smoked, and money saved. Many smokers find one or more of the many self-help books available useful in helping them quit and stay off tobacco.  3. LEARN NEW SKILLS AND BEHAVIORS  Try to distract yourself from urges to smoke.  Talk to someone, go for a walk, or occupy your time with a task. When you first try to quit, change your routine. Take a different route to work. Drink tea instead of coffee. Eat breakfast in a different place. Do something to reduce your stress. Take a hot bath, exercise, or read a book. Plan something enjoyable to do every day. Reward yourself for not smoking. Explore interactive web-based programs that specialize in helping you quit. 4. GET MEDICINE AND USE IT CORRECTLY  Medicines can help you stop smoking and decrease the urge to smoke. Combining medicine with the above behavioral methods and support can quadruple your chances of successfully quitting smoking. The [place] Food and Drug Administration (FDA) has approved 7 medicines to help you quit smoking. These medicines fall into 3 categories. Nicotine replacement therapy (delivers nicotine to your body without the negative effects and risks of smoking):  Nicotine gum: Available over-the-counter. Nicotine lozenges: Available over-the-counter. Nicotine inhaler: Available by prescription. Nicotine nasal spray: Available by prescription. Nicotine skin patches (transdermal): Available by prescription and over-the-counter. Antidepressant medicine (helps people abstain from smoking, but how this works is unknown): Bupropion sustained-release (SR) tablets: Available by prescription. Nicotinic receptor partial agonist (simulates the effect of nicotine in your brain):  Varenicline tartrate tablets: Available by prescription. Ask your caregiver for advice about which medicines to use and how to use them. Carefully read the information on the package. Everyone who is trying to quit may benefit from using a medicine. If you are pregnant or trying to become pregnant, nursing an infant, you are under age 25, or you smoke fewer than 10 cigarettes per day, talk to your caregiver before taking any nicotine replacement medicines.   You should stop using a nicotine replacement product and call your caregiver if you experience nausea, dizziness, weakness, vomiting, fast or irregular heartbeat, mouth problems with the lozenge or gum, or redness or swelling of the skin around the patch that does not go away. Do not use any other product containing nicotine while using a nicotine replacement product. Talk to your caregiver before using these products if you have diabetes, heart disease, asthma, stomach ulcers, you had a recent heart attack, you have high blood pressure that is not controlled with medicine, a history of irregular heartbeat, or you have been prescribed medicine to help you quit smoking. 5. BE PREPARED FOR RELAPSE OR DIFFICULT SITUATIONS  Most relapses occur within the first 3 months after quitting. Do not be discouraged if you start smoking again. Remember, most people try several times before they finally quit. You may have symptoms of withdrawal because your body is used to nicotine. You may crave cigarettes, be irritable, feel very hungry, cough often, get headaches, or have difficulty concentrating. The withdrawal symptoms are only temporary. They are strongest when you first quit, but they will go away within 10 to 14 days. Here are some difficult situations to watch for:  Alcohol. Avoid drinking alcohol. Drinking lowers your chances of successfully quitting. Caffeine. Try to reduce the amount of caffeine you consume. It also lowers your chances of successfully quitting. Other smokers. Being around smoking can make you want to smoke. Avoid smokers. Weight gain. Many smokers will gain weight when they quit, usually less than 10 pounds. Eat a healthy diet and stay active. Do not let weight gain distract you from your main goal, quitting smoking. Some medicines that help you quit smoking may also help delay weight gain. You can always lose the weight gained after you quit. Bad mood or depression.  There are a lot of ways to improve your mood other than smoking. If you are having problems with any of these situations, talk to your caregiver. SPECIAL SITUATIONS AND CONDITIONS  Studies suggest that everyone can quit smoking. Your situation or condition can give you a special reason to quit. Pregnant women/new mothers: By quitting, you protect your baby's health and your own. Hospitalized patients: By quitting, you reduce health problems and help healing. Heart attack patients: By quitting, you reduce your risk of a second heart attack. Lung, head, and neck cancer patients: By quitting, you reduce your chance of a second cancer. Parents of children and adolescents: By quitting, you protect your children from illnesses caused by secondhand smoke. QUESTIONS TO THINK ABOUT  Think about the following questions before you try to stop smoking. You may want to talk about your answers with your caregiver. Why do you want to quit? If you tried to quit in the past, what helped and what did not? What will be the most difficult situations for you after you quit? How will you plan to handle them? Who can help you through the tough times? Your family? Friends? Caregiver? What pleasures do you get from smoking? What ways can you still get pleasure if you quit? Here are some questions to ask your caregiver: How can you help me to be successful at quitting? What medicine do you think would be best for me and how should I take it? What should I do if I need more help? What is smoking withdrawal like? How can I get information on withdrawal?  Quitting takes hard work and a lot of effort, but you can quit smoking. FOR MORE INFORMATION   Smokefree. gov (Portable[employer].se) provides free, accurate, evidence-based information and professional assistance to help support the immediate and long-term needs of people trying to quit smoking.   Document Released: 12/12/2002 Document Revised: 12/06/2012 Document Reviewed: 10/04/2010  ExitCare® Patient Information [id number] Dereje Eaton. ·     Keeping Home a [facility]       As we get older, changes in balance, gait, strength, vision, hearing, and cognition make even the most youthful senior more prone to accidents. Falls are one of the leading health risks for older people. This increased risk of falling is related to:   Aging process (eg, decreased muscle strength, slowed reflexes)   Higher incidence of chronic health problems (eg, arthritis, diabetes) that may limit mobility, agility or sensory awareness   Side effects of medicine (eg, dizziness, blurred vision)especially medicines like prescription pain medicines and drugs used to treat mental health conditions   Depending on the brittleness of your bones, the consequences of a fall can be serious and long lasting. Home Life   Research by the Association of Aging [facility]) shows that some home accidents among older adults can be prevented by making simple lifestyle changes and basic modifications and repairs to the home environment. Here are some lifestyle changes that experts recommend:   Have your hearing and vision checked regularly. Be sure to wear prescription glasses that are right for you. Speak to your doctor or pharmacist about the possible side effects of your medicines. A number of medicines can cause dizziness. If you have problems with sleep, talk to your doctor. Limit your intake of alcohol. If necessary, use a cane or walker to help maintain your balance. Wear supportive, rubber-soled shoes, even at home. If you live in a region that gets wintry weather, you may want to put special cleats on your shoes to prevent you from slipping on the snow and ice. Exercise regularly to help maintain muscle tone, agility, and balance. Always hold the banister when going up or down stairs. Also, use  bars when getting in or out of the bath or shower, or using the toilet. To avoid dizziness, get up slowly from a lying down position.  Sit up first, dangling your legs for a minute or two before rising to a standing position. Overall Home Safety Check   According to the Consumer Product Safety Commision's \"Older Consumer Home Safety Checklist,\" it is important to check for potential hazards in each room. And remember, proper lighting is an essential factor in home safety. If you cannot see clearly, you are more likely to fall. Important questions to ask yourself include:   Are lamp, electric, extension, and telephone cords placed out of the flow of traffic and maintained in good condition? Have frayed cords been replaced? Are all small rugs and runners slip resistant? If not, you can secure them to the floor with a special double-sided carpet tape. Are smoke detectors properly locatedone on every floor of your home and one outside of every sleeping area? Are they in good working order? Are batteries replaced at least once a year? Do you have a well-maintained carbon monoxide detector outside every sleeping are in your home? Does your furniture layout leave plenty of space to maneuver between and around chairs, tables, beds, and sofas? Are hallways, stairs and passages between rooms well lit? Can you reach a lamp without getting out of bed? Are floor surfaces well maintained? Shag rugs, high-pile carpeting, tile floors, and polished wood floors can be particularly slippery. Stairs should always have handrails and be carpeted or fitted with a non-skid tread. Is your telephone easily reachable. Is the cord safely tucked away? Room by Room   According to the Association of Aging, bathrooms and nehemiah are the two most potentially hazardous rooms in your home. In the Kitchen    Be sure your stove is in proper working order and always make sure burners and the oven are off before you go out or go to sleep. Keep pots on the back burners, turn handles away from the front of the stove, and keep stove clean and free of grease build-up.     Kitchen ventilation systems and range exhausts should be working properly. Keep flammable objects such as towels and pot holders away from the cooking area except when in use. Make sure kitchen curtains are tied back. Move cords and appliances away from the sink and hot surfaces. If extension cords are needed, install wiring guides so they do not hang over the sink, range, or working areas. Look for coffee pots, kettles and toaster ovens with automatic shut-offs. Keep a mop handy in the kitchen so you can wipe up spills instantly. You should also have a small fire extinguisher. Arrange your kitchen with frequently used items on lower shelves to avoid the need to stand on a stepstool to reach them. Make sure countertops are well-lit to avoid injuries while cutting and preparing food. In the Bathroom    Use a non-slip mat or decals in the tub and shower, since wet, soapy tile or porcelain surfaces are extremely slippery. Make sure bathroom rugs are non-skid or tape them firmly to the floor. Bathtubs should have at least one, preferably two, grab bars, firmly attached to structural supports in the wall. (Do not use built-in soap holders or glass shower doors as grab bars.)    Tub seats fitted with non-slip material on the legs allow you to wash sitting down. For people with limited mobility, bathtub transfer benches allow you to slide safely into the tub. Raised toilet seats and toilet safety rails are helpful for those with knee or hip problems. In the [place]    Make sure you use a nightlight and that the area around your bed is clear of potential obstacles. Be careful with electric blankets and never go to sleep with a heating pad, which can cause serious burns even if on a low setting. Use fire-resistant mattress covers and pillows, and NEVER smoke in bed. Keep a phone next to the bed that is programmed to dial 911 at the push of a button.       If you have a chronic condition, you may want to sign on with an automatic call-in service. Typically the system includes a small pendant that connects directly to an emergency medical voice-response system. You should also make arrangements to stay in contact with someonefriend, neighbor, family memberon a regular schedule. Fire Prevention   According to the iwi. (Smoke Alarms for Every) [address] [place] [address], senior citizens are one of the two highest risk groups for death and serious injuries due to residential fires. When cooking, wear short-sleeved items, never a bulky long-sleeved robe. The [facility]'s Safety Checklist for Older Consumers emphasizes the importance of checking basements, garages, workshops and storage areas for fire hazards, such as volatile liquids, piles of old rags or clothing and overloaded circuits. Never smoke in bed or when lying down on a couch or recliner chair. Small portable electric or kerosene heaters are responsible for many home fires and should be used cautiously if at all. If you do use one, be sure to keep them away from flammable materials. In case of fire, make sure you have a pre-established emergency exit plan. Have a professional check your fireplace and other fuel-burning appliances yearly. Helping Hands   Baby boomers entering the reddy years will continue to see the development of new products to help older adults live safely and independently in spite of age-related changes. Making Life More Livable  , by Chelsea Nunez, lists over 1,000 products for \"living well in the mature years,\" such as bathing and mobility aids, household security devices, ergonomically designed knives and peelers, and faucet valves and knobs for temperature control. Medical supply stores and organizations are good sources of information about products that improve your quality of life and insure your safety.      Last Reviewed: November 2009 Alicja Rosa MD   Updated: 3/7/2011     ·        Advance Care Planning: Care Instructions  Your Care Instructions     It can be hard to live with an illness that cannot be cured. But if your health is getting worse, you may want to make decisions about end-of-life care. Planning for the end of your life does not mean that you are giving up. It is a way to make sure that your wishes are met. Clearly stating your wishes can make it easier for your loved ones. Making plans while you are still able may also ease your mind and make your final days less stressful and more meaningful. Follow-up care is a key part of your treatment and safety. Be sure to make and go to all appointments, and call your doctor if you are having problems. It's also a good idea to know your test results and keep a list of the medicines you take. What can you do to plan for the end of life? You can bring these issues up with your doctor. You do not need to wait until your doctor starts the conversation. You might start with, \"What makes life worth living for me is. Silvia Camargo \" And then follow it with, \"I would not be willing to live with . Silvia Camargo \" When you complete this sentence it helps your doctor understand your wishes. Talk openly and honestly with your doctor. This is the best way to understand the decisions you will need to make as your health changes. Know that you can always change your mind. Ask your doctor about commonly used life-support measures. These include tube feedings, breathing machines, and fluids given through a vein (IV). Understanding these treatments will help you decide whether you want them. You may choose to have these life-supporting treatments for a limited time. This allows a trial period to see whether they will help you. You may also decide that you want your doctor to take only certain measures to keep you alive. It may help to think about the big picture, like what makes life worth living for you or what your values and goals are. Talk to your doctor about how long you are likely to live. Your doctor may be able to give you an idea of what usually happens with your specific illness. Think about preparing papers that state your wishes. These papers are called advance directives. If you do this early and review them often, there will not be any confusion about what you want. You can change your instructions at any time. Which papers should you prepare? Advance directives are legal papers that tell doctors how you want to be cared for at the end of your life. You do not need a  to write these papers. Ask your doctor or your state [facility] department for information on how to write your advance directives. They may have the forms for each of these types of papers. Make sure your doctor has a copy of these on file, and give a copy to a family member or close friend. Consider a do-not-resuscitate order (DNR). This order asks that no extra treatments be done if your heart stops or you stop breathing. Extra treatments may include cardiopulmonary resuscitation (CPR), electrical shock to restart your heart, or a machine to breathe for you. If you decide to have a DNR order, ask your doctor to explain and write it. Place the order in your home where everyone can easily see it. Consider a living will. A living will explains your wishes about life support and other treatments at the end of your life if you become unable to speak for yourself. Living brice tell doctors to use or not use treatments that would keep you alive. You must have one or two witnesses or a notary present when you sign this form. A living will may be called something else in your state. Consider a medical power of . This form allows you to name a person to make decisions about your care if you are not able to. Most people ask a close friend or family member. Talk to this person about the kinds of treatments you want and those that you do not want. Make sure this person understands your wishes.  A medical power of  may be called something else in your state. These legal papers are simple to change. Tell your doctor what you want to change, and ask him or her to make a note in your medical file. Give your family updated copies of the papers. Where can you learn more? Go to https://chpepiceweb.[employer] (P). org and sign in to your Thinglink account. Enter P184 in the [employer] box to learn more about \"Advance Care Planning: Care Instructions. \"     If you do not have an account, please click on the \"Sign Up Now\" link. Current as of: March 17, 2021               Content Version: 13.0  © 8400-0606 Healthwise, [employer]. Care instructions adapted under license by [facility] ([facility]). If you have questions about a medical condition or this instruction, always ask your healthcare professional. [address] any warranty or liability for your use of this information. ·        Learning About Living [place]  What is a living will? A living will, also called a declaration, is a legal form. It tells your family and your doctor your wishes when you can't speak for yourself. It's used by the health professionals who will treat you as you near the end of your life or if you get seriously hurt or ill. If you put your wishes in writing, your loved ones and others will know what kind of care you want. They won't need to guess. This can ease your mind and be helpful to others. And you can change or cancel your living will at any time. A living will is not the same as an estate or property will. An estate will explains what you want to happen with your money and property after you die. How do you use it? A living will is used to describe the kinds of treatment or life support you want as you near the end of your life or if you get seriously hurt or ill. Keep these facts in mind about living brice. Your living will is used only if you can't speak or make decisions for yourself.  Most often, one or more doctors must certify that you can't speak or decide for yourself before your living will takes effect. If you get better and can speak for yourself again, you can accept or refuse any treatment. It doesn't matter what you said in your living will. Some states may limit your right to refuse treatment in certain cases. For example, you may need to clearly state in your living will that you don't want artificial hydration and nutrition, such as being fed through a tube. Is a living will a legal document? A living will is a legal document. Each state has its own laws about living brice. And a living will may be called something else in your state. Here are some things to know about living brice. You don't need an  to complete a living will. But legal advice can be helpful if your state's laws are unclear. It can also help if your health history is complicated or your family can't agree on what should be in your living will. You can change your living will at any time. Some people find that their wishes about end-of-life care change as their health changes. If you make big changes to your living will, complete a new form. If you move to another state, make sure that your living will is legal in the state where you now live. In most cases, doctors will respect your wishes even if you have a form from a different state. You might use a universal form that has been approved by many states. This kind of form can sometimes be filled out and stored online. Your digital copy will then be available wherever you have a connection to the internet. The doctors and nurses who need to treat you can find it right away. Your state may offer an online registry. This is another place where you can store your living will online. It's a good idea to get your living will notarized. This means using a person called a  to watch two people sign, or witness, your living will.   What should you know when you create a living will? Here are some questions to ask yourself as you make your living will:  Do you know enough about life support methods that might be used? If not, talk to your doctor so you know what might be done if you can't breathe on your own, your heart stops, or you can't swallow. What things would you still want to be able to do after you receive life-support methods? Would you want to be able to walk? To speak? To eat on your own? To live without the help of machines? Do you want certain Holiness practices performed if you become very ill? If you have a choice, where do you want to be cared for? In your home? At a hospital or nursing home? If you have a choice at the end of your life, where would you prefer to die? At home? In a hospital or nursing home? Somewhere else? Would you prefer to be buried or cremated? Do you want your organs to be donated after you die? What should you do with your living will? Make sure that your family members and your health care agent have copies of your living will (also called a declaration). Give your doctor a copy of your living will. Ask him or her to keep it as part of your medical record. If you have more than one doctor, make sure that each one has a copy. Put a copy of your living will where it can be easily found. For example, some people may put a copy on their refrigerator door. If you are using a digital copy, be sure your doctor, family members, and health care agent know how to find and access it. Where can you learn more? Go to https://taryn.Be my eyes. org and sign in to your [employer] account. Enter T652 in the [employer] box to learn more about \"Learning About Living [place]. \"     If you do not have an account, please click on the \"Sign Up Now\" link. Current as of: March 17, 2021               Content Version: 13.0  © 9920-5299 Healthwise, Incorporated. Care instructions adapted under license by [employer].  If you have questions about a medical condition or this instruction, always ask your healthcare professional. [address] any warranty or liability for your use of this information. ·        Learning About Medical Power of   What is a medical power of ? A medical power of , also called a durable power of  for health care, is one type of the legal forms called advance directives. It lets you name the person you want to make treatment decisions for you if you can't speak or decide for yourself. The person you choose is called your health care agent. This person is also called a health care proxy or health care surrogate. A medical power of  may be called something else in your state. How do you choose a health care agent? Choose your health care agent carefully. This person may or may not be a family member. Talk to the person before you make your final decision. Make sure he or she is comfortable with this responsibility. It's a good idea to choose someone who:  Is at least 25years old. Knows you well and understands what makes life meaningful for you. Understands your Yarsanism and moral values. Will do what you want, not what he or she wants. Will be able to make difficult choices at a stressful time. Will be able to refuse or stop treatment, if that is what you would want, even if you could die. Will be firm and confident with health professionals if needed. Will ask questions to get needed information. Lives near you or agrees to travel to you if needed. Your family may help you make medical decisions while you can still be part of that process. But it's important to choose one person to be your health care agent in case you aren't able to make decisions for yourself. If you don't fill out the legal form and name a health care agent, the decisions your family can make may be limited.   A health care agent may be called something else in your state. Who will make decisions for you if you don't have a health care agent? If you don't have a health care agent or a living will, you may not get the care you want. Decisions may be made by family members who disagree about your medical care. Or decisions may be made by a medical professional who doesn't know you well. In some cases, a  makes the decisions. When you name a health care agent, it is very clear who has the power to make health decisions for you. How do you name a health care agent? You name your health care agent on a legal form. This form is usually called a medical power of . Ask your hospital, state bar association, or office on aging where to find these forms. You must sign the form to make it legal. Some states require you to get the form notarized. This means that a person called a  watches you sign the form and then he or she signs the form. Some states also require that two or more witnesses sign the form. Be sure to tell your family members and doctors who your health care agent is. Where can you learn more? Go to https://moksha8 Pharmaceuticalspepiceweb.[employer] org and sign in to your [employer] account. Enter [id number] in the [facility] box to learn more about \"Learning About [address]. \"     If you do not have an account, please click on the \"Sign Up Now\" link. Current as of: March 17, 2021               Content Version: 13.0  © 3668-0217 Healthwise, Incorporated. Care instructions adapted under license by [facility] ([facility]). If you have questions about a medical condition or this instruction, always ask your healthcare professional. [place] [address] any warranty or liability for your use of this information.     ·

## 2021-11-23 PROBLEM — Z87.440 HISTORY OF RECURRENT UTIS: Status: ACTIVE | Noted: 2021-01-01

## 2021-12-01 NOTE — TELEPHONE ENCOUNTER
Pt's daughter called to state her mother's pessary came out on 11/26/21.  Pt did not call office but told her daughter yesterday, 11/30/21.  Pt does not want an appointment and pt's daughter states pt has appt with urology next week on 12/8/21.  Pt's daughter was also asking for UA C&S results.  Results are not released yet.  Please advise.

## 2022-01-01 ENCOUNTER — APPOINTMENT (OUTPATIENT)
Dept: NUCLEAR MEDICINE | Age: 77
DRG: 286 | End: 2022-01-01
Attending: STUDENT IN AN ORGANIZED HEALTH CARE EDUCATION/TRAINING PROGRAM
Payer: MEDICARE

## 2022-01-01 ENCOUNTER — OFFICE VISIT (OUTPATIENT)
Dept: FAMILY MEDICINE CLINIC | Age: 77
End: 2022-01-01
Payer: MEDICARE

## 2022-01-01 ENCOUNTER — HOSPITAL ENCOUNTER (INPATIENT)
Age: 77
LOS: 4 days | Discharge: NURSING, REHAB OR LONG-TERM CARE | DRG: 286 | End: 2022-09-03
Attending: STUDENT IN AN ORGANIZED HEALTH CARE EDUCATION/TRAINING PROGRAM | Admitting: INTERNAL MEDICINE
Payer: MEDICARE

## 2022-01-01 ENCOUNTER — APPOINTMENT (OUTPATIENT)
Dept: NEUROLOGY | Facility: HOSPITAL | Age: 77
End: 2022-01-01

## 2022-01-01 ENCOUNTER — APPOINTMENT (OUTPATIENT)
Dept: CT IMAGING | Facility: HOSPITAL | Age: 77
End: 2022-01-01

## 2022-01-01 ENCOUNTER — TELEPHONE (OUTPATIENT)
Dept: FAMILY MEDICINE CLINIC | Age: 77
End: 2022-01-01

## 2022-01-01 ENCOUNTER — APPOINTMENT (OUTPATIENT)
Dept: GENERAL RADIOLOGY | Age: 77
DRG: 286 | End: 2022-01-01
Attending: STUDENT IN AN ORGANIZED HEALTH CARE EDUCATION/TRAINING PROGRAM
Payer: MEDICARE

## 2022-01-01 ENCOUNTER — APPOINTMENT (OUTPATIENT)
Dept: CT IMAGING | Age: 77
DRG: 286 | End: 2022-01-01
Attending: STUDENT IN AN ORGANIZED HEALTH CARE EDUCATION/TRAINING PROGRAM
Payer: MEDICARE

## 2022-01-01 ENCOUNTER — OFFICE VISIT (OUTPATIENT)
Dept: UROLOGY | Facility: CLINIC | Age: 77
End: 2022-01-01

## 2022-01-01 ENCOUNTER — TELEPHONE (OUTPATIENT)
Dept: UROLOGY | Facility: CLINIC | Age: 77
End: 2022-01-01

## 2022-01-01 ENCOUNTER — HOSPITAL ENCOUNTER (INPATIENT)
Facility: HOSPITAL | Age: 77
LOS: 8 days | Discharge: NURSING, REHAB OR LONG-TERM CARE | End: 2022-06-17
Attending: FAMILY MEDICINE | Admitting: INTERNAL MEDICINE

## 2022-01-01 ENCOUNTER — APPOINTMENT (OUTPATIENT)
Dept: MRI IMAGING | Facility: HOSPITAL | Age: 77
End: 2022-01-01

## 2022-01-01 ENCOUNTER — APPOINTMENT (OUTPATIENT)
Dept: GENERAL RADIOLOGY | Facility: HOSPITAL | Age: 77
End: 2022-01-01

## 2022-01-01 VITALS
HEIGHT: 65 IN | TEMPERATURE: 97.7 F | RESPIRATION RATE: 18 BRPM | SYSTOLIC BLOOD PRESSURE: 135 MMHG | WEIGHT: 180 LBS | DIASTOLIC BLOOD PRESSURE: 68 MMHG | HEART RATE: 82 BPM | BODY MASS INDEX: 29.99 KG/M2 | OXYGEN SATURATION: 99 %

## 2022-01-01 VITALS
OXYGEN SATURATION: 96 % | TEMPERATURE: 98.2 F | HEART RATE: 120 BPM | DIASTOLIC BLOOD PRESSURE: 82 MMHG | SYSTOLIC BLOOD PRESSURE: 149 MMHG | WEIGHT: 190.26 LBS | RESPIRATION RATE: 16 BRPM | BODY MASS INDEX: 37.35 KG/M2 | HEIGHT: 60 IN

## 2022-01-01 VITALS
WEIGHT: 190 LBS | HEART RATE: 98 BPM | RESPIRATION RATE: 18 BRPM | SYSTOLIC BLOOD PRESSURE: 154 MMHG | DIASTOLIC BLOOD PRESSURE: 80 MMHG | OXYGEN SATURATION: 96 % | HEIGHT: 65 IN | TEMPERATURE: 97.9 F | BODY MASS INDEX: 31.65 KG/M2

## 2022-01-01 VITALS — BODY MASS INDEX: 33.97 KG/M2 | TEMPERATURE: 98.3 F | WEIGHT: 199 LBS | HEIGHT: 64 IN

## 2022-01-01 VITALS — HEIGHT: 64 IN | BODY MASS INDEX: 34.04 KG/M2 | TEMPERATURE: 99.2 F | WEIGHT: 199.4 LBS

## 2022-01-01 VITALS — TEMPERATURE: 98.9 F | WEIGHT: 199 LBS | HEIGHT: 64 IN | BODY MASS INDEX: 33.97 KG/M2

## 2022-01-01 DIAGNOSIS — R10.32 LEFT LOWER QUADRANT PAIN: ICD-10-CM

## 2022-01-01 DIAGNOSIS — E03.9 HYPOTHYROIDISM, UNSPECIFIED TYPE: ICD-10-CM

## 2022-01-01 DIAGNOSIS — M54.9 CHRONIC NECK AND BACK PAIN: ICD-10-CM

## 2022-01-01 DIAGNOSIS — F41.9 ANXIETY DISORDER, UNSPECIFIED: ICD-10-CM

## 2022-01-01 DIAGNOSIS — G89.29 CHRONIC NECK AND BACK PAIN: ICD-10-CM

## 2022-01-01 DIAGNOSIS — M54.2 CHRONIC NECK AND BACK PAIN: ICD-10-CM

## 2022-01-01 DIAGNOSIS — G89.29 CHRONIC BILATERAL LOW BACK PAIN WITHOUT SCIATICA: Primary | ICD-10-CM

## 2022-01-01 DIAGNOSIS — E78.5 HYPERLIPIDEMIA, UNSPECIFIED HYPERLIPIDEMIA TYPE: ICD-10-CM

## 2022-01-01 DIAGNOSIS — U07.1 COVID-19: ICD-10-CM

## 2022-01-01 DIAGNOSIS — G89.29 CHRONIC BILATERAL LOW BACK PAIN WITHOUT SCIATICA: ICD-10-CM

## 2022-01-01 DIAGNOSIS — M54.50 CHRONIC BILATERAL LOW BACK PAIN WITHOUT SCIATICA: Primary | ICD-10-CM

## 2022-01-01 DIAGNOSIS — N39.0 RECURRENT UTI: Primary | ICD-10-CM

## 2022-01-01 DIAGNOSIS — I10 ESSENTIAL HYPERTENSION: ICD-10-CM

## 2022-01-01 DIAGNOSIS — G93.40 ACUTE ENCEPHALOPATHY: ICD-10-CM

## 2022-01-01 DIAGNOSIS — I10 ESSENTIAL HYPERTENSION: Primary | ICD-10-CM

## 2022-01-01 DIAGNOSIS — N18.4 CHRONIC KIDNEY DISEASE (CKD), STAGE IV (SEVERE) (HCC): Primary | ICD-10-CM

## 2022-01-01 DIAGNOSIS — M54.50 CHRONIC BILATERAL LOW BACK PAIN WITHOUT SCIATICA: ICD-10-CM

## 2022-01-01 DIAGNOSIS — K21.9 GASTROESOPHAGEAL REFLUX DISEASE WITHOUT ESOPHAGITIS: ICD-10-CM

## 2022-01-01 DIAGNOSIS — D53.9 MACROCYTIC ANEMIA: ICD-10-CM

## 2022-01-01 DIAGNOSIS — N18.4 CHRONIC KIDNEY DISEASE (CKD), STAGE IV (SEVERE) (HCC): ICD-10-CM

## 2022-01-01 DIAGNOSIS — R32 URINARY INCONTINENCE, UNSPECIFIED TYPE: ICD-10-CM

## 2022-01-01 DIAGNOSIS — F32.A DEPRESSION, UNSPECIFIED DEPRESSION TYPE: ICD-10-CM

## 2022-01-01 DIAGNOSIS — R19.7 DIARRHEA, UNSPECIFIED TYPE: Primary | ICD-10-CM

## 2022-01-01 DIAGNOSIS — R50.9 FEVER, UNSPECIFIED FEVER CAUSE: ICD-10-CM

## 2022-01-01 DIAGNOSIS — E55.9 VITAMIN D DEFICIENCY: ICD-10-CM

## 2022-01-01 DIAGNOSIS — E79.0 ELEVATED URIC ACID IN BLOOD: ICD-10-CM

## 2022-01-01 DIAGNOSIS — E86.0 DEHYDRATION: ICD-10-CM

## 2022-01-01 DIAGNOSIS — Z78.9 DECREASED ACTIVITIES OF DAILY LIVING (ADL): ICD-10-CM

## 2022-01-01 DIAGNOSIS — N39.41 URGE INCONTINENCE: ICD-10-CM

## 2022-01-01 DIAGNOSIS — K14.6 BURNING TONGUE: ICD-10-CM

## 2022-01-01 DIAGNOSIS — R13.10 DYSPHAGIA, UNSPECIFIED TYPE: ICD-10-CM

## 2022-01-01 DIAGNOSIS — R00.0 TACHYCARDIA: ICD-10-CM

## 2022-01-01 DIAGNOSIS — F41.9 ANXIETY: ICD-10-CM

## 2022-01-01 DIAGNOSIS — Z74.09 IMPAIRED MOBILITY: ICD-10-CM

## 2022-01-01 DIAGNOSIS — R35.0 URINARY FREQUENCY: ICD-10-CM

## 2022-01-01 DIAGNOSIS — R50.9 FEVER, UNSPECIFIED FEVER CAUSE: Primary | ICD-10-CM

## 2022-01-01 DIAGNOSIS — Z86.16 HISTORY OF COVID-19: ICD-10-CM

## 2022-01-01 DIAGNOSIS — B37.0 THRUSH: Primary | ICD-10-CM

## 2022-01-01 DIAGNOSIS — Z12.31 ENCOUNTER FOR SCREENING MAMMOGRAM FOR MALIGNANT NEOPLASM OF BREAST: Primary | ICD-10-CM

## 2022-01-01 DIAGNOSIS — R41.82 ALTERED MENTAL STATUS, UNSPECIFIED ALTERED MENTAL STATUS TYPE: Primary | ICD-10-CM

## 2022-01-01 DIAGNOSIS — J47.9 TRACTION BRONCHIECTASIS (HCC): Primary | ICD-10-CM

## 2022-01-01 DIAGNOSIS — N39.0 RECURRENT UTI: ICD-10-CM

## 2022-01-01 DIAGNOSIS — B36.9 FUNGAL INFECTION OF SKIN OF ABDOMEN: ICD-10-CM

## 2022-01-01 DIAGNOSIS — R53.1 WEAKNESS: ICD-10-CM

## 2022-01-01 DIAGNOSIS — R68.83 CHILLS: ICD-10-CM

## 2022-01-01 LAB
ADENOVIRUS F 40 41 PCR: NOT DETECTED
ALBUMIN SERPL-MCNC: 2.7 G/DL (ref 3.5–5.2)
ALBUMIN SERPL-MCNC: 3.6 G/DL (ref 3.5–5.2)
ALBUMIN SERPL-MCNC: 3.7 G/DL (ref 3.5–5.2)
ALBUMIN SERPL-MCNC: 3.9 G/DL (ref 3.5–5.2)
ALBUMIN SERPL-MCNC: 4.3 G/DL (ref 3.5–5.2)
ALBUMIN SERPL-MCNC: 4.3 G/DL (ref 3.5–5.2)
ALBUMIN SERPL-MCNC: 4.4 G/DL (ref 3.5–5.2)
ALBUMIN/GLOB SERPL: 1.2 G/DL
ALBUMIN/GLOB SERPL: 1.3 G/DL
ALBUMIN/GLOB SERPL: 1.4 G/DL
ALP BLD-CCNC: 307 U/L (ref 35–104)
ALP BLD-CCNC: 86 U/L (ref 35–104)
ALP BLD-CCNC: 92 U/L (ref 35–104)
ALP BLD-CCNC: 96 U/L (ref 35–104)
ALP SERPL-CCNC: 61 U/L (ref 39–117)
ALP SERPL-CCNC: 63 U/L (ref 39–117)
ALP SERPL-CCNC: 72 U/L (ref 39–117)
ALP SERPL-CCNC: 76 U/L (ref 39–117)
ALP SERPL-CCNC: 77 U/L (ref 39–117)
ALT SERPL W P-5'-P-CCNC: 18 U/L (ref 1–33)
ALT SERPL W P-5'-P-CCNC: 20 U/L (ref 1–33)
ALT SERPL W P-5'-P-CCNC: 23 U/L (ref 1–33)
ALT SERPL W P-5'-P-CCNC: 32 U/L (ref 1–33)
ALT SERPL W P-5'-P-CCNC: 48 U/L (ref 1–33)
ALT SERPL-CCNC: 15 U/L (ref 5–33)
ALT SERPL-CCNC: 16 U/L (ref 5–33)
ALT SERPL-CCNC: 23 U/L (ref 5–33)
ALT SERPL-CCNC: 32 U/L (ref 5–33)
AMMONIA BLD-SCNC: 19 UMOL/L (ref 11–51)
AMPHET+METHAMPHET UR QL: NEGATIVE
AMPHETAMINES UR QL: NEGATIVE
ANION GAP SERPL CALCULATED.3IONS-SCNC: 10 MMOL/L (ref 7–19)
ANION GAP SERPL CALCULATED.3IONS-SCNC: 10 MMOL/L (ref 7–19)
ANION GAP SERPL CALCULATED.3IONS-SCNC: 11 MMOL/L (ref 7–19)
ANION GAP SERPL CALCULATED.3IONS-SCNC: 12 MMOL/L (ref 5–15)
ANION GAP SERPL CALCULATED.3IONS-SCNC: 12 MMOL/L (ref 7–19)
ANION GAP SERPL CALCULATED.3IONS-SCNC: 12 MMOL/L (ref 7–19)
ANION GAP SERPL CALCULATED.3IONS-SCNC: 13 MMOL/L (ref 7–19)
ANION GAP SERPL CALCULATED.3IONS-SCNC: 13 MMOL/L (ref 7–19)
ANION GAP SERPL CALCULATED.3IONS-SCNC: 14 MMOL/L (ref 5–15)
ANION GAP SERPL CALCULATED.3IONS-SCNC: 14 MMOL/L (ref 5–15)
ANION GAP SERPL CALCULATED.3IONS-SCNC: 21 MMOL/L (ref 7–19)
APPEARANCE CSF: ABNORMAL
APPEARANCE CSF: CLEAR
APTT PPP: 29.8 SECONDS (ref 24.1–35)
ARTERIAL PATENCY WRIST A: ABNORMAL
ARTERIAL PATENCY WRIST A: ABNORMAL
AST SERPL-CCNC: 20 U/L (ref 5–32)
AST SERPL-CCNC: 22 U/L (ref 1–32)
AST SERPL-CCNC: 27 U/L (ref 5–32)
AST SERPL-CCNC: 31 U/L (ref 1–32)
AST SERPL-CCNC: 33 U/L (ref 5–32)
AST SERPL-CCNC: 34 U/L (ref 1–32)
AST SERPL-CCNC: 38 U/L (ref 1–32)
AST SERPL-CCNC: 41 U/L (ref 1–32)
AST SERPL-CCNC: 47 U/L (ref 5–32)
ASTROVIRUS PCR: NOT DETECTED
ATMOSPHERIC PRESS: 749 MMHG
ATMOSPHERIC PRESS: 750 MMHG
BACTERIA SPEC AEROBE CULT: NORMAL
BACTERIA UR QL AUTO: ABNORMAL /HPF
BACTERIA: ABNORMAL /HPF
BACTERIA: NEGATIVE /HPF
BARBITURATES UR QL SCN: NEGATIVE
BASE EXCESS BLDA CALC-SCNC: -1.4 MMOL/L (ref 0–2)
BASE EXCESS BLDA CALC-SCNC: -8.9 MMOL/L (ref 0–2)
BASOPHILS # BLD AUTO: 0 10*3/MM3 (ref 0–0.2)
BASOPHILS # BLD AUTO: 0.02 10*3/MM3 (ref 0–0.2)
BASOPHILS # BLD AUTO: 0.02 10*3/MM3 (ref 0–0.2)
BASOPHILS ABSOLUTE: 0 K/UL (ref 0–0.2)
BASOPHILS ABSOLUTE: 0 K/UL (ref 0–0.2)
BASOPHILS NFR BLD AUTO: 0 % (ref 0–1.5)
BASOPHILS NFR BLD AUTO: 0.3 % (ref 0–1.5)
BASOPHILS NFR BLD AUTO: 0.4 % (ref 0–1.5)
BASOPHILS RELATIVE PERCENT: 0.3 % (ref 0–1)
BASOPHILS RELATIVE PERCENT: 0.5 % (ref 0–1)
BDY SITE: ABNORMAL
BDY SITE: ABNORMAL
BENZODIAZ UR QL SCN: NEGATIVE
BILIRUB BLD-MCNC: NEGATIVE MG/DL
BILIRUB SERPL-MCNC: 0.2 MG/DL (ref 0–1.2)
BILIRUB SERPL-MCNC: 0.3 MG/DL (ref 0.2–1.2)
BILIRUB SERPL-MCNC: 0.4 MG/DL (ref 0–1.2)
BILIRUB SERPL-MCNC: 0.5 MG/DL (ref 0–1.2)
BILIRUB UR QL STRIP: NEGATIVE
BILIRUBIN URINE: NEGATIVE
BILIRUBIN URINE: NEGATIVE
BLOOD CULTURE, ROUTINE: NORMAL
BLOOD, URINE: NEGATIVE
BLOOD, URINE: NEGATIVE
BODY TEMPERATURE: 37 C
BODY TEMPERATURE: 37 C
BUN BLDV-MCNC: 10 MG/DL (ref 8–23)
BUN BLDV-MCNC: 11 MG/DL (ref 8–23)
BUN BLDV-MCNC: 12 MG/DL (ref 8–23)
BUN BLDV-MCNC: 13 MG/DL (ref 8–23)
BUN BLDV-MCNC: 15 MG/DL (ref 8–23)
BUN BLDV-MCNC: 15 MG/DL (ref 8–23)
BUN BLDV-MCNC: 17 MG/DL (ref 8–23)
BUN BLDV-MCNC: 8 MG/DL (ref 8–23)
BUN SERPL-MCNC: 18 MG/DL (ref 8–23)
BUN SERPL-MCNC: 19 MG/DL (ref 8–23)
BUN SERPL-MCNC: 23 MG/DL (ref 8–23)
BUN SERPL-MCNC: 24 MG/DL (ref 8–23)
BUN SERPL-MCNC: 25 MG/DL (ref 8–23)
BUN SERPL-MCNC: 29 MG/DL (ref 8–23)
BUN/CREAT SERPL: 18.7 (ref 7–25)
BUN/CREAT SERPL: 21.2 (ref 7–25)
BUN/CREAT SERPL: 24.7 (ref 7–25)
BUN/CREAT SERPL: 25.3 (ref 7–25)
BUN/CREAT SERPL: 28.8 (ref 7–25)
BUN/CREAT SERPL: 30.2 (ref 7–25)
BUPRENORPHINE SERPL-MCNC: NEGATIVE NG/ML
C GATTII+NEOFOR DNA CSF QL NAA+NON-PROBE: NOT DETECTED
CALCIUM SERPL-MCNC: 7.8 MG/DL (ref 8.8–10.2)
CALCIUM SERPL-MCNC: 8.1 MG/DL (ref 8.8–10.2)
CALCIUM SERPL-MCNC: 8.2 MG/DL (ref 8.8–10.2)
CALCIUM SERPL-MCNC: 8.3 MG/DL (ref 8.8–10.2)
CALCIUM SERPL-MCNC: 8.3 MG/DL (ref 8.8–10.2)
CALCIUM SERPL-MCNC: 9 MG/DL (ref 8.8–10.2)
CALCIUM SERPL-MCNC: 9.4 MG/DL (ref 8.8–10.2)
CALCIUM SERPL-MCNC: 9.8 MG/DL (ref 8.8–10.2)
CALCIUM SPEC-SCNC: 10 MG/DL (ref 8.6–10.5)
CALCIUM SPEC-SCNC: 8.4 MG/DL (ref 8.6–10.5)
CALCIUM SPEC-SCNC: 8.7 MG/DL (ref 8.6–10.5)
CALCIUM SPEC-SCNC: 9 MG/DL (ref 8.6–10.5)
CALCIUM SPEC-SCNC: 9.1 MG/DL (ref 8.6–10.5)
CALCIUM SPEC-SCNC: 9.6 MG/DL (ref 8.6–10.5)
CAMPYLOBACTER PCR: NOT DETECTED
CANNABINOIDS SERPL QL: NEGATIVE
CHLORIDE BLD-SCNC: 100 MMOL/L (ref 98–111)
CHLORIDE BLD-SCNC: 106 MMOL/L (ref 98–111)
CHLORIDE BLD-SCNC: 108 MMOL/L (ref 98–111)
CHLORIDE BLD-SCNC: 109 MMOL/L (ref 98–111)
CHLORIDE BLD-SCNC: 112 MMOL/L (ref 98–111)
CHLORIDE BLD-SCNC: 114 MMOL/L (ref 98–111)
CHLORIDE BLD-SCNC: 93 MMOL/L (ref 98–111)
CHLORIDE BLD-SCNC: 98 MMOL/L (ref 98–111)
CHLORIDE SERPL-SCNC: 102 MMOL/L (ref 98–107)
CHLORIDE SERPL-SCNC: 103 MMOL/L (ref 98–107)
CHLORIDE SERPL-SCNC: 103 MMOL/L (ref 98–107)
CHLORIDE SERPL-SCNC: 104 MMOL/L (ref 98–107)
CHLORIDE SERPL-SCNC: 106 MMOL/L (ref 98–107)
CHLORIDE SERPL-SCNC: 106 MMOL/L (ref 98–107)
CHOLESTEROL, TOTAL: 137 MG/DL (ref 160–199)
CHOLESTEROL, TOTAL: 66 MG/DL (ref 160–199)
CK SERPL-CCNC: 259 U/L (ref 20–180)
CK SERPL-CCNC: 438 U/L (ref 20–180)
CLARITY UR: CLEAR
CLARITY, POC: ABNORMAL
CLARITY, POC: CLEAR
CLARITY, POC: CLEAR
CLARITY: ABNORMAL
CLARITY: CLEAR
CLOSTRIDIUM DIFFICILE, PCR: DETECTED
CMV DNA CSF QL NAA+PROBE: NOT DETECTED
CO2 SERPL-SCNC: 22 MMOL/L (ref 22–29)
CO2 SERPL-SCNC: 24 MMOL/L (ref 22–29)
CO2 SERPL-SCNC: 25 MMOL/L (ref 22–29)
CO2 SERPL-SCNC: 25 MMOL/L (ref 22–29)
CO2: 16 MMOL/L (ref 22–29)
CO2: 18 MMOL/L (ref 22–29)
CO2: 19 MMOL/L (ref 22–29)
CO2: 20 MMOL/L (ref 22–29)
CO2: 24 MMOL/L (ref 22–29)
CO2: 26 MMOL/L (ref 22–29)
COCAINE UR QL: NEGATIVE
COLOR CSF: ABNORMAL
COLOR CSF: COLORLESS
COLOR SPUN CSF: ABNORMAL
COLOR SPUN CSF: COLORLESS
COLOR UR: ABNORMAL
COLOR UR: YELLOW
COLOR: YELLOW
COLOR: YELLOW
CREAT SERPL-MCNC: 0.5 MG/DL (ref 0.5–0.9)
CREAT SERPL-MCNC: 0.7 MG/DL (ref 0.5–0.9)
CREAT SERPL-MCNC: 0.75 MG/DL (ref 0.57–1)
CREAT SERPL-MCNC: 0.8 MG/DL (ref 0.57–1)
CREAT SERPL-MCNC: 0.8 MG/DL (ref 0.5–0.9)
CREAT SERPL-MCNC: 0.8 MG/DL (ref 0.5–0.9)
CREAT SERPL-MCNC: 0.85 MG/DL (ref 0.57–1)
CREAT SERPL-MCNC: 0.96 MG/DL (ref 0.57–1)
CREAT SERPL-MCNC: 0.97 MG/DL (ref 0.57–1)
CREAT SERPL-MCNC: 1 MG/DL (ref 0.5–0.9)
CREAT SERPL-MCNC: 1.1 MG/DL (ref 0.5–0.9)
CREAT SERPL-MCNC: 1.34 MG/DL (ref 0.57–1)
CRP SERPL-MCNC: 0.48 MG/DL (ref 0–0.5)
CRP SERPL-MCNC: 0.82 MG/DL (ref 0–0.5)
CRP SERPL-MCNC: 1 MG/DL (ref 0–0.5)
CRP SERPL-MCNC: 1.72 MG/DL (ref 0–0.5)
CRYPTOSPORIDIUM PCR: NOT DETECTED
CRYSTALS, UA: ABNORMAL /HPF
CULTURE, BLOOD 2: NORMAL
CYCLOSPORA CAYETANENSIS PCR: NOT DETECTED
D DIMER PPP FEU-MCNC: 1.03 MCGFEU/ML (ref 0–0.5)
D DIMER: 2.17 UG/ML FEU (ref 0–0.48)
D-LACTATE SERPL-SCNC: 0.8 MMOL/L (ref 0.5–2)
DEPRECATED RDW RBC AUTO: 40.7 FL (ref 37–54)
DEPRECATED RDW RBC AUTO: 40.8 FL (ref 37–54)
DEPRECATED RDW RBC AUTO: 41.1 FL (ref 37–54)
DEPRECATED RDW RBC AUTO: 43.1 FL (ref 37–54)
DEPRECATED RDW RBC AUTO: 43.8 FL (ref 37–54)
E COLI ENTEROAGGREGATIVE PCR: NOT DETECTED
E COLI ENTEROPATHOGENIC PCR: NOT DETECTED
E COLI ENTEROTOXIGENIC PCR: NOT DETECTED
E COLI K1 DNA CSF QL NAA+NON-PROBE: NOT DETECTED
E COLI SHIGELLA/ENTEROINVASIVE PCR: NOT DETECTED
EGFRCR SERPLBLD CKD-EPI 2021: 40.4 ML/MIN/1.73
EGFRCR SERPLBLD CKD-EPI 2021: 59.6 ML/MIN/1.73
EGFRCR SERPLBLD CKD-EPI 2021: 60.3 ML/MIN/1.73
EGFRCR SERPLBLD CKD-EPI 2021: 69.8 ML/MIN/1.73
EGFRCR SERPLBLD CKD-EPI 2021: 75.1 ML/MIN/1.73
EGFRCR SERPLBLD CKD-EPI 2021: 81.1 ML/MIN/1.73
EKG P AXIS: 18 DEGREES
EKG P AXIS: 40 DEGREES
EKG P AXIS: 42 DEGREES
EKG P-R INTERVAL: 138 MS
EKG P-R INTERVAL: 138 MS
EKG P-R INTERVAL: 152 MS
EKG Q-T INTERVAL: 358 MS
EKG Q-T INTERVAL: 358 MS
EKG Q-T INTERVAL: 386 MS
EKG QRS DURATION: 90 MS
EKG QRS DURATION: 90 MS
EKG QRS DURATION: 94 MS
EKG QTC CALCULATION (BAZETT): 428 MS
EKG QTC CALCULATION (BAZETT): 438 MS
EKG QTC CALCULATION (BAZETT): 443 MS
EKG T AXIS: 127 DEGREES
EKG T AXIS: 134 DEGREES
EKG T AXIS: 63 DEGREES
ENTAMOEBA HISTOLYTICA PCR: NOT DETECTED
EOSINOPHIL # BLD AUTO: 0 10*3/MM3 (ref 0–0.4)
EOSINOPHIL # BLD AUTO: 0.02 10*3/MM3 (ref 0–0.4)
EOSINOPHIL # BLD AUTO: 0.03 10*3/MM3 (ref 0–0.4)
EOSINOPHIL NFR BLD AUTO: 0 % (ref 0.3–6.2)
EOSINOPHIL NFR BLD AUTO: 0.4 % (ref 0.3–6.2)
EOSINOPHIL NFR BLD AUTO: 0.5 % (ref 0.3–6.2)
EOSINOPHILS ABSOLUTE: 0.1 K/UL (ref 0–0.6)
EOSINOPHILS ABSOLUTE: 0.2 K/UL (ref 0–0.6)
EOSINOPHILS RELATIVE PERCENT: 2.1 % (ref 0–5)
EOSINOPHILS RELATIVE PERCENT: 2.1 % (ref 0–5)
EPITHELIAL CELLS, UA: 1 /HPF (ref 0–5)
EPITHELIAL CELLS, UA: ABNORMAL /HPF
ERYTHROCYTE [DISTWIDTH] IN BLOOD BY AUTOMATED COUNT: 12.1 % (ref 12.3–15.4)
ERYTHROCYTE [DISTWIDTH] IN BLOOD BY AUTOMATED COUNT: 12.1 % (ref 12.3–15.4)
ERYTHROCYTE [DISTWIDTH] IN BLOOD BY AUTOMATED COUNT: 12.3 % (ref 12.3–15.4)
ERYTHROCYTE [DISTWIDTH] IN BLOOD BY AUTOMATED COUNT: 12.7 % (ref 12.3–15.4)
ERYTHROCYTE [DISTWIDTH] IN BLOOD BY AUTOMATED COUNT: 12.7 % (ref 12.3–15.4)
ERYTHROCYTE [SEDIMENTATION RATE] IN BLOOD: 35 MM/HR (ref 0–30)
EV RNA CSF QL NAA+PROBE: NOT DETECTED
FERRITIN SERPL-MCNC: 485.9 NG/ML (ref 13–150)
FOLATE: 6.3 NG/ML (ref 4.8–37.3)
GFR AFRICAN AMERICAN: 58
GFR AFRICAN AMERICAN: >59
GFR NON-AFRICAN AMERICAN: 48
GFR NON-AFRICAN AMERICAN: 53
GFR NON-AFRICAN AMERICAN: >60
GIARDIA LAMBLIA PCR: NOT DETECTED
GLOBULIN UR ELPH-MCNC: 3 GM/DL
GLOBULIN UR ELPH-MCNC: 3.1 GM/DL
GLOBULIN UR ELPH-MCNC: 3.1 GM/DL
GLOBULIN UR ELPH-MCNC: 3.3 GM/DL
GLOBULIN UR ELPH-MCNC: 3.7 GM/DL
GLUCOSE BLD-MCNC: 104 MG/DL (ref 74–109)
GLUCOSE BLD-MCNC: 104 MG/DL (ref 74–109)
GLUCOSE BLD-MCNC: 108 MG/DL (ref 74–109)
GLUCOSE BLD-MCNC: 112 MG/DL (ref 74–109)
GLUCOSE BLD-MCNC: 69 MG/DL (ref 74–109)
GLUCOSE BLD-MCNC: 92 MG/DL (ref 74–109)
GLUCOSE BLD-MCNC: 96 MG/DL (ref 74–109)
GLUCOSE BLD-MCNC: 97 MG/DL (ref 74–109)
GLUCOSE BLDC GLUCOMTR-MCNC: 110 MG/DL (ref 70–130)
GLUCOSE CSF-MCNC: 67 MG/DL (ref 40–70)
GLUCOSE SERPL-MCNC: 101 MG/DL (ref 65–99)
GLUCOSE SERPL-MCNC: 105 MG/DL (ref 65–99)
GLUCOSE SERPL-MCNC: 124 MG/DL (ref 65–99)
GLUCOSE SERPL-MCNC: 133 MG/DL (ref 65–99)
GLUCOSE SERPL-MCNC: 140 MG/DL (ref 65–99)
GLUCOSE SERPL-MCNC: 92 MG/DL (ref 65–99)
GLUCOSE UR STRIP-MCNC: NEGATIVE MG/DL
GLUCOSE URINE: NEGATIVE MG/DL
GLUCOSE URINE: NEGATIVE MG/DL
GP B STREP DNA SPEC QL NAA+PROBE: NOT DETECTED
GRAM STN SPEC: NORMAL
GRAM STN SPEC: NORMAL
HAEM INFLU SEROTYP DNA SPEC NAA+PROBE: NOT DETECTED
HBA1C MFR BLD: 5.5 % (ref 4–6)
HCO3 BLDA-SCNC: 15.7 MMOL/L (ref 20–26)
HCO3 BLDA-SCNC: 21.7 MMOL/L (ref 20–26)
HCT VFR BLD AUTO: 34.7 % (ref 34–46.6)
HCT VFR BLD AUTO: 36.4 % (ref 34–46.6)
HCT VFR BLD AUTO: 37.3 % (ref 34–46.6)
HCT VFR BLD AUTO: 40.6 % (ref 34–46.6)
HCT VFR BLD AUTO: 42 % (ref 34–46.6)
HCT VFR BLD CALC: 30.4 % (ref 37–47)
HCT VFR BLD CALC: 31.6 % (ref 37–47)
HCT VFR BLD CALC: 32.6 % (ref 37–47)
HCT VFR BLD CALC: 35.1 % (ref 37–47)
HCT VFR BLD CALC: 36.2 % (ref 37–47)
HCT VFR BLD CALC: 36.8 % (ref 37–47)
HCT VFR BLD CALC: 38.2 % (ref 37–47)
HCT VFR BLD CALC: 42.6 % (ref 37–47)
HDLC SERPL-MCNC: 16 MG/DL (ref 65–121)
HDLC SERPL-MCNC: 44 MG/DL (ref 65–121)
HEMOGLOBIN: 10.4 G/DL (ref 12–16)
HEMOGLOBIN: 10.9 G/DL (ref 12–16)
HEMOGLOBIN: 11.4 G/DL (ref 12–16)
HEMOGLOBIN: 11.7 G/DL (ref 12–16)
HEMOGLOBIN: 11.9 G/DL (ref 12–16)
HEMOGLOBIN: 12.4 G/DL (ref 12–16)
HEMOGLOBIN: 13.6 G/DL (ref 12–16)
HEMOGLOBIN: 9.8 G/DL (ref 12–16)
HGB BLD-MCNC: 11.6 G/DL (ref 12–15.9)
HGB BLD-MCNC: 12.3 G/DL (ref 12–15.9)
HGB BLD-MCNC: 12.5 G/DL (ref 12–15.9)
HGB BLD-MCNC: 13.4 G/DL (ref 12–15.9)
HGB BLD-MCNC: 14.1 G/DL (ref 12–15.9)
HGB UR QL STRIP.AUTO: NEGATIVE
HHV6 DNA CSF QL NAA+PROBE: NOT DETECTED
HSV1 DNA CSF QL NAA+PROBE: NOT DETECTED
HSV2 DNA CSF QL NAA+PROBE: NOT DETECTED
HYALINE CASTS UR QL AUTO: ABNORMAL /LPF
HYALINE CASTS: 2 /HPF (ref 0–8)
HYALINE CASTS: ABNORMAL /LPF (ref 0–5)
IMM GRANULOCYTES # BLD AUTO: 0.01 10*3/MM3 (ref 0–0.05)
IMM GRANULOCYTES # BLD AUTO: 0.01 10*3/MM3 (ref 0–0.05)
IMM GRANULOCYTES # BLD AUTO: 0.02 10*3/MM3 (ref 0–0.05)
IMM GRANULOCYTES NFR BLD AUTO: 0.2 % (ref 0–0.5)
IMM GRANULOCYTES NFR BLD AUTO: 0.2 % (ref 0–0.5)
IMM GRANULOCYTES NFR BLD AUTO: 0.4 % (ref 0–0.5)
IMMATURE GRANULOCYTES #: 0.2 K/UL
IMMATURE GRANULOCYTES #: 0.2 K/UL
INHALED O2 CONCENTRATION: 21 %
INHALED O2 CONCENTRATION: 21 %
INR PPP: 1.12 (ref 0.91–1.09)
IRON SATURATION: 50 % (ref 14–50)
IRON: 69 UG/DL (ref 37–145)
KETONES UR QL STRIP: NEGATIVE
KETONES UR QL: NEGATIVE
KETONES, URINE: ABNORMAL MG/DL
KETONES, URINE: NEGATIVE MG/DL
L MONOCYTOG RRNA SPEC QL PROBE: NOT DETECTED
LACTIC ACID: 0.9 MMOL/L (ref 0.5–1.9)
LDL CHOLESTEROL CALCULATED: 28 MG/DL
LDL CHOLESTEROL CALCULATED: 74 MG/DL
LEUKOCYTE EST, POC: ABNORMAL
LEUKOCYTE EST, POC: ABNORMAL
LEUKOCYTE EST, POC: NEGATIVE
LEUKOCYTE ESTERASE UR QL STRIP.AUTO: NEGATIVE
LEUKOCYTE ESTERASE, URINE: ABNORMAL
LEUKOCYTE ESTERASE, URINE: ABNORMAL
LV EF: 31 %
LV EF: 33 %
LVEF MODALITY: NORMAL
LVEF MODALITY: NORMAL
LYMPHOCYTES # BLD AUTO: 0.61 10*3/MM3 (ref 0.7–3.1)
LYMPHOCYTES # BLD AUTO: 1.28 10*3/MM3 (ref 0.7–3.1)
LYMPHOCYTES # BLD AUTO: 1.47 10*3/MM3 (ref 0.7–3.1)
LYMPHOCYTES ABSOLUTE: 1.6 K/UL (ref 1.1–4.5)
LYMPHOCYTES ABSOLUTE: 1.7 K/UL (ref 1.1–4.5)
LYMPHOCYTES NFR BLD AUTO: 10.6 % (ref 19.6–45.3)
LYMPHOCYTES NFR BLD AUTO: 22.9 % (ref 19.6–45.3)
LYMPHOCYTES NFR BLD AUTO: 23.2 % (ref 19.6–45.3)
LYMPHOCYTES NFR CSF MANUAL: 88 %
LYMPHOCYTES NFR CSF MANUAL: 88 %
LYMPHOCYTES RELATIVE PERCENT: 20.9 % (ref 20–40)
LYMPHOCYTES RELATIVE PERCENT: 24.7 % (ref 20–40)
Lab: ABNORMAL
Lab: ABNORMAL
MAGNESIUM RBC-MCNC: 4.9 MG/DL (ref 4.2–6.8)
MAGNESIUM SERPL-MCNC: 1.3 MG/DL (ref 1.6–2.4)
MAGNESIUM SERPL-MCNC: 1.4 MG/DL (ref 1.6–2.4)
MAGNESIUM SERPL-MCNC: 1.7 MG/DL (ref 1.6–2.4)
MAGNESIUM SERPL-MCNC: 1.8 MG/DL (ref 1.6–2.4)
MAGNESIUM SERPL-MCNC: 3.1 MG/DL (ref 1.6–2.4)
MAGNESIUM: 1.2 MG/DL (ref 1.6–2.4)
MAGNESIUM: 1.3 MG/DL (ref 1.6–2.4)
MAGNESIUM: 1.5 MG/DL (ref 1.6–2.4)
MAGNESIUM: 1.7 MG/DL (ref 1.6–2.4)
MAGNESIUM: 2.1 MG/DL (ref 1.6–2.4)
MCH RBC QN AUTO: 29.2 PG (ref 27–31)
MCH RBC QN AUTO: 29.4 PG (ref 27–31)
MCH RBC QN AUTO: 29.8 PG (ref 27–31)
MCH RBC QN AUTO: 30.4 PG (ref 27–31)
MCH RBC QN AUTO: 30.5 PG (ref 27–31)
MCH RBC QN AUTO: 30.5 PG (ref 27–31)
MCH RBC QN AUTO: 30.7 PG (ref 26.6–33)
MCH RBC QN AUTO: 30.8 PG (ref 26.6–33)
MCH RBC QN AUTO: 30.8 PG (ref 27–31)
MCH RBC QN AUTO: 30.9 PG (ref 26.6–33)
MCH RBC QN AUTO: 31 PG (ref 26.6–33)
MCH RBC QN AUTO: 31.1 PG (ref 26.6–33)
MCH RBC QN AUTO: 31.4 PG (ref 27–31)
MCHC RBC AUTO-ENTMCNC: 31.8 G/DL (ref 33–37)
MCHC RBC AUTO-ENTMCNC: 31.9 G/DL (ref 33–37)
MCHC RBC AUTO-ENTMCNC: 32.2 G/DL (ref 33–37)
MCHC RBC AUTO-ENTMCNC: 32.5 G/DL (ref 33–37)
MCHC RBC AUTO-ENTMCNC: 32.5 G/DL (ref 33–37)
MCHC RBC AUTO-ENTMCNC: 32.9 G/DL (ref 33–37)
MCHC RBC AUTO-ENTMCNC: 32.9 G/DL (ref 33–37)
MCHC RBC AUTO-ENTMCNC: 33 G/DL (ref 31.5–35.7)
MCHC RBC AUTO-ENTMCNC: 33.4 G/DL (ref 31.5–35.7)
MCHC RBC AUTO-ENTMCNC: 33.4 G/DL (ref 33–37)
MCHC RBC AUTO-ENTMCNC: 33.5 G/DL (ref 31.5–35.7)
MCHC RBC AUTO-ENTMCNC: 33.6 G/DL (ref 31.5–35.7)
MCHC RBC AUTO-ENTMCNC: 33.8 G/DL (ref 31.5–35.7)
MCV RBC AUTO: 89.3 FL (ref 81–99)
MCV RBC AUTO: 90.5 FL (ref 81–99)
MCV RBC AUTO: 91.1 FL (ref 81–99)
MCV RBC AUTO: 91.5 FL (ref 79–97)
MCV RBC AUTO: 91.5 FL (ref 79–97)
MCV RBC AUTO: 91.6 FL (ref 81–99)
MCV RBC AUTO: 92.8 FL (ref 79–97)
MCV RBC AUTO: 92.8 FL (ref 79–97)
MCV RBC AUTO: 92.8 FL (ref 81–99)
MCV RBC AUTO: 93.3 FL (ref 79–97)
MCV RBC AUTO: 93.9 FL (ref 81–99)
MCV RBC AUTO: 96.8 FL (ref 81–99)
MCV RBC AUTO: 98.4 FL (ref 81–99)
METHADONE UR QL SCN: NEGATIVE
METHOD: ABNORMAL
MODALITY: ABNORMAL
MODALITY: ABNORMAL
MONOCYTES # BLD AUTO: 0.63 10*3/MM3 (ref 0.1–0.9)
MONOCYTES # BLD AUTO: 0.7 10*3/MM3 (ref 0.1–0.9)
MONOCYTES # BLD AUTO: 0.82 10*3/MM3 (ref 0.1–0.9)
MONOCYTES ABSOLUTE: 1.1 K/UL (ref 0–0.9)
MONOCYTES ABSOLUTE: 1.1 K/UL (ref 0–0.9)
MONOCYTES NFR BLD AUTO: 10.9 % (ref 5–12)
MONOCYTES NFR BLD AUTO: 12.7 % (ref 5–12)
MONOCYTES NFR BLD AUTO: 12.8 % (ref 5–12)
MONOCYTES NFR CSF MANUAL: 12 % (ref 15–45)
MONOCYTES NFR CSF MANUAL: 12 % (ref 15–45)
MONOCYTES RELATIVE PERCENT: 14.3 % (ref 0–10)
MONOCYTES RELATIVE PERCENT: 15.5 % (ref 0–10)
N MEN DNA SPEC QL NAA+PROBE: NOT DETECTED
NEUTROPHILS ABSOLUTE: 3.7 K/UL (ref 1.5–7.5)
NEUTROPHILS ABSOLUTE: 4.6 K/UL (ref 1.5–7.5)
NEUTROPHILS NFR BLD AUTO: 3.48 10*3/MM3 (ref 1.7–7)
NEUTROPHILS NFR BLD AUTO: 4.06 10*3/MM3 (ref 1.7–7)
NEUTROPHILS NFR BLD AUTO: 4.51 10*3/MM3 (ref 1.7–7)
NEUTROPHILS NFR BLD AUTO: 62.9 % (ref 42.7–76)
NEUTROPHILS NFR BLD AUTO: 63.3 % (ref 42.7–76)
NEUTROPHILS NFR BLD AUTO: 78.3 % (ref 42.7–76)
NEUTROPHILS RELATIVE PERCENT: 55.2 % (ref 50–65)
NEUTROPHILS RELATIVE PERCENT: 59.9 % (ref 50–65)
NITRITE UR QL STRIP: NEGATIVE
NITRITE UR-MCNC: NEGATIVE MG/ML
NITRITE, URINE: NEGATIVE
NITRITE, URINE: NEGATIVE
NOROVIRUS GI GII PCR: NOT DETECTED
NRBC BLD AUTO-RTO: 0 /100 WBC (ref 0–0.2)
NT-PROBNP SERPL-MCNC: 531.9 PG/ML (ref 0–1800)
NUC CELL # CSF MANUAL: 19 /MM3 (ref 0–8)
NUC CELL # CSF MANUAL: 36 /MM3 (ref 0–8)
OPIATES UR QL: NEGATIVE
ORGANISM: ABNORMAL
OXYCODONE UR QL SCN: NEGATIVE
PARECHOVIRUS A RNA CSF QL NAA+NON-PROBE: NOT DETECTED
PCO2 BLDA: 29.8 MM HG (ref 35–45)
PCO2 BLDA: 31.2 MM HG (ref 35–45)
PCO2 TEMP ADJ BLD: 29.8 MM HG (ref 35–45)
PCO2 TEMP ADJ BLD: 31.2 MM HG (ref 35–45)
PCP UR QL SCN: NEGATIVE
PDW BLD-RTO: 12.2 % (ref 11.5–14.5)
PDW BLD-RTO: 12.8 % (ref 11.5–14.5)
PDW BLD-RTO: 13.6 % (ref 11.5–14.5)
PDW BLD-RTO: 13.8 % (ref 11.5–14.5)
PDW BLD-RTO: 13.8 % (ref 11.5–14.5)
PDW BLD-RTO: 14.1 % (ref 11.5–14.5)
PDW BLD-RTO: 14.3 % (ref 11.5–14.5)
PDW BLD-RTO: 14.3 % (ref 11.5–14.5)
PH BLDA: 7.33 PH UNITS (ref 7.35–7.45)
PH BLDA: 7.45 PH UNITS (ref 7.35–7.45)
PH UA: 5 (ref 5–8)
PH UA: 5.5 (ref 5–8)
PH UR STRIP.AUTO: <=5 [PH] (ref 5–8)
PH UR: 5 [PH] (ref 5–8)
PH, TEMP CORRECTED: 7.33 PH UNITS (ref 7.35–7.45)
PH, TEMP CORRECTED: 7.45 PH UNITS (ref 7.35–7.45)
PHOSPHATE SERPL-MCNC: 2.9 MG/DL (ref 2.5–4.5)
PHOSPHATE SERPL-MCNC: 3.1 MG/DL (ref 2.5–4.5)
PLATELET # BLD AUTO: 153 10*3/MM3 (ref 140–450)
PLATELET # BLD AUTO: 159 10*3/MM3 (ref 140–450)
PLATELET # BLD AUTO: 190 10*3/MM3 (ref 140–450)
PLATELET # BLD AUTO: 195 10*3/MM3 (ref 140–450)
PLATELET # BLD AUTO: 329 10*3/MM3 (ref 140–450)
PLATELET # BLD: 198 K/UL (ref 130–400)
PLATELET # BLD: 208 K/UL (ref 130–400)
PLATELET # BLD: 210 K/UL (ref 130–400)
PLATELET # BLD: 243 K/UL (ref 130–400)
PLATELET # BLD: 252 K/UL (ref 130–400)
PLATELET # BLD: 259 K/UL (ref 130–400)
PLATELET # BLD: 268 K/UL (ref 130–400)
PLATELET # BLD: 281 K/UL (ref 130–400)
PLESIOMONAS SHIGELLOIDES PCR: NOT DETECTED
PMV BLD AUTO: 10 FL (ref 6–12)
PMV BLD AUTO: 10 FL (ref 9.4–12.3)
PMV BLD AUTO: 10.1 FL (ref 6–12)
PMV BLD AUTO: 10.1 FL (ref 9.4–12.3)
PMV BLD AUTO: 10.5 FL (ref 9.4–12.3)
PMV BLD AUTO: 10.6 FL (ref 9.4–12.3)
PMV BLD AUTO: 10.7 FL (ref 9.4–12.3)
PMV BLD AUTO: 10.9 FL (ref 9.4–12.3)
PMV BLD AUTO: 9.5 FL (ref 6–12)
PMV BLD AUTO: 9.7 FL (ref 9.4–12.3)
PMV BLD AUTO: 9.8 FL (ref 6–12)
PMV BLD AUTO: 9.8 FL (ref 6–12)
PMV BLD AUTO: 9.9 FL (ref 9.4–12.3)
PO2 BLDA: 61.6 MM HG (ref 83–108)
PO2 BLDA: 73 MM HG (ref 83–108)
PO2 TEMP ADJ BLD: 61.6 MM HG (ref 83–108)
PO2 TEMP ADJ BLD: 73 MM HG (ref 83–108)
POTASSIUM REFLEX MAGNESIUM: 3.3 MMOL/L (ref 3.5–5)
POTASSIUM REFLEX MAGNESIUM: 3.6 MMOL/L (ref 3.5–5)
POTASSIUM REFLEX MAGNESIUM: 4.1 MMOL/L (ref 3.5–5)
POTASSIUM SERPL-SCNC: 3.5 MMOL/L (ref 3.5–5)
POTASSIUM SERPL-SCNC: 3.5 MMOL/L (ref 3.5–5.2)
POTASSIUM SERPL-SCNC: 3.5 MMOL/L (ref 3.5–5.2)
POTASSIUM SERPL-SCNC: 3.6 MMOL/L (ref 3.5–5)
POTASSIUM SERPL-SCNC: 3.7 MMOL/L (ref 3.5–5)
POTASSIUM SERPL-SCNC: 3.7 MMOL/L (ref 3.5–5.2)
POTASSIUM SERPL-SCNC: 3.8 MMOL/L (ref 3.5–5)
POTASSIUM SERPL-SCNC: 3.9 MMOL/L (ref 3.5–5.2)
POTASSIUM SERPL-SCNC: 3.9 MMOL/L (ref 3.5–5.2)
POTASSIUM SERPL-SCNC: 4.1 MMOL/L (ref 3.5–5.2)
POTASSIUM SERPL-SCNC: 4.3 MMOL/L (ref 3.5–5)
PRO-BNP: ABNORMAL PG/ML (ref 0–1800)
PRO-BNP: ABNORMAL PG/ML (ref 0–1800)
PROCALCITONIN SERPL-MCNC: 0.1 NG/ML (ref 0–0.25)
PROCALCITONIN SERPL-MCNC: 0.33 NG/ML (ref 0–0.25)
PROCALCITONIN: 14.36 NG/ML (ref 0–0.09)
PROPOXYPH UR QL: NEGATIVE
PROT CSF-MCNC: 64.6 MG/DL (ref 15–45)
PROT SERPL-MCNC: 6.7 G/DL (ref 6–8.5)
PROT SERPL-MCNC: 6.7 G/DL (ref 6–8.5)
PROT SERPL-MCNC: 7.4 G/DL (ref 6–8.5)
PROT SERPL-MCNC: 7.7 G/DL (ref 6–8.5)
PROT SERPL-MCNC: 8 G/DL (ref 6–8.5)
PROT UR QL STRIP: ABNORMAL
PROT UR STRIP-MCNC: NEGATIVE MG/DL
PROTEIN UA: 30 MG/DL
PROTEIN UA: NEGATIVE MG/DL
PROTHROMBIN TIME: 13.9 SECONDS (ref 11.9–14.6)
QT INTERVAL: 344 MS
QTC INTERVAL: 423 MS
RBC # BLD AUTO: 3.74 10*6/MM3 (ref 3.77–5.28)
RBC # BLD AUTO: 3.98 10*6/MM3 (ref 3.77–5.28)
RBC # BLD AUTO: 4.02 10*6/MM3 (ref 3.77–5.28)
RBC # BLD AUTO: 4.35 10*6/MM3 (ref 3.77–5.28)
RBC # BLD AUTO: 4.59 10*6/MM3 (ref 3.77–5.28)
RBC # BLD: 3.36 M/UL (ref 4.2–5.4)
RBC # BLD: 3.54 M/UL (ref 4.2–5.4)
RBC # BLD: 3.58 M/UL (ref 4.2–5.4)
RBC # BLD: 3.8 M/UL (ref 4.2–5.4)
RBC # BLD: 3.83 M/UL (ref 4.2–5.4)
RBC # BLD: 3.9 M/UL (ref 4.2–5.4)
RBC # BLD: 4.07 M/UL (ref 4.2–5.4)
RBC # BLD: 4.33 M/UL (ref 4.2–5.4)
RBC # CSF MANUAL: 1000 /MM3 (ref 0–0)
RBC # CSF MANUAL: 2000 /MM3 (ref 0–0)
RBC # UR STRIP: ABNORMAL /HPF
RBC # UR STRIP: ABNORMAL /UL
RBC UA: 4 /HPF (ref 0–4)
RBC UA: ABNORMAL /HPF (ref 0–2)
REF LAB TEST METHOD: ABNORMAL
ROTAVIRUS A PCR: NOT DETECTED
S PNEUM DNA CSF QL NAA+NON-PROBE: NOT DETECTED
SALMONELLA PCR: NOT DETECTED
SAO2 % BLDCOA: 92.9 % (ref 94–99)
SAO2 % BLDCOA: 93.7 % (ref 94–99)
SAPOVIRUS PCR: NOT DETECTED
SARS-COV-2 AG RESP QL IA.RAPID: DETECTED
SARS-COV-2 RNA PNL SPEC NAA+PROBE: DETECTED
SARS-COV-2, NAAT: NOT DETECTED
SARS-COV-2, PCR: NOT DETECTED
SHIGA-LIKE TOXIN-PRODUCING E. COLI (STEC) STX1/STX2: NOT DETECTED
SODIUM BLD-SCNC: 134 MMOL/L (ref 136–145)
SODIUM BLD-SCNC: 134 MMOL/L (ref 136–145)
SODIUM BLD-SCNC: 137 MMOL/L (ref 136–145)
SODIUM BLD-SCNC: 140 MMOL/L (ref 136–145)
SODIUM BLD-SCNC: 141 MMOL/L (ref 136–145)
SODIUM BLD-SCNC: 142 MMOL/L (ref 136–145)
SODIUM BLD-SCNC: 142 MMOL/L (ref 136–145)
SODIUM BLD-SCNC: 143 MMOL/L (ref 136–145)
SODIUM SERPL-SCNC: 137 MMOL/L (ref 136–145)
SODIUM SERPL-SCNC: 137 MMOL/L (ref 136–145)
SODIUM SERPL-SCNC: 138 MMOL/L (ref 136–145)
SODIUM SERPL-SCNC: 140 MMOL/L (ref 136–145)
SODIUM SERPL-SCNC: 143 MMOL/L (ref 136–145)
SODIUM SERPL-SCNC: 145 MMOL/L (ref 136–145)
SP GR UR STRIP: 1.02 (ref 1–1.03)
SP GR UR: 1 (ref 1–1.03)
SP GR UR: 1.01 (ref 1–1.03)
SP GR UR: 1.01 (ref 1–1.03)
SPECIFIC GRAVITY UA: 1.02 (ref 1–1.03)
SPECIFIC GRAVITY UA: 1.02 (ref 1–1.03)
SPECIMEN VOL CSF: 28 ML
SPECIMEN VOL CSF: 28 ML
SQUAMOUS #/AREA URNS HPF: ABNORMAL /HPF
TOTAL IRON BINDING CAPACITY: 139 UG/DL (ref 250–400)
TOTAL PROTEIN: 5.3 G/DL (ref 6.6–8.7)
TOTAL PROTEIN: 6.5 G/DL (ref 6.6–8.7)
TOTAL PROTEIN: 7.2 G/DL (ref 6.6–8.7)
TOTAL PROTEIN: 7.6 G/DL (ref 6.6–8.7)
TRICYCLICS UR QL SCN: POSITIVE
TRIGL SERPL-MCNC: 108 MG/DL (ref 0–149)
TRIGL SERPL-MCNC: 95 MG/DL (ref 0–149)
TROPONIN T SERPL-MCNC: <0.01 NG/ML (ref 0–0.03)
TROPONIN: 0.14 NG/ML (ref 0–0.03)
TROPONIN: 0.14 NG/ML (ref 0–0.03)
TROPONIN: 0.16 NG/ML (ref 0–0.03)
TROPONIN: 0.16 NG/ML (ref 0–0.03)
TSH REFLEX FT4: 3.56 UIU/ML (ref 0.35–5.5)
TSH SERPL DL<=0.05 MIU/L-ACNC: 1.11 UIU/ML (ref 0.27–4.2)
TUBE # CSF: 1
TUBE # CSF: 3
URIC ACID, SERUM: 8.8 MG/DL (ref 2.4–5.7)
URINE CULTURE, ROUTINE: ABNORMAL
URINE CULTURE, ROUTINE: NORMAL
UROBILINOGEN UR QL STRIP: ABNORMAL
UROBILINOGEN UR QL: NORMAL
UROBILINOGEN, URINE: 0.2 E.U./DL
UROBILINOGEN, URINE: 0.2 E.U./DL
VENTILATOR MODE: ABNORMAL
VENTILATOR MODE: ABNORMAL
VIBRIO CHOLERAE PCR: NOT DETECTED
VIBRIO PCR: NOT DETECTED
VIT B12 BLD-MCNC: 523 PG/ML (ref 211–946)
VITAMIN B-12: 1689 PG/ML (ref 211–946)
VITAMIN D 25-HYDROXY: 37.1 NG/ML
VZV DNA CSF QL NAA+PROBE: NOT DETECTED
WBC # BLD: 5.6 K/UL (ref 4.8–10.8)
WBC # BLD: 5.6 K/UL (ref 4.8–10.8)
WBC # BLD: 6.8 K/UL (ref 4.8–10.8)
WBC # BLD: 7.3 K/UL (ref 4.8–10.8)
WBC # BLD: 7.7 K/UL (ref 4.8–10.8)
WBC # BLD: 7.9 K/UL (ref 4.8–10.8)
WBC # BLD: 8.4 K/UL (ref 4.8–10.8)
WBC # BLD: 9.4 K/UL (ref 4.8–10.8)
WBC # UR STRIP: ABNORMAL /HPF
WBC NRBC COR # BLD: 13.35 10*3/MM3 (ref 3.4–10.8)
WBC NRBC COR # BLD: 3.75 10*3/MM3 (ref 3.4–10.8)
WBC NRBC COR # BLD: 5.52 10*3/MM3 (ref 3.4–10.8)
WBC NRBC COR # BLD: 5.76 10*3/MM3 (ref 3.4–10.8)
WBC NRBC COR # BLD: 6.41 10*3/MM3 (ref 3.4–10.8)
WBC UA: 2 /HPF (ref 0–5)
WBC UA: ABNORMAL /HPF (ref 0–5)
YEAST: PRESENT /HPF
YERSINIA ENTEROCOLITICA PCR: NOT DETECTED

## 2022-01-01 PROCEDURE — 0 GADOBENATE DIMEGLUMINE 529 MG/ML SOLUTION: Performed by: INTERNAL MEDICINE

## 2022-01-01 PROCEDURE — 6360000002 HC RX W HCPCS

## 2022-01-01 PROCEDURE — 94799 UNLISTED PULMONARY SVC/PX: CPT

## 2022-01-01 PROCEDURE — 72125 CT NECK SPINE W/O DYE: CPT

## 2022-01-01 PROCEDURE — 99214 OFFICE O/P EST MOD 30 MIN: CPT | Performed by: FAMILY MEDICINE

## 2022-01-01 PROCEDURE — 99223 1ST HOSP IP/OBS HIGH 75: CPT | Performed by: INTERNAL MEDICINE

## 2022-01-01 PROCEDURE — 84100 ASSAY OF PHOSPHORUS: CPT | Performed by: INTERNAL MEDICINE

## 2022-01-01 PROCEDURE — 84145 PROCALCITONIN (PCT): CPT | Performed by: NURSE PRACTITIONER

## 2022-01-01 PROCEDURE — 6360000004 HC RX CONTRAST MEDICATION: Performed by: INTERNAL MEDICINE

## 2022-01-01 PROCEDURE — 6360000002 HC RX W HCPCS: Performed by: NURSE PRACTITIONER

## 2022-01-01 PROCEDURE — 83540 ASSAY OF IRON: CPT

## 2022-01-01 PROCEDURE — 99232 SBSQ HOSP IP/OBS MODERATE 35: CPT | Performed by: INTERNAL MEDICINE

## 2022-01-01 PROCEDURE — 97163 PT EVAL HIGH COMPLEX 45 MIN: CPT

## 2022-01-01 PROCEDURE — 94664 DEMO&/EVAL PT USE INHALER: CPT

## 2022-01-01 PROCEDURE — 93010 ELECTROCARDIOGRAM REPORT: CPT | Performed by: INTERNAL MEDICINE

## 2022-01-01 PROCEDURE — 25010000002 MAGNESIUM SULFATE IN D5W 1G/100ML (PREMIX) 1-5 GM/100ML-% SOLUTION: Performed by: INTERNAL MEDICINE

## 2022-01-01 PROCEDURE — 70553 MRI BRAIN STEM W/O & W/DYE: CPT

## 2022-01-01 PROCEDURE — 71045 X-RAY EXAM CHEST 1 VIEW: CPT

## 2022-01-01 PROCEDURE — 25010000002 LORAZEPAM PER 2 MG: Performed by: INTERNAL MEDICINE

## 2022-01-01 PROCEDURE — 6360000002 HC RX W HCPCS: Performed by: INTERNAL MEDICINE

## 2022-01-01 PROCEDURE — 2580000003 HC RX 258: Performed by: NURSE PRACTITIONER

## 2022-01-01 PROCEDURE — 84157 ASSAY OF PROTEIN OTHER: CPT | Performed by: INTERNAL MEDICINE

## 2022-01-01 PROCEDURE — 1090F PRES/ABSN URINE INCON ASSESS: CPT | Performed by: FAMILY MEDICINE

## 2022-01-01 PROCEDURE — 76937 US GUIDE VASCULAR ACCESS: CPT

## 2022-01-01 PROCEDURE — 82140 ASSAY OF AMMONIA: CPT | Performed by: PSYCHIATRY & NEUROLOGY

## 2022-01-01 PROCEDURE — 2700000000 HC OXYGEN THERAPY PER DAY

## 2022-01-01 PROCEDURE — 93016 CV STRESS TEST SUPVJ ONLY: CPT | Performed by: INTERNAL MEDICINE

## 2022-01-01 PROCEDURE — 6370000000 HC RX 637 (ALT 250 FOR IP): Performed by: NURSE PRACTITIONER

## 2022-01-01 PROCEDURE — 86140 C-REACTIVE PROTEIN: CPT | Performed by: INTERNAL MEDICINE

## 2022-01-01 PROCEDURE — 36415 COLL VENOUS BLD VENIPUNCTURE: CPT

## 2022-01-01 PROCEDURE — 2140000000 HC CCU INTERMEDIATE R&B

## 2022-01-01 PROCEDURE — 99497 ADVNCD CARE PLAN 30 MIN: CPT

## 2022-01-01 PROCEDURE — 81001 URINALYSIS AUTO W/SCOPE: CPT | Performed by: PHYSICIAN ASSISTANT

## 2022-01-01 PROCEDURE — 85025 COMPLETE CBC W/AUTO DIFF WBC: CPT

## 2022-01-01 PROCEDURE — 80048 BASIC METABOLIC PNL TOTAL CA: CPT

## 2022-01-01 PROCEDURE — 93017 CV STRESS TEST TRACING ONLY: CPT

## 2022-01-01 PROCEDURE — 99232 SBSQ HOSP IP/OBS MODERATE 35: CPT

## 2022-01-01 PROCEDURE — 25010000002 ENOXAPARIN PER 10 MG: Performed by: INTERNAL MEDICINE

## 2022-01-01 PROCEDURE — 94640 AIRWAY INHALATION TREATMENT: CPT

## 2022-01-01 PROCEDURE — 25010000002 CEFTRIAXONE PER 250 MG: Performed by: FAMILY MEDICINE

## 2022-01-01 PROCEDURE — 85025 COMPLETE CBC W/AUTO DIFF WBC: CPT | Performed by: INTERNAL MEDICINE

## 2022-01-01 PROCEDURE — 85027 COMPLETE CBC AUTOMATED: CPT

## 2022-01-01 PROCEDURE — 25010000002 HEPARIN LOCK FLUSH PER 10 UNITS: Performed by: INTERNAL MEDICINE

## 2022-01-01 PROCEDURE — 85379 FIBRIN DEGRADATION QUANT: CPT | Performed by: NURSE PRACTITIONER

## 2022-01-01 PROCEDURE — 87040 BLOOD CULTURE FOR BACTERIA: CPT | Performed by: NURSE PRACTITIONER

## 2022-01-01 PROCEDURE — 94761 N-INVAS EAR/PLS OXIMETRY MLT: CPT

## 2022-01-01 PROCEDURE — 83735 ASSAY OF MAGNESIUM: CPT | Performed by: INTERNAL MEDICINE

## 2022-01-01 PROCEDURE — 97166 OT EVAL MOD COMPLEX 45 MIN: CPT

## 2022-01-01 PROCEDURE — G8399 PT W/DXA RESULTS DOCUMENT: HCPCS | Performed by: FAMILY MEDICINE

## 2022-01-01 PROCEDURE — 95816 EEG AWAKE AND DROWSY: CPT

## 2022-01-01 PROCEDURE — 36600 WITHDRAWAL OF ARTERIAL BLOOD: CPT

## 2022-01-01 PROCEDURE — 82803 BLOOD GASES ANY COMBINATION: CPT

## 2022-01-01 PROCEDURE — 99214 OFFICE O/P EST MOD 30 MIN: CPT | Performed by: PHYSICIAN ASSISTANT

## 2022-01-01 PROCEDURE — 36410 VNPNXR 3YR/> PHY/QHP DX/THER: CPT

## 2022-01-01 PROCEDURE — 99152 MOD SED SAME PHYS/QHP 5/>YRS: CPT | Performed by: INTERNAL MEDICINE

## 2022-01-01 PROCEDURE — 82550 ASSAY OF CK (CPK): CPT | Performed by: FAMILY MEDICINE

## 2022-01-01 PROCEDURE — 83735 ASSAY OF MAGNESIUM: CPT

## 2022-01-01 PROCEDURE — 25010000002 DEXAMETHASONE PER 1 MG: Performed by: INTERNAL MEDICINE

## 2022-01-01 PROCEDURE — 80053 COMPREHEN METABOLIC PANEL: CPT | Performed by: INTERNAL MEDICINE

## 2022-01-01 PROCEDURE — 25010000002 LEVETIRACETAM IN NACL 0.82% 500 MG/100ML SOLUTION: Performed by: INTERNAL MEDICINE

## 2022-01-01 PROCEDURE — 82746 ASSAY OF FOLIC ACID SERUM: CPT

## 2022-01-01 PROCEDURE — 3430000000 HC RX DIAGNOSTIC RADIOPHARMACEUTICAL: Performed by: INTERNAL MEDICINE

## 2022-01-01 PROCEDURE — 2709999900 HC NON-CHARGEABLE SUPPLY

## 2022-01-01 PROCEDURE — 85652 RBC SED RATE AUTOMATED: CPT | Performed by: FAMILY MEDICINE

## 2022-01-01 PROCEDURE — 97110 THERAPEUTIC EXERCISES: CPT

## 2022-01-01 PROCEDURE — 85025 COMPLETE CBC W/AUTO DIFF WBC: CPT | Performed by: NURSE PRACTITIONER

## 2022-01-01 PROCEDURE — 93018 CV STRESS TEST I&R ONLY: CPT | Performed by: INTERNAL MEDICINE

## 2022-01-01 PROCEDURE — 25010000002 MAGNESIUM SULFATE 2 GM/50ML SOLUTION: Performed by: INTERNAL MEDICINE

## 2022-01-01 PROCEDURE — U0005 INFEC AGEN DETEC AMPLI PROBE: HCPCS

## 2022-01-01 PROCEDURE — 82550 ASSAY OF CK (CPK): CPT | Performed by: INTERNAL MEDICINE

## 2022-01-01 PROCEDURE — 83880 ASSAY OF NATRIURETIC PEPTIDE: CPT | Performed by: NURSE PRACTITIONER

## 2022-01-01 PROCEDURE — 25010000002 ENOXAPARIN PER 10 MG: Performed by: FAMILY MEDICINE

## 2022-01-01 PROCEDURE — 99203 OFFICE O/P NEW LOW 30 MIN: CPT | Performed by: PHYSICIAN ASSISTANT

## 2022-01-01 PROCEDURE — 82962 GLUCOSE BLOOD TEST: CPT

## 2022-01-01 PROCEDURE — 92610 EVALUATE SWALLOWING FUNCTION: CPT

## 2022-01-01 PROCEDURE — 25010000002 CEFTRIAXONE PER 250 MG: Performed by: INTERNAL MEDICINE

## 2022-01-01 PROCEDURE — C8929 TTE W OR WO FOL WCON,DOPPLER: HCPCS

## 2022-01-01 PROCEDURE — C1894 INTRO/SHEATH, NON-LASER: HCPCS

## 2022-01-01 PROCEDURE — 93458 L HRT ARTERY/VENTRICLE ANGIO: CPT

## 2022-01-01 PROCEDURE — 93005 ELECTROCARDIOGRAM TRACING: CPT | Performed by: NURSE PRACTITIONER

## 2022-01-01 PROCEDURE — 97162 PT EVAL MOD COMPLEX 30 MIN: CPT

## 2022-01-01 PROCEDURE — 84100 ASSAY OF PHOSPHORUS: CPT | Performed by: FAMILY MEDICINE

## 2022-01-01 PROCEDURE — 84484 ASSAY OF TROPONIN QUANT: CPT

## 2022-01-01 PROCEDURE — 85027 COMPLETE CBC AUTOMATED: CPT | Performed by: INTERNAL MEDICINE

## 2022-01-01 PROCEDURE — C1751 CATH, INF, PER/CENT/MIDLINE: HCPCS

## 2022-01-01 PROCEDURE — 93458 L HRT ARTERY/VENTRICLE ANGIO: CPT | Performed by: INTERNAL MEDICINE

## 2022-01-01 PROCEDURE — 92950 HEART/LUNG RESUSCITATION CPR: CPT

## 2022-01-01 PROCEDURE — 85025 COMPLETE CBC W/AUTO DIFF WBC: CPT | Performed by: FAMILY MEDICINE

## 2022-01-01 PROCEDURE — A9577 INJ MULTIHANCE: HCPCS | Performed by: INTERNAL MEDICINE

## 2022-01-01 PROCEDURE — 99152 MOD SED SAME PHYS/QHP 5/>YRS: CPT

## 2022-01-01 PROCEDURE — U0003 INFECTIOUS AGENT DETECTION BY NUCLEIC ACID (DNA OR RNA); SEVERE ACUTE RESPIRATORY SYNDROME CORONAVIRUS 2 (SARS-COV-2) (CORONAVIRUS DISEASE [COVID-19]), AMPLIFIED PROBE TECHNIQUE, MAKING USE OF HIGH THROUGHPUT TECHNOLOGIES AS DESCRIBED BY CMS-2020-01-R: HCPCS

## 2022-01-01 PROCEDURE — 1036F TOBACCO NON-USER: CPT | Performed by: FAMILY MEDICINE

## 2022-01-01 PROCEDURE — 99233 SBSQ HOSP IP/OBS HIGH 50: CPT | Performed by: PHYSICIAN ASSISTANT

## 2022-01-01 PROCEDURE — 80048 BASIC METABOLIC PNL TOTAL CA: CPT | Performed by: INTERNAL MEDICINE

## 2022-01-01 PROCEDURE — 25010000002 LEVETIRACETAM IN NACL 0.82% 500 MG/100ML SOLUTION: Performed by: FAMILY MEDICINE

## 2022-01-01 PROCEDURE — 83735 ASSAY OF MAGNESIUM: CPT | Performed by: FAMILY MEDICINE

## 2022-01-01 PROCEDURE — 2580000003 HC RX 258: Performed by: INTERNAL MEDICINE

## 2022-01-01 PROCEDURE — 92526 ORAL FUNCTION THERAPY: CPT

## 2022-01-01 PROCEDURE — 99285 EMERGENCY DEPT VISIT HI MDM: CPT

## 2022-01-01 PROCEDURE — 87205 SMEAR GRAM STAIN: CPT | Performed by: INTERNAL MEDICINE

## 2022-01-01 PROCEDURE — 83605 ASSAY OF LACTIC ACID: CPT | Performed by: NURSE PRACTITIONER

## 2022-01-01 PROCEDURE — 92526 ORAL FUNCTION THERAPY: CPT | Performed by: SPEECH-LANGUAGE PATHOLOGIST

## 2022-01-01 PROCEDURE — 97530 THERAPEUTIC ACTIVITIES: CPT

## 2022-01-01 PROCEDURE — 87507 IADNA-DNA/RNA PROBE TQ 12-25: CPT

## 2022-01-01 PROCEDURE — 87070 CULTURE OTHR SPECIMN AEROBIC: CPT | Performed by: INTERNAL MEDICINE

## 2022-01-01 PROCEDURE — 97535 SELF CARE MNGMENT TRAINING: CPT

## 2022-01-01 PROCEDURE — 63710000001 DEXAMETHASONE PER 0.25 MG: Performed by: INTERNAL MEDICINE

## 2022-01-01 PROCEDURE — 009U3ZX DRAINAGE OF SPINAL CANAL, PERCUTANEOUS APPROACH, DIAGNOSTIC: ICD-10-PCS | Performed by: INTERNAL MEDICINE

## 2022-01-01 PROCEDURE — 70450 CT HEAD/BRAIN W/O DYE: CPT

## 2022-01-01 PROCEDURE — 86140 C-REACTIVE PROTEIN: CPT | Performed by: FAMILY MEDICINE

## 2022-01-01 PROCEDURE — 80053 COMPREHEN METABOLIC PANEL: CPT | Performed by: NURSE PRACTITIONER

## 2022-01-01 PROCEDURE — 80061 LIPID PANEL: CPT

## 2022-01-01 PROCEDURE — 83880 ASSAY OF NATRIURETIC PEPTIDE: CPT

## 2022-01-01 PROCEDURE — 82728 ASSAY OF FERRITIN: CPT | Performed by: FAMILY MEDICINE

## 2022-01-01 PROCEDURE — 85610 PROTHROMBIN TIME: CPT | Performed by: INTERNAL MEDICINE

## 2022-01-01 PROCEDURE — 87483 CNS DNA AMP PROBE TYPE 12-25: CPT | Performed by: INTERNAL MEDICINE

## 2022-01-01 PROCEDURE — G8417 CALC BMI ABV UP PARAM F/U: HCPCS | Performed by: FAMILY MEDICINE

## 2022-01-01 PROCEDURE — 05H933Z INSERTION OF INFUSION DEVICE INTO RIGHT BRACHIAL VEIN, PERCUTANEOUS APPROACH: ICD-10-PCS | Performed by: INTERNAL MEDICINE

## 2022-01-01 PROCEDURE — 71275 CT ANGIOGRAPHY CHEST: CPT

## 2022-01-01 PROCEDURE — 85379 FIBRIN DEGRADATION QUANT: CPT

## 2022-01-01 PROCEDURE — 83036 HEMOGLOBIN GLYCOSYLATED A1C: CPT

## 2022-01-01 PROCEDURE — 87015 SPECIMEN INFECT AGNT CONCNTJ: CPT | Performed by: INTERNAL MEDICINE

## 2022-01-01 PROCEDURE — 80053 COMPREHEN METABOLIC PANEL: CPT

## 2022-01-01 PROCEDURE — 87635 SARS-COV-2 COVID-19 AMP PRB: CPT

## 2022-01-01 PROCEDURE — 87426 SARSCOV CORONAVIRUS AG IA: CPT | Performed by: INTERNAL MEDICINE

## 2022-01-01 PROCEDURE — 89051 BODY FLUID CELL COUNT: CPT | Performed by: INTERNAL MEDICINE

## 2022-01-01 PROCEDURE — G8427 DOCREV CUR MEDS BY ELIG CLIN: HCPCS | Performed by: FAMILY MEDICINE

## 2022-01-01 PROCEDURE — 81001 URINALYSIS AUTO W/SCOPE: CPT

## 2022-01-01 PROCEDURE — 87040 BLOOD CULTURE FOR BACTERIA: CPT

## 2022-01-01 PROCEDURE — 99213 OFFICE O/P EST LOW 20 MIN: CPT | Performed by: PHYSICIAN ASSISTANT

## 2022-01-01 PROCEDURE — 4A023N7 MEASUREMENT OF CARDIAC SAMPLING AND PRESSURE, LEFT HEART, PERCUTANEOUS APPROACH: ICD-10-PCS | Performed by: INTERNAL MEDICINE

## 2022-01-01 PROCEDURE — 6370000000 HC RX 637 (ALT 250 FOR IP): Performed by: INTERNAL MEDICINE

## 2022-01-01 PROCEDURE — 85730 THROMBOPLASTIN TIME PARTIAL: CPT | Performed by: INTERNAL MEDICINE

## 2022-01-01 PROCEDURE — 83605 ASSAY OF LACTIC ACID: CPT

## 2022-01-01 PROCEDURE — A9502 TC99M TETROFOSMIN: HCPCS | Performed by: INTERNAL MEDICINE

## 2022-01-01 PROCEDURE — 2500000003 HC RX 250 WO HCPCS

## 2022-01-01 PROCEDURE — P9612 CATHETERIZE FOR URINE SPEC: HCPCS

## 2022-01-01 PROCEDURE — 05HC33Z INSERTION OF INFUSION DEVICE INTO LEFT BASILIC VEIN, PERCUTANEOUS APPROACH: ICD-10-PCS | Performed by: INTERNAL MEDICINE

## 2022-01-01 PROCEDURE — 0 MAGNESIUM SULFATE 4 GM/100ML SOLUTION: Performed by: INTERNAL MEDICINE

## 2022-01-01 PROCEDURE — 82607 VITAMIN B-12: CPT

## 2022-01-01 PROCEDURE — 80306 DRUG TEST PRSMV INSTRMNT: CPT | Performed by: PHYSICIAN ASSISTANT

## 2022-01-01 PROCEDURE — 83550 IRON BINDING TEST: CPT

## 2022-01-01 PROCEDURE — 97165 OT EVAL LOW COMPLEX 30 MIN: CPT

## 2022-01-01 PROCEDURE — 87635 SARS-COV-2 COVID-19 AMP PRB: CPT | Performed by: NURSE PRACTITIONER

## 2022-01-01 PROCEDURE — 84145 PROCALCITONIN (PCT): CPT | Performed by: INTERNAL MEDICINE

## 2022-01-01 PROCEDURE — 87086 URINE CULTURE/COLONY COUNT: CPT

## 2022-01-01 PROCEDURE — B2111ZZ FLUOROSCOPY OF MULTIPLE CORONARY ARTERIES USING LOW OSMOLAR CONTRAST: ICD-10-PCS | Performed by: INTERNAL MEDICINE

## 2022-01-01 PROCEDURE — 84145 PROCALCITONIN (PCT): CPT

## 2022-01-01 PROCEDURE — 99232 SBSQ HOSP IP/OBS MODERATE 35: CPT | Performed by: PHYSICIAN ASSISTANT

## 2022-01-01 PROCEDURE — 1123F ACP DISCUSS/DSCN MKR DOCD: CPT | Performed by: FAMILY MEDICINE

## 2022-01-01 PROCEDURE — 82607 VITAMIN B-12: CPT | Performed by: PSYCHIATRY & NEUROLOGY

## 2022-01-01 PROCEDURE — 78452 HT MUSCLE IMAGE SPECT MULT: CPT | Performed by: INTERNAL MEDICINE

## 2022-01-01 PROCEDURE — 82945 GLUCOSE OTHER FLUID: CPT | Performed by: INTERNAL MEDICINE

## 2022-01-01 PROCEDURE — 80053 COMPREHEN METABOLIC PANEL: CPT | Performed by: FAMILY MEDICINE

## 2022-01-01 PROCEDURE — 84443 ASSAY THYROID STIM HORMONE: CPT

## 2022-01-01 PROCEDURE — 81001 URINALYSIS AUTO W/SCOPE: CPT | Performed by: NURSE PRACTITIONER

## 2022-01-01 PROCEDURE — 93005 ELECTROCARDIOGRAM TRACING: CPT | Performed by: INTERNAL MEDICINE

## 2022-01-01 PROCEDURE — 84484 ASSAY OF TROPONIN QUANT: CPT | Performed by: NURSE PRACTITIONER

## 2022-01-01 PROCEDURE — 36415 COLL VENOUS BLD VENIPUNCTURE: CPT | Performed by: FAMILY MEDICINE

## 2022-01-01 PROCEDURE — C1769 GUIDE WIRE: HCPCS

## 2022-01-01 PROCEDURE — 6360000002 HC RX W HCPCS: Performed by: HOSPITALIST

## 2022-01-01 PROCEDURE — 97116 GAIT TRAINING THERAPY: CPT

## 2022-01-01 PROCEDURE — 99222 1ST HOSP IP/OBS MODERATE 55: CPT

## 2022-01-01 PROCEDURE — 51798 US URINE CAPACITY MEASURE: CPT | Performed by: PHYSICIAN ASSISTANT

## 2022-01-01 PROCEDURE — B2151ZZ FLUOROSCOPY OF LEFT HEART USING LOW OSMOLAR CONTRAST: ICD-10-PCS | Performed by: INTERNAL MEDICINE

## 2022-01-01 RX ORDER — HYDRALAZINE HYDROCHLORIDE 20 MG/ML
10 INJECTION INTRAMUSCULAR; INTRAVENOUS EVERY 10 MIN PRN
Status: DISCONTINUED | OUTPATIENT
Start: 2022-01-01 | End: 2022-01-01 | Stop reason: HOSPADM

## 2022-01-01 RX ORDER — ENOXAPARIN SODIUM 100 MG/ML
40 INJECTION SUBCUTANEOUS NIGHTLY
Status: DISCONTINUED | OUTPATIENT
Start: 2022-01-01 | End: 2022-01-01

## 2022-01-01 RX ORDER — AMLODIPINE BESYLATE 5 MG/1
5 TABLET ORAL DAILY
Status: DISCONTINUED | OUTPATIENT
Start: 2022-01-01 | End: 2022-01-01 | Stop reason: HOSPADM

## 2022-01-01 RX ORDER — CHLORTHALIDONE 25 MG/1
25 TABLET ORAL DAILY
Status: DISCONTINUED | OUTPATIENT
Start: 2022-01-01 | End: 2022-01-01 | Stop reason: HOSPADM

## 2022-01-01 RX ORDER — ASPIRIN 81 MG/1
81 TABLET, CHEWABLE ORAL DAILY
Qty: 30 TABLET | Refills: 3 | Status: SHIPPED | OUTPATIENT
Start: 2022-01-01

## 2022-01-01 RX ORDER — VITAMIN B COMPLEX
2000 TABLET ORAL NIGHTLY
Status: DISCONTINUED | OUTPATIENT
Start: 2022-01-01 | End: 2022-01-01 | Stop reason: HOSPADM

## 2022-01-01 RX ORDER — ONDANSETRON 4 MG/1
4 TABLET, ORALLY DISINTEGRATING ORAL EVERY 8 HOURS PRN
Qty: 90 TABLET | Refills: 0
Start: 2022-01-01 | End: 2022-01-01

## 2022-01-01 RX ORDER — SODIUM CHLORIDE 9 MG/ML
INJECTION, SOLUTION INTRAVENOUS CONTINUOUS
Status: DISCONTINUED | OUTPATIENT
Start: 2022-01-01 | End: 2022-01-01

## 2022-01-01 RX ORDER — BISACODYL 10 MG
10 SUPPOSITORY, RECTAL RECTAL ONCE
Status: COMPLETED | OUTPATIENT
Start: 2022-01-01 | End: 2022-01-01

## 2022-01-01 RX ORDER — POTASSIUM CHLORIDE 20 MEQ/1
40 TABLET, EXTENDED RELEASE ORAL ONCE
Status: COMPLETED | OUTPATIENT
Start: 2022-01-01 | End: 2022-01-01

## 2022-01-01 RX ORDER — BUDESONIDE 0.5 MG/2ML
0.5 INHALANT ORAL 2 TIMES DAILY
Status: DISCONTINUED | OUTPATIENT
Start: 2022-01-01 | End: 2022-01-01 | Stop reason: HOSPADM

## 2022-01-01 RX ORDER — GABAPENTIN 100 MG/1
200 CAPSULE ORAL 2 TIMES DAILY
Qty: 120 CAPSULE | Refills: 5 | Status: ON HOLD | OUTPATIENT
Start: 2022-01-01 | End: 2022-01-01 | Stop reason: SDUPTHER

## 2022-01-01 RX ORDER — SODIUM CHLORIDE 0.9 % (FLUSH) 0.9 %
20 SYRINGE (ML) INJECTION AS NEEDED
Status: DISCONTINUED | OUTPATIENT
Start: 2022-01-01 | End: 2022-01-01 | Stop reason: HOSPADM

## 2022-01-01 RX ORDER — ENOXAPARIN SODIUM 100 MG/ML
40 INJECTION SUBCUTANEOUS DAILY
Status: DISCONTINUED | OUTPATIENT
Start: 2022-01-01 | End: 2022-01-01 | Stop reason: HOSPADM

## 2022-01-01 RX ORDER — LORAZEPAM 0.5 MG/1
0.5 TABLET ORAL EVERY 8 HOURS PRN
Qty: 90 TABLET | Refills: 2 | Status: SHIPPED | OUTPATIENT
Start: 2022-01-01 | End: 2022-12-05

## 2022-01-01 RX ORDER — LOSARTAN POTASSIUM 50 MG/1
50 TABLET ORAL
Start: 2022-01-01

## 2022-01-01 RX ORDER — LEVETIRACETAM 500 MG/1
500 TABLET ORAL EVERY 12 HOURS SCHEDULED
Start: 2022-01-01

## 2022-01-01 RX ORDER — ASCORBIC ACID 500 MG
500 TABLET ORAL DAILY
Status: DISCONTINUED | OUTPATIENT
Start: 2022-01-01 | End: 2022-01-01

## 2022-01-01 RX ORDER — MAGNESIUM SULFATE IN WATER 40 MG/ML
2000 INJECTION, SOLUTION INTRAVENOUS ONCE
Status: COMPLETED | OUTPATIENT
Start: 2022-01-01 | End: 2022-01-01

## 2022-01-01 RX ORDER — BUDESONIDE AND FORMOTEROL FUMARATE DIHYDRATE 160; 4.5 UG/1; UG/1
2 AEROSOL RESPIRATORY (INHALATION) 2 TIMES DAILY
Status: DISCONTINUED | OUTPATIENT
Start: 2022-01-01 | End: 2022-01-01 | Stop reason: CLARIF

## 2022-01-01 RX ORDER — LIDOCAINE 50 MG/G
1 PATCH TOPICAL
Status: DISCONTINUED | OUTPATIENT
Start: 2022-01-01 | End: 2022-01-01 | Stop reason: HOSPADM

## 2022-01-01 RX ORDER — ESOMEPRAZOLE MAGNESIUM 40 MG/1
CAPSULE, DELAYED RELEASE ORAL
Qty: 90 CAPSULE | Refills: 3 | Status: SHIPPED | OUTPATIENT
Start: 2022-01-01

## 2022-01-01 RX ORDER — LEVETIRACETAM 100 MG/ML
500 SOLUTION ORAL EVERY 12 HOURS SCHEDULED
Status: DISCONTINUED | OUTPATIENT
Start: 2022-01-01 | End: 2022-01-01

## 2022-01-01 RX ORDER — LORAZEPAM 0.5 MG/1
0.5 TABLET ORAL EVERY 12 HOURS PRN
Status: DISCONTINUED | OUTPATIENT
Start: 2022-01-01 | End: 2022-01-01 | Stop reason: HOSPADM

## 2022-01-01 RX ORDER — FUROSEMIDE 10 MG/ML
40 INJECTION INTRAMUSCULAR; INTRAVENOUS ONCE
Status: COMPLETED | OUTPATIENT
Start: 2022-01-01 | End: 2022-01-01

## 2022-01-01 RX ORDER — VENLAFAXINE HYDROCHLORIDE 75 MG/1
CAPSULE, EXTENDED RELEASE ORAL
Qty: 90 CAPSULE | Refills: 1 | Status: ON HOLD | OUTPATIENT
Start: 2022-01-01 | End: 2022-01-01 | Stop reason: HOSPADM

## 2022-01-01 RX ORDER — CARVEDILOL 25 MG/1
TABLET ORAL
Qty: 180 TABLET | Refills: 2 | Status: SHIPPED | OUTPATIENT
Start: 2022-01-01 | End: 2022-01-01 | Stop reason: ALTCHOICE

## 2022-01-01 RX ORDER — CARVEDILOL 25 MG/1
25 TABLET ORAL 2 TIMES DAILY
Status: DISCONTINUED | OUTPATIENT
Start: 2022-01-01 | End: 2022-01-01 | Stop reason: HOSPADM

## 2022-01-01 RX ORDER — ARFORMOTEROL TARTRATE 15 UG/2ML
15 SOLUTION RESPIRATORY (INHALATION) 2 TIMES DAILY
Qty: 120 ML | Refills: 3 | Status: SHIPPED | OUTPATIENT
Start: 2022-01-01

## 2022-01-01 RX ORDER — TRAMADOL HYDROCHLORIDE 50 MG/1
50 TABLET ORAL 3 TIMES DAILY PRN
Qty: 90 TABLET | Refills: 2 | Status: SHIPPED | OUTPATIENT
Start: 2022-01-01 | End: 2022-12-05

## 2022-01-01 RX ORDER — LOSARTAN POTASSIUM 25 MG/1
25 TABLET ORAL DAILY
Status: ON HOLD | COMMUNITY
End: 2022-01-01 | Stop reason: HOSPADM

## 2022-01-01 RX ORDER — VANCOMYCIN HYDROCHLORIDE 125 MG/1
125 CAPSULE ORAL 4 TIMES DAILY
Status: DISCONTINUED | OUTPATIENT
Start: 2022-01-01 | End: 2022-01-01 | Stop reason: CLARIF

## 2022-01-01 RX ORDER — MAGNESIUM SULFATE HEPTAHYDRATE 40 MG/ML
2 INJECTION, SOLUTION INTRAVENOUS ONCE
Status: COMPLETED | OUTPATIENT
Start: 2022-01-01 | End: 2022-01-01

## 2022-01-01 RX ORDER — AMOXICILLIN 250 MG
2 CAPSULE ORAL 2 TIMES DAILY
Start: 2022-01-01

## 2022-01-01 RX ORDER — NITROFURANTOIN MACROCRYSTALS 50 MG/1
50 CAPSULE ORAL NIGHTLY
Qty: 90 CAPSULE | Refills: 0 | Status: ON HOLD | OUTPATIENT
Start: 2022-01-01 | End: 2022-01-01

## 2022-01-01 RX ORDER — LOSARTAN POTASSIUM 25 MG/1
25 TABLET ORAL DAILY
Status: DISCONTINUED | OUTPATIENT
Start: 2022-01-01 | End: 2022-01-01 | Stop reason: HOSPADM

## 2022-01-01 RX ORDER — ACETAMINOPHEN 325 MG/1
650 TABLET ORAL EVERY 6 HOURS PRN
Status: DISCONTINUED | OUTPATIENT
Start: 2022-01-01 | End: 2022-01-01 | Stop reason: HOSPADM

## 2022-01-01 RX ORDER — ASPIRIN 81 MG/1
81 TABLET, CHEWABLE ORAL DAILY
Status: DISCONTINUED | OUTPATIENT
Start: 2022-01-01 | End: 2022-01-01 | Stop reason: HOSPADM

## 2022-01-01 RX ORDER — BUDESONIDE AND FORMOTEROL FUMARATE DIHYDRATE 160; 4.5 UG/1; UG/1
2 AEROSOL RESPIRATORY (INHALATION)
COMMUNITY

## 2022-01-01 RX ORDER — MAGNESIUM SULFATE 1 G/100ML
1 INJECTION INTRAVENOUS ONCE
Status: COMPLETED | OUTPATIENT
Start: 2022-01-01 | End: 2022-01-01

## 2022-01-01 RX ORDER — TRAMADOL HYDROCHLORIDE 50 MG/1
50 TABLET ORAL 3 TIMES DAILY PRN
Qty: 9 TABLET | Refills: 0 | Status: SHIPPED | OUTPATIENT
Start: 2022-01-01 | End: 2022-01-01 | Stop reason: SDUPTHER

## 2022-01-01 RX ORDER — FAMOTIDINE 20 MG/1
20 TABLET, FILM COATED ORAL
Status: DISCONTINUED | OUTPATIENT
Start: 2022-01-01 | End: 2022-01-01 | Stop reason: HOSPADM

## 2022-01-01 RX ORDER — POLYETHYLENE GLYCOL 3350 17 G/17G
17 POWDER, FOR SOLUTION ORAL DAILY PRN
Status: DISCONTINUED | OUTPATIENT
Start: 2022-01-01 | End: 2022-01-01 | Stop reason: HOSPADM

## 2022-01-01 RX ORDER — FAMOTIDINE 10 MG/ML
20 INJECTION, SOLUTION INTRAVENOUS EVERY 12 HOURS SCHEDULED
Status: DISCONTINUED | OUTPATIENT
Start: 2022-01-01 | End: 2022-01-01

## 2022-01-01 RX ORDER — SODIUM CHLORIDE 0.9 % (FLUSH) 0.9 %
10 SYRINGE (ML) INJECTION EVERY 12 HOURS SCHEDULED
Status: DISCONTINUED | OUTPATIENT
Start: 2022-01-01 | End: 2022-01-01 | Stop reason: HOSPADM

## 2022-01-01 RX ORDER — POLYETHYLENE GLYCOL 3350 17 G/17G
17 POWDER, FOR SOLUTION ORAL DAILY
Status: DISCONTINUED | OUTPATIENT
Start: 2022-01-01 | End: 2022-01-01 | Stop reason: HOSPADM

## 2022-01-01 RX ORDER — LORAZEPAM 0.5 MG/1
TABLET ORAL
Qty: 90 TABLET | Refills: 2 | Status: SHIPPED | OUTPATIENT
Start: 2022-01-01 | End: 2022-01-01

## 2022-01-01 RX ORDER — LIDOCAINE 50 MG/G
1 PATCH TOPICAL
Start: 2022-01-01

## 2022-01-01 RX ORDER — GABAPENTIN 100 MG/1
100 CAPSULE ORAL 2 TIMES DAILY
Qty: 6 CAPSULE | Refills: 0 | Status: SHIPPED | OUTPATIENT
Start: 2022-01-01 | End: 2022-01-01 | Stop reason: SDUPTHER

## 2022-01-01 RX ORDER — PANTOPRAZOLE SODIUM 40 MG/1
40 TABLET, DELAYED RELEASE ORAL
Status: DISCONTINUED | OUTPATIENT
Start: 2022-01-01 | End: 2022-01-01 | Stop reason: HOSPADM

## 2022-01-01 RX ORDER — FUROSEMIDE 20 MG/1
20 TABLET ORAL 2 TIMES DAILY
Qty: 60 TABLET | Refills: 3 | Status: SHIPPED | OUTPATIENT
Start: 2022-01-01

## 2022-01-01 RX ORDER — FERROUS SULFATE 325(65) MG
TABLET ORAL
Qty: 90 TABLET | Refills: 2
Start: 2022-01-01

## 2022-01-01 RX ORDER — ASPIRIN 81 MG/1
81 TABLET, CHEWABLE ORAL DAILY
Qty: 30 TABLET | Refills: 3 | OUTPATIENT
Start: 2022-01-01

## 2022-01-01 RX ORDER — ESTRADIOL 0.1 MG/G
2 CREAM VAGINAL 2 TIMES WEEKLY
Qty: 0.5 G | Refills: 11 | Status: SHIPPED | OUTPATIENT
Start: 2022-01-01 | End: 2022-01-01 | Stop reason: HOSPADM

## 2022-01-01 RX ORDER — DEXAMETHASONE SODIUM PHOSPHATE 4 MG/ML
6 INJECTION, SOLUTION INTRA-ARTICULAR; INTRALESIONAL; INTRAMUSCULAR; INTRAVENOUS; SOFT TISSUE DAILY
Status: DISCONTINUED | OUTPATIENT
Start: 2022-01-01 | End: 2022-01-01

## 2022-01-01 RX ORDER — CLONIDINE HYDROCHLORIDE 0.1 MG/1
0.1 TABLET ORAL ONCE
Status: COMPLETED | OUTPATIENT
Start: 2022-01-01 | End: 2022-01-01

## 2022-01-01 RX ORDER — LOSARTAN POTASSIUM 50 MG/1
50 TABLET ORAL
Status: DISCONTINUED | OUTPATIENT
Start: 2022-01-01 | End: 2022-01-01 | Stop reason: HOSPADM

## 2022-01-01 RX ORDER — CARVEDILOL 6.25 MG/1
12.5 TABLET ORAL 2 TIMES DAILY WITH MEALS
Status: DISCONTINUED | OUTPATIENT
Start: 2022-01-01 | End: 2022-01-01 | Stop reason: HOSPADM

## 2022-01-01 RX ORDER — LORAZEPAM 0.5 MG/1
0.5 TABLET ORAL EVERY 8 HOURS PRN
Qty: 90 TABLET | Refills: 2 | Status: ON HOLD | OUTPATIENT
Start: 2022-01-01 | End: 2022-01-01 | Stop reason: SDUPTHER

## 2022-01-01 RX ORDER — FERROUS SULFATE 325(65) MG
TABLET ORAL
Qty: 90 TABLET | Refills: 2 | Status: ON HOLD | OUTPATIENT
Start: 2022-01-01 | End: 2022-01-01 | Stop reason: SDUPTHER

## 2022-01-01 RX ORDER — FUROSEMIDE 20 MG/1
20 TABLET ORAL 2 TIMES DAILY
Qty: 60 TABLET | Refills: 3 | Status: CANCELLED | DISCHARGE
Start: 2022-01-01

## 2022-01-01 RX ORDER — FERROUS SULFATE 325(65) MG
325 TABLET ORAL
Status: DISCONTINUED | OUTPATIENT
Start: 2022-01-01 | End: 2022-01-01 | Stop reason: HOSPADM

## 2022-01-01 RX ORDER — TRAMADOL HYDROCHLORIDE 50 MG/1
50 TABLET ORAL 3 TIMES DAILY PRN
Status: DISCONTINUED | OUTPATIENT
Start: 2022-01-01 | End: 2022-01-01 | Stop reason: HOSPADM

## 2022-01-01 RX ORDER — POTASSIUM CHLORIDE 20 MEQ/1
20 TABLET, EXTENDED RELEASE ORAL DAILY
Status: ON HOLD | COMMUNITY
End: 2022-01-01 | Stop reason: HOSPADM

## 2022-01-01 RX ORDER — BUDESONIDE 0.5 MG/2ML
0.5 INHALANT ORAL 2 TIMES DAILY
Qty: 60 EACH | Refills: 3 | Status: SHIPPED | OUTPATIENT
Start: 2022-01-01

## 2022-01-01 RX ORDER — ACETAMINOPHEN 650 MG/1
650 SUPPOSITORY RECTAL EVERY 6 HOURS PRN
Status: DISCONTINUED | OUTPATIENT
Start: 2022-01-01 | End: 2022-01-01 | Stop reason: HOSPADM

## 2022-01-01 RX ORDER — SODIUM CHLORIDE 9 MG/ML
INJECTION, SOLUTION INTRAVENOUS PRN
Status: DISCONTINUED | OUTPATIENT
Start: 2022-01-01 | End: 2022-01-01 | Stop reason: HOSPADM

## 2022-01-01 RX ORDER — BUDESONIDE AND FORMOTEROL FUMARATE DIHYDRATE 160; 4.5 UG/1; UG/1
2 AEROSOL RESPIRATORY (INHALATION)
Status: DISCONTINUED | OUTPATIENT
Start: 2022-01-01 | End: 2022-01-01 | Stop reason: HOSPADM

## 2022-01-01 RX ORDER — VANCOMYCIN HYDROCHLORIDE 125 MG/1
125 CAPSULE ORAL 4 TIMES DAILY
Qty: 24 CAPSULE | Refills: 0 | DISCHARGE
Start: 2022-01-01 | End: 2022-01-01

## 2022-01-01 RX ORDER — CHLORTHALIDONE 25 MG/1
25 TABLET ORAL DAILY
Status: ON HOLD | COMMUNITY
End: 2022-01-01 | Stop reason: HOSPADM

## 2022-01-01 RX ORDER — MELATONIN
1000 DAILY
Status: DISCONTINUED | OUTPATIENT
Start: 2022-01-01 | End: 2022-01-01 | Stop reason: HOSPADM

## 2022-01-01 RX ORDER — SODIUM CHLORIDE 0.9 % (FLUSH) 0.9 %
5-40 SYRINGE (ML) INJECTION PRN
Status: DISCONTINUED | OUTPATIENT
Start: 2022-01-01 | End: 2022-01-01 | Stop reason: HOSPADM

## 2022-01-01 RX ORDER — LORAZEPAM 2 MG/ML
1 INJECTION INTRAMUSCULAR ONCE
Status: COMPLETED | OUTPATIENT
Start: 2022-01-01 | End: 2022-01-01

## 2022-01-01 RX ORDER — ATORVASTATIN CALCIUM 80 MG/1
80 TABLET, FILM COATED ORAL NIGHTLY
Qty: 30 TABLET | Refills: 3 | OUTPATIENT
Start: 2022-01-01

## 2022-01-01 RX ORDER — GABAPENTIN 100 MG/1
100 CAPSULE ORAL 2 TIMES DAILY
Qty: 60 CAPSULE | Refills: 2 | Status: SHIPPED | OUTPATIENT
Start: 2022-01-01 | End: 2022-12-05

## 2022-01-01 RX ORDER — FLUTICASONE PROPIONATE 50 MCG
2 SPRAY, SUSPENSION (ML) NASAL DAILY
Status: DISCONTINUED | OUTPATIENT
Start: 2022-01-01 | End: 2022-01-01 | Stop reason: HOSPADM

## 2022-01-01 RX ORDER — SODIUM CHLORIDE 0.9 % (FLUSH) 0.9 %
10 SYRINGE (ML) INJECTION AS NEEDED
Status: DISCONTINUED | OUTPATIENT
Start: 2022-01-01 | End: 2022-01-01 | Stop reason: HOSPADM

## 2022-01-01 RX ORDER — GUAIFENESIN 600 MG/1
600 TABLET, EXTENDED RELEASE ORAL DAILY
Status: DISCONTINUED | OUTPATIENT
Start: 2022-01-01 | End: 2022-01-01 | Stop reason: HOSPADM

## 2022-01-01 RX ORDER — MAGNESIUM SULFATE HEPTAHYDRATE 40 MG/ML
4 INJECTION, SOLUTION INTRAVENOUS ONCE
Status: COMPLETED | OUTPATIENT
Start: 2022-01-01 | End: 2022-01-01

## 2022-01-01 RX ORDER — MAGNESIUM SULFATE 1 G/100ML
1000 INJECTION INTRAVENOUS PRN
Status: DISCONTINUED | OUTPATIENT
Start: 2022-01-01 | End: 2022-01-01 | Stop reason: HOSPADM

## 2022-01-01 RX ORDER — CLONIDINE HYDROCHLORIDE 0.1 MG/1
0.1 TABLET ORAL 2 TIMES DAILY PRN
Status: DISCONTINUED | OUTPATIENT
Start: 2022-01-01 | End: 2022-01-01 | Stop reason: HOSPADM

## 2022-01-01 RX ORDER — ATORVASTATIN CALCIUM 40 MG/1
80 TABLET, FILM COATED ORAL NIGHTLY
Status: DISCONTINUED | OUTPATIENT
Start: 2022-01-01 | End: 2022-01-01 | Stop reason: HOSPADM

## 2022-01-01 RX ORDER — LEVETIRACETAM 500 MG/1
500 TABLET ORAL EVERY 12 HOURS SCHEDULED
Status: DISCONTINUED | OUTPATIENT
Start: 2022-01-01 | End: 2022-01-01 | Stop reason: HOSPADM

## 2022-01-01 RX ORDER — ONDANSETRON 4 MG/1
4 TABLET, ORALLY DISINTEGRATING ORAL EVERY 8 HOURS PRN
Status: DISCONTINUED | OUTPATIENT
Start: 2022-01-01 | End: 2022-01-01 | Stop reason: HOSPADM

## 2022-01-01 RX ORDER — SODIUM CHLORIDE 9 MG/ML
50 INJECTION, SOLUTION INTRAVENOUS CONTINUOUS
Status: DISCONTINUED | OUTPATIENT
Start: 2022-01-01 | End: 2022-01-01

## 2022-01-01 RX ORDER — LEVETIRACETAM 5 MG/ML
500 INJECTION INTRAVASCULAR EVERY 12 HOURS SCHEDULED
Status: DISCONTINUED | OUTPATIENT
Start: 2022-01-01 | End: 2022-01-01

## 2022-01-01 RX ORDER — ATORVASTATIN CALCIUM 80 MG/1
80 TABLET, FILM COATED ORAL NIGHTLY
Qty: 30 TABLET | Refills: 3 | Status: SHIPPED | OUTPATIENT
Start: 2022-01-01

## 2022-01-01 RX ORDER — BUDESONIDE AND FORMOTEROL FUMARATE DIHYDRATE 160; 4.5 UG/1; UG/1
2 AEROSOL RESPIRATORY (INHALATION) 2 TIMES DAILY
Qty: 10.2 G | Refills: 3 | Status: SHIPPED | OUTPATIENT
Start: 2022-01-01

## 2022-01-01 RX ORDER — NYSTATIN 100000 [USP'U]/G
POWDER TOPICAL
DISCHARGE
Start: 2022-01-01

## 2022-01-01 RX ORDER — FAMOTIDINE 20 MG/1
20 TABLET, FILM COATED ORAL
Start: 2022-01-01

## 2022-01-01 RX ORDER — ACETAMINOPHEN 160 MG
1 TABLET,DISINTEGRATING ORAL NIGHTLY
Qty: 30 CAPSULE | Refills: 0
Start: 2022-01-01 | End: 2022-01-01

## 2022-01-01 RX ORDER — ARFORMOTEROL TARTRATE 15 UG/2ML
15 SOLUTION RESPIRATORY (INHALATION) 2 TIMES DAILY
Status: DISCONTINUED | OUTPATIENT
Start: 2022-01-01 | End: 2022-01-01 | Stop reason: HOSPADM

## 2022-01-01 RX ORDER — SODIUM CHLORIDE 0.9 % (FLUSH) 0.9 %
5-40 SYRINGE (ML) INJECTION EVERY 12 HOURS SCHEDULED
Status: DISCONTINUED | OUTPATIENT
Start: 2022-01-01 | End: 2022-01-01 | Stop reason: HOSPADM

## 2022-01-01 RX ORDER — ZINC SULFATE 50(220)MG
220 CAPSULE ORAL DAILY
Status: DISCONTINUED | OUTPATIENT
Start: 2022-01-01 | End: 2022-01-01

## 2022-01-01 RX ORDER — LORAZEPAM 2 MG/ML
1 INJECTION INTRAMUSCULAR EVERY 4 HOURS PRN
Status: ACTIVE | OUTPATIENT
Start: 2022-01-01 | End: 2022-01-01

## 2022-01-01 RX ORDER — SUCRALFATE 1 G/1
1 TABLET ORAL 4 TIMES DAILY
Qty: 120 TABLET | Refills: 5 | Status: SHIPPED | OUTPATIENT
Start: 2022-01-01

## 2022-01-01 RX ORDER — DOXEPIN HYDROCHLORIDE 75 MG/1
CAPSULE ORAL
Qty: 90 CAPSULE | Refills: 3 | Status: SHIPPED | OUTPATIENT
Start: 2022-01-01

## 2022-01-01 RX ORDER — LIDOCAINE HYDROCHLORIDE 20 MG/ML
15 SOLUTION OROPHARYNGEAL PRN
Qty: 100 ML | Refills: 1 | Status: SHIPPED | OUTPATIENT
Start: 2022-01-01 | End: 2022-01-01 | Stop reason: ALTCHOICE

## 2022-01-01 RX ORDER — LEVOTHYROXINE SODIUM 0.1 MG/1
100 TABLET ORAL DAILY
Status: DISCONTINUED | OUTPATIENT
Start: 2022-01-01 | End: 2022-01-01 | Stop reason: HOSPADM

## 2022-01-01 RX ORDER — HEPARIN SODIUM (PORCINE) LOCK FLUSH IV SOLN 100 UNIT/ML 100 UNIT/ML
5 SOLUTION INTRAVENOUS AS NEEDED
Status: DISCONTINUED | OUTPATIENT
Start: 2022-01-01 | End: 2022-01-01 | Stop reason: HOSPADM

## 2022-01-01 RX ORDER — LORAZEPAM 0.5 MG/1
0.5 TABLET ORAL EVERY 8 HOURS PRN
Qty: 9 TABLET | Refills: 0 | Status: SHIPPED | OUTPATIENT
Start: 2022-01-01 | End: 2022-01-01 | Stop reason: SDUPTHER

## 2022-01-01 RX ORDER — ONDANSETRON 2 MG/ML
4 INJECTION INTRAMUSCULAR; INTRAVENOUS EVERY 6 HOURS PRN
Status: DISCONTINUED | OUTPATIENT
Start: 2022-01-01 | End: 2022-01-01 | Stop reason: HOSPADM

## 2022-01-01 RX ORDER — CARVEDILOL 25 MG/1
25 TABLET ORAL 2 TIMES DAILY
Status: ON HOLD | COMMUNITY
End: 2022-01-01 | Stop reason: HOSPADM

## 2022-01-01 RX ORDER — AMOXICILLIN 250 MG
1 CAPSULE ORAL 2 TIMES DAILY
Status: DISCONTINUED | OUTPATIENT
Start: 2022-01-01 | End: 2022-01-01

## 2022-01-01 RX ORDER — POTASSIUM CHLORIDE 20 MEQ/1
40 TABLET, EXTENDED RELEASE ORAL ONCE
Status: DISCONTINUED | OUTPATIENT
Start: 2022-01-01 | End: 2022-01-01

## 2022-01-01 RX ORDER — SUCRALFATE 1 G/1
1 TABLET ORAL 4 TIMES DAILY
Status: DISCONTINUED | OUTPATIENT
Start: 2022-01-01 | End: 2022-01-01 | Stop reason: HOSPADM

## 2022-01-01 RX ORDER — FUROSEMIDE 10 MG/ML
20 INJECTION INTRAMUSCULAR; INTRAVENOUS 2 TIMES DAILY
Status: DISCONTINUED | OUTPATIENT
Start: 2022-01-01 | End: 2022-01-01

## 2022-01-01 RX ORDER — AMOXICILLIN 250 MG
2 CAPSULE ORAL 2 TIMES DAILY
Status: DISCONTINUED | OUTPATIENT
Start: 2022-01-01 | End: 2022-01-01 | Stop reason: HOSPADM

## 2022-01-01 RX ORDER — CARVEDILOL 12.5 MG/1
12.5 TABLET ORAL 2 TIMES DAILY WITH MEALS
Start: 2022-01-01

## 2022-01-01 RX ORDER — GUAIFENESIN 600 MG/1
600 TABLET, EXTENDED RELEASE ORAL 2 TIMES DAILY
Qty: 10 TABLET | Refills: 0
Start: 2022-01-01 | End: 2022-01-01

## 2022-01-01 RX ORDER — GUAIFENESIN 600 MG/1
600 TABLET, EXTENDED RELEASE ORAL DAILY
DISCHARGE
Start: 2022-01-01

## 2022-01-01 RX ORDER — SODIUM CHLORIDE 9 MG/ML
1000 INJECTION, SOLUTION INTRAVENOUS CONTINUOUS
Status: ACTIVE | OUTPATIENT
Start: 2022-01-01 | End: 2022-01-01

## 2022-01-01 RX ORDER — FUROSEMIDE 20 MG/1
20 TABLET ORAL 2 TIMES DAILY
Status: DISCONTINUED | OUTPATIENT
Start: 2022-01-01 | End: 2022-01-01 | Stop reason: HOSPADM

## 2022-01-01 RX ORDER — VANCOMYCIN HYDROCHLORIDE 125 MG/1
125 CAPSULE ORAL 4 TIMES DAILY
COMMUNITY

## 2022-01-01 RX ORDER — ENOXAPARIN SODIUM 100 MG/ML
40 INJECTION SUBCUTANEOUS
Status: DISCONTINUED | OUTPATIENT
Start: 2022-01-01 | End: 2022-01-01 | Stop reason: HOSPADM

## 2022-01-01 RX ORDER — POTASSIUM CHLORIDE 7.45 MG/ML
10 INJECTION INTRAVENOUS ONCE
Status: COMPLETED | OUTPATIENT
Start: 2022-01-01 | End: 2022-01-01

## 2022-01-01 RX ORDER — VENLAFAXINE HYDROCHLORIDE 75 MG/1
150 CAPSULE, EXTENDED RELEASE ORAL DAILY
Status: DISCONTINUED | OUTPATIENT
Start: 2022-01-01 | End: 2022-01-01 | Stop reason: HOSPADM

## 2022-01-01 RX ORDER — IPRATROPIUM BROMIDE AND ALBUTEROL SULFATE 2.5; .5 MG/3ML; MG/3ML
3 SOLUTION RESPIRATORY (INHALATION) EVERY 6 HOURS PRN
Status: DISCONTINUED | OUTPATIENT
Start: 2022-01-01 | End: 2022-01-01 | Stop reason: HOSPADM

## 2022-01-01 RX ORDER — LORAZEPAM 0.5 MG/1
0.5 TABLET ORAL 2 TIMES DAILY
Qty: 60 TABLET | Refills: 5 | Status: SHIPPED | OUTPATIENT
Start: 2022-01-01 | End: 2022-01-01 | Stop reason: SDUPTHER

## 2022-01-01 RX ORDER — LORAZEPAM 0.5 MG/1
0.5 TABLET ORAL EVERY 8 HOURS PRN
Status: DISCONTINUED | OUTPATIENT
Start: 2022-01-01 | End: 2022-01-01

## 2022-01-01 RX ORDER — GABAPENTIN 100 MG/1
200 CAPSULE ORAL 3 TIMES DAILY
Qty: 540 CAPSULE | Refills: 1 | Status: SHIPPED | OUTPATIENT
Start: 2022-01-01 | End: 2022-01-01 | Stop reason: SDUPTHER

## 2022-01-01 RX ADMIN — ENOXAPARIN SODIUM 40 MG: 100 INJECTION SUBCUTANEOUS at 14:31

## 2022-01-01 RX ADMIN — FAMOTIDINE 20 MG: 20 TABLET, FILM COATED ORAL at 19:03

## 2022-01-01 RX ADMIN — VENLAFAXINE HYDROCHLORIDE 150 MG: 75 CAPSULE, EXTENDED RELEASE ORAL at 12:03

## 2022-01-01 RX ADMIN — ACETAMINOPHEN 650 MG: 325 TABLET ORAL at 10:06

## 2022-01-01 RX ADMIN — FLUTICASONE PROPIONATE 2 SPRAY: 50 SPRAY, METERED NASAL at 08:49

## 2022-01-01 RX ADMIN — LEVOTHYROXINE SODIUM 100 MCG: 100 TABLET ORAL at 08:47

## 2022-01-01 RX ADMIN — DEXAMETHASONE SODIUM PHOSPHATE 6 MG: 4 INJECTION, SOLUTION INTRA-ARTICULAR; INTRALESIONAL; INTRAMUSCULAR; INTRAVENOUS; SOFT TISSUE at 10:06

## 2022-01-01 RX ADMIN — FAMOTIDINE 20 MG: 10 INJECTION INTRAVENOUS at 19:43

## 2022-01-01 RX ADMIN — BUDESONIDE AND FORMOTEROL FUMARATE DIHYDRATE 2 PUFF: 160; 4.5 AEROSOL RESPIRATORY (INHALATION) at 06:26

## 2022-01-01 RX ADMIN — SUCRALFATE 1 G: 1 TABLET ORAL at 18:04

## 2022-01-01 RX ADMIN — ASPIRIN 81 MG 81 MG: 81 TABLET ORAL at 08:28

## 2022-01-01 RX ADMIN — LEVETIRACETAM 500 MG: 100 SOLUTION ORAL at 13:49

## 2022-01-01 RX ADMIN — Medication 125 MG: at 07:04

## 2022-01-01 RX ADMIN — CARVEDILOL 25 MG: 25 TABLET, FILM COATED ORAL at 12:04

## 2022-01-01 RX ADMIN — LEVETIRACETAM 500 MG: 500 TABLET, FILM COATED ORAL at 20:51

## 2022-01-01 RX ADMIN — SUCRALFATE 1 G: 1 TABLET ORAL at 17:25

## 2022-01-01 RX ADMIN — BUDESONIDE AND FORMOTEROL FUMARATE DIHYDRATE 2 PUFF: 160; 4.5 AEROSOL RESPIRATORY (INHALATION) at 20:33

## 2022-01-01 RX ADMIN — SODIUM CHLORIDE, PRESERVATIVE FREE 10 ML: 5 INJECTION INTRAVENOUS at 10:05

## 2022-01-01 RX ADMIN — ENOXAPARIN SODIUM 40 MG: 100 INJECTION SUBCUTANEOUS at 10:18

## 2022-01-01 RX ADMIN — LEVETIRACETAM 500 MG: 500 TABLET, FILM COATED ORAL at 09:56

## 2022-01-01 RX ADMIN — MAGNESIUM SULFATE HEPTAHYDRATE 2000 MG: 40 INJECTION, SOLUTION INTRAVENOUS at 03:47

## 2022-01-01 RX ADMIN — ENOXAPARIN SODIUM 40 MG: 100 INJECTION SUBCUTANEOUS at 21:34

## 2022-01-01 RX ADMIN — SUCRALFATE 1 G: 1 TABLET ORAL at 21:51

## 2022-01-01 RX ADMIN — Medication 1000 UNITS: at 13:15

## 2022-01-01 RX ADMIN — FUROSEMIDE 20 MG: 10 INJECTION, SOLUTION INTRAMUSCULAR; INTRAVENOUS at 17:25

## 2022-01-01 RX ADMIN — SUCRALFATE 1 G: 1 TABLET ORAL at 12:38

## 2022-01-01 RX ADMIN — FERROUS SULFATE TAB 325 MG (65 MG ELEMENTAL FE) 325 MG: 325 (65 FE) TAB at 08:34

## 2022-01-01 RX ADMIN — BUDESONIDE AND FORMOTEROL FUMARATE DIHYDRATE 2 PUFF: 160; 4.5 AEROSOL RESPIRATORY (INHALATION) at 07:10

## 2022-01-01 RX ADMIN — SUCRALFATE 1 G: 1 TABLET ORAL at 08:47

## 2022-01-01 RX ADMIN — BUDESONIDE 500 MCG: 0.5 SUSPENSION RESPIRATORY (INHALATION) at 18:15

## 2022-01-01 RX ADMIN — AMLODIPINE BESYLATE 5 MG: 5 TABLET ORAL at 09:57

## 2022-01-01 RX ADMIN — SUCRALFATE 1 G: 1 TABLET ORAL at 11:48

## 2022-01-01 RX ADMIN — FERROUS SULFATE TAB 325 MG (65 MG ELEMENTAL FE) 325 MG: 325 (65 FE) TAB at 08:47

## 2022-01-01 RX ADMIN — ZINC SULFATE 220 MG (50 MG) CAPSULE 220 MG: CAPSULE at 10:07

## 2022-01-01 RX ADMIN — DOCUSATE SODIUM 50 MG AND SENNOSIDES 8.6 MG 1 TABLET: 8.6; 5 TABLET, FILM COATED ORAL at 09:56

## 2022-01-01 RX ADMIN — LEVETIRACETAM 500 MG: 5 INJECTION INTRAVASCULAR at 21:03

## 2022-01-01 RX ADMIN — ARFORMOTEROL TARTRATE 15 MCG: 15 SOLUTION RESPIRATORY (INHALATION) at 07:03

## 2022-01-01 RX ADMIN — OXYCODONE HYDROCHLORIDE AND ACETAMINOPHEN 500 MG: 500 TABLET ORAL at 10:16

## 2022-01-01 RX ADMIN — MAGNESIUM SULFATE IN DEXTROSE 1000 MG: 10 INJECTION, SOLUTION INTRAVENOUS at 11:53

## 2022-01-01 RX ADMIN — Medication 1000 UNITS: at 09:06

## 2022-01-01 RX ADMIN — Medication 2000 UNITS: at 22:24

## 2022-01-01 RX ADMIN — FAMOTIDINE 20 MG: 10 INJECTION INTRAVENOUS at 08:23

## 2022-01-01 RX ADMIN — POLYETHYLENE GLYCOL 3350 17 G: 17 POWDER, FOR SOLUTION ORAL at 09:58

## 2022-01-01 RX ADMIN — ARFORMOTEROL TARTRATE 15 MCG: 15 SOLUTION RESPIRATORY (INHALATION) at 18:15

## 2022-01-01 RX ADMIN — Medication 125 MG: at 05:47

## 2022-01-01 RX ADMIN — LORAZEPAM 1 MG: 2 INJECTION INTRAMUSCULAR at 11:41

## 2022-01-01 RX ADMIN — DOXEPIN HYDROCHLORIDE 75 MG: 50 CAPSULE ORAL at 21:52

## 2022-01-01 RX ADMIN — LOSARTAN POTASSIUM 50 MG: 50 TABLET, FILM COATED ORAL at 10:07

## 2022-01-01 RX ADMIN — LOSARTAN POTASSIUM 25 MG: 25 TABLET, FILM COATED ORAL at 08:53

## 2022-01-01 RX ADMIN — CEFTRIAXONE SODIUM 1 G: 1 INJECTION, POWDER, FOR SOLUTION INTRAMUSCULAR; INTRAVENOUS at 21:03

## 2022-01-01 RX ADMIN — LIDOCAINE 1 PATCH: 50 PATCH CUTANEOUS at 10:18

## 2022-01-01 RX ADMIN — LEVETIRACETAM 500 MG: 100 SOLUTION ORAL at 22:32

## 2022-01-01 RX ADMIN — SODIUM CHLORIDE: 9 INJECTION, SOLUTION INTRAVENOUS at 14:45

## 2022-01-01 RX ADMIN — SODIUM CHLORIDE 1000 ML: 9 INJECTION, SOLUTION INTRAVENOUS at 12:10

## 2022-01-01 RX ADMIN — LEVETIRACETAM 500 MG: 5 INJECTION INTRAVASCULAR at 22:37

## 2022-01-01 RX ADMIN — BUDESONIDE 500 MCG: 0.5 SUSPENSION RESPIRATORY (INHALATION) at 18:40

## 2022-01-01 RX ADMIN — REGADENOSON 0.4 MG: 0.08 INJECTION, SOLUTION INTRAVENOUS at 14:51

## 2022-01-01 RX ADMIN — ACETAMINOPHEN 650 MG: 325 TABLET ORAL at 02:42

## 2022-01-01 RX ADMIN — GUAIFENESIN 600 MG: 600 TABLET ORAL at 08:29

## 2022-01-01 RX ADMIN — FUROSEMIDE 20 MG: 10 INJECTION, SOLUTION INTRAMUSCULAR; INTRAVENOUS at 17:32

## 2022-01-01 RX ADMIN — FAMOTIDINE 20 MG: 10 INJECTION INTRAVENOUS at 10:07

## 2022-01-01 RX ADMIN — FAMOTIDINE 20 MG: 20 TABLET, FILM COATED ORAL at 18:05

## 2022-01-01 RX ADMIN — ACETAMINOPHEN 650 MG: 325 TABLET ORAL at 16:45

## 2022-01-01 RX ADMIN — CEFTRIAXONE SODIUM 1 G: 1 INJECTION, POWDER, FOR SOLUTION INTRAMUSCULAR; INTRAVENOUS at 19:43

## 2022-01-01 RX ADMIN — ZINC SULFATE 220 MG (50 MG) CAPSULE 220 MG: CAPSULE at 09:06

## 2022-01-01 RX ADMIN — Medication 125 MG: at 12:29

## 2022-01-01 RX ADMIN — TETROFOSMIN 8 MILLICURIE: 1.38 INJECTION, POWDER, LYOPHILIZED, FOR SOLUTION INTRAVENOUS at 14:27

## 2022-01-01 RX ADMIN — Medication 125 MG: at 00:10

## 2022-01-01 RX ADMIN — FLUTICASONE PROPIONATE 2 SPRAY: 50 SPRAY, METERED NASAL at 08:21

## 2022-01-01 RX ADMIN — GUAIFENESIN 600 MG: 600 TABLET ORAL at 10:05

## 2022-01-01 RX ADMIN — GADOBENATE DIMEGLUMINE 15 ML: 529 INJECTION, SOLUTION INTRAVENOUS at 11:22

## 2022-01-01 RX ADMIN — LEVETIRACETAM 500 MG: 500 TABLET, FILM COATED ORAL at 20:42

## 2022-01-01 RX ADMIN — FAMOTIDINE 20 MG: 20 TABLET, FILM COATED ORAL at 10:17

## 2022-01-01 RX ADMIN — BUDESONIDE AND FORMOTEROL FUMARATE DIHYDRATE 2 PUFF: 160; 4.5 AEROSOL RESPIRATORY (INHALATION) at 06:08

## 2022-01-01 RX ADMIN — LOSARTAN POTASSIUM 50 MG: 50 TABLET, FILM COATED ORAL at 09:56

## 2022-01-01 RX ADMIN — DEXAMETHASONE 6 MG: 4 TABLET ORAL at 10:17

## 2022-01-01 RX ADMIN — BUDESONIDE AND FORMOTEROL FUMARATE DIHYDRATE 2 PUFF: 160; 4.5 AEROSOL RESPIRATORY (INHALATION) at 07:08

## 2022-01-01 RX ADMIN — BUDESONIDE 500 MCG: 0.5 SUSPENSION RESPIRATORY (INHALATION) at 06:43

## 2022-01-01 RX ADMIN — Medication 125 MG: at 18:04

## 2022-01-01 RX ADMIN — SUCRALFATE 1 G: 1 TABLET ORAL at 22:00

## 2022-01-01 RX ADMIN — ENOXAPARIN SODIUM 40 MG: 100 INJECTION SUBCUTANEOUS at 14:00

## 2022-01-01 RX ADMIN — BISACODYL 10 MG: 10 SUPPOSITORY RECTAL at 13:08

## 2022-01-01 RX ADMIN — ATORVASTATIN CALCIUM 80 MG: 40 TABLET, FILM COATED ORAL at 21:51

## 2022-01-01 RX ADMIN — OXYCODONE HYDROCHLORIDE AND ACETAMINOPHEN 500 MG: 500 TABLET ORAL at 09:06

## 2022-01-01 RX ADMIN — LOSARTAN POTASSIUM 50 MG: 50 TABLET, FILM COATED ORAL at 09:03

## 2022-01-01 RX ADMIN — BUDESONIDE AND FORMOTEROL FUMARATE DIHYDRATE 2 PUFF: 160; 4.5 AEROSOL RESPIRATORY (INHALATION) at 06:27

## 2022-01-01 RX ADMIN — Medication 125 MG: at 05:03

## 2022-01-01 RX ADMIN — AMLODIPINE BESYLATE 5 MG: 5 TABLET ORAL at 13:15

## 2022-01-01 RX ADMIN — BUDESONIDE 500 MCG: 0.5 SUSPENSION RESPIRATORY (INHALATION) at 06:22

## 2022-01-01 RX ADMIN — FAMOTIDINE 20 MG: 10 INJECTION INTRAVENOUS at 21:53

## 2022-01-01 RX ADMIN — POTASSIUM BICARBONATE 40 MEQ: 782 TABLET, EFFERVESCENT ORAL at 14:45

## 2022-01-01 RX ADMIN — DEXAMETHASONE 6 MG: 4 TABLET ORAL at 13:49

## 2022-01-01 RX ADMIN — LEVOTHYROXINE SODIUM 100 MCG: 100 TABLET ORAL at 08:28

## 2022-01-01 RX ADMIN — GUAIFENESIN 600 MG: 600 TABLET ORAL at 08:47

## 2022-01-01 RX ADMIN — ENOXAPARIN SODIUM 40 MG: 100 INJECTION SUBCUTANEOUS at 22:52

## 2022-01-01 RX ADMIN — FUROSEMIDE 20 MG: 10 INJECTION, SOLUTION INTRAMUSCULAR; INTRAVENOUS at 08:30

## 2022-01-01 RX ADMIN — POTASSIUM BICARBONATE 40 MEQ: 782 TABLET, EFFERVESCENT ORAL at 07:05

## 2022-01-01 RX ADMIN — MAGNESIUM SULFATE IN DEXTROSE 1000 MG: 10 INJECTION, SOLUTION INTRAVENOUS at 10:45

## 2022-01-01 RX ADMIN — SODIUM CHLORIDE 25 ML: 9 INJECTION, SOLUTION INTRAVENOUS at 07:08

## 2022-01-01 RX ADMIN — LIDOCAINE 1 PATCH: 50 PATCH CUTANEOUS at 09:59

## 2022-01-01 RX ADMIN — BISACODYL 10 MG: 10 SUPPOSITORY RECTAL at 19:03

## 2022-01-01 RX ADMIN — SODIUM CHLORIDE: 9 INJECTION, SOLUTION INTRAVENOUS at 00:34

## 2022-01-01 RX ADMIN — BUDESONIDE 500 MCG: 0.5 SUSPENSION RESPIRATORY (INHALATION) at 20:33

## 2022-01-01 RX ADMIN — LEVETIRACETAM 500 MG: 5 INJECTION INTRAVASCULAR at 20:16

## 2022-01-01 RX ADMIN — ACETAMINOPHEN 650 MG: 325 TABLET ORAL at 10:05

## 2022-01-01 RX ADMIN — LEVETIRACETAM 500 MG: 5 INJECTION INTRAVASCULAR at 09:06

## 2022-01-01 RX ADMIN — DEXAMETHASONE SODIUM PHOSPHATE 6 MG: 4 INJECTION, SOLUTION INTRA-ARTICULAR; INTRALESIONAL; INTRAMUSCULAR; INTRAVENOUS; SOFT TISSUE at 09:06

## 2022-01-01 RX ADMIN — Medication 2000 UNITS: at 21:51

## 2022-01-01 RX ADMIN — Medication 10 ML: at 13:15

## 2022-01-01 RX ADMIN — FAMOTIDINE 20 MG: 10 INJECTION INTRAVENOUS at 21:03

## 2022-01-01 RX ADMIN — ENOXAPARIN SODIUM 40 MG: 100 INJECTION SUBCUTANEOUS at 20:38

## 2022-01-01 RX ADMIN — LOSARTAN POTASSIUM 50 MG: 50 TABLET, FILM COATED ORAL at 13:15

## 2022-01-01 RX ADMIN — LEVETIRACETAM 500 MG: 5 INJECTION INTRAVASCULAR at 20:38

## 2022-01-01 RX ADMIN — ENOXAPARIN SODIUM 40 MG: 100 INJECTION SUBCUTANEOUS at 09:57

## 2022-01-01 RX ADMIN — VENLAFAXINE HYDROCHLORIDE 150 MG: 75 CAPSULE, EXTENDED RELEASE ORAL at 08:47

## 2022-01-01 RX ADMIN — BUDESONIDE 500 MCG: 0.5 SUSPENSION RESPIRATORY (INHALATION) at 07:03

## 2022-01-01 RX ADMIN — LIDOCAINE 1 PATCH: 50 PATCH CUTANEOUS at 10:09

## 2022-01-01 RX ADMIN — ARFORMOTEROL TARTRATE 15 MCG: 15 SOLUTION RESPIRATORY (INHALATION) at 20:33

## 2022-01-01 RX ADMIN — FUROSEMIDE 40 MG: 10 INJECTION, SOLUTION INTRAMUSCULAR; INTRAVENOUS at 00:10

## 2022-01-01 RX ADMIN — ALTEPLASE 2 MG: 2.2 INJECTION, POWDER, LYOPHILIZED, FOR SOLUTION INTRAVENOUS at 18:21

## 2022-01-01 RX ADMIN — SUCRALFATE 1 G: 1 TABLET ORAL at 10:05

## 2022-01-01 RX ADMIN — CEFTRIAXONE SODIUM 1 G: 1 INJECTION, POWDER, FOR SOLUTION INTRAMUSCULAR; INTRAVENOUS at 20:39

## 2022-01-01 RX ADMIN — Medication 1000 UNITS: at 10:06

## 2022-01-01 RX ADMIN — SODIUM CHLORIDE: 9 INJECTION, SOLUTION INTRAVENOUS at 08:18

## 2022-01-01 RX ADMIN — CLONIDINE HYDROCHLORIDE 0.1 MG: 0.1 TABLET ORAL at 21:52

## 2022-01-01 RX ADMIN — Medication 2000 UNITS: at 22:00

## 2022-01-01 RX ADMIN — MAGNESIUM SULFATE HEPTAHYDRATE 4 G: 40 INJECTION, SOLUTION INTRAVENOUS at 17:34

## 2022-01-01 RX ADMIN — ARFORMOTEROL TARTRATE 15 MCG: 15 SOLUTION RESPIRATORY (INHALATION) at 18:40

## 2022-01-01 RX ADMIN — PANTOPRAZOLE SODIUM 40 MG: 40 TABLET, DELAYED RELEASE ORAL at 07:05

## 2022-01-01 RX ADMIN — Medication 125 MG: at 00:30

## 2022-01-01 RX ADMIN — ONDANSETRON 4 MG: 2 INJECTION INTRAMUSCULAR; INTRAVENOUS at 21:40

## 2022-01-01 RX ADMIN — SUCRALFATE 1 G: 1 TABLET ORAL at 08:29

## 2022-01-01 RX ADMIN — IOPAMIDOL 95 ML: 612 INJECTION, SOLUTION INTRAVENOUS at 11:30

## 2022-01-01 RX ADMIN — Medication 125 MG: at 17:25

## 2022-01-01 RX ADMIN — ARFORMOTEROL TARTRATE 15 MCG: 15 SOLUTION RESPIRATORY (INHALATION) at 06:43

## 2022-01-01 RX ADMIN — SODIUM CHLORIDE 50 ML/HR: 9 INJECTION, SOLUTION INTRAVENOUS at 14:43

## 2022-01-01 RX ADMIN — LEVETIRACETAM 500 MG: 500 TABLET, FILM COATED ORAL at 08:32

## 2022-01-01 RX ADMIN — Medication 1000 UNITS: at 10:17

## 2022-01-01 RX ADMIN — LEVETIRACETAM 500 MG: 5 INJECTION INTRAVASCULAR at 00:34

## 2022-01-01 RX ADMIN — FAMOTIDINE 20 MG: 20 TABLET, FILM COATED ORAL at 09:56

## 2022-01-01 RX ADMIN — LOSARTAN POTASSIUM 50 MG: 50 TABLET, FILM COATED ORAL at 10:16

## 2022-01-01 RX ADMIN — FLUTICASONE PROPIONATE 2 SPRAY: 50 SPRAY, METERED NASAL at 10:33

## 2022-01-01 RX ADMIN — Medication 125 MG: at 12:05

## 2022-01-01 RX ADMIN — BUDESONIDE AND FORMOTEROL FUMARATE DIHYDRATE 2 PUFF: 160; 4.5 AEROSOL RESPIRATORY (INHALATION) at 19:18

## 2022-01-01 RX ADMIN — FUROSEMIDE 40 MG: 10 INJECTION, SOLUTION INTRAMUSCULAR; INTRAVENOUS at 10:33

## 2022-01-01 RX ADMIN — LOSARTAN POTASSIUM 25 MG: 25 TABLET, FILM COATED ORAL at 12:03

## 2022-01-01 RX ADMIN — LORAZEPAM 0.5 MG: 0.5 TABLET ORAL at 14:46

## 2022-01-01 RX ADMIN — SODIUM CHLORIDE, PRESERVATIVE FREE 10 ML: 5 INJECTION INTRAVENOUS at 08:22

## 2022-01-01 RX ADMIN — ZINC SULFATE 220 MG (50 MG) CAPSULE 220 MG: CAPSULE at 13:15

## 2022-01-01 RX ADMIN — DEXAMETHASONE SODIUM PHOSPHATE 6 MG: 4 INJECTION, SOLUTION INTRA-ARTICULAR; INTRALESIONAL; INTRAMUSCULAR; INTRAVENOUS; SOFT TISSUE at 13:15

## 2022-01-01 RX ADMIN — LEVETIRACETAM 500 MG: 500 TABLET, FILM COATED ORAL at 10:28

## 2022-01-01 RX ADMIN — LIDOCAINE HYDROCHLORIDE 1 G: 10 INJECTION, SOLUTION EPIDURAL; INFILTRATION; INTRACAUDAL; PERINEURAL at 22:32

## 2022-01-01 RX ADMIN — PANTOPRAZOLE SODIUM 40 MG: 40 TABLET, DELAYED RELEASE ORAL at 05:48

## 2022-01-01 RX ADMIN — TETROFOSMIN 24 MILLICURIE: 1.38 INJECTION, POWDER, LYOPHILIZED, FOR SOLUTION INTRAVENOUS at 14:27

## 2022-01-01 RX ADMIN — LIDOCAINE 1 PATCH: 50 PATCH CUTANEOUS at 19:36

## 2022-01-01 RX ADMIN — POLYETHYLENE GLYCOL 3350 17 G: 17 POWDER, FOR SOLUTION ORAL at 08:33

## 2022-01-01 RX ADMIN — LEVETIRACETAM 500 MG: 500 TABLET, FILM COATED ORAL at 19:56

## 2022-01-01 RX ADMIN — VENLAFAXINE HYDROCHLORIDE 150 MG: 75 CAPSULE, EXTENDED RELEASE ORAL at 08:28

## 2022-01-01 RX ADMIN — BUDESONIDE AND FORMOTEROL FUMARATE DIHYDRATE 2 PUFF: 160; 4.5 AEROSOL RESPIRATORY (INHALATION) at 07:33

## 2022-01-01 RX ADMIN — SODIUM CHLORIDE, PRESERVATIVE FREE 10 ML: 5 INJECTION INTRAVENOUS at 22:03

## 2022-01-01 RX ADMIN — CARVEDILOL 25 MG: 25 TABLET, FILM COATED ORAL at 21:52

## 2022-01-01 RX ADMIN — AMLODIPINE BESYLATE 5 MG: 5 TABLET ORAL at 10:16

## 2022-01-01 RX ADMIN — SODIUM CHLORIDE 50 ML/HR: 9 INJECTION, SOLUTION INTRAVENOUS at 09:58

## 2022-01-01 RX ADMIN — LEVETIRACETAM 500 MG: 5 INJECTION INTRAVASCULAR at 08:24

## 2022-01-01 RX ADMIN — Medication 1000 UNITS: at 09:56

## 2022-01-01 RX ADMIN — DEXAMETHASONE SODIUM PHOSPHATE 6 MG: 4 INJECTION, SOLUTION INTRA-ARTICULAR; INTRALESIONAL; INTRAMUSCULAR; INTRAVENOUS; SOFT TISSUE at 08:24

## 2022-01-01 RX ADMIN — Medication 125 MG: at 11:48

## 2022-01-01 RX ADMIN — DOCUSATE SODIUM 50 MG AND SENNOSIDES 8.6 MG 1 TABLET: 8.6; 5 TABLET, FILM COATED ORAL at 08:34

## 2022-01-01 RX ADMIN — FLUTICASONE PROPIONATE 2 SPRAY: 50 SPRAY, METERED NASAL at 08:30

## 2022-01-01 RX ADMIN — VENLAFAXINE HYDROCHLORIDE 150 MG: 75 CAPSULE, EXTENDED RELEASE ORAL at 10:05

## 2022-01-01 RX ADMIN — ARFORMOTEROL TARTRATE 15 MCG: 15 SOLUTION RESPIRATORY (INHALATION) at 18:10

## 2022-01-01 RX ADMIN — Medication 1000 UNITS: at 08:32

## 2022-01-01 RX ADMIN — Medication 125 MG: at 22:23

## 2022-01-01 RX ADMIN — DOCUSATE SODIUM 50 MG AND SENNOSIDES 8.6 MG 2 TABLET: 8.6; 5 TABLET, FILM COATED ORAL at 19:56

## 2022-01-01 RX ADMIN — ENOXAPARIN SODIUM 40 MG: 100 INJECTION SUBCUTANEOUS at 08:37

## 2022-01-01 RX ADMIN — SUCRALFATE 1 G: 1 TABLET ORAL at 18:21

## 2022-01-01 RX ADMIN — ZINC SULFATE 220 MG (50 MG) CAPSULE 220 MG: CAPSULE at 10:16

## 2022-01-01 RX ADMIN — Medication 500 UNITS: at 16:26

## 2022-01-01 RX ADMIN — SUCRALFATE 1 G: 1 TABLET ORAL at 17:32

## 2022-01-01 RX ADMIN — SUCRALFATE 1 G: 1 TABLET ORAL at 21:35

## 2022-01-01 RX ADMIN — FAMOTIDINE 20 MG: 20 TABLET, FILM COATED ORAL at 06:09

## 2022-01-01 RX ADMIN — SODIUM CHLORIDE, PRESERVATIVE FREE 10 ML: 5 INJECTION INTRAVENOUS at 21:36

## 2022-01-01 RX ADMIN — ENOXAPARIN SODIUM 40 MG: 100 INJECTION SUBCUTANEOUS at 14:47

## 2022-01-01 RX ADMIN — GUAIFENESIN 600 MG: 600 TABLET ORAL at 14:47

## 2022-01-01 RX ADMIN — AMLODIPINE BESYLATE 5 MG: 5 TABLET ORAL at 20:17

## 2022-01-01 RX ADMIN — ATORVASTATIN CALCIUM 80 MG: 40 TABLET, FILM COATED ORAL at 22:00

## 2022-01-01 RX ADMIN — FUROSEMIDE 20 MG: 10 INJECTION, SOLUTION INTRAMUSCULAR; INTRAVENOUS at 12:04

## 2022-01-01 RX ADMIN — ARFORMOTEROL TARTRATE 15 MCG: 15 SOLUTION RESPIRATORY (INHALATION) at 06:22

## 2022-01-01 RX ADMIN — DOCUSATE SODIUM 50 MG AND SENNOSIDES 8.6 MG 1 TABLET: 8.6; 5 TABLET, FILM COATED ORAL at 20:42

## 2022-01-01 RX ADMIN — CARVEDILOL 25 MG: 25 TABLET, FILM COATED ORAL at 22:24

## 2022-01-01 RX ADMIN — AMLODIPINE BESYLATE 5 MG: 5 TABLET ORAL at 10:07

## 2022-01-01 RX ADMIN — MAGNESIUM SULFATE IN DEXTROSE 1000 MG: 10 INJECTION, SOLUTION INTRAVENOUS at 08:24

## 2022-01-01 RX ADMIN — MAGNESIUM SULFATE IN DEXTROSE 1 G: 10 INJECTION, SOLUTION INTRAVENOUS at 09:47

## 2022-01-01 RX ADMIN — FAMOTIDINE 20 MG: 10 INJECTION INTRAVENOUS at 09:06

## 2022-01-01 RX ADMIN — LEVETIRACETAM 500 MG: 5 INJECTION INTRAVASCULAR at 13:14

## 2022-01-01 RX ADMIN — LIDOCAINE 1 PATCH: 50 PATCH CUTANEOUS at 08:32

## 2022-01-01 RX ADMIN — SODIUM CHLORIDE, POTASSIUM CHLORIDE, SODIUM LACTATE AND CALCIUM CHLORIDE 500 ML: 600; 310; 30; 20 INJECTION, SOLUTION INTRAVENOUS at 18:22

## 2022-01-01 RX ADMIN — BUDESONIDE 500 MCG: 0.5 SUSPENSION RESPIRATORY (INHALATION) at 18:10

## 2022-01-01 RX ADMIN — OXYCODONE HYDROCHLORIDE AND ACETAMINOPHEN 500 MG: 500 TABLET ORAL at 10:06

## 2022-01-01 RX ADMIN — PANTOPRAZOLE SODIUM 40 MG: 40 TABLET, DELAYED RELEASE ORAL at 08:53

## 2022-01-01 RX ADMIN — CARVEDILOL 12.5 MG: 6.25 TABLET, FILM COATED ORAL at 16:27

## 2022-01-01 RX ADMIN — ATORVASTATIN CALCIUM 80 MG: 40 TABLET, FILM COATED ORAL at 22:24

## 2022-01-01 RX ADMIN — BUDESONIDE AND FORMOTEROL FUMARATE DIHYDRATE 2 PUFF: 160; 4.5 AEROSOL RESPIRATORY (INHALATION) at 18:28

## 2022-01-01 RX ADMIN — IOPAMIDOL 70 ML: 755 INJECTION, SOLUTION INTRAVENOUS at 14:22

## 2022-01-01 RX ADMIN — BUDESONIDE AND FORMOTEROL FUMARATE DIHYDRATE 2 PUFF: 160; 4.5 AEROSOL RESPIRATORY (INHALATION) at 18:58

## 2022-01-01 RX ADMIN — ACETAMINOPHEN 650 MG: 325 TABLET ORAL at 20:51

## 2022-01-01 RX ADMIN — CARVEDILOL 25 MG: 25 TABLET, FILM COATED ORAL at 08:47

## 2022-01-01 RX ADMIN — BUDESONIDE AND FORMOTEROL FUMARATE DIHYDRATE 2 PUFF: 160; 4.5 AEROSOL RESPIRATORY (INHALATION) at 19:02

## 2022-01-01 RX ADMIN — AMLODIPINE BESYLATE 5 MG: 5 TABLET ORAL at 09:03

## 2022-01-01 RX ADMIN — CARVEDILOL 25 MG: 25 TABLET, FILM COATED ORAL at 08:29

## 2022-01-01 RX ADMIN — FAMOTIDINE 20 MG: 10 INJECTION INTRAVENOUS at 20:39

## 2022-01-01 RX ADMIN — LOSARTAN POTASSIUM 50 MG: 50 TABLET, FILM COATED ORAL at 20:17

## 2022-01-01 RX ADMIN — LEVETIRACETAM 500 MG: 5 INJECTION INTRAVASCULAR at 10:06

## 2022-01-01 RX ADMIN — Medication 125 MG: at 18:21

## 2022-01-01 RX ADMIN — BUDESONIDE AND FORMOTEROL FUMARATE DIHYDRATE 2 PUFF: 160; 4.5 AEROSOL RESPIRATORY (INHALATION) at 20:13

## 2022-01-01 RX ADMIN — PERFLUTREN 1.5 ML: 6.52 INJECTION, SUSPENSION INTRAVENOUS at 08:39

## 2022-01-01 RX ADMIN — SODIUM CHLORIDE, PRESERVATIVE FREE 10 ML: 5 INJECTION INTRAVENOUS at 21:51

## 2022-01-01 RX ADMIN — Medication 1000 UNITS: at 10:07

## 2022-01-01 RX ADMIN — MAGNESIUM SULFATE HEPTAHYDRATE 2000 MG: 40 INJECTION, SOLUTION INTRAVENOUS at 05:58

## 2022-01-01 RX ADMIN — MAGNESIUM SULFATE HEPTAHYDRATE 2 G: 40 INJECTION, SOLUTION INTRAVENOUS at 11:40

## 2022-01-01 RX ADMIN — POLYETHYLENE GLYCOL 3350 17 G: 17 POWDER, FOR SOLUTION ORAL at 19:02

## 2022-01-01 RX ADMIN — LEVOTHYROXINE SODIUM 100 MCG: 100 TABLET ORAL at 12:04

## 2022-01-01 RX ADMIN — CEFTRIAXONE SODIUM 1 G: 1 INJECTION, POWDER, FOR SOLUTION INTRAMUSCULAR; INTRAVENOUS at 21:53

## 2022-01-01 RX ADMIN — OXYCODONE HYDROCHLORIDE AND ACETAMINOPHEN 500 MG: 500 TABLET ORAL at 10:07

## 2022-01-01 RX ADMIN — DOXEPIN HYDROCHLORIDE 75 MG: 50 CAPSULE ORAL at 22:24

## 2022-01-01 RX ADMIN — FAMOTIDINE 20 MG: 10 INJECTION INTRAVENOUS at 13:15

## 2022-01-01 RX ADMIN — MAGNESIUM SULFATE IN DEXTROSE 1000 MG: 10 INJECTION, SOLUTION INTRAVENOUS at 07:09

## 2022-01-01 RX ADMIN — Medication 125 MG: at 17:32

## 2022-01-01 RX ADMIN — FUROSEMIDE 20 MG: 10 INJECTION, SOLUTION INTRAMUSCULAR; INTRAVENOUS at 08:47

## 2022-01-01 RX ADMIN — Medication 2000 UNITS: at 21:35

## 2022-01-01 RX ADMIN — SUCRALFATE 1 G: 1 TABLET ORAL at 12:04

## 2022-01-01 RX ADMIN — BUDESONIDE AND FORMOTEROL FUMARATE DIHYDRATE 2 PUFF: 160; 4.5 AEROSOL RESPIRATORY (INHALATION) at 19:07

## 2022-01-01 RX ADMIN — MAGNESIUM SULFATE HEPTAHYDRATE 2 G: 40 INJECTION, SOLUTION INTRAVENOUS at 10:06

## 2022-01-01 RX ADMIN — ENOXAPARIN SODIUM 40 MG: 100 INJECTION SUBCUTANEOUS at 10:11

## 2022-01-01 RX ADMIN — ASPIRIN 81 MG 81 MG: 81 TABLET ORAL at 10:05

## 2022-01-01 RX ADMIN — Medication 125 MG: at 23:46

## 2022-01-01 RX ADMIN — LEVOTHYROXINE SODIUM 100 MCG: 100 TABLET ORAL at 10:05

## 2022-01-01 RX ADMIN — OXYCODONE HYDROCHLORIDE AND ACETAMINOPHEN 500 MG: 500 TABLET ORAL at 13:15

## 2022-01-01 RX ADMIN — Medication 10 ML: at 09:57

## 2022-01-01 RX ADMIN — FAMOTIDINE 20 MG: 20 TABLET, FILM COATED ORAL at 16:40

## 2022-01-01 RX ADMIN — DOCUSATE SODIUM 50 MG AND SENNOSIDES 8.6 MG 1 TABLET: 8.6; 5 TABLET, FILM COATED ORAL at 20:51

## 2022-01-01 RX ADMIN — ASPIRIN 81 MG 81 MG: 81 TABLET ORAL at 08:47

## 2022-01-01 RX ADMIN — SODIUM CHLORIDE 50 ML/HR: 9 INJECTION, SOLUTION INTRAVENOUS at 07:38

## 2022-01-01 RX ADMIN — SUCRALFATE 1 G: 1 TABLET ORAL at 22:24

## 2022-01-01 RX ADMIN — DOXEPIN HYDROCHLORIDE 75 MG: 50 CAPSULE ORAL at 22:00

## 2022-01-01 ASSESSMENT — ENCOUNTER SYMPTOMS
SHORTNESS OF BREATH: 0
WHEEZING: 0
SHORTNESS OF BREATH: 1
DIARRHEA: 0
ABDOMINAL PAIN: 0
COUGH: 1
GASTROINTESTINAL NEGATIVE: 1
SHORTNESS OF BREATH: 1
WHEEZING: 0
SORE THROAT: 0
NAUSEA: 1
GASTROINTESTINAL NEGATIVE: 1
DIARRHEA: 1
TROUBLE SWALLOWING: 0
NAUSEA: 1
BACK PAIN: 0
VOMITING: 0
ALLERGIC/IMMUNOLOGIC NEGATIVE: 1
NAUSEA: 0
RESPIRATORY NEGATIVE: 1
EYES NEGATIVE: 1
SORE THROAT: 0
EYES NEGATIVE: 1
RESPIRATORY NEGATIVE: 1
DIARRHEA: 1
VOMITING: 0
BACK PAIN: 0
DIARRHEA: 1
COUGH: 1
COUGH: 1
TROUBLE SWALLOWING: 0
ABDOMINAL PAIN: 0
TROUBLE SWALLOWING: 0
NAUSEA: 1
VOMITING: 0

## 2022-01-04 NOTE — PROGRESS NOTES
Subjective    Ms. Ivy is 76 y.o. female    Chief Complaint: History of Urinary Tract Infection with Hematuria.    History of Present Illness  UTI  Patient is here for recurrent UTI.  The infections have been occurring for 3months.  Context of the infections recurrent.  Type of UTI is Acute cystitis Patient has had a few infections in the last year.  Of the diagnosed infections, 3 have been culture proven.  Onset has been gradual. Course of the symptoms has been stable .  Associated symptoms include dysuria, urgency and frequency and incontinence.  The patient has tried  antibiotics  temporarily effective for management.   Previous  urologic procedures none.  Previous workup includes urinalysis and urine culture. Also with incontinence and some hesitancy.    The following portions of the patient's history were reviewed and updated as appropriate: allergies, current medications, past family history, past medical history, past social history, past surgical history and problem list.    Review of Systems   Constitutional: Negative for appetite change, diaphoresis and fever.   HENT: Negative for facial swelling and sore throat.    Eyes: Negative for discharge and visual disturbance.   Respiratory: Negative for cough and shortness of breath.    Cardiovascular: Negative for chest pain and leg swelling.   Gastrointestinal: Negative for anal bleeding and vomiting.   Endocrine: Negative for cold intolerance and heat intolerance.   Genitourinary: Positive for frequency. Negative for decreased urine volume, difficulty urinating, dyspareunia, dysuria, enuresis, flank pain, genital sores, hematuria, menstrual problem, pelvic pain, urgency, vaginal bleeding, vaginal discharge and vaginal pain.   Musculoskeletal: Negative for back pain and gait problem.   Skin: Negative for pallor and rash.   Allergic/Immunologic: Negative for immunocompromised state.   Neurological: Negative for seizures and headaches.   Hematological: Negative  for adenopathy. Does not bruise/bleed easily.   Psychiatric/Behavioral: Negative for dysphoric mood, self-injury and suicidal ideas.         Current Outpatient Medications:   •  acetaminophen (TYLENOL) 325 MG tablet, Take 650 mg by mouth Every 6 (Six) Hours As Needed for Mild Pain ., Disp: , Rfl:   •  amLODIPine (NORVASC) 5 MG tablet, Take 5 mg by mouth Daily., Disp: , Rfl:   •  carvedilol (COREG) 25 MG tablet, Take 25 mg by mouth 2 (Two) Times a Day., Disp: , Rfl:   •  cetirizine (zyrTEC) 10 MG tablet, Take 10 mg by mouth Daily., Disp: , Rfl:   •  chlorthalidone (HYGROTON) 25 MG tablet, Take 25 mg by mouth Daily., Disp: , Rfl:   •  CloNIDine (CATAPRES) 0.1 MG tablet, Take 0.1 mg by mouth 2 (Two) Times a Day., Disp: , Rfl:   •  dexamethasone 0.5 MG/5ML elixir, SWISH AND SPIT 5MLS BY MOUTH AND REPEAT FOUR TO FIVE TIMES A DAY, Disp: , Rfl:   •  doxepin (SINEquan) 75 MG capsule, Take 75 mg by mouth every night at bedtime., Disp: , Rfl:   •  Dulera 200-5 MCG/ACT inhaler, INHALE TWO PUFFS INTO THE LUNGS TWICE DAILY, Disp: , Rfl:   •  EPINEPHrine (EPIPEN 2-ABRIL) 0.3 MG/0.3ML solution auto-injector injection, Inject 0.3 mg into the muscle as needed. Use as directed for allergic reaction, Disp: , Rfl:   •  esomeprazole (nexIUM) 40 MG capsule, Take 40 mg by mouth., Disp: , Rfl:   •  ferrous sulfate 324 (65 Fe) MG tablet delayed-release EC tablet, TAKE ONE TABLET BY MOUTH DAILY, Disp: , Rfl:   •  fluticasone (FLONASE) 50 MCG/ACT nasal spray, 2 sprays into the nostril(s) as directed by provider Daily., Disp: , Rfl:   •  gabapentin (NEURONTIN) 100 MG capsule, Take 100 mg by mouth 3 (Three) Times a Day., Disp: , Rfl:   •  guaiFENesin (MUCINEX) 600 MG 12 hr tablet, Take 1,200 mg by mouth., Disp: , Rfl:   •  HYDROcodone-acetaminophen (NORCO) 7.5-325 MG per tablet, Take 1 tablet by mouth Every 4 (Four) Hours As Needed for Moderate Pain  (Pain)., Disp: 15 tablet, Rfl: 0  •  ipratropium-albuterol (DUO-NEB) 0.5-2.5 mg/3 ml nebulizer,  Inhale 3 mL., Disp: , Rfl:   •  LORazepam (ATIVAN) 0.5 MG tablet, Take 0.5 mg by mouth 3 (Three) Times a Day As Needed. for anxiety, Disp: , Rfl:   •  mometasone (NASONEX) 50 MCG/ACT nasal spray, 2 sprays into each nostril Daily., Disp: , Rfl:   •  olmesartan (BENICAR) 40 MG tablet, Take 40 mg by mouth Daily., Disp: , Rfl:   •  Omega-3 Fatty Acids (FISH OIL) 1000 MG capsule capsule, Take  by mouth Daily With Breakfast., Disp: , Rfl:   •  polyethylene glycol (MIRALAX) powder, Take 17 g by mouth As Needed., Disp: , Rfl:   •  prednisoLONE acetate (PRED FORTE) 1 % ophthalmic suspension, , Disp: , Rfl:   •  simvastatin (ZOCOR) 20 MG tablet, simvastatin 20 mg tablet, Disp: , Rfl:   •  sodium chloride 0.65 % nasal spray, 1 spray into the nostril(s) as directed by provider As Needed for Congestion., Disp: , Rfl:   •  sucralfate (CARAFATE) 1 GM/10ML suspension, Take 1 g by mouth 4 (Four) Times a Day., Disp: , Rfl:   •  venlafaxine XR (EFFEXOR-XR) 150 MG 24 hr capsule, Take 150 mg by mouth., Disp: , Rfl:   •  ciprofloxacin (Cipro) 250 MG tablet, 1 po q 24 hours for 7 days, Disp: 7 tablet, Rfl: 0  •  folic acid (FOLVITE) 1 MG tablet, Take 1 mg by mouth Daily., Disp: , Rfl:   •  levothyroxine (SYNTHROID, LEVOTHROID) 100 MCG tablet, Take 1 tablet by mouth Q Morning, Disp: , Rfl:   •  oxybutynin XL (Ditropan XL) 5 MG 24 hr tablet, 1 po daily, Disp: 30 tablet, Rfl: 1  •  senna (SENOKOT) 8.6 MG tablet, Take 2 tablets by mouth., Disp: , Rfl:     Past Medical History:   Diagnosis Date   • Anxiety and depression    • Asthma     bronchioectasis   • Back pain     with nerve pain in legs   • Cataract    • Dermatochalasis of both upper eyelids    • GERD (gastroesophageal reflux disease)    • Hypertension    • Hypothyroidism     hypothyroidism   • Kidney disease    • Lipoma    • Osteoarthritis    • Visual field defect, unspecified        Past Surgical History:   Procedure Laterality Date   • ANKLE SURGERY     • APPENDECTOMY     •  "BRONCHOSCOPY     • CERVICAL FUSION     • CHOLECYSTECTOMY     • EXCISION MASS LEG Left 3/4/2019    Procedure: EXCISION LIPOMA - LEFT HIP;  Surgeon: Giulia Rodrigez MD;  Location:  [facility] OR;  Service: General   • HYSTERECTOMY     • OTHER SURGICAL HISTORY      thumb   • REPLACEMENT TOTAL KNEE     • TOTAL SHOULDER ARTHROPLASTY W/ DISTAL CLAVICLE EXCISION Right 3/6/2018    Procedure: RIGHT REVERSE TOTAL SHOULDER ARTHROPLASTY;  Surgeon: Imtiaz Lobato MD;  Location:  [facility] OR;  Service:        Social History     Socioeconomic History   • Marital status:    Tobacco Use   • Smoking status: Never Smoker   • Smokeless tobacco: Never Used   Substance and Sexual Activity   • Alcohol use: No   • Drug use: No   • Sexual activity: Defer       Family History   Problem Relation Age of Onset   • Cancer Maternal Grandmother    • Hypertension Mother    • Stroke Mother    • Heart attack Father        Objective    Temp 99.2 °F (37.3 °C)   Ht 162.6 cm (64.02\")   Wt 90.4 kg (199 lb 6.4 oz)   BMI 34.21 kg/m²     Physical Exam  Vitals reviewed.   Constitutional:       Appearance: Normal appearance.   HENT:      Head: Normocephalic and atraumatic.      Nose: No congestion.   Pulmonary:      Effort: Pulmonary effort is normal.   Abdominal:      General: There is no distension.      Palpations: Abdomen is soft. There is no mass.      Tenderness: There is abdominal tenderness.      Comments: Left lower quadrant tenderness with palpation   Skin:     General: Skin is warm and dry.   Neurological:      Mental Status: She is alert and oriented to person, place, and time.   Psychiatric:         Mood and Affect: Mood normal.         Behavior: Behavior normal.             Results for orders placed or performed in visit on 01/11/22   POC Urinalysis Dipstick, Multipro    Specimen: Urine   Result Value Ref Range    Color Yellow Yellow, Straw, Dark Yellow, Elizabeth    Clarity, UA Clear Clear    Glucose, UA Negative Negative, 1000 mg/dL (3+) " mg/dL    Bilirubin Negative Negative    Ketones, UA Negative Negative    Specific Gravity  1.010 1.005 - 1.030    Blood, UA Trace (A) Negative    pH, Urine 5.0 5.0 - 8.0    Protein, POC Negative Negative mg/dL    Urobilinogen, UA Normal Normal    Nitrite, UA Negative Negative    Leukocytes Trace (A) Negative   Bladder Scan interpretation  Estimation of residual urine via abdominal ultrasound  Residual Urine: 266ml  Indication: Retention  Position: Supine  Examination: Incremental scanning of the suprapubic area using 3 MHz transducer using copious amounts of acoustic gel.   Findings: An anechoic area was demonstrated which represented the bladder, with measurement of residual urine as noted. I inspected this myself. In that the residual urine was stable or insignificant, no treatment will be necessary at this time.     Assessment and Plan    Diagnoses and all orders for this visit:    1. Recurrent UTI (Primary)  -     POC Urinalysis Dipstick, Multipro  -     CT Abdomen Pelvis With & Without Contrast; Future    2. Left lower quadrant pain  -     CT Abdomen Pelvis With & Without Contrast; Future    3. Urinary incontinence, unspecified type  -     CT Abdomen Pelvis With & Without Contrast; Future    Patient has has recent recurrent UTIs her urine is currently not infected looking she is not having symptoms of UTI though she has ongoing incontinence also some hesitancy she will go to the commode thinking she has to void and is not able to and then lays back down and she has to go again.  She has seen gynecology who referred her here she had been using a pessary she no longer has a pessary in place at this time she does have a history of cystocele.    I explained the most common cause of recurrent infections and postmenopausal females is vaginal bacteria I think she would benefit from priming her estrogen cream however we will hold off on that until she returns after getting CT urogram to evaluate her bladder and upper  tracts.    I also discussed things she can do to help prevent infections it is increasing water intake, trying to void every 2-3 hours, start cranberry pills and probiotics.  She does have irritable bowel syndrome and does have primarily constipating form of IBS.  I explained this could also be a risk factor for developing urinary tract infections.

## 2022-01-26 NOTE — PROGRESS NOTES
Subjective    Ms. Ivy is 76 y.o. female    Chief Complaint: 2 Week follow up for Recurrent UTI.    History of Present Illness  Patient is 76-year-old female with history of recurrent UTIs she also has incontinence.  She was having some left lower quadrant pain which we are not sure if it was UTI or urologic related.  She got a CT urogram which ended up getting at [facility] she did bring the disc with her which I reviewed.  She is not symptomatic of an infection today.  Regarding her incontinence she has failed on oxybutynin and Myrbetriq.  The following portions of the patient's history were reviewed and updated as appropriate: allergies, current medications, past family history, past medical history, past social history, past surgical history and problem list.    Review of Systems   Constitutional: Negative for appetite change, diaphoresis and fever.   HENT: Negative for facial swelling and sore throat.    Eyes: Negative for discharge and visual disturbance.   Respiratory: Negative for cough and shortness of breath.    Cardiovascular: Negative for chest pain and leg swelling.   Gastrointestinal: Negative for anal bleeding and vomiting.   Endocrine: Negative for cold intolerance and heat intolerance.   Genitourinary: Positive for urgency. Negative for decreased urine volume, difficulty urinating, dyspareunia, dysuria, enuresis, flank pain, frequency, genital sores, hematuria, menstrual problem, pelvic pain, vaginal bleeding, vaginal discharge and vaginal pain.   Musculoskeletal: Negative for back pain and gait problem.   Skin: Negative for pallor and rash.   Allergic/Immunologic: Negative for immunocompromised state.   Neurological: Negative for seizures and headaches.   Hematological: Negative for adenopathy. Does not bruise/bleed easily.   Psychiatric/Behavioral: Negative for dysphoric mood, self-injury and suicidal ideas.         Current Outpatient Medications:   •  acetaminophen (TYLENOL) 325 MG tablet,  Take 650 mg by mouth Every 6 (Six) Hours As Needed for Mild Pain ., Disp: , Rfl:   •  amLODIPine (NORVASC) 5 MG tablet, Take 5 mg by mouth Daily., Disp: , Rfl:   •  carvedilol (COREG) 25 MG tablet, Take 25 mg by mouth 2 (Two) Times a Day., Disp: , Rfl:   •  cetirizine (zyrTEC) 10 MG tablet, Take 10 mg by mouth Daily., Disp: , Rfl:   •  chlorthalidone (HYGROTON) 25 MG tablet, Take 25 mg by mouth Daily., Disp: , Rfl:   •  CloNIDine (CATAPRES) 0.1 MG tablet, Take 0.1 mg by mouth 2 (Two) Times a Day., Disp: , Rfl:   •  dexamethasone 0.5 MG/5ML elixir, SWISH AND SPIT 5MLS BY MOUTH AND REPEAT FOUR TO FIVE TIMES A DAY, Disp: , Rfl:   •  doxepin (SINEquan) 75 MG capsule, Take 75 mg by mouth every night at bedtime., Disp: , Rfl:   •  Dulera 200-5 MCG/ACT inhaler, INHALE TWO PUFFS INTO THE LUNGS TWICE DAILY, Disp: , Rfl:   •  EPINEPHrine (EPIPEN 2-ABRIL) 0.3 MG/0.3ML solution auto-injector injection, Inject 0.3 mg into the muscle as needed. Use as directed for allergic reaction, Disp: , Rfl:   •  esomeprazole (nexIUM) 40 MG capsule, Take 40 mg by mouth., Disp: , Rfl:   •  ferrous sulfate 324 (65 Fe) MG tablet delayed-release EC tablet, TAKE ONE TABLET BY MOUTH DAILY, Disp: , Rfl:   •  fluticasone (FLONASE) 50 MCG/ACT nasal spray, 2 sprays into the nostril(s) as directed by provider Daily., Disp: , Rfl:   •  folic acid (FOLVITE) 1 MG tablet, Take 1 mg by mouth Daily., Disp: , Rfl:   •  gabapentin (NEURONTIN) 100 MG capsule, Take 100 mg by mouth 3 (Three) Times a Day., Disp: , Rfl:   •  guaiFENesin (MUCINEX) 600 MG 12 hr tablet, Take 1,200 mg by mouth., Disp: , Rfl:   •  ipratropium-albuterol (DUO-NEB) 0.5-2.5 mg/3 ml nebulizer, Inhale 3 mL., Disp: , Rfl:   •  levothyroxine (SYNTHROID, LEVOTHROID) 100 MCG tablet, Take 1 tablet by mouth Q Morning, Disp: , Rfl:   •  LORazepam (ATIVAN) 0.5 MG tablet, Take 0.5 mg by mouth 3 (Three) Times a Day As Needed. for anxiety, Disp: , Rfl:   •  olmesartan (BENICAR) 40 MG tablet, Take 40 mg by  mouth Daily., Disp: , Rfl:   •  Omega-3 Fatty Acids (FISH OIL) 1000 MG capsule capsule, Take  by mouth Daily With Breakfast., Disp: , Rfl:   •  polyethylene glycol (MIRALAX) powder, Take 17 g by mouth As Needed., Disp: , Rfl:   •  prednisoLONE acetate (PRED FORTE) 1 % ophthalmic suspension, , Disp: , Rfl:   •  simvastatin (ZOCOR) 20 MG tablet, simvastatin 20 mg tablet, Disp: , Rfl:   •  sodium chloride 0.65 % nasal spray, 1 spray into the nostril(s) as directed by provider As Needed for Congestion., Disp: , Rfl:   •  sucralfate (CARAFATE) 1 GM/10ML suspension, Take 1 g by mouth 4 (Four) Times a Day., Disp: , Rfl:   •  ciprofloxacin (Cipro) 250 MG tablet, 1 po q 24 hours for 7 days, Disp: 7 tablet, Rfl: 0  •  HYDROcodone-acetaminophen (NORCO) 7.5-325 MG per tablet, Take 1 tablet by mouth Every 4 (Four) Hours As Needed for Moderate Pain  (Pain)., Disp: 15 tablet, Rfl: 0  •  mometasone (NASONEX) 50 MCG/ACT nasal spray, 2 sprays into each nostril Daily., Disp: , Rfl:   •  nitrofurantoin (MACRODANTIN) 50 MG capsule, Take 1 capsule by mouth Every Night., Disp: 90 capsule, Rfl: 0  •  oxybutynin XL (Ditropan XL) 5 MG 24 hr tablet, 1 po daily, Disp: 30 tablet, Rfl: 1  •  senna (SENOKOT) 8.6 MG tablet, Take 2 tablets by mouth., Disp: , Rfl:   •  venlafaxine XR (EFFEXOR-XR) 150 MG 24 hr capsule, Take 150 mg by mouth., Disp: , Rfl:     Past Medical History:   Diagnosis Date   • Anxiety and depression    • Asthma     bronchioectasis   • Back pain     with nerve pain in legs   • Cataract    • Dermatochalasis of both upper eyelids    • GERD (gastroesophageal reflux disease)    • Hypertension    • Hypothyroidism     hypothyroidism   • Kidney disease    • Lipoma    • Osteoarthritis    • Visual field defect, unspecified        Past Surgical History:   Procedure Laterality Date   • ANKLE SURGERY     • APPENDECTOMY     • BRONCHOSCOPY     • CERVICAL FUSION     • CHOLECYSTECTOMY     • EXCISION MASS LEG Left 3/4/2019    Procedure:  "EXCISION LIPOMA - LEFT HIP;  Surgeon: Giulia Rodrigez MD;  Location:  [facility];  Service: General   • HYSTERECTOMY     • OTHER SURGICAL HISTORY      thumb   • REPLACEMENT TOTAL KNEE     • TOTAL SHOULDER ARTHROPLASTY W/ DISTAL CLAVICLE EXCISION Right 3/6/2018    Procedure: RIGHT REVERSE TOTAL SHOULDER ARTHROPLASTY;  Surgeon: Imtiaz Lobato MD;  Location:  [facility];  Service:        Social History     Socioeconomic History   • Marital status:    Tobacco Use   • Smoking status: Never Smoker   • Smokeless tobacco: Never Used   Vaping Use   • Vaping Use: Never used   Substance and Sexual Activity   • Alcohol use: No   • Drug use: No   • Sexual activity: Defer       Family History   Problem Relation Age of Onset   • Cancer Maternal Grandmother    • Hypertension Mother    • Stroke Mother    • Heart attack Father        Objective    Temp 98.3 °F (36.8 °C)   Ht 162.6 cm (64.02\")   Wt 90.3 kg (199 lb)   BMI 34.14 kg/m²     Physical Exam  Vitals reviewed.   Constitutional:       Appearance: Normal appearance.   HENT:      Head: Normocephalic and atraumatic.      Nose: No congestion.   Pulmonary:      Effort: Pulmonary effort is normal.   Skin:     Coloration: Skin is not pale.   Neurological:      Mental Status: She is alert and oriented to person, place, and time.   Psychiatric:         Mood and Affect: Mood normal.         Behavior: Behavior normal.             Results for orders placed or performed in visit on 01/28/22   POC Urinalysis Dipstick, Multipro    Specimen: Urine   Result Value Ref Range    Color Elizabeth Yellow, Straw, Dark Yellow, Elizabeth    Clarity, UA Cloudy (A) Clear    Glucose, UA Negative Negative, 1000 mg/dL (3+) mg/dL    Bilirubin Negative Negative    Ketones, UA Negative Negative    Specific Gravity  1.005 1.005 - 1.030    Blood, UA Trace (A) Negative    pH, Urine 5.0 5.0 - 8.0    Protein, POC Negative Negative mg/dL    Urobilinogen, UA Normal Normal    Nitrite, UA Negative Negative    " Leukocytes Negative Negative     Independent review of the CT scan of abdomen and pelvis with and without contrast  The CT scan of the abdomen/pelvis done with and without contrast is available for me to review.  Treatment recommendations require an independent review.  First I scanned the liver, spleen, and bowel pattern.  The retroperitoneum including the major vessels and lymphatic packages are briefly reviewed.  This film as been reviewed by the radiologist to determine any non urologic abnormalities that are present.  The kidneys are closely inspected for size, symmetry, contour, parenchymal thickness, perinephric reaction, presence of calcifications, and intrarenal dilation of the collecting system.  The ureters are inspected for their course, caliber, and any calcifications.  The bladder is inspected for its thickness, size, and presence of any calcifications.  This scan shows normal kidneys bilaterally no evidence of obstruction either kidney or ureter no ureteral or kidney stones no cysts or masses.  Did show diverticulosis no other acute findings.  Assessment and Plan    Diagnoses and all orders for this visit:    1. Recurrent UTI (Primary)  -     POC Urinalysis Dipstick, Multipro  -     nitrofurantoin (MACRODANTIN) 50 MG capsule; Take 1 capsule by mouth Every Night.  Dispense: 90 capsule; Refill: 0    2. Urge incontinence    CT urogram revealed no acute or obvious urologic abnormalities there was chronic diverticulosis but no other acute findings.  I think her discomfort left lower quadrant could be irritable bowel related.  Urine is not infected looking today after discussion she will continue the cranberry pills and probiotics which he started at her last visit I also want her to start estrogen cream I did give her sample tubes she should apply it Monday Wednesday and Friday.  Also elected start a 3-month prophylactic course of Macrodantin to take at nighttime.  I will then see her back in 3  months.    Regarding urge incontinence she has failed on oxybutynin on Myrbetriq I did give her the information handout on posterior tibial nerve stimulation treatment.

## 2022-01-27 NOTE — TELEPHONE ENCOUNTER
Called the patient and talked to her daughter about the ct that patient needs to have prior to her appointment. Patients daughter stated that she had the CT and they will bring the report and the diisk  with them

## 2022-03-14 NOTE — TELEPHONE ENCOUNTER
Tanya Bowels is on back order and unable to get. She did get one inhaler but it was $157 for one. Is there an alternative to Tanya Bowels 200?

## 2022-03-28 NOTE — TELEPHONE ENCOUNTER
Pt daughter called and stated that she got premarin samples in January and would like for a prescription of it to be called in.

## 2022-04-22 NOTE — TELEPHONE ENCOUNTER
Patient is currently taking gabapentin 100 mg 1 in the am and 2 in the pm. She has some Tramadol, but she does not like to take it much. She wants to know if she can titrate gabapentin up to 200 mg tid and see if that helps with her back pain. Please advise.

## 2022-04-27 NOTE — PROGRESS NOTES
Subjective    Ms. Ivy is 77 y.o. female    Chief Complaint: 3 Month follow up for Recurrent UTI.    History of Present Illness  Patient is 77-year-old patient who returns for 3-month follow-up with history of recurrent urinary tract infections.  She is doing better she has not had a treatable infection since she was last seen she continues the daily Macrodantin she has a few doses left.  She is also using the estrogen cream as directed as well as the probiotics cranberry pills and d-mannose regimen.  Regarding her incontinence she did not have any response to oxybutynin or Myrbetriq but this has improved slightly.  I did give her information on PTNS however at this time she is not interested.      The following portions of the patient's history were reviewed and updated as appropriate: allergies, current medications, past family history, past medical history, past social history, past surgical history and problem list.    Review of Systems   Constitutional: Negative for chills and fever.   Gastrointestinal: Negative for abdominal pain, anal bleeding and blood in stool.   Genitourinary: Positive for urgency. Negative for decreased urine volume, difficulty urinating, dyspareunia, dysuria, enuresis, flank pain, frequency, genital sores, hematuria, menstrual problem, pelvic pain, vaginal bleeding, vaginal discharge and vaginal pain.         Current Outpatient Medications:   •  acetaminophen (TYLENOL) 325 MG tablet, Take 650 mg by mouth Every 6 (Six) Hours As Needed for Mild Pain ., Disp: , Rfl:   •  amLODIPine (NORVASC) 5 MG tablet, Take 5 mg by mouth Daily., Disp: , Rfl:   •  carvedilol (COREG) 25 MG tablet, Take 25 mg by mouth 2 (Two) Times a Day., Disp: , Rfl:   •  cetirizine (zyrTEC) 10 MG tablet, Take 10 mg by mouth Daily., Disp: , Rfl:   •  chlorthalidone (HYGROTON) 25 MG tablet, Take 25 mg by mouth Daily., Disp: , Rfl:   •  CloNIDine (CATAPRES) 0.1 MG tablet, Take 0.1 mg by mouth 2 (Two) Times a Day., Disp: ,  Rfl:   •  dexamethasone 0.5 MG/5ML elixir, SWISH AND SPIT 5MLS BY MOUTH AND REPEAT FOUR TO FIVE TIMES A DAY, Disp: , Rfl:   •  doxepin (SINEquan) 75 MG capsule, Take 75 mg by mouth every night at bedtime., Disp: , Rfl:   •  Dulera 200-5 MCG/ACT inhaler, INHALE TWO PUFFS INTO THE LUNGS TWICE DAILY, Disp: , Rfl:   •  EPINEPHrine (EPIPEN) 0.3 MG/0.3ML solution auto-injector injection, Inject 0.3 mg into the muscle as needed. Use as directed for allergic reaction, Disp: , Rfl:   •  esomeprazole (nexIUM) 40 MG capsule, Take 40 mg by mouth., Disp: , Rfl:   •  estradiol (ESTRACE) 0.1 MG/GM vaginal cream, Insert 2 g into the vagina 2 (Two) Times a Week for 360 days., Disp: 0.5 g, Rfl: 11  •  ferrous sulfate 324 (65 Fe) MG tablet delayed-release EC tablet, TAKE ONE TABLET BY MOUTH DAILY, Disp: , Rfl:   •  fluticasone (FLONASE) 50 MCG/ACT nasal spray, 2 sprays into the nostril(s) as directed by provider Daily., Disp: , Rfl:   •  folic acid (FOLVITE) 1 MG tablet, Take 1 mg by mouth Daily., Disp: , Rfl:   •  gabapentin (NEURONTIN) 100 MG capsule, Take 100 mg by mouth 3 (Three) Times a Day., Disp: , Rfl:   •  guaiFENesin (MUCINEX) 600 MG 12 hr tablet, Take 1,200 mg by mouth., Disp: , Rfl:   •  ipratropium-albuterol (DUO-NEB) 0.5-2.5 mg/3 ml nebulizer, Inhale 3 mL., Disp: , Rfl:   •  levothyroxine (SYNTHROID, LEVOTHROID) 100 MCG tablet, Take 1 tablet by mouth Q Morning, Disp: , Rfl:   •  LORazepam (ATIVAN) 0.5 MG tablet, Take 0.5 mg by mouth 3 (Three) Times a Day As Needed. for anxiety, Disp: , Rfl:   •  mometasone (NASONEX) 50 MCG/ACT nasal spray, 2 sprays into each nostril Daily., Disp: , Rfl:   •  nitrofurantoin (MACRODANTIN) 50 MG capsule, Take 1 capsule by mouth Every Night., Disp: 90 capsule, Rfl: 0  •  olmesartan (BENICAR) 40 MG tablet, Take 40 mg by mouth Daily., Disp: , Rfl:   •  Omega-3 Fatty Acids (FISH OIL) 1000 MG capsule capsule, Take  by mouth Daily With Breakfast., Disp: , Rfl:   •  polyethylene glycol (MIRALAX)  powder, Take 17 g by mouth As Needed., Disp: , Rfl:   •  prednisoLONE acetate (PRED FORTE) 1 % ophthalmic suspension, , Disp: , Rfl:   •  senna (SENOKOT) 8.6 MG tablet, Take 2 tablets by mouth., Disp: , Rfl:   •  simvastatin (ZOCOR) 20 MG tablet, simvastatin 20 mg tablet, Disp: , Rfl:   •  sodium chloride 0.65 % nasal spray, 1 spray into the nostril(s) as directed by provider As Needed for Congestion., Disp: , Rfl:   •  sucralfate (CARAFATE) 1 GM/10ML suspension, Take 1 g by mouth 4 (Four) Times a Day., Disp: , Rfl:   •  venlafaxine XR (EFFEXOR-XR) 150 MG 24 hr capsule, Take 150 mg by mouth., Disp: , Rfl:   •  ciprofloxacin (Cipro) 250 MG tablet, 1 po q 24 hours for 7 days, Disp: 7 tablet, Rfl: 0  •  HYDROcodone-acetaminophen (NORCO) 7.5-325 MG per tablet, Take 1 tablet by mouth Every 4 (Four) Hours As Needed for Moderate Pain  (Pain)., Disp: 15 tablet, Rfl: 0  •  oxybutynin XL (Ditropan XL) 5 MG 24 hr tablet, 1 po daily, Disp: 30 tablet, Rfl: 1    Past Medical History:   Diagnosis Date   • Anxiety and depression    • Asthma     bronchioectasis   • Back pain     with nerve pain in legs   • Cataract    • Dermatochalasis of both upper eyelids    • GERD (gastroesophageal reflux disease)    • Hypertension    • Hypothyroidism     hypothyroidism   • Kidney disease    • Lipoma    • Osteoarthritis    • Visual field defect, unspecified        Past Surgical History:   Procedure Laterality Date   • ANKLE SURGERY     • APPENDECTOMY     • BRONCHOSCOPY     • CERVICAL FUSION     • CHOLECYSTECTOMY     • EXCISION MASS LEG Left 3/4/2019    Procedure: EXCISION LIPOMA - LEFT HIP;  Surgeon: Giulia Rodrigez MD;  Location:  [facility];  Service: General   • HYSTERECTOMY     • OTHER SURGICAL HISTORY      thumb   • REPLACEMENT TOTAL KNEE     • TOTAL SHOULDER ARTHROPLASTY W/ DISTAL CLAVICLE EXCISION Right 3/6/2018    Procedure: RIGHT REVERSE TOTAL SHOULDER ARTHROPLASTY;  Surgeon: Imtiaz Lobato MD;  Location:  [facility];  Service:   "      Social History     Socioeconomic History   • Marital status:    Tobacco Use   • Smoking status: Never Smoker   • Smokeless tobacco: Never Used   Vaping Use   • Vaping Use: Never used   Substance and Sexual Activity   • Alcohol use: No   • Drug use: No   • Sexual activity: Defer       Family History   Problem Relation Age of Onset   • Cancer Maternal Grandmother    • Hypertension Mother    • Stroke Mother    • Heart attack Father        Objective    Temp 98.9 °F (37.2 °C)   Ht 162.6 cm (64.02\")   Wt 90.3 kg (199 lb)   BMI 34.14 kg/m²     Physical Exam  Vitals reviewed.   Constitutional:       Appearance: Normal appearance.   HENT:      Head: Normocephalic and atraumatic.      Nose: No congestion.   Pulmonary:      Effort: Pulmonary effort is normal.   Skin:     Coloration: Skin is not pale.   Neurological:      Mental Status: She is alert and oriented to person, place, and time.   Psychiatric:         Mood and Affect: Mood normal.         Behavior: Behavior normal.             Results for orders placed or performed in visit on 04/29/22   POC Urinalysis Dipstick, Multipro    Specimen: Urine   Result Value Ref Range    Color Yellow Yellow, Straw, Dark Yellow, Elizabeth    Clarity, UA Clear Clear    Glucose, UA Negative Negative, 1000 mg/dL (3+) mg/dL    Bilirubin Negative Negative    Ketones, UA Negative Negative    Specific Gravity  1.010 1.005 - 1.030    Blood, UA Trace (A) Negative    pH, Urine 5.0 5.0 - 8.0    Protein, POC Negative Negative mg/dL    Urobilinogen, UA Normal Normal    Nitrite, UA Negative Negative    Leukocytes Trace (A) Negative     Assessment and Plan    Diagnoses and all orders for this visit:    1. Recurrent UTI (Primary)  -     POC Urinalysis Dipstick, Multipro    Patient is had no infection since she was last seen her urine is not infected looking today when she completes the Macrodantin prescription she will stop to see how she does off of the daily antibiotic.  She will continue " the estrogen cream as directed she had a recent refill sent in with 11 refills.  Continue the cranberry pills d-mannose probiotics regiment as well as increasing water follow-up in 6 months.

## 2022-06-09 PROBLEM — U07.1 COVID-19 VIRUS DETECTED: Status: ACTIVE | Noted: 2022-01-01

## 2022-06-09 PROBLEM — R41.82 ALTERED MENTAL STATUS, UNSPECIFIED ALTERED MENTAL STATUS TYPE: Status: ACTIVE | Noted: 2022-01-01

## 2022-06-09 NOTE — ED PROVIDER NOTES
Subjective   Patient is a 77-year-old female that presents with daughter today with complaint of confusion.  Patient's daughter reports that the patient had a fall on Sunday.  She is unsure what happened.  EMS went out to the house and checked on her and helped her get back up.  The daughter then later checked on the patient and she seemed fine.  She states that her mother was just feeling weaker so 2 days later they checked some blood and urinalysis and this all came back okay.  She states that they checked her for COVID and it did come back positive.  She was started on Paxil bid by her primary care provider.  Patient has been taking the medication.  The daughter states that yesterday she began to develop symptoms and went home to lay down she had been staying with her mother.  States that when she went back today the mother was confused and was only just laying around the house.  She was very weak.  The daughter states that the patient has had a fever up to 103 for the past several days and that she is having difficulty controlling it.  The patient will look at me.  She is unsure what her name is.  She is unsure what kind of dog that she has.  She does tell me what the dog's name is.  She does not answer any other questions I ask her.  The daughter states that normally the patient lives alone and is awake alert and oriented cares for herself at home.  Patient's last known well time was sometime yesterday.  The patient presents here today for further evaluation.      History provided by:  Relative  History limited by:  Mental status change   used: No        Review of Systems   Unable to perform ROS: Mental status change   HENT: Negative for dental problem.        Past Medical History:   Diagnosis Date   • Anxiety and depression    • Asthma     bronchioectasis   • Back pain     with nerve pain in legs   • Cataract    • Dermatochalasis of both upper eyelids    • GERD (gastroesophageal reflux  disease)    • Hypertension    • Hypothyroidism     hypothyroidism   • Kidney disease    • Lipoma    • Osteoarthritis    • Visual field defect, unspecified        Allergies   Allergen Reactions   • Penicillins Rash and Other (See Comments)     Whelps, itching   • Sulfa Antibiotics Rash and Other (See Comments)     Whelps,l itching       Past Surgical History:   Procedure Laterality Date   • ANKLE SURGERY     • APPENDECTOMY     • BRONCHOSCOPY     • CERVICAL FUSION     • CHOLECYSTECTOMY     • EXCISION MASS LEG Left 3/4/2019    Procedure: EXCISION LIPOMA - LEFT HIP;  Surgeon: Giulia Rodrigez MD;  Location: [facility] OR;  Service: General   • HYSTERECTOMY     • OTHER SURGICAL HISTORY      thumb   • REPLACEMENT TOTAL KNEE     • TOTAL SHOULDER ARTHROPLASTY W/ DISTAL CLAVICLE EXCISION Right 3/6/2018    Procedure: RIGHT REVERSE TOTAL SHOULDER ARTHROPLASTY;  Surgeon: Imtiaz Lobato MD;  Location: [facility] OR;  Service:        Family History   Problem Relation Age of Onset   • Cancer Maternal Grandmother    • Hypertension Mother    • Stroke Mother    • Heart attack Father        Social History     Socioeconomic History   • Marital status:    Tobacco Use   • Smoking status: Never Smoker   • Smokeless tobacco: Never Used   Vaping Use   • Vaping Use: Never used   Substance and Sexual Activity   • Alcohol use: No   • Drug use: No   • Sexual activity: Defer           Objective   Physical Exam  Vitals and nursing note reviewed.   Constitutional:       Appearance: She is ill-appearing.   HENT:      Head: Normocephalic.      Right Ear: External ear normal.      Left Ear: External ear normal.      Mouth/Throat:      Mouth: Mucous membranes are moist.      Pharynx: Oropharynx is clear.   Eyes:      Extraocular Movements: Extraocular movements intact.      Conjunctiva/sclera: Conjunctivae normal.      Pupils: Pupils are equal, round, and reactive to light.   Cardiovascular:      Rate and Rhythm: Normal rate and regular rhythm.  "  Pulmonary:      Effort: Pulmonary effort is normal.      Breath sounds: Normal breath sounds.   Abdominal:      General: Bowel sounds are normal.      Palpations: Abdomen is soft.   Skin:     General: Skin is warm and dry.      Capillary Refill: Capillary refill takes less than 2 seconds.   Neurological:      Mental Status: She is alert. She is disoriented.   Psychiatric:         Mood and Affect: Mood normal.         Procedures           ED Course  ED Course as of 06/09/22 1942   Thu Jun 09, 2022 1941 I discussed with patient's case with Dr. Brown He is going admit the patient.  Patient be admitted at this time in stable condition.  The patient's daughter has been advised of all findings as well as the patient. [LF]      ED Course User Index  [LF] Serena Yanes, APRALFONSO                                         CT Head Without Contrast   Final Result   1. No hemorrhage, edema or mass effect.   2. Mild atrophy with associated prominence of the lateral ventricles.   3. Low density in the hemispheric white matter is nonspecific and likely   due to chronic small vessel disease.   4. Partially empty appearance of the sella turcica.       The full report of this exam was immediately signed and available to the   emergency room. The patient is currently in the emergency room.       This report was finalized on 06/09/2022 18:22 by Dr. Jack Goldstein MD.      CT Cervical Spine Without Contrast   Final Result   1. No evidence of cervical spine fracture.   2. Postoperative and degenerative changes of the cervical spine and   upper thoracic spine, as described.   3. Abnormal opacification at the left lung apex. This area is not fully   evaluated on this study. This may be infectious or inflammatory.   Neoplasm is also considered. Follow-up nonemergent chest CT recommended.   Marked as \"new lung findings\" in PACS for follow-up.   4. Carotid artery calcification in the neck bilaterally.       The full report of this exam " was immediately signed and available to the   emergency room. The patient is currently in the emergency room.           This report was finalized on 06/09/2022 18:31 by Dr. Jack Goldstein MD.      XR Chest 1 View   Final Result   1. Hypoventilation with vascular crowding.   2. Opacity in the left perihilar region and left lung base likely due to   atelectasis. Pneumonia less likely.   3. Stable bronchial wall thickening.           This report was finalized on 06/09/2022 17:44 by Dr. Jack Goldstein MD.        Labs Reviewed   COVID-19,[facility] IN-HOUSE,[facility] SWAB NO TRANSPORT MEDIA 2 HR TAT - Abnormal; Notable for the following components:       Result Value    COVID19 Detected (*)     All other components within normal limits    Narrative:     Fact sheet for providers: https://www.fda.gov/media/569480/download     Fact sheet for patients: https://www.fda.gov/media/828766/download    Test performed by PCR.    Consider negative results in combination with clinical observations, patient history, and epidemiological information.   COMPREHENSIVE METABOLIC PANEL - Abnormal; Notable for the following components:    BUN 25 (*)     Creatinine 1.34 (*)     AST (SGOT) 34 (*)     eGFR 40.4 (*)     All other components within normal limits    Narrative:     GFR Normal >60  Chronic Kidney Disease <60  Kidney Failure <15     URINALYSIS W/ CULTURE IF INDICATED - Abnormal; Notable for the following components:    Protein, UA 30 mg/dL (1+) (*)     All other components within normal limits    Narrative:     In absence of clinical symptoms, the presence of pyuria, bacteria, and/or nitrites on the urinalysis result does not correlate with infection.   D-DIMER, QUANTITATIVE - Abnormal; Notable for the following components:    D-Dimer, Quantitative 1.03 (*)     All other components within normal limits    Narrative:     Reference Range is 0-0.50 MCGFEU/mL. However, results <0.50 MCGFEU/mL tends to rule out DVT or PE. Results >0.50  "MCGFEU/mL are not useful in predicting absence or presence of DVT or PE.     PROCALCITONIN - Abnormal; Notable for the following components:    Procalcitonin 0.33 (*)     All other components within normal limits    Narrative:     As a Marker for Sepsis (Non-Neonates):    1. <0.5 ng/mL represents a low risk of severe sepsis and/or septic shock.  2. >2 ng/mL represents a high risk of severe sepsis and/or septic shock.    As a Marker for Lower Respiratory Tract Infections that require antibiotic therapy:    PCT on Admission    Antibiotic Therapy       6-12 Hrs later    >0.5                Strongly Recommended  >0.25 - <0.5        Recommended   0.1 - 0.25          Discouraged              Remeasure/reassess PCT  <0.1                Strongly Discouraged     Remeasure/reassess PCT    As 28 day mortality risk marker: \"Change in Procalcitonin Result\" (>80% or <=80%) if Day 0 (or Day 1) and Day 4 values are available. Refer to http://www.[email or web address]pct[facility]cal[facility]    Change in PCT <=80%  A decrease of PCT levels below or equal to 80% defines a positive change in PCT test result representing a higher risk for 28-day all-cause mortality of patients diagnosed with severe sepsis for septic shock.    Change in PCT >80%  A decrease of PCT levels of more than 80% defines a negative change in PCT result representing a lower risk for 28-day all-cause mortality of patients diagnosed with severe sepsis or septic shock.      CBC WITH AUTO DIFFERENTIAL - Abnormal; Notable for the following components:    Monocyte % 12.8 (*)     All other components within normal limits   BLOOD GAS, ARTERIAL - Abnormal; Notable for the following components:    pH, Arterial 7.452 (*)     pCO2, Arterial 31.2 (*)     pO2, Arterial 61.6 (*)     Base Excess, Arterial -1.4 (*)     O2 Saturation, Arterial 92.9 (*)     pCO2, Temperature Corrected 31.2 (*)     pH, Temp Corrected 7.452 (*)     pO2, Temperature Corrected 61.6 (*)     All other components within " normal limits   URINALYSIS, MICROSCOPIC ONLY - Abnormal; Notable for the following components:    RBC, UA 0-2 (*)     All other components within normal limits   BNP (IN-HOUSE) - Normal    Narrative:     Among patients with dyspnea, NT-proBNP is highly sensitive for the detection of acute congestive heart failure. In addition NT-proBNP of <300 pg/ml effectively rules out acute congestive heart failure with 99% negative predictive value.    Results may be falsely decreased if patient taking Biotin.     TROPONIN (IN-HOUSE) - Normal    Narrative:     Troponin T Reference Range:  <= 0.03 ng/mL-   Negative for AMI  >0.03 ng/mL-     Abnormal for myocardial necrosis.  Clinicians would have to utilize clinical acumen, EKG, Troponin and serial changes to determine if it is an Acute Myocardial Infarction or myocardial injury due to an underlying chronic condition.       Results may be falsely decreased if patient taking Biotin.     LACTIC ACID, PLASMA - Normal   BLOOD CULTURE   BLOOD CULTURE   BLOOD GAS, ARTERIAL   CBC AND DIFFERENTIAL    Narrative:     The following orders were created for panel order CBC & Differential.  Procedure                               Abnormality         Status                     ---------                               -----------         ------                     CBC Auto Differential[[id number]]        Abnormal            Final result                 Please view results for these tests on the individual orders.             MDM  Number of Diagnoses or Management Options  Altered mental status, unspecified altered mental status type: new and requires workup  COVID-19: new and requires workup     Amount and/or Complexity of Data Reviewed  Clinical lab tests: reviewed and ordered  Tests in the radiology section of CPT®: reviewed and ordered  Tests in the medicine section of CPT®: reviewed and ordered  Decide to obtain previous medical records or to obtain history from someone other than the patient:  yes  Discuss the patient with other providers: yes    Patient Progress  Patient progress: stable      Final diagnoses:   Altered mental status, unspecified altered mental status type   COVID-19       ED Disposition  ED Disposition     ED Disposition   Decision to Admit    Condition   --    Comment   Level of Care: Telemetry [5]   Diagnosis: Altered mental status, unspecified altered mental status type [[id number]]   Admitting Physician: RICH AZUL [[id number]]   Attending Physician: RICH AZUL [[id number]]   Isolate for COVID?: Yes [1]   Certification: I Certify That Inpatient Hospital Services Are Medically Necessary For Greater Than 2 Midnights               No follow-up provider specified.       Medication List      No changes were made to your prescriptions during this visit.          Serena Yanes, APRN  06/09/22 1942

## 2022-06-10 PROBLEM — R56.9 SEIZURE-LIKE ACTIVITY: Status: ACTIVE | Noted: 2022-01-01

## 2022-06-10 PROBLEM — G93.40 ACUTE ENCEPHALOPATHY: Status: ACTIVE | Noted: 2022-01-01

## 2022-06-10 NOTE — H&P
[facility] Medicine Services  HISTORY AND PHYSICAL    Date of Admission: 6/9/2022  Primary Care Physician: Kun Gonzalez MD    Subjective     Chief Complaint: Mental status changes.    History of Present Illness  77 year old female with PMH of HTN, hypothyroidism, CKD that was recently diagnosed with COVID 19 and started on Paxlovid. Daughter states that the patient has been debilitated and confused. Tonight she appeared to be totally disoriented and this is a significant change from baseline.     The patient had an episode of tonic clonic activity once on medical floor with a post ictal change that lasted over 10 minutes. Her T max is 100.5 in the ER, she was about 99 during the episode. She has not had seizures in the past or CVA history.     Her daughter states that no medications have been changed recently, other than addition of Paxlovid. And she had taken her home medications as prescribed.     Review of Systems   Unable to perform ROS: Mental status change     Past Medical History:   Past Medical History:   Diagnosis Date   • Anxiety and depression    • Asthma     bronchioectasis   • Back pain     with nerve pain in legs   • Cataract    • Dermatochalasis of both upper eyelids    • GERD (gastroesophageal reflux disease)    • Hypertension    • Hypothyroidism     hypothyroidism   • Kidney disease    • Lipoma    • Osteoarthritis    • Visual field defect, unspecified      Past Surgical History:  Past Surgical History:   Procedure Laterality Date   • ANKLE SURGERY     • APPENDECTOMY     • BRONCHOSCOPY     • CERVICAL FUSION     • CHOLECYSTECTOMY     • EXCISION MASS LEG Left 3/4/2019    Procedure: EXCISION LIPOMA - LEFT HIP;  Surgeon: Giulia Rodrigez MD;  Location: [facility];  Service: General   • HYSTERECTOMY     • OTHER SURGICAL HISTORY      thumb   • REPLACEMENT TOTAL KNEE     • TOTAL SHOULDER ARTHROPLASTY W/ DISTAL CLAVICLE EXCISION Right 3/6/2018    Procedure: RIGHT  REVERSE TOTAL SHOULDER ARTHROPLASTY;  Surgeon: Imtiaz Lobato MD;  Location: [facility];  Service:      Social History:  reports that she has never smoked. She has never used smokeless tobacco. She reports that she does not drink alcohol and does not use drugs.    Family History: family history includes Cancer in her maternal grandmother; Heart attack in her father; Hypertension in her mother; Stroke in her mother.       Allergies:  Allergies   Allergen Reactions   • Penicillins Rash and Other (See Comments)     Whelps, itching   • Sulfa Antibiotics Rash and Other (See Comments)     Whelps,l itching       Medications:  Prior to Admission medications    Medication Sig Start Date End Date Taking? Authorizing Provider   acetaminophen (TYLENOL) 325 MG tablet Take 650 mg by mouth Every 6 (Six) Hours As Needed for Mild Pain .    Imelda Mcrae MD   amLODIPine (NORVASC) 5 MG tablet Take 5 mg by mouth Daily.    Imelda Mcrae MD   carvedilol (COREG) 25 MG tablet Take 25 mg by mouth 2 (Two) Times a Day. 9/20/21   Imelda Mcrae MD   cetirizine (zyrTEC) 10 MG tablet Take 10 mg by mouth Daily. 9/20/21   Imelda Mcrae MD   chlorthalidone (HYGROTON) 25 MG tablet Take 25 mg by mouth Daily. 9/20/21   Imelda Mcrae MD   ciprofloxacin (Cipro) 250 MG tablet 1 po q 24 hours for 7 days 12/3/21   Lexy Rodriguez APRN   CloNIDine (CATAPRES) 0.1 MG tablet Take 0.1 mg by mouth 2 (Two) Times a Day.    Imelda Mcrae MD   dexamethasone 0.5 MG/5ML elixir SWISH AND SPIT 5MLS BY MOUTH AND REPEAT FOUR TO FIVE TIMES A DAY 11/12/21   Imelda Mcrae MD   doxepin (SINEquan) 75 MG capsule Take 75 mg by mouth every night at bedtime. 9/20/21   Imelda Mcrae MD   Dulera 200-5 MCG/ACT inhaler INHALE TWO PUFFS INTO THE LUNGS TWICE DAILY 9/20/21   Imelda Mcrae MD   EPINEPHrine (EPIPEN) 0.3 MG/0.3ML solution auto-injector injection Inject 0.3 mg into the muscle as needed. Use as  directed for allergic reaction    Imelda Mcrae MD   esomeprazole (nexIUM) 40 MG capsule Take 40 mg by mouth. 2/20/20   Imelda Mcrae MD   estradiol (ESTRACE) 0.1 MG/GM vaginal cream Insert 2 g into the vagina 2 (Two) Times a Week for 360 days. 3/28/22 3/23/23  Federico Moyer PA   ferrous sulfate 324 (65 Fe) MG tablet delayed-release EC tablet TAKE ONE TABLET BY MOUTH DAILY 8/6/18   Imelda Mcrae MD   fluticasone (FLONASE) 50 MCG/ACT nasal spray 2 sprays into the nostril(s) as directed by provider Daily.    Imelda Mcrae MD   folic acid (FOLVITE) 1 MG tablet Take 1 mg by mouth Daily.    Imelda Mcrae MD   gabapentin (NEURONTIN) 100 MG capsule Take 100 mg by mouth 3 (Three) Times a Day. 10/12/21   Imelda Mcrae MD   guaiFENesin (MUCINEX) 600 MG 12 hr tablet Take 1,200 mg by mouth. 8/22/19   Imelda Mcrae MD   HYDROcodone-acetaminophen (NORCO) 7.5-325 MG per tablet Take 1 tablet by mouth Every 4 (Four) Hours As Needed for Moderate Pain  (Pain). 3/4/19   Giulia Rodrigez MD   ipratropium-albuterol (DUO-NEB) 0.5-2.5 mg/3 ml nebulizer Inhale 3 mL. 7/5/19   Imelda Mcrae MD   levothyroxine (SYNTHROID, LEVOTHROID) 100 MCG tablet Take 1 tablet by mouth Q Morning 6/9/16   Imelda Mcrae MD   LORazepam (ATIVAN) 0.5 MG tablet Take 0.5 mg by mouth 3 (Three) Times a Day As Needed. for anxiety 9/20/21   Imelda Mcrae MD   mometasone (NASONEX) 50 MCG/ACT nasal spray 2 sprays into each nostril Daily.    Imelda Mcrae MD   nitrofurantoin (MACRODANTIN) 50 MG capsule Take 1 capsule by mouth Every Night. 1/28/22   Federico Moyer PA   olmesartan (BENICAR) 40 MG tablet Take 40 mg by mouth Daily.    Imelda Mcrae MD   Omega-3 Fatty Acids (FISH OIL) 1000 MG capsule capsule Take  by mouth Daily With Breakfast.    Imelda Mcrae MD   oxybutynin XL (Ditropan XL) 5 MG 24 hr tablet 1 po daily 11/4/21   Lexy Rodriguez APRN  "  polyethylene glycol (MIRALAX) powder Take 17 g by mouth As Needed.    Imelda Mcrae MD   prednisoLONE acetate (PRED FORTE) 1 % ophthalmic suspension  11/2/21   Imelda Mcrae MD   senna (SENOKOT) 8.6 MG tablet Take 2 tablets by mouth.    Imelda Mcrae MD   simvastatin (ZOCOR) 20 MG tablet simvastatin 20 mg tablet 2/12/18   Imelda Mcrae MD   sodium chloride 0.65 % nasal spray 1 spray into the nostril(s) as directed by provider As Needed for Congestion.    Imelda Mcrae MD   sucralfate (CARAFATE) 1 GM/10ML suspension Take 1 g by mouth 4 (Four) Times a Day.    Imelda Mcrae MD   venlafaxine XR (EFFEXOR-XR) 150 MG 24 hr capsule Take 150 mg by mouth. 1/15/20   Imelda Mcrae MD I have utilized all available immediate resources to obtain, update, and review the patient's current medications.    Objective     Vital Signs: /82 (BP Location: Left arm, Patient Position: Lying)   Pulse 93   Temp 100.5 °F (38.1 °C)   Resp 20   Ht 152.4 cm (60\")   Wt 90.3 kg (199 lb)   SpO2 95%   BMI 38.86 kg/m²   Physical Exam  Vitals reviewed.   Constitutional:       Appearance: She is well-developed. She is ill-appearing and diaphoretic.   HENT:      Head: Normocephalic and atraumatic.      Right Ear: External ear normal.      Left Ear: External ear normal.      Mouth/Throat:      Mouth: Mucous membranes are dry.      Pharynx: Oropharynx is clear.   Eyes:      General:         Right eye: No discharge.         Left eye: No discharge.      Extraocular Movements: Extraocular movements intact.      Conjunctiva/sclera: Conjunctivae normal.      Pupils: Pupils are equal, round, and reactive to light.   Neck:      Vascular: No JVD.   Cardiovascular:      Rate and Rhythm: Normal rate and regular rhythm.      Pulses: Normal pulses.      Heart sounds: Normal heart sounds.   Pulmonary:      Effort: Pulmonary effort is normal. No respiratory distress.      Breath sounds: No " stridor. No wheezing, rhonchi or rales.   Chest:      Chest wall: No tenderness.   Abdominal:      General: There is no distension.      Palpations: Abdomen is soft.      Tenderness: There is no abdominal tenderness. There is no guarding or rebound.      Hernia: No hernia is present.   Musculoskeletal:         General: No swelling, tenderness or deformity. Normal range of motion.      Cervical back: Normal range of motion and neck supple. No rigidity or tenderness. No muscular tenderness.   Skin:     General: Skin is warm.      Capillary Refill: Capillary refill takes less than 2 seconds.      Findings: No erythema or rash.   Neurological:      General: No focal deficit present.      Mental Status: She is alert. She is disoriented.      Cranial Nerves: No cranial nerve deficit.      Sensory: No sensory deficit.      Motor: No weakness or abnormal muscle tone.      Deep Tendon Reflexes: Reflexes normal.     Results Reviewed:  Lab Results (last 24 hours)     Procedure Component Value Units Date/Time    Blood Culture - Blood, Arm, Left [[id number]] Collected: 06/09/22 1725    Specimen: Blood from Arm, Left Updated: 06/09/22 1820    Blood Culture - Blood, Wrist, Right [[id number]] Collected: 06/09/22 1729    Specimen: Blood from Wrist, Right Updated: 06/09/22 1820    Procalcitonin [[id number]]  (Abnormal) Collected: 06/09/22 1729    Specimen: Blood Updated: 06/09/22 1808     Procalcitonin 0.33 ng/mL     Narrative:      As a Marker for Sepsis (Non-Neonates):    1. <0.5 ng/mL represents a low risk of severe sepsis and/or septic shock.  2. >2 ng/mL represents a high risk of severe sepsis and/or septic shock.    As a Marker for Lower Respiratory Tract Infections that require antibiotic therapy:    PCT on Admission    Antibiotic Therapy       6-12 Hrs later    >0.5                Strongly Recommended  >0.25 - <0.5        Recommended   0.1 - 0.25          Discouraged              Remeasure/reassess PCT  <0.1                 "Strongly Discouraged     Remeasure/reassess PCT    As 28 day mortality risk marker: \"Change in Procalcitonin Result\" (>80% or <=80%) if Day 0 (or Day 1) and Day 4 values are available. Refer to http://www.[facility]-pct-calculator.com    Change in PCT <=80%  A decrease of PCT levels below or equal to 80% defines a positive change in PCT test result representing a higher risk for 28-day all-cause mortality of patients diagnosed with severe sepsis for septic shock.    Change in PCT >80%  A decrease of PCT levels of more than 80% defines a negative change in PCT result representing a lower risk for 28-day all-cause mortality of patients diagnosed with severe sepsis or septic shock.       COVID-19,[facility] [employer] IN-HOUSE,Nasal Swab No Transport Media 3-4 HR TAT - Swab, Nasal Cavity [[id number]]  (Abnormal) Collected: 06/09/22 1657    Specimen: Swab from Nasal Cavity Updated: 06/09/22 1805     COVID19 Detected    Narrative:      Fact sheet for providers: https://www.fda.gov/media/545631/download     Fact sheet for patients: https://www.fda.gov/media/305945/download    Test performed by PCR.    Consider negative results in combination with clinical observations, patient history, and epidemiological information.    Comprehensive Metabolic Panel [[id number]]  (Abnormal) Collected: 06/09/22 1729    Specimen: Blood Updated: 06/09/22 1804     Glucose 92 mg/dL      BUN 25 mg/dL      Creatinine 1.34 mg/dL      Sodium 138 mmol/L      Potassium 3.9 mmol/L      Chloride 102 mmol/L      CO2 24.0 mmol/L      Calcium 9.1 mg/dL      Total Protein 8.0 g/dL      Albumin 4.30 g/dL      ALT (SGPT) 23 U/L      AST (SGOT) 34 U/L      Alkaline Phosphatase 76 U/L      Total Bilirubin 0.2 mg/dL      Globulin 3.7 gm/dL      A/G Ratio 1.2 g/dL      BUN/Creatinine Ratio 18.7     Anion Gap 12.0 mmol/L      eGFR 40.4 mL/min/1.73      Comment: National Kidney Foundation and American Society of Nephrology (ASN) Task Force recommended calculation based on the " Chronic Kidney Disease Epidemiology Collaboration (CKD-EPI) equation refit without adjustment for race.       Narrative:      GFR Normal >60  Chronic Kidney Disease <60  Kidney Failure <15      Lactic Acid, Plasma [[id number]]  (Normal) Collected: 06/09/22 1729    Specimen: Blood Updated: 06/09/22 1802     Lactate 0.8 mmol/L     Troponin [[id number]]  (Normal) Collected: 06/09/22 1729    Specimen: Blood Updated: 06/09/22 1801     Troponin T <0.010 ng/mL     Narrative:      Troponin T Reference Range:  <= 0.03 ng/mL-   Negative for AMI  >0.03 ng/mL-     Abnormal for myocardial necrosis.  Clinicians would have to utilize clinical acumen, EKG, Troponin and serial changes to determine if it is an Acute Myocardial Infarction or myocardial injury due to an underlying chronic condition.       Results may be falsely decreased if patient taking Biotin.      BNP [[id number]]  (Normal) Collected: 06/09/22 1729    Specimen: Blood Updated: 06/09/22 1801     proBNP 531.9 pg/mL     Narrative:      Among patients with dyspnea, NT-proBNP is highly sensitive for the detection of acute congestive heart failure. In addition NT-proBNP of <300 pg/ml effectively rules out acute congestive heart failure with 99% negative predictive value.    Results may be falsely decreased if patient taking Biotin.      D-dimer, Quantitative [[id number]]  (Abnormal) Collected: 06/09/22 1729    Specimen: Blood Updated: 06/09/22 1752     D-Dimer, Quantitative 1.03 MCGFEU/mL     Narrative:      Reference Range is 0-0.50 MCGFEU/mL. However, results <0.50 MCGFEU/mL tends to rule out DVT or PE. Results >0.50 MCGFEU/mL are not useful in predicting absence or presence of DVT or PE.      Urinalysis With Culture If Indicated - Urine, Catheter [[id number]]  (Abnormal) Collected: 06/09/22 1738    Specimen: Urine, Catheter Updated: 06/09/22 1748     Color, UA Yellow     Appearance, UA Clear     pH, UA <=5.0     Specific Gravity, UA 1.016     Glucose, UA Negative      Ketones, UA Negative     Bilirubin, UA Negative     Blood, UA Negative     Protein, UA 30 mg/dL (1+)     Leuk Esterase, UA Negative     Nitrite, UA Negative     Urobilinogen, UA 0.2 E.U./dL    Narrative:      In absence of clinical symptoms, the presence of pyuria, bacteria, and/or nitrites on the urinalysis result does not correlate with infection.    Urinalysis, Microscopic Only - Urine, Catheter [[id number]]  (Abnormal) Collected: 06/09/22 1738    Specimen: Urine, Catheter Updated: 06/09/22 1748     RBC, UA 0-2 /HPF      WBC, UA None Seen /HPF      Comment: Urine culture not indicated.        Bacteria, UA None Seen /HPF      Squamous Epithelial Cells, UA None Seen /HPF      Hyaline Casts, UA 0-2 /LPF      Methodology Automated Microscopy    CBC & Differential [[id number]]  (Abnormal) Collected: 06/09/22 1729    Specimen: Blood Updated: 06/09/22 1741    Narrative:      The following orders were created for panel order CBC & Differential.  Procedure                               Abnormality         Status                     ---------                               -----------         ------                     CBC Auto Differential[[id number]]        Abnormal            Final result                 Please view results for these tests on the individual orders.    CBC Auto Differential [[id number]]  (Abnormal) Collected: 06/09/22 1729    Specimen: Blood Updated: 06/09/22 1741     WBC 6.41 10*3/mm3      RBC 4.35 10*6/mm3      Hemoglobin 13.4 g/dL      Hematocrit 40.6 %      MCV 93.3 fL      MCH 30.8 pg      MCHC 33.0 g/dL      RDW 12.7 %      RDW-SD 43.8 fl      MPV 9.5 fL      Platelets 190 10*3/mm3      Neutrophil % 63.3 %      Lymphocyte % 22.9 %      Monocyte % 12.8 %      Eosinophil % 0.5 %      Basophil % 0.3 %      Immature Grans % 0.2 %      Neutrophils, Absolute 4.06 10*3/mm3      Lymphocytes, Absolute 1.47 10*3/mm3      Monocytes, Absolute 0.82 10*3/mm3      Eosinophils, Absolute 0.03 10*3/mm3      Basophils,  Absolute 0.02 10*3/mm3      Immature Grans, Absolute 0.01 10*3/mm3      nRBC 0.0 /100 WBC     Blood Gas, Arterial - [[id number]]  (Abnormal) Collected: 06/09/22 1712    Specimen: Arterial Blood Updated: 06/09/22 1713     Site Right Brachial     Efren's Test N/A     pH, Arterial 7.452 pH units      Comment: 83 Value above reference range        pCO2, Arterial 31.2 mm Hg      Comment: 84 Value below reference range        pO2, Arterial 61.6 mm Hg      Comment: 84 Value below reference range        HCO3, Arterial 21.7 mmol/L      Base Excess, Arterial -1.4 mmol/L      Comment: 84 Value below reference range        O2 Saturation, Arterial 92.9 %      Comment: 84 Value below reference range        Temperature 37.0 C      Barometric Pressure for Blood Gas 749 mmHg      Modality Room Air     FIO2 21 %      Ventilator Mode NA     Collected by [id number]     Comment: Meter: [id number]     :  [id number]        pCO2, Temperature Corrected 31.2 mm Hg      pH, Temp Corrected 7.452 pH Units      pO2, Temperature Corrected 61.6 mm Hg         Imaging Results (Last 24 Hours)     Procedure Component Value Units Date/Time    CT Cervical Spine Without Contrast [[id number]] Collected: 06/09/22 1823     Updated: 06/09/22 1834    Narrative:      EXAMINATION:  CT CERVICAL SPINE WO CONTRAST-  6/9/2022 6:02 PM CDT     HISTORY: The patient fell. Concern for neck injury.     COMPARISON: No comparison.      DOSE LENGTH PRODUCT: 301 mGy-cm. Automated exposure control was also  utilized to decrease patient radiation dose.     TECHNIQUE: Serial helical tomographic images of the cervical spine were  obtained without the use of intravenous contrast. Sagittal and coronal  reformatted images were also provided.     FINDINGS:     ALIGNMENT: The alignment is normal. There is pannus formation around the  dens with some erosive change of the dens.     BONES: There is no evidence of fracture. Vertebral body heights are  maintained.     DISC SPACES:      C2-3: The disc is fairly well-maintained. There is facet arthropathy  left greater than right. There is mild central disc bulge producing  minimal dural sac compression. There is mild foraminal narrowing on the  left.     C3-4: The disc is fairly well-maintained. There is spondylitic and  uncinate spurring. There is facet arthropathy left much greater than  right. There is severe left and moderate to severe right-sided foraminal  narrowing. There is mild narrowing of the central canal.     C4-5: There is severe disc narrowing with spondylitic and uncinate  spurring. There is facet arthropathy. There is severe right and mild to  moderate left-sided foraminal narrowing. No significant central spinal  canal stenosis.     C5-6: Prior interbody fusion with anterior plate fixation. There appears  to be solid fusion of the disc. There is mild spondylitic spurring.  There is mild facet arthropathy. There is no significant spinal or  foraminal stenosis.     C6-7: There has been prior interbody fusion with anterior plate  fixation. There appears to be good solid fusion of the disc. The facet  joints are maintained. There is no spinal or foraminal stenosis.     C7-T1: There is severe disc narrowing. There is spondylitic and uncinate  spurring. The left-sided facet joint is fused. There has been prior  posterior instrumented fusion on the right. There is no central spinal  canal stenosis. There is at least moderate bilateral foraminal stenosis.     T1-2: There is mild disc narrowing. There is anterior syndesmophyte  formation. There is mild facet arthropathy. There is mild foraminal  narrowing bilaterally.     T2-3: There is disc narrowing with anterior spurring. There is mild  facet arthropathy. There is mild foraminal narrowing bilaterally.     OTHER FINDINGS: There is abnormal opacification in the left lung apex.  There is carotid artery calcification in the neck bilaterally.       Impression:      1. No evidence of  "cervical spine fracture.  2. Postoperative and degenerative changes of the cervical spine and  upper thoracic spine, as described.  3. Abnormal opacification at the left lung apex. This area is not fully  evaluated on this study. This may be infectious or inflammatory.  Neoplasm is also considered. Follow-up nonemergent chest CT recommended.  Marked as \"new lung findings\" in PACS for follow-up.  4. Carotid artery calcification in the neck bilaterally.     The full report of this exam was immediately signed and available to the  emergency room. The patient is currently in the emergency room.        This report was finalized on 06/09/2022 18:31 by Dr. Jack Goldstein MD.    CT Head Without Contrast [[id number]] Collected: 06/09/22 1821     Updated: 06/09/22 1825    Narrative:      EXAMINATION:  CT HEAD WO CONTRAST-  6/9/2022 6:02 PM CDT     HISTORY: The patient fell. Rule out head injury.     TECHNIQUE: Multiple axial images were obtained through the brain without  contrast infusion. Multiplanar images were reconstructed.     DLP: 596 mGy-cm. Automated dosage reduction technique was utilized to  reduce patient dosage.     COMPARISON: No comparison study.     FINDINGS: There are no hemorrhage, edema or mass effect. There is mild  atrophy. There is mild associated prominence of the lateral ventricles.  There is low density in the hemispheric white matter. The paranasal  sinuses are clear. The mastoid air cells are clear. No calvarial  fracture is seen. There is a partially empty appearance of the sella  turcica.       Impression:      1. No hemorrhage, edema or mass effect.  2. Mild atrophy with associated prominence of the lateral ventricles.  3. Low density in the hemispheric white matter is nonspecific and likely  due to chronic small vessel disease.  4. Partially empty appearance of the sella turcica.     The full report of this exam was immediately signed and available to the  emergency room. The patient is currently " in the emergency room.     This report was finalized on 06/09/2022 18:22 by Dr. Jack Goldstein MD.    XR Chest 1 View [[id number]] Collected: 06/09/22 1743     Updated: 06/09/22 1747    Narrative:      EXAMINATION:  XR CHEST 1 VW-  6/9/2022 5:29 PM CDT     HISTORY: Shortness of breath. Covid positive.     COMPARISON: 2/28/2019.     TECHNIQUE: Single view AP image.     FINDINGS:  There is hypoventilation with vascular crowding. There is  stable bronchial wall thickening. There is patchy infiltrate in the left  perihilar region and left lung base. There is a calcified granuloma on  the left. Heart size appears to be within normal limits. There is a port  catheter on the right that is new compared to the prior study. There has  been prior cervical fusion and right shoulder arthroplasty. There are  degenerative changes of the spine and left glenohumeral joint.       Impression:      1. Hypoventilation with vascular crowding.  2. Opacity in the left perihilar region and left lung base likely due to  atelectasis. Pneumonia less likely.  3. Stable bronchial wall thickening.        This report was finalized on 06/09/2022 17:44 by Dr. Jack Goldstein MD.        I have personally reviewed and interpreted the radiology studies and ECG obtained at time of admission.     Assessment / Plan     Assessment:   Active Hospital Problems    Diagnosis    • **Acute encephalopathy    • Seizure-like activity (HCC)    • COVID-19 virus infection    • Essential hypertension    • Generalized anxiety disorder    • Acquired hypothyroidism    • Hyperlipidemia      Plan:   Admitted initially to medical floor, but transferred to critical care after seizure activity  Vitals per protocol, neuro checks every 4 hours  Npo until alert and verified swallowing bedside or SLP if needed  IVF NS 50 cc/hour  Fall and seizure precautions, bed rest  Keppra 500 mg IVP BID  MRI brain in AM  Neurology consult in AM  Held home medications due to mental status changes  on presentation    DVT prophylaxis > Lovenox 40 mg SQ daily    Code Status/Advanced Care Plan: Full    The patient's surrogate decision maker is see records.     I discussed my findings and recommendations with the daughter at bedside.    Estimated length of stay is over 2 midnights.     The patient was seen and examined by me on 06/09/2022 at 2045 and 2315.    Electronically signed by Otilio Brown MD, 06/09/22, 23:45 CDT.

## 2022-06-10 NOTE — NURSING NOTE
Pt was not oriented at initial assessment upon arrival to CCU. She was alert and she attempted to squeeze hands when prompted, but followed no other commands and could not tell me her name.   At the 0400 assessment, she was able to verbalize her name, move all extremities to command, and follow commands for facial movements. This was a significant improvement compared to the first assessment. Pt has a purwick external cath in place. Output is adequate. She had a small (green liquid) bowel movement at 0400. Vitals have been stable. No fevers, but she did seem to be shivering at the 0400 assessment. Warm blankets were added.  Labs this morning show several results that are out of range:  Procalcitonin 0.33, D-Dimer 1.03, BUN 25, Creat 1.34, C-Reactive Prot 1.0, , Mag 1.3, Ferritin 485.9, and Sed Rate of 35.   Pt has an order for a neurology consult today.   CT results still pending and MRI ordered for A.M today. When asked if she had pain, she would occasionally nod yes, but was unable to point to where, or verbalize a location.

## 2022-06-10 NOTE — CASE MANAGEMENT/SOCIAL WORK
Discharge Planning Assessment  [facility]     Patient Name: Hanny Ivy  MRN: [id number]  Today's Date: 6/10/2022    Admit Date: 6/9/2022     Discharge Needs Assessment     Row Name 06/10/22 0928       Living Environment    People in Home alone    Current Living Arrangements home    Primary Care Provided by child(germaine)    Family Caregiver if Needed child(germaine), adult    Family Caregiver Names Shantell    Able to Return to Prior Arrangements yes       Resource/Environmental Concerns    Resource/Environmental Concerns none       Transition Planning    Patient/Family Anticipates Transition to home    Transportation Anticipated family or friend will provide       Discharge Needs Assessment    Readmission Within the Last 30 Days no previous admission in last 30 days    Equipment Currently Used at Home cane, straight;walker, rolling;wheelchair    Concerns to be Addressed no discharge needs identified    Equipment Needed After Discharge none    Discharge Coordination/Progress spoke to daughter Shantell; patient lives alone with daughter Shantell a nurse assisting if needed; has RX coverage and PCP; will follow for DC needs               Discharge Plan    No documentation.               Continued Care and Services - Admitted Since 6/9/2022    Coordination has not been started for this encounter.          Demographic Summary    No documentation.                Functional Status    No documentation.                Psychosocial    No documentation.                Abuse/Neglect    No documentation.                Legal    No documentation.                Substance Abuse    No documentation.                Patient Forms    No documentation.                   Mya Zhang, ANI

## 2022-06-10 NOTE — PROGRESS NOTES
"    [facility] Medicine Services  INPATIENT PROGRESS NOTE    Patient Name: Hanny Ivy  Date of Admission: 6/9/2022  Today's Date: 06/10/22  Length of Stay: 1  Primary Care Physician: Kun Gonzalez MD    Subjective   Chief Complaint: follow-up suspected seizure  HPI   Patient had a suspected seizure last night upon arrival to the medical floor, and subsequently was moved to the CCU.  Seizure precautions were instituted.  Patient was administered Keppra.  CT imaging of the brain did not reveal any acute findings.  She is currently on about 2 L by nasal cannula.  She does appear confused.  She is able to answer some simple yes/no questions.  When I asked her if she is in pain she reported yes.  When I asked her to localize her pain she reported \"all over.\"  She is shivering from time to time during my assessment and appears cold.  I cannot get much additional history from her at this time.    Review of Systems     All pertinent negatives and positives are as above. All other systems have been reviewed and are negative unless otherwise stated.     Objective    Temp:  [97.9 °F (36.6 °C)-100.5 °F (38.1 °C)] 97.9 °F (36.6 °C)  Heart Rate:  [] 88  Resp:  [15-20] 15  BP: (136-169)/() 152/87  Physical Exam  Vitals reviewed.   Constitutional:       Appearance: She is ill-appearing.      Comments: Shivers from time to time during exam   HENT:      Head: Normocephalic.      Mouth/Throat:      Mouth: Mucous membranes are dry.   Eyes:      Pupils: Pupils are equal, round, and reactive to light.   Cardiovascular:      Rate and Rhythm: Normal rate.   Pulmonary:      Effort: Pulmonary effort is normal. No respiratory distress.      Breath sounds: No wheezing or rhonchi.      Comments: On 2LNC; diminished at bases  Abdominal:      Palpations: Abdomen is soft.   Musculoskeletal:         General: No deformity.      Cervical back: Neck supple. No rigidity.   Skin:     General: Skin is " warm.      Capillary Refill: Capillary refill takes less than 2 seconds.   Neurological:      Mental Status: She is disoriented.      Comments: Patient is able to answer some yes/no questions, appears confused, was able to perform hand grasp to command bilaterally.  Will spontaneously move all of her extremities.         Results Review:  I have reviewed the labs, radiology results, and diagnostic studies.    Laboratory Data:   Results from last 7 days   Lab Units 06/10/22  0338 06/09/22  1729 06/06/22  1211   WBC 10*3/mm3 5.52 6.41 5.6   HEMOGLOBIN g/dL 11.6* 13.4 12.4   HEMATOCRIT % 34.7 40.6 38.2   PLATELETS 10*3/mm3 159 190 198        Results from last 7 days   Lab Units 06/10/22  0338 06/09/22  1729 06/06/22  1211   SODIUM mmol/L 137 138 134*   POTASSIUM mmol/L 3.5 3.9 4.3   CHLORIDE mmol/L 103 102 93*   TOTAL CO2 mmol/L  --   --  20*   CO2 mmol/L 22.0 24.0  --    BUN mg/dL 18 25* 15   CREATININE mg/dL 0.85 1.34* 1.1*   CALCIUM mg/dL 8.4* 9.1 9.4   BILIRUBIN mg/dL 0.2 0.2 0.3   ALK PHOS U/L 63 76 92   ALT (SGPT) U/L 20 23 23   AST (SGOT) U/L 31 34* 33*   GLUCOSE mg/dL 105* 92 69*       Culture Data:   No results found for: BLOODCX, URINECX, WOUNDCX, MRSACX, RESPCX, STOOLCX    Radiology Data:   Imaging Results (Last 24 Hours)     Procedure Component Value Units Date/Time    CT Head Without Contrast [[id number]] Collected: 06/10/22 0610     Updated: 06/10/22 0626    Narrative:      EXAMINATION: CT HEAD WO CONTRAST-      6/9/2022 11:24 PM CDT     HISTORY: possible seizure/ RRT; R41.82-Altered mental status,  unspecified; U07.1-COVID-19     In order to have a CT radiation dose as low as reasonably achievable  Automated Exposure Control was utilized for adjustment of the mA and/or  KV according to patient size.     DLP in mGycm= 1520        The CT scan of the head is performed without intravenous contrast  enhancement. Images are acquired in axial plane and subsequent  reconstruction in coronal and sagittal planes.      Comparison is made with the previous study dated 6/9/2022.     There is no evidence of a mass. There is no midline shift.     There is no evidence of intracranial hemorrhage or hematoma.     Moderately prominent ventricles, basal cisterns and cortical sulci are  similar to the previous study, representing a chronic volume  loss/atrophy.     Chronic white matter ischemic changes bilaterally persist. Gray-white  matter differentiation is maintained.     The images are reviewed in bone windows show no evidence of skull  fracture. The visualized paranasal sinuses and mastoid air cells are  clear.       Impression:      1. No acute intracranial abnormality.     The above study was initially reviewed and reported by stat rad. I  cannot find any discrepancies.     This report was finalized on 06/10/2022 06:23 by Dr. Kierra Miranda MD.    CT Cervical Spine Without Contrast [[id number]] Collected: 06/09/22 1823     Updated: 06/09/22 1834    Narrative:      EXAMINATION:  CT CERVICAL SPINE WO CONTRAST-  6/9/2022 6:02 PM CDT     HISTORY: The patient fell. Concern for neck injury.     COMPARISON: No comparison.      DOSE LENGTH PRODUCT: 301 mGy-cm. Automated exposure control was also  utilized to decrease patient radiation dose.     TECHNIQUE: Serial helical tomographic images of the cervical spine were  obtained without the use of intravenous contrast. Sagittal and coronal  reformatted images were also provided.     FINDINGS:     ALIGNMENT: The alignment is normal. There is pannus formation around the  dens with some erosive change of the dens.     BONES: There is no evidence of fracture. Vertebral body heights are  maintained.     DISC SPACES:     C2-3: The disc is fairly well-maintained. There is facet arthropathy  left greater than right. There is mild central disc bulge producing  minimal dural sac compression. There is mild foraminal narrowing on the  left.     C3-4: The disc is fairly well-maintained. There is  spondylitic and  uncinate spurring. There is facet arthropathy left much greater than  right. There is severe left and moderate to severe right-sided foraminal  narrowing. There is mild narrowing of the central canal.     C4-5: There is severe disc narrowing with spondylitic and uncinate  spurring. There is facet arthropathy. There is severe right and mild to  moderate left-sided foraminal narrowing. No significant central spinal  canal stenosis.     C5-6: Prior interbody fusion with anterior plate fixation. There appears  to be solid fusion of the disc. There is mild spondylitic spurring.  There is mild facet arthropathy. There is no significant spinal or  foraminal stenosis.     C6-7: There has been prior interbody fusion with anterior plate  fixation. There appears to be good solid fusion of the disc. The facet  joints are maintained. There is no spinal or foraminal stenosis.     C7-T1: There is severe disc narrowing. There is spondylitic and uncinate  spurring. The left-sided facet joint is fused. There has been prior  posterior instrumented fusion on the right. There is no central spinal  canal stenosis. There is at least moderate bilateral foraminal stenosis.     T1-2: There is mild disc narrowing. There is anterior syndesmophyte  formation. There is mild facet arthropathy. There is mild foraminal  narrowing bilaterally.     T2-3: There is disc narrowing with anterior spurring. There is mild  facet arthropathy. There is mild foraminal narrowing bilaterally.     OTHER FINDINGS: There is abnormal opacification in the left lung apex.  There is carotid artery calcification in the neck bilaterally.       Impression:      1. No evidence of cervical spine fracture.  2. Postoperative and degenerative changes of the cervical spine and  upper thoracic spine, as described.  3. Abnormal opacification at the left lung apex. This area is not fully  evaluated on this study. This may be infectious or inflammatory.  Neoplasm  "is also considered. Follow-up nonemergent chest CT recommended.  Marked as \"new lung findings\" in PACS for follow-up.  4. Carotid artery calcification in the neck bilaterally.     The full report of this exam was immediately signed and available to the  emergency room. The patient is currently in the emergency room.        This report was finalized on 06/09/2022 18:31 by Dr. Jack Goldstein MD.    CT Head Without Contrast [[id number]] Collected: 06/09/22 1821     Updated: 06/09/22 1825    Narrative:      EXAMINATION:  CT HEAD WO CONTRAST-  6/9/2022 6:02 PM CDT     HISTORY: The patient fell. Rule out head injury.     TECHNIQUE: Multiple axial images were obtained through the brain without  contrast infusion. Multiplanar images were reconstructed.     DLP: 596 mGy-cm. Automated dosage reduction technique was utilized to  reduce patient dosage.     COMPARISON: No comparison study.     FINDINGS: There are no hemorrhage, edema or mass effect. There is mild  atrophy. There is mild associated prominence of the lateral ventricles.  There is low density in the hemispheric white matter. The paranasal  sinuses are clear. The mastoid air cells are clear. No calvarial  fracture is seen. There is a partially empty appearance of the sella  turcica.       Impression:      1. No hemorrhage, edema or mass effect.  2. Mild atrophy with associated prominence of the lateral ventricles.  3. Low density in the hemispheric white matter is nonspecific and likely  due to chronic small vessel disease.  4. Partially empty appearance of the sella turcica.     The full report of this exam was immediately signed and available to the  emergency room. The patient is currently in the emergency room.     This report was finalized on 06/09/2022 18:22 by Dr. Jack Goldstein MD.    XR Chest 1 View [[id number]] Collected: 06/09/22 1743     Updated: 06/09/22 1747    Narrative:      EXAMINATION:  XR CHEST 1 VW-  6/9/2022 5:29 PM CDT     HISTORY: Shortness of " breath. Covid positive.     COMPARISON: 2/28/2019.     TECHNIQUE: Single view AP image.     FINDINGS:  There is hypoventilation with vascular crowding. There is  stable bronchial wall thickening. There is patchy infiltrate in the left  perihilar region and left lung base. There is a calcified granuloma on  the left. Heart size appears to be within normal limits. There is a port  catheter on the right that is new compared to the prior study. There has  been prior cervical fusion and right shoulder arthroplasty. There are  degenerative changes of the spine and left glenohumeral joint.       Impression:      1. Hypoventilation with vascular crowding.  2. Opacity in the left perihilar region and left lung base likely due to  atelectasis. Pneumonia less likely.  3. Stable bronchial wall thickening.        This report was finalized on 06/09/2022 17:44 by Dr. Jack Goldstein MD.          I have reviewed the patient's current medications.     Assessment/Plan     Active Hospital Problems    Diagnosis    • **Acute encephalopathy    • Seizure-like activity (HCC)    • COVID-19 virus infection    • Essential hypertension    • Generalized anxiety disorder    • Acquired hypothyroidism    • Hyperlipidemia        Plan:  1.  Neurology consultation  2.  MRI Brain today  3.  EEG  4.  Seizure precautions  5.  IV Keppra  6.  IV Ativan PRN  7.  Magnesium replacement  8.  Add incentive spirometry  9.  Patient is currently on 2 L by nasal cannula and maintaining O2 saturations of 100%.  10.  Chest x-ray reviewed; some patchy opacifications on the left, likely could be atelectasis.  Continue to monitor closely.  11.  I will start trial of dexamethasone  12.  Patient has been on Paxlovid as outpatient  13.  ST assessment prior to starting PO  14.  Lovenox PPx  15.  We will resume some home medications, vitamin supplementation in the setting of COVID-19, etc. if/when cleared to begin p.o.  16.  Follow-up blood cultures  17.  Keep in  CCU      Electronically signed by Steve Cadet MD, 06/10/22, 07:17 CDT.

## 2022-06-10 NOTE — THERAPY EVALUATION
Acute Care - Speech Language Pathology   Swallow Initial Evaluation [facility]     Patient Name: Hanny Ivy  : 1945  MRN: [id number]  Today's Date: 6/10/2022               Admit Date: 2022     Clinical bedside swallowing evaluation completed per SLP. Pt was alert, presented with flat affect, initially showed no response to SLP verbalizations, yet began to occasionally respond with weak head nod at times. Pt was able to follow oral motor commands for exam with SLP modeling and mod verbal cues given decreased attention and significantly delayed processing, with mild R labial weakness at rest and otherwise generalized oral musculature weakness with reduced ROM. She was presented pureed, honey thick, nectar thick, and thin consistencies only. She was observed to have prolonged oral maninpulation of the pureed with significantly delayed oral transit, warranting cues to elicit a swallow with each of the pureed trials, as she had not initiated oral transit after the bolus had been in the oral cavity for 20-30 seconds x2. She showed reduced delay in initiation of oral transit with all other consistencies, with only mild delay. No observed concerns with laryngeal elevation were observed. No overt s/s of aspiration. [facility] not [facility] at this time given significant delay with pureed and noted cognitive delay, posing high risk for choking, in addition to aspiration.   RECS:   • D/C NPO status, ok to start pureed diet consistency with nectar thick liquid consistency  • 1:1 feeds with staff, feeder to consistently alternate between bites and sips in efforts to minimize delayed oral transit and swallow initiation with pureed  • RN to monitor for increased lung congestion  • meds crushed (if safe) in pudding/applesauce. ST to continue to follow and tx for dysphagia. Thanks!  Ericka Stevenson, CCC-SLP 6/10/2022 13:32 CDT    Visit Dx:     ICD-10-CM ICD-9-CM   1. Altered mental status, unspecified  altered mental status type  R41.82 780.97   2. COVID-19  U07.1 079.89   3. Dysphagia, unspecified type  R13.10 787.20     Patient Active Problem List   Diagnosis   • Dermatochalasis of both upper eyelids   • Visual field defect, unspecified   • Rotator cuff arthropathy, right   • Pure hypercholesterolemia   • Essential hypertension   • Mild intermittent asthma without complication   • Generalized anxiety disorder   • Gastroesophageal reflux disease without esophagitis   • Acquired hypothyroidism   • Anemia   • Chronic neck and back pain   • Gait abnormality   • Hyperlipidemia   • MVP (mitral valve prolapse)   • Pulmonary infiltrate in left lung on chest x-ray   • Seasonal allergies   • Traction bronchiectasis (HCC)   • History of recurrent UTIs   • Altered mental status, unspecified altered mental status type   • COVID-19 virus infection   • Acute encephalopathy   • Seizure-like activity (HCC)     Past Medical History:   Diagnosis Date   • Anxiety and depression    • Asthma     bronchioectasis   • Back pain     with nerve pain in legs   • Cataract    • Dermatochalasis of both upper eyelids    • GERD (gastroesophageal reflux disease)    • Hypertension    • Hypothyroidism     hypothyroidism   • Kidney disease    • Lipoma    • Osteoarthritis    • Visual field defect, unspecified      Past Surgical History:   Procedure Laterality Date   • ANKLE SURGERY     • APPENDECTOMY     • BRONCHOSCOPY     • CERVICAL FUSION     • CHOLECYSTECTOMY     • EXCISION MASS LEG Left 3/4/2019    Procedure: EXCISION LIPOMA - LEFT HIP;  Surgeon: Giulia Rodrigez MD;  Location: [facility] OR;  Service: General   • HYSTERECTOMY     • OTHER SURGICAL HISTORY      thumb   • REPLACEMENT TOTAL KNEE     • TOTAL SHOULDER ARTHROPLASTY W/ DISTAL CLAVICLE EXCISION Right 3/6/2018    Procedure: RIGHT REVERSE TOTAL SHOULDER ARTHROPLASTY;  Surgeon: Imtiaz Lobato MD;  Location: [facility] OR;  Service:        SLP Recommendation and Plan  SLP Swallowing  Diagnosis: moderate, oral dysphagia, R/O pharyngeal dysphagia (06/10/22 1200)  SLP Diet Recommendation: puree, nectar thick liquids (06/10/22 1200)  Recommended Precautions and Strategies: upright posture during/after eating, small bites of food and sips of liquid, alternate between small bites of food and sips of liquid, 1:1 supervision, assist with feeding, general aspiration precautions (06/10/22 1200)  SLP Rec. for Method of Medication Administration: meds crushed, with pudding or applesauce (06/10/22 1200)     Monitor for Signs of Aspiration: yes, notify SLP if any concerns, cough, gurgly voice, throat clearing, pneumonia, right lower lobe infiltrates (06/10/22 1200)  Recommended Diagnostics: reassess via clinical swallow evaluation, other (see comments) (More noted cognitive improvement is observed and/or as appropriate.) (06/10/22 1200)  Swallow Criteria for Skilled Therapeutic Interventions Met: demonstrates skilled criteria (06/10/22 1200)  Anticipated Discharge Disposition (SLP): unknown (06/10/22 1200)  Rehab Potential/Prognosis, Swallowing: adequate, monitor progress closely (06/10/22 1200)  Therapy Frequency (Swallow): at least, 3 days per week (06/10/22 1200)  Predicted Duration Therapy Intervention (Days): until discharge (06/10/22 1200)                                  Plan of Care Reviewed With: patient, other (see comments) (RN)  Progress:  (Eval)  Outcome Evaluation: Clinical bedside swallowing evaluation completed per SLP. Pt was alert, presented with flat affect, initially showed no response to SLP verbalizations, yet began to occasionally respond with weak head nod at times. Pt was able to follow oral motor commands for exam with SLP modeling and mod verbal cues given decreased attention and significantly delayed processing, with mild R labial weakness at rest and otherwise generalized oral musculature weakness with reduced ROM. She was presented pureed, honey thick, nectar thick, and thin  consistencies only. She was observed to have prolonged oral maninpulation of the pureed with significantly delayed oral transit, warranting cues to elicit a swallow with each of the pureed trials, as she had not initiated oral transit after the bolus had been in the oral cavity for 20-30 seconds x2. She showed reduced delay in initiation of oral transit with all other consistencies, with only mild delay. No observed concerns with laryngeal elevation were observed. No overt s/s of aspiration. Mech soft and solids were not attempted at this time given significant delay with pureed and noted cognitive delay, posing high risk for choking, in addition to aspiration. RECS: D/C NPO status, ok to start pureed diet consistency with nectar thick liquid consistency; 1:1 feeds with staff, feeder to consistently alternate between bites and sips in efforts to minimize delayed oral transit and swallow initiation with pureed; RN to monitor for increased lung congestion; meds crushed (if safe) in pudding/applesauce. ST to continue to follow and tx for dysphagia. Thanks!      SWALLOW EVALUATION (last 72 hours)     SLP Adult Swallow Evaluation     Row Name 06/10/22 1200                   Rehab Evaluation    Document Type evaluation  -TM        Subjective Information --  Decreased communication, decreased attention  -TM        Patient Observations alert;cooperative  -TM        Patient/Family/Caregiver Comments/Observations No family present, enhanced isolation precautions for COVID-19.  -TM        Patient Effort adequate  -TM                  General Information    Patient Profile Reviewed yes  -TM        Pertinent History Of Current Problem Acute encephalopathy, seizure. Hx of HTN, hypothyroidism, CKD, recent COVID-19, GERD, asthma, anxiety and depression, hypothyroidism.  -TM        Current Method of Nutrition NPO  -TM        Precautions/Limitations, Vision WFL;for purposes of eval  -TM        Precautions/Limitations, Hearing hearing  impairment, bilaterally  -TM        Prior Level of Function-Communication unknown  -TM        Prior Level of Function-Swallowing no diet consistency restrictions  -TM        Plans/Goals Discussed with patient;other (see comments)  RN  -TM        Barriers to Rehab cognitive status  -TM        Patient's Goals for Discharge patient did not state  -TM                  Pain    Additional Documentation Pain Scale: FACES Pre/Post-Treatment (Group)  -TM                  Pain Scale: FACES Pre/Post-Treatment    Pain: FACES Scale, Pretreatment 0-->no hurt  -TM        Posttreatment Pain Rating 4-->hurts little more  -TM        Pre/Posttreatment Pain Comment Grimacing with head of bed elevation and lowering during eval.  -TM                  Oral Motor Structure and Function    Dentition Assessment upper dentures/partial in place;lower dentures/partial in place  -TM        Secretion Management WNL/WFL  -TM        Mucosal Quality dry  -TM        Volitional Swallow unable to elicit  -TM        Volitional Cough weak;reduced respiratory support  -TM                  Oral Musculature and Cranial Nerve Assessment    Oral Motor General Assessment generalized oral motor weakness;vocal impairment  -TM        Vocal Impairment, Detail. Cranial Nerve X (Vagus) vocal quality abnormality (see comments);impaired throat clear/cough (see comments);other (see comments)  Weak vocal intensity with periods of dysphonia  -TM                  General Eating/Swallowing Observations    Respiratory Support Currently in Use nasal cannula  -TM        Eating/Swallowing Skills fed by SLP  -TM        Positioning During Eating upright in bed  -TM        Utensils Used spoon;straw  -TM        Consistencies Trialed pudding thick;honey-thick liquids;nectar/syrup-thick liquids;thin liquids  -TM                  Respiratory    Respiratory Status WFL  -TM                  Clinical Swallow Eval    Oral Prep Phase impaired  -TM        Oral Transit impaired  -TM         Oral Residue WFL  -TM        Pharyngeal Phase no overt signs/symptoms of pharyngeal impairment  r/o pharyngeal dysphagia  -TM        Esophageal Phase unremarkable  -TM        Clinical Swallow Evaluation Summary See note.  -TM                  Oral Prep Concerns    Oral Prep Concerns other (see comments)  Prolonged bolus manipulation  -TM        Oral Prep Concerns, Comment Pureed  -TM                  Oral Transit Concerns    Oral Transit Concerns delayed initiation of bolus transit  -TM        Delayed Intiation of Bolus Transit pudding;thin  -TM                  Pharyngeal Phase Concerns    Pharyngeal Phase Concerns other (see comments)  Audible swallows with nectar and thin, inconsistently  -TM                  SLP Evaluation Clinical Impression    SLP Swallowing Diagnosis moderate;oral dysphagia;R/O pharyngeal dysphagia  -TM        Functional Impact risk of aspiration/pneumonia;risk of malnutrition;risk of dehydration  -TM        Rehab Potential/Prognosis, Swallowing adequate, monitor progress closely  -TM        Swallow Criteria for Skilled Therapeutic Interventions Met demonstrates skilled criteria  -TM                  Recommendations    Therapy Frequency (Swallow) at least;3 days per week  -TM        Predicted Duration Therapy Intervention (Days) until discharge  -        SLP Diet Recommendation puree;nectar thick liquids  -TM        Recommended Diagnostics reassess via clinical swallow evaluation;other (see comments)  More noted cognitive improvement is observed and/or as appropriate.  -TM        Recommended Precautions and Strategies upright posture during/after eating;small bites of food and sips of liquid;alternate between small bites of food and sips of liquid;1:1 supervision;assist with feeding;general aspiration precautions  -TM        Oral Care Recommendations Oral Care before breakfast, after meals and PRN  -TM        SLP Rec. for Method of Medication Administration meds crushed;with pudding or  applesauce  -TM        Monitor for Signs of Aspiration yes;notify SLP if any concerns;cough;gurgly voice;throat clearing;pneumonia;right lower lobe infiltrates  -TM        Anticipated Discharge Disposition (SLP) unknown  -TM                  Swallow Goals (SLP)    Oral Nutrition/Hydration Goal Selection (SLP) oral nutrition/hydration, SLP goal 1  -TM        Lingual Strengthening Goal Selection (SLP) lingual strengthening, SLP goal 1  -TM        Additional Documentation lingual strengthening goal selection (SLP)  -TM                  Oral Nutrition/Hydration Goal 1 (SLP)    Oral Nutrition/Hydration Goal 1, SLP Pt will tolerate LRD without overt s/s of aspiration.  -TM        Time Frame (Oral Nutrition/Hydration Goal 1, SLP) by discharge  -TM        Barriers (Oral Nutrition/Hydration Goal 1, SLP) cognition  -TM        Progress/Outcomes (Oral Nutrition/Hydration Goal 1, SLP) goal ongoing  -TM                  Lingual Strengthening Goal 1 (SLP)    Activity (Lingual Strengthening Goal 1, SLP) increase lingual tone/sensation/control/coordination/movement  -TM        Increase Lingual Tone/Sensation/Control/Coordination/Movement lingual movement exercises  -TM        [place]/Accuracy (Lingual Strengthening Goal 1, SLP) with minimal cues (75-90% accuracy)  -TM        Time Frame (Lingual Strengthening Goal 1, SLP) by discharge  -TM        Barriers (Lingual Strengthening Goal 1, SLP) cognition  -TM        Progress/Outcomes (Lingual Strengthening Goal 1, SLP) goal ongoing  -TM              User Key  (r) = Recorded By, (t) = Taken By, (c) = Cosigned By    Initials Name Effective Dates    TM Ericka Stevenson CCC-SLP 06/16/21 -                 EDUCATION  The patient has been educated in the following areas:   Dysphagia (Swallowing Impairment).        SLP GOALS     Row Name 06/10/22 1200             Oral Nutrition/Hydration Goal 1 (SLP)    Oral Nutrition/Hydration Goal 1, SLP Pt will tolerate LRD without overt s/s of  aspiration.  -TM      Time Frame (Oral Nutrition/Hydration Goal 1, SLP) by discharge  -TM      Barriers (Oral Nutrition/Hydration Goal 1, SLP) cognition  -TM      Progress/Outcomes (Oral Nutrition/Hydration Goal 1, SLP) goal ongoing  -TM              Lingual Strengthening Goal 1 (SLP)    Activity (Lingual Strengthening Goal 1, SLP) increase lingual tone/sensation/control/coordination/movement  -TM      Increase Lingual Tone/Sensation/Control/Coordination/Movement lingual movement exercises  -TM      [place]/Accuracy (Lingual Strengthening Goal 1, SLP) with minimal cues (75-90% accuracy)  -TM      Time Frame (Lingual Strengthening Goal 1, SLP) by discharge  -TM      Barriers (Lingual Strengthening Goal 1, SLP) cognition  -TM      Progress/Outcomes (Lingual Strengthening Goal 1, SLP) goal ongoing  -TM            User Key  (r) = Recorded By, (t) = Taken By, (c) = Cosigned By    Initials Name Provider Type     Ericka Stevenson CCC-SLP Speech and Language Pathologist                   Time Calculation:    Time Calculation- SLP     Row Name 06/10/22 1329             Time Calculation- SLP    SLP Start Time 1006  -TM      SLP Stop Time 1118  -TM      SLP Time Calculation (min) 72 min  -TM      SLP Received On 06/10/22  -TM      SLP Goal Re-Cert Due Date 06/20/22  -TM              Untimed Charges    SLP Eval/Re-eval  ST Eval Oral Pharyng Swallow - 80359  -TM      [id number] Eval Oral Pharyng Swallow Minutes 72  -TM              Total Minutes    Untimed Charges Total Minutes 72  -TM       Total Minutes 72  -TM            User Key  (r) = Recorded By, (t) = Taken By, (c) = Cosigned By    Initials Name Provider Type     Ericka Stevenson CCC-SLP Speech and Language Pathologist                Therapy Charges for Today     Code Description Service Date Service Provider Modifiers Qty    [id number] HC ST EVAL ORAL PHARYNG SWALLOW 5 6/10/2022 Ericka Stevenson CCC-SLP GN 1               BENITA Hutchinson  6/10/2022

## 2022-06-10 NOTE — CONSULTS
Neurology Consult Note    Referring Provider: Steve Cadet MD    Reason for Consultation:    Possible seizure    Subjective     History of Present Illness:  This is a 77-year-old female patient routinely cared for by Kun Galicia MD, who was being treated as an outpatient for COVID-19 with Paxlovid.  The patient has a history of hypertension and chronic kidney disease.    The patient was brought to the emergency room due to complaints of her being disoriented and with acute mental status changes significantly different from her usual state of health.    After admission, the patient had a witnessed tonic-clonic episode that lasted 5 to 10 minutes with significant postictal period.  She has no previous history of seizure.    EEG performed demonstrates no evidence of epileptiform discharges, but generalized slowing.    MRI brain was negative for acute intracranial process.  No evidence of diffusion restriction and no evidence of abnormal signal suggesting post-ictal findings.    The patient is receiving Keppra 500 mg IV every 12 hours.  She has received magnesium supplementation (magnesium 1.3 this morning).  No repeat yet available..      Past Medical History:   Diagnosis Date   • Anxiety and depression    • Asthma     bronchioectasis   • Back pain     with nerve pain in legs   • Cataract    • Dermatochalasis of both upper eyelids    • GERD (gastroesophageal reflux disease)    • Hypertension    • Hypothyroidism     hypothyroidism   • Kidney disease    • Lipoma    • Osteoarthritis    • Visual field defect, unspecified        Allergies   Allergen Reactions   • Penicillins Rash and Other (See Comments)     Whelps, itching   • Sulfa Antibiotics Rash and Other (See Comments)     Whelps,l itching     No current facility-administered medications on file prior to encounter.     Current Outpatient Medications on File Prior to Encounter   Medication Sig   • acetaminophen (TYLENOL) 325 MG tablet Take 650 mg by mouth  Every 6 (Six) Hours As Needed for Mild Pain .   • amLODIPine (NORVASC) 5 MG tablet Take 5 mg by mouth Daily.   • budesonide-formoterol (SYMBICORT) 160-4.5 MCG/ACT inhaler Inhale 2 puffs 2 (Two) Times a Day.   • carvedilol (COREG) 25 MG tablet Take 25 mg by mouth 2 (Two) Times a Day.   • cetirizine (zyrTEC) 10 MG tablet Take 10 mg by mouth Daily.   • doxepin (SINEquan) 75 MG capsule Take 75 mg by mouth every night at bedtime.   • esomeprazole (nexIUM) 40 MG capsule Take 40 mg by mouth.   • estradiol (ESTRACE) 0.1 MG/GM vaginal cream Insert 2 g into the vagina 2 (Two) Times a Week for 360 days.   • ferrous sulfate 324 (65 Fe) MG tablet delayed-release EC tablet TAKE ONE TABLET BY MOUTH DAILY   • fluticasone (FLONASE) 50 MCG/ACT nasal spray 2 sprays into the nostril(s) as directed by provider Daily.   • gabapentin (NEURONTIN) 100 MG capsule Take 200 mg by mouth 2 (Two) Times a Day.   • guaiFENesin (MUCINEX) 600 MG 12 hr tablet Take 1,200 mg by mouth.   • ipratropium-albuterol (DUO-NEB) 0.5-2.5 mg/3 ml nebulizer Inhale 3 mL.   • levothyroxine (SYNTHROID, LEVOTHROID) 100 MCG tablet Take 1 tablet by mouth Q Morning   • Nirmatrelvir & Ritonavir (PAXLOVID) 20 x 150 MG & 10 x 100MG tablet therapy pack tablet Take 3 tablets by mouth 2 (Two) Times a Day.   • olmesartan (BENICAR) 40 MG tablet Take 40 mg by mouth Daily.   • Omega-3 Fatty Acids (FISH OIL) 1000 MG capsule capsule Take  by mouth Daily With Breakfast.   • polyethylene glycol (MIRALAX) powder Take 17 g by mouth As Needed.   • senna (SENOKOT) 8.6 MG tablet Take 2 tablets by mouth.   • simvastatin (ZOCOR) 20 MG tablet simvastatin 20 mg tablet   • sucralfate (CARAFATE) 1 GM/10ML suspension Take 1 g by mouth 4 (Four) Times a Day.   • venlafaxine XR (EFFEXOR-XR) 150 MG 24 hr capsule Take 150 mg by mouth.   • chlorthalidone (HYGROTON) 25 MG tablet Take 25 mg by mouth Daily. (Patient not taking: Reported on 6/9/2022)   • ciprofloxacin (Cipro) 250 MG tablet 1 po q 24 hours  for 7 days (Patient not taking: Reported on 6/9/2022)   • CloNIDine (CATAPRES) 0.1 MG tablet Take 0.1 mg by mouth 2 (Two) Times a Day As Needed.   • dexamethasone 0.5 MG/5ML elixir SWISH AND SPIT 5MLS BY MOUTH AND REPEAT FOUR TO FIVE TIMES A DAY (Patient not taking: Reported on 6/9/2022)   • Dulera 200-5 MCG/ACT inhaler INHALE TWO PUFFS INTO THE LUNGS TWICE DAILY (Patient not taking: Reported on 6/9/2022)   • EPINEPHrine (EPIPEN) 0.3 MG/0.3ML solution auto-injector injection Inject 0.3 mg into the muscle as needed. Use as directed for allergic reaction   • folic acid (FOLVITE) 1 MG tablet Take 1 mg by mouth Daily. (Patient not taking: Reported on 6/9/2022)   • HYDROcodone-acetaminophen (NORCO) 7.5-325 MG per tablet Take 1 tablet by mouth Every 4 (Four) Hours As Needed for Moderate Pain  (Pain). (Patient not taking: Reported on 6/9/2022)   • LORazepam (ATIVAN) 0.5 MG tablet Take 0.5 mg by mouth 2 (Two) Times a Day. for anxiety   • mometasone (NASONEX) 50 MCG/ACT nasal spray 2 sprays into each nostril Daily. (Patient not taking: Reported on 6/9/2022)   • nitrofurantoin (MACRODANTIN) 50 MG capsule Take 1 capsule by mouth Every Night. (Patient not taking: Reported on 6/9/2022)   • oxybutynin XL (Ditropan XL) 5 MG 24 hr tablet 1 po daily (Patient not taking: Reported on 6/9/2022)   • prednisoLONE acetate (PRED FORTE) 1 % ophthalmic suspension  (Patient not taking: Reported on 6/9/2022)   • sodium chloride 0.65 % nasal spray 1 spray into the nostril(s) as directed by provider As Needed for Congestion. (Patient not taking: Reported on 6/9/2022)       Social History     Socioeconomic History   • Marital status:    Tobacco Use   • Smoking status: Never Smoker   • Smokeless tobacco: Never Used   Vaping Use   • Vaping Use: Never used   Substance and Sexual Activity   • Alcohol use: No   • Drug use: No   • Sexual activity: Defer     Family History   Problem Relation Age of Onset   • Cancer Maternal Grandmother    •  "Hypertension Mother    • Stroke Mother    • Heart attack Father        Review of Systems  A 14 point review of systems was reviewed and was negative.    Objective      Vital Signs  Temp:  [97.9 °F (36.6 °C)-100.5 °F (38.1 °C)] 97.9 °F (36.6 °C)  Heart Rate:  [] 88  Resp:  [15-20] 15  BP: (136-169)/() 152/87    General Exam:  Head:  Normocephalic, atraumatic.  HEENT: PERRLA.  Full EOM.  Neck:  No lymphadenopathy, thyromegaly or bruit.  Cardiac:  Regular rate and rhythm.    Lungs:  Clear to auscultation bilaterally.    Abdomen:  Non-tender, Non-distended.    Extremities: Full peripheral pulses.  No clubbing, cyanosis or edema.  Skin: No ulceration, breakdown or rash.      Neurologic Exam:  Mental Status:    -Awake. Alert.    -Cannot tell me her name.  -Does not accurately follow simple commands.  -Nods \"yes\" to question if she is hungry     CN II:  Full visual fields with confrontation.  Pupils equally reactive to light.  CN III, IV, VI:  Extraocular muscles function intact with no nystagmus.  CN V: Provided no subjective input.  CN VII:  Facial motor symmetric.  CN VIII:  Gross hearing intact bilaterally.  CN IX/X:  Palate elevates symmetrically.  CN XI:  Shoulder shrug symmetric.  CN XII: Would not stick her tongue out for me.     Motor: (strength out of 5:  1= minimal movement, 2 = movement in plane of gravity, 3 = movement against gravity, 4 = movement against some resistance, 5 = full strength)     Does not appear to have any clear motor deficit, but would not participate in manual motor testing of the upper or lower extremities.       Deep Tendon Reflexes:  -Right              Biceps: 2+         Triceps: 2+      Brachioradialis: 2+              Patella: 2+       Ankle: 2+         Babinski:  negative  -Left              Biceps: 2+         Triceps: 2+      Brachioradialis: 2+              Patella: 2+       Ankle: 2+         Babinski:  negative    Tone (Modified Ofe Scale):  No appreciable increase " in tone or rigidity noted.     Sensory:  -Intact to light touch, pinprick BUE (C5-T1) and BLE (L2-S1).     Coordination:  -Could not follow command for finger-nose, however, surprisingly, did perform heel-to-shin bilaterally.     Gait  -Not observed due to safety concerns.    Results Review:  Lab Results (last 24 hours)     Procedure Component Value Units Date/Time    C-reactive Protein [[id number]]  (Abnormal) Collected: 06/09/22 1729    Specimen: Blood Updated: 06/10/22 0819     C-Reactive Protein 1.72 mg/dL     Comprehensive Metabolic Panel [[id number]]  (Abnormal) Collected: 06/10/22 0338    Specimen: Blood Updated: 06/10/22 0454     Glucose 105 mg/dL      BUN 18 mg/dL      Creatinine 0.85 mg/dL      Sodium 137 mmol/L      Potassium 3.5 mmol/L      Chloride 103 mmol/L      CO2 22.0 mmol/L      Calcium 8.4 mg/dL      Total Protein 6.7 g/dL      Albumin 3.70 g/dL      ALT (SGPT) 20 U/L      AST (SGOT) 31 U/L      Alkaline Phosphatase 63 U/L      Total Bilirubin 0.2 mg/dL      Globulin 3.0 gm/dL      A/G Ratio 1.2 g/dL      BUN/Creatinine Ratio 21.2     Anion Gap 12.0 mmol/L      eGFR 69.8 mL/min/1.73      Comment: National Kidney Foundation and American Society of Nephrology (ASN) Task Force recommended calculation based on the Chronic Kidney Disease Epidemiology Collaboration (CKD-EPI) equation refit without adjustment for race.       Narrative:      GFR Normal >60  Chronic Kidney Disease <60  Kidney Failure <15      C-reactive Protein [[id number]]  (Abnormal) Collected: 06/10/22 0338    Specimen: Blood Updated: 06/10/22 0454     C-Reactive Protein 1.00 mg/dL     CK [[id number]]  (Abnormal) Collected: 06/10/22 0338    Specimen: Blood Updated: 06/10/22 0454     Creatine Kinase 438 U/L     Phosphorus [[id number]]  (Normal) Collected: 06/10/22 0338    Specimen: Blood Updated: 06/10/22 0454     Phosphorus 2.9 mg/dL     Magnesium [[id number]]  (Abnormal) Collected: 06/10/22 0338    Specimen: Blood Updated: 06/10/22 0454      Magnesium 1.3 mg/dL     Ferritin [[id number]]  (Abnormal) Collected: 06/10/22 0338    Specimen: Blood Updated: 06/10/22 0454     Ferritin 485.90 ng/mL     Narrative:      Results may be falsely decreased if patient taking Biotin.      Sedimentation Rate [[id number]]  (Abnormal) Collected: 06/10/22 0338    Specimen: Blood Updated: 06/10/22 0432     Sed Rate 35 mm/hr     CBC & Differential [[id number]]  (Abnormal) Collected: 06/10/22 0338    Specimen: Blood Updated: 06/10/22 0427    Narrative:      The following orders were created for panel order CBC & Differential.  Procedure                               Abnormality         Status                     ---------                               -----------         ------                     CBC Auto Differential[[id number]]        Abnormal            Final result                 Please view results for these tests on the individual orders.    CBC Auto Differential [[id number]]  (Abnormal) Collected: 06/10/22 0338    Specimen: Blood Updated: 06/10/22 0427     WBC 5.52 10*3/mm3      RBC 3.74 10*6/mm3      Hemoglobin 11.6 g/dL      Hematocrit 34.7 %      MCV 92.8 fL      MCH 31.0 pg      MCHC 33.4 g/dL      RDW 12.7 %      RDW-SD 43.1 fl      MPV 10.0 fL      Platelets 159 10*3/mm3      Neutrophil % 62.9 %      Lymphocyte % 23.2 %      Monocyte % 12.7 %      Eosinophil % 0.4 %      Basophil % 0.4 %      Immature Grans % 0.4 %      Neutrophils, Absolute 3.48 10*3/mm3      Lymphocytes, Absolute 1.28 10*3/mm3      Monocytes, Absolute 0.70 10*3/mm3      Eosinophils, Absolute 0.02 10*3/mm3      Basophils, Absolute 0.02 10*3/mm3      Immature Grans, Absolute 0.02 10*3/mm3      nRBC 0.0 /100 WBC     POC Glucose Once [[id number]]  (Normal) Collected: 06/09/22 2315    Specimen: Blood Updated: 06/09/22 2326     Glucose 110 mg/dL      Comment: : JWAGNERMary NdiayehanMeter ID: [id number]       Blood Gas, Arterial - [[id number]]  (Abnormal) Collected: 06/09/22 2325    Specimen:  "Arterial Blood Updated: 06/09/22 2322     Site Right Brachial     Efren's Test N/A     pH, Arterial 7.331 pH units      Comment: 84 Value below reference range        pCO2, Arterial 29.8 mm Hg      Comment: 84 Value below reference range        pO2, Arterial 73.0 mm Hg      Comment: 84 Value below reference range        HCO3, Arterial 15.7 mmol/L      Comment: 84 Value below reference range        Base Excess, Arterial -8.9 mmol/L      Comment: 84 Value below reference range        O2 Saturation, Arterial 93.7 %      Comment: 84 Value below reference range        Temperature 37.0 C      Barometric Pressure for Blood Gas 750 mmHg      Modality Room Air     FIO2 21 %      Ventilator Mode NA     Collected by [id number]     Comment: Meter: [id number]     :  [id number]        pCO2, Temperature Corrected 29.8 mm Hg      pH, Temp Corrected 7.331 pH Units      pO2, Temperature Corrected 73.0 mm Hg     Blood Culture - Blood, Arm, Left [[id number]] Collected: 06/09/22 1725    Specimen: Blood from Arm, Left Updated: 06/09/22 1820    Blood Culture - Blood, Wrist, Right [[id number]] Collected: 06/09/22 1729    Specimen: Blood from Wrist, Right Updated: 06/09/22 1820    Procalcitonin [[id number]]  (Abnormal) Collected: 06/09/22 1729    Specimen: Blood Updated: 06/09/22 1808     Procalcitonin 0.33 ng/mL     Narrative:      As a Marker for Sepsis (Non-Neonates):    1. <0.5 ng/mL represents a low risk of severe sepsis and/or septic shock.  2. >2 ng/mL represents a high risk of severe sepsis and/or septic shock.    As a Marker for Lower Respiratory Tract Infections that require antibiotic therapy:    PCT on Admission    Antibiotic Therapy       6-12 Hrs later    >0.5                Strongly Recommended  >0.25 - <0.5        Recommended   0.1 - 0.25          Discouraged              Remeasure/reassess PCT  <0.1                Strongly Discouraged     Remeasure/reassess PCT    As 28 day mortality risk marker: \"Change in " "Procalcitonin Result\" (>80% or <=80%) if Day 0 (or Day 1) and Day 4 values are available. Refer to [email or web address]    Change in PCT <=80%  A decrease of PCT levels below or equal to 80% defines a positive change in PCT test result representing a higher risk for 28-day all-cause mortality of patients diagnosed with severe sepsis for septic shock.    Change in PCT >80%  A decrease of PCT levels of more than 80% defines a negative change in PCT result representing a lower risk for 28-day all-cause mortality of patients diagnosed with severe sepsis or septic shock.       COVID-19,[facility] IN-HOUSE,Nasal Swab No Transport Media 3-4 HR TAT - Swab, Nasal Cavity [[id number]]  (Abnormal) Collected: 06/09/22 1657    Specimen: Swab from Nasal Cavity Updated: 06/09/22 1805     COVID19 Detected    Narrative:      Fact sheet for providers: https://www.fda.gov/media/540558/download     Fact sheet for patients: https://www.fda.gov/media/516161/download    Test performed by PCR.    Consider negative results in combination with clinical observations, patient history, and epidemiological information.    Comprehensive Metabolic Panel [[id number]]  (Abnormal) Collected: 06/09/22 1729    Specimen: Blood Updated: 06/09/22 1804     Glucose 92 mg/dL      BUN 25 mg/dL      Creatinine 1.34 mg/dL      Sodium 138 mmol/L      Potassium 3.9 mmol/L      Chloride 102 mmol/L      CO2 24.0 mmol/L      Calcium 9.1 mg/dL      Total Protein 8.0 g/dL      Albumin 4.30 g/dL      ALT (SGPT) 23 U/L      AST (SGOT) 34 U/L      Alkaline Phosphatase 76 U/L      Total Bilirubin 0.2 mg/dL      Globulin 3.7 gm/dL      A/G Ratio 1.2 g/dL      BUN/Creatinine Ratio 18.7     Anion Gap 12.0 mmol/L      eGFR 40.4 mL/min/1.73      Comment: National Kidney Foundation and American Society of Nephrology (ASN) Task Force recommended calculation based on the Chronic Kidney Disease Epidemiology Collaboration (CKD-EPI) equation refit without adjustment for " race.       Narrative:      GFR Normal >60  Chronic Kidney Disease <60  Kidney Failure <15      Lactic Acid, Plasma [[id number]]  (Normal) Collected: 06/09/22 1729    Specimen: Blood Updated: 06/09/22 1802     Lactate 0.8 mmol/L     Troponin [[id number]]  (Normal) Collected: 06/09/22 1729    Specimen: Blood Updated: 06/09/22 1801     Troponin T <0.010 ng/mL     Narrative:      Troponin T Reference Range:  <= 0.03 ng/mL-   Negative for AMI  >0.03 ng/mL-     Abnormal for myocardial necrosis.  Clinicians would have to utilize clinical acumen, EKG, Troponin and serial changes to determine if it is an Acute Myocardial Infarction or myocardial injury due to an underlying chronic condition.       Results may be falsely decreased if patient taking Biotin.      BNP [[id number]]  (Normal) Collected: 06/09/22 1729    Specimen: Blood Updated: 06/09/22 1801     proBNP 531.9 pg/mL     Narrative:      Among patients with dyspnea, NT-proBNP is highly sensitive for the detection of acute congestive heart failure. In addition NT-proBNP of <300 pg/ml effectively rules out acute congestive heart failure with 99% negative predictive value.    Results may be falsely decreased if patient taking Biotin.      D-dimer, Quantitative [[id number]]  (Abnormal) Collected: 06/09/22 1729    Specimen: Blood Updated: 06/09/22 1752     D-Dimer, Quantitative 1.03 MCGFEU/mL     Narrative:      Reference Range is 0-0.50 MCGFEU/mL. However, results <0.50 MCGFEU/mL tends to rule out DVT or PE. Results >0.50 MCGFEU/mL are not useful in predicting absence or presence of DVT or PE.      Urinalysis With Culture If Indicated - Urine, Catheter [[id number]]  (Abnormal) Collected: 06/09/22 1738    Specimen: Urine, Catheter Updated: 06/09/22 1748     Color, UA Yellow     Appearance, UA Clear     pH, UA <=5.0     Specific Gravity, UA 1.016     Glucose, UA Negative     Ketones, UA Negative     Bilirubin, UA Negative     Blood, UA Negative     Protein, UA 30 mg/dL (1+)      Leuk Esterase, UA Negative     Nitrite, UA Negative     Urobilinogen, UA 0.2 E.U./dL    Narrative:      In absence of clinical symptoms, the presence of pyuria, bacteria, and/or nitrites on the urinalysis result does not correlate with infection.    Urinalysis, Microscopic Only - Urine, Catheter [[id number]]  (Abnormal) Collected: 06/09/22 1738    Specimen: Urine, Catheter Updated: 06/09/22 1748     RBC, UA 0-2 /HPF      WBC, UA None Seen /HPF      Comment: Urine culture not indicated.        Bacteria, UA None Seen /HPF      Squamous Epithelial Cells, UA None Seen /HPF      Hyaline Casts, UA 0-2 /LPF      Methodology Automated Microscopy    CBC & Differential [[id number]]  (Abnormal) Collected: 06/09/22 1729    Specimen: Blood Updated: 06/09/22 1741    Narrative:      The following orders were created for panel order CBC & Differential.  Procedure                               Abnormality         Status                     ---------                               -----------         ------                     CBC Auto Differential[[id number]]        Abnormal            Final result                 Please view results for these tests on the individual orders.    CBC Auto Differential [[id number]]  (Abnormal) Collected: 06/09/22 1729    Specimen: Blood Updated: 06/09/22 1741     WBC 6.41 10*3/mm3      RBC 4.35 10*6/mm3      Hemoglobin 13.4 g/dL      Hematocrit 40.6 %      MCV 93.3 fL      MCH 30.8 pg      MCHC 33.0 g/dL      RDW 12.7 %      RDW-SD 43.8 fl      MPV 9.5 fL      Platelets 190 10*3/mm3      Neutrophil % 63.3 %      Lymphocyte % 22.9 %      Monocyte % 12.8 %      Eosinophil % 0.5 %      Basophil % 0.3 %      Immature Grans % 0.2 %      Neutrophils, Absolute 4.06 10*3/mm3      Lymphocytes, Absolute 1.47 10*3/mm3      Monocytes, Absolute 0.82 10*3/mm3      Eosinophils, Absolute 0.03 10*3/mm3      Basophils, Absolute 0.02 10*3/mm3      Immature Grans, Absolute 0.01 10*3/mm3      nRBC 0.0 /100 WBC     Blood Gas,  Arterial - [[id number]]  (Abnormal) Collected: 06/09/22 1712    Specimen: Arterial Blood Updated: 06/09/22 1713     Site Right Brachial     Efren's Test N/A     pH, Arterial 7.452 pH units      Comment: 83 Value above reference range        pCO2, Arterial 31.2 mm Hg      Comment: 84 Value below reference range        pO2, Arterial 61.6 mm Hg      Comment: 84 Value below reference range        HCO3, Arterial 21.7 mmol/L      Base Excess, Arterial -1.4 mmol/L      Comment: 84 Value below reference range        O2 Saturation, Arterial 92.9 %      Comment: 84 Value below reference range        Temperature 37.0 C      Barometric Pressure for Blood Gas 749 mmHg      Modality Room Air     FIO2 21 %      Ventilator Mode NA     Collected by [id number]     Comment: Meter: [id number]     :  [id number]        pCO2, Temperature Corrected 31.2 mm Hg      pH, Temp Corrected 7.452 pH Units      pO2, Temperature Corrected 61.6 mm Hg           .  Imaging Results (Last 24 Hours)     Procedure Component Value Units Date/Time    MRI Brain With & Without Contrast [[id number]] Collected: 06/10/22 1146     Updated: 06/10/22 1201    Narrative:      EXAMINATION:  MRI BRAIN W WO CONTRAST-  6/10/2022 10:43 AM CDT     HISTORY: Seizure, new-onset, no history of trauma; R41.82-Altered mental  status, unspecified; U07.1-COVID-19.     TECHNIQUE: Multiplanar imaging was performed in a high field magnet  before and after gadolinium contrast administration.     COMPARISON: No comparison study.     FINDINGS: There is a 1.2 cm area of T2 high signal within the right  petrous apex. This is of increased signal on both the FLAIR and standard  T2 sequence. It is low signal on T1. There is minimal signal within this  area on the diffusion sequence. There is no evidence of acute infarct on  the diffusion-weighted sequence. There is T2 high signal within the  periventricular white matter. There is mild to moderate atrophy with  associated ventricular  prominence. There is a partially empty appearance  of the sella turcica. There is no abnormal enhancement after contrast  administration.       Impression:      1. A 1.2 cm T2 high signal lesion without enhancement in the right  petrous apex has minimal signal on the diffusion sequence. Cholesterol  granuloma and petrous apex effusion with proteinaceous fluid are in the  differential.  2. Mild to moderate atrophy with associated ventricular prominence.  3. T2 high signal within the hemispheric white matter is nonspecific and  likely due to chronic small vessel disease.  4. Incidental partially empty appearance of the sella turcica.     This report was finalized on 06/10/2022 11:58 by Dr. Jack Goldstein MD.    CT Head Without Contrast [[id number]] Collected: 06/10/22 0610     Updated: 06/10/22 0626    Narrative:      EXAMINATION: CT HEAD WO CONTRAST-      6/9/2022 11:24 PM CDT     HISTORY: possible seizure/ RRT; R41.82-Altered mental status,  unspecified; U07.1-COVID-19     In order to have a CT radiation dose as low as reasonably achievable  Automated Exposure Control was utilized for adjustment of the mA and/or  KV according to patient size.     DLP in mGycm= 1520        The CT scan of the head is performed without intravenous contrast  enhancement. Images are acquired in axial plane and subsequent  reconstruction in coronal and sagittal planes.     Comparison is made with the previous study dated 6/9/2022.     There is no evidence of a mass. There is no midline shift.     There is no evidence of intracranial hemorrhage or hematoma.     Moderately prominent ventricles, basal cisterns and cortical sulci are  similar to the previous study, representing a chronic volume  loss/atrophy.     Chronic white matter ischemic changes bilaterally persist. Gray-white  matter differentiation is maintained.     The images are reviewed in bone windows show no evidence of skull  fracture. The visualized paranasal sinuses and mastoid  air cells are  clear.       Impression:      1. No acute intracranial abnormality.     The above study was initially reviewed and reported by [facility]. I  cannot find any discrepancies.     This report was finalized on 06/10/2022 06:23 by Dr. Kierra Miranda MD.    CT Cervical Spine Without Contrast [[id number]] Collected: 06/09/22 1823     Updated: 06/09/22 1834    Narrative:      EXAMINATION:  CT CERVICAL SPINE WO CONTRAST-  6/9/2022 6:02 PM CDT     HISTORY: The patient fell. Concern for neck injury.     COMPARISON: No comparison.      DOSE LENGTH PRODUCT: 301 mGy-cm. Automated exposure control was also  utilized to decrease patient radiation dose.     TECHNIQUE: Serial helical tomographic images of the cervical spine were  obtained without the use of intravenous contrast. Sagittal and coronal  reformatted images were also provided.     FINDINGS:     ALIGNMENT: The alignment is normal. There is pannus formation around the  dens with some erosive change of the dens.     BONES: There is no evidence of fracture. Vertebral body heights are  maintained.     DISC SPACES:     C2-3: The disc is fairly well-maintained. There is facet arthropathy  left greater than right. There is mild central disc bulge producing  minimal dural sac compression. There is mild foraminal narrowing on the  left.     C3-4: The disc is fairly well-maintained. There is spondylitic and  uncinate spurring. There is facet arthropathy left much greater than  right. There is severe left and moderate to severe right-sided foraminal  narrowing. There is mild narrowing of the central canal.     C4-5: There is severe disc narrowing with spondylitic and uncinate  spurring. There is facet arthropathy. There is severe right and mild to  moderate left-sided foraminal narrowing. No significant central spinal  canal stenosis.     C5-6: Prior interbody fusion with anterior plate fixation. There appears  to be solid fusion of the disc. There is mild spondylitic  "spurring.  There is mild facet arthropathy. There is no significant spinal or  foraminal stenosis.     C6-7: There has been prior interbody fusion with anterior plate  fixation. There appears to be good solid fusion of the disc. The facet  joints are maintained. There is no spinal or foraminal stenosis.     C7-T1: There is severe disc narrowing. There is spondylitic and uncinate  spurring. The left-sided facet joint is fused. There has been prior  posterior instrumented fusion on the right. There is no central spinal  canal stenosis. There is at least moderate bilateral foraminal stenosis.     T1-2: There is mild disc narrowing. There is anterior syndesmophyte  formation. There is mild facet arthropathy. There is mild foraminal  narrowing bilaterally.     T2-3: There is disc narrowing with anterior spurring. There is mild  facet arthropathy. There is mild foraminal narrowing bilaterally.     OTHER FINDINGS: There is abnormal opacification in the left lung apex.  There is carotid artery calcification in the neck bilaterally.       Impression:      1. No evidence of cervical spine fracture.  2. Postoperative and degenerative changes of the cervical spine and  upper thoracic spine, as described.  3. Abnormal opacification at the left lung apex. This area is not fully  evaluated on this study. This may be infectious or inflammatory.  Neoplasm is also considered. Follow-up nonemergent chest CT recommended.  Marked as \"new lung findings\" in PACS for follow-up.  4. Carotid artery calcification in the neck bilaterally.     The full report of this exam was immediately signed and available to the  emergency room. The patient is currently in the emergency room.        This report was finalized on 06/09/2022 18:31 by Dr. Jack Goldstein MD.    CT Head Without Contrast [[id number]] Collected: 06/09/22 1821     Updated: 06/09/22 1825    Narrative:      EXAMINATION:  CT HEAD WO CONTRAST-  6/9/2022 6:02 PM CDT     HISTORY: The " patient fell. Rule out head injury.     TECHNIQUE: Multiple axial images were obtained through the brain without  contrast infusion. Multiplanar images were reconstructed.     DLP: 596 mGy-cm. Automated dosage reduction technique was utilized to  reduce patient dosage.     COMPARISON: No comparison study.     FINDINGS: There are no hemorrhage, edema or mass effect. There is mild  atrophy. There is mild associated prominence of the lateral ventricles.  There is low density in the hemispheric white matter. The paranasal  sinuses are clear. The mastoid air cells are clear. No calvarial  fracture is seen. There is a partially empty appearance of the sella  turcica.       Impression:      1. No hemorrhage, edema or mass effect.  2. Mild atrophy with associated prominence of the lateral ventricles.  3. Low density in the hemispheric white matter is nonspecific and likely  due to chronic small vessel disease.  4. Partially empty appearance of the sella turcica.     The full report of this exam was immediately signed and available to the  emergency room. The patient is currently in the emergency room.     This report was finalized on 06/09/2022 18:22 by Dr. Jack Goldstein MD.    XR Chest 1 View [[id number]] Collected: 06/09/22 1743     Updated: 06/09/22 1747    Narrative:      EXAMINATION:  XR CHEST 1 VW-  6/9/2022 5:29 PM CDT     HISTORY: Shortness of breath. Covid positive.     COMPARISON: 2/28/2019.     TECHNIQUE: Single view AP image.     FINDINGS:  There is hypoventilation with vascular crowding. There is  stable bronchial wall thickening. There is patchy infiltrate in the left  perihilar region and left lung base. There is a calcified granuloma on  the left. Heart size appears to be within normal limits. There is a port  catheter on the right that is new compared to the prior study. There has  been prior cervical fusion and right shoulder arthroplasty. There are  degenerative changes of the spine and left glenohumeral  joint.       Impression:      1. Hypoventilation with vascular crowding.  2. Opacity in the left perihilar region and left lung base likely due to  atelectasis. Pneumonia less likely.  3. Stable bronchial wall thickening.        This report was finalized on 06/09/2022 17:44 by Dr. Jack Goldstein MD.            Assessment/Plan     Impression:  · New onset seizure  · COVID-19 infection treated with Paxlovid  · Encephalopathy  · Hypertension, essential      Plan:  · Continue Keppra 500 mg IV every 12 hours  · Follow-up magnesium level  · PT, OT & ST evaluations  · Patient is clearly encephalopathic and confused.  She is not able to accurately follow commands and she provides no history.  That being said, nothing clearly focal on her examination.  MRI brain is reviewed and shows no acute intracranial abnormality.  · EEG is negative for epileptiform activity, but does show generalized slowing consistent with an underlying encephalopathy.  · UDS  · Encephalopathy will likely improve as other metabolic abnormalities improved.    Thank you, Dr. Cadet, for the consultation and the opportunity participate in the care of your patient.          Kevan Martinez PA-C  06/10/22  15:46 CDT

## 2022-06-10 NOTE — PLAN OF CARE
Problem: Adult Inpatient Plan of Care  Goal: Plan of Care Review  Outcome: Ongoing, Progressing  Flowsheets (Taken 6/10/2022 1200)  Progress: (Eval) --  Plan of Care Reviewed With: (RN)  • patient  • other (see comments)  Outcome Evaluation:  Clinical bedside swallowing evaluation completed per SLP. Pt was alert, presented with flat affect, initially showed no response to SLP verbalizations, yet began to occasionally respond with weak head nod at times. Pt was able to follow oral motor commands for exam with SLP modeling and mod verbal cues given decreased attention and significantly delayed processing, with mild R labial weakness at rest and otherwise generalized oral musculature weakness with reduced ROM. She was presented pureed, honey thick, nectar thick, and thin consistencies only. She was observed to have prolonged oral maninpulation of the pureed with significantly delayed oral transit, warranting cues to elicit a swallow with each of the pureed trials, as she had not initiated oral transit after the bolus had been in the oral cavity for 20-30 seconds x2. She showed reduced delay in initiation of oral transit with all other consistencies, with only mild delay. No observed concerns with laryngeal elevation were observed. No overt s/s of aspiration. Mech soft and solids were not attempted at this time given significant delay with pureed and noted cognitive delay, posing high risk for choking, in addition to aspiration.   RECS:   • D/C NPO status, ok to start pureed diet consistency with nectar thick liquid consistency  • 1:1 feeds with staff, feeder to consistently alternate between bites and sips in efforts to minimize delayed oral transit and swallow initiation with pureed  • RN to monitor for increased lung congestion  • meds crushed (if safe) in pudding/applesauce. ST to continue to follow and tx for dysphagia. Thanks!

## 2022-06-10 NOTE — NURSING NOTE
After giving Lovenox shot when preparing to leave room Pt starred at he wall screaming with eyes deviating to the right with arms stretched straight out. RRt called at 2259. Pt then began to appear to be holding her breath O2 on pulse ox was 63. Pt went limp and unresponsive. CPR started and roughly 5 compressions given before pt came to and had radial pulses.

## 2022-06-11 NOTE — PROGRESS NOTES
Neurology Progress Note      Chief Complaint:    Possible seizure    Subjective     Subjective:  Patient is demonstrating increased level of alertness and interaction today, but is not communicating effectively.  She is not following commands consistently.  She remains hemodynamically stable without any further demonstrated seizure-like activity.  No other focal neurologic complaints as detailed below.      Past Medical History:   Diagnosis Date   • Anxiety and depression    • Asthma     bronchioectasis   • Back pain     with nerve pain in legs   • Cataract    • Dermatochalasis of both upper eyelids    • GERD (gastroesophageal reflux disease)    • Hypertension    • Hypothyroidism     hypothyroidism   • Kidney disease    • Lipoma    • Osteoarthritis    • Visual field defect, unspecified      Past Surgical History:   Procedure Laterality Date   • ANKLE SURGERY     • APPENDECTOMY     • BRONCHOSCOPY     • CERVICAL FUSION     • CHOLECYSTECTOMY     • EXCISION MASS LEG Left 3/4/2019    Procedure: EXCISION LIPOMA - LEFT HIP;  Surgeon: Giulia Rodrigez MD;  Location: [facility] OR;  Service: General   • HYSTERECTOMY     • OTHER SURGICAL HISTORY      thumb   • REPLACEMENT TOTAL KNEE     • TOTAL SHOULDER ARTHROPLASTY W/ DISTAL CLAVICLE EXCISION Right 3/6/2018    Procedure: RIGHT REVERSE TOTAL SHOULDER ARTHROPLASTY;  Surgeon: Imtiaz Lobato MD;  Location: [facility] OR;  Service:      Family History   Problem Relation Age of Onset   • Cancer Maternal Grandmother    • Hypertension Mother    • Stroke Mother    • Heart attack Father      Social History     Tobacco Use   • Smoking status: Never Smoker   • Smokeless tobacco: Never Used   Vaping Use   • Vaping Use: Never used   Substance Use Topics   • Alcohol use: No   • Drug use: No       Medications:  Current Facility-Administered Medications   Medication Dose Route Frequency Provider Last Rate Last Admin   • acetaminophen (TYLENOL) tablet 650 mg  650 mg Oral Q6H PRN Stephanie  Otilio Henriquez MD       • ascorbic acid (VITAMIN C) tablet 500 mg  500 mg Oral Daily Steve Cadet MD       • budesonide-formoterol (SYMBICORT) 160-4.5 MCG/ACT inhaler 2 puff  2 puff Inhalation BID - RT Steve Cadet MD   2 puff at 06/11/22 0608   • cefTRIAXone (ROCEPHIN) 1 g in sodium chloride 0.9 % 100 mL IVPB-VTB  1 g Intravenous Q24H Otilio Brown  mL/hr at 06/10/22 2039 1 g at 06/10/22 2039   • cholecalciferol (VITAMIN D3) tablet 1,000 Units  1,000 Units Oral Daily Steve Cadet MD       • dexamethasone (DECADRON) injection 6 mg  6 mg Intravenous Daily Steve Cadet MD   6 mg at 06/10/22 0824   • famotidine (PEPCID) injection 20 mg  20 mg Intravenous Q12H Steve Cadet MD   20 mg at 06/10/22 2039   • Hold medication  1 each Does not apply Continuous PRN Steve Cadet MD       • ipratropium-albuterol (DUO-NEB) nebulizer solution 3 mL  3 mL Nebulization Q6H PRN Otilio Brown MD       • levETIRAcetam in NaCl 0.82% (KEPPRA) IVPB 500 mg  500 mg Intravenous Q12H Otilio Brown MD   500 mg at 06/10/22 2038   • LORazepam (ATIVAN) injection 1 mg  1 mg Intravenous Q4H PRN Steve Cadet MD       • magnesium sulfate 2g/50 mL (PREMIX) infusion  2 g Intravenous Once Steve Cadet MD       • sodium chloride 0.9 % flush 10 mL  10 mL Intravenous PRN Otilio Brown MD       • sodium chloride 0.9 % infusion  50 mL/hr Intravenous Continuous Otilio Brown MD 50 mL/hr at 06/11/22 0738 50 mL/hr at 06/11/22 0738   • zinc sulfate (ZINCATE) capsule 220 mg  220 mg Oral Daily Steve Cadet MD           Allergies:    Penicillins and Sulfa antibiotics    Review of Systems:   -A 14 point review of systems is completed and is negative.      Objective      Vital Signs  Temp:  [97.8 °F (36.6 °C)-98.4 °F (36.9 °C)] 98.1 °F (36.7 °C)  Heart Rate:  [55-80] 57  Resp:  [18] 18  BP: ()/(57-99) 137/57    Physical  "Exam:    General Exam:  Head:  Normocephalic, atraumatic.  HEENT: PERRLA.  Full EOM.  Neck:  No lymphadenopathy, thyromegaly or bruit.  Cardiac:  Regular rate and rhythm.    Lungs:  Clear to auscultation bilaterally.    Abdomen:  Non-tender, Non-distended.    Extremities: Full peripheral pulses.  No clubbing, cyanosis or edema.  Skin: No ulceration, breakdown or rash.      Neurologic Exam:  Mental Status:    -Awake. Alert.    -Oriented to self and \"[place].\"  Could not tell me that she is in the hospital.  -Follows simple commands better than yesterday, but with slightly greater than 50% accuracy.     CN II:  Full visual fields with confrontation.  Pupils equally reactive to light.  CN III, IV, VI:  Extraocular muscles function intact with no nystagmus.  CN V: Provided no subjective input.  CN VII:  Facial motor symmetric.  CN VIII:  Gross hearing intact bilaterally.  CN IX/X:  Palate elevates symmetrically.  CN XI:  Shoulder shrug symmetric.  CN XII: Would not stick her tongue out for me.     Motor: (strength out of 5:  1= minimal movement, 2 = movement in plane of gravity, 3 = movement against gravity, 4 = movement against some resistance, 5 = full strength)     5/5 in all motor groups of bilateral upper and lower extremities.        Deep Tendon Reflexes:  -Right              Biceps: 2+         Triceps: 2+      Brachioradialis: 2+              Patella: 2+       Ankle: 2+         Babinski:  negative  -Left              Biceps: 2+         Triceps: 2+      Brachioradialis: 2+              Patella: 2+       Ankle: 2+         Babinski:  negative     Tone (Modified Ofe Scale):  No appreciable increase in tone or rigidity noted.     Sensory:  -Intact to light touch, pinprick BUE (C5-T1) and BLE (L2-S1).     Coordination:  -Performed finger-nose and heel-to-shin with accuracy.     Gait  -Not observed due to safety concerns.     Results Review:    I reviewed the patient's new clinical results and findings.    Lab " Results (last 24 hours)     Procedure Component Value Units Date/Time    Basic Metabolic Panel [[id number]]  (Abnormal) Collected: 06/11/22 0655    Specimen: Blood Updated: 06/11/22 0730     Glucose 124 mg/dL      BUN 19 mg/dL      Creatinine 0.75 mg/dL      Sodium 137 mmol/L      Potassium 4.1 mmol/L      Chloride 103 mmol/L      CO2 22.0 mmol/L      Calcium 8.7 mg/dL      BUN/Creatinine Ratio 25.3     Anion Gap 12.0 mmol/L      eGFR 81.1 mL/min/1.73      Comment: National Kidney Foundation and American Society of Nephrology (ASN) Task Force recommended calculation based on the Chronic Kidney Disease Epidemiology Collaboration (CKD-EPI) equation refit without adjustment for race.       Narrative:      GFR Normal >60  Chronic Kidney Disease <60  Kidney Failure <15      CK [[id number]]  (Abnormal) Collected: 06/11/22 0655    Specimen: Blood Updated: 06/11/22 0730     Creatine Kinase 259 U/L     Magnesium [[id number]]  (Abnormal) Collected: 06/11/22 0655    Specimen: Blood Updated: 06/11/22 0730     Magnesium 1.4 mg/dL     CBC (No Diff) [[id number]]  (Abnormal) Collected: 06/11/22 0655    Specimen: Blood Updated: 06/11/22 0713     WBC 3.75 10*3/mm3      RBC 3.98 10*6/mm3      Hemoglobin 12.3 g/dL      Hematocrit 36.4 %      MCV 91.5 fL      MCH 30.9 pg      MCHC 33.8 g/dL      RDW 12.1 %      RDW-SD 40.7 fl      MPV 9.8 fL      Platelets 153 10*3/mm3     Urine Drug Screen - Urine, Clean Catch [[id number]]  (Abnormal) Collected: 06/10/22 2037    Specimen: Urine, Clean Catch Updated: 06/10/22 2100     THC, Screen, Urine Negative     Phencyclidine (PCP), Urine Negative     Cocaine Screen, Urine Negative     Methamphetamine, Ur Negative     Opiate Screen Negative     Amphetamine Screen, Urine Negative     Benzodiazepine Screen, Urine Negative     Tricyclic Antidepressants Screen Positive     Methadone Screen, Urine Negative     Barbiturates Screen, Urine Negative     Oxycodone Screen, Urine Negative     Propoxyphene Screen  Negative     Buprenorphine, Screen, Urine Negative    Narrative:      Cutoff For Drugs Screened:    Amphetamines               500 ng/ml  Barbiturates               200 ng/ml  Benzodiazepines            150 ng/ml  Cocaine                    150 ng/ml  Methadone                  200 ng/ml  Opiates                    100 ng/ml  Phencyclidine               25 ng/ml  THC                            50 ng/ml  Methamphetamine            500 ng/ml  Tricyclic Antidepressants  300 ng/ml  Oxycodone                  100 ng/ml  Propoxyphene               300 ng/ml  Buprenorphine               10 ng/ml    The normal value for all drugs tested is negative. This report includes unconfirmed screening results, with the cutoff values listed, to be used for medical treatment purposes only.  Unconfirmed results must not be used for non-medical purposes such as employment or legal testing.  Clinical consideration should be applied to any drug of abuse test, particularly when unconfirmed results are used.      Blood Culture - Blood, Arm, Left [[id number]]  (Normal) Collected: 06/09/22 1725    Specimen: Blood from Arm, Left Updated: 06/10/22 1834     Blood Culture No growth at 24 hours    Blood Culture - Blood, Wrist, Right [[id number]]  (Normal) Collected: 06/09/22 1729    Specimen: Blood from Wrist, Right Updated: 06/10/22 1834     Blood Culture No growth at 24 hours          Imaging Results (Last 24 Hours)     Procedure Component Value Units Date/Time    MRI Brain With & Without Contrast [[id number]] Collected: 06/10/22 1146     Updated: 06/10/22 1201    Narrative:      EXAMINATION:  MRI BRAIN W WO CONTRAST-  6/10/2022 10:43 AM CDT     HISTORY: Seizure, new-onset, no history of trauma; R41.82-Altered mental  status, unspecified; U07.1-COVID-19.     TECHNIQUE: Multiplanar imaging was performed in a high field magnet  before and after gadolinium contrast administration.     COMPARISON: No comparison study.     FINDINGS: There is a 1.2 cm  area of T2 high signal within the right  petrous apex. This is of increased signal on both the FLAIR and standard  T2 sequence. It is low signal on T1. There is minimal signal within this  area on the diffusion sequence. There is no evidence of acute infarct on  the diffusion-weighted sequence. There is T2 high signal within the  periventricular white matter. There is mild to moderate atrophy with  associated ventricular prominence. There is a partially empty appearance  of the sella turcica. There is no abnormal enhancement after contrast  administration.       Impression:      1. A 1.2 cm T2 high signal lesion without enhancement in the right  petrous apex has minimal signal on the diffusion sequence. Cholesterol  granuloma and petrous apex effusion with proteinaceous fluid are in the  differential.  2. Mild to moderate atrophy with associated ventricular prominence.  3. T2 high signal within the hemispheric white matter is nonspecific and  likely due to chronic small vessel disease.  4. Incidental partially empty appearance of the sella turcica.     This report was finalized on 06/10/2022 11:58 by Dr. Jack Goldstein MD.          Assessment/Plan     Impression:  · New onset seizure  · COVID-19 infection treated with Paxlovid  · Encephalopathy  · Hypertension, essential        Plan:  · Continue Keppra 500 mg IV every 12 hours.  Can transition to oral if acceptable speech therapy.  · Hospitalist is planning lumbar puncture later today.  · PT, OT & ST evaluations  · Patient's encephalopathy seems to improved.  She is speaking today.  She answers greater than 50% of questions appropriately.  She is following commands improved from yesterday.  She did not speak to me yesterday, but is speaking well at this time.  · EEG is negative for epileptiform activity, but does show generalized slowing consistent with an underlying encephalopathy.  · UDS positive for tricyclic antidepressant (on doxepin as home  medication)  · Encephalopathy will likely improve as other metabolic abnormalities improved.  Cognition improvement likely will be delayed from the improvement of her other metabolic issues.            Kevan Martinez PA-C  06/11/22  08:44 CDT

## 2022-06-11 NOTE — PROGRESS NOTES
"    [facility] Medicine Services  INPATIENT PROGRESS NOTE    Patient Name: Hanny Ivy  Date of Admission: 6/9/2022  Today's Date: 06/11/22  Length of Stay: 2  Primary Care Physician: Kun Gonzalez MD    Subjective   Chief Complaint: follow-up suspected seizure  HPI   Patient is more alert this morning and interactive, but I could not get her to provide any additional history articulate any words.  She did smiled at me on 1 occasion, and when I told her that I liked her smile she responded \"thank you.\"  That was the only speech that she articulated during my assessment.  She seemed to indicate that she does have a headache.  It was difficult to obtain any additional history.  She seemed a motion that her knees were causing some discomfort.  No reported seizure from overnight.  No other new lateralizing symptoms.  Patient not eating or drinking very much, but currently on a puréed diet.    Review of Systems   Constitutional: Positive for fever.        All pertinent negatives and positives are as above. All other systems have been reviewed and are negative unless otherwise stated.     Objective    Temp:  [97.8 °F (36.6 °C)-98.6 °F (37 °C)] 98.1 °F (36.7 °C)  Heart Rate:  [55-85] 57  Resp:  [18] 18  BP: ()/(57-99) 137/57  Physical Exam  Vitals reviewed.   Constitutional:       Appearance: She is ill-appearing.      Comments: She is more alert and interactive but still not communicating/talking   HENT:      Head: Normocephalic.      Mouth/Throat:      Mouth: Mucous membranes are dry.   Eyes:      Pupils: Pupils are equal, round, and reactive to light.   Neck:      Comments: Neck is supple but patient grimaced some with passive flexion  Cardiovascular:      Rate and Rhythm: Normal rate.   Pulmonary:      Effort: Pulmonary effort is normal. No respiratory distress.      Comments: Now on room air  Abdominal:      Palpations: Abdomen is soft.   Musculoskeletal:         General: " "No deformity.      Cervical back: Neck supple. No rigidity.      Comments: Previous knee surgery (scar noted); no erythema; no prominent effusion or warmth   Skin:     General: Skin is warm.      Capillary Refill: Capillary refill takes less than 2 seconds.   Neurological:      Mental Status: She is disoriented.      Motor: Weakness present.      Comments: Patient is able to answer some yes/no questions.  Aphasia, more alert and interactive, but I couldn't get her to articulate any words - I heard her say \"thank you\" once.  Moves all extremities; no lateralizing symptoms           Results Review:  I have reviewed the labs, radiology results, and diagnostic studies.    Laboratory Data:   Results from last 7 days   Lab Units 06/10/22  0338 06/09/22  1729 06/06/22  1211   WBC 10*3/mm3 5.52 6.41 5.6   HEMOGLOBIN g/dL 11.6* 13.4 12.4   HEMATOCRIT % 34.7 40.6 38.2   PLATELETS 10*3/mm3 159 190 198        Results from last 7 days   Lab Units 06/10/22  0338 06/09/22  1729 06/06/22  1211   SODIUM mmol/L 137 138 134*   POTASSIUM mmol/L 3.5 3.9 4.3   CHLORIDE mmol/L 103 102 93*   TOTAL CO2 mmol/L  --   --  20*   CO2 mmol/L 22.0 24.0  --    BUN mg/dL 18 25* 15   CREATININE mg/dL 0.85 1.34* 1.1*   CALCIUM mg/dL 8.4* 9.1 9.4   BILIRUBIN mg/dL 0.2 0.2 0.3   ALK PHOS U/L 63 76 92   ALT (SGPT) U/L 20 23 23   AST (SGOT) U/L 31 34* 33*   GLUCOSE mg/dL 105* 92 69*       Culture Data:   No results found for: BLOODCX, URINECX, WOUNDCX, MRSACX, RESPCX, STOOLCX    Radiology Data:   Imaging Results (Last 24 Hours)     Procedure Component Value Units Date/Time    MRI Brain With & Without Contrast [[id number]] Collected: 06/10/22 1146     Updated: 06/10/22 1201    Narrative:      EXAMINATION:  MRI BRAIN W WO CONTRAST-  6/10/2022 10:43 AM CDT     HISTORY: Seizure, new-onset, no history of trauma; R41.82-Altered mental  status, unspecified; U07.1-COVID-19.     TECHNIQUE: Multiplanar imaging was performed in a high field magnet  before and after " gadolinium contrast administration.     COMPARISON: No comparison study.     FINDINGS: There is a 1.2 cm area of T2 high signal within the right  petrous apex. This is of increased signal on both the FLAIR and standard  T2 sequence. It is low signal on T1. There is minimal signal within this  area on the diffusion sequence. There is no evidence of acute infarct on  the diffusion-weighted sequence. There is T2 high signal within the  periventricular white matter. There is mild to moderate atrophy with  associated ventricular prominence. There is a partially empty appearance  of the sella turcica. There is no abnormal enhancement after contrast  administration.       Impression:      1. A 1.2 cm T2 high signal lesion without enhancement in the right  petrous apex has minimal signal on the diffusion sequence. Cholesterol  granuloma and petrous apex effusion with proteinaceous fluid are in the  differential.  2. Mild to moderate atrophy with associated ventricular prominence.  3. T2 high signal within the hemispheric white matter is nonspecific and  likely due to chronic small vessel disease.  4. Incidental partially empty appearance of the sella turcica.     This report was finalized on 06/10/2022 11:58 by Dr. Jack Goldstein MD.          I have reviewed the patient's current medications.     Assessment/Plan     Active Hospital Problems    Diagnosis    • **Acute encephalopathy    • Seizure-like activity (HCC)    • COVID-19 virus infection    • Essential hypertension    • Generalized anxiety disorder    • Acquired hypothyroidism    • Hyperlipidemia        Plan:  1.  Neurology recommendations reviewed; appreciate their assistance  2.  MRI Brain with no acute findings  3.  EEG with no epileptiform activity however diffuse slowing was evident  4.  Continue seizure precautions  5.  Will order lumbar puncture for CSF analysis today  6.  Last dose of Lovenox (PPx dose) was given last PM; will d/c and transition to SCDs  7.  IV  Keppra  8.  Magnesium replacement again today; 2gram IV ordered  9.  Incentive spirometry  10.  Patient on room air this morning  11.  Chest x-ray reviewed; some patchy opacifications on the left, likely could be atelectasis.  Continue to monitor closely.  12.  Dexamethasone - day 2.  Started yesterday when patient has 02 requirement   13.  Patient has been on Paxlovid as outpatient  14.  Follow-up blood cultures  15.  Workup continues    Electronically signed by Steve Cadet MD, 06/11/22, 06:57 CDT.

## 2022-06-12 NOTE — PROGRESS NOTES
Neurology Progress Note      Chief Complaint:    Possible seizure  Encephalopathy    Subjective     Subjective:  Patient is alert, but less interactive today.  She did not voice any words for me today as she did yesterday.  Dr. Stoll is planning to proceed with lumbar puncture today evaluating her for encephalitis or other inflammatory process.  She has had no further seizure-like activity reported or documented.      Past Medical History:   Diagnosis Date   • Anxiety and depression    • Asthma     bronchioectasis   • Back pain     with nerve pain in legs   • Cataract    • Dermatochalasis of both upper eyelids    • GERD (gastroesophageal reflux disease)    • Hypertension    • Hypothyroidism     hypothyroidism   • Kidney disease    • Lipoma    • Osteoarthritis    • Visual field defect, unspecified      Past Surgical History:   Procedure Laterality Date   • ANKLE SURGERY     • APPENDECTOMY     • BRONCHOSCOPY     • CERVICAL FUSION     • CHOLECYSTECTOMY     • EXCISION MASS LEG Left 3/4/2019    Procedure: EXCISION LIPOMA - LEFT HIP;  Surgeon: Giulia Rodrigez MD;  Location: [facility] OR;  Service: General   • HYSTERECTOMY     • OTHER SURGICAL HISTORY      thumb   • REPLACEMENT TOTAL KNEE     • TOTAL SHOULDER ARTHROPLASTY W/ DISTAL CLAVICLE EXCISION Right 3/6/2018    Procedure: RIGHT REVERSE TOTAL SHOULDER ARTHROPLASTY;  Surgeon: Imtiaz Lobato MD;  Location: [facility] OR;  Service:      Family History   Problem Relation Age of Onset   • Cancer Maternal Grandmother    • Hypertension Mother    • Stroke Mother    • Heart attack Father      Social History     Tobacco Use   • Smoking status: Never Smoker   • Smokeless tobacco: Never Used   Vaping Use   • Vaping Use: Never used   Substance Use Topics   • Alcohol use: No   • Drug use: No       Medications:  Current Facility-Administered Medications   Medication Dose Route Frequency Provider Last Rate Last Admin   • acetaminophen (TYLENOL) tablet 650 mg  650 mg Oral Q6H PRN  Otilio Brown MD   650 mg at 06/11/22 1005   • ascorbic acid (VITAMIN C) tablet 500 mg  500 mg Oral Daily Steve Cadet MD   500 mg at 06/12/22 0906   • budesonide-formoterol (SYMBICORT) 160-4.5 MCG/ACT inhaler 2 puff  2 puff Inhalation BID - RT Steve Cadet MD   2 puff at 06/12/22 0627   • cefTRIAXone (ROCEPHIN) 1 g in sodium chloride 0.9 % 100 mL IVPB-VTB  1 g Intravenous Q24H Otilio Brown  mL/hr at 06/11/22 2103 1 g at 06/11/22 2103   • cholecalciferol (VITAMIN D3) tablet 1,000 Units  1,000 Units Oral Daily Steve Cadet MD   1,000 Units at 06/12/22 0906   • dexamethasone (DECADRON) injection 6 mg  6 mg Intravenous Daily Steve Cadet MD   6 mg at 06/12/22 0906   • famotidine (PEPCID) injection 20 mg  20 mg Intravenous Q12H Steve Cadet MD   20 mg at 06/12/22 0906   • Hold medication  1 each Does not apply Continuous PRN Steve Cadet MD       • ipratropium-albuterol (DUO-NEB) nebulizer solution 3 mL  3 mL Nebulization Q6H PRN Otilio Brown MD       • levETIRAcetam in NaCl 0.82% (KEPPRA) IVPB 500 mg  500 mg Intravenous Q12H Otilio Brown MD   500 mg at 06/12/22 0906   • LORazepam (ATIVAN) injection 1 mg  1 mg Intravenous Q4H PRN Steve Cadet MD       • sodium chloride 0.9 % flush 10 mL  10 mL Intravenous PRN Otilio Brown MD       • sodium chloride 0.9 % infusion  50 mL/hr Intravenous Continuous Otilio Brown MD 50 mL/hr at 06/11/22 0738 50 mL/hr at 06/11/22 0738   • zinc sulfate (ZINCATE) capsule 220 mg  220 mg Oral Daily Steve Cadet MD   220 mg at 06/12/22 0906       Allergies:    Penicillins and Sulfa antibiotics    Review of Systems:   -A 14 point review of systems is completed and is negative.      Objective      Vital Signs  Temp:  [96.6 °F (35.9 °C)-97.8 °F (36.6 °C)] 97.4 °F (36.3 °C)  Heart Rate:  [54-82] 67  Resp:  [15-18] 15  BP: (101-179)/()  "159/67    Physical Exam:     General Exam:  Head:  Normocephalic, atraumatic.  HEENT: PERRLA.  Full EOM.  Neck:  No lymphadenopathy, thyromegaly or bruit.  Cardiac:  Regular rate and rhythm.    Lungs:  Clear to auscultation bilaterally.    Abdomen:  Non-tender, Non-distended.    Extremities: Full peripheral pulses.  No clubbing, cyanosis or edema.  Skin: No ulceration, breakdown or rash.      Neurologic Exam:  Mental Status:    -Awake. Alert.    -Would not answer orientation questions.  Would only nod \"yes.\"  -Did not follow commands as well this morning.  Does perform finger-to-nose with the left and then when told to touch her nose she will tap her nose with the left upper extremity, but not the right although she independently moves the right upper extremity without difficulty.     CN II:  Full visual fields with confrontation.  Pupils equally reactive to light.  CN III, IV, VI:  Extraocular muscles function intact with no nystagmus.  CN V: Provided no subjective input.  CN VII:  Facial motor symmetric.  CN VIII:  Gross hearing intact bilaterally.  CN IX/X:  Palate elevates symmetrically.  CN XI:  Shoulder shrug symmetric.  CN XII: Would not stick her tongue out for me.     Motor: (strength out of 5:  1= minimal movement, 2 = movement in plane of gravity, 3 = movement against gravity, 4 = movement against some resistance, 5 = full strength)     5/5 in all motor groups of bilateral upper and lower extremities.        Deep Tendon Reflexes:  -Right              Biceps: 2+         Triceps: 2+      Brachioradialis: 2+              Patella: 2+       Ankle: 2+         Babinski:  negative  -Left              Biceps: 2+         Triceps: 2+      Brachioradialis: 2+              Patella: 2+       Ankle: 2+         Babinski:  negative     Tone (Modified Ofe Scale):  No appreciable increase in tone or rigidity noted.     Sensory:  -Intact to light touch, pinprick BUE (C5-T1) and BLE (L2-S1).     Coordination:  -Did not " perform finger-nose accurately today other than with the left upper extremity.  -Did not perform heel-to-shin bilaterally today.     Gait  -Not observed due to safety concerns.       Results Review:    I reviewed the patient's new clinical results and findings.    Lab Results (last 24 hours)     Procedure Component Value Units Date/Time    Magnesium [[id number]]  (Normal) Collected: 06/12/22 0426    Specimen: Blood Updated: 06/12/22 0512     Magnesium 1.8 mg/dL     Comprehensive Metabolic Panel [[id number]]  (Abnormal) Collected: 06/12/22 0426    Specimen: Blood Updated: 06/12/22 0506     Glucose 140 mg/dL      BUN 23 mg/dL      Creatinine 0.80 mg/dL      Sodium 140 mmol/L      Potassium 3.9 mmol/L      Chloride 106 mmol/L      CO2 22.0 mmol/L      Calcium 9.0 mg/dL      Total Protein 6.7 g/dL      Albumin 3.60 g/dL      ALT (SGPT) 18 U/L      AST (SGOT) 22 U/L      Alkaline Phosphatase 61 U/L      Total Bilirubin 0.2 mg/dL      Globulin 3.1 gm/dL      A/G Ratio 1.2 g/dL      BUN/Creatinine Ratio 28.8     Anion Gap 12.0 mmol/L      eGFR 75.1 mL/min/1.73      Comment: National Kidney Foundation and American Society of Nephrology (ASN) Task Force recommended calculation based on the Chronic Kidney Disease Epidemiology Collaboration (CKD-EPI) equation refit without adjustment for race.       Narrative:      GFR Normal >60  Chronic Kidney Disease <60  Kidney Failure <15      CBC & Differential [[id number]]  (Abnormal) Collected: 06/12/22 0426    Specimen: Blood Updated: 06/12/22 0455    Narrative:      The following orders were created for panel order CBC & Differential.  Procedure                               Abnormality         Status                     ---------                               -----------         ------                     CBC Auto Differential[[id number]]        Abnormal            Final result                 Please view results for these tests on the individual orders.    CBC Auto Differential  [[id number]]  (Abnormal) Collected: 06/12/22 0426    Specimen: Blood Updated: 06/12/22 0455     WBC 5.76 10*3/mm3      RBC 4.02 10*6/mm3      Hemoglobin 12.5 g/dL      Hematocrit 37.3 %      MCV 92.8 fL      MCH 31.1 pg      MCHC 33.5 g/dL      RDW 12.1 %      RDW-SD 41.1 fl      MPV 10.1 fL      Platelets 195 10*3/mm3      Neutrophil % 78.3 %      Lymphocyte % 10.6 %      Monocyte % 10.9 %      Eosinophil % 0.0 %      Basophil % 0.0 %      Immature Grans % 0.2 %      Neutrophils, Absolute 4.51 10*3/mm3      Lymphocytes, Absolute 0.61 10*3/mm3      Monocytes, Absolute 0.63 10*3/mm3      Eosinophils, Absolute 0.00 10*3/mm3      Basophils, Absolute 0.00 10*3/mm3      Immature Grans, Absolute 0.01 10*3/mm3      nRBC 0.0 /100 WBC     Blood Culture - Blood, Arm, Left [[id number]]  (Normal) Collected: 06/09/22 1725    Specimen: Blood from Arm, Left Updated: 06/11/22 1832     Blood Culture No growth at 2 days    Blood Culture - Blood, Wrist, Right [[id number]]  (Normal) Collected: 06/09/22 1729    Specimen: Blood from Wrist, Right Updated: 06/11/22 1832     Blood Culture No growth at 2 days    Protime-INR [[id number]]  (Abnormal) Collected: 06/11/22 1234    Specimen: Blood Updated: 06/11/22 1259     Protime 13.9 Seconds      INR 1.12    aPTT [[id number]]  (Normal) Collected: 06/11/22 1234    Specimen: Blood Updated: 06/11/22 1259     PTT 29.8 seconds           Imaging Results (Last 24 Hours)     ** No results found for the last 24 hours. **          Assessment/Plan     Impression:  · Possible seizure  · Hypomagnesemia  · COVID-19 infection treated with Paxlovid  · Encephalopathy  · Hypertension, essential        Plan:  · Continue Keppra 500 mg IV every 12 hours.  Can transition to oral if acceptable speech therapy.  · Hospitalist is planning lumbar puncture later today.  · PT, OT & ST   · Await encephalitis panel from lumbar puncture/CSF studies  · EEG is negative for epileptiform activity, but does show generalized slowing  consistent with an underlying encephalopathy.  · UDS positive for tricyclic antidepressant (on doxepin as home medication)  · Encephalopathy will likely improve as other metabolic abnormalities improved.  Cognition improvement likely will be delayed from the improvement of her other metabolic issues.  This still could be related to COVID-19.  · Dr. Stoll is ordering a RBC tagged magnesium level.  Supplement as indicated with target levels of 2.0.            Kevan Martinez PA-C  06/12/22  11:51 CDT

## 2022-06-12 NOTE — PLAN OF CARE
Goal Outcome Evaluation:   Patient able to tell me her name a few times during the day.  Tried to get out of bed.  Bed alarm activated.  Educated her to not attempt to get up.  LP ordered.  Patient must be off lovenox 24 hours prior to.  Temperature a little low this morning as documented.  Warm blankets applied.  Temperature improved.  Patient moaning when putting pillow under her head.  Said her head and neck hurts when asked.  Also rubbing her legs.  She said yes that her knees were hurting.  Tylenol given.  Pain improved.  Titrated oxygen down to RA.  Vital signs stable.

## 2022-06-12 NOTE — PLAN OF CARE
Goal Outcome Evaluation:   Right wrist IV infiltrated.  Removed.  New IV started as documented.  Magnesium ordered.  LP completed at bedside.  Patient lying flat x 2 hours.  No issues noted.  Medications ordered for HTN as documented.  Patient refused to eat anything for lunch or dinner.  Vital signs stable.  Orders to transfer.

## 2022-06-12 NOTE — PROCEDURES
"Lumbar Puncture    Date/Time: 6/12/2022 12:42 PM  Performed by: Robb Stoll MD  Authorized by: Robb Stoll MD   Consent: Verbal consent obtained. Written consent obtained.  Risks and benefits: risks, benefits and alternatives were discussed  Consent given by: power of   Patient understanding: patient states understanding of the procedure being performed  Patient consent: the patient's understanding of the procedure matches consent given  Procedure consent: procedure consent matches procedure scheduled  Relevant documents: relevant documents present and verified  Test results: test results available and properly labeled  Required items: required blood products, implants, devices, and special equipment available  Patient identity confirmed: anonymous protocol, patient vented/unresponsive  Time out: Immediately prior to procedure a \"time out\" was called to verify the correct patient, procedure, equipment, support staff and site/side marked as required.  Indications: evaluation for altered mental status and evaluation for infection    Anesthesia:  Local Anesthetic: lidocaine 1% without epinephrine    Sedation:  Patient sedated: Gave a 1 mg of ativan.    Preparation: Patient was prepped and draped in the usual sterile fashion.  Lumbar space: L4-L5 interspace  Patient's position: right lateral decubitus  Needle gauge: 18  Needle type: spinal needle - Quincke tip  Needle length: 3.5 in  Number of attempts: 5 or more  Fluid appearance: clear, blood-tinged and blood-tinged then clearing  Tubes of fluid: 4  Total volume: 18 ml  Post-procedure: adhesive bandage applied  Patient tolerance: patient tolerated the procedure well with no immediate complications        "

## 2022-06-13 NOTE — PROGRESS NOTES
Neurology Progress Note      Chief Complaint:    Encephalopathy  Possible seizure    Subjective     Subjective:  Patient is doing better than yesterday.  She is now on the telemetry floor.  She is working with speech therapy.  Still has some holding of food in the oral cavity with some delayed clearing of the oropharynx, however, is doing much better than yesterday.  Encephalitis panel is negative.  Other CSF studies unremarkable thus far.  No new seizure-like activity.      Past Medical History:   Diagnosis Date   • Anxiety and depression    • Asthma     bronchioectasis   • Back pain     with nerve pain in legs   • Cataract    • Dermatochalasis of both upper eyelids    • GERD (gastroesophageal reflux disease)    • Hypertension    • Hypothyroidism     hypothyroidism   • Kidney disease    • Lipoma    • Osteoarthritis    • Visual field defect, unspecified      Past Surgical History:   Procedure Laterality Date   • ANKLE SURGERY     • APPENDECTOMY     • BRONCHOSCOPY     • CERVICAL FUSION     • CHOLECYSTECTOMY     • EXCISION MASS LEG Left 3/4/2019    Procedure: EXCISION LIPOMA - LEFT HIP;  Surgeon: Giulia Rodrigez MD;  Location: [facility] OR;  Service: General   • HYSTERECTOMY     • OTHER SURGICAL HISTORY      thumb   • REPLACEMENT TOTAL KNEE     • TOTAL SHOULDER ARTHROPLASTY W/ DISTAL CLAVICLE EXCISION Right 3/6/2018    Procedure: RIGHT REVERSE TOTAL SHOULDER ARTHROPLASTY;  Surgeon: Imtiaz Lobato MD;  Location: [facility] OR;  Service:      Family History   Problem Relation Age of Onset   • Cancer Maternal Grandmother    • Hypertension Mother    • Stroke Mother    • Heart attack Father      Social History     Tobacco Use   • Smoking status: Never Smoker   • Smokeless tobacco: Never Used   Vaping Use   • Vaping Use: Never used   Substance Use Topics   • Alcohol use: No   • Drug use: No       Medications:  Current Facility-Administered Medications   Medication Dose Route Frequency Provider Last Rate Last Admin   •  acetaminophen (TYLENOL) tablet 650 mg  650 mg Oral Q6H PRN Robb Stoll MD   650 mg at 06/11/22 1005   • amLODIPine (NORVASC) tablet 5 mg  5 mg Oral Daily Robb Stoll MD   5 mg at 06/12/22 2017   • ascorbic acid (VITAMIN C) tablet 500 mg  500 mg Oral Daily Robb Stoll MD   500 mg at 06/12/22 0906   • budesonide-formoterol (SYMBICORT) 160-4.5 MCG/ACT inhaler 2 puff  2 puff Inhalation BID - RT Robb Stoll MD   2 puff at 06/13/22 0733   • cefTRIAXone (ROCEPHIN) 1 g in sodium chloride 0.9 % 100 mL IVPB-VTB  1 g Intravenous Q24H Robb Stoll  mL/hr at 06/1943 1 g at 06/1943   • cholecalciferol (VITAMIN D3) tablet 1,000 Units  1,000 Units Oral Daily Robb Stoll MD   1,000 Units at 06/12/22 0906   • dexamethasone (DECADRON) injection 6 mg  6 mg Intravenous Daily Robb Stoll MD   6 mg at 06/12/22 0906   • famotidine (PEPCID) injection 20 mg  20 mg Intravenous Q12H Robb Stoll MD   20 mg at 06/1945   • Hold medication  1 each Does not apply Continuous PRN Robb Stoll MD       • ipratropium-albuterol (DUO-NEB) nebulizer solution 3 mL  3 mL Nebulization Q6H PRN Robb Stoll MD       • levETIRAcetam in NaCl 0.82% (KEPPRA) IVPB 500 mg  500 mg Intravenous Q12H Robb Stoll MD   500 mg at 06/12/22 2016   • LORazepam (ATIVAN) injection 1 mg  1 mg Intravenous Q4H PRN Robb Stoll MD       • losartan (COZAAR) tablet 50 mg  50 mg Oral Q24H Robb Stoll MD   50 mg at 06/12/22 2017   • sodium chloride 0.9 % flush 10 mL  10 mL Intravenous PRN Robb Stoll MD       • sodium chloride 0.9 % infusion  50 mL/hr Intravenous Continuous Robb Stoll MD 50 mL/hr at 06/12/22 1443 50 mL/hr at 06/12/22 1443   • zinc sulfate (ZINCATE) capsule 220 mg  220 mg Oral Daily Robb Stoll MD   220 mg at 06/12/22 0906       Allergies:    Penicillins and Sulfa antibiotics    Review of Systems:   -A 14 point review of  "systems is completed and is negative.      Objective      Vital Signs  Temp:  [94 °F (34.4 °C)-98.1 °F (36.7 °C)] 94 °F (34.4 °C)  Heart Rate:  [59-86] 86  Resp:  [13-18] 16  BP: (126-182)/(69-99) 130/82    Physical Exam:    General Exam:  Head:  Normocephalic, atraumatic.  HEENT: PERRLA.  Full EOM.  Neck:  No lymphadenopathy, thyromegaly or bruit.  Cardiac:  Regular rate and rhythm.  Normal S1, S2.  No murmur, rub or gallop.  Lungs:  Clear to auscultation bilaterally.  No wheeze, rales or rhonchi.  Abdomen:  Non-tender, Non-distended.  Bowel sounds normoactive.  Extremities: Full peripheral pulses.  No clubbing, cyanosis or edema.  Skin: No ulceration, breakdown or rash.      Neurologic Exam:  Mental Status:    -Awake. Alert. Oriented to person & \"Sears.\"  -Otherwise does not speak very much, but when asked what her daughter's name is, she states, \"my daughter works…\" and seems to have difficulty completing thoughts, but is speaking more than yesterday.  -Follows some simple commands.  Improved from yesterday     CN II:  Full visual fields with confrontation.  Pupils equally reactive to light.  CN III, IV, VI:  Extraocular muscles function intact with no nystagmus.  CN V:  Facial sensory is symmetric.  CN VII:  Facial motor symmetric.  CN VIII:  Gross hearing intact bilaterally.  CN IX/X:  Palate elevates symmetrically.  CN XI:  Shoulder shrug symmetric.  CN XII:  Tongue is midline on protrusion.     Motor: (strength out of 5:  1= minimal movement, 2 = movement in plane of gravity, 3 = movement against gravity, 4 = movement against some resistance, 5 = full strength)     -5/5 in bilateral biceps, triceps, brachioradialis, wrist extensors and intrinsic muscles of the hand.    -5/5 in bilateral hip flexors, quadriceps, hamstrings, gastrocsoleus complex, anterior tibialis and extensor hallucis longus.       Deep Tendon Reflexes:  -Right              Biceps: 2+         Triceps: 2+      Brachioradialis: 2+             "  Patella: 2+       Ankle: 2+           -Left              Biceps: 2+         Triceps: 2+      Brachioradialis: 2+              Patella: 2+       Ankle: 2+             Tone (Modified Ofe Scale):  No appreciable increase in tone or rigidity noted.     Sensory:  -Intact to light touch, pinprick BUE (C5-T1) and BLE (L2-S1).     Coordination:  -Finger to nose intact BUEs  -Heel to shin intact BLEs  -No ataxia     Gait  -No signs of ataxia  -ambulates unassisted       Results Review:    I reviewed the patient's new clinical results and findings.    Lab Results (last 24 hours)     Procedure Component Value Units Date/Time    Culture, CSF - Cerebrospinal Fluid, Lumbar Puncture [[id number]] Collected: 06/12/22 1239    Specimen: Cerebrospinal Fluid from Lumbar Puncture Updated: 06/13/22 0959     CSF Culture No growth     Gram Stain Few (2+) WBCs per low power field      No organisms seen    Meningitis / Encephalitis Panel, PCR - Cerebrospinal Fluid, Lumbar Puncture [[id number]]  (Normal) Collected: 06/12/22 1239    Specimen: Cerebrospinal Fluid from Lumbar Puncture Updated: 06/12/22 1935     ESCHERICHIA COLI K1, PCR Not Detected     HAEMOPHILUS INFLUENZAE, PCR Not Detected     LISTERIA MONOCYTOGENES, PCR Not Detected     NEISSERIA MENINGITIDIS, PCR Not Detected     STREPTOCOCCUS AGALACTIAE, PCR Not Detected     STREPTOCOCCUS PNEUMONIAE, PCR Not Detected     CYTOMEGALOVIRUS (CMV), PCR Not Detected     ENTEROVIRUS, PCR Not Detected     HERPES SIMPLEX VIRUS 1 (HSV-1), PCR Not Detected     HERPES SIMPLEX VIRUS 2 (HSV-2), PCR Not Detected     HUMAN PARECHOVIRUS, PCR Not Detected     VARICELLA ZOSTER VIRUS (VZV), PCR Not Detected     CRYPTOCOCCUS NEOFORMANS / GATTII, PCR Not Detected     HUMAN HERPES VIRUS 6 PCR Not Detected    Narrative:      This test is performed by utilizing real time polymerace chain recation (PCR).   The [employer] ME Panel does not distinguish between latent and active CMV and HHV-6 infections. Detection  of these viruses may indicate primary infection, secondary reactivation, or the presence of latent virus. Results should always be interpreted in conjunction with other clinical, laboratory, and epidemiological information.    Blood Culture - Blood, Arm, Left [[id number]]  (Normal) Collected: 06/09/22 1725    Specimen: Blood from Arm, Left Updated: 06/12/22 1833     Blood Culture No growth at 3 days    Blood Culture - Blood, Wrist, Right [[id number]]  (Normal) Collected: 06/09/22 1729    Specimen: Blood from Wrist, Right Updated: 06/12/22 1833     Blood Culture No growth at 3 days    Cell Count With Differential, CSF [[id number]]  (Abnormal) Collected: 06/12/22 1239    Specimen: Cerebrospinal Fluid from Lumbar Puncture Updated: 06/12/22 1441    Narrative:      The following orders were created for panel order Cell Count With Differential, CSF.  Procedure                               Abnormality         Status                     ---------                               -----------         ------                     Cell Count, CSF - Cerebr...[[id number]]  Abnormal            Final result               Spinal fluid differentia...[[id number]]  Abnormal            Final result                 Please view results for these tests on the individual orders.    Spinal fluid differential - Cerebrospinal Fluid, Lumbar Puncture [[id number]]  (Abnormal) Collected: 06/12/22 1239    Specimen: Cerebrospinal Fluid from Lumbar Puncture Updated: 06/12/22 1441     Lymphocytes, CSF 88 %      Monocytes, CSF 12 %     Cell Count With Differential, CSF Use CSF Tube: 1 [[id number]]  (Abnormal) Collected: 06/12/22 1239    Specimen: Cerebrospinal Fluid from Lumbar Puncture Updated: 06/12/22 1437    Narrative:      The following orders were created for panel order Cell Count With Differential, CSF Use CSF Tube: 1.  Procedure                               Abnormality         Status                     ---------                                -----------         ------                     Cell Count, CSF - Cerebr...[[id number]]  Abnormal            Final result               Spinal fluid differentia...[[id number]]  Abnormal            Final result                 Please view results for these tests on the individual orders.    Spinal fluid differential - Cerebrospinal Fluid, Lumbar Puncture [[id number]]  (Abnormal) Collected: 06/12/22 1239    Specimen: Cerebrospinal Fluid from Lumbar Puncture Updated: 06/12/22 1437     Lymphocytes, CSF 88 %      Monocytes, CSF 12 %     Protein, CSF - Cerebrospinal Fluid, Lumbar Puncture [[id number]]  (Abnormal) Collected: 06/12/22 1239    Specimen: Cerebrospinal Fluid from Lumbar Puncture Updated: 06/12/22 1341     Protein, Total (CSF) 64.6 mg/dL     Glucose, CSF - Cerebrospinal Fluid, Lumbar Puncture [[id number]]  (Normal) Collected: 06/12/22 1239    Specimen: Cerebrospinal Fluid from Lumbar Puncture Updated: 06/12/22 1341     Glucose, CSF 67 mg/dL     Cell Count, CSF - Cerebrospinal Fluid, Lumbar Puncture [[id number]]  (Abnormal) Collected: 06/12/22 1239    Specimen: Cerebrospinal Fluid from Lumbar Puncture Updated: 06/12/22 1331     Color, CSF Colorless     Supernatant Color, CSF Colorless     Appearance, CSF Clear     RBC, CSF 1,000 /mm3      Nucleated Cells, CSF 19 /mm3      Volume, CSF 28.0 mL      Tube Number, CSF 3    Cell Count, CSF - Cerebrospinal Fluid, Lumbar Puncture [[id number]]  (Abnormal) Collected: 06/12/22 1239    Specimen: Cerebrospinal Fluid from Lumbar Puncture Updated: 06/12/22 1325     Color, CSF Pink     Supernatant Color, CSF Xanthrochromic     Appearance, CSF Hazy     RBC, CSF 2,000 /mm3      Nucleated Cells, CSF 36 /mm3      Volume, CSF 28.0 mL      Tube Number, CSF 1     Method: Automated Sysmex XN Method    Magnesium, RBC [[id number]] Collected: 06/12/22 1143    Specimen: Blood Updated: 06/12/22 1158          Imaging Results (Last 24 Hours)     ** No results found for the last 24 hours. **           Assessment/Plan     Impression:  · Possible seizure  · Hypomagnesemia  · COVID-19 infection treated with Paxlovid  · Encephalopathy--possibly COVID-related  · Hypertension, essential        Plan:  · Continue Keppra 500 mg IV every 12 hours.  Can transition to oral if acceptable speech therapy.  Would recommend only use of this in the short-term and will look at weaning and discontinuing as encephalopathy clears.  · Encephalitis panel and other markers from lumbar puncture are negative.  · Continue PT, OT & ST   · Suspect that this may be a COVID-related encephalopathy.  · Awaiting follow-up RBC magnesium ordered yesterday.  Magnesium serum was 1.8 yesterday.  · Will anticipate weaning and discontinuing Keppra in the near future.  · Neurology will follow more peripherally as encephalopathy improves.            Kevan Martinez PA-C  06/13/22  11:57 CDT

## 2022-06-13 NOTE — CASE MANAGEMENT/SOCIAL WORK
Continued Stay Note   Gene     Patient Name: Hanny Ivy  MRN: [id number]  Today's Date: 6/13/2022    Admit Date: 6/9/2022     Discharge Plan     Row Name 06/13/22 1425       Plan    Plan Comments Events noted. Plan is for dc home when medically stable with the assist of the daughter. Unsure if HH will be needed at dc . Pt has DME at home. Will continue to follow.               Discharge Codes    No documentation.               Expected Discharge Date and Time     Expected Discharge Date Expected Discharge Time    [place] 15, 2022             AC [facility]

## 2022-06-13 NOTE — PLAN OF CARE
"  Problem: Adult Inpatient Plan of Care  Goal: Plan of Care Review  Outcome: Ongoing, Progressing  Flowsheets (Taken 6/13/2022 0840)  Progress: improving  Plan of Care Reviewed With: patient  Outcome Evaluation:  SLP dysphagia tx completed. Pt was alert, cooperative, initially exhibited significant tremors of the upper extremities and head, yet this resolved by time of visit per Kevan Martinez neurology PA (was educated on SLP observation). Pt remains confused. Able to respond in short utterances at times, though perseverative, often stated \"I don't understand why\" and was unable to complete her utterance even with cues and assistance per SLP. She was presented trials of pureed, honey thick, nectar thick, thin, and regular solid for possible upgrade. She continued to present with oral holding and delayed oral transit with the pureed consistency, as was noted during initial eval, therefore warranted cues. However, no further oral holding observed. Cough following thin liquid trial. Mildly decreased mandibular bite strength with regular solid, though functional mastication and spontaneous lingual sweep for oral residue maintenance and clearing. Minimal to mild oral residue post solid. No other overt s/s of aspiration were observed than was noted above with thin. Cannot fully r/o aspiration at this time. Educated RN on pt performance post tx.   RECS:    Upgrade to mechanical soft diet consistency, continue with nectar thick liquid consistency   meds whole in pudding/applesauce and/or with nectar thick liquids   RN to monitor for increased lung congestion   Assistance with staff with feeds   General feeding/aspiration precautions. ST to continue to follow and tx for dysphagia. Thanks!     "

## 2022-06-13 NOTE — THERAPY TREATMENT NOTE
"Acute Care - Speech Language Pathology   Swallow Treatment Note [facility]     Patient Name: Hanny Ivy  : 1945  MRN: [id number]  Today's Date: 2022               Admit Date: 2022     SLP dysphagia tx completed. Pt was alert, cooperative, initially exhibited significant tremors of the upper extremities and head, yet this resolved by time of visit per Kevan Martinez neurology PA (was educated on SLP observation). Pt remains confused. Able to respond in short utterances at times, though perseverative, often stated \"I don't understand why\" and was unable to complete her utterance even with cues and assistance per SLP. She was presented trials of pureed, honey thick, nectar thick, thin, and regular solid for possible upgrade. She continued to present with oral holding and delayed oral transit with the pureed consistency, as was noted during initial eval, therefore warranted cues. However, no further oral holding observed. Cough following thin liquid trial. Mildly decreased mandibular bite strength with regular solid, though functional mastication and spontaneous lingual sweep for oral residue maintenance and clearing. Minimal to mild oral residue post solid. No other overt s/s of aspiration were observed than was noted above with thin. Cannot fully r/o aspiration at this time. Educated RN on pt performance post tx.   RECS:   • Upgrade to mechanical soft diet consistency, continue with nectar thick liquid consistency  • meds whole in pudding/applesauce and/or with nectar thick liquids  • RN to monitor for increased lung congestion  • Assistance with staff with feeds  • General feeding/aspiration precautions. ST to continue to follow and tx for dysphagia. Thanks!  Ericka Stevenson, CCC-SLP 2022 10:48 CDT    Visit Dx:     ICD-10-CM ICD-9-CM   1. Altered mental status, unspecified altered mental status type  R41.82 780.97   2. COVID-19  U07.1 079.89   3. Dysphagia, unspecified type  R13.10 787.20   4. " Acute encephalopathy  G93.40 348.30     Patient Active Problem List   Diagnosis   • Dermatochalasis of both upper eyelids   • Visual field defect, unspecified   • Rotator cuff arthropathy, right   • Pure hypercholesterolemia   • Essential hypertension   • Mild intermittent asthma without complication   • Generalized anxiety disorder   • Gastroesophageal reflux disease without esophagitis   • Acquired hypothyroidism   • Anemia   • Chronic neck and back pain   • Gait abnormality   • Hyperlipidemia   • MVP (mitral valve prolapse)   • Pulmonary infiltrate in left lung on chest x-ray   • Seasonal allergies   • Traction bronchiectasis (HCC)   • History of recurrent UTIs   • Altered mental status, unspecified altered mental status type   • COVID-19 virus infection   • Acute encephalopathy   • Seizure-like activity (HCC)     Past Medical History:   Diagnosis Date   • Anxiety and depression    • Asthma     bronchioectasis   • Back pain     with nerve pain in legs   • Cataract    • Dermatochalasis of both upper eyelids    • GERD (gastroesophageal reflux disease)    • Hypertension    • Hypothyroidism     hypothyroidism   • Kidney disease    • Lipoma    • Osteoarthritis    • Visual field defect, unspecified      Past Surgical History:   Procedure Laterality Date   • ANKLE SURGERY     • APPENDECTOMY     • BRONCHOSCOPY     • CERVICAL FUSION     • CHOLECYSTECTOMY     • EXCISION MASS LEG Left 3/4/2019    Procedure: EXCISION LIPOMA - LEFT HIP;  Surgeon: Giulia Rodrigez MD;  Location: [facility] OR;  Service: General   • HYSTERECTOMY     • OTHER SURGICAL HISTORY      thumb   • REPLACEMENT TOTAL KNEE     • TOTAL SHOULDER ARTHROPLASTY W/ DISTAL CLAVICLE EXCISION Right 3/6/2018    Procedure: RIGHT REVERSE TOTAL SHOULDER ARTHROPLASTY;  Surgeon: Imtiaz Lobato MD;  Location: [facility] OR;  Service:        SLP Recommendation and Plan                                                                      Plan of Care Reviewed With:  "patient  Progress: improving  Outcome Evaluation: SLP dysphagia tx completed. Pt was alert, cooperative, initially exhibited significant tremors of the upper extremities and head, yet this resolved by time of visit per Kevan Martinez, neurology PA (was educated on SLP observation). Pt remains confused. Able to respond in short utterances at times, though perseverative, often stated \"I don't understand why\" and was unable to complete her utterance even with cues and assistance per SLP. She was presented trials of pureed, honey thick, nectar thick, thin, and regular solid for possible upgrade. She continued to present with oral holding and delayed oral transit with the pureed consistency, as was noted during initial eval, therefore warranted cues. However, no further oral holding observed. Cough following thin liquid trial. Mildly decreased mandibular bite strength with regular solid, though functional mastication and spontaneous lingual sweep for oral residue maintenance and clearing. Minimal to mild oral residue post solid. No other overt s/s of aspiration were observed than was noted above with thin. Cannot fully r/o aspiration at this time. Educated RN on pt performance post tx. RECS: Upgrade to mechanical soft diet consistency, continue with nectar thick liquid consistency; meds whole in pudding/applesauce and/or with nectar thick liquids; RN to monitor for increased lung congestion; Assistance with staff with feeds; General feeding/aspiration precautions. ST to continue to follow and tx for dysphagia. Thanks!      SWALLOW EVALUATION (last 72 hours)     SLP Adult Swallow Evaluation     Row Name 06/13/22 0840 06/10/22 1200                Rehab Evaluation    Document Type therapy note (daily note)  -TM evaluation  -TM       Subjective Information no complaints  Confused  -TM --  Decreased communication, decreased attention  -TM       Patient Observations alert;cooperative  Reduced attention  -TM alert;cooperative  " -TM       Patient/Family/Caregiver Comments/Observations No family present, enhanced isolation precautions.  -TM No family present, enhanced isolation precautions for COVID-19.  -TM       Patient Effort adequate  -TM adequate  -TM                General Information    Patient Profile Reviewed -- yes  -TM       Pertinent History Of Current Problem -- Acute encephalopathy, seizure. Hx of HTN, hypothyroidism, CKD, recent COVID-19, GERD, asthma, anxiety and depression, hypothyroidism.  -TM       Current Method of Nutrition -- NPO  -TM       Precautions/Limitations, Vision -- WFL;for purposes of eval  -TM       Precautions/Limitations, Hearing -- hearing impairment, bilaterally  -TM       Prior Level of Function-Communication -- unknown  -TM       Prior Level of Function-Swallowing -- no diet consistency restrictions  -TM       Plans/Goals Discussed with -- patient;other (see comments)  RN  -TM       Barriers to Rehab -- cognitive status  -TM       Patient's Goals for Discharge -- patient did not state  -TM                Pain    Additional Documentation Pain Scale: FACES Pre/Post-Treatment (Group)  -TM Pain Scale: FACES Pre/Post-Treatment (Group)  -TM                Pain Scale: FACES Pre/Post-Treatment    Pain: FACES Scale, Pretreatment 2-->hurts little bit  -TM 0-->no hurt  -TM       Posttreatment Pain Rating 2-->hurts little bit  -TM 4-->hurts little more  -TM       Pre/Posttreatment Pain Comment Grimacing  -TM Grimacing with head of bed elevation and lowering during eval.  -TM                Oral Motor Structure and Function    Dentition Assessment -- upper dentures/partial in place;lower dentures/partial in place  -TM       Secretion Management -- WNL/WFL  -TM       Mucosal Quality -- dry  -TM       Volitional Swallow -- unable to elicit  -TM       Volitional Cough -- weak;reduced respiratory support  -TM                Oral Musculature and Cranial Nerve Assessment    Oral Motor General Assessment -- generalized  oral motor weakness;vocal impairment  -TM       Vocal Impairment, Detail. Cranial Nerve X (Vagus) -- vocal quality abnormality (see comments);impaired throat clear/cough (see comments);other (see comments)  Weak vocal intensity with periods of dysphonia  -TM                General Eating/Swallowing Observations    Respiratory Support Currently in Use -- nasal cannula  -TM       Eating/Swallowing Skills -- fed by SLP  -TM       Positioning During Eating -- upright in bed  -TM       Utensils Used -- spoon;straw  -TM       Consistencies Trialed -- pudding thick;honey-thick liquids;nectar/syrup-thick liquids;thin liquids  -TM                Respiratory    Respiratory Status -- WFL  -TM                Clinical Swallow Eval    Oral Prep Phase -- impaired  -TM       Oral Transit -- impaired  -TM       Oral Residue -- WFL  -TM       Pharyngeal Phase -- no overt signs/symptoms of pharyngeal impairment  r/o pharyngeal dysphagia  -TM       Esophageal Phase -- unremarkable  -TM       Clinical Swallow Evaluation Summary -- See note.  -TM                Oral Prep Concerns    Oral Prep Concerns -- other (see comments)  Prolonged bolus manipulation  -TM       Oral Prep Concerns, Comment -- Pureed  -TM                Oral Transit Concerns    Oral Transit Concerns -- delayed initiation of bolus transit  -TM       Delayed Intiation of Bolus Transit -- pudding;thin  -TM                Pharyngeal Phase Concerns    Pharyngeal Phase Concerns -- other (see comments)  Audible swallows with nectar and thin, inconsistently  -TM                SLP Evaluation Clinical Impression    SLP Swallowing Diagnosis -- moderate;oral dysphagia;R/O pharyngeal dysphagia  -TM       Functional Impact -- risk of aspiration/pneumonia;risk of malnutrition;risk of dehydration  -TM       Rehab Potential/Prognosis, Swallowing -- adequate, monitor progress closely  -TM       Swallow Criteria for Skilled Therapeutic Interventions Met -- demonstrates skilled criteria   -TM                Recommendations    Therapy Frequency (Swallow) -- at least;3 days per week  -TM       Predicted Duration Therapy Intervention (Days) -- until discharge  -TM       SLP Diet Recommendation -- puree;nectar thick liquids  -TM       Recommended Diagnostics -- reassess via clinical swallow evaluation;other (see comments)  More noted cognitive improvement is observed and/or as appropriate.  -TM       Recommended Precautions and Strategies -- upright posture during/after eating;small bites of food and sips of liquid;alternate between small bites of food and sips of liquid;1:1 supervision;assist with feeding;general aspiration precautions  -TM       Oral Care Recommendations -- Oral Care before breakfast, after meals and PRN  -TM       SLP Rec. for Method of Medication Administration -- meds crushed;with pudding or applesauce  -TM       Monitor for Signs of Aspiration -- yes;notify SLP if any concerns;cough;gurgly voice;throat clearing;pneumonia;right lower lobe infiltrates  -TM       Anticipated Discharge Disposition (SLP) -- unknown  -TM                Swallow Goals (SLP)    Oral Nutrition/Hydration Goal Selection (SLP) -- oral nutrition/hydration, SLP goal 1  -TM       Lingual Strengthening Goal Selection (SLP) -- lingual strengthening, SLP goal 1  -TM       Additional Documentation -- lingual strengthening goal selection (SLP)  -TM                Oral Nutrition/Hydration Goal 1 (SLP)    Oral Nutrition/Hydration Goal 1, SLP Pt will tolerate LRD without overt s/s of aspiration.  -TM Pt will tolerate LRD without overt s/s of aspiration.  -TM       Time Frame (Oral Nutrition/Hydration Goal 1, SLP) by discharge  -TM by discharge  -TM       Barriers (Oral Nutrition/Hydration Goal 1, SLP) cognition  -TM cognition  -TM       Progress/Outcomes (Oral Nutrition/Hydration Goal 1, SLP) continuing progress toward goal  -TM goal ongoing  -TM                Lingual Strengthening Goal 1 (SLP)    Activity (Lingual  Strengthening Goal 1, SLP) increase lingual tone/sensation/control/coordination/movement  -TM increase lingual tone/sensation/control/coordination/movement  -TM       Increase Lingual Tone/Sensation/Control/Coordination/Movement lingual movement exercises  -TM lingual movement exercises  -TM       [place]/Accuracy (Lingual Strengthening Goal 1, SLP) with minimal cues (75-90% accuracy)  -TM with minimal cues (75-90% accuracy)  -TM       Time Frame (Lingual Strengthening Goal 1, SLP) by discharge  -TM by discharge  -TM       Barriers (Lingual Strengthening Goal 1, SLP) cognition  -TM cognition  -TM       Progress/Outcomes (Lingual Strengthening Goal 1, SLP) goal ongoing  -TM goal ongoing  -TM             User Key  (r) = Recorded By, (t) = Taken By, (c) = Cosigned By    Initials Name Effective Dates    TM Ericka Stevenson CCC-SLP 06/16/21 -                 EDUCATION  The patient has been educated in the following areas:   Dysphagia (Swallowing Impairment).        SLP GOALS     Row Name 06/13/22 0840 06/10/22 1200          Oral Nutrition/Hydration Goal 1 (SLP)    Oral Nutrition/Hydration Goal 1, SLP Pt will tolerate LRD without overt s/s of aspiration.  -TM Pt will tolerate LRD without overt s/s of aspiration.  -TM     Time Frame (Oral Nutrition/Hydration Goal 1, SLP) by discharge  -TM by discharge  -TM     Barriers (Oral Nutrition/Hydration Goal 1, SLP) cognition  -TM cognition  -TM     Progress/Outcomes (Oral Nutrition/Hydration Goal 1, SLP) continuing progress toward goal  -TM goal ongoing  -TM            Lingual Strengthening Goal 1 (SLP)    Activity (Lingual Strengthening Goal 1, SLP) increase lingual tone/sensation/control/coordination/movement  -TM increase lingual tone/sensation/control/coordination/movement  -TM     Increase Lingual Tone/Sensation/Control/Coordination/Movement lingual movement exercises  -TM lingual movement exercises  -TM     [place]/Accuracy (Lingual Strengthening Goal 1, SLP) with  minimal cues (75-90% accuracy)  -TM with minimal cues (75-90% accuracy)  -TM     Time Frame (Lingual Strengthening Goal 1, SLP) by discharge  -TM by discharge  -TM     Barriers (Lingual Strengthening Goal 1, SLP) cognition  -TM cognition  -TM     Progress/Outcomes (Lingual Strengthening Goal 1, SLP) goal ongoing  -TM goal ongoing  -TM           User Key  (r) = Recorded By, (t) = Taken By, (c) = Cosigned By    Initials Name Provider Type    TM Ericka Stevenson CCC-SLP Speech and Language Pathologist                   Time Calculation:    Time Calculation- SLP     Row Name 06/13/22 1047             Time Calculation- SLP    SLP Start Time 0840  -TM      SLP Stop Time 0940  -TM      SLP Time Calculation (min) 60 min  -TM      SLP Received On 06/13/22  -TM              Untimed Charges    43443-DA Treatment Swallow Minutes 60  -TM              Total Minutes    Untimed Charges Total Minutes 60  -TM       Total Minutes 60  -TM            User Key  (r) = Recorded By, (t) = Taken By, (c) = Cosigned By    Initials Name Provider Type     Ericka Stevenson CCC-SLP Speech and Language Pathologist                Therapy Charges for Today     Code Description Service Date Service Provider Modifiers Qty    [id number]  ST TREATMENT SWALLOW 4 6/13/2022 Ericka Stevenson CCC-SLP GN 1               BENITA Hutchinson  6/13/2022

## 2022-06-13 NOTE — PLAN OF CARE
Problem: Adult Inpatient Plan of Care  Goal: Plan of Care Review  Outcome: Ongoing, Progressing  Flowsheets (Taken 6/13/2022 7627)  Progress: no change  Plan of Care Reviewed With: patient  Outcome Evaluation: Pt is Alert confused. No indicators of any pain. Turns. On RA. . SCDs. Purewick in use. No seizure activity noted. VSS. Safety maintained.

## 2022-06-13 NOTE — PLAN OF CARE
Goal Outcome Evaluation:  Plan of Care Reviewed With: patient        Progress: no change   Pt alert and oriented to self only. VSS. Pt c/o pain,  notified, see MAR. COLBERT. PPP. SCDs for VTE. , room air. Tolerating prescribed diet. Pt incontinent of bowel and bladder, external catheter removed by patient several times, removed and brief placed. Pt on bedrest. Pt restless and moves about in bed. Pt calls out frequently. Isolation precautions maintained. Bed alarm set. Call light within reach. Safety maintained.

## 2022-06-14 NOTE — PROGRESS NOTES
[facility]  INPATIENT PROGRESS NOTE    Patient Name: Hanny Ivy  Date of Admission: 6/9/2022  Today's Date: 06/14/22  Length of Stay: 5  Primary Care Physician: Kun Gonzalez MD    Subjective   Chief Complaint: confusion    HPI:  Patient was seen and examined at bedside.  Patient still confused and has food on her hand and is upset about this.  Patient tells me her name but is not able to tell me anything else.    Discussed with infection control, was able to get the patient out of precautions.      Review of Systems   Unable to perform ROS: Mental status change        All pertinent negatives and positives are as above. All other systems have been reviewed and are negative unless otherwise stated.     Objective    Temp:  [96.5 °F (35.8 °C)-96.8 °F (36 °C)] 96.8 °F (36 °C)  Heart Rate:  [90-95] 94  Resp:  [18] 18  BP: (148-158)/(78-90) 148/78  Physical Exam  Vitals and nursing note reviewed.   Constitutional:       Appearance: She is obese.   HENT:      Head: Normocephalic and atraumatic.      Mouth/Throat:      Mouth: Mucous membranes are dry.      Pharynx: Oropharynx is clear.   Eyes:      General: No scleral icterus.     Conjunctiva/sclera: Conjunctivae normal.   Cardiovascular:      Rate and Rhythm: Normal rate and regular rhythm.      Heart sounds: No murmur heard.  Abdominal:      General: Abdomen is flat. Bowel sounds are normal. There is no distension.      Palpations: Abdomen is soft. There is no mass.   Skin:     General: Skin is warm and dry.      Coloration: Skin is pale.   Neurological:      General: No focal deficit present.      Mental Status: She is alert. She is disoriented.   Psychiatric:         Mood and Affect: Mood normal.         Behavior: Behavior normal.             Results Review:  I have reviewed the labs, radiology results, and diagnostic studies.    Laboratory Data:   Results from last 7 days   Lab Units 06/14/22  0602  06/12/22  0426 06/11/22  0655   WBC 10*3/mm3 13.35* 5.76 3.75   HEMOGLOBIN g/dL 14.1 12.5 12.3   HEMATOCRIT % 42.0 37.3 36.4   PLATELETS 10*3/mm3 329 195 153        Results from last 7 days   Lab Units 06/14/22  0649 06/12/22  0426 06/11/22  0655 06/10/22  0338   SODIUM mmol/L 143 140 137 137   POTASSIUM mmol/L 3.7 3.9 4.1 3.5   CHLORIDE mmol/L 104 106 103 103   CO2 mmol/L 25.0 22.0 22.0 22.0   BUN mg/dL 24* 23 19 18   CREATININE mg/dL 0.97 0.80 0.75 0.85   CALCIUM mg/dL 9.6 9.0 8.7 8.4*   BILIRUBIN mg/dL 0.4 0.2  --  0.2   ALK PHOS U/L 72 61  --  63   ALT (SGPT) U/L 32 18  --  20   AST (SGOT) U/L 38* 22  --  31   GLUCOSE mg/dL 101* 140* 124* 105*       Culture Data:   Blood Culture   Date Value Ref Range Status   06/09/2022 No growth at 4 days  Preliminary   06/09/2022 No growth at 4 days  Preliminary       Radiology Data:   Imaging Results (Last 24 Hours)     ** No results found for the last 24 hours. **          I have reviewed the patient's current medications.     Assessment/Plan     Active Hospital Problems    Diagnosis    • **Acute encephalopathy    • Seizure-like activity (HCC)    • COVID-19 virus infection    • Essential hypertension    • Generalized anxiety disorder    • Acquired hypothyroidism    • Hyperlipidemia        Plan:  Continue seizure precautions    Appreciate neurology input.      LP performed at bedside 6/12.  Results thus far are unremarkable.      MRI showed no acute findings.  EEG no seizure activity but diffuse slowing.    Magnesium replacement.  Serum magnesium ordered.  Monitor closely.    Incentive spirometer.    Resume lovenox    Dexamethasone 6 mg daily.  Continue for now.  Was on paxlovid as an outpatient.  Patient removed from precautions.  Per infection control, the patient was exposed 6/2, started having symptoms on that weekend, positive home test on 6/6 and started paxlovid.  Infection control gave approval to remove from precautions on 6/14.    Blood culture  NGTD.    PT/OT    Continue ceftriaxone for possible UTI    Palliative care consultation                  Discharge Planning: I expect the patient to be discharged to ? in >2 days.    Electronically signed by Robb Stoll MD, 06/14/22, 18:15 CDT.

## 2022-06-14 NOTE — THERAPY TREATMENT NOTE
Acute Care - Speech Language Pathology   Swallow Treatment Note [facility]     Patient Name: Hanny Ivy  : 1945  MRN: [id number]  Today's Date: 2022  Onset of Illness/Injury or Date of Surgery: 22     Referring Physician: Dr. Brown      Admit Date: 2022  ST tx completed. Pt sitting side of bed with OT upon arrival. Minimal verbalizations throughout tx session. Sister stated pt lived independently prior to current admission. Pt participated in PO trials of puree, regular solids, and thin liquids. Prolonged oral holding with puree trials despite max cues. More immediate swallow with thin liquids via straw. No overt s/s of aspiration observed with smaller bolus but pt obtained larger bolus and significant immediate cough followed. Adequate mastication and more timely swallow with solids but unable to present further trials d/t pt fatigue. Continue with current diet of puree and nectar thick liquids at this time. ST to follow up for increased solid trials for possible upgrade.   Fatoumata Murray, MS CCC-SLP 2022 15:25 CDT      Visit Dx:     ICD-10-CM ICD-9-CM   1. Altered mental status, unspecified altered mental status type  R41.82 780.97   2. COVID-19  U07.1 079.89   3. Dysphagia, unspecified type  R13.10 787.20   4. Acute encephalopathy  G93.40 348.30   5. Impaired mobility  Z74.09 799.89   6. Decreased activities of daily living (ADL)  Z78.9 V49.89     Patient Active Problem List   Diagnosis   • Dermatochalasis of both upper eyelids   • Visual field defect, unspecified   • Rotator cuff arthropathy, right   • Pure hypercholesterolemia   • Essential hypertension   • Mild intermittent asthma without complication   • Generalized anxiety disorder   • Gastroesophageal reflux disease without esophagitis   • Acquired hypothyroidism   • Anemia   • Chronic neck and back pain   • Gait abnormality   • Hyperlipidemia   • MVP (mitral valve prolapse)   • Pulmonary infiltrate in left lung on  chest x-ray   • Seasonal allergies   • Traction bronchiectasis (HCC)   • History of recurrent UTIs   • Altered mental status, unspecified altered mental status type   • COVID-19 virus infection   • Acute encephalopathy   • Seizure-like activity (HCC)     Past Medical History:   Diagnosis Date   • Anxiety and depression    • Asthma     bronchioectasis   • Back pain     with nerve pain in legs   • Cataract    • Dermatochalasis of both upper eyelids    • GERD (gastroesophageal reflux disease)    • Hypertension    • Hypothyroidism     hypothyroidism   • Kidney disease    • Lipoma    • Osteoarthritis    • Visual field defect, unspecified      Past Surgical History:   Procedure Laterality Date   • ANKLE SURGERY     • APPENDECTOMY     • BRONCHOSCOPY     • CERVICAL FUSION     • CHOLECYSTECTOMY     • EXCISION MASS LEG Left 3/4/2019    Procedure: EXCISION LIPOMA - LEFT HIP;  Surgeon: Giulia Rodrigez MD;  Location: [facility] OR;  Service: General   • HYSTERECTOMY     • OTHER SURGICAL HISTORY      thumb   • REPLACEMENT TOTAL KNEE     • TOTAL SHOULDER ARTHROPLASTY W/ DISTAL CLAVICLE EXCISION Right 3/6/2018    Procedure: RIGHT REVERSE TOTAL SHOULDER ARTHROPLASTY;  Surgeon: Imtiaz Lobato MD;  Location: [facility] OR;  Service:        SLP Recommendation and Plan                                         Daily Summary of Progress (SLP): progress toward functional goals is good (06/14/22 8145)                  Plan for Continued Treatment (SLP): continue treatment per plan of care (06/14/22 1423)         Plan of Care Reviewed With: patient, family  Progress: no change  Outcome Evaluation: ST tx completed. Pt sitting side of bed with OT upon arrival. Minimal verbalizations throughout tx session. Sister stated pt lived independently prior to current admission. Pt participated in PO trials of puree, regular solids, and thin liquids. Prolonged oral holding with puree trials despite max cues. More immediate swallow with thin liquids via  straw. No overt s/s of aspiration observed with smaller bolus but pt obtained larger bolus and significant immediate cough followed. Adequate mastication and more timely swallow with solids but unable to present further trials d/t pt fatigue. Continue with current diet of puree and nectar thick liquids at this time. ST to follow up for increased solid trials for possible upgrade.      SWALLOW EVALUATION (last 72 hours)     SLP Adult Swallow Evaluation     Row Name 06/14/22 1423 06/13/22 0830                Rehab Evaluation    Document Type therapy note (daily note)  -BN therapy note (daily note)  -TM       Subjective Information no complaints  -BN no complaints  Confused  -TM       Patient Observations alert;cooperative  -BN alert;cooperative  Reduced attention  -TM       Patient/Family/Caregiver Comments/Observations pt sister arrived during tx session  -BN No family present, enhanced isolation precautions.  -TM       Patient Effort adequate  -BN adequate  -TM                Pain    Additional Documentation -- Pain Scale: FACES Pre/Post-Treatment (Group)  -TM                Pain Scale: FACES Pre/Post-Treatment    Pain: FACES Scale, Pretreatment 0-->no hurt  -BN 2-->hurts little bit  -TM       Posttreatment Pain Rating 4-->hurts little more  -BN 2-->hurts little bit  -TM       Pre/Posttreatment Pain Comment pain with coughing  -BN Grimacing  -TM                SLP Treatment Clinical Impressions    Daily Summary of Progress (SLP) progress toward functional goals is good  -BN --       Barriers to Overall Progress (SLP) Cognitive status  -BN --       Plan for Continued Treatment (SLP) continue treatment per plan of care  -BN --       Care Plan Review care plan/treatment goals reviewed  -BN --       Care Plan Review, Other Participant(s) caregiver;family  -BN --                Swallow Goals (SLP)    Oral Nutrition/Hydration Goal Selection (SLP) oral nutrition/hydration, SLP goal 1  -BN --       Lingual Strengthening  Goal Selection (SLP) lingual strengthening, SLP goal 1  -BN --       Additional Documentation lingual strengthening goal selection (SLP)  -BN --                Oral Nutrition/Hydration Goal 1 (SLP)    Oral Nutrition/Hydration Goal 1, SLP Pt will tolerate LRD without overt s/s of aspiration.  -BN Pt will tolerate LRD without overt s/s of aspiration.  -TM       Time Frame (Oral Nutrition/Hydration Goal 1, SLP) by discharge  -BN by discharge  -TM       Barriers (Oral Nutrition/Hydration Goal 1, SLP) cognition  -BN cognition  -TM       Progress/Outcomes (Oral Nutrition/Hydration Goal 1, SLP) continuing progress toward goal  -BN continuing progress toward goal  -TM                Lingual Strengthening Goal 1 (SLP)    Activity (Lingual Strengthening Goal 1, SLP) increase lingual tone/sensation/control/coordination/movement  -BN increase lingual tone/sensation/control/coordination/movement  -TM       Increase Lingual Tone/Sensation/Control/Coordination/Movement lingual movement exercises  -BN lingual movement exercises  -TM       [place]/Accuracy (Lingual Strengthening Goal 1, SLP) with minimal cues (75-90% accuracy)  -BN with minimal cues (75-90% accuracy)  -TM       Time Frame (Lingual Strengthening Goal 1, SLP) by discharge  -BN by discharge  -TM       Barriers (Lingual Strengthening Goal 1, SLP) cognition  -BN cognition  -TM       Progress/Outcomes (Lingual Strengthening Goal 1, SLP) goal ongoing  -BN goal ongoing  -TM             User Key  (r) = Recorded By, (t) = Taken By, (c) = Cosigned By    Initials Name Effective Dates    TM Ericka Stevenson CCC-SLP 06/16/21 -     BN Fatoumata Murray MS CCC-SLP 05/31/22 -                 EDUCATION  The patient has been educated in the following areas:   Dysphagia (Swallowing Impairment) Modified Diet Instruction.        SLP GOALS     Row Name 06/14/22 1423 06/13/22 0840          Oral Nutrition/Hydration Goal 1 (SLP)    Oral Nutrition/Hydration Goal 1, SLP Pt will  tolerate LRD without overt s/s of aspiration.  -BN Pt will tolerate LRD without overt s/s of aspiration.  -TM     Time Frame (Oral Nutrition/Hydration Goal 1, SLP) by discharge  -BN by discharge  -TM     Barriers (Oral Nutrition/Hydration Goal 1, SLP) cognition  -BN cognition  -TM     Progress/Outcomes (Oral Nutrition/Hydration Goal 1, SLP) continuing progress toward goal  -BN continuing progress toward goal  -TM            Lingual Strengthening Goal 1 (SLP)    Activity (Lingual Strengthening Goal 1, SLP) increase lingual tone/sensation/control/coordination/movement  -BN increase lingual tone/sensation/control/coordination/movement  -TM     Increase Lingual Tone/Sensation/Control/Coordination/Movement lingual movement exercises  -BN lingual movement exercises  -TM     [place]/Accuracy (Lingual Strengthening Goal 1, SLP) with minimal cues (75-90% accuracy)  -BN with minimal cues (75-90% accuracy)  -TM     Time Frame (Lingual Strengthening Goal 1, SLP) by discharge  -BN by discharge  -TM     Barriers (Lingual Strengthening Goal 1, SLP) cognition  -BN cognition  -TM     Progress/Outcomes (Lingual Strengthening Goal 1, SLP) goal ongoing  -BN goal ongoing  -TM           User Key  (r) = Recorded By, (t) = Taken By, (c) = Cosigned By    Initials Name Provider Type    TM Ericka Stevenson CCC-SLP Speech and Language Pathologist    BN Fatoumata Murray MS CCC-SLP Speech and Language Pathologist                   Time Calculation:    Time Calculation- SLP     Row Name 06/14/22 1524             Time Calculation- SLP    SLP Start Time 1423  -BN      SLP Stop Time 1524  -BN      SLP Time Calculation (min) 61 min  -BN      SLP Received On 06/14/22  -BN              Untimed Charges    67535-WA Treatment Swallow Minutes 61  -BN              Total Minutes    Untimed Charges Total Minutes 61  -BN       Total Minutes 61  -BN            User Key  (r) = Recorded By, (t) = Taken By, (c) = Cosigned By    Initials Name Provider  Type    BN Fatoumata Murray, MS CCC-SLP Speech and Language Pathologist                Therapy Charges for Today     Code Description Service Date Service Provider Modifiers Qty    94769818538 HC ST TREATMENT SWALLOW 4 6/14/2022 Fatoumata Murray, MS CCC-SLP GN 1               Fatoumata Murray MS CCC-SLP  6/14/2022

## 2022-06-14 NOTE — THERAPY EVALUATION
Acute Care - Occupational Therapy Initial Evaluation  [facility]     Patient Name: Hanny Ivy  : 1945  MRN: [id number]  Today's Date: 2022  Onset of Illness/Injury or Date of Surgery: 22  Date of Referral to OT: 22  Referring Physician: Dr. Brown    Admit Date: 2022       ICD-10-CM ICD-9-CM   1. Altered mental status, unspecified altered mental status type  R41.82 780.97   2. COVID-19  U07.1 079.89   3. Dysphagia, unspecified type  R13.10 787.20   4. Acute encephalopathy  G93.40 348.30   5. Impaired mobility  Z74.09 799.89   6. Decreased activities of daily living (ADL)  Z78.9 V49.89     Patient Active Problem List   Diagnosis   • Dermatochalasis of both upper eyelids   • Visual field defect, unspecified   • Rotator cuff arthropathy, right   • Pure hypercholesterolemia   • Essential hypertension   • Mild intermittent asthma without complication   • Generalized anxiety disorder   • Gastroesophageal reflux disease without esophagitis   • Acquired hypothyroidism   • Anemia   • Chronic neck and back pain   • Gait abnormality   • Hyperlipidemia   • MVP (mitral valve prolapse)   • Pulmonary infiltrate in left lung on chest x-ray   • Seasonal allergies   • Traction bronchiectasis (HCC)   • History of recurrent UTIs   • Altered mental status, unspecified altered mental status type   • COVID-19 virus infection   • Acute encephalopathy   • Seizure-like activity (HCC)     Past Medical History:   Diagnosis Date   • Anxiety and depression    • Asthma     bronchioectasis   • Back pain     with nerve pain in legs   • Cataract    • Dermatochalasis of both upper eyelids    • GERD (gastroesophageal reflux disease)    • Hypertension    • Hypothyroidism     hypothyroidism   • Kidney disease    • Lipoma    • Osteoarthritis    • Visual field defect, unspecified      Past Surgical History:   Procedure Laterality Date   • ANKLE SURGERY     • APPENDECTOMY     • BRONCHOSCOPY     • CERVICAL FUSION     •  CHOLECYSTECTOMY     • EXCISION MASS LEG Left 3/4/2019    Procedure: EXCISION LIPOMA - LEFT HIP;  Surgeon: Giulia Rodrigez MD;  Location: [facility] OR;  Service: General   • HYSTERECTOMY     • OTHER SURGICAL HISTORY      thumb   • REPLACEMENT TOTAL KNEE     • TOTAL SHOULDER ARTHROPLASTY W/ DISTAL CLAVICLE EXCISION Right 3/6/2018    Procedure: RIGHT REVERSE TOTAL SHOULDER ARTHROPLASTY;  Surgeon: Imtiaz Lobato MD;  Location:  [facility] OR;  Service:          OT ASSESSMENT FLOWSHEET (last 12 hours)     OT Evaluation and Treatment     Row Name 06/14/22 1419                   OT Time and Intention    Subjective Information --  unable to state  -AC        Document Type evaluation  -AC                  General Information    Patient Profile Reviewed yes  -AC        Onset of Illness/Injury or Date of Surgery 06/09/22  -AC        Referring Physician Dr. Brown  -        General Observations of Patient confused, reaching over bed rails, nno apparent distress  -AC        Prior Level of Function independent:;all household mobility;gait;transfer;bed mobility;ADL's  sister arrived during the eval and states pt was mostly independent  -AC        Pertinent History of Current Functional Problem confused, weakness; Dx: possible seizure, hypomagnesemia, encephalopathy, COVID 19  -AC        Existing Precautions/Restrictions fall  -AC        Barriers to Rehab cognitive status  -AC                  Pain Scale: FACES Pre/Post-Treatment    Pain: FACES Scale, Pretreatment 0-->no hurt  -AC        Posttreatment Pain Rating 6-->hurts even more  -AC        Pre/Posttreatment Pain Comment chest pain with coughing and laying flat, resolves with repositioning  -AC                  Cognition    Orientation Status (Cognition) unable/difficult to assess  does not say name  -AC        Follows Commands (Cognition) follows one-step commands;25-49% accuracy  -AC        Personal Safety Interventions elopement precautions initiated;fall prevention  program maintained;gait belt;muscle strengthening facilitated;nonskid shoes/slippers when out of bed;supervised activity  -                  Range of Motion Comprehensive    Comment, General Range of Motion not formally assessed d/t confusion however pt demo shoulder flexion to 90 degrees B and all other joints WFL AROM  -AC                  Strength Comprehensive (MMT)    Comment, General Manual Muscle Testing (MMT) Assessment 5/5 BUE  -                  Activities of Daily Living    BADL Assessment/Intervention lower body dressing;toileting  -                  Lower Body Dressing Assessment/Training    [place] Level (Lower Body Dressing) don;socks;maximum assist (25% patient effort)  -        Position (Lower Body Dressing) edge of bed sitting  -                  Toileting Assessment/Training    [place] Level (Toileting) perform perineal hygiene;maximum assist (25% patient effort)  -AC        Position (Toileting) supported standing  -                  BADL Safety/Performance    Impairments, BADL Safety/Performance balance;cognition;endurance/activity tolerance;pain;range of motion;shortness of breath  -        Cognitive Impairments, BADL Safety/Performance attention;awareness, need for assistance;impulsivity;insight into deficits/self-awareness;judgment;problem-solving/reasoning  -                  Bed Mobility    Bed Mobility supine-sit;sit-supine  -        Supine-Sit [place] (Bed Mobility) minimum assist (75% patient effort)  -                  Transfer Assessment/Treatment    Transfers sit-stand transfer;stand-sit transfer  -        Comment, (Transfers) transfers x3, does not follow commands for correct hand placement  -                  Transfers    Sit-Stand [place] (Transfers) moderate assist (50% patient effort);verbal cues;nonverbal cues (demo/gesture)  -        Stand-Sit [place] (Transfers) moderate assist (50% patient effort);verbal cues;nonverbal cues  (demo/gesture)  -AC                  Safety Issues, Functional Mobility    Impairments Affecting Function (Mobility) balance;endurance/activity tolerance;pain;range of motion (ROM);strength;shortness of breath  -                  Motor Skills    Motor Skills neuro-muscular function  -        Neuromuscular Function bilateral;upper extremity;tremor, resting  -AC                  Balance    Balance Assessment sitting static balance;sitting dynamic balance;standing static balance;standing dynamic balance  -AC        Static Sitting Balance standby assist  -AC        Dynamic Sitting Balance contact guard  -AC        Position, Sitting Balance sitting edge of bed  -AC        Static Standing Balance minimal assist  -AC        Position/Device Used, Standing Balance walker, front-wheeled  -AC                  Plan of Care Review    Plan of Care Reviewed With patient  -AC        Progress no change  -AC        Outcome Evaluation OT eval completed.  Pt confused with minimal verbal communication.  Does not state name or confirm any pt identifiers.  Sister arrived toward end of evaluation and reports pt was mostly indpendent at baseline and lives alone.  Pt needed Kayla to come to EOB.  She needs frequent verbal cues for hand placement/UE support on bed instead of grabbing OT's hand/arm for support.  Pt stood 3x with modA.  Stood in place for about 20 seconds for toileting hygiene with maxA.  MaxA to don socks.  Pt would benefit from skilled OT to address decreased ADL function.  She is very confused and unable to carry out even simple commands.  Will need SNF placement at discharge.  -AC                  Positioning and Restraints    Pre-Treatment Position in bed  -AC        Post Treatment Position bed  -AC        In Bed fowlers;call light within reach;encouraged to call for assist;exit alarm on;with SLP;side rails up x2;with family/caregiver;SCD pump applied  -                  Therapy Assessment/Plan (OT)    Date of  Referral to OT 06/09/22  -AC        OT Diagnosis decreased adl  -AC        Rehab Potential (OT) good, to achieve stated therapy goals  -AC        Criteria for Skilled Therapeutic Interventions Met (OT) yes;meets criteria;skilled treatment is necessary  -AC        Therapy Frequency (OT) 5 times/wk  -AC        Predicted Duration of Therapy Intervention (OT) 10 days  -AC        Planned Therapy Interventions (OT) activity tolerance training;BADL retraining;cognitive/visual perception retraining;functional balance retraining;occupation/activity based interventions;patient/caregiver education/training;transfer/mobility retraining  -AC                  OT Goals    Transfer Goal Selection (OT) transfer, OT goal 1  -AC        Dressing Goal Selection (OT) dressing, OT goal 1  -AC        Grooming Goal Selection (OT) grooming, OT goal 1  -AC                  Transfer Goal 1 (OT)    Activity/Assistive Device (Transfer Goal 1, OT) bed-to-chair/chair-to-bed;commode, bedside without drop arms;walker, rolling  -AC        [place] Level/Cues Needed (Transfer Goal 1, OT) minimum assist (75% or more patient effort)  -AC        Time Frame (Transfer Goal 1, OT) long term goal (LTG);10 days  -AC        Progress/Outcome (Transfer Goal 1, OT) goal ongoing  -AC                  Dressing Goal 1 (OT)    Activity/Device (Dressing Goal 1, OT) dressing skills, all  -AC        [place]/Cues Needed (Dressing Goal 1, OT) minimum assist (75% or more patient effort)  -AC        Time Frame (Dressing Goal 1, OT) long term goal (LTG);10 days  -AC        Progress/Outcome (Dressing Goal 1, OT) goal ongoing  -AC                  Grooming Goal 1 (OT)    Activity/Device (Grooming Goal 1, OT) grooming skills, all  -AC        [place] (Grooming Goal 1, OT) minimum assist (75% or more patient effort)  -AC        Time Frame (Grooming Goal 1, OT) long term goal (LTG);10 days  -AC        Progress/Outcome (Grooming Goal 1, OT) goal ongoing  -AC               User Key  (r) = Recorded By, (t) = Taken By, (c) = Cosigned By    Initials Name Effective Dates     Andrea Mitchell, OTR/L, JONES 04/09/19 -                  Occupational Therapy Education                 Title: PT OT SLP Therapies (In Progress)     Topic: Occupational Therapy (In Progress)     Point: ADL training (In Progress)     Description:   Instruct learner(s) on proper safety adaptation and remediation techniques during self care or transfers.   Instruct in proper use of assistive devices.              Learning Progress Summary           Patient Acceptance, E, NR,NL by  at 6/14/2022 1507    Comment: OT POC, benefits of activity, safe transfer technique   Family Acceptance, E, NR,NL by  at 6/14/2022 1507    Comment: OT POC, benefits of activity, safe transfer technique                   Point: Home exercise program (Not Started)     Description:   Instruct learner(s) on appropriate technique for monitoring, assisting and/or progressing therapeutic exercises/activities.              Learner Progress:  Not documented in this visit.          Point: Body mechanics (Not Started)     Description:   Instruct learner(s) on proper positioning and spine alignment during self-care, functional mobility activities and/or exercises.              Learner Progress:  Not documented in this visit.                      User Key     Initials Effective Dates Name Provider Type Discipline     04/09/19 -  Andrea Mitchell, OTR/L, JONES Occupational Therapist OT                  OT Recommendation and Plan  Planned Therapy Interventions (OT): activity tolerance training, BADL retraining, cognitive/visual perception retraining, functional balance retraining, occupation/activity based interventions, patient/caregiver education/training, transfer/mobility retraining  Therapy Frequency (OT): 5 times/wk  Plan of Care Review  Plan of Care Reviewed With: patient  Progress: no change  Outcome Evaluation: OT eval completed.  Pt  confused with minimal verbal communication.  Does not state name or confirm any pt identifiers.  Sister arrived toward end of evaluation and reports pt was mostly indpendent at baseline and lives alone.  Pt needed Kayla to come to EOB.  She needs frequent verbal cues for hand placement/UE support on bed instead of grabbing OT's hand/arm for support.  Pt stood 3x with modA.  Stood in place for about 20 seconds for toileting hygiene with maxA.  MaxA to don socks.  Pt would benefit from skilled OT to address decreased ADL function.  She is very confused and unable to carry out even simple commands.  Will need SNF placement at discharge.  Plan of Care Reviewed With: patient  Outcome Evaluation: OT eval completed.  Pt confused with minimal verbal communication.  Does not state name or confirm any pt identifiers.  Sister arrived toward end of evaluation and reports pt was mostly indpendent at baseline and lives alone.  Pt needed Kayla to come to EOB.  She needs frequent verbal cues for hand placement/UE support on bed instead of grabbing OT's hand/arm for support.  Pt stood 3x with modA.  Stood in place for about 20 seconds for toileting hygiene with maxA.  MaxA to don socks.  Pt would benefit from skilled OT to address decreased ADL function.  She is very confused and unable to carry out even simple commands.  Will need SNF placement at discharge.     Outcome Measures     Row Name 06/14/22 7247             How much help from another is currently needed...    Putting on and taking off regular lower body clothing? 1  -AC      Bathing (including washing, rinsing, and drying) 1  -AC      Toileting (which includes using toilet bed pan or urinal) 2  -AC      Putting on and taking off regular upper body clothing 2  -AC      Taking care of personal grooming (such as brushing teeth) 2  -AC      Eating meals 2  -AC      AM-PAC 6 Clicks Score (OT) 10  -AC              Functional Assessment    Outcome Measure Options AM-PAC 6 Clicks  Daily Activity (OT)  -AC            User Key  (r) = Recorded By, (t) = Taken By, (c) = Cosigned By    Initials Name Provider Type    Andrea Oseguera OTR/L, JONES Occupational Therapist                Time Calculation:    Time Calculation- OT     Row Name 06/14/22 1505             Time Calculation- OT    OT Start Time 1419  +10 min chart review  -AC      OT Stop Time 1506  -AC      OT Time Calculation (min) 47 min  -      OT Received On 06/14/22  -      OT Goal Re-Cert Due Date 06/24/22  -            User Key  (r) = Recorded By, (t) = Taken By, (c) = Cosigned By    Initials Name Provider Type    Andrea Oseguera OTR/L, JONES Occupational Therapist              Therapy Charges for Today     Code Description Service Date Service Provider Modifiers Qty    [id number]  OT EVAL MOD COMPLEXITY 4 6/14/2022 Andrea Mitchell OTR/L, JONES GO 1               YANIRA Holloway/L, CNT  6/14/2022

## 2022-06-14 NOTE — PLAN OF CARE
Goal Outcome Evaluation:  Plan of Care Reviewed With: patient        Progress: no change  Outcome Evaluation: OT eval completed.  Pt confused with minimal verbal communication.  Does not state name or confirm any pt identifiers.  Sister arrived toward end of evaluation and reports pt was mostly indpendent at baseline and lives alone.  Pt needed Kayla to come to EOB.  She needs frequent verbal cues for hand placement/UE support on bed instead of grabbing OT's hand/arm for support.  Pt stood 3x with modA.  Stood in place for about 20 seconds for toileting hygiene with maxA.  MaxA to don socks.  Pt would benefit from skilled OT to address decreased ADL function.  She is very confused and unable to carry out even simple commands.  Will need SNF placement at discharge.

## 2022-06-14 NOTE — CONSULTS
Palliative Care Initial Consult   Attending Physician: Robb Stoll MD  Referring Provider: Robb Stoll MD    Date of Admission: 6/9/2022   Date of Consult: 6/14/2022    Reason for Referral: goals of care discussion/advance care planning, comfort care and support for patient/family    Code Status and Medical Interventions:   Ordered at: 06/09/22 2048     Code Status (Patient has no pulse and is not breathing):    CPR (Attempt to Resuscitate)     Medical Interventions (Patient has pulse or is breathing):    Full Support      Subjective     HPI: 77 y.o. female with past medical history of anxiety, asthma, back pain, cataract, chronic back pain, chronic kidney disease stage IV, degenerative disc disease, depression, dermatochalasis of bilateral upper eyelids, gastroesophageal reflux disease, hypertension, hypothyroidism, lipoma, osteoarthritis and visual field defect.  Patient presented to [facility] on 6/9/2022 related to confusion.  Per chart review she had apparently fallen Sunday prior to admission but seemed fine to family however became progressively weaker and was evaluated by PCP.  Chart review also indicates she was found to be positive for COVID-19 and started on Paxlovid.  Apparently when daughter went back to check on her on date of admission she was very weak and confused and was brought to ED for evaluation.  Work-up in ED revealed few abnormalities in labs including creatinine 1.34, BUN 25, GFR 40.4, AST 34, CRP 1.72, procalcitonin 0.33 and D-dimer 1.03.  Chest x-ray obtained revealed hypoventilation with vascular crowding, opacity in left perihilar region and left lung base likely due to atelectasis and stable bronchial wall thickening.  Urinalysis negative for infection.  CT of head completed revealed no hemorrhage, edema or mass-effect however did visualize mild atrophy with associated prominence of the lateral ventricles and low-density in the hemispheric white matter  likely due to chronic small vessel disease.  She was admitted to the medical floor however had an episode of tonic-clonic activity with postictal change and transferred to cardiac care unit.  CT of head repeated however no acute intracranial findings visualized.  An EEG was performed and revealed diffuse cerebral dysfunction of moderate degree however nonspecific and no evidence for epilepsy or ongoing seizures visualized.  Neurology consulted and an MRI of brain completed on 6/10/2022. MRI of brain revealed a 1.2 cm T2 high signal lesion without enhancement in the right petrous apex, mild to moderate atrophy with associated ventricular prominence, T2 high signal within the hemispheric white matter is nonspecific likely due to chronic small vessel disease and incidental partially empty appearance of the sella turcica.  Neurology recommended continuing Keppra however felt encephalopathy would improve once metabolic abnormalities were resolved.  Lumbar puncture performed on 6/12/2022, encephalitis/meningitis panel and varicella-zoster virus resulted negative.  CSF culture final results pending however no growth visualized to 2 days.  She was transferred back to the medical floor on 6/12/2022.  Labs collected this morning reveal BUN elevated at 24, GFR slightly decreased at 59.6 and AST elevated at 38.  CRP has trended downward and is 0.82 however WBC is now elevated at 13.35 (5.76 6/12/2022).  PT and OT have evaluated and recommended SNF at discharge. Lying in bed, alert and in no apparent distress at time of exam.  Unable to assess orientation questions through verbalizing answer as she appears to be experiencing expressive aphasia however able to answer correctly by nodding head yes or no appropriately to name and place.  Her sister is currently at bedside.        Advance Care Planning   Advanced Directives: Advance Directive Status: Patient has advance directive, copy requested.     Advance Care Planning  "Discussion: Patient's daughter, Shantell, at length regarding her mother's overall condition while exploring goals of care.  Shantell reflected on events leading up to hospitalization and during.  Reports she is eager to see her mother as she has not been able to due to isolation precautions.  Shantell expressed concerns with cognitive function and resuming home medications.  She reflected on her mother's struggle with anxiety and depression long-term and reports she has tried multiple interventions including shock therapy and pharmacologic interventions.  Shantell shared that her mother is usually very talkative and this is how she realized she was not feeling well as she was not being herself.  She reflected on her mother's past medical history including multiple hospitalizations in 2019 after undergoing back surgery.  Reports she ended up developing pneumonia multiple times and pancreatitis and this is why she had a Port-A-Cath placed.  Discussed possibility of her mother having underlying dementia due to atrophy and chronic microvascular disease visualized on imaging of brain and she reports that this does not surprise her as her mother often repeats herself frequently.  Shantell had questions regarding discharge placement, explained therapy had evaluated and recommended SNF placement.  Expressed understanding and would like to look at placement at [facility] or [place], will notify SW.  Explored goals of care and if her mother had ever completed any ACP documents.  Shantell states her mother did complete and she will plan to retrieve copy and bring to hospital tonight.  Reports she does not have a \"DNR\" however would not wish to be on prolonged ventilator support however daughter seems to have good prognostic awareness and understanding.  She reflected on traumatic events her mother has experienced including her daughter passing away unexpectedly 2 days prior to her  passing away while on hospice due to bladder cancer.  Shantell " shared she is her mother's primary caregiver as one of her brothers moved to [facility] shortly after her father and sister passed away and has not been in contact with her since and she has another brother that lives in [place], [place] however only visits on weekends occassionally.  Expressed concerns with her mother not speaking due to depression and/or psychiatric issues, support provided and explained continued efforts at evaluation and treatment.    The patient receives support from her children and extended family.  POA/Healthcare Surrogate - Patient's children are her next of kin.    Due to the Palliative Care Topics Discussed: palliative care, goals of care, care options, resuscitation status and discharge options we will establish an advance care plan.     Review of Systems   Unable to perform ROS: mental status change     Pain Assessment  Nonverbal Indicators of Pain: moaning, activity pattern change, withdrawn, rubbing, other (see comments) (Patient will nod yes when asked if her head, neck, and knees are hurting.  Rubbing her knees.  She would nod no to all other areas.)  CPOT Facial Expression: 0-->relaxed, neutral  CPOT Body Movements: 0-->absence of movements  CPOT Muscle Tension: 0-->relaxed  Ventilator Compliance/Vocalization: 0-->talking in normal tone or no sound  CPOT Score: 0  PAINAD Breathin-->normal  PAINAD Negative Vocalization: 1-->occasional moan/groan, low speech, negative/disapproving quality  PAINAD Facial Expression: 0-->smiling or inexpressive  PAINAD Body Language: 0-->relaxed  PAINAD Consolability: 1-->distracted or reassured by voice/touch  PAINAD Score: 2  Pain Location: knee, neck, head  Pain Description: other (see comments) (MARGARET)      Past Medical History:   Diagnosis Date   • Anxiety and depression    • Asthma     bronchioectasis   • Back pain     with nerve pain in legs   • Cataract    • Dermatochalasis of both upper eyelids    • GERD (gastroesophageal reflux  disease)    • Hypertension    • Hypothyroidism     hypothyroidism   • Kidney disease    • Lipoma    • Osteoarthritis    • Visual field defect, unspecified       Past Surgical History:   Procedure Laterality Date   • ANKLE SURGERY     • APPENDECTOMY     • BRONCHOSCOPY     • CERVICAL FUSION     • CHOLECYSTECTOMY     • EXCISION MASS LEG Left 3/4/2019    Procedure: EXCISION LIPOMA - LEFT HIP;  Surgeon: Giulia Rodrigez MD;  Location: [facility] OR;  Service: General   • HYSTERECTOMY     • OTHER SURGICAL HISTORY      thumb   • REPLACEMENT TOTAL KNEE     • TOTAL SHOULDER ARTHROPLASTY W/ DISTAL CLAVICLE EXCISION Right 3/6/2018    Procedure: RIGHT REVERSE TOTAL SHOULDER ARTHROPLASTY;  Surgeon: Imtiaz Lobato MD;  Location: [facility] OR;  Service:       Social History     Socioeconomic History   • Marital status:    Tobacco Use   • Smoking status: Never Smoker   • Smokeless tobacco: Never Used   Vaping Use   • Vaping Use: Never used   Substance and Sexual Activity   • Alcohol use: No   • Drug use: No   • Sexual activity: Defer     Allergies   Allergen Reactions   • Penicillins Rash and Other (See Comments)     Whelps, itching   • Sulfa Antibiotics Rash and Other (See Comments)     Whelps,l itching       Objective   Diagnostics: Reviewed    Intake/Output Summary (Last 24 hours) at 6/14/2022 1431  Last data filed at 6/14/2022 1100  Gross per 24 hour   Intake 100 ml   Output --   Net 100 ml       Current medication reviewed for route, type, dose and frequency and are current per MAR at time of dictation.  Current Facility-Administered Medications   Medication Dose Route Frequency Provider Last Rate Last Admin   • acetaminophen (TYLENOL) tablet 650 mg  650 mg Oral Q6H PRN Robb Stoll MD   650 mg at 06/11/22 1005   • amLODIPine (NORVASC) tablet 5 mg  5 mg Oral Daily Robb Stoll MD   5 mg at 06/14/22 1007   • ascorbic acid (VITAMIN C) tablet 500 mg  500 mg Oral Daily Robb Stoll MD   500 mg at  06/14/22 1007   • budesonide-formoterol (SYMBICORT) 160-4.5 MCG/ACT inhaler 2 puff  2 puff Inhalation BID - RT Robb Stoll MD   2 puff at 06/14/22 0710   • cefTRIAXone (ROCEPHIN) 1 g in sodium chloride 0.9 % 100 mL IVPB-VTB  1 g Intravenous Q24H Robb Stoll  mL/hr at 06/13/22 2153 1 g at 06/13/22 2153   • cholecalciferol (VITAMIN D3) tablet 1,000 Units  1,000 Units Oral Daily Robb Stoll MD   1,000 Units at 06/14/22 1007   • dexamethasone (DECADRON) tablet 6 mg  6 mg Oral Daily With Breakfast Robb Stoll MD       • Enoxaparin Sodium (LOVENOX) syringe 40 mg  40 mg Subcutaneous Q24H Robb Stoll MD   40 mg at 06/14/22 1011   • famotidine (PEPCID) tablet 20 mg  20 mg Oral BID AC Robb Stoll MD       • Hold medication  1 each Does not apply Continuous PRN Robb Stoll MD       • ipratropium-albuterol (DUO-NEB) nebulizer solution 3 mL  3 mL Nebulization Q6H PRN Robb Stoll MD       • levETIRAcetam (KEPPRA) 100 MG/ML solution 500 mg  500 mg Oral Q12H Robb Stoll MD       • lidocaine (LIDODERM) 5 % 1 patch  1 patch Transdermal Q24H Robb Stoll MD   1 patch at 06/14/22 1009   • LORazepam (ATIVAN) injection 1 mg  1 mg Intravenous Q4H PRN Robb Stoll MD       • losartan (COZAAR) tablet 50 mg  50 mg Oral Q24H Robb Stoll MD   50 mg at 06/14/22 1007   • sodium chloride 0.9 % flush 10 mL  10 mL Intravenous PRN Robb Stoll MD   10 mL at 06/13/22 1315   • sodium chloride 0.9 % infusion  50 mL/hr Intravenous Continuous Robb Stoll MD 50 mL/hr at 06/12/22 1443 50 mL/hr at 06/12/22 1443   • zinc sulfate (ZINCATE) capsule 220 mg  220 mg Oral Daily Robb Stoll MD   220 mg at 06/14/22 1007     hold, 1 each  sodium chloride, 50 mL/hr, Last Rate: 50 mL/hr (06/12/22 1443)      •  acetaminophen  •  hold  •  ipratropium-albuterol  •  LORazepam  •  [COMPLETED] Insert peripheral IV **AND** sodium chloride    Assessment  "  /78 (BP Location: Right arm, Patient Position: Lying)   Pulse 94   Temp 96.8 °F (36 °C) (Axillary)   Resp 18   Ht 152.4 cm (60\")   Wt 86.3 kg (190 lb 4.1 oz)   SpO2 92%   BMI 37.16 kg/m²      Physical Exam  Vitals and nursing note reviewed.   Constitutional:       General: She is not in acute distress.     Appearance: She is obese. She is ill-appearing.   HENT:      Head: Normocephalic and atraumatic.   Eyes:      General: Lids are normal.      Extraocular Movements: Extraocular movements intact.   Neck:      Vascular: No JVD.      Trachea: Trachea normal.   Cardiovascular:      Rate and Rhythm: Tachycardia present.      Heart sounds: Normal heart sounds.   Pulmonary:      Effort: Pulmonary effort is normal.      Breath sounds: Normal breath sounds.   Chest:      Chest wall: Tenderness (near sternum; lidoderm patch in place) present. No swelling.   Abdominal:      General: Abdomen is protuberant.      Palpations: Abdomen is soft.   Musculoskeletal:      Cervical back: Neck supple.   Skin:     General: Skin is warm and dry.   Neurological:      Mental Status: She is alert. She is disoriented.      Motor: Weakness present.   Psychiatric:         Mood and Affect: Mood is anxious (gets anxious when experiences aphasia).      Comments: Unable to answer orientation questions verbally however able to answer correctly by nodding head yes or no. Attempts to verbalize however appears to be experiencing expressive aphasia.     Functional status: Palliative Performance Scale Score: Performance 40% based on the following measures: Ambulation: Mainly in bed, Activity and Evidence of Disease: Unable to do any work, extensive evidence of disease, Self-Care: Mainly assistance required,  Intake: Normal or reduced, LOC: Full, drowsy or confusion   Nutritional status: Albumin 4.30. Body mass index is 37.16 kg/m².   Patient status: Disease state: Controlled with current treatments.    Impression/Problem List:  1.   " "Altered mental status /  Acute encephalopathy  2.   Abnormal MRI of brain - cerebral atrophy & chronic microvascular disease  3.   COVID-19 virus infection  4.   Seizure-like activity  5.   Difficulty communicating / expressive aphasia   6.   Chronic kidney disease stage IV  7.   Depression   8.   Generalized anxiety disorder  9.   Impaired mobility  10. Decreased activities of daily living   11. Dysphagia  12. Leukocytosis  13. Hypertension  14. Hyperlipidemia  15. Hypothyroidism     Plan/Recommendations     1. Goals of care include CODE STATUS CPR with full support interventions.    2. Palliative care encounter  - Prognosis is guarded long-term secondary to altered mental status/acute encephalopathy, abnormal MRI of brain revealing cerebral atrophy and chronic microvascular disease, COVID-19 virus infection, seizure-like activity, difficulty communicating, chronic kidney disease stage IV, depression, generalized anxiety disorder, impaired mobility and decreased activities of daily living, dysphagia and other comorbidities listed above.    - Extensive discussion held with patient's daughter, Shantell, at length regarding overall condition and explored goals of care.    - Reports her mother has completed ACP documents and will plan to bring a copy to the hospital later this afternoon.      - Shared she does not have a \"DNR\" however would not wish to be on prolonged ventilatory support. Daughter seems to have good prognostic awareness and understanding.    - Therapy recommending SNF placement at discharge and she reports she would like for her mother to go to [facility] if possible and an alternative option would be [facility].  Defer to attending / SW.     3.  Depression / Anxiety  - Daughter expressed concerns with her mother not receiving home medications for anxiety and/or depression.     - PTA medications include doxepin nightly and venlafaxine XR daily.  Daughter reports she takes Ativan twice " daily as needed but often takes before going to bed.    - Will defer restarting PTA depression and anxiety medications to attending.       Thank you for this consult and allowing us to participate in patient's plan of care. Palliative Care Team will continue to follow patient.     Time spent: 68 minutes spent reviewing medical and medication records, assessing and examining patient, discussing with family, answering questions, providing some guidance about a plan and documentation of care, and coordinating care with other healthcare members, with > 50% time spent face to face.   18 minutes spent on advance care planning.    Little Rios, APRN  6/14/2022

## 2022-06-14 NOTE — PLAN OF CARE
Problem: Adult Inpatient Plan of Care  Goal: Plan of Care Review  Recent Flowsheet Documentation  Taken 6/14/2022 1100 by Toribio Valle PT  Plan of Care Reviewed With: patient  Outcome Evaluation: PT IE complete.  Oriented to person w/ verbal cues.  Follows commands ~50% of the time.  Difficult time verbalizing.  Min/mod A bed mobility.  Mod A to stand bedside.  Pt did not take steps voluntarily or on command.  PT to offer ambulation and strengthening.  Recommend SNF at [facility].  Thank you for referral.   Goal Outcome Evaluation:  Plan of Care Reviewed With: patient           Outcome Evaluation: PT IE complete.  Oriented to person w/ verbal cues.  Follows commands ~50% of the time.  Difficult time verbalizing.  Min/mod A bed mobility.  Mod A to stand bedside.  Pt did not take steps voluntarily or on command.  PT to offer ambulation and strengthening.  Recommend SNF at [facility].  Thank you for referral.

## 2022-06-14 NOTE — PLAN OF CARE
Goal Outcome Evaluation:              Outcome Evaluation: NTN Assessment complete. PO 0-15%; completely dependent for meals. Starting Boost and Magic Cup all meals. SLP recommending puree consistency; recommend to observe SLP recommendations. NTN following per protocol.

## 2022-06-14 NOTE — PLAN OF CARE
Goal Outcome Evaluation:  Plan of Care Reviewed With: patient, family        Progress: no change  Outcome Evaluation: ST tx completed. Pt sitting side of bed with OT upon arrival. Minimal verbalizations throughout tx session. Sister stated pt lived independently prior to current admission. Pt participated in PO trials of puree, regular solids, and thin liquids. Prolonged oral holding with puree trials despite max cues. More immediate swallow with thin liquids via straw. No overt s/s of aspiration observed with smaller bolus but pt obtained larger bolus and significant immediate cough followed. Adequate mastication and more timely swallow with solids but unable to present further trials d/t pt fatigue. Continue with current diet of puree and nectar thick liquids at this time. ST to follow up for increased solid trials for possible upgrade.

## 2022-06-14 NOTE — THERAPY EVALUATION
Patient Name: Hanny Ivy  : 1945    MRN: [id number]                              Today's Date: 2022       Admit Date: 2022    Visit Dx:     ICD-10-CM ICD-9-CM   1. Altered mental status, unspecified altered mental status type  R41.82 780.97   2. COVID-19  U07.1 079.89   3. Dysphagia, unspecified type  R13.10 787.20   4. Acute encephalopathy  G93.40 348.30   5. Impaired mobility  Z74.09 799.89     Patient Active Problem List   Diagnosis   • Dermatochalasis of both upper eyelids   • Visual field defect, unspecified   • Rotator cuff arthropathy, right   • Pure hypercholesterolemia   • Essential hypertension   • Mild intermittent asthma without complication   • Generalized anxiety disorder   • Gastroesophageal reflux disease without esophagitis   • Acquired hypothyroidism   • Anemia   • Chronic neck and back pain   • Gait abnormality   • Hyperlipidemia   • MVP (mitral valve prolapse)   • Pulmonary infiltrate in left lung on chest x-ray   • Seasonal allergies   • Traction bronchiectasis (HCC)   • History of recurrent UTIs   • Altered mental status, unspecified altered mental status type   • COVID-19 virus infection   • Acute encephalopathy   • Seizure-like activity (HCC)     Past Medical History:   Diagnosis Date   • Anxiety and depression    • Asthma     bronchioectasis   • Back pain     with nerve pain in legs   • Cataract    • Dermatochalasis of both upper eyelids    • GERD (gastroesophageal reflux disease)    • Hypertension    • Hypothyroidism     hypothyroidism   • Kidney disease    • Lipoma    • Osteoarthritis    • Visual field defect, unspecified      Past Surgical History:   Procedure Laterality Date   • ANKLE SURGERY     • APPENDECTOMY     • BRONCHOSCOPY     • CERVICAL FUSION     • CHOLECYSTECTOMY     • EXCISION MASS LEG Left 3/4/2019    Procedure: EXCISION LIPOMA - LEFT HIP;  Surgeon: Giulia Rodrigez MD;  Location: [facility];  Service: General   • HYSTERECTOMY     • OTHER SURGICAL HISTORY       thumb   • REPLACEMENT TOTAL KNEE     • TOTAL SHOULDER ARTHROPLASTY W/ DISTAL CLAVICLE EXCISION Right 3/6/2018    Procedure: RIGHT REVERSE TOTAL SHOULDER ARTHROPLASTY;  Surgeon: Imtiaz Lobato MD;  Location: [facility] OR;  Service:       General Information     [facility] Name 06/14/22 1100          Physical Therapy Time and Intention    Document Type evaluation  AMS, Neurology suspects that this may be a COVID-related encephalopathy  -     Mode of Treatment physical therapy  -     Row Name 06/14/22 1100          General Information    Patient Profile Reviewed yes  -     Prior Level of Function --  unknown PLOF-pt remains confused and disoriented  -     Existing Precautions/Restrictions fall  -     Barriers to Rehab cognitive status;medically complex  -[facility] Name 06/14/22 1100          Cognition    Orientation Status (Cognition) oriented to;person;verbal cues/prompts needed for orientation  -[facility] Name 06/14/22 1100          Safety Issues, Functional Mobility    Safety Issues Affecting Function (Mobility) at risk behavior observed;insight into deficits/self-awareness;safety precautions follow-through/compliance;safety precaution awareness;problem-solving  -     Impairments Affecting Function (Mobility) balance;cognition;endurance/activity tolerance;pain;strength  -           User Key  (r) = Recorded By, (t) = Taken By, (c) = Cosigned By    Initials Name Provider Type     Toribio Valle, PT Physical Therapist               Mobility     Row Name 06/14/22 1100          Bed Mobility    Bed Mobility supine-sit;sit-supine  -     Supine-Sit [place] (Bed Mobility) verbal cues;minimum assist (75% patient effort)  -     Sit-Supine [place] (Bed Mobility) verbal cues;moderate assist (50% patient effort)  -[facility] Name 06/14/22 1100          Sit-Stand Transfer    Sit-Stand [place] (Transfers) verbal cues;moderate assist (50% patient effort)  -[facility] Name 06/14/22 1100           Gait/Stairs (Locomotion)    Distance in Feet (Gait) --  unable to take steps-multiple vc's  -           User Key  (r) = Recorded By, (t) = Taken By, (c) = Cosigned By    Initials Name Provider Type     Toribio Valle, PT Physical Therapist               Obj/Interventions     [facility] Name 06/14/22 1100          Range of Motion Comprehensive    General Range of Motion bilateral lower extremity ROM WFL;bilateral upper extremity ROM WFL  -[facility] Name 06/14/22 1100          Strength Comprehensive (MMT)    General Manual Muscle Testing (MMT) Assessment upper extremity strength deficits identified;lower extremity strength deficits identified  -           User Key  (r) = Recorded By, (t) = Taken By, (c) = Cosigned By    Initials Name Provider Type     Toribio Valle, PT Physical Therapist               Goals/Plan     [facility] Name 06/14/22 1100          Bed Mobility Goal 1 (PT)    Activity/Assistive Device (Bed Mobility Goal 1, PT) bed mobility activities, all  -     [place] Level/Cues Needed (Bed Mobility Goal 1, PT) standby assist  -     Time Frame (Bed Mobility Goal 1, PT) by discharge  -     Progress/Outcomes (Bed Mobility Goal 1, PT) goal ongoing  -[facility] Name 06/14/22 1100          Transfer Goal 1 (PT)    Activity/Assistive Device (Transfer Goal 1, PT) sit-to-stand/stand-to-sit  -     [place] Level/Cues Needed (Transfer Goal 1, PT) contact guard required  -     Time Frame (Transfer Goal 1, PT) by discharge  -     Progress/Outcome (Transfer Goal 1, PT) goal ongoing  -[facility] Name 06/14/22 1100          Gait Training Goal 1 (PT)    Activity/Assistive Device (Gait Training Goal 1, PT) gait (walking locomotion)  -     [place] Level (Gait Training Goal 1, PT) contact guard required  -     Distance (Gait Training Goal 1, PT) 20ft  -     Time Frame (Gait Training Goal 1, PT) by discharge  -     Progress/Outcome (Gait Training Goal 1, PT) goal ongoing  [facility] Name 06/14/22 1100           Therapy Assessment/Plan (PT)    Planned Therapy Interventions (PT) balance training;bed mobility training;gait training;home exercise program;strengthening;ROM (range of motion);postural re-education;patient/family education;transfer training  [facility]           User Key  (r) = Recorded By, (t) = Taken By, (c) = Cosigned By    Initials Name Provider Type     Toribio Valle, PT Physical Therapist               Clinical Impression     [facility] Name 06/14/22 1100          Pain    Pain Intervention(s) Medication (See MAR);Repositioned;Ambulation/increased activity  -     Additional Documentation Pain Scale: FACES Pre/Post-Treatment (Group)  -[facility] Name 06/14/22 1100          Pain Scale: FACES Pre/Post-Treatment    Pain: FACES Scale, Pretreatment 0-->no hurt  -     Posttreatment Pain Rating 4-->hurts little more  -     Pain Location --  sternal  -[facility] Name 06/14/22 1100          Plan of Care Review    Plan of Care Reviewed With patient  -     Outcome Evaluation PT IE complete.  Oriented to person w/ verbal cues.  Follows commands ~50% of the time.  Difficult time verbalizing.  Min/mod A bed mobility.  Mod A to stand bedside.  Pt did not take steps voluntarily or on command.  PT to offer ambulation and strengthening.  Recommend SNF at [place].  Thank you for referral.  -[facility] Name 06/14/22 1100          Therapy Assessment/Plan (PT)    Patient/Family Therapy Goals Statement (PT) did not state  -     Rehab Potential (PT) fair, will monitor progress closely  -     Criteria for Skilled Interventions Met (PT) yes;skilled treatment is necessary  [facility]     Therapy Frequency (PT) 2 times/day  -     Predicted Duration of Therapy Intervention (PT) until dc  -[facility] Name 06/14/22 1100          Vital Signs    Post SpO2 (%) 95  -     O2 Delivery Post Treatment room air  -     Post Patient Position Supine  -[facility] Name 06/14/22 1100          Positioning and Restraints    Pre-Treatment Position in bed   -     Post Treatment Position bed  -LH     In Bed fowlers;call light within reach;encouraged to call for assist;exit alarm on;side rails up x3;SCD pump applied  -           User Key  (r) = Recorded By, (t) = Taken By, (c) = Cosigned By    Initials Name Provider Type     Toribio Valle, PT Physical Therapist               Outcome Measures     Row Name 06/14/22 1100          How much help from another person do you currently need...    Turning from your back to your side while in flat bed without using bedrails? 2  -LH     Moving from lying on back to sitting on the side of a flat bed without bedrails? 2  -LH     Moving to and from a bed to a chair (including a wheelchair)? 2  -LH     Standing up from a chair using your arms (e.g., wheelchair, bedside chair)? 2  -LH     Climbing 3-5 steps with a railing? 1  -LH     To walk in hospital room? 1  -     AM-PAC 6 Clicks Score (PT) 10  -     Highest level of mobility 4 --> Transferred to chair/commode  -     Row Name 06/14/22 1100          Functional Assessment    Outcome Measure Options AM-PAC 6 Clicks Basic Mobility (PT)  -           User Key  (r) = Recorded By, (t) = Taken By, (c) = Cosigned By    Initials Name Provider Type     Toribio Valle PT Physical Therapist                             Physical Therapy Education                 Title: PT OT SLP Therapies (In Progress)     Topic: Physical Therapy (In Progress)     Point: Mobility training (In Progress)     Learning Progress Summary           Patient Acceptance, E,D, NL by  at 6/14/2022 1156    Comment: benefits of PT and POC, call for A OOB                   Point: Home exercise program (In Progress)     Learning Progress Summary           Patient Acceptance, E,D, NL by  at 6/14/2022 1156    Comment: benefits of PT and POC, call for A OOB                   Point: Precautions (In Progress)     Learning Progress Summary           Patient Acceptance, E,D, NL by  at 6/14/2022 1156    Comment:  benefits of PT and POC, call for A OOB                               User Key     Initials Effective Dates Name Provider Type Discipline     06/16/21 -  Toribio Valle, PT Physical Therapist PT              PT Recommendation and Plan  Planned Therapy Interventions (PT): balance training, bed mobility training, gait training, home exercise program, strengthening, ROM (range of motion), postural re-education, patient/family education, transfer training  Plan of Care Reviewed With: patient  Outcome Evaluation: PT IE complete.  Oriented to person w/ verbal cues.  Follows commands ~50% of the time.  Difficult time verbalizing.  Min/mod A bed mobility.  Mod A to stand bedside.  Pt did not take steps voluntarily or on command.  PT to offer ambulation and strengthening.  Recommend SNF at [place].  Thank you for referral.     Time Calculation:    PT Charges     Row Name 06/14/22 1157             Time Calculation    Start Time 1100  -      Stop Time 1157  -      Time Calculation (min) 57 min  -      PT Received On 06/14/22  -      PT Goal Re-Cert Due Date 06/24/22  -              Untimed Charges    PT Eval/Re-eval Minutes 57  -              Total Minutes    Untimed Charges Total Minutes 57  -       Total Minutes 57  -            User Key  (r) = Recorded By, (t) = Taken By, (c) = Cosigned By    Initials Name Provider Type     Toribio Valle, PT Physical Therapist              Therapy Charges for Today     Code Description Service Date Service Provider Modifiers Qty    89866869709 HC PT EVAL HIGH COMPLEXITY 4 6/14/2022 Toribio Valle PT GP 1          PT G-Codes  Outcome Measure Options: AM-PAC 6 Clicks Basic Mobility (PT)  AM-PAC 6 Clicks Score (PT): 10    Toribio Valle PT  6/14/2022

## 2022-06-14 NOTE — NURSING NOTE
Patient exposed to COVID-19 on 6/2 and started having symptoms on weekend, home test positive on 6/6 and started on Paxlovid. Patient is afebrile with no respiratory issues, can be removed from isolation. Rukhsana Ervin RN, MSN Infection Preventionist.

## 2022-06-14 NOTE — PROGRESS NOTES
[facility] Medicine Services  INPATIENT PROGRESS NOTE    Patient Name: Hanny Ivy  Date of Admission: 6/9/2022  Today's Date: 06/13/22  Length of Stay: 4  Primary Care Physician: Kun Gonzalez MD    Subjective   Chief Complaint: confusion  HPI   Patient was seen and examined at bedside.  The patient is a little more with it today.  She still remains pleasantly confused at this time.  She intermittently is following commands.  Discussed the case at length with neurology PA and MD.  They both agree that a lumbar puncture may be in the patient's best interest.  Patient was difficult to obtain CSF, but ultimately was able to do so.  During the procedure, the patient did receive a dose of Ativan to assist with her relaxing.  Patient otherwise is afebrile and hemodynamically stable.  Patient is likely able to go to the floor this afternoon.  We will start the patient on some oral blood pressure medications as her pressure continues to increase.  The patient is able to tell me her name, and at times able to tell me where she is.  She is however unable to tell us any of the circumstances requiring her to be admitted to the hospital.    Discussed at length with teleneurology and neurology APRN.  Recommended LP.        Review of Systems   Unable to perform ROS: Mental status change        All pertinent negatives and positives are as above. All other systems have been reviewed and are negative unless otherwise stated.     Objective    Temp:  [94 °F (34.4 °C)-97.2 °F (36.2 °C)] 96.5 °F (35.8 °C)  Heart Rate:  [71-95] 90  Resp:  [16-18] 18  BP: (126-158)/(72-90) 158/90  Physical Exam  Vitals and nursing note reviewed.   Constitutional:       Appearance: She is obese.   HENT:      Head: Normocephalic and atraumatic.      Mouth/Throat:      Mouth: Mucous membranes are dry.      Pharynx: Oropharynx is clear.   Eyes:      General: No scleral icterus.     Conjunctiva/sclera: Conjunctivae  normal.   Cardiovascular:      Rate and Rhythm: Normal rate and regular rhythm.      Heart sounds: No murmur heard.  Abdominal:      General: Abdomen is flat. Bowel sounds are normal. There is no distension.      Palpations: Abdomen is soft. There is no mass.   Skin:     General: Skin is warm and dry.      Coloration: Skin is pale.   Neurological:      General: No focal deficit present.      Mental Status: She is alert. She is disoriented.   Psychiatric:         Mood and Affect: Mood normal.         Behavior: Behavior normal.             Results Review:  I have reviewed the labs, radiology results, and diagnostic studies.    Laboratory Data:   Results from last 7 days   Lab Units 06/12/22  0426 06/11/22  0655 06/10/22  0338   WBC 10*3/mm3 5.76 3.75 5.52   HEMOGLOBIN g/dL 12.5 12.3 11.6*   HEMATOCRIT % 37.3 36.4 34.7   PLATELETS 10*3/mm3 195 153 159        Results from last 7 days   Lab Units 06/12/22  0426 06/11/22  0655 06/10/22  0338 06/09/22  1729   SODIUM mmol/L 140 137 137 138   POTASSIUM mmol/L 3.9 4.1 3.5 3.9   CHLORIDE mmol/L 106 103 103 102   CO2 mmol/L 22.0 22.0 22.0 24.0   BUN mg/dL 23 19 18 25*   CREATININE mg/dL 0.80 0.75 0.85 1.34*   CALCIUM mg/dL 9.0 8.7 8.4* 9.1   BILIRUBIN mg/dL 0.2  --  0.2 0.2   ALK PHOS U/L 61  --  63 76   ALT (SGPT) U/L 18  --  20 23   AST (SGOT) U/L 22  --  31 34*   GLUCOSE mg/dL 140* 124* 105* 92       Culture Data:   Blood Culture   Date Value Ref Range Status   06/09/2022 No growth at 4 days  Preliminary   06/09/2022 No growth at 4 days  Preliminary       Radiology Data:   Imaging Results (Last 24 Hours)     ** No results found for the last 24 hours. **          I have reviewed the patient's current medications.     Assessment/Plan     Active Hospital Problems    Diagnosis    • **Acute encephalopathy    • Seizure-like activity (HCC)    • COVID-19 virus infection    • Essential hypertension    • Generalized anxiety disorder    • Acquired hypothyroidism    • Hyperlipidemia         Plan:  Continue seizure precautions    Discussed with neurology    LP performed at bedside.  PCR for encephalitis/meningitis, cell count, culture, will be sent.    MRI showed no acute findings.  EEG no seizure activity but diffuse slowing.    Magnesium replacement.  Serum magnesium ordered.  Monitor closely.    Incentive spirometer.    Will resume lovenox after LP     Dexamethasone 6 mg daily.  Continue for now.  Was on paxlovid as an outpatient    Blood culture NGTD.    PT/OT                          Discharge Planning: I expect the patient to be discharged to  in >2 days.    Electronically signed by Robb Stoll MD, 06/13/22, 21:31 CDT.

## 2022-06-14 NOTE — PLAN OF CARE
Problem: Adult Inpatient Plan of Care  Goal: Plan of Care Review  6/14/2022 0654 by Elmer Pena RN  Outcome: Ongoing, Progressing  Flowsheets (Taken 6/14/2022 0654)  Progress: no change  Plan of Care Reviewed With: patient  Outcome Evaluation: Pt is Alert, Confused.  very restless during the night. Pulled out multiple IVs during the shift. Tele on SCDs when patient will keep on. Room air. pt refuses to keep  on. Incont. VSS. lidocaine patch applied to chest and pt will not keep on. Safety maintained.

## 2022-06-14 NOTE — PLAN OF CARE
Goal Outcome Evaluation:  Plan of Care Reviewed With: patient        Progress: no change   Pt alert and oriented to self. VSS. Pt c/o pain, lidocaine patch applied per order. COLBERT. PPP. SCDs for VTE. , room air. Tolerating prescribed diet. Incontinent of bowel/bladder, brief on. Up with PT. Weight shift assistance provided. Isolation precautions d/c per infection control nurse.  Bed alarm set. Call light within reach. Safety maintained.

## 2022-06-14 NOTE — PROGRESS NOTES
[facility] Medicine Services  INPATIENT PROGRESS NOTE    Patient Name: Hanny Ivy  Date of Admission: 6/9/2022  Today's Date: 06/13/22  Length of Stay: 4  Primary Care Physician: Kun Gonzalez MD    Subjective   Chief Complaint: confusion    HPI:  Patient was seen and examined at bedside on two occassions.  Awake and alert but very impulsive and confused.  Able to tell me her name.  LP results so far unremarkable.        Review of Systems   Unable to perform ROS: Mental status change        All pertinent negatives and positives are as above. All other systems have been reviewed and are negative unless otherwise stated.     Objective    Temp:  [94 °F (34.4 °C)-97.2 °F (36.2 °C)] 96.5 °F (35.8 °C)  Heart Rate:  [71-95] 90  Resp:  [16-18] 18  BP: (126-158)/(72-90) 158/90  Physical Exam  Vitals and nursing note reviewed.   Constitutional:       Appearance: She is obese.   HENT:      Head: Normocephalic and atraumatic.      Mouth/Throat:      Mouth: Mucous membranes are dry.      Pharynx: Oropharynx is clear.   Eyes:      General: No scleral icterus.     Conjunctiva/sclera: Conjunctivae normal.   Cardiovascular:      Rate and Rhythm: Normal rate and regular rhythm.      Heart sounds: No murmur heard.  Abdominal:      General: Abdomen is flat. Bowel sounds are normal. There is no distension.      Palpations: Abdomen is soft. There is no mass.   Skin:     General: Skin is warm and dry.      Coloration: Skin is pale.   Neurological:      General: No focal deficit present.      Mental Status: She is alert. She is disoriented.   Psychiatric:         Mood and Affect: Mood normal.         Behavior: Behavior normal.             Results Review:  I have reviewed the labs, radiology results, and diagnostic studies.    Laboratory Data:   Results from last 7 days   Lab Units 06/12/22  0426 06/11/22  0655 06/10/22  0338   WBC 10*3/mm3 5.76 3.75 5.52   HEMOGLOBIN g/dL 12.5 12.3 11.6*    HEMATOCRIT % 37.3 36.4 34.7   PLATELETS 10*3/mm3 195 153 159        Results from last 7 days   Lab Units 06/12/22  0426 06/11/22  0655 06/10/22  0338 06/09/22  1729   SODIUM mmol/L 140 137 137 138   POTASSIUM mmol/L 3.9 4.1 3.5 3.9   CHLORIDE mmol/L 106 103 103 102   CO2 mmol/L 22.0 22.0 22.0 24.0   BUN mg/dL 23 19 18 25*   CREATININE mg/dL 0.80 0.75 0.85 1.34*   CALCIUM mg/dL 9.0 8.7 8.4* 9.1   BILIRUBIN mg/dL 0.2  --  0.2 0.2   ALK PHOS U/L 61  --  63 76   ALT (SGPT) U/L 18  --  20 23   AST (SGOT) U/L 22  --  31 34*   GLUCOSE mg/dL 140* 124* 105* 92       Culture Data:   Blood Culture   Date Value Ref Range Status   06/09/2022 No growth at 4 days  Preliminary   06/09/2022 No growth at 4 days  Preliminary       Radiology Data:   Imaging Results (Last 24 Hours)     ** No results found for the last 24 hours. **          I have reviewed the patient's current medications.     Assessment/Plan     Active Hospital Problems    Diagnosis    • **Acute encephalopathy    • Seizure-like activity (HCC)    • COVID-19 virus infection    • Essential hypertension    • Generalized anxiety disorder    • Acquired hypothyroidism    • Hyperlipidemia        Plan:  Continue seizure precautions    Discussed with neurology    LP performed at bedside 6/12.  Results thus far are unremarkable.      MRI showed no acute findings.  EEG no seizure activity but diffuse slowing.    Magnesium replacement.  Serum magnesium ordered.  Monitor closely.    Incentive spirometer.    Resume lovenox    Dexamethasone 6 mg daily.  Continue for now.  Was on paxlovid as an outpatient    Blood culture NGTD.    PT/OT                          Discharge Planning: I expect the patient to be discharged to  in >2 days.    Electronically signed by Robb Stoll MD, 06/13/22, 21:37 CDT.

## 2022-06-15 NOTE — PLAN OF CARE
Problem: Adult Inpatient Plan of Care  Goal: Plan of Care Review  Outcome: Ongoing, Progressing  Flowsheets (Taken 6/15/2022 7688)  Progress: improving  Plan of Care Reviewed With: patient  Outcome Evaluation: Pt performed bed mobility with min-mod A with cues for progress. Pt performed sit to stand with min A with RW but was unable to progress BLE. 2 person to assist and was HHA. Pt performed transfer with min-mod A with directions on how to progress BLE. Pt is easily distracted and required cues to refocus. Exit alarm on

## 2022-06-15 NOTE — THERAPY TREATMENT NOTE
Acute Care - Physical Therapy Treatment Note  [facility]     Patient Name: Hanny Ivy  : 1945  MRN: [id number]  Today's Date: 6/15/2022   Onset of Illness/Injury or Date of Surgery: 22  Visit Dx:     ICD-10-CM ICD-9-CM   1. Altered mental status, unspecified altered mental status type  R41.82 780.97   2. COVID-19  U07.1 079.89   3. Dysphagia, unspecified type  R13.10 787.20   4. Acute encephalopathy  G93.40 348.30   5. Impaired mobility  Z74.09 799.89   6. Decreased activities of daily living (ADL)  Z78.9 V49.89     Patient Active Problem List   Diagnosis   • Dermatochalasis of both upper eyelids   • Visual field defect, unspecified   • Rotator cuff arthropathy, right   • Pure hypercholesterolemia   • Essential hypertension   • Mild intermittent asthma without complication   • Generalized anxiety disorder   • Gastroesophageal reflux disease without esophagitis   • Acquired hypothyroidism   • Anemia   • Chronic neck and back pain   • Gait abnormality   • Hyperlipidemia   • MVP (mitral valve prolapse)   • Pulmonary infiltrate in left lung on chest x-ray   • Seasonal allergies   • Traction bronchiectasis (HCC)   • History of recurrent UTIs   • Altered mental status, unspecified altered mental status type   • COVID-19 virus infection   • Acute encephalopathy   • Seizure-like activity (HCC)     Past Medical History:   Diagnosis Date   • Anxiety and depression    • Asthma     bronchioectasis   • Back pain     with nerve pain in legs   • Cataract    • Dermatochalasis of both upper eyelids    • GERD (gastroesophageal reflux disease)    • Hypertension    • Hypothyroidism     hypothyroidism   • Kidney disease    • Lipoma    • Osteoarthritis    • Visual field defect, unspecified      Past Surgical History:   Procedure Laterality Date   • ANKLE SURGERY     • APPENDECTOMY     • BRONCHOSCOPY     • CERVICAL FUSION     • CHOLECYSTECTOMY     • EXCISION MASS LEG Left 3/4/2019    Procedure: EXCISION LIPOMA - LEFT HIP;   Surgeon: Giulia Rodrigez MD;  Location:  [facility] OR;  Service: General   • HYSTERECTOMY     • OTHER SURGICAL HISTORY      thumb   • REPLACEMENT TOTAL KNEE     • TOTAL SHOULDER ARTHROPLASTY W/ DISTAL CLAVICLE EXCISION Right 3/6/2018    Procedure: RIGHT REVERSE TOTAL SHOULDER ARTHROPLASTY;  Surgeon: Imtiaz Lobato MD;  Location:  [facility] OR;  Service:      PT Assessment (last 12 hours)     PT Evaluation and Treatment     Row Name 06/15/22 0843          Physical Therapy Time and Intention    Subjective Information no complaints  -WK     Document Type therapy note (daily note)  -WK     Mode of Treatment physical therapy  -WK     Row Name 06/15/22 0843          General Information    Existing Precautions/Restrictions fall  -WK     Row Name 06/15/22 0843          Bed Mobility    Supine-Sit [place] (Bed Mobility) verbal cues;minimum assist (75% patient effort)  -WK     Sit-Supine [place] (Bed Mobility) verbal cues;moderate assist (50% patient effort)  -WK     Row Name 06/15/22 0843          Transfers    Transfers stand pivot/stand step transfer  -WK     Sit-Stand [place] (Transfers) verbal cues;minimum assist (75% patient effort)  -WK     Stand-Sit [place] (Transfers) verbal cues;minimum assist (75% patient effort);2 person assist  -WK     Row Name 06/15/22 0843          Stand Pivot/Stand Step Transfer    Stand Pivot/Stand Step [place] (Transfers) verbal cues;minimum assist (75% patient effort);moderate assist (50% patient effort);2 person assist  -WK     Assistive Device (Stand Pivot Stand Step Transfer) --  HHA  -WK     Comment, (Stand Pivot Transfer) bed <-> BSC  -WK     Row Name 06/15/22 0843          Gait/Stairs (Locomotion)    Comment, (Gait/Stairs) pt is distracted easily and requires cues to refocus  -WK     Row Name 06/15/22 0843          Balance    Comment, Balance EOB SBA, performed reaching and and following one step command. Pt had difficulty following commands unless PTA said her  name to get her attention before asking pt to perform activity.  -WK     Row Name 06/15/22 0843          Positioning and Restraints    Pre-Treatment Position in bed  -WK     Post Treatment Position bed  -WK     In Bed fowlers;call light within reach;encouraged to call for assist;exit alarm on  -WK           User Key  (r) = Recorded By, (t) = Taken By, (c) = Cosigned By    Initials Name Provider Type    WK Jennifer Olvera PTA Physical Therapist Assistant                Physical Therapy Education                 Title: PT OT SLP Therapies (In Progress)     Topic: Physical Therapy (In Progress)     Point: Mobility training (In Progress)     Learning Progress Summary           Patient Acceptance, E,D, NL by  at 6/14/2022 1156    Comment: benefits of PT and POC, call for A OOB                   Point: Home exercise program (In Progress)     Learning Progress Summary           Patient Acceptance, E,D, NL by  at 6/14/2022 1156    Comment: benefits of PT and POC, call for A OOB                   Point: Precautions (In Progress)     Learning Progress Summary           Patient Acceptance, E,D, NL by  at 6/14/2022 1156    Comment: benefits of PT and POC, call for A OOB                               User Key     Initials Effective Dates Name Provider Type [facility] 06/16/21 -  Toribio Valle D, PT Physical Therapist PT              PT Recommendation and Plan     Plan of Care Reviewed With: patient  Progress: improving  Outcome Evaluation: Pt performed bed mobility with min-mod A with cues for progress. Pt performed sit to stand with min A with RW but was unable to progress BLE. 2 person to assist and was HHA. Pt performed transfer with min-mod A with directions on how to progress BLE. Pt is easily distracted and required cues to refocus. Exit alarm on   Outcome Measures     Row Name 06/15/22 0843 06/14/22 1419          How much help from another person do you currently need...    Turning from your back to your side  while in flat bed without using bedrails? 3  -WK --     Moving from lying on back to sitting on the side of a flat bed without bedrails? 2  -WK --     Moving to and from a bed to a chair (including a wheelchair)? 2  -WK --     Standing up from a chair using your arms (e.g., wheelchair, bedside chair)? 2  -WK --     Climbing 3-5 steps with a railing? 1  -WK --     To walk in hospital room? 2  -WK --     AM-PAC 6 Clicks Score (PT) 12  -WK --            How much help from another is currently needed...    Putting on and taking off regular lower body clothing? -- 1  -AC     Bathing (including washing, rinsing, and drying) -- 1  -AC     Toileting (which includes using toilet bed pan or urinal) -- 2  -AC     Putting on and taking off regular upper body clothing -- 2  -AC     Taking care of personal grooming (such as brushing teeth) -- 2  -AC     Eating meals -- 2  -AC     AM-PAC 6 Clicks Score (OT) -- 10  -AC            Functional Assessment    Outcome Measure Options AM-PAC 6 Clicks Basic Mobility (PT)  -WK AM-PAC 6 Clicks Daily Activity (OT)  -AC           User Key  (r) = Recorded By, (t) = Taken By, (c) = Cosigned By    Initials Name Provider Type    AC Andrea Mitchell N, OTR/L, CNT Occupational Therapist    WK Jennifer Olvera PTA Physical Therapist Assistant                 Time Calculation:    PT Charges     Row Name 06/15/22 0950             Time Calculation    Start Time 0843  -WK      Stop Time 0925  -WK      Time Calculation (min) 42 min  -WK      PT Received On 06/15/22  -WK              Time Calculation- PT    Total Timed Code Minutes- PT 42 minute(s)  -WK            User Key  (r) = Recorded By, (t) = Taken By, (c) = Cosigned By    Initials Name Provider Type    WK Jennifer Olvera PTA Physical Therapist Assistant              Therapy Charges for Today     Code Description Service Date Service Provider Modifiers Qty    78247106309  PT THERAPEUTIC ACT EA 15 MIN 6/15/2022 Jennifer Olvera PTA GP 3           PT G-Codes  Outcome Measure Options: AM-PAC 6 Clicks Basic Mobility (PT)  AM-PAC 6 Clicks Score (PT): 12  AM-PAC 6 Clicks Score (OT): 10    Jennifer Olvera, PTA  6/15/2022

## 2022-06-15 NOTE — PLAN OF CARE
Goal Outcome Evaluation:  Plan of Care Reviewed With: patient        Progress: no change  Outcome Evaluation: OT tx completed.  Pt conversing more but also still perseverates on certain phrases.  Pt had vomited on self during lunch and was assisted in changing gown.  ModA to don/doff gown, modA to come to EOB, Kayla to stand.  Attempted steps forward however pt was too weak.  OT will continue to treat to increase independence with ADL.

## 2022-06-15 NOTE — THERAPY TREATMENT NOTE
Acute Care - Occupational Therapy Treatment Note  [facility]     Patient Name: Hanny Ivy  : 1945  MRN: [id number]  Today's Date: 6/15/2022  Onset of Illness/Injury or Date of Surgery: 22  Date of Referral to OT: 22  Referring Physician: Dr. Brown    Admit Date: 2022       ICD-10-CM ICD-9-CM   1. Altered mental status, unspecified altered mental status type  R41.82 780.97   2. COVID-19  U07.1 079.89   3. Dysphagia, unspecified type  R13.10 787.20   4. Acute encephalopathy  G93.40 348.30   5. Impaired mobility  Z74.09 799.89   6. Decreased activities of daily living (ADL)  Z78.9 V49.89     Patient Active Problem List   Diagnosis   • Dermatochalasis of both upper eyelids   • Visual field defect, unspecified   • Rotator cuff arthropathy, right   • Pure hypercholesterolemia   • Essential hypertension   • Mild intermittent asthma without complication   • Generalized anxiety disorder   • Gastroesophageal reflux disease without esophagitis   • Acquired hypothyroidism   • Anemia   • Chronic neck and back pain   • Gait abnormality   • Hyperlipidemia   • MVP (mitral valve prolapse)   • Pulmonary infiltrate in left lung on chest x-ray   • Seasonal allergies   • Traction bronchiectasis (HCC)   • History of recurrent UTIs   • Altered mental status, unspecified altered mental status type   • COVID-19 virus infection   • Acute encephalopathy   • Seizure-like activity (HCC)     Past Medical History:   Diagnosis Date   • Anxiety and depression    • Asthma     bronchioectasis   • Back pain     with nerve pain in legs   • Cataract    • Dermatochalasis of both upper eyelids    • GERD (gastroesophageal reflux disease)    • Hypertension    • Hypothyroidism     hypothyroidism   • Kidney disease    • Lipoma    • Osteoarthritis    • Visual field defect, unspecified      Past Surgical History:   Procedure Laterality Date   • ANKLE SURGERY     • APPENDECTOMY     • BRONCHOSCOPY     • CERVICAL FUSION     •  CHOLECYSTECTOMY     • EXCISION MASS LEG Left 3/4/2019    Procedure: EXCISION LIPOMA - LEFT HIP;  Surgeon: Giulia Rodrigez MD;  Location:  [facility] OR;  Service: General   • HYSTERECTOMY     • OTHER SURGICAL HISTORY      thumb   • REPLACEMENT TOTAL KNEE     • TOTAL SHOULDER ARTHROPLASTY W/ DISTAL CLAVICLE EXCISION Right 3/6/2018    Procedure: RIGHT REVERSE TOTAL SHOULDER ARTHROPLASTY;  Surgeon: Imtiaz Lobato MD;  Location:  [facility] OR;  Service:          OT ASSESSMENT FLOWSHEET (last 12 hours)     OT Evaluation and Treatment     Row Name 06/15/22 4910                   OT Time and Intention    Document Type therapy note (daily note)  -        Mode of Treatment occupational therapy  -                  General Information    Existing Precautions/Restrictions fall  -        Barriers to Rehab cognitive status  -                  Pain Assessment    Pain Location - chest  -AC        Pain Intervention(s) Repositioned;Ambulation/increased activity  -                  Pain Scale: FACES Pre/Post-Treatment    Pain: FACES Scale, Pretreatment 0-->no hurt  -AC        Posttreatment Pain Rating 8-->hurts whole lot  -                  Activities of Daily Living    BADL Assessment/Intervention upper body dressing  -AC                  Upper Body Dressing Assessment/Training    [place] Level (Upper Body Dressing) don;moderate assist (50% patient effort)  gown  -AC        Position (Upper Body Dressing) edge of bed sitting  -AC                  Bed Mobility    Supine-Sit [place] (Bed Mobility) minimum assist (75% patient effort);verbal cues  -AC        Sit-Supine [place] (Bed Mobility) moderate assist (50% patient effort);2 person assist;verbal cues  -AC        Assistive Device (Bed Mobility) head of bed elevated  -AC                  Transfer Assessment/Treatment    Transfers sit-stand transfer;stand-sit transfer  -                  Transfers    Sit-Stand [place] (Transfers) minimum assist (75%  patient effort)  -AC        Stand-Sit [place] (Transfers) minimum assist (75% patient effort);verbal cues  -AC                  Sit-Stand Transfer    Assistive Device (Sit-Stand Transfers) walker, front-wheeled  -AC                  Stand-Sit Transfer    Assistive Device (Stand-Sit Transfers) walker, front-wheeled  -AC                  Plan of Care Review    Plan of Care Reviewed With patient  -AC        Progress no change  -AC        Outcome Evaluation OT tx completed.  Pt conversing more but also still perseverates on certain phrases.  Pt had vomited on self during lunch and was assisted in changing gown.  ModA to don/doff gown, modA to come to EOB, Kayla to stand.  Attempted steps forward however pt was too weak.  OT will continue to treat to increase independence with ADL.  -AC                  Positioning and Restraints    Pre-Treatment Position in bed  -AC        Post Treatment Position bed  -AC        In Bed fowlers;call light within reach;encouraged to call for assist;side rails up x2;with SLP;SCD pump applied  -AC              User Key  (r) = Recorded By, (t) = Taken By, (c) = Cosigned By    Initials Name Effective Dates    AC Andrea Mitchell, OTR/L, CNT 04/09/19 -                  Occupational Therapy Education                 Title: PT OT SLP Therapies (In Progress)     Topic: Occupational Therapy (In Progress)     Point: ADL training (In Progress)     Description:   Instruct learner(s) on proper safety adaptation and remediation techniques during self care or transfers.   Instruct in proper use of assistive devices.              Learning Progress Summary           Patient Acceptance, E, DU by  at 6/15/2022 1312    Comment: safe transfers    Acceptance, E, NR,NL by  at 6/14/2022 1507    Comment: OT POC, benefits of activity, safe transfer technique   Family Acceptance, E, NR,NL by  at 6/14/2022 1507    Comment: OT POC, benefits of activity, safe transfer technique                   Point: Home  exercise program (Not Started)     Description:   Instruct learner(s) on appropriate technique for monitoring, assisting and/or progressing therapeutic exercises/activities.              Learner Progress:  Not documented in this visit.          Point: Body mechanics (Not Started)     Description:   Instruct learner(s) on proper positioning and spine alignment during self-care, functional mobility activities and/or exercises.              Learner Progress:  Not documented in this visit.                      User Key     Initials Effective Dates Name Provider Type Discipline     04/09/19 -  Andrea Mitchell, OTR/L, CNT Occupational Therapist OT                  OT Recommendation and Plan  Planned Therapy Interventions (OT): activity tolerance training, BADL retraining, cognitive/visual perception retraining, functional balance retraining, occupation/activity based interventions, patient/caregiver education/training, transfer/mobility retraining  Therapy Frequency (OT): 5 times/wk  Plan of Care Review  Plan of Care Reviewed With: patient  Progress: no change  Outcome Evaluation: OT tx completed.  Pt conversing more but also still perseverates on certain phrases.  Pt had vomited on self during lunch and was assisted in changing gown.  ModA to don/doff gown, modA to come to EOB, Kayla to stand.  Attempted steps forward however pt was too weak.  OT will continue to treat to increase independence with ADL.  Plan of Care Reviewed With: patient  Outcome Evaluation: OT tx completed.  Pt conversing more but also still perseverates on certain phrases.  Pt had vomited on self during lunch and was assisted in changing gown.  ModA to don/doff gown, modA to come to EOB, Kayla to stand.  Attempted steps forward however pt was too weak.  OT will continue to treat to increase independence with ADL.     Outcome Measures     Row Name 06/15/22 1230 06/15/22 0843 06/14/22 1419       How much help from another person do you currently  need...    Turning from your back to your side while in flat bed without using bedrails? -- 3  -WK --    Moving from lying on back to sitting on the side of a flat bed without bedrails? -- 2  -WK --    Moving to and from a bed to a chair (including a wheelchair)? -- 2  -WK --    Standing up from a chair using your arms (e.g., wheelchair, bedside chair)? -- 2  -WK --    Climbing 3-5 steps with a railing? -- 1  -WK --    To walk in hospital room? -- 2  -WK --    AM-PAC 6 Clicks Score (PT) -- 12  -WK --       How much help from another is currently needed...    Putting on and taking off regular lower body clothing? 2  -AC -- 1  -AC    Bathing (including washing, rinsing, and drying) 2  -AC -- 1  -AC    Toileting (which includes using toilet bed pan or urinal) 2  -AC -- 2  -AC    Putting on and taking off regular upper body clothing 2  -AC -- 2  -AC    Taking care of personal grooming (such as brushing teeth) 2  -AC -- 2  -AC    Eating meals 2  -AC -- 2  -AC    AM-PAC 6 Clicks Score (OT) 12  -AC -- 10  -AC       Functional Assessment    Outcome Measure Options AM-PAC 6 Clicks Daily Activity (OT)  -AC AM-PAC 6 Clicks Basic Mobility (PT)  -WK AM-PAC 6 Clicks Daily Activity (OT)  -          User Key  (r) = Recorded By, (t) = Taken By, (c) = Cosigned By    Initials Name Provider Type    Andrea Oseguera OTR/L, JONES Occupational Therapist    WK Jennifer Olvera PTA Physical Therapist Assistant                Time Calculation:    Time Calculation- OT     Row Name 06/15/22 1313             Time Calculation- OT    OT Start Time 1230  -      OT Stop Time 1310  -AC      OT Time Calculation (min) 40 min  -      Total Timed Code Minutes- OT 40 minute(s)  -      OT Received On 06/15/22  -            User Key  (r) = Recorded By, (t) = Taken By, (c) = Cosigned By    Initials Name Provider Type    Andrea Oseguera, OTR/L, JONES Occupational Therapist              Therapy Charges for Today     Code Description Service  Date Service Provider Modifiers Qty    83971237014 HC OT EVAL MOD COMPLEXITY 4 6/14/2022 Andrea Mitchell, OTR/L, CNT GO 1    39420237929 HC OT SELF CARE/MGMT/TRAIN EA 15 MIN 6/15/2022 Andrea Mitchell, OTR/L, CNT GO 3               Andrea Mitchell, OTR/L, CNT  6/15/2022

## 2022-06-15 NOTE — THERAPY TREATMENT NOTE
Acute Care - Speech Language Pathology   Swallow Treatment Note [facility]     Patient Name: Hanny Ivy  : 1945  MRN: [id number]  Today's Date: 6/15/2022  Onset of Illness/Injury or Date of Surgery: 22     Referring Physician: Dr. Brown      Admit Date: 2022  ST tx completed. Pt with lunch tray at bedside. Pt encouraged for increased PO but pt refused mech soft food that was present. Various PO trials presented with pt demo with facial grimace and oral holding. Pt appears to prefer chocolate flavor and demo with more timely swallow with chocolate boost. Slow but adequate mastication of regular solids. No overt s/s of aspiration noted with PO trials. ST to change pt diet to finger food diet and continue with nectar thick liquids. Diet message sent to provide chocolate magic cup each tray d/t dislike with berry flavor. Pt needs to be 1:1 feeder. ST to follow and treat.   Fatoumata Murray MS-CCC/SLP, [facility] 6/15/2022 15:31 CDT      Visit Dx:     ICD-10-CM ICD-9-CM   1. Altered mental status, unspecified altered mental status type  R41.82 780.97   2. COVID-19  U07.1 079.89   3. Dysphagia, unspecified type  R13.10 787.20   4. Acute encephalopathy  G93.40 348.30   5. Impaired mobility  Z74.09 799.89   6. Decreased activities of daily living (ADL)  Z78.9 V49.89     Patient Active Problem List   Diagnosis   • Dermatochalasis of both upper eyelids   • Visual field defect, unspecified   • Rotator cuff arthropathy, right   • Pure hypercholesterolemia   • Essential hypertension   • Mild intermittent asthma without complication   • Generalized anxiety disorder   • Gastroesophageal reflux disease without esophagitis   • Acquired hypothyroidism   • Anemia   • Chronic neck and back pain   • Gait abnormality   • Hyperlipidemia   • MVP (mitral valve prolapse)   • Pulmonary infiltrate in left lung on chest x-ray   • Seasonal allergies   • Traction bronchiectasis (HCC)   • History of recurrent UTIs   • Altered mental  status, unspecified altered mental status type   • COVID-19 virus infection   • Acute encephalopathy   • Seizure-like activity (HCC)     Past Medical History:   Diagnosis Date   • Anxiety and depression    • Asthma     bronchioectasis   • Back pain     with nerve pain in legs   • Cataract    • Dermatochalasis of both upper eyelids    • GERD (gastroesophageal reflux disease)    • Hypertension    • Hypothyroidism     hypothyroidism   • Kidney disease    • Lipoma    • Osteoarthritis    • Visual field defect, unspecified      Past Surgical History:   Procedure Laterality Date   • ANKLE SURGERY     • APPENDECTOMY     • BRONCHOSCOPY     • CERVICAL FUSION     • CHOLECYSTECTOMY     • EXCISION MASS LEG Left 3/4/2019    Procedure: EXCISION LIPOMA - LEFT HIP;  Surgeon: Giulia Rodrigez MD;  Location: [facility] OR;  Service: General   • HYSTERECTOMY     • OTHER SURGICAL HISTORY      thumb   • REPLACEMENT TOTAL KNEE     • TOTAL SHOULDER ARTHROPLASTY W/ DISTAL CLAVICLE EXCISION Right 3/6/2018    Procedure: RIGHT REVERSE TOTAL SHOULDER ARTHROPLASTY;  Surgeon: Imtiaz Lobato MD;  Location:  [facility] OR;  Service:        SLP Recommendation and Plan     SLP Diet Recommendation: soft textures, other (see comments) (finger foods) (06/15/22 1255)                                   Daily Summary of Progress (SLP): progress toward functional goals is good (06/15/22 1255)                  Plan for Continued Treatment (SLP): continue treatment per plan of care (06/15/22 1255)         Plan of Care Reviewed With: other (see comments)  Progress: no change  Outcome Evaluation: ST tx completed. Pt with lunch tray at bedside. Pt encouraged for increased PO but pt refused mech soft food that was present. Various PO trials presented with pt demo with facial grimace and oral holding. Pt appears to prefer chocolate flavor and demo with more timely swallow with chocolate boost. Slow but adequate mastication of regular solids. No overt s/s of  aspiration noted with PO trials. ST to change pt diet to finger food diet and continue with nectar thick liquids. Diet message sent to provide chocolate magic cup each tray d/t dislike with berry flavor. Pt needs to be 1:1 feeder. ST to follow and treat.      SWALLOW EVALUATION (last 72 hours)     SLP Adult Swallow Evaluation     Row Name 06/15/22 1255 06/14/22 1423 06/13/22 0840             Rehab Evaluation    Document Type therapy note (daily note)  -BN therapy note (daily note)  -BN therapy note (daily note)  -TM      Subjective Information no complaints  -BN no complaints  -BN no complaints  Confused  -TM      Patient Observations alert;cooperative  -BN alert;cooperative  -BN alert;cooperative  Reduced attention  -TM      Patient/Family/Caregiver Comments/Observations no family present  -BN pt sister arrived during tx session  -BN No family present, enhanced isolation precautions.  -TM      Patient Effort adequate  -BN adequate  -BN adequate  -TM              Pain    Additional Documentation -- -- Pain Scale: FACES Pre/Post-Treatment (Group)  -TM              Pain Scale: FACES Pre/Post-Treatment    Pain: FACES Scale, Pretreatment 0-->no hurt  -BN 0-->no hurt  -BN 2-->hurts little bit  -TM      Posttreatment Pain Rating 0-->no hurt  -BN 4-->hurts little more  -BN 2-->hurts little bit  -TM      Pre/Posttreatment Pain Comment -- pain with coughing  -BN Grimacing  -TM              SLP Treatment Clinical Impressions    Daily Summary of Progress (SLP) progress toward functional goals is good  -BN progress toward functional goals is good  -BN --      Barriers to Overall Progress (SLP) Cognitive status  -BN Cognitive status  -BN --      Plan for Continued Treatment (SLP) continue treatment per plan of care  -BN continue treatment per plan of care  -BN --      Care Plan Review care plan/treatment goals reviewed  -BN care plan/treatment goals reviewed  -BN --      Care Plan Review, Other Participant(s) caregiver  -BN  caregiver;family  -BN --              Recommendations    SLP Diet Recommendation soft textures;other (see comments)  finger foods  -BN -- --              Swallow Goals (SLP)    Oral Nutrition/Hydration Goal Selection (SLP) oral nutrition/hydration, SLP goal 1  -BN oral nutrition/hydration, SLP goal 1  -BN --      Lingual Strengthening Goal Selection (SLP) lingual strengthening, SLP goal 1  -BN lingual strengthening, SLP goal 1  -BN --      Additional Documentation lingual strengthening goal selection (SLP)  -BN lingual strengthening goal selection (SLP)  -BN --              Oral Nutrition/Hydration Goal 1 (SLP)    Oral Nutrition/Hydration Goal 1, SLP Pt will tolerate LRD without overt s/s of aspiration.  -BN Pt will tolerate LRD without overt s/s of aspiration.  -BN Pt will tolerate LRD without overt s/s of aspiration.  -TM      Time Frame (Oral Nutrition/Hydration Goal 1, SLP) by discharge  -BN by discharge  -BN by discharge  -TM      Barriers (Oral Nutrition/Hydration Goal 1, SLP) cognition  -BN cognition  -BN cognition  -TM      Progress/Outcomes (Oral Nutrition/Hydration Goal 1, SLP) continuing progress toward goal  -BN continuing progress toward goal  -BN continuing progress toward goal  -TM              Lingual Strengthening Goal 1 (SLP)    Activity (Lingual Strengthening Goal 1, SLP) increase lingual tone/sensation/control/coordination/movement  -BN increase lingual tone/sensation/control/coordination/movement  -BN increase lingual tone/sensation/control/coordination/movement  -TM      Increase Lingual Tone/Sensation/Control/Coordination/Movement lingual movement exercises  -BN lingual movement exercises  -BN lingual movement exercises  -TM      [place]/Accuracy (Lingual Strengthening Goal 1, SLP) with minimal cues (75-90% accuracy)  -BN with minimal cues (75-90% accuracy)  -BN with minimal cues (75-90% accuracy)  -TM      Time Frame (Lingual Strengthening Goal 1, SLP) by discharge  -BN by discharge  -BN  by discharge  -TM      Barriers (Lingual Strengthening Goal 1, SLP) cognition  -BN cognition  -BN cognition  -TM      Progress/Outcomes (Lingual Strengthening Goal 1, SLP) goal ongoing  -BN goal ongoing  -BN goal ongoing  -TM            User Key  (r) = Recorded By, (t) = Taken By, (c) = Cosigned By    Initials Name Effective Dates    TM Ericka Stevenson, CCC-SLP 06/16/21 -     BN Fatoumata Murray, MS CCC-SLP 05/31/22 - 06/14/22    BN Fatoumata Murray, MS-CCC/SLP, [facility] 06/15/22 -                 EDUCATION  The patient has been educated in the following areas:   Modified Diet Instruction.        SLP GOALS     Row Name 06/15/22 1255 06/14/22 1423 06/13/22 0840       Oral Nutrition/Hydration Goal 1 (SLP)    Oral Nutrition/Hydration Goal 1, SLP Pt will tolerate LRD without overt s/s of aspiration.  -BN Pt will tolerate LRD without overt s/s of aspiration.  -BN Pt will tolerate LRD without overt s/s of aspiration.  -TM    Time Frame (Oral Nutrition/Hydration Goal 1, SLP) by discharge  -BN by discharge  -BN by discharge  -TM    Barriers (Oral Nutrition/Hydration Goal 1, SLP) cognition  -BN cognition  -BN cognition  -TM    Progress/Outcomes (Oral Nutrition/Hydration Goal 1, SLP) continuing progress toward goal  -BN continuing progress toward goal  -BN continuing progress toward goal  -TM       Lingual Strengthening Goal 1 (SLP)    Activity (Lingual Strengthening Goal 1, SLP) increase lingual tone/sensation/control/coordination/movement  -BN increase lingual tone/sensation/control/coordination/movement  -BN increase lingual tone/sensation/control/coordination/movement  -TM    Increase Lingual Tone/Sensation/Control/Coordination/Movement lingual movement exercises  -BN lingual movement exercises  -BN lingual movement exercises  -TM    [place]/Accuracy (Lingual Strengthening Goal 1, SLP) with minimal cues (75-90% accuracy)  -BN with minimal cues (75-90% accuracy)  -BN with minimal cues (75-90% accuracy)  -TM     Time Frame (Lingual Strengthening Goal 1, SLP) by discharge  -BN by discharge  -BN by discharge  -TM    Barriers (Lingual Strengthening Goal 1, SLP) cognition  -BN cognition  -BN cognition  -TM    Progress/Outcomes (Lingual Strengthening Goal 1, SLP) goal ongoing  -BN goal ongoing  -BN goal ongoing  -TM          User Key  (r) = Recorded By, (t) = Taken By, (c) = Cosigned By    Initials Name Provider Type     Ericka Stevenson CCC-SLP Speech and Language Pathologist    Fatoumata Elizabeth, MS CCC-SLP Speech and Language Pathologist    Fatoumata Elizabeth, MS-CCC/SLP, CNT Speech and Language Pathologist                   Time Calculation:    Time Calculation- SLP     Row Name 06/15/22 1530             Time Calculation- SLP    SLP Start Time 1255  -BN      SLP Stop Time 1349  -BN      SLP Time Calculation (min) 54 min  -BN      SLP Received On 06/15/22  -BN              Untimed Charges    [id number]-TN Treatment Swallow Minutes 54  -BN              Total Minutes    Untimed Charges Total Minutes 54  -BN       Total Minutes 54  -BN            User Key  (r) = Recorded By, (t) = Taken By, (c) = Cosigned By    Initials Name Provider Type    Fatoumata Elizabeth, MS-CCC/SLP, [facility] Speech and Language Pathologist                Therapy Charges for Today     Code Description Service Date Service Provider Modifiers Qty    99824439236 HC ST TREATMENT SWALLOW 4 6/14/2022 Fatoumata Murray MS-CCC/SLP, CNT GN 1    23875526498 HC ST TREATMENT SWALLOW 4 6/15/2022 Fatoumata Murray MS-CCC/SLP, JONES GN 1               Fatoumata [place], MS-CCC/SLP, JONES  6/15/2022

## 2022-06-15 NOTE — PROGRESS NOTES
Neurology Progress Note      Chief Complaint:    Seizure like episode  Encephalopathy    Subjective     Subjective:  No further seizure-like activity.  Magnesium 1.7.  Patient has continued waxing and waning levels of cognition as would be expected.  Leukocytosis with a white count of 13,000 likely secondary to demargination from steroid.  Tolerating oral intake and plan to switch Keppra from IV to p.o. today.    Much more alert today.  She is alert and oriented to person, place and time.  She is able to tell me that it is 2022.  She tells me her daughter, Shantell, works for Dr. [employer].  She tells me her dog's name is Homar.  She has no hesitation in her responses and answers appropriately to all questions today.      Past Medical History:   Diagnosis Date   • Anxiety and depression    • Asthma     bronchioectasis   • Back pain     with nerve pain in legs   • Cataract    • Dermatochalasis of both upper eyelids    • GERD (gastroesophageal reflux disease)    • Hypertension    • Hypothyroidism     hypothyroidism   • Kidney disease    • Lipoma    • Osteoarthritis    • Visual field defect, unspecified      Past Surgical History:   Procedure Laterality Date   • ANKLE SURGERY     • APPENDECTOMY     • BRONCHOSCOPY     • CERVICAL FUSION     • CHOLECYSTECTOMY     • EXCISION MASS LEG Left 3/4/2019    Procedure: EXCISION LIPOMA - LEFT HIP;  Surgeon: Giulia Rodrigez MD;  Location: [facility] OR;  Service: General   • HYSTERECTOMY     • OTHER SURGICAL HISTORY      thumb   • REPLACEMENT TOTAL KNEE     • TOTAL SHOULDER ARTHROPLASTY W/ DISTAL CLAVICLE EXCISION Right 3/6/2018    Procedure: RIGHT REVERSE TOTAL SHOULDER ARTHROPLASTY;  Surgeon: Imtiaz Lobato MD;  Location: [facility] OR;  Service:      Family History   Problem Relation Age of Onset   • Cancer Maternal Grandmother    • Hypertension Mother    • Stroke Mother    • Heart attack Father      Social History     Tobacco Use   • Smoking status: Never Smoker   • Smokeless tobacco:  Never Used   Vaping Use   • Vaping Use: Never used   Substance Use Topics   • Alcohol use: No   • Drug use: No       Medications:  Current Facility-Administered Medications   Medication Dose Route Frequency Provider Last Rate Last Admin   • acetaminophen (TYLENOL) tablet 650 mg  650 mg Oral Q6H PRN Robb Stoll MD   650 mg at 06/11/22 1005   • amLODIPine (NORVASC) tablet 5 mg  5 mg Oral Daily Robb Stoll MD   5 mg at 06/14/22 1007   • ascorbic acid (VITAMIN C) tablet 500 mg  500 mg Oral Daily Robb Stoll MD   500 mg at 06/14/22 1007   • budesonide-formoterol (SYMBICORT) 160-4.5 MCG/ACT inhaler 2 puff  2 puff Inhalation BID - RT Robb Stoll MD   2 puff at 06/15/22 0626   • cefTRIAXone (ROCEPHIN) 1 g in sodium chloride 0.9 % 100 mL IVPB-VTB  1 g Intravenous Q24H Robb Stoll  mL/hr at 06/13/22 2153 1 g at 06/13/22 2153   • cholecalciferol (VITAMIN D3) tablet 1,000 Units  1,000 Units Oral Daily Robb Stoll MD   1,000 Units at 06/14/22 1007   • dexamethasone (DECADRON) tablet 6 mg  6 mg Oral Daily With Breakfast Robb Stoll MD   6 mg at 06/14/22 1349   • Enoxaparin Sodium (LOVENOX) syringe 40 mg  40 mg Subcutaneous Q24H Robb Stoll MD   40 mg at 06/14/22 1011   • famotidine (PEPCID) tablet 20 mg  20 mg Oral BID AC Robb Stoll MD   20 mg at 06/14/22 1805   • Hold medication  1 each Does not apply Continuous PRN Robb Stoll MD       • ipratropium-albuterol (DUO-NEB) nebulizer solution 3 mL  3 mL Nebulization Q6H PRN Robb Stoll MD       • levETIRAcetam (KEPPRA) 100 MG/ML solution 500 mg  500 mg Oral Q12H Robb Stoll MD   500 mg at 06/14/22 2232   • lidocaine (LIDODERM) 5 % 1 patch  1 patch Transdermal Q24H Robb Stoll MD   1 patch at 06/14/22 1009   • LORazepam (ATIVAN) injection 1 mg  1 mg Intravenous Q4H PRN Robb Stoll MD       • losartan (COZAAR) tablet 50 mg  50 mg Oral Q24H Robb Stoll MD   50  mg at 06/14/22 1007   • sodium chloride 0.9 % flush 10 mL  10 mL Intravenous PRN Robb Stoll MD   10 mL at 06/13/22 1315   • sodium chloride 0.9 % infusion  50 mL/hr Intravenous Continuous Robb Stoll MD 50 mL/hr at 06/12/22 1443 50 mL/hr at 06/12/22 1443   • zinc sulfate (ZINCATE) capsule 220 mg  220 mg Oral Daily Robb Stoll MD   220 mg at 06/14/22 1007       Allergies:    Penicillins and Sulfa antibiotics    Review of Systems:   -A 14 point review of systems is completed and is negative.      Objective      Vital Signs  Temp:  [98 °F (36.7 °C)-98.6 °F (37 °C)] 98.1 °F (36.7 °C)  Heart Rate:  [] 109  Resp:  [18-22] 18  BP: (138-176)/(88-99) 154/89    Physical Exam:    General Exam:  Head:  Normocephalic, atraumatic.  HEENT: PERRLA.  Full EOM.  Neck:  No lymphadenopathy, thyromegaly or bruit.  Cardiac:  Regular rate and rhythm.    Lungs:  Clear to auscultation bilaterally.    Abdomen:  Non-tender, Non-distended.   Extremities: Full peripheral pulses.  No clubbing, cyanosis or edema.  Skin: No ulceration, breakdown or rash.      Neurologic Exam:  Mental Status:    -Awake. Alert. Oriented to person.  She tells me that we are at [facility].  She tells me that is 2022.  She can tell me the name of her dog, Homar, and her daughter, Shantell.  She can tell me that her daughter works for [employer] in [place], [place].  -Follows some simple commands.     CN II:  Full visual fields with confrontation.  Pupils equally reactive to light.  CN III, IV, VI:  Extraocular muscles function intact with no nystagmus.  CN V:  Facial sensory is symmetric.  CN VII:  Facial motor symmetric.  CN VIII:  Gross hearing intact bilaterally.  CN IX/X:  Palate elevates symmetrically.  CN XI:  Shoulder shrug symmetric.  CN XII:  Tongue is midline on protrusion.     Motor: (strength out of 5:  1= minimal movement, 2 = movement in plane of gravity, 3 = movement against gravity, 4 = movement against some  resistance, 5 = full strength)     -5/5 in bilateral biceps, triceps, brachioradialis, wrist extensors and intrinsic muscles of the hand.    -5/5 in bilateral hip flexors, quadriceps, hamstrings, gastrocsoleus complex, anterior tibialis and extensor hallucis longus.       Deep Tendon Reflexes:  -Right              Biceps: 2+         Triceps: 2+      Brachioradialis: 2+              Patella: 2+       Ankle: 2+           -Left              Biceps: 2+         Triceps: 2+      Brachioradialis: 2+              Patella: 2+       Ankle: 2+             Tone (Modified Ofe Scale):  No appreciable increase in tone or rigidity noted.     Sensory:  -Intact to light touch, pinprick BUE (C5-T1) and BLE (L2-S1).     Coordination:  -Finger to nose intact BUEs     Gait  -Nonambulatory       Results Review:    I reviewed the patient's new clinical results and findings.    Lab Results (last 24 hours)     Procedure Component Value Units Date/Time    Culture, CSF - Cerebrospinal Fluid, Lumbar Puncture [[id number]] Collected: 06/12/22 1239    Specimen: Cerebrospinal Fluid from Lumbar Puncture Updated: 06/15/22 0557     CSF Culture No growth at 3 days     Gram Stain Few (2+) WBCs per low power field      No organisms seen    Vitamin B12 [[id number]]  (Normal) Collected: 06/14/22 0649    Specimen: Blood Updated: 06/14/22 1942     Vitamin B-12 523 pg/mL     Narrative:      Results may be falsely increased if patient taking Biotin.      Blood Culture - Blood, Arm, Left [[id number]]  (Normal) Collected: 06/09/22 1725    Specimen: Blood from Arm, Left Updated: 06/14/22 1833     Blood Culture No growth at 5 days    Blood Culture - Blood, Wrist, Right [[id number]]  (Normal) Collected: 06/09/22 1729    Specimen: Blood from Wrist, Right Updated: 06/14/22 1833     Blood Culture No growth at 5 days          Imaging Results (Last 24 Hours)     ** No results found for the last 24 hours. **          Assessment/Plan     Impression:  · Possible isolated  seizure like activity  · Encephalopathy, improving, possibly COVID-related  · Hypomagnesemia-1.7 today  · COVID-19 infection treated with Paxlovid--Now on dexamethasone-now off of contact restrictions  · Leukocytosis--likely secondary to demargination  · Hypertension, essential        Plan:  · Continue Keppra 500 mg q12hrs.  Change to PO.  We will plan on leaving her on this until seen in the outpatient neurology clinic in follow-up at which time it can likely be weaned and discontinued.  · Encephalitis panel and other markers from lumbar puncture are negative.  · Continue PT, OT & ST   · Suspect that this may be a COVID-related encephalopathy.  · Outpatient neurology follow-up.  · Neurology will sign off.            Kevan Martinez PA-C  06/15/22  08:09 CDT

## 2022-06-15 NOTE — PROGRESS NOTES
[facility]  INPATIENT PROGRESS NOTE    Patient Name: Hanny Ivy  Date of Admission: 6/9/2022  Today's Date: 06/15/22  Length of Stay: 6  Primary Care Physician: Kun Gonzalez MD    Subjective   Chief Complaint: confusion    HPI:  Patient was seen and examined at bedside.  The patient is able to tell me her name today, she is also able to tell me that she is aware that she has COVID.  Patient denies any fever or chills.  Patient denies any nausea or vomiting.  Patient is eating and drinking a little bit better.  Patient has been taken out of precautions.  Patient needs a little bit of magnesium today, and she does note IV access.  We will access her port.  Patient continues to show improvement in regards to her mental status, but is still slow to answer.  Results patient likely has COVID-19 related encephalopathy, for a hypoactive delirium.    Review of Systems   Unable to perform ROS: Mental status change        All pertinent negatives and positives are as above. All other systems have been reviewed and are negative unless otherwise stated.     Objective    Temp:  [98 °F (36.7 °C)-98.7 °F (37.1 °C)] 98.7 °F (37.1 °C)  Heart Rate:  [] 111  Resp:  [18-22] 18  BP: (138-176)/(88-99) 160/91  Physical Exam  Vitals and nursing note reviewed.   Constitutional:       Appearance: She is obese.   HENT:      Head: Normocephalic and atraumatic.      Mouth/Throat:      Mouth: Mucous membranes are dry.      Pharynx: Oropharynx is clear.   Eyes:      General: No scleral icterus.     Conjunctiva/sclera: Conjunctivae normal.   Cardiovascular:      Rate and Rhythm: Normal rate and regular rhythm.      Heart sounds: No murmur heard.  Abdominal:      General: Abdomen is flat. Bowel sounds are normal. There is no distension.      Palpations: Abdomen is soft. There is no mass.   Skin:     General: Skin is warm and dry.      Coloration: Skin is pale.   Neurological:       General: No focal deficit present.      Mental Status: She is alert. She is disoriented.      Comments: Knows name and that she had COVID   Psychiatric:         Mood and Affect: Mood normal.         Behavior: Behavior normal.             Results Review:  I have reviewed the labs, radiology results, and diagnostic studies.    Laboratory Data:   Results from last 7 days   Lab Units 06/14/22  0650 06/12/22  0426 06/11/22  0655   WBC 10*3/mm3 13.35* 5.76 3.75   HEMOGLOBIN g/dL 14.1 12.5 12.3   HEMATOCRIT % 42.0 37.3 36.4   PLATELETS 10*3/mm3 329 195 153        Results from last 7 days   Lab Units 06/14/22  0649 06/12/22  0426 06/11/22  0655 06/10/22  0338   SODIUM mmol/L 143 140 137 137   POTASSIUM mmol/L 3.7 3.9 4.1 3.5   CHLORIDE mmol/L 104 106 103 103   CO2 mmol/L 25.0 22.0 22.0 22.0   BUN mg/dL 24* 23 19 18   CREATININE mg/dL 0.97 0.80 0.75 0.85   CALCIUM mg/dL 9.6 9.0 8.7 8.4*   BILIRUBIN mg/dL 0.4 0.2  --  0.2   ALK PHOS U/L 72 61  --  63   ALT (SGPT) U/L 32 18  --  20   AST (SGOT) U/L 38* 22  --  31   GLUCOSE mg/dL 101* 140* 124* 105*       Culture Data:   Blood Culture   Date Value Ref Range Status   06/09/2022 No growth at 4 days  Preliminary   06/09/2022 No growth at 4 days  Preliminary       Radiology Data:   Imaging Results (Last 24 Hours)     ** No results found for the last 24 hours. **          I have reviewed the patient's current medications.     Assessment/Plan     Active Hospital Problems    Diagnosis    • **Acute encephalopathy    • Seizure-like activity (HCC)    • COVID-19 virus infection    • Essential hypertension    • Generalized anxiety disorder    • Acquired hypothyroidism    • Hyperlipidemia        Plan:  Continue seizure precautions    Appreciate neurology input.      LP performed at bedside 6/12.  Results unremarkable.      MRI showed no acute findings.  EEG no seizure activity but diffuse slowing.    Magnesium replacement.  Serum magnesium ordered.  Monitor closely.    Incentive  spirometer.    Resume lovenox    Dexamethasone 6 mg daily, will discontinue as patient has no O2 needs and can contribute to some of her confusion.  Was on paxlovid as an outpatient.  Patient removed from precautions.  Per infection control, the patient was exposed 6/2, started having symptoms on that weekend, positive home test on 6/6 and started paxlovid.  Infection control gave approval to remove from precautions on 6/14.    Blood culture NGTD.    PT/OT    Patient received 5 days of ceftriaxone treatment for possible UTI.      Palliative care consultation, appreciate assistance     for placement                   Discharge Planning: I expect the patient to be discharged to  in >2 days.    Electronically signed by Robb Stoll MD, 06/15/22, 17:02 CDT.

## 2022-06-15 NOTE — PLAN OF CARE
Goal Outcome Evaluation:  Plan of Care Reviewed With: other (see comments)        Progress: no change  Outcome Evaluation: ST tx completed. Pt with lunch tray at bedside. Pt encouraged for increased PO but pt refused mech soft food that was present. Various PO trials presented with pt demo with facial grimace and oral holding. Pt appears to prefer chocolate flavor and demo with more timely swallow with chocolate boost. Slow but adequate mastication of regular solids. No overt s/s of aspiration noted with PO trials. ST to change pt diet to finger food diet and continue with nectar thick liquids. Diet message sent to provide chocolate magic cup each tray d/t dislike with berry flavor. Pt needs to be 1:1 feeder. ST to follow and treat.

## 2022-06-15 NOTE — PLAN OF CARE
Goal Outcome Evaluation:  Plan of Care Reviewed With: patient        Progress: no change  Outcome Evaluation: No neuro changes, though pt does seem to be less verbal since daughter left for the night.  Follows some commands.  At times has a tremor of her upper extremities, possibly corresponding to anxiety.  When given thickened PO liquid Keppra, pt gagged and let some dribble out of her mouth.  Attempted to mix it with applesauce, but pt coughed and spit some out.  Pt had another episode of coughing around 0300 (she had not received anything PO around that time).  Bed alarm on.  SCDs on.  Pt removed her lidocaine patch early on in shift.  Message sent to Dr. Stoll r/t pt not having PIV and having one more dose of IV Rocephin.  He ordered to give it IM.  Order placed and shot given.  Incontinent of urine.  Excoriation noted to groin/buttocks.  Cream applied.  BP  Has been elevated this shift.  Has slept very little

## 2022-06-15 NOTE — PROGRESS NOTES
Palliative Care Daily Progress Note     Code Status and Medical Interventions:   Ordered at: 22     Code Status (Patient has no pulse and is not breathing):    CPR (Attempt to Resuscitate)     Medical Interventions (Patient has pulse or is breathing):    Full Support     Subjective   Chief complaint: goals of care/advanced care planning.    Medical record reviewed. Events noted.  CSF culture shows no growth at 3 days.  No other new labs available for review.  She is alert, lying in bed in no apparent distress.  Occupational Therapy in room working with her.  Able to state her name and that she is at [facility].  Continues to experience delay in response however appears to have improved since exam yesterday.  No visitors currently at bedside.     Advance Care Planning   Advanced Directives: Patient has an advanced directive on file.     Advance Care Planning Discussion: Call placed to patient's daughter, Shantell, however unable to reach.  Voicemail left with return phone number.     The patient receives support from her daughter and extended family.  POA/Healthcare Surrogate - Patient's children (Cheng Ivy, Shantell lAeyda and John Benitok) are her healthcare surrogates.     Goals of care: Ongoing.    Review of Systems   Unable to perform ROS: mental status change     Pain Assessment  Nonverbal Indicators of Pain: moaning, activity pattern change, withdrawn, rubbing, other (see comments) (Patient will nod yes when asked if her head, neck, and knees are hurting.  Rubbing her knees.  She would nod no to all other areas.)  CPOT and PAINAD Scales: PAINAD (Pain Assessment in Advance Dementia Scale)  CPOT Facial Expression: 0-->relaxed, neutral  CPOT Body Movements: 0-->absence of movements  CPOT Muscle Tension: 0-->relaxed  Ventilator Compliance/Vocalization: 0-->talking in normal tone or no sound  CPOT Score: 0  PAINAD Breathin-->normal  PAINAD Negative Vocalization: 0-->none  PAINAD Facial Expression:  0-->smiling or inexpressive  PAINAD Body Language: 0-->relaxed  PAINAD Consolability: 0-->no need to console  PAINAD Score: 0  Pain Location: knee, neck, head  Pain Description: other (see comments) (MARGARET)    Objective   Diagnostics: Reviewed      Intake/Output Summary (Last 24 hours) at 6/15/2022 1218  Last data filed at 6/15/2022 0800  Gross per 24 hour   Intake 240 ml   Output --   Net 240 ml     Current Facility-Administered Medications   Medication Dose Route Frequency Provider Last Rate Last Admin   • acetaminophen (TYLENOL) tablet 650 mg  650 mg Oral Q6H PRN Robb Stoll MD   650 mg at 06/11/22 1005   • amLODIPine (NORVASC) tablet 5 mg  5 mg Oral Daily Robb Stoll MD   5 mg at 06/15/22 1016   • ascorbic acid (VITAMIN C) tablet 500 mg  500 mg Oral Daily Robb Stoll MD   500 mg at 06/15/22 1016   • budesonide-formoterol (SYMBICORT) 160-4.5 MCG/ACT inhaler 2 puff  2 puff Inhalation BID - RT Robb Stoll MD   2 puff at 06/15/22 0626   • cefTRIAXone (ROCEPHIN) 1 g in sodium chloride 0.9 % 100 mL IVPB-VTB  1 g Intravenous Q24H Robb Stoll  mL/hr at 06/13/22 2153 1 g at 06/13/22 2153   • cholecalciferol (VITAMIN D3) tablet 1,000 Units  1,000 Units Oral Daily Robb Stoll MD   1,000 Units at 06/15/22 1017   • dexamethasone (DECADRON) tablet 6 mg  6 mg Oral Daily With Breakfast Robb Stoll MD   6 mg at 06/15/22 1017   • Enoxaparin Sodium (LOVENOX) syringe 40 mg  40 mg Subcutaneous Q24H Robb Stoll MD   40 mg at 06/15/22 1018   • famotidine (PEPCID) tablet 20 mg  20 mg Oral BID AC Robb Stoll MD   20 mg at 06/15/22 1017   • Hold medication  1 each Does not apply Continuous PRN Robb Stoll MD       • ipratropium-albuterol (DUO-NEB) nebulizer solution 3 mL  3 mL Nebulization Q6H PRN Robb Stoll MD       • levETIRAcetam (KEPPRA) tablet 500 mg  500 mg Oral Q12H Kevan Martinez PA-C   500 mg at 06/15/22 1028   • lidocaine (LIDODERM) 5  "% 1 patch  1 patch Transdermal Q24H Robb Stoll MD   1 patch at 06/15/22 1018   • LORazepam (ATIVAN) injection 1 mg  1 mg Intravenous Q4H PRN Robb Stoll MD       • losartan (COZAAR) tablet 50 mg  50 mg Oral Q24H Robb Stoll MD   50 mg at 06/15/22 1016   • sodium chloride 0.9 % flush 10 mL  10 mL Intravenous PRN Robb Stoll MD   10 mL at 06/13/22 1315   • sodium chloride 0.9 % infusion  50 mL/hr Intravenous Continuous Robb Stoll MD 50 mL/hr at 06/12/22 1443 50 mL/hr at 06/12/22 1443   • zinc sulfate (ZINCATE) capsule 220 mg  220 mg Oral Daily Robb Stoll MD   220 mg at 06/15/22 1016     hold, 1 each  sodium chloride, 50 mL/hr, Last Rate: 50 mL/hr (06/12/22 1443)      •  acetaminophen  •  hold  •  ipratropium-albuterol  •  LORazepam  •  [COMPLETED] Insert peripheral IV **AND** sodium chloride    Assessment:  Vital Signs: /89 (BP Location: Left arm, Patient Position: Lying)   Pulse 109   Temp 98.1 °F (36.7 °C) (Oral)   Resp 18   Ht 152.4 cm (60\")   Wt 86.3 kg (190 lb 4.1 oz)   SpO2 94%   BMI 37.16 kg/m²     Physical Exam  Vitals and nursing note reviewed.   Constitutional:       General: She is not in acute distress.     Appearance: She is obese. She is ill-appearing.   HENT:      Head: Normocephalic and atraumatic.   Eyes:      General: Lids are normal.      Extraocular Movements: Extraocular movements intact.   Neck:      Vascular: No JVD.      Trachea: Trachea normal.   Cardiovascular:      Rate and Rhythm: Tachycardia present.      Heart sounds: Normal heart sounds.   Pulmonary:      Effort: Pulmonary effort is normal.      Breath sounds: Normal breath sounds.   Chest:      Chest wall: No swelling or tenderness.   Abdominal:      General: Abdomen is protuberant. Bowel sounds are normal.      Palpations: Abdomen is soft.   Musculoskeletal:      Cervical back: Neck supple.   Skin:     General: Skin is warm and dry.   Neurological:      Mental Status: She " is alert. She is disoriented.      Motor: Weakness present.   Psychiatric:         Cognition and Memory: Cognition is impaired.      Comments: Continues to have difficulty expressing answers and has delay before able to verbalize.        Functional status: Palliative Performance Scale Score: Performance 50% based on the following measures: Ambulation: Mainly sit or lie down, Activity and Evidence of Disease: Unable to do any work, extensive evidence of disease, Self-Care: Considerable assistance required,  Intake: Normal or reduced, LOC: Full or confusion.  Nutritional status: Albumin 4.30. Body mass index is 37.16 kg/m².  Patient status: Disease state: Controlled with current treatments.    Impression/Problem List:  1.   Altered mental status /  Acute encephalopathy  2.   Abnormal MRI of brain - Cerebral atrophy & chronic microvascular disease  3.   COVID-19 virus infection  4.   Seizure-like activity  5.   Difficulty communicating  6.   Chronic kidney disease stage IV  7.   Depression   8.   Generalized anxiety disorder  9.   Impaired mobility  10. Decreased activities of daily living   11. Dysphagia  12. Leukocytosis  13. Hypertension  14. Hyperlipidemia  15. Hypothyroidism    Plans/Recommendations     1. Goals of care include CODE STATUS CPR with full support interventions.    2. Palliative care encounter  - Prognosis is guarded long term secondary to AMS/acute encephalopathy, abnormal MRI of brain revealing cerebral atrophy and chronic microvascular disease, COVID-19 virus infection, seizure-like activity, difficulty communicating, CKD stage IV, depression, anxiety, impaired mobility and decreased ADLs, dysphagia and other comorbidities listed above    - Able to correctly state name and place today however continues to experience delay in responses however appears to have improved since exam yesterday    - Call placed to patient's daughter, Shantell, however unable to reach.  Voicemail left with return phone  number.       - Thank you for allowing us to participate in patient's plan of care. Palliative Care Team will continue to follow patient and plan to follow up tomorrow or sooner if needed.    Time spent:30 minutes spent reviewing medical and medication records, assessing and examining patient, discussing with family, answering questions, providing some guidance about a plan and documentation of care, and coordinating care with other healthcare members, with > 50% time spent face to face.     Little Rios, APRN  6/15/2022

## 2022-06-16 NOTE — THERAPY TREATMENT NOTE
Acute Care - Physical Therapy Treatment Note  [facility]     Patient Name: Hanny Ivy  : 1945  MRN: [id number]  Today's Date: 2022   Onset of Illness/Injury or Date of Surgery: 22  Visit Dx:     ICD-10-CM ICD-9-CM   1. Altered mental status, unspecified altered mental status type  R41.82 780.97   2. COVID-19  U07.1 079.89   3. Dysphagia, unspecified type  R13.10 787.20   4. Acute encephalopathy  G93.40 348.30   5. Impaired mobility  Z74.09 799.89   6. Decreased activities of daily living (ADL)  Z78.9 V49.89     Patient Active Problem List   Diagnosis   • Dermatochalasis of both upper eyelids   • Visual field defect, unspecified   • Rotator cuff arthropathy, right   • Pure hypercholesterolemia   • Essential hypertension   • Mild intermittent asthma without complication   • Generalized anxiety disorder   • Gastroesophageal reflux disease without esophagitis   • Acquired hypothyroidism   • Anemia   • Chronic neck and back pain   • Gait abnormality   • Hyperlipidemia   • MVP (mitral valve prolapse)   • Pulmonary infiltrate in left lung on chest x-ray   • Seasonal allergies   • Traction bronchiectasis (HCC)   • History of recurrent UTIs   • Altered mental status, unspecified altered mental status type   • COVID-19 virus infection   • Acute encephalopathy   • Seizure-like activity (HCC)     Past Medical History:   Diagnosis Date   • Anxiety and depression    • Asthma     bronchioectasis   • Back pain     with nerve pain in legs   • Cataract    • Dermatochalasis of both upper eyelids    • GERD (gastroesophageal reflux disease)    • Hypertension    • Hypothyroidism     hypothyroidism   • Kidney disease    • Lipoma    • Osteoarthritis    • Visual field defect, unspecified      Past Surgical History:   Procedure Laterality Date   • ANKLE SURGERY     • APPENDECTOMY     • BRONCHOSCOPY     • CERVICAL FUSION     • CHOLECYSTECTOMY     • EXCISION MASS LEG Left 3/4/2019    Procedure: EXCISION LIPOMA - LEFT HIP;   Surgeon: Giulia Rodrigez MD;  Location:  [facility] OR;  Service: General   • HYSTERECTOMY     • OTHER SURGICAL HISTORY      thumb   • REPLACEMENT TOTAL KNEE     • TOTAL SHOULDER ARTHROPLASTY W/ DISTAL CLAVICLE EXCISION Right 3/6/2018    Procedure: RIGHT REVERSE TOTAL SHOULDER ARTHROPLASTY;  Surgeon: Imtiaz Lobato MD;  Location:  [facility] OR;  Service:      PT Assessment (last 12 hours)     PT Evaluation and Treatment     Row Name 06/16/22 1434          Physical Therapy Time and Intention    Subjective Information complains of;pain  R shoulder, did not rate pain  -WK     Document Type therapy note (daily note)  -WK     Mode of Treatment physical therapy  -WK     Comment easily distracted  -WK     Row Name 06/16/22 1434          General Information    Existing Precautions/Restrictions fall  -WK     Row Name 06/16/22 1434          Bed Mobility    Comment, (Bed Mobility) refused due to pain  -WK     Row Name 06/16/22 1434          Motor Skills    Therapeutic Exercise aerobic  -WK     Row Name 06/16/22 1434          Aerobic Exercise    Comment, Aerobic Exercise (Therapeutic Exercise) AROM BLE 20 reps  with constant verbal and tactile cues to continue to perform exercise  -WK     Row Name 06/16/22 1434          Positioning and Restraints    Pre-Treatment Position in bed  -WK     Post Treatment Position bed  -WK     In Bed fowlers;call light within reach;encouraged to call for assist;exit alarm on  -WK           User Key  (r) = Recorded By, (t) = Taken By, (c) = Cosigned By    Initials Name Provider Type    WK Jennifer Olvera PTA Physical Therapist Assistant                Physical Therapy Education                 Title: PT OT SLP Therapies (In Progress)     Topic: Physical Therapy (In Progress)     Point: Mobility training (In Progress)     Learning Progress Summary           Patient Acceptance, E,D, NL by  at 6/14/2022 1156    Comment: benefits of PT and POC, call for A OOB                   Point: Home exercise  program (In Progress)     Learning Progress Summary           Patient Acceptance, E,D, NL by  at 6/14/2022 1156    Comment: benefits of PT and POC, call for A OOB                   Point: Precautions (In Progress)     Learning Progress Summary           Patient Acceptance, E,D, NL by  at 6/14/2022 1156    Comment: benefits of PT and POC, call for A OOB                               User Key     Initials Effective Dates Name Provider Type Discipline     06/16/21 -  Toribio Valle, PT Physical Therapist PT              PT Recommendation and Plan     Plan of Care Reviewed With: patient  Progress: improving  Outcome Evaluation: Pt refused bed mobility due to pain and just getting back in bed. Pt agreed to BLE and performed AROM BLE with verbal and tactile cues to continue to perform exercises. Pt is easily distracted.   Outcome Measures     Row Name 06/15/22 1230 06/15/22 0843 06/14/22 1419       How much help from another person do you currently need...    Turning from your back to your side while in flat bed without using bedrails? -- 3  -WK --    Moving from lying on back to sitting on the side of a flat bed without bedrails? -- 2  -WK --    Moving to and from a bed to a chair (including a wheelchair)? -- 2  -WK --    Standing up from a chair using your arms (e.g., wheelchair, bedside chair)? -- 2  -WK --    Climbing 3-5 steps with a railing? -- 1  -WK --    To walk in hospital room? -- 2  -WK --    AM-PAC 6 Clicks Score (PT) -- 12  -WK --       How much help from another is currently needed...    Putting on and taking off regular lower body clothing? 2  -AC -- 1  -AC    Bathing (including washing, rinsing, and drying) 2  -AC -- 1  -AC    Toileting (which includes using toilet bed pan or urinal) 2  -AC -- 2  -AC    Putting on and taking off regular upper body clothing 2  -AC -- 2  -AC    Taking care of personal grooming (such as brushing teeth) 2  -AC -- 2  -AC    Eating meals 2  -AC -- 2  -AC    AM-PAC 6 Clicks  Score (OT) 12  - -- 10  -       Functional Assessment    Outcome Measure Options AM-PAC 6 Clicks Daily Activity (OT)  -AC AM-PAC 6 Clicks Basic Mobility (PT)  -WK AM-PAC 6 Clicks Daily Activity (OT)  -AC          User Key  (r) = Recorded By, (t) = Taken By, (c) = Cosigned By    Initials Name Provider Type    AC Andrea Mitchell, OTR/L, CNT Occupational Therapist    WK Jennifer Olvera PTA Physical Therapist Assistant                 Time Calculation:    PT Charges     Row Name 06/16/22 1451             Time Calculation    Start Time 1434  -WK      Stop Time 1448  -WK      Time Calculation (min) 14 min  -WK      PT Received On 06/16/22  -WK              Time Calculation- PT    Total Timed Code Minutes- PT 14 minute(s)  -WK            User Key  (r) = Recorded By, (t) = Taken By, (c) = Cosigned By    Initials Name Provider Type    WK Jennifer Olvera PTA Physical Therapist Assistant              Therapy Charges for Today     Code Description Service Date Service Provider Modifiers Qty    40687056015 HC PT THERAPEUTIC ACT EA 15 MIN 6/15/2022 Jennifer Olvera PTA GP 3    78855053806 HC PT THER PROC EA 15 MIN 6/16/2022 Jennifer Olvera PTA GP 1          PT G-Codes  Outcome Measure Options: AM-PAC 6 Clicks Daily Activity (OT)  AM-PAC 6 Clicks Score (PT): 12  AM-PAC 6 Clicks Score (OT): 12    Jennifer Olvera PTA  6/16/2022

## 2022-06-16 NOTE — PROGRESS NOTES
"Palliative Care Daily Progress Note     Code Status and Medical Interventions:   Ordered at: 06/09/22 2048     Code Status (Patient has no pulse and is not breathing):    CPR (Attempt to Resuscitate)     Medical Interventions (Patient has pulse or is breathing):    Full Support     Subjective   Chief complaint: goals of care/advanced care planning.    Medical record reviewed. Events noted. Labs completed this AM reveal AST remains elevated at 41 (38 on 6/14/2022) and ALT is now elevated. BUN slightly elevated at 29 however creatinine and GFR normalized. She is alert, lying in bed in no apparent distress. Able to communicate without as much of delay today and stated her name and place correctly. Stated she was in the hospital for COVID however unsure of other events regarding situation. She wasn't able to state the year however able to state the correct president. No visitors currently at bedside.     Advance Care Planning   Advanced Directives: Patient has an advanced directive on file.     Advance Care Planning Discussion: Spoke with patient's daughter, Shantell. Reports she was not able to visit last night however her niece did and shared she was speaking better. Shantell stated she is pleased to hear she is communicating better each day and plans to visit with her this afternoon. Discussed goals of care further to determine if she had any questions and/or concerns with medical priorities, treatment and/or discharge options. Shantell reflected on past illnesses her mother has overcame and stated, \"I am not going to make her a DNR yet especially if it is just something mild and she can recover quickly like she has during this hospitalization.\"  Shared she is aware of interventions and complications that can occur and knows her mother would not wish to be placed on ventilatory support long-term. Shantell states she works in the medical field and has a good understanding and would not have difficulty making decisions regarding " interventions if need arises.     The patient receives support from her daughter and extended family.  POA/Healthcare Surrogate - Patient's children (hCeng Ivy, Shantell Maynard and John Ivy) are her healthcare surrogates.     Goals of care: Ongoing.    Review of Systems   Unable to perform ROS: mental status change     Pain Assessment  Nonverbal Indicators of Pain: moaning, activity pattern change, withdrawn, rubbing, other (see comments) (Patient will nod yes when asked if her head, neck, and knees are hurting.  Rubbing her knees.  She would nod no to all other areas.)  CPOT and PAINAD Scales: PAINAD (Pain Assessment in Advance Dementia Scale)  CPOT Facial Expression: 0-->relaxed, neutral  CPOT Body Movements: 0-->absence of movements  CPOT Muscle Tension: 0-->relaxed  Ventilator Compliance/Vocalization: 0-->talking in normal tone or no sound  CPOT Score: 0  PAINAD Breathin-->normal  PAINAD Negative Vocalization: 1-->occasional moan/groan, low speech, negative/disapproving quality  PAINAD Facial Expression: 0-->smiling or inexpressive  PAINAD Body Language: 0-->relaxed  PAINAD Consolability: 1-->distracted or reassured by voice/touch  PAINAD Score: 2  Pain Location: chest  Pain Description: other (see comments) (MARGARET)    Objective   Diagnostics: Reviewed      Intake/Output Summary (Last 24 hours) at 2022 1246  Last data filed at 2022 0800  Gross per 24 hour   Intake 100 ml   Output --   Net 100 ml     Current Facility-Administered Medications   Medication Dose Route Frequency Provider Last Rate Last Admin   • acetaminophen (TYLENOL) tablet 650 mg  650 mg Oral Q6H PRN Robb Stoll MD   650 mg at 06/15/22 2051   • amLODIPine (NORVASC) tablet 5 mg  5 mg Oral Daily Robb Stoll MD   5 mg at 22 0903   • budesonide-formoterol (SYMBICORT) 160-4.5 MCG/ACT inhaler 2 puff  2 puff Inhalation BID - RT Robb Stoll MD   2 puff at 22 0708   • cholecalciferol (VITAMIN D3) tablet 1,000  "Units  1,000 Units Oral Daily Robb Stoll MD   1,000 Units at 06/16/22 0832   • Enoxaparin Sodium (LOVENOX) syringe 40 mg  40 mg Subcutaneous Q24H Robb Stoll MD   40 mg at 06/16/22 0837   • famotidine (PEPCID) tablet 20 mg  20 mg Oral BID AC Robb Stoll MD   20 mg at 06/16/22 0609   • ipratropium-albuterol (DUO-NEB) nebulizer solution 3 mL  3 mL Nebulization Q6H PRN Robb Stoll MD       • levETIRAcetam (KEPPRA) tablet 500 mg  500 mg Oral Q12H Kevan Martinez PA-C   500 mg at 06/16/22 0832   • lidocaine (LIDODERM) 5 % 1 patch  1 patch Transdermal Q24H Robb Stoll MD   1 patch at 06/16/22 0832   • LORazepam (ATIVAN) injection 1 mg  1 mg Intravenous Q4H PRN Robb Stoll MD       • losartan (COZAAR) tablet 50 mg  50 mg Oral Q24H Robb Stoll MD   50 mg at 06/16/22 0903   • polyethylene glycol (MIRALAX) packet 17 g  17 g Oral Daily Robb Stoll MD   17 g at 06/16/22 0833   • sennosides-docusate (PERICOLACE) 8.6-50 MG per tablet 1 tablet  1 tablet Oral BID Robb Stoll MD   1 tablet at 06/16/22 0834   • sodium chloride 0.9 % flush 10 mL  10 mL Intravenous PRN Robb Stoll MD   10 mL at 06/13/22 1315        •  acetaminophen  •  ipratropium-albuterol  •  LORazepam  •  [COMPLETED] Insert peripheral IV **AND** sodium chloride    Assessment:  Vital Signs: /82 (BP Location: Left arm, Patient Position: Sitting)   Pulse 111   Temp 97.9 °F (36.6 °C) (Oral)   Resp 18   Ht 152.4 cm (60\")   Wt 86.3 kg (190 lb 4.1 oz)   SpO2 94%   BMI 37.16 kg/m²     Physical Exam  Vitals and nursing note reviewed.   Constitutional:       General: She is not in acute distress.     Appearance: She is obese. She is ill-appearing.   HENT:      Head: Normocephalic and atraumatic.   Eyes:      General: Lids are normal.      Extraocular Movements: Extraocular movements intact.   Neck:      Vascular: No JVD.      Trachea: Trachea normal.   Cardiovascular:      Rate and " Rhythm: Tachycardia present.      Heart sounds: Normal heart sounds.   Pulmonary:      Effort: Pulmonary effort is normal.      Breath sounds: Normal breath sounds.   Chest:      Chest wall: No swelling.      Comments: Port accessed to right chest.  Abdominal:      General: Abdomen is protuberant. Bowel sounds are normal.      Palpations: Abdomen is soft.   Musculoskeletal:      Cervical back: Neck supple.   Skin:     General: Skin is warm and dry.   Neurological:      Mental Status: She is alert.      Motor: Weakness present.   Psychiatric:         Mood and Affect: Mood normal.         Cognition and Memory: Cognition is impaired.      Comments: Forgetful however appears to be improving.     Functional status: Palliative Performance Scale Score: Performance 40% based on the following measures: Ambulation: Mainly in bed, Activity and Evidence of Disease: Unable to do any work, extensive evidence of disease, Self-Care: Mainly assistance required,  Intake: Normal or reduced, LOC: Full, drowsy or confusion.  Nutritional status: Albumin 4.30. Body mass index is 37.16 kg/m².  Patient status: Disease state: Controlled with current treatments.    Impression/Problem List:  1.   Altered mental status /  Acute encephalopathy  2.   Abnormal MRI of brain - Cerebral atrophy & chronic microvascular disease  3.   COVID-19 virus infection  4.   Seizure-like activity  5.   Chronic kidney disease stage IV  6.   Depression   7.   Hypermagnesemia  8.   Transaminitis   9.    Impaired mobility  10.  Generalized anxiety disorder  11.  Decreased activities of daily living   12.  Dysphagia  13.  Leukocytosis  14.  Hypertension  15.  Hyperlipidemia  16.  Hypothyroidism    Plans/Recommendations     1. Goals of care include CODE STATUS CPR with full support interventions.    2. Palliative care encounter  - Prognosis is guarded long term secondary to AMS/acute encephalopathy, abnormal MRI of brain revealing cerebral atrophy and chronic  microvascular disease, COVID-19 virus infection, seizure-like activity, difficulty communicating, CKD stage IV, depression, anxiety, impaired mobility and decreased ADLs, dysphagia and other comorbidities listed above    - Ability to communicate improving as she has less delay. Able to correctly state name and place and that she was hospitalized for COVID-19. Unable to verbalize other details from situation or year however stated correct president.     - Spoke with patient's daughter, Shantell and discussed goals of care further to determine if she had any questions and/or concerns with medical priorities, treatment and/or discharge options.     - Wishes to continue current measures with CODE STATUS as delineated above.     - SW consult placed as family interested in short-term SNF placement for further rehab.     - Thank you for allowing us to participate in patient's plan of care. Palliative Care Team will sign off. Please re-consult if needed.    Time spent:30 minutes spent reviewing medical and medication records, assessing and examining patient, discussing with family, answering questions, providing some guidance about a plan and documentation of care, and coordinating care with other healthcare members, with > 50% time spent face to face.     Little Rios, APRN  6/16/2022

## 2022-06-16 NOTE — PLAN OF CARE
Problem: Adult Inpatient Plan of Care  Goal: Plan of Care Review  Outcome: Ongoing, Progressing  Flowsheets (Taken 6/16/2022 1450)  Progress: improving  Plan of Care Reviewed With: patient  Outcome Evaluation: Pt refused bed mobility due to pain and just getting back in bed. Pt agreed to BLE and performed AROM BLE with verbal and tactile cues to continue to perform exercises. Pt is easily distracted.

## 2022-06-16 NOTE — PROGRESS NOTES
"    [facility]  INPATIENT PROGRESS NOTE    Patient Name: Hanny Ivy  Date of Admission: 6/9/2022  Today's Date: 06/16/22  Length of Stay: 7  Primary Care Physician: Kun Gonzalez MD    Subjective   Chief Complaint: confusion    HPI:    Patient much better.  She actually asked the nurse if she is \"over COVID\" yet.  Patient knew she was in the hospital.  She could tell me her name and birthday.  Continues to improve but still very weak and easily fatigued.    Review of Systems   Constitutional: Positive for fatigue. Negative for activity change, chills and fever.   Respiratory: Negative for cough and shortness of breath.    Cardiovascular: Negative for chest pain and palpitations.   Gastrointestinal: Negative for abdominal distention, abdominal pain, diarrhea, nausea and vomiting.   Neurological: Positive for tremors and weakness.        All pertinent negatives and positives are as above. All other systems have been reviewed and are negative unless otherwise stated.     Objective    Temp:  [97.4 °F (36.3 °C)-98.6 °F (37 °C)] 97.9 °F (36.6 °C)  Heart Rate:  [] 111  Resp:  [16-20] 18  BP: (105-176)/(65-84) 105/82  Physical Exam  Vitals and nursing note reviewed.   Constitutional:       Appearance: She is obese.   HENT:      Head: Normocephalic and atraumatic.      Mouth/Throat:      Mouth: Mucous membranes are dry.      Pharynx: Oropharynx is clear.   Eyes:      General: No scleral icterus.     Conjunctiva/sclera: Conjunctivae normal.   Cardiovascular:      Rate and Rhythm: Normal rate and regular rhythm.      Heart sounds: No murmur heard.  Abdominal:      General: Abdomen is flat. Bowel sounds are normal. There is no distension.      Palpations: Abdomen is soft. There is no mass.   Skin:     General: Skin is warm and dry.      Coloration: Skin is pale.   Neurological:      General: No focal deficit present.      Mental Status: She is alert. She is " disoriented.      Comments: Oriented x 2 and to situation; did not get year correct   Psychiatric:         Mood and Affect: Mood normal.         Behavior: Behavior normal.             Results Review:  I have reviewed the labs, radiology results, and diagnostic studies.    Laboratory Data:   Results from last 7 days   Lab Units 06/14/22  0650 06/12/22  0426 06/11/22  0655   WBC 10*3/mm3 13.35* 5.76 3.75   HEMOGLOBIN g/dL 14.1 12.5 12.3   HEMATOCRIT % 42.0 37.3 36.4   PLATELETS 10*3/mm3 329 195 153        Results from last 7 days   Lab Units 06/16/22  0550 06/14/22  0649 06/12/22  0426   SODIUM mmol/L 145 143 140   POTASSIUM mmol/L 3.5 3.7 3.9   CHLORIDE mmol/L 106 104 106   CO2 mmol/L 25.0 25.0 22.0   BUN mg/dL 29* 24* 23   CREATININE mg/dL 0.96 0.97 0.80   CALCIUM mg/dL 10.0 9.6 9.0   BILIRUBIN mg/dL 0.5 0.4 0.2   ALK PHOS U/L 77 72 61   ALT (SGPT) U/L 48* 32 18   AST (SGOT) U/L 41* 38* 22   GLUCOSE mg/dL 133* 101* 140*       Culture Data:   Blood Culture   Date Value Ref Range Status   06/09/2022 No growth at 4 days  Preliminary   06/09/2022 No growth at 4 days  Preliminary       Radiology Data:   Imaging Results (Last 24 Hours)     ** No results found for the last 24 hours. **          I have reviewed the patient's current medications.     Assessment/Plan     Active Hospital Problems    Diagnosis    • **Acute encephalopathy    • Seizure-like activity (HCC)    • COVID-19 virus infection    • Essential hypertension    • Generalized anxiety disorder    • Acquired hypothyroidism    • Hyperlipidemia        Plan:  Continue seizure precautions    Appreciate neurology input.      LP performed at bedside 6/12.  Results unremarkable.      MRI showed no acute findings.  EEG no seizure activity but diffuse slowing.    Magnesium replacement.  Serum magnesium ordered.  Monitor closely.    Incentive spirometer.    Resume lovenox    Dexamethasone 6 mg elvin initially, discontinued as patient has no O2 needs and can contribute to  some of her confusion.  Was on paxlovid as an outpatient.  Patient removed from precautions.  Per infection control, the patient was exposed 6/2, started having symptoms on that weekend, positive home test on 6/6 and started paxlovid.  Infection control gave approval to remove from precautions on 6/14.    Blood culture NGTD.    PT/OT    SLP following, evaluated at bedside with Kaylee BURRIS    Patient received 5 days of ceftriaxone treatment for possible UTI.      Palliative care consultation, appreciate assistance     for placement                   Discharge Planning: I expect the patient to be discharged to SNF, medically ready.    Electronically signed by Robb Stoll MD, 06/16/22, 15:52 CDT.

## 2022-06-16 NOTE — DISCHARGE PLACEMENT REQUEST
"Please call [phone] after info reviewed. Patient is out of Covid isolation. Referral for short term only then return to home.   Thanks        Hanny Barrett (77 y.o. Female)             Date of Birth   1945    Social Security Number       Address   [address] [place] [id number]    Home Phone   [phone]    MRN   [id number]       Congregation   Zoroastrianism    Marital Status                               Admission Date   6/9/22    Admission Type   Emergency    Admitting Provider   Robb Stoll MD    Attending Provider   Robb Stoll MD    Department, Room/Bed   [facility] 3A, 337/1       Discharge Date       Discharge Disposition       Discharge Destination                               Attending Provider: Robb Stoll MD    Allergies: Penicillins, Sulfa Antibiotics    Isolation: None   Infection: COVID (History) (06/14/22)   Code Status: CPR   Advance Care Planning Activity    Ht: 152.4 cm (60\")   Wt: 86.3 kg (190 lb 4.1 oz)    Admission Cmt: None   Principal Problem: Acute encephalopathy [G93.40]                 Active Insurance as of 6/9/2022     Primary Coverage     Payor Plan Insurance Group Employer/Plan Group    MEDICARE MEDICARE A & B      Payor Plan Address Payor Plan Phone Number Payor Plan Fax Number Effective Dates    [address] [phone]  2/1/2008 - None Entered    [facility] [id number]       Subscriber Name Subscriber Birth Date Member ID       HANNY BARRETT 1945 [id number]           Secondary Coverage     Payor Plan Insurance Group Employer/Plan Group    [employer]  SUP AAR HEALTH CARE OPTIONS      Payor Plan Address Payor Plan Phone Number Payor Plan Fax Number Effective Dates    [facility] [phone]  1/1/2016 - None Entered    [address]       [facility] [address]       Subscriber Name Subscriber Birth Date Member ID       HANNY BARRETT 1945 [id number]                 Emergency Contacts      (Rel.) " Home Phone Work Phone Mobile Phone    Shantell Maynard (Daughter) -- [phone]            Insurance Information                MEDICARE/MEDICARE A & B Phone: [phone]    Subscriber: Hanny Ivy Subscriber#: [id number]    Group#: -- Precert#: --        [facility] SUP/[facility] HEALTH CARE OPTIONS Phone: [phone]    Subscriber: Hanny Ivy Subscriber#: [id number]    Group#: -- Precert#: --             History & Physical      Otilio Brown MD at 06/09/22 2045              [facility] Medicine Services  HISTORY AND PHYSICAL    Date of Admission: 6/9/2022  Primary Care Physician: Kun Gonzalez MD    Subjective     Chief Complaint: Mental status changes.    History of Present Illness  77 year old female with PMH of HTN, hypothyroidism, CKD that was recently diagnosed with COVID 19 and started on Paxlovid. Daughter states that the patient has been debilitated and confused. Tonight she appeared to be totally disoriented and this is a significant change from baseline.     The patient had an episode of tonic clonic activity once on medical floor with a post ictal change that lasted over 10 minutes. Her T max is 100.5 in the ER, she was about 99 during the episode. She has not had seizures in the past or CVA history.     Her daughter states that no medications have been changed recently, other than addition of Paxlovid. And she had taken her home medications as prescribed.     Review of Systems   Unable to perform ROS: Mental status change     Past Medical History:   Past Medical History:   Diagnosis Date   • Anxiety and depression    • Asthma     bronchioectasis   • Back pain     with nerve pain in legs   • Cataract    • Dermatochalasis of both upper eyelids    • GERD (gastroesophageal reflux disease)    • Hypertension    • Hypothyroidism     hypothyroidism   • Kidney disease    • Lipoma    • Osteoarthritis    • Visual field defect, unspecified      Past Surgical  History:  Past Surgical History:   Procedure Laterality Date   • ANKLE SURGERY     • APPENDECTOMY     • BRONCHOSCOPY     • CERVICAL FUSION     • CHOLECYSTECTOMY     • EXCISION MASS LEG Left 3/4/2019    Procedure: EXCISION LIPOMA - LEFT HIP;  Surgeon: Giulia Rodrigez MD;  Location: [facility] OR;  Service: General   • HYSTERECTOMY     • OTHER SURGICAL HISTORY      thumb   • REPLACEMENT TOTAL KNEE     • TOTAL SHOULDER ARTHROPLASTY W/ DISTAL CLAVICLE EXCISION Right 3/6/2018    Procedure: RIGHT REVERSE TOTAL SHOULDER ARTHROPLASTY;  Surgeon: Imtiaz Lobato MD;  Location: [facility] OR;  Service:      Social History:  reports that she has never smoked. She has never used smokeless tobacco. She reports that she does not drink alcohol and does not use drugs.    Family History: family history includes Cancer in her maternal grandmother; Heart attack in her father; Hypertension in her mother; Stroke in her mother.       Allergies:  Allergies   Allergen Reactions   • Penicillins Rash and Other (See Comments)     Whelps, itching   • Sulfa Antibiotics Rash and Other (See Comments)     Whelps,l itching       Medications:  Prior to Admission medications    Medication Sig Start Date End Date Taking? Authorizing Provider   acetaminophen (TYLENOL) 325 MG tablet Take 650 mg by mouth Every 6 (Six) Hours As Needed for Mild Pain .    ProviderImelda MD   amLODIPine (NORVASC) 5 MG tablet Take 5 mg by mouth Daily.    ProviderImelda MD   carvedilol (COREG) 25 MG tablet Take 25 mg by mouth 2 (Two) Times a Day. 9/20/21   Imelda Mcrae MD   cetirizine (zyrTEC) 10 MG tablet Take 10 mg by mouth Daily. 9/20/21   Imelda Mcrae MD   chlorthalidone (HYGROTON) 25 MG tablet Take 25 mg by mouth Daily. 9/20/21   Imelda Mcrae MD   ciprofloxacin (Cipro) 250 MG tablet 1 po q 24 hours for 7 days 12/3/21   Lexy Rodriguez APRN   CloNIDine (CATAPRES) 0.1 MG tablet Take 0.1 mg by mouth 2 (Two) Times a Day.     Imelda Mcrae MD   dexamethasone 0.5 MG/5ML elixir SWISH AND SPIT 5MLS BY MOUTH AND REPEAT FOUR TO FIVE TIMES A DAY 11/12/21   mIelda Mcrae MD   doxepin (SINEquan) 75 MG capsule Take 75 mg by mouth every night at bedtime. 9/20/21   Imelda Mcrae MD   Dulera 200-5 MCG/ACT inhaler INHALE TWO PUFFS INTO THE LUNGS TWICE DAILY 9/20/21   Imelda Mcrae MD   EPINEPHrine (EPIPEN) 0.3 MG/0.3ML solution auto-injector injection Inject 0.3 mg into the muscle as needed. Use as directed for allergic reaction    Imelda Mcrae MD   esomeprazole (nexIUM) 40 MG capsule Take 40 mg by mouth. 2/20/20   Imelda Mcrae MD   estradiol (ESTRACE) 0.1 MG/GM vaginal cream Insert 2 g into the vagina 2 (Two) Times a Week for 360 days. 3/28/22 3/23/23  Federico Moyer PA   ferrous sulfate 324 (65 Fe) MG tablet delayed-release EC tablet TAKE ONE TABLET BY MOUTH DAILY 8/6/18   Imelda Mcrae MD   fluticasone (FLONASE) 50 MCG/ACT nasal spray 2 sprays into the nostril(s) as directed by provider Daily.    Imelda Mcrae MD   folic acid (FOLVITE) 1 MG tablet Take 1 mg by mouth Daily.    Imelda Mcrae MD   gabapentin (NEURONTIN) 100 MG capsule Take 100 mg by mouth 3 (Three) Times a Day. 10/12/21   Imelda Mcrae MD   guaiFENesin (MUCINEX) 600 MG 12 hr tablet Take 1,200 mg by mouth. 8/22/19   Imelda Mcrae MD   HYDROcodone-acetaminophen (NORCO) 7.5-325 MG per tablet Take 1 tablet by mouth Every 4 (Four) Hours As Needed for Moderate Pain  (Pain). 3/4/19   Giulia Rodrigez MD   ipratropium-albuterol (DUO-NEB) 0.5-2.5 mg/3 ml nebulizer Inhale 3 mL. 7/5/19   Imelda Mcrae MD   levothyroxine (SYNTHROID, LEVOTHROID) 100 MCG tablet Take 1 tablet by mouth Q Morning 6/9/16   Imelda Mcrae MD   LORazepam (ATIVAN) 0.5 MG tablet Take 0.5 mg by mouth 3 (Three) Times a Day As Needed. for anxiety 9/20/21   Provider, MD Imelda   mometasone (NASONEX) 50 MCG/ACT  "nasal spray 2 sprays into each nostril Daily.    Imelda Mcrae MD   nitrofurantoin (MACRODANTIN) 50 MG capsule Take 1 capsule by mouth Every Night. 1/28/22   Federico Moyer PA   olmesartan (BENICAR) 40 MG tablet Take 40 mg by mouth Daily.    Imelda Mcrae MD   Omega-3 Fatty Acids (FISH OIL) 1000 MG capsule capsule Take  by mouth Daily With Breakfast.    Imelda Mcrae MD   oxybutynin XL (Ditropan XL) 5 MG 24 hr tablet 1 po daily 11/4/21   Lexy Rodriguez APRN   polyethylene glycol (MIRALAX) powder Take 17 g by mouth As Needed.    Imelda Mcrae MD   prednisoLONE acetate (PRED FORTE) 1 % ophthalmic suspension  11/2/21   Imelda Mcrae MD   senna (SENOKOT) 8.6 MG tablet Take 2 tablets by mouth.    Imelda Mcrae MD   simvastatin (ZOCOR) 20 MG tablet simvastatin 20 mg tablet 2/12/18   Imelda Mcrae MD   sodium chloride 0.65 % nasal spray 1 spray into the nostril(s) as directed by provider As Needed for Congestion.    Imelda Mcrae MD   sucralfate (CARAFATE) 1 GM/10ML suspension Take 1 g by mouth 4 (Four) Times a Day.    Imelda Mcrae MD   venlafaxine XR (EFFEXOR-XR) 150 MG 24 hr capsule Take 150 mg by mouth. 1/15/20   Imelda Mcrae MD I have utilized all available immediate resources to obtain, update, and review the patient's current medications.    Objective     Vital Signs: /82 (BP Location: Left arm, Patient Position: Lying)   Pulse 93   Temp 100.5 °F (38.1 °C)   Resp 20   Ht 152.4 cm (60\")   Wt 90.3 kg (199 lb)   SpO2 95%   BMI 38.86 kg/m²   Physical Exam  Vitals reviewed.   Constitutional:       Appearance: She is well-developed. She is ill-appearing and diaphoretic.   HENT:      Head: Normocephalic and atraumatic.      Right Ear: External ear normal.      Left Ear: External ear normal.      Mouth/Throat:      Mouth: Mucous membranes are dry.      Pharynx: Oropharynx is clear.   Eyes:      General:         Right " eye: No discharge.         Left eye: No discharge.      Extraocular Movements: Extraocular movements intact.      Conjunctiva/sclera: Conjunctivae normal.      Pupils: Pupils are equal, round, and reactive to light.   Neck:      Vascular: No JVD.   Cardiovascular:      Rate and Rhythm: Normal rate and regular rhythm.      Pulses: Normal pulses.      Heart sounds: Normal heart sounds.   Pulmonary:      Effort: Pulmonary effort is normal. No respiratory distress.      Breath sounds: No stridor. No wheezing, rhonchi or rales.   Chest:      Chest wall: No tenderness.   Abdominal:      General: There is no distension.      Palpations: Abdomen is soft.      Tenderness: There is no abdominal tenderness. There is no guarding or rebound.      Hernia: No hernia is present.   Musculoskeletal:         General: No swelling, tenderness or deformity. Normal range of motion.      Cervical back: Normal range of motion and neck supple. No rigidity or tenderness. No muscular tenderness.   Skin:     General: Skin is warm.      Capillary Refill: Capillary refill takes less than 2 seconds.      Findings: No erythema or rash.   Neurological:      General: No focal deficit present.      Mental Status: She is alert. She is disoriented.      Cranial Nerves: No cranial nerve deficit.      Sensory: No sensory deficit.      Motor: No weakness or abnormal muscle tone.      Deep Tendon Reflexes: Reflexes normal.     Results Reviewed:   Narrative:      EXAMINATION:  XR CHEST 1 VW-  6/9/2022 5:29 PM CDT     HISTORY: Shortness of breath. Covid positive.     COMPARISON: 2/28/2019.     TECHNIQUE: Single view AP image.     FINDINGS:  There is hypoventilation with vascular crowding. There is  stable bronchial wall thickening. There is patchy infiltrate in the left  perihilar region and left lung base. There is a calcified granuloma on  the left. Heart size appears to be within normal limits. There is a port  catheter on the right that is new compared to  the prior study. There has  been prior cervical fusion and right shoulder arthroplasty. There are  degenerative changes of the spine and left glenohumeral joint.       Impression:      1. Hypoventilation with vascular crowding.  2. Opacity in the left perihilar region and left lung base likely due to  atelectasis. Pneumonia less likely.  3. Stable bronchial wall thickening.        This report was finalized on 06/09/2022 17:44 by Dr. Jack Goldstein MD.        I have personally reviewed and interpreted the radiology studies and ECG obtained at time of admission.     Assessment / Plan     Assessment:   Active Hospital Problems    Diagnosis    • **Acute encephalopathy    • Seizure-like activity (HCC)    • COVID-19 virus infection    • Essential hypertension    • Generalized anxiety disorder    • Acquired hypothyroidism    • Hyperlipidemia      Plan:   Admitted initially to medical floor, but transferred to critical care after seizure activity  Vitals per protocol, neuro checks every 4 hours  Npo until alert and verified swallowing bedside or SLP if needed  IVF NS 50 cc/hour  Fall and seizure precautions, bed rest  Keppra 500 mg IVP BID  MRI brain in AM  Neurology consult in AM  Held home medications due to mental status changes on presentation    DVT prophylaxis > Lovenox 40 mg SQ daily    Code Status/Advanced Care Plan: Full    The patient's surrogate decision maker is see records.     I discussed my findings and recommendations with the daughter at bedside.    Estimated length of stay is over 2 midnights.     The patient was seen and examined by me on 06/09/2022 at 2045 and 2315.    Electronically signed by Otilio Brown MD, 06/09/22, 23:45 CDT.              Electronically signed by Otilio Brown MD at 06/10/22 0708       Vital Signs (last day)     Date/Time Temp Temp src Pulse Resp BP Patient Position SpO2    06/16/22 1044 97.9 (36.6) Oral 111 18 105/82 Sitting 94    06/16/22 0749 97.5 (36.4)  "Oral 103 20 107/65 Lying 97    06/16/22 0708 -- -- 100 20 -- -- 95    06/16/22 0500 97.4 (36.3) Oral 97 20 157/79 Lying 95    06/15/22 2323 98.6 (37) Oral 96 16 142/79 Lying 98    06/15/22 2010 98.2 (36.8) Oral 110 18 176/84 Lying 95    06/15/22 1918 -- -- 105 17 -- -- 96    06/15/22 1414 98.7 (37.1) Oral 111 18 160/91 Sitting 96    06/15/22 0806 98.1 (36.7) Oral 109 18 154/89 Lying 94    06/15/22 0629 -- -- 107 20 -- -- 93    06/15/22 0626 -- -- 104 20 -- -- 93    06/15/22 0300 98 (36.7) Oral 99 22 165/99 Lying 93             Operative/Procedure Notes (last 7 days)      Robb Stoll MD at 06/12/22 1241      Procedure Orders    1. Lumbar Puncture [[id number]] ordered by Robb Stoll MD           Post-procedure Diagnoses    1. Acute encephalopathy [G93.40]             Lumbar Puncture    Date/Time: 6/12/2022 12:42 PM  Performed by: Robb Stoll MD  Authorized by: Robb Stoll MD   Consent: Verbal consent obtained. Written consent obtained.  Risks and benefits: risks, benefits and alternatives were discussed  Consent given by: power of   Patient understanding: patient states understanding of the procedure being performed  Patient consent: the patient's understanding of the procedure matches consent given  Procedure consent: procedure consent matches procedure scheduled  Relevant documents: relevant documents present and verified  Test results: test results available and properly labeled  Required items: required blood products, implants, devices, and special equipment available  Patient identity confirmed: anonymous protocol, patient vented/unresponsive  Time out: Immediately prior to procedure a \"time out\" was called to verify the correct patient, procedure, equipment, support staff and site/side marked as required.  Indications: evaluation for altered mental status and evaluation for infection    Anesthesia:  Local Anesthetic: lidocaine 1% without epinephrine    Sedation:  Patient " sedated: Gave a 1 mg of ativan.    Preparation: Patient was prepped and draped in the usual sterile fashion.  Lumbar space: L4-L5 interspace  Patient's position: right lateral decubitus  Needle gauge: 18  Needle type: spinal needle - Quincke tip  Needle length: 3.5 in  Number of attempts: 5 or more  Fluid appearance: clear, blood-tinged and blood-tinged then clearing  Tubes of fluid: 4  Total volume: 18 ml  Post-procedure: adhesive bandage applied  Patient tolerance: patient tolerated the procedure well with no immediate complications          Electronically signed by Robb Stoll MD at 06/12/22 1247          Physician Progress Notes (last 24 hours)      Robb Stoll MD at 06/15/22 1702              [facility] Medicine Services  INPATIENT PROGRESS NOTE    Patient Name: Hanny Ivy  Date of Admission: 6/9/2022  Today's Date: 06/15/22  Length of Stay: 6  Primary Care Physician: Kun Gonzalez MD    Subjective   Chief Complaint: confusion    HPI:  Patient was seen and examined at bedside.  The patient is able to tell me her name today, she is also able to tell me that she is aware that she has COVID.  Patient denies any fever or chills.  Patient denies any nausea or vomiting.  Patient is eating and drinking a little bit better.  Patient has been taken out of precautions.  Patient needs a little bit of magnesium today, and she does note IV access.  We will access her port.  Patient continues to show improvement in regards to her mental status, but is still slow to answer.  Results patient likely has COVID-19 related encephalopathy, for a hypoactive delirium.    Review of Systems   Unable to perform ROS: Mental status change        All pertinent negatives and positives are as above. All other systems have been reviewed and are negative unless otherwise stated.     Objective    Temp:  [98 °F (36.7 °C)-98.7 °F (37.1 °C)] 98.7 °F (37.1 °C)  Heart Rate:  []  111  Resp:  [18-22] 18  BP: (138-176)/(88-99) 160/91  Physical Exam  Vitals and nursing note reviewed.   Constitutional:       Appearance: She is obese.   HENT:      Head: Normocephalic and atraumatic.      Mouth/Throat:      Mouth: Mucous membranes are dry.      Pharynx: Oropharynx is clear.   Eyes:      General: No scleral icterus.     Conjunctiva/sclera: Conjunctivae normal.   Cardiovascular:      Rate and Rhythm: Normal rate and regular rhythm.      Heart sounds: No murmur heard.  Abdominal:      General: Abdomen is flat. Bowel sounds are normal. There is no distension.      Palpations: Abdomen is soft. There is no mass.   Skin:     General: Skin is warm and dry.      Coloration: Skin is pale.   Neurological:      General: No focal deficit present.      Mental Status: She is alert. She is disoriented.      Comments: Knows name and that she had COVID   Psychiatric:         Mood and Affect: Mood normal.         Behavior: Behavior normal.             Results Review:  I have reviewed the labs, radiology results, and diagnostic studies.    Laboratory Data:   Results from last 7 days   Lab Units 06/14/22  0650 06/12/22  0426 06/11/22  0655   WBC 10*3/mm3 13.35* 5.76 3.75   HEMOGLOBIN g/dL 14.1 12.5 12.3   HEMATOCRIT % 42.0 37.3 36.4   PLATELETS 10*3/mm3 329 195 153        Results from last 7 days   Lab Units 06/14/22  0649 06/12/22  0426 06/11/22  0655 06/10/22  0338   SODIUM mmol/L 143 140 137 137   POTASSIUM mmol/L 3.7 3.9 4.1 3.5   CHLORIDE mmol/L 104 106 103 103   CO2 mmol/L 25.0 22.0 22.0 22.0   BUN mg/dL 24* 23 19 18   CREATININE mg/dL 0.97 0.80 0.75 0.85   CALCIUM mg/dL 9.6 9.0 8.7 8.4*   BILIRUBIN mg/dL 0.4 0.2  --  0.2   ALK PHOS U/L 72 61  --  63   ALT (SGPT) U/L 32 18  --  20   AST (SGOT) U/L 38* 22  --  31   GLUCOSE mg/dL 101* 140* 124* 105*       Culture Data:   Blood Culture   Date Value Ref Range Status   06/09/2022 No growth at 4 days  Preliminary   06/09/2022 No growth at 4 days  Preliminary        Radiology Data:   Imaging Results (Last 24 Hours)     ** No results found for the last 24 hours. **          I have reviewed the patient's current medications.     Assessment/Plan     Active Hospital Problems    Diagnosis    • **Acute encephalopathy    • Seizure-like activity (HCC)    • COVID-19 virus infection    • Essential hypertension    • Generalized anxiety disorder    • Acquired hypothyroidism    • Hyperlipidemia        Plan:  Continue seizure precautions    Appreciate neurology input.      LP performed at bedside 6/12.  Results unremarkable.      MRI showed no acute findings.  EEG no seizure activity but diffuse slowing.    Magnesium replacement.  Serum magnesium ordered.  Monitor closely.    Incentive spirometer.    Resume lovenox    Dexamethasone 6 mg daily, will discontinue as patient has no O2 needs and can contribute to some of her confusion.  Was on paxlovid as an outpatient.  Patient removed from precautions.  Per infection control, the patient was exposed 6/2, started having symptoms on that weekend, positive home test on 6/6 and started paxlovid.  Infection control gave approval to remove from precautions on 6/14.    Blood culture NGTD.    PT/OT    Patient received 5 days of ceftriaxone treatment for possible UTI.      Palliative care consultation, appreciate assistance    SW for placement                   Discharge Planning: I expect the patient to be discharged to  in >2 days.    Electronically signed by Robb Stoll MD, 06/15/22, 17:02 CDT.    Electronically signed by Robb Stoll MD at 06/15/22 1708       Consult Notes (last 24 hours)  Notes from 06/15/22 1256 through 06/16/22 1256   No notes of this type exist for this encounter.         Physical Therapy Notes (last 24 hours)  Notes from 06/15/22 1256 through 06/16/22 1256   No notes exist for this encounter.                               Fatoumata Murray, MS-CCC/SLP, CNT    Speech and Language Pathologist   Speech  "Therapy   Plan of Care      Signed   Date of Service:  06/15/22 1530   Creation Time:  06/15/22 1530              Signed              Show:Clear all  []Manual[]Template[]Copied    Added by:  [x]Fatoumata Murray, MS-CCC/SLP, CNT      []Saran for details    Goal Outcome Evaluation:  Plan of Care Reviewed With: other (see comments)  Progress: no change  Outcome Evaluation: ST tx completed. Pt with lunch tray at bedside. Pt encouraged for increased PO but pt refused mech soft food that was present. Various PO trials presented with pt demo with facial grimace and oral holding. Pt appears to prefer chocolate flavor and demo with more timely swallow with chocolate boost. Slow but adequate mastication of regular solids. No overt s/s of aspiration noted with PO trials. ST to change pt diet to finger food diet and continue with nectar thick liquids. Diet message sent to provide chocolate magic cup each tray d/t dislike with berry flavor. Pt needs to be 1:1 feeder. ST to follow and treat.                 ED to Hosp-Admission (Current) on 6/9/2022     ED to Hosp-Admission (Current) on 6/9/2022        Detailed Report        ROS Info      Note shared with patient        Additional Documentation    Vitals:  /82 (BP Location: Left arm, Patient Position: Sitting)   Pulse 111   Temp 97.9 °F (36.6 °C) (Oral)   Resp 18   Ht 152.4 cm (60\")   Wt 86.3 kg (190 lb 4.1 oz)   SpO2 94%   BMI 37.16 kg/m²   BSA 1.83 m²      More Vitals   Flowsheets:  Travel, Exposure, and Communicable Disease Screening,   Outbreak Screen,   Candida auris Outbreak,   HPI,   Vital Signs,   Pain,   Triage Sepsis Screen,   Acuity/Destination,   [place] Suicide Severity Rating Scale Past Month (C-SSRS Screen Version),   Violence Risk Screen,   Abuse Screen,   Fall Risk Assessment,   Sepsis Predictive Model,   Falls PA Model,   All LDAs,   Cardiac,   Peripheral Neurovascular,   Fever Assessments,   Patient Radiology Status,   Device Vitals,   MEWS " score,   Workload Acuity,   Care Handoff,   Vital Signs,   Vitals Reassessment,   Audit-C,   Patient Belongings,   D/C Planning,   Functional Level Screening,   Nutrition Screen,   Healthcare Directives,   Bedside Dysphagia Screenings,   Respiratory Therapy Flowsheet,   Rapid Response Onset,   CPR/Defib/Cardioversion,   Ventilation,   Vitals,   Rapid Response Outcome,   ICU Vital Signs,   Critical Care Adult PCS Body System,   Adult PCS Body System,   PF RATIO,   Intake/Output,   Readmission Risk Score,   MCG Next Review Date,   CM Responsibilities,   Discharge Needs Assessment,   OT Evaluation and Treatment,   PT Evaluation and Treatment,   SLP Adult Swallow Evaluation,   CARE PLAN MINI-FLOWSHEET DATA,   Time Calculation- SLP,   SLP Priority,   Data,   Vaccination Screening,   Psychosocial Review,   Therapy Patient List Popup Flowsheet,   Vitals, Intake and Output,   Daily care/ Safety,   Meds to Bed Enrollment,   Discharge Plan,   Pressure Ulcer Screening,   Adult Patient Profile,   General Information,   Mobility,   Obj/Interventions,   Clinical Impression,   Goals/Plan,   Outcome Measures,   Time Calculation- PT,   PT Priority,   OT Priority,   Outcome Measures,   Time Calculation- OT,   Palliative Care Assessment,   Palliative Quality Data,   Reason for Assessment,   Nutrition/Diet History,   Labs/Tests/Procedures/Meds,   Physical Findings,   Estimated/Assessed Needs - Anthropometrics,   Nutrition Prescription Ordered,   Evaluation of Received Nutrient/Fluid Intake,   Problem 1,   Intervention Goal,   Nutrition Intervention,   Nutrition Prescription,   Education/Evaluation   ...(87 more)   Encounter Info:  Billing Info,   History,   Allergies,   Detailed Report              Patient Summary Extracts    Lab Result Report 6/16/2022 12:56 PM       Encounter Information    Encounter Information    Department Encounter #   6/9/2022  4:37 PM  [facility] 3A [id number]                 Care Timeline    06/09   Admitted from  ED 2133      Rapid Response      Transferred to [facility] 2343   06/12   Transferred out of [facility] CARDIAC [facility]ARE 1848         Clinical Impressions       Altered mental status, unspecified altered mental status type     COVID-19      Disposition       Decision to Admit       Orders Placed    There are too many orders placed during this visit to show here. Check the appropriate Chart Review tabs to learn about these orders.         Care Timeline      SCANNED EKG     SCANNED - TELEMETRY       SCANNED - TELEMETRY       SCANNED - TELEMETRY       SCANNED - TELEMETRY     1616   Arrived   1657   COVID-19,[facility] IN-HOUSE,Nasal Swab No Transport Media 3-4 HR TAT - Swab, Nasal Cavity    1705   ECG 12 Lead   1712   Blood Gas, Arterial -    1725   Blood Culture - Blood, Arm, Left   1729   Comprehensive Metabolic Panel      Lactic Acid, Plasma     BNP     CBC & Differential      D-dimer, Quantitative      Blood Culture - Blood, Wrist, Right     Troponin     Procalcitonin    1730   XR Chest 1 View   1738   Urinalysis With Culture If Indicated - Urine, Catheter      Urinalysis, Microscopic Only - Urine, Catheter    1814   CT Head Without Contrast     CT Cervical Spine Without Contrast   1822   Lactated Ringers 500 mL   1941   Admitted (ED Boarder)   2133   Transferred to [facility] 3A       Medications Administered     All Medications Administered (32)      Visit Diagnoses       Altered mental status, unspecified altered mental status type     COVID-19     Dysphagia, unspecified type     Acute encephalopathy     Impaired mobility     Decreased activities of daily living (ADL)     Problem List             Media  From this encounter  Electronic signature on 6/9/2022 1657 - E-signed   Scan on 6/9/2022 by [employer], [place]: ER SIGN IN FORM, EDILIA, 06/09/2022       Committee Details    There is no Committee Review information to display for this encounter.       Committee  Members    Hospice and Palliative Medicine: Ana Marx, APRN Neurology: Alexandru Sifuentes MD   Occupational Therapy: Andrea Mitchell, OTR/L, Chasity Canales, OTR/L   Isabel Moyer OTR/L, Talay Galo, GAR Palliative Care: Tracy Luong, CSW   Little Rios, APRN   Physical Therapy: Marine Huang, PTA   Silvana Maguire, PT   Toribio Valle, PT   Jennifer Olvera, PTA   Joe Gordillo, PT DPT   Angelica Berrios, PTA : Natalie Montes, TJW   Speech Pathology: Ericka Stevenson, CCC-SLP   Kaylee Rodriguez, MS CCC-SLP   Terri, Fatoumata Andrade, MS CCC-SLP                    Occupational Therapy Notes (last 48 hours)      Andrea Mitchell, OTR/L, JONES at 06/15/22 1314          Acute Care - Occupational Therapy Treatment Note  [facility]     Patient Name: Hanny Ivy  : 1945  MRN: [id number]  Today's Date: 6/15/2022  Onset of Illness/Injury or Date of Surgery: 22  Date of Referral to OT: 22  Referring Physician: Dr. Brown    Admit Date: 2022       ICD-10-CM ICD-9-CM   1. Altered mental status, unspecified altered mental status type  R41.82 780.97   2. COVID-19  U07.1 079.89   3. Dysphagia, unspecified type  R13.10 787.20   4. Acute encephalopathy  G93.40 348.30   5. Impaired mobility  Z74.09 799.89   6. Decreased activities of daily living (ADL)  Z78.9 V49.89     Patient Active Problem List   Diagnosis   • Dermatochalasis of both upper eyelids   • Visual field defect, unspecified   • Rotator cuff arthropathy, right   • Pure hypercholesterolemia   • Essential hypertension   • Mild intermittent asthma without complication   • Generalized anxiety disorder   • Gastroesophageal reflux disease without esophagitis   • Acquired hypothyroidism   • Anemia   • Chronic neck and back pain   • Gait abnormality   • Hyperlipidemia   • MVP (mitral valve prolapse)   • Pulmonary infiltrate in left lung on chest x-ray   • Seasonal allergies   •  Traction bronchiectasis (HCC)   • History of recurrent UTIs   • Altered mental status, unspecified altered mental status type   • COVID-19 virus infection   • Acute encephalopathy   • Seizure-like activity (HCC)     Past Medical History:   Diagnosis Date   • Anxiety and depression    • Asthma     bronchioectasis   • Back pain     with nerve pain in legs   • Cataract    • Dermatochalasis of both upper eyelids    • GERD (gastroesophageal reflux disease)    • Hypertension    • Hypothyroidism     hypothyroidism   • Kidney disease    • Lipoma    • Osteoarthritis    • Visual field defect, unspecified      Past Surgical History:   Procedure Laterality Date   • ANKLE SURGERY     • APPENDECTOMY     • BRONCHOSCOPY     • CERVICAL FUSION     • CHOLECYSTECTOMY     • EXCISION MASS LEG Left 3/4/2019    Procedure: EXCISION LIPOMA - LEFT HIP;  Surgeon: Giulia Rodrigez MD;  Location: [facility] OR;  Service: General   • HYSTERECTOMY     • OTHER SURGICAL HISTORY      thumb   • REPLACEMENT TOTAL KNEE     • TOTAL SHOULDER ARTHROPLASTY W/ DISTAL CLAVICLE EXCISION Right 3/6/2018    Procedure: RIGHT REVERSE TOTAL SHOULDER ARTHROPLASTY;  Surgeon: Imtiaz Lobato MD;  Location:  [facility] OR;  Service:          OT ASSESSMENT FLOWSHEET (last 12 hours)     OT Evaluation and Treatment     Row Name 06/15/22 0072                   OT Time and Intention    Document Type therapy note (daily note)  -AC        Mode of Treatment occupational therapy  -AC                  General Information    Existing Precautions/Restrictions fall  -AC        Barriers to Rehab cognitive status  -AC                  Pain Assessment    Pain Location - chest  -AC        Pain Intervention(s) Repositioned;Ambulation/increased activity  -AC                  Pain Scale: FACES Pre/Post-Treatment    Pain: FACES Scale, Pretreatment 0-->no hurt  -AC        Posttreatment Pain Rating 8-->hurts whole lot  -AC                  Activities of Daily Living    BADL  Assessment/Intervention upper body dressing  -AC                  Upper Body Dressing Assessment/Training    [place] Level (Upper Body Dressing) don;moderate assist (50% patient effort)  gown  -AC        Position (Upper Body Dressing) edge of bed sitting  -AC                  Bed Mobility    Supine-Sit [place] (Bed Mobility) minimum assist (75% patient effort);verbal cues  -AC        Sit-Supine [place] (Bed Mobility) moderate assist (50% patient effort);2 person assist;verbal cues  -AC        Assistive Device (Bed Mobility) head of bed elevated  -AC                  Transfer Assessment/Treatment    Transfers sit-stand transfer;stand-sit transfer  -AC                  Transfers    Sit-Stand [place] (Transfers) minimum assist (75% patient effort)  -AC        Stand-Sit [place] (Transfers) minimum assist (75% patient effort);verbal cues  -AC                  Sit-Stand Transfer    Assistive Device (Sit-Stand Transfers) walker, front-wheeled  -AC                  Stand-Sit Transfer    Assistive Device (Stand-Sit Transfers) walker, front-wheeled  -AC                  Plan of Care Review    Plan of Care Reviewed With patient  -AC        Progress no change  -AC        Outcome Evaluation OT tx completed.  Pt conversing more but also still perseverates on certain phrases.  Pt had vomited on self during lunch and was assisted in changing gown.  ModA to don/doff gown, modA to come to EOB, Kayla to stand.  Attempted steps forward however pt was too weak.  OT will continue to treat to increase independence with ADL.  -AC                  Positioning and Restraints    Pre-Treatment Position in bed  -AC        Post Treatment Position bed  -AC        In Bed fowlers;call light within reach;encouraged to call for assist;side rails up x2;with SLP;SCD pump applied  -AC              User Key  (r) = Recorded By, (t) = Taken By, (c) = Cosigned By    Initials Name Effective Dates    Andrea Oseguera, OTR/L, CNT  04/09/19 -                  Occupational Therapy Education                 Title: PT OT SLP Therapies (In Progress)     Topic: Occupational Therapy (In Progress)     Point: ADL training (In Progress)     Description:   Instruct learner(s) on proper safety adaptation and remediation techniques during self care or transfers.   Instruct in proper use of assistive devices.              Learning Progress Summary           Patient Acceptance, E, DU by  at 6/15/2022 1312    Comment: safe transfers    Acceptance, E, NR,NL by  at 6/14/2022 1507    Comment: OT POC, benefits of activity, safe transfer technique   Family Acceptance, E, NR,NL by  at 6/14/2022 1507    Comment: OT POC, benefits of activity, safe transfer technique                   Point: Home exercise program (Not Started)     Description:   Instruct learner(s) on appropriate technique for monitoring, assisting and/or progressing therapeutic exercises/activities.              Learner Progress:  Not documented in this visit.          Point: Body mechanics (Not Started)     Description:   Instruct learner(s) on proper positioning and spine alignment during self-care, functional mobility activities and/or exercises.              Learner Progress:  Not documented in this visit.                      User Key     Initials Effective Dates Name Provider Type Discipline     04/09/19 -  Andrea Mitchell, OTR/L, CNT Occupational Therapist OT                  OT Recommendation and Plan  Planned Therapy Interventions (OT): activity tolerance training, BADL retraining, cognitive/visual perception retraining, functional balance retraining, occupation/activity based interventions, patient/caregiver education/training, transfer/mobility retraining  Therapy Frequency (OT): 5 times/wk  Plan of Care Review  Plan of Care Reviewed With: patient  Progress: no change  Outcome Evaluation: OT tx completed.  Pt conversing more but also still perseverates on certain phrases.  Pt had  vomited on self during lunch and was assisted in changing gown.  ModA to don/doff gown, modA to come to EOB, Kayla to stand.  Attempted steps forward however pt was too weak.  OT will continue to treat to increase independence with ADL.  Plan of Care Reviewed With: patient  Outcome Evaluation: OT tx completed.  Pt conversing more but also still perseverates on certain phrases.  Pt had vomited on self during lunch and was assisted in changing gown.  ModA to don/doff gown, modA to come to EOB, Kayla to stand.  Attempted steps forward however pt was too weak.  OT will continue to treat to increase independence with ADL.     Outcome Measures     Row Name 06/15/22 1230 06/15/22 0843 06/14/22 1419       How much help from another person do you currently need...    Turning from your back to your side while in flat bed without using bedrails? -- 3  -WK --    Moving from lying on back to sitting on the side of a flat bed without bedrails? -- 2  -WK --    Moving to and from a bed to a chair (including a wheelchair)? -- 2  -WK --    Standing up from a chair using your arms (e.g., wheelchair, bedside chair)? -- 2  -WK --    Climbing 3-5 steps with a railing? -- 1  -WK --    To walk in hospital room? -- 2  -WK --    AM-PAC 6 Clicks Score (PT) -- 12  -WK --       How much help from another is currently needed...    Putting on and taking off regular lower body clothing? 2  -AC -- 1  -AC    Bathing (including washing, rinsing, and drying) 2  -AC -- 1  -AC    Toileting (which includes using toilet bed pan or urinal) 2  -AC -- 2  -AC    Putting on and taking off regular upper body clothing 2  -AC -- 2  -AC    Taking care of personal grooming (such as brushing teeth) 2  -AC -- 2  -AC    Eating meals 2  -AC -- 2  -AC    AM-PAC 6 Clicks Score (OT) 12  -AC -- 10  -AC       Functional Assessment    Outcome Measure Options AM-PAC 6 Clicks Daily Activity (OT)  -AC AM-PAC 6 Clicks Basic Mobility (PT)  -WK AM-PAC 6 Clicks Daily Activity (OT)   -AC          User Key  (r) = Recorded By, (t) = Taken By, (c) = Cosigned By    Initials Name Provider Type    AC Andrea Mitchell, OTR/L, CNT Occupational Therapist    Jennifer Aguero PTA Physical Therapist Assistant                Time Calculation:    Time Calculation- OT     Row Name 06/15/22 1313             Time Calculation- OT    OT Start Time 1230  -AC      OT Stop Time 1310  -AC      OT Time Calculation (min) 40 min  -AC      Total Timed Code Minutes- OT 40 minute(s)  -AC      OT Received On 06/15/22  -            User Key  (r) = Recorded By, (t) = Taken By, (c) = Cosigned By    Initials Name Provider Type     Andrea Mitchell, OTR/L, CNT Occupational Therapist              Therapy Charges for Today     Code Description Service Date Service Provider Modifiers Qty    43970279549 HC OT EVAL MOD COMPLEXITY 4 2022 Andrea Mitchell OTR/L, CNT GO 1    44404421922 HC OT SELF CARE/MGMT/TRAIN EA 15 MIN 6/15/2022 Andrea Mitchell OTR/L, CNT GO 3               Andrea HAMILTON. YANIRA Mitchell/L, CNT  6/15/2022    Electronically signed by Andrea Mitchell OTR/L, JONES at 06/15/22 1314     Andrea Mitchell OTR/L, CNT at 06/15/22 1313        Goal Outcome Evaluation:  Plan of Care Reviewed With: patient        Progress: no change  Outcome Evaluation: OT tx completed.  Pt conversing more but also still perseverates on certain phrases.  Pt had vomited on self during lunch and was assisted in changing gown.  ModA to don/doff gown, modA to come to EOB, Kayla to stand.  Attempted steps forward however pt was too weak.  OT will continue to treat to increase independence with ADL.    Electronically signed by Andrea Mitchell OTR/L, CNT at 06/15/22 1313     Andrea Mitchell OTR/L, CNT at 22 1508          Acute Care - Occupational Therapy Initial Evaluation  [facility]     Patient Name: Hanny Ivy  : 1945  MRN: [id number]  Today's Date: 2022  Onset of Illness/Injury or Date of Surgery: 22  Date of  Referral to OT: 06/09/22  Referring Physician: Dr. Brown    Admit Date: 6/9/2022       ICD-10-CM ICD-9-CM   1. Altered mental status, unspecified altered mental status type  R41.82 780.97   2. COVID-19  U07.1 079.89   3. Dysphagia, unspecified type  R13.10 787.20   4. Acute encephalopathy  G93.40 348.30   5. Impaired mobility  Z74.09 799.89   6. Decreased activities of daily living (ADL)  Z78.9 V49.89     Patient Active Problem List   Diagnosis   • Dermatochalasis of both upper eyelids   • Visual field defect, unspecified   • Rotator cuff arthropathy, right   • Pure hypercholesterolemia   • Essential hypertension   • Mild intermittent asthma without complication   • Generalized anxiety disorder   • Gastroesophageal reflux disease without esophagitis   • Acquired hypothyroidism   • Anemia   • Chronic neck and back pain   • Gait abnormality   • Hyperlipidemia   • MVP (mitral valve prolapse)   • Pulmonary infiltrate in left lung on chest x-ray   • Seasonal allergies   • Traction bronchiectasis (HCC)   • History of recurrent UTIs   • Altered mental status, unspecified altered mental status type   • COVID-19 virus infection   • Acute encephalopathy   • Seizure-like activity (HCC)     Past Medical History:   Diagnosis Date   • Anxiety and depression    • Asthma     bronchioectasis   • Back pain     with nerve pain in legs   • Cataract    • Dermatochalasis of both upper eyelids    • GERD (gastroesophageal reflux disease)    • Hypertension    • Hypothyroidism     hypothyroidism   • Kidney disease    • Lipoma    • Osteoarthritis    • Visual field defect, unspecified      Past Surgical History:   Procedure Laterality Date   • ANKLE SURGERY     • APPENDECTOMY     • BRONCHOSCOPY     • CERVICAL FUSION     • CHOLECYSTECTOMY     • EXCISION MASS LEG Left 3/4/2019    Procedure: EXCISION LIPOMA - LEFT HIP;  Surgeon: Giulia Rodrigez MD;  Location: [facility];  Service: General   • HYSTERECTOMY     • OTHER SURGICAL HISTORY       thumb   • REPLACEMENT TOTAL KNEE     • TOTAL SHOULDER ARTHROPLASTY W/ DISTAL CLAVICLE EXCISION Right 3/6/2018    Procedure: RIGHT REVERSE TOTAL SHOULDER ARTHROPLASTY;  Surgeon: Imtiaz Lobato MD;  Location: [facility] OR;  Service:          OT ASSESSMENT FLOWSHEET (last 12 hours)     OT Evaluation and Treatment     Row Name 06/14/22 1419                   OT Time and Intention    Subjective Information --  unable to state  -AC        Document Type evaluation  -AC                  General Information    Patient Profile Reviewed yes  -AC        Onset of Illness/Injury or Date of Surgery 06/09/22  -AC        Referring Physician Dr. Brown  -AC        General Observations of Patient confused, reaching over bed rails, nno apparent distress  -AC        Prior Level of Function independent:;all household mobility;gait;transfer;bed mobility;ADL's  sister arrived during the eval and states pt was mostly independent  -AC        Pertinent History of Current Functional Problem confused, weakness; Dx: possible seizure, hypomagnesemia, encephalopathy, COVID 19  -AC        Existing Precautions/Restrictions fall  -AC        Barriers to Rehab cognitive status  -                  Pain Scale: FACES Pre/Post-Treatment    Pain: FACES Scale, Pretreatment 0-->no hurt  -AC        Posttreatment Pain Rating 6-->hurts even more  -AC        Pre/Posttreatment Pain Comment chest pain with coughing and laying flat, resolves with repositioning  -AC                  Cognition    Orientation Status (Cognition) unable/difficult to assess  does not say name  -AC        Follows Commands (Cognition) follows one-step commands;25-49% accuracy  -AC        Personal Safety Interventions elopement precautions initiated;fall prevention program maintained;gait belt;muscle strengthening facilitated;nonskid shoes/slippers when out of bed;supervised activity  -AC                  Range of Motion Comprehensive    Comment, General Range of Motion not formally  assessed d/t confusion however pt demo shoulder flexion to 90 degrees B and all other joints WFL AROM  -AC                  Strength Comprehensive (MMT)    Comment, General Manual Muscle Testing (MMT) Assessment 5/5 BUE  -                  Activities of Daily Living    BADL Assessment/Intervention lower body dressing;toileting  -                  Lower Body Dressing Assessment/Training    [place] Level (Lower Body Dressing) don;socks;maximum assist (25% patient effort)  -        Position (Lower Body Dressing) edge of bed sitting  -                  Toileting Assessment/Training    [place] Level (Toileting) perform perineal hygiene;maximum assist (25% patient effort)  -        Position (Toileting) supported standing  -                  BADL Safety/Performance    Impairments, BADL Safety/Performance balance;cognition;endurance/activity tolerance;pain;range of motion;shortness of breath  -        Cognitive Impairments, BADL Safety/Performance attention;awareness, need for assistance;impulsivity;insight into deficits/self-awareness;judgment;problem-solving/reasoning  -                  Bed Mobility    Bed Mobility supine-sit;sit-supine  -        Supine-Sit [place] (Bed Mobility) minimum assist (75% patient effort)  -                  Transfer Assessment/Treatment    Transfers sit-stand transfer;stand-sit transfer  -        Comment, (Transfers) transfers x3, does not follow commands for correct hand placement  -                  Transfers    Sit-Stand [place] (Transfers) moderate assist (50% patient effort);verbal cues;nonverbal cues (demo/gesture)  -        Stand-Sit [place] (Transfers) moderate assist (50% patient effort);verbal cues;nonverbal cues (demo/gesture)  -                  Safety Issues, Functional Mobility    Impairments Affecting Function (Mobility) balance;endurance/activity tolerance;pain;range of motion (ROM);strength;shortness of breath  -                   Motor Skills    Motor Skills neuro-muscular function  -AC        Neuromuscular Function bilateral;upper extremity;tremor, resting  -AC                  Balance    Balance Assessment sitting static balance;sitting dynamic balance;standing static balance;standing dynamic balance  -AC        Static Sitting Balance standby assist  -AC        Dynamic Sitting Balance contact guard  -AC        Position, Sitting Balance sitting edge of bed  -AC        Static Standing Balance minimal assist  -AC        Position/Device Used, Standing Balance walker, front-wheeled  -AC                  Plan of Care Review    Plan of Care Reviewed With patient  -AC        Progress no change  -AC        Outcome Evaluation OT eval completed.  Pt confused with minimal verbal communication.  Does not state name or confirm any pt identifiers.  Sister arrived toward end of evaluation and reports pt was mostly indpendent at baseline and lives alone.  Pt needed Kayla to come to EOB.  She needs frequent verbal cues for hand placement/UE support on bed instead of grabbing OT's hand/arm for support.  Pt stood 3x with modA.  Stood in place for about 20 seconds for toileting hygiene with maxA.  MaxA to don socks.  Pt would benefit from skilled OT to address decreased ADL function.  She is very confused and unable to carry out even simple commands.  Will need SNF placement at discharge.  -AC                  Positioning and Restraints    Pre-Treatment Position in bed  -AC        Post Treatment Position bed  -AC        In Bed fowlers;call light within reach;encouraged to call for assist;exit alarm on;with SLP;side rails up x2;with family/caregiver;SCD pump applied  -                  Therapy Assessment/Plan (OT)    Date of Referral to OT 06/09/22  -AC        OT Diagnosis decreased adl  -AC        Rehab Potential (OT) good, to achieve stated therapy goals  -AC        Criteria for Skilled Therapeutic Interventions Met (OT) yes;meets criteria;skilled  treatment is necessary  -AC        Therapy Frequency (OT) 5 times/wk  -AC        Predicted Duration of Therapy Intervention (OT) 10 days  -AC        Planned Therapy Interventions (OT) activity tolerance training;BADL retraining;cognitive/visual perception retraining;functional balance retraining;occupation/activity based interventions;patient/caregiver education/training;transfer/mobility retraining  -AC                  OT Goals    Transfer Goal Selection (OT) transfer, OT goal 1  -AC        Dressing Goal Selection (OT) dressing, OT goal 1  -AC        Grooming Goal Selection (OT) grooming, OT goal 1  -AC                  Transfer Goal 1 (OT)    Activity/Assistive Device (Transfer Goal 1, OT) bed-to-chair/chair-to-bed;commode, bedside without drop arms;walker, rolling  -AC        [place] Level/Cues Needed (Transfer Goal 1, OT) minimum assist (75% or more patient effort)  -AC        Time Frame (Transfer Goal 1, OT) long term goal (LTG);10 days  -AC        Progress/Outcome (Transfer Goal 1, OT) goal ongoing  -AC                  Dressing Goal 1 (OT)    Activity/Device (Dressing Goal 1, OT) dressing skills, all  -AC        [place]/Cues Needed (Dressing Goal 1, OT) minimum assist (75% or more patient effort)  -AC        Time Frame (Dressing Goal 1, OT) long term goal (LTG);10 days  -AC        Progress/Outcome (Dressing Goal 1, OT) goal ongoing  -AC                  Grooming Goal 1 (OT)    Activity/Device (Grooming Goal 1, OT) grooming skills, all  -AC        [place] (Grooming Goal 1, OT) minimum assist (75% or more patient effort)  -AC        Time Frame (Grooming Goal 1, OT) long term goal (LTG);10 days  -AC        Progress/Outcome (Grooming Goal 1, OT) goal ongoing  -AC              User Key  (r) = Recorded By, (t) = Taken By, (c) = Cosigned By    Initials Name Effective Dates    Andrea Oseguera, OTR/L, CNT 04/09/19 -                  Occupational Therapy Education                 Title: PT OT SLP  Therapies (In Progress)     Topic: Occupational Therapy (In Progress)     Point: ADL training (In Progress)     Description:   Instruct learner(s) on proper safety adaptation and remediation techniques during self care or transfers.   Instruct in proper use of assistive devices.              Learning Progress Summary           Patient Acceptance, E, NR,NL by  at 6/14/2022 1507    Comment: OT POC, benefits of activity, safe transfer technique   Family Acceptance, E, NR,NL by  at 6/14/2022 1507    Comment: OT POC, benefits of activity, safe transfer technique                   Point: Home exercise program (Not Started)     Description:   Instruct learner(s) on appropriate technique for monitoring, assisting and/or progressing therapeutic exercises/activities.              Learner Progress:  Not documented in this visit.          Point: Body mechanics (Not Started)     Description:   Instruct learner(s) on proper positioning and spine alignment during self-care, functional mobility activities and/or exercises.              Learner Progress:  Not documented in this visit.                      User Key     Initials Effective Dates Name Provider Type Discipline     04/09/19 -  Andrea Mitchell, OTR/L, CNT Occupational Therapist OT                  OT Recommendation and Plan  Planned Therapy Interventions (OT): activity tolerance training, BADL retraining, cognitive/visual perception retraining, functional balance retraining, occupation/activity based interventions, patient/caregiver education/training, transfer/mobility retraining  Therapy Frequency (OT): 5 times/wk  Plan of Care Review  Plan of Care Reviewed With: patient  Progress: no change  Outcome Evaluation: OT eval completed.  Pt confused with minimal verbal communication.  Does not state name or confirm any pt identifiers.  Sister arrived toward end of evaluation and reports pt was mostly indpendent at baseline and lives alone.  Pt needed Kayla to come to  EOB.  She needs frequent verbal cues for hand placement/UE support on bed instead of grabbing OT's hand/arm for support.  Pt stood 3x with modA.  Stood in place for about 20 seconds for toileting hygiene with maxA.  MaxA to don socks.  Pt would benefit from skilled OT to address decreased ADL function.  She is very confused and unable to carry out even simple commands.  Will need SNF placement at discharge.  Plan of Care Reviewed With: patient  Outcome Evaluation: OT eval completed.  Pt confused with minimal verbal communication.  Does not state name or confirm any pt identifiers.  Sister arrived toward end of evaluation and reports pt was mostly indpendent at baseline and lives alone.  Pt needed Kayla to come to EOB.  She needs frequent verbal cues for hand placement/UE support on bed instead of grabbing OT's hand/arm for support.  Pt stood 3x with modA.  Stood in place for about 20 seconds for toileting hygiene with maxA.  MaxA to don socks.  Pt would benefit from skilled OT to address decreased ADL function.  She is very confused and unable to carry out even simple commands.  Will need SNF placement at discharge.     Outcome Measures     Row Name 06/14/22 4924             How much help from another is currently needed...    Putting on and taking off regular lower body clothing? 1  -AC      Bathing (including washing, rinsing, and drying) 1  -AC      Toileting (which includes using toilet bed pan or urinal) 2  -AC      Putting on and taking off regular upper body clothing 2  -AC      Taking care of personal grooming (such as brushing teeth) 2  -AC      Eating meals 2  -AC      AM-PAC 6 Clicks Score (OT) 10  -AC              Functional Assessment    Outcome Measure Options AM-PAC 6 Clicks Daily Activity (OT)  -AC            User Key  (r) = Recorded By, (t) = Taken By, (c) = Cosigned By    Initials Name Provider Type    Andrea Oseguera, OTR/L, CNT Occupational Therapist                Time Calculation:    Time  Calculation- OT     Row Name 06/14/22 1505             Time Calculation- OT    OT Start Time 1419  +10 min chart review  -AC      OT Stop Time 1506  -AC      OT Time Calculation (min) 47 min  -AC      OT Received On 06/14/22  -AC      OT Goal Re-Cert Due Date 06/24/22  -            User Key  (r) = Recorded By, (t) = Taken By, (c) = Cosigned By    Initials Name Provider Type     Andrea Mitchell, OTR/L, CNT Occupational Therapist              Therapy Charges for Today     Code Description Service Date Service Provider Modifiers Qty    70859090694 HC OT EVAL MOD COMPLEXITY 4 6/14/2022 Andrea Mitchell, OTR/L, CNT GO 1               Andrea HAMILTON. SITA MitchellR/L, JONES  6/14/2022    Electronically signed by Andrea Mitchell OTR/L, CNT at 06/14/22 1507     Andrea Mitchell, OTR/L, CNT at 06/14/22 1506        Goal Outcome Evaluation:  Plan of Care Reviewed With: patient        Progress: no change  Outcome Evaluation: OT eval completed.  Pt confused with minimal verbal communication.  Does not state name or confirm any pt identifiers.  Sister arrived toward end of evaluation and reports pt was mostly indpendent at baseline and lives alone.  Pt needed Kayla to come to EOB.  She needs frequent verbal cues for hand placement/UE support on bed instead of grabbing OT's hand/arm for support.  Pt stood 3x with modA.  Stood in place for about 20 seconds for toileting hygiene with maxA.  MaxA to don socks.  Pt would benefit from skilled OT to address decreased ADL function.  She is very confused and unable to carry out even simple commands.  Will need SNF placement at discharge.    Electronically signed by Andrea Mitchell OTR/L, CNT at 06/14/22 1506                                Toribio Valle, PT    Physical Therapist   Physical Therapy   Plan of Care      Signed   Date of Service:  06/14/22 1156   Creation Time:  06/14/22 1156              Signed              Show:Clear all  []Manual[]Template[]Copied    Added by:  [x]Toribio Valle  "PEG, PT      []Saran for details       Problem: Adult Inpatient Plan of Care  Goal: Plan of Care Review  Recent Flowsheet Documentation  Taken 6/14/2022 1100 by Toribio Valle, PT  Plan of Care Reviewed With: patient  Outcome Evaluation: PT IE complete.  Oriented to person w/ verbal cues.  Follows commands ~50% of the time.  Difficult time verbalizing.  Min/mod A bed mobility.  Mod A to stand bedside.  Pt did not take steps voluntarily or on command.  PT to offer ambulation and strengthening.  Recommend SNF at [facility].  Thank you for referral.   Goal Outcome Evaluation:  Plan of Care Reviewed With: patient  Outcome Evaluation: PT IE complete.  Oriented to person w/ verbal cues.  Follows commands ~50% of the time.  Difficult time verbalizing.  Min/mod A bed mobility.  Mod A to stand bedside.  Pt did not take steps voluntarily or on command.  PT to offer ambulation and strengthening.  Recommend SNF at [facility].  Thank you for referral.                 ED to Hosp-Admission (Current) on 6/9/2022     ED to Hosp-Admission (Current) on 6/9/2022        Detailed Report        ROS Info      Note shared with patient        Additional Documentation    Vitals:  /82 (BP Location: Left arm, Patient Position: Sitting)   Pulse 111   Temp 97.9 °F (36.6 °C) (Oral)   Resp 18   Ht 152.4 cm (60\")   Wt 86.3 kg (190 lb 4.1 oz)   SpO2 94%   BMI 37.16 kg/m²   BSA 1.83 m²      More Vitals   Flowsheets:  Travel, Exposure, and Communicable Disease Screening,   Outbreak Screen,   Candida auris Outbreak,   HPI,   Vital Signs,   Pain,   Triage Sepsis Screen,   Acuity/Destination,   [place] Suicide Severity Rating Scale Past Month (C-SSRS Screen Version),   Violence Risk Screen,   Abuse Screen,   Fall Risk Assessment,   Sepsis Predictive Model,   Falls PA Model,   All LDAs,   Cardiac,   Peripheral Neurovascular,   Fever Assessments,   Patient Radiology Status,   Device Vitals,   MEWS score,   Workload Acuity,   Care Handoff,   Vital Signs,   Vitals " Reassessment,   Audit-C,   Patient Belongings,   D/C Planning,   Functional Level Screening,   Nutrition Screen,   Healthcare Directives,   Bedside Dysphagia Screenings,   Respiratory Therapy Flowsheet,   Rapid Response Onset,   CPR/Defib/Cardioversion,   Ventilation,   Vitals,   Rapid Response Outcome,   ICU Vital Signs,   Critical Care Adult PCS Body System,   Adult PCS Body System,   PF RATIO,   Intake/Output,   Readmission Risk Score,   MCG Next Review Date,   CM Responsibilities,   Discharge Needs Assessment,   OT Evaluation and Treatment,   PT Evaluation and Treatment,   SLP Adult Swallow Evaluation,   CARE PLAN MINI-FLOWSHEET DATA,   Time Calculation- SLP,   SLP Priority,   Data,   Vaccination Screening,   Psychosocial Review,   Therapy Patient List Popup Flowsheet,   Vitals, Intake and Output,   Daily care/ Safety,   Meds to Bed Enrollment,   Discharge Plan,   Pressure Ulcer Screening,   Adult Patient Profile,   General Information,   Mobility,   Obj/Interventions,   Clinical Impression,   Goals/Plan,   Outcome Measures,   Time Calculation- PT,   PT Priority,   OT Priority,   Outcome Measures,   Time Calculation- OT,   Palliative Care Assessment,   Palliative Quality Data,   Reason for Assessment,   Nutrition/Diet History,   Labs/Tests/Procedures/Meds,   Physical Findings,   Estimated/Assessed Needs - Anthropometrics,   Nutrition Prescription Ordered,   Evaluation of Received Nutrient/Fluid Intake,   Problem 1,   Intervention Goal,   Nutrition Intervention,   Nutrition Prescription,   Education/Evaluation   ...(87 more)   Encounter Info:  Billing Info,   History,   Allergies,   Detailed Report              Patient Summary Extracts    Lab Result Report 6/16/2022 12:57 PM       Encounter Information    Encounter Information    Department Encounter #   6/9/2022  4:37 PM  PAD 3A [id number]                 Care Timeline    06/09   Admitted from ED 2133      Rapid Response      Transferred to [facility]  [place] [facility] 2343   06/12   Transferred out of [facility] 1848         Clinical Impressions       Altered mental status, unspecified altered mental status type     COVID-19      Disposition       Decision to Admit       Orders Placed    There are too many orders placed during this visit to show here. Check the appropriate Chart Review tabs to learn about these orders.         Care Timeline      SCANNED EKG     SCANNED - TELEMETRY       SCANNED - TELEMETRY       SCANNED - TELEMETRY       SCANNED - TELEMETRY     1616   Arrived   1657   COVID-19,[facility] IN-HOUSE,Nasal Swab No Transport Media 3-4 HR TAT - Swab, Nasal Cavity    1705   ECG 12 Lead   1712   Blood Gas, Arterial -    1725   Blood Culture - Blood, Arm, Left   1729   Comprehensive Metabolic Panel      Lactic Acid, Plasma     BNP     CBC & Differential      D-dimer, Quantitative      Blood Culture - Blood, Wrist, Right     Troponin     Procalcitonin    1730   XR Chest 1 View   1738   Urinalysis With Culture If Indicated - Urine, Catheter      Urinalysis, Microscopic Only - Urine, Catheter    1814   CT Head Without Contrast     CT Cervical Spine Without Contrast   1822   Lactated Ringers 500 mL   1941   Admitted (ED Boarder)   2133   Transferred to [facility] 3A       Medications Administered     All Medications Administered (32)      Visit Diagnoses       Altered mental status, unspecified altered mental status type     COVID-19     Dysphagia, unspecified type     Acute encephalopathy     Impaired mobility     Decreased activities of daily living (ADL)     Problem List             Media  From this encounter  Electronic signature on 6/9/2022 1657 - E-signed   Scan on 6/9/2022 by [employer], [place]: ER SIGN IN FORM, PAD, 06/09/2022       Committee Details    There is no Committee Review information to display for this encounter.       Committee Members    Hospice and Palliative Medicine: Ana Marx, APRN  Neurology: Alexandru Sifuentes MD   Occupational Therapy: Andrea Mitchell, OTR/L, Chasity Canales, OTR/L   Isabel Moyer, OTR/L, Talya Galo, RIN Palliative Care: Tracy Luong, [place], SEAN   Physical Therapy: Marine Huang, PTA   Silvana Maguire, PT   Toribio Valle, PT   Jennifer Olvera, PTA   Joe Gordillo, PT DPT   Angelica Berrios, PTA : Natalie Montes, BSW   Speech Pathology: Ericka Stevenson, CCC-SLP   Kaylee Rodriguez, MS CCC-SLP   Fatoumata Murray, MS CCC-SLP

## 2022-06-16 NOTE — PLAN OF CARE
Goal Outcome Evaluation:  Plan of Care Reviewed With: patient        Progress: no change   Pt alert and oriented x3. VSS. No c/o pain, this shift. COLBERT. PPP. SCDs and Lovenox for VTE. .,room air. Tele NS. Tolerating prescribed diet. Incontinent bowel/bladder, incontinence care provided. External cath placed. Pt chairfast.Weight shift assistance provided. Last BM 6/11/22. Bed alarm set. Call light within reach. Safety maintained.

## 2022-06-16 NOTE — PLAN OF CARE
Goal Outcome Evaluation:  Plan of Care Reviewed With: patient, caregiver        Progress: no change       Swallow treatment completed. The patient was alert and cooperative. Sitting up in chair feeding herself cake and whipped cream when SLP entered room. She was orally holding most of the cake. Cued to swallow. Patient leaned her head over meal tray and cake fell onto tray. She reports she is fatigued and has back pain. RN and MD aware. She completed trials of puree, nectar thick, and thin liquids. Oral holding with all but thin liquids. Quick oral transit with thin which likely represents poor oral control of the bolus. Significant coughing on 2/5 thin water trials. No overt s/s given nectar thick or puree. Patient to continue on soft solids with finger foods and nectar thick liquids. SLP recommends 1:1 assistance with all PO. Patient will require cues to swallow and alternate bites/sips. Ensure oral care is completed post meal to decrease risk of choking due to pocketed food. SLP will continue to follow.

## 2022-06-16 NOTE — THERAPY TREATMENT NOTE
Acute Care - Occupational Therapy Treatment Note  [facility]     Patient Name: Hanny Ivy  : 1945  MRN: [id number]  Today's Date: 2022  Onset of Illness/Injury or Date of Surgery: 22  Date of Referral to OT: 22  Referring Physician: Dr. Brown    Admit Date: 2022       ICD-10-CM ICD-9-CM   1. Altered mental status, unspecified altered mental status type  R41.82 780.97   2. COVID-19  U07.1 079.89   3. Dysphagia, unspecified type  R13.10 787.20   4. Acute encephalopathy  G93.40 348.30   5. Impaired mobility  Z74.09 799.89   6. Decreased activities of daily living (ADL)  Z78.9 V49.89     Patient Active Problem List   Diagnosis   • Dermatochalasis of both upper eyelids   • Visual field defect, unspecified   • Rotator cuff arthropathy, right   • Pure hypercholesterolemia   • Essential hypertension   • Mild intermittent asthma without complication   • Generalized anxiety disorder   • Gastroesophageal reflux disease without esophagitis   • Acquired hypothyroidism   • Anemia   • Chronic neck and back pain   • Gait abnormality   • Hyperlipidemia   • MVP (mitral valve prolapse)   • Pulmonary infiltrate in left lung on chest x-ray   • Seasonal allergies   • Traction bronchiectasis (HCC)   • History of recurrent UTIs   • Altered mental status, unspecified altered mental status type   • COVID-19 virus infection   • Acute encephalopathy   • Seizure-like activity (HCC)     Past Medical History:   Diagnosis Date   • Anxiety and depression    • Asthma     bronchioectasis   • Back pain     with nerve pain in legs   • Cataract    • Dermatochalasis of both upper eyelids    • GERD (gastroesophageal reflux disease)    • Hypertension    • Hypothyroidism     hypothyroidism   • Kidney disease    • Lipoma    • Osteoarthritis    • Visual field defect, unspecified      Past Surgical History:   Procedure Laterality Date   • ANKLE SURGERY     • APPENDECTOMY     • BRONCHOSCOPY     • CERVICAL FUSION     •  CHOLECYSTECTOMY     • EXCISION MASS LEG Left 3/4/2019    Procedure: EXCISION LIPOMA - LEFT HIP;  Surgeon: Giulia Rodrigez MD;  Location:  [facility] OR;  Service: General   • HYSTERECTOMY     • OTHER SURGICAL HISTORY      thumb   • REPLACEMENT TOTAL KNEE     • TOTAL SHOULDER ARTHROPLASTY W/ DISTAL CLAVICLE EXCISION Right 3/6/2018    Procedure: RIGHT REVERSE TOTAL SHOULDER ARTHROPLASTY;  Surgeon: Imtiaz Lobato MD;  Location:  [facility] OR;  Service:          OT ASSESSMENT FLOWSHEET (last 12 hours)     OT Evaluation and Treatment     Row Name 06/16/22 0836                   OT Time and Intention    Subjective Information complains of;fatigue  -TS        Document Type therapy note (daily note)  -TS        Mode of Treatment occupational therapy  -TS        Patient Effort good  -TS                  General Information    Existing Precautions/Restrictions fall  -TS                  Pain Assessment    Pretreatment Pain Rating 0/10 - no pain  -TS        Posttreatment Pain Rating 0/10 - no pain  -TS                  Cognition    Orientation Status (Cognition) oriented to;person;place  -TS        Follows Commands (Cognition) follows one-step commands;over 90% accuracy  -TS        Personal Safety Interventions fall prevention program maintained;nonskid shoes/slippers when out of bed;gait belt;elopement precautions initiated  -TS                  Activities of Daily Living    BADL Assessment/Intervention grooming;feeding  -TS                  Grooming Assessment/Training    [place] (Grooming) oral care regimen;set up;verbal cues  -TS        Position (Grooming) supported sitting  -TS        Comment, (Grooming) in recliner, pt required cues to continue task or return to task if she became distracted  -TS                  Self-Feeding Assessment/Training    [place] (Feeding) liquids to mouth;feeding skills;scoop food and bring to mouth;supervision;verbal cues;prepare tray/open items;maximum assist (25%  patient effort)  -TS        Position (Self-Feeding) edge of bed sitting;supported sitting  -TS        Comment, (Feeding) pt required increased time, cues to continue task if she became distracted. Pt was able to load fork with food and take bite  -TS                  Bed Mobility    Supine-Sit [place] (Bed Mobility) contact guard  -TS        Assistive Device (Bed Mobility) bed rails;head of bed elevated  -TS                  Transfer Assessment/Treatment    Transfers sit-stand transfer;stand-sit transfer;bed-chair transfer  -TS                  Transfers    Bed-Chair [place] (Transfers) contact guard;minimum assist (75% patient effort);2 person assist  -TS        Sit-Stand [place] (Transfers) contact guard;minimum assist (75% patient effort)  -TS        Stand-Sit [place] (Transfers) contact guard;verbal cues  -TS                  Bed-Chair Transfer    Comment, (Bed-Chair Transfer) HHA  -TS                  Sit-Stand Transfer    Comment, (Sit-Stand Transfer) HHA  -TS                  Stand-Sit Transfer    Comment, (Stand-Sit Transfer) HHA  -TS                  Balance    Static Sitting Balance standby assist  -TS        Comment, Balance EOB x30 minutes  -TS                  Plan of Care Review    Plan of Care Reviewed With patient  -TS        Progress improving  -TS        Outcome Evaluation Pt demonstrated increased alertness this AM. Pt participated appropriately in conversation but occasionally had to be redirected to task. Pt CGA to come to EOB. Pt transfers with HHA with CGA/min A. Pt would benefit from acute rehab/swing bed if she continues to improve steadily as pt was independent prior to admit  -TS                  Positioning and Restraints    Pre-Treatment Position in bed  -TS        Post Treatment Position chair  -TS        In Chair reclined;call light within reach;encouraged to call for assist;exit alarm on  -TS              User Key  (r) = Recorded By, (t) = Taken By, (c) = Cosigned  By    Initials Name Effective Dates    TS Talya Biggs ALFONSO, RIN 06/16/21 -                  Occupational Therapy Education                 Title: PT OT SLP Therapies (In Progress)     Topic: Occupational Therapy (In Progress)     Point: ADL training (In Progress)     Description:   Instruct learner(s) on proper safety adaptation and remediation techniques during self care or transfers.   Instruct in proper use of assistive devices.              Learning Progress Summary           Patient Acceptance, E, DU by  at 6/15/2022 1312    Comment: safe transfers    Acceptance, E, NR,NL by  at 6/14/2022 1507    Comment: OT POC, benefits of activity, safe transfer technique   Family Acceptance, E, NR,NL by  at 6/14/2022 1507    Comment: OT POC, benefits of activity, safe transfer technique                   Point: Home exercise program (Not Started)     Description:   Instruct learner(s) on appropriate technique for monitoring, assisting and/or progressing therapeutic exercises/activities.              Learner Progress:  Not documented in this visit.          Point: Body mechanics (Not Started)     Description:   Instruct learner(s) on proper positioning and spine alignment during self-care, functional mobility activities and/or exercises.              Learner Progress:  Not documented in this visit.                      User Key     Initials Effective Dates Name Provider Type Discipline     04/09/19 -  Andrea Mitchell, OTR/L, CNT Occupational Therapist OT                  OT Recommendation and Plan     Plan of Care Review  Plan of Care Reviewed With: patient  Progress: improving  Outcome Evaluation: Pt demonstrated increased alertness this AM. Pt participated appropriately in conversation but occasionally had to be redirected to task. Pt CGA to come to EOB. Pt transfers with HHA with CGA/min A. Pt would benefit from acute rehab/swing bed if she continues to improve steadily as pt was independent prior to  admit  Plan of Care Reviewed With: patient  Outcome Evaluation: Pt demonstrated increased alertness this AM. Pt participated appropriately in conversation but occasionally had to be redirected to task. Pt CGA to come to EOB. Pt transfers with HHA with CGA/min A. Pt would benefit from acute rehab/swing bed if she continues to improve steadily as pt was independent prior to admit     Outcome Measures     Row Name 06/16/22 1500 06/15/22 1230 06/15/22 0843       How much help from another person do you currently need...    Turning from your back to your side while in flat bed without using bedrails? -- -- 3  -WK    Moving from lying on back to sitting on the side of a flat bed without bedrails? -- -- 2  -WK    Moving to and from a bed to a chair (including a wheelchair)? -- -- 2  -WK    Standing up from a chair using your arms (e.g., wheelchair, bedside chair)? -- -- 2  -WK    Climbing 3-5 steps with a railing? -- -- 1  -WK    To walk in hospital room? -- -- 2  -WK    AM-PAC 6 Clicks Score (PT) -- -- 12  -WK       How much help from another is currently needed...    Putting on and taking off regular lower body clothing? 2  -TS 2  -AC --    Bathing (including washing, rinsing, and drying) 2  -TS 2  -AC --    Toileting (which includes using toilet bed pan or urinal) 2  -TS 2  -AC --    Putting on and taking off regular upper body clothing 3  -TS 2  -AC --    Taking care of personal grooming (such as brushing teeth) 3  -TS 2  -AC --    Eating meals 3  -TS 2  -AC --    AM-PAC 6 Clicks Score (OT) 15  -TS 12  -AC --       Functional Assessment    Outcome Measure Options -- AM-PAC 6 Clicks Daily Activity (OT)  -AC AM-PAC 6 Clicks Basic Mobility (PT)  -WK    Row Name 06/14/22 1419             How much help from another is currently needed...    Putting on and taking off regular lower body clothing? 1  -AC      Bathing (including washing, rinsing, and drying) 1  -AC      Toileting (which includes using toilet bed pan or urinal)  2  -AC      Putting on and taking off regular upper body clothing 2  -AC      Taking care of personal grooming (such as brushing teeth) 2  -AC      Eating meals 2  -AC      AM-PAC 6 Clicks Score (OT) 10  -AC              Functional Assessment    Outcome Measure Options AM-PAC 6 Clicks Daily Activity (OT)  -AC            User Key  (r) = Recorded By, (t) = Taken By, (c) = Cosigned By    Initials Name Provider Type    AC Andrea Mitchell, OTR/L, CNT Occupational Therapist    TS Talya Biggs COTA Occupational Therapist Assistant    Jennifer Aguero, SHANTANU Physical Therapist Assistant                Time Calculation:    Time Calculation- OT     Row Name 06/16/22 1503             Time Calculation- OT    OT Start Time 0836  -TS      OT Stop Time 1008  -TS      OT Time Calculation (min) 92 min  -TS      Total Timed Code Minutes- OT 92 minute(s)  -TS      OT Received On 06/16/22  -TS              Timed Charges    07045 - OT Self Care/Mgmt Minutes 92  -TS              Total Minutes    Timed Charges Total Minutes 92  -TS       Total Minutes 92  -TS            User Key  (r) = Recorded By, (t) = Taken By, (c) = Cosigned By    Initials Name Provider Type    TS Talya Biggs COTA Occupational Therapist Assistant              Therapy Charges for Today     Code Description Service Date Service Provider Modifiers Qty    [id number] HC OT SELF CARE/MGMT/TRAIN EA 15 MIN 6/16/2022 Talya Biggs COTA GO 6               Talya HAMILTON. RIN Biggs  6/16/2022

## 2022-06-16 NOTE — CASE MANAGEMENT/SOCIAL WORK
Continued Stay Note  [facility]     Patient Name: Hanny Ivy  MRN: [id number]  Today's Date: 6/16/2022    Admit Date: 6/9/2022     Discharge Plan     Row Name 06/16/22 1400       Plan    Plan Comments Referrals sent to both [place] [facility] and [place] per dtr request. Await answers.               Discharge Codes    No documentation.               Expected Discharge Date and Time     Expected Discharge Date Expected Discharge Time    Jun 17, 2022             AC [place]

## 2022-06-16 NOTE — PLAN OF CARE
Goal Outcome Evaluation:               Patient resting well throughout the night.  Orientation waxing and waning, but at beginning of shift oriented to person, place, and situation.  Hypertensive at beginning of shift, treated with x1 dose of PO Clonidine.  NSR/ST per telemetry monitor.  Voiding - incontinent mostly, but did use BSC once.  Very weak when pivoting to BSC and requires frequent cues.  Has not had BM yet despite suppository administered last night.  Port accessed (R) anterior chest wall - saline locked with drsg CDI.

## 2022-06-16 NOTE — THERAPY TREATMENT NOTE
Acute Care - Speech Language Pathology   Swallow Treatment Note [facility]     Patient Name: Hanny Ivy  : 1945  MRN: [id number]  Today's Date: 2022  Onset of Illness/Injury or Date of Surgery: 22     Referring Physician: Dr. Brown      Admit Date: 2022  Swallow treatment completed. The patient was alert and cooperative. Sitting up in chair feeding herself cake and whipped cream when SLP entered room. She was orally holding most of the cake. Cued to swallow. Patient leaned her head over meal tray and cake fell onto tray. She reports she is fatigued and has back pain. RN and MD aware. She completed trials of puree, nectar thick, and thin liquids. Oral holding with all but thin liquids. Quick oral transit with thin which likely represents poor oral control of the bolus. Significant coughing on 2/5 thin water trials. No overt s/s given nectar thick or puree. Patient to continue on soft solids with finger foods and nectar thick liquids. SLP recommends 1:1 assistance with all PO. Patient will require cues to swallow and alternate bites/sips. Ensure oral care is completed post meal to decrease risk of choking due to pocketed food. SLP will continue to follow.   Kaylee Rodriguez MS CCC-SLP 2022 14:33 CDT    Visit Dx:     ICD-10-CM ICD-9-CM   1. Altered mental status, unspecified altered mental status type  R41.82 780.97   2. COVID-19  U07.1 079.89   3. Dysphagia, unspecified type  R13.10 787.20   4. Acute encephalopathy  G93.40 348.30   5. Impaired mobility  Z74.09 799.89   6. Decreased activities of daily living (ADL)  Z78.9 V49.89     Patient Active Problem List   Diagnosis   • Dermatochalasis of both upper eyelids   • Visual field defect, unspecified   • Rotator cuff arthropathy, right   • Pure hypercholesterolemia   • Essential hypertension   • Mild intermittent asthma without complication   • Generalized anxiety disorder   • Gastroesophageal reflux disease without esophagitis   •  Acquired hypothyroidism   • Anemia   • Chronic neck and back pain   • Gait abnormality   • Hyperlipidemia   • MVP (mitral valve prolapse)   • Pulmonary infiltrate in left lung on chest x-ray   • Seasonal allergies   • Traction bronchiectasis (HCC)   • History of recurrent UTIs   • Altered mental status, unspecified altered mental status type   • COVID-19 virus infection   • Acute encephalopathy   • Seizure-like activity (HCC)     Past Medical History:   Diagnosis Date   • Anxiety and depression    • Asthma     bronchioectasis   • Back pain     with nerve pain in legs   • Cataract    • Dermatochalasis of both upper eyelids    • GERD (gastroesophageal reflux disease)    • Hypertension    • Hypothyroidism     hypothyroidism   • Kidney disease    • Lipoma    • Osteoarthritis    • Visual field defect, unspecified      Past Surgical History:   Procedure Laterality Date   • ANKLE SURGERY     • APPENDECTOMY     • BRONCHOSCOPY     • CERVICAL FUSION     • CHOLECYSTECTOMY     • EXCISION MASS LEG Left 3/4/2019    Procedure: EXCISION LIPOMA - LEFT HIP;  Surgeon: Giulia Rodrigez MD;  Location: [facility];  Service: General   • HYSTERECTOMY     • OTHER SURGICAL HISTORY      thumb   • REPLACEMENT TOTAL KNEE     • TOTAL SHOULDER ARTHROPLASTY W/ DISTAL CLAVICLE EXCISION Right 3/6/2018    Procedure: RIGHT REVERSE TOTAL SHOULDER ARTHROPLASTY;  Surgeon: Imtiaz Lobato MD;  Location: [facility] OR;  Service:        SLP Recommendation and Plan                                         Daily Summary of Progress (SLP): progress toward functional goals is gradual (06/16/22 1345)                  Plan for Continued Treatment (SLP): continue treatment per plan of care (06/16/22 1345)         Plan of Care Reviewed With: patient, caregiver  Progress: no change      SWALLOW EVALUATION (last 72 hours)     SLP Adult Swallow Evaluation     Row Name 06/16/22 1345 06/15/22 1255 06/14/22 1423             Rehab Evaluation    Document Type therapy note  (daily note)  -MM therapy note (daily note)  -BN therapy note (daily note)  -BN      Subjective Information no complaints  -MM no complaints  -BN no complaints  -BN      Patient Observations alert;cooperative;agree to therapy  -MM alert;cooperative  -BN alert;cooperative  -BN      Patient/Family/Caregiver Comments/Observations No family present  -MM no family present  -BN pt sister arrived during tx session  -BN      Patient Effort adequate  -MM adequate  -BN adequate  -BN              Pain    Additional Documentation Pain Scale: FACES Pre/Post-Treatment (Group)  -MM -- --              Pain Scale: FACES Pre/Post-Treatment    Pain: FACES Scale, Pretreatment 0-->no hurt  -MM 0-->no hurt  -BN 0-->no hurt  -BN      Posttreatment Pain Rating 0-->no hurt  -MM 0-->no hurt  -BN 4-->hurts little more  -BN      Pre/Posttreatment Pain Comment -- -- pain with coughing  -BN              SLP Treatment Clinical Impressions    Daily Summary of Progress (SLP) progress toward functional goals is gradual  -MM progress toward functional goals is good  -BN progress toward functional goals is good  -BN      Barriers to Overall Progress (SLP) Cognitive status  -MM Cognitive status  -BN Cognitive status  -BN      Plan for Continued Treatment (SLP) continue treatment per plan of care  -MM continue treatment per plan of care  -BN continue treatment per plan of care  -BN      Care Plan Review risks/benefits reviewed;care plan/treatment goals reviewed  -MM care plan/treatment goals reviewed  -BN care plan/treatment goals reviewed  -BN      Care Plan Review, Other Participant(s) caregiver  -MM caregiver  -BN caregiver;family  -BN              Recommendations    SLP Diet Recommendation -- soft textures;other (see comments)  finger foods  -BN --              Swallow Goals (SLP)    Oral Nutrition/Hydration Goal Selection (SLP) oral nutrition/hydration, SLP goal 1  -MM oral nutrition/hydration, SLP goal 1  -BN oral nutrition/hydration, SLP goal 1   -BN      Lingual Strengthening Goal Selection (SLP) lingual strengthening, SLP goal 1  -MM lingual strengthening, SLP goal 1  -BN lingual strengthening, SLP goal 1  -BN      Additional Documentation lingual strengthening goal selection (SLP)  -MM lingual strengthening goal selection (SLP)  -BN lingual strengthening goal selection (SLP)  -BN              Oral Nutrition/Hydration Goal 1 (SLP)    Oral Nutrition/Hydration Goal 1, SLP Pt will tolerate LRD without overt s/s of aspiration.  -MM Pt will tolerate LRD without overt s/s of aspiration.  -BN Pt will tolerate LRD without overt s/s of aspiration.  -BN      Time Frame (Oral Nutrition/Hydration Goal 1, SLP) by discharge  -MM by discharge  -BN by discharge  -BN      Barriers (Oral Nutrition/Hydration Goal 1, SLP) cognition  -MM cognition  -BN cognition  -BN      Progress/Outcomes (Oral Nutrition/Hydration Goal 1, SLP) continuing progress toward goal  -MM continuing progress toward goal  -BN continuing progress toward goal  -BN              Lingual Strengthening Goal 1 (SLP)    Activity (Lingual Strengthening Goal 1, SLP) increase lingual tone/sensation/control/coordination/movement  -MM increase lingual tone/sensation/control/coordination/movement  -BN increase lingual tone/sensation/control/coordination/movement  -BN      Increase Lingual Tone/Sensation/Control/Coordination/Movement lingual movement exercises  -MM lingual movement exercises  -BN lingual movement exercises  -BN      [place]/Accuracy (Lingual Strengthening Goal 1, SLP) with minimal cues (75-90% accuracy)  -MM with minimal cues (75-90% accuracy)  -BN with minimal cues (75-90% accuracy)  -BN      Time Frame (Lingual Strengthening Goal 1, SLP) by discharge  -MM by discharge  -BN by discharge  -BN      Barriers (Lingual Strengthening Goal 1, SLP) cognition  -MM cognition  -BN cognition  -BN      Progress/Outcomes (Lingual Strengthening Goal 1, SLP) goal ongoing  -MM goal ongoing  -BN goal ongoing   -BN            User Key  (r) = Recorded By, (t) = Taken By, (c) = Cosigned By    Initials Name Effective Dates    Fatoumata Elizabeth, MS CCC-SLP 05/31/22 - 06/14/22    BN Fatoumata Murray, MS-CCC/SLP, CNT 06/15/22 -     MM Michael Kaylee HERNAN, MS CCC-SLP 06/16/21 -                 EDUCATION  The patient has been educated in the following areas:   Dysphagia (Swallowing Impairment).        SLP GOALS     Row Name 06/16/22 1345 06/15/22 1255 06/14/22 1423       Oral Nutrition/Hydration Goal 1 (SLP)    Oral Nutrition/Hydration Goal 1, SLP Pt will tolerate LRD without overt s/s of aspiration.  -MM Pt will tolerate LRD without overt s/s of aspiration.  -BN Pt will tolerate LRD without overt s/s of aspiration.  -BN    Time Frame (Oral Nutrition/Hydration Goal 1, SLP) by discharge  -MM by discharge  -BN by discharge  -BN    Barriers (Oral Nutrition/Hydration Goal 1, SLP) cognition  -MM cognition  -BN cognition  -BN    Progress/Outcomes (Oral Nutrition/Hydration Goal 1, SLP) continuing progress toward goal  -MM continuing progress toward goal  -BN continuing progress toward goal  -BN       Lingual Strengthening Goal 1 (SLP)    Activity (Lingual Strengthening Goal 1, SLP) increase lingual tone/sensation/control/coordination/movement  -MM increase lingual tone/sensation/control/coordination/movement  -BN increase lingual tone/sensation/control/coordination/movement  -BN    Increase Lingual Tone/Sensation/Control/Coordination/Movement lingual movement exercises  -MM lingual movement exercises  -BN lingual movement exercises  -BN    [place]/Accuracy (Lingual Strengthening Goal 1, SLP) with minimal cues (75-90% accuracy)  -MM with minimal cues (75-90% accuracy)  -BN with minimal cues (75-90% accuracy)  -BN    Time Frame (Lingual Strengthening Goal 1, SLP) by discharge  -MM by discharge  -BN by discharge  -BN    Barriers (Lingual Strengthening Goal 1, SLP) cognition  -MM cognition  -BN cognition  -BN     Progress/Outcomes (Lingual Strengthening Goal 1, SLP) goal ongoing  -MM goal ongoing  -BN goal ongoing  -BN          User Key  (r) = Recorded By, (t) = Taken By, (c) = Cosigned By    Initials Name Provider Type    Fatoumata Elizabeth, MS CCC-SLP Speech and Language Pathologist    Fatoumata Elizabeth, MS-CCC/SLP, [facility] Speech and Language Pathologist    Kaylee Gilliam MS CCC-SLP Speech and Language Pathologist                   Time Calculation:    Time Calculation- SLP     Row Name 06/16/22 1432             Time Calculation- SLP    SLP Start Time 1345  -MM      SLP Stop Time 1432  -MM      SLP Time Calculation (min) 47 min  -MM      SLP Received On 06/16/22  -MM              Untimed Charges    [id number] Treatment Swallow Minutes 47  -MM              Total Minutes    Untimed Charges Total Minutes 47  -MM       Total Minutes 47  -MM            User Key  (r) = Recorded By, (t) = Taken By, (c) = Cosigned By    Initials Name Provider Type    Kaylee Gilliam MS CCC-SLP Speech and Language Pathologist                Therapy Charges for Today     Code Description Service Date Service Provider Modifiers Qty    [id number]  ST TREATMENT SWALLOW 3 6/16/2022 Kaylee Rodriguez MS CCC-SLP GN 1               Kaylee Rodriguez MS CCC-LORRI  6/16/2022

## 2022-06-16 NOTE — PLAN OF CARE
Goal Outcome Evaluation:  Plan of Care Reviewed With: patient        Progress: improving  Outcome Evaluation: Pt demonstrated increased alertness this AM. Pt participated appropriately in conversation but occasionally had to be redirected to task. Pt CGA to come to EOB. Pt transfers with HHA with CGA/min A. Pt would benefit from acute rehab/swing bed if she continues to improve steadily as pt was independent prior to admit

## 2022-06-17 NOTE — PLAN OF CARE
Problem: Adult Inpatient Plan of Care  Goal: Plan of Care Review  Outcome: Ongoing, Progressing  Flowsheets (Taken 6/17/2022 9797)  Progress: improving  Plan of Care Reviewed With: patient  Outcome Evaluation: Pt performed bed mobility and transfer with min A. Pt amb 5' Kayla with RW with cues for safety and to progress. Pt fatigued quickly and was unable to amb further. Pt is able to follow more cues today but continues to need cues on progress BLE during gait. Recommend SNF at discharge.

## 2022-06-17 NOTE — THERAPY TREATMENT NOTE
Acute Care - Occupational Therapy Treatment Note  [facility]     Patient Name: Hanny Ivy  : 1945  MRN: [id number]  Today's Date: 2022  Onset of Illness/Injury or Date of Surgery: 22  Date of Referral to OT: 22  Referring Physician: Dr. Brown    Admit Date: 2022       ICD-10-CM ICD-9-CM   1. Altered mental status, unspecified altered mental status type  R41.82 780.97   2. COVID-19  U07.1 079.89   3. Dysphagia, unspecified type  R13.10 787.20   4. Acute encephalopathy  G93.40 348.30   5. Impaired mobility  Z74.09 799.89   6. Decreased activities of daily living (ADL)  Z78.9 V49.89     Patient Active Problem List   Diagnosis   • Dermatochalasis of both upper eyelids   • Visual field defect, unspecified   • Rotator cuff arthropathy, right   • Pure hypercholesterolemia   • Essential hypertension   • Mild intermittent asthma without complication   • Generalized anxiety disorder   • Gastroesophageal reflux disease without esophagitis   • Acquired hypothyroidism   • Anemia   • Chronic neck and back pain   • Gait abnormality   • Hyperlipidemia   • MVP (mitral valve prolapse)   • Pulmonary infiltrate in left lung on chest x-ray   • Seasonal allergies   • Traction bronchiectasis (HCC)   • History of recurrent UTIs   • Altered mental status, unspecified altered mental status type   • COVID-19 virus infection   • Acute encephalopathy   • Seizure-like activity (HCC)     Past Medical History:   Diagnosis Date   • Anxiety and depression    • Asthma     bronchioectasis   • Back pain     with nerve pain in legs   • Cataract    • Dermatochalasis of both upper eyelids    • GERD (gastroesophageal reflux disease)    • Hypertension    • Hypothyroidism     hypothyroidism   • Kidney disease    • Lipoma    • Osteoarthritis    • Visual field defect, unspecified      Past Surgical History:   Procedure Laterality Date   • ANKLE SURGERY     • APPENDECTOMY     • BRONCHOSCOPY     • CERVICAL FUSION     •  CHOLECYSTECTOMY     • EXCISION MASS LEG Left 3/4/2019    Procedure: EXCISION LIPOMA - LEFT HIP;  Surgeon: Giulia Rodrigez MD;  Location:  [facility] OR;  Service: General   • HYSTERECTOMY     • OTHER SURGICAL HISTORY      thumb   • REPLACEMENT TOTAL KNEE     • TOTAL SHOULDER ARTHROPLASTY W/ DISTAL CLAVICLE EXCISION Right 3/6/2018    Procedure: RIGHT REVERSE TOTAL SHOULDER ARTHROPLASTY;  Surgeon: Imtiaz Lobato MD;  Location:  [facility] OR;  Service:          OT ASSESSMENT FLOWSHEET (last 12 hours)     OT Evaluation and Treatment     Row Name 06/17/22 1000                   OT Time and Intention    Subjective Information complains of;weakness;fatigue;pain  -TS        Document Type therapy note (daily note)  -TS        Mode of Treatment occupational therapy  -TS        Patient Effort good  -TS                  General Information    Existing Precautions/Restrictions fall  -TS                  Pain Assessment    Pain Location - back;chest  -TS        Pain Intervention(s) Heat applied;Repositioned  -TS                  Pain Scale: FACES Pre/Post-Treatment    Pain: FACES Scale, Pretreatment 4-->hurts little more  -TS        Posttreatment Pain Rating 4-->hurts little more  -TS                  Cognition    Personal Safety Interventions fall prevention program maintained;gait belt;nonskid shoes/slippers when out of bed  -TS                  Activities of Daily Living    BADL Assessment/Intervention bathing;upper body dressing;lower body dressing;grooming  -TS                  Bathing Assessment/Intervention    [place] Level (Bathing) upper body;minimum assist (75% patient effort);moderate assist (50% patient effort);lower body;perineal area;maximum assist (25% patient effort)  -TS        Assistive Devices (Bathing) hand-held shower spray hose;grab bar, tub/shower;shower chair  -TS        Position (Bathing) supported sitting  -TS                  Upper Body Dressing Assessment/Training    [place] Level (Upper Body  Dressing) don;doff;set up;minimum assist (75% patient effort)  -TS        Position (Upper Body Dressing) unsupported sitting  -TS                  Lower Body Dressing Assessment/Training    [place] Level (Lower Body Dressing) doff;don;socks;moderate assist (50% patient effort)  -TS        Position (Lower Body Dressing) unsupported sitting  -TS                  Grooming Assessment/Training    [place] Level (Grooming) oral care regimen;set up;verbal cues  -TS        Position (Grooming) unsupported sitting  -TS                  Bed Mobility    Sit-Supine [place] (Bed Mobility) minimum assist (75% patient effort);moderate assist (50% patient effort)  -TS                  Transfer Assessment/Treatment    Transfers sit-stand transfer;stand-sit transfer;chair-bed transfer;shower transfer  -TS                  Transfers    Chair-Bed [place] (Transfers) minimum assist (75% patient effort)  -TS        Sit-Stand [place] (Transfers) contact guard;minimum assist (75% patient effort)  -TS        Stand-Sit [place] (Transfers) contact guard  -TS        [place] Level (Shower Transfer) contact guard  -TS        Assistive Device (Shower Transfer) grab bar, tub/shower;shower chair  -TS                  Shower Transfer    Type (Shower Transfer) sit-stand;stand-sit  -TS                  Positioning and Restraints    Pre-Treatment Position sitting in chair/recliner  -TS        Post Treatment Position bed  -TS        In Bed fowlers;call light within reach;encouraged to call for assist;side rails up x2  -TS              User Key  (r) = Recorded By, (t) = Taken By, (c) = Cosigned By    Initials Name Effective Dates    TS Talya Biggs, RIN 06/16/21 -                  Occupational Therapy Education                 Title: PT OT SLP Therapies (In Progress)     Topic: Occupational Therapy (In Progress)     Point: ADL training (In Progress)     Description:   Instruct learner(s) on proper safety  adaptation and remediation techniques during self care or transfers.   Instruct in proper use of assistive devices.              Learning Progress Summary           Patient Acceptance, E, DU by  at 6/15/2022 1312    Comment: safe transfers    Acceptance, E, NR,NL by  at 6/14/2022 1507    Comment: OT POC, benefits of activity, safe transfer technique   Family Acceptance, E, NR,NL by  at 6/14/2022 1507    Comment: OT POC, benefits of activity, safe transfer technique                   Point: Home exercise program (Not Started)     Description:   Instruct learner(s) on appropriate technique for monitoring, assisting and/or progressing therapeutic exercises/activities.              Learner Progress:  Not documented in this visit.          Point: Body mechanics (Not Started)     Description:   Instruct learner(s) on proper positioning and spine alignment during self-care, functional mobility activities and/or exercises.              Learner Progress:  Not documented in this visit.                      User Key     Initials Effective Dates Name Provider Type Discipline     04/09/19 -  Andrea Mitchell, OTR/L, CNT Occupational Therapist OT                  OT Recommendation and Plan     Plan of Care Review  Plan of Care Reviewed With: patient  Progress: improving  Outcome Evaluation: Pt demonstrated increased alertness this AM. Pt participated appropriately in conversation but occasionally had to be redirected to task. Pt CGA to come to EOB. Pt transfers with HHA with CGA/min A. Pt would benefit from acute rehab/swing bed if she continues to improve steadily as pt was independent prior to admit  Plan of Care Reviewed With: patient  Outcome Evaluation: Pt demonstrated increased alertness this AM. Pt participated appropriately in conversation but occasionally had to be redirected to task. Pt CGA to come to EOB. Pt transfers with HHA with CGA/min A. Pt would benefit from acute rehab/swing bed if she continues to  improve steadily as pt was independent prior to admit     Outcome Measures     Row Name 06/17/22 1200 06/17/22 0805 06/16/22 1500       How much help from another person do you currently need...    Turning from your back to your side while in flat bed without using bedrails? -- 3  -WK --    Moving from lying on back to sitting on the side of a flat bed without bedrails? -- 3  -WK --    Moving to and from a bed to a chair (including a wheelchair)? -- 3  -WK --    Standing up from a chair using your arms (e.g., wheelchair, bedside chair)? -- 3  -WK --    Climbing 3-5 steps with a railing? -- 1  -WK --    To walk in hospital room? -- 2  -WK --    AM-PAC 6 Clicks Score (PT) -- 15  -WK --       How much help from another is currently needed...    Putting on and taking off regular lower body clothing? 2  -TS -- 2  -TS    Bathing (including washing, rinsing, and drying) 2  -TS -- 2  -TS    Toileting (which includes using toilet bed pan or urinal) 2  -TS -- 2  -TS    Putting on and taking off regular upper body clothing 3  -TS -- 3  -TS    Taking care of personal grooming (such as brushing teeth) 3  -TS -- 3  -TS    Eating meals 3  -TS -- 3  -TS    AM-PAC 6 Clicks Score (OT) 15  -TS -- 15  -TS       Functional Assessment    Outcome Measure Options -- AM-PAC 6 Clicks Basic Mobility (PT)  -WK --    Row Name 06/15/22 1230 06/15/22 0843 06/14/22 1419       How much help from another person do you currently need...    Turning from your back to your side while in flat bed without using bedrails? -- 3  -WK --    Moving from lying on back to sitting on the side of a flat bed without bedrails? -- 2  -WK --    Moving to and from a bed to a chair (including a wheelchair)? -- 2  -WK --    Standing up from a chair using your arms (e.g., wheelchair, bedside chair)? -- 2  -WK --    Climbing 3-5 steps with a railing? -- 1  -WK --    To walk in hospital room? -- 2  -WK --    AM-PAC 6 Clicks Score (PT) -- 12  -WK --       How much help from  another is currently needed...    Putting on and taking off regular lower body clothing? 2  -AC -- 1  -AC    Bathing (including washing, rinsing, and drying) 2  -AC -- 1  -AC    Toileting (which includes using toilet bed pan or urinal) 2  -AC -- 2  -AC    Putting on and taking off regular upper body clothing 2  -AC -- 2  -AC    Taking care of personal grooming (such as brushing teeth) 2  -AC -- 2  -AC    Eating meals 2  -AC -- 2  -AC    AM-PAC 6 Clicks Score (OT) 12  -AC -- 10  -AC       Functional Assessment    Outcome Measure Options AM-PAC 6 Clicks Daily Activity (OT)  -AC AM-PAC 6 Clicks Basic Mobility (PT)  -WK AM-PAC 6 Clicks Daily Activity (OT)  -AC          User Key  (r) = Recorded By, (t) = Taken By, (c) = Cosigned By    Initials Name Provider Type    AC Andrea Mitchell, OTR/L, CNT Occupational Therapist    Talya Paz COTA Occupational Therapist Assistant    WK Jennifer Olvera, PTA Physical Therapist Assistant                Time Calculation:    Time Calculation- OT     Row Name 06/17/22 1239             Time Calculation- OT    OT Start Time 1000  -TS      OT Stop Time 1110  -TS      OT Time Calculation (min) 70 min  -TS      Total Timed Code Minutes- OT 70 minute(s)  -TS      OT Received On 06/17/22  -TS              Timed Charges    64698 - OT Self Care/Mgmt Minutes 70  -TS              Total Minutes    Timed Charges Total Minutes 70  -TS       Total Minutes 70  -TS            User Key  (r) = Recorded By, (t) = Taken By, (c) = Cosigned By    Initials Name Provider Type     Talya Biggs COTA Occupational Therapist Assistant              Therapy Charges for Today     Code Description Service Date Service Provider Modifiers Qty    53608357881 HC OT SELF CARE/MGMT/TRAIN EA 15 MIN 6/16/2022 Talya Biggs COTA GO 6    42925790415 HC OT SELF CARE/MGMT/TRAIN EA 15 MIN 6/17/2022 Talya Biggs COTA GO 5               Talya HAMILTON. RIN Biggs  6/17/2022

## 2022-06-17 NOTE — PLAN OF CARE
Goal Outcome Evaluation:              Outcome Evaluation: Pt DC to [facility] report called to Beulah LAW,  Informed her that pt has not been urinating (was straight cath in AM) and Dr. Stoll stated OK to dc but they need to monitor her output.  Removed tele and deaccessed and heparin locked port.

## 2022-06-17 NOTE — PLAN OF CARE
Goal Outcome Evaluation:  Plan of Care Reviewed With: patient, caregiver        Progress: improving       Swallow treatment completed. The patient was alert and cooperative. Sitting up in chair and visibly fatigued. Poor intake from breakfast tray. Patient completed trials of regular solids, nectar thick boost, and thin water via edge of cup. Minimal to no oral holding noted today. Delayed cough with water from edge of cup on 1/2 trials presented. Patient too fatigued to actively participate with oral motor exercises. Nurse aid reports improved intake with daughter feeding yesterday afternoon. Patient to continue mechanical soft diet with finger foods and nectar thick liquids. Continue 1:1 feeding.     Kaylee Rodriguez MS CCC-SLP 6/17/2022 09:43 CDT

## 2022-06-17 NOTE — PLAN OF CARE
Goal Outcome Evaluation:  Plan of Care Reviewed With: patient        Progress: no change  Outcome Evaluation: A & O at times, confused at times, c/o chest and back pain, prn tylenol given, aqua k pad in use, excoriation to abdominal fold, up with assistance of one or 2 to BSC, did not void this shift, bladder scan 329, I/O cath 425 ml, patient with difficulty swallowing, on soft diet with nectar thick liquids, mediport to R chest, meds in pudding this shift, bed alarm set, safety maintained

## 2022-06-17 NOTE — DISCHARGE PLACEMENT REQUEST
"Annalisa Barrett (77 y.o. Female)             Date of Birth   1945    Social Security Number       Address   [address] [place] [address]    Home Phone   [phone]    MRN   [id number]       [facility]    Marital Status                               Admission Date   6/9/22    Admission Type   Emergency    Admitting Provider   Robb Stoll MD    Attending Provider   Robb Stoll MD    Department, Room/Bed   [facility] 3A, 337/1       Discharge Date       Discharge Disposition   Skilled Nursing Facility (DC - External)    Discharge Destination                               Attending Provider: Robb Stoll MD    Allergies: Penicillins, Sulfa Antibiotics    Isolation: None   Infection: COVID (History) (06/14/22), COVID Screen (preop/placement) (06/17/22)   Code Status: CPR   Advance Care Planning Activity    Ht: 152.4 cm (60\")   Wt: 86.3 kg (190 lb 4.1 oz)    Admission Cmt: None   Principal Problem: Acute encephalopathy [G93.40]                 Active Insurance as of 6/9/2022     Primary Coverage     Payor Plan Insurance Group Employer/Plan Group    MEDICARE MEDICARE A & B      Payor Plan Address Payor Plan Phone Number Payor Plan Fax Number Effective Dates    [address] [phone]  2/1/2008 - None Entered    [facility] [id number]       Subscriber Name Subscriber Birth Date Member ID       ANNALISA BARRETT 1945 [id number]           Secondary Coverage     Payor Plan Insurance Group Employer/Plan Group    [employer] SUP AAR HEALTH CARE OPTIONS      Payor Plan Address Payor Plan Phone Number Payor Plan Fax Number Effective Dates    [facility] [phone]  1/1/2016 - None Entered    [address]       [facility] [address]       Subscriber Name Subscriber Birth Date Member ID       ANNALISA BARRETT 1945 [id number]                 Emergency Contacts      (Rel.) Home Phone Work Phone Mobile Phone    Shantell Maynard (Daughter) -- " [phone]                 Discharge Summary      Robb Stoll MD at 06/17/22 1316                [facility] Medicine Services  DISCHARGE SUMMARY       Date of Admission: 6/9/2022  Date of Discharge:  6/17/2022  Primary Care Physician: Kun Gonzalez MD    Presenting Problem/History of Present Illness:  Mental status changes.    Final Discharge Diagnoses:  Active Hospital Problems    Diagnosis    • **Acute encephalopathy    • Seizure-like activity (HCC)    • COVID-19 virus infection    • Essential hypertension    • Generalized anxiety disorder    • Acquired hypothyroidism    • Hyperlipidemia        Consults:   Neurology  Palliative    Procedures Performed: LP    Pertinent Test Results:       Imaging Results (All)     Procedure Component Value Units Date/Time    MRI Brain With & Without Contrast [[id number]] Collected: 06/10/22 1146     Updated: 06/10/22 1201    Narrative:      EXAMINATION:  MRI BRAIN W WO CONTRAST-  6/10/2022 10:43 AM CDT     HISTORY: Seizure, new-onset, no history of trauma; R41.82-Altered mental  status, unspecified; U07.1-COVID-19.     TECHNIQUE: Multiplanar imaging was performed in a high field magnet  before and after gadolinium contrast administration.     COMPARISON: No comparison study.     FINDINGS: There is a 1.2 cm area of T2 high signal within the right  petrous apex. This is of increased signal on both the FLAIR and standard  T2 sequence. It is low signal on T1. There is minimal signal within this  area on the diffusion sequence. There is no evidence of acute infarct on  the diffusion-weighted sequence. There is T2 high signal within the  periventricular white matter. There is mild to moderate atrophy with  associated ventricular prominence. There is a partially empty appearance  of the sella turcica. There is no abnormal enhancement after contrast  administration.       Impression:      1. A 1.2 cm T2 high signal lesion without  enhancement in the right  petrous apex has minimal signal on the diffusion sequence. Cholesterol  granuloma and petrous apex effusion with proteinaceous fluid are in the  differential.  2. Mild to moderate atrophy with associated ventricular prominence.  3. T2 high signal within the hemispheric white matter is nonspecific and  likely due to chronic small vessel disease.  4. Incidental partially empty appearance of the sella turcica.     This report was finalized on 06/10/2022 11:58 by Dr. Jack Goldstein MD.    CT Head Without Contrast [[id number]] Collected: 06/10/22 0610     Updated: 06/10/22 0626    Narrative:      EXAMINATION: CT HEAD WO CONTRAST-      6/9/2022 11:24 PM CDT     HISTORY: possible seizure/ RRT; R41.82-Altered mental status,  unspecified; U07.1-COVID-19     In order to have a CT radiation dose as low as reasonably achievable  Automated Exposure Control was utilized for adjustment of the mA and/or  KV according to patient size.     DLP in mGycm= 1520        The CT scan of the head is performed without intravenous contrast  enhancement. Images are acquired in axial plane and subsequent  reconstruction in coronal and sagittal planes.     Comparison is made with the previous study dated 6/9/2022.     There is no evidence of a mass. There is no midline shift.     There is no evidence of intracranial hemorrhage or hematoma.     Moderately prominent ventricles, basal cisterns and cortical sulci are  similar to the previous study, representing a chronic volume  loss/atrophy.     Chronic white matter ischemic changes bilaterally persist. Gray-white  matter differentiation is maintained.     The images are reviewed in bone windows show no evidence of skull  fracture. The visualized paranasal sinuses and mastoid air cells are  clear.       Impression:      1. No acute intracranial abnormality.     The above study was initially reviewed and reported by stat rad. I  cannot find any discrepancies.     This report  was finalized on 06/10/2022 06:23 by Dr. Kierra Miranda MD.    CT Cervical Spine Without Contrast [[id number]] Collected: 06/09/22 1823     Updated: 06/09/22 1834    Narrative:      EXAMINATION:  CT CERVICAL SPINE WO CONTRAST-  6/9/2022 6:02 PM CDT     HISTORY: The patient fell. Concern for neck injury.     COMPARISON: No comparison.      DOSE LENGTH PRODUCT: 301 mGy-cm. Automated exposure control was also  utilized to decrease patient radiation dose.     TECHNIQUE: Serial helical tomographic images of the cervical spine were  obtained without the use of intravenous contrast. Sagittal and coronal  reformatted images were also provided.     FINDINGS:     ALIGNMENT: The alignment is normal. There is pannus formation around the  dens with some erosive change of the dens.     BONES: There is no evidence of fracture. Vertebral body heights are  maintained.     DISC SPACES:     C2-3: The disc is fairly well-maintained. There is facet arthropathy  left greater than right. There is mild central disc bulge producing  minimal dural sac compression. There is mild foraminal narrowing on the  left.     C3-4: The disc is fairly well-maintained. There is spondylitic and  uncinate spurring. There is facet arthropathy left much greater than  right. There is severe left and moderate to severe right-sided foraminal  narrowing. There is mild narrowing of the central canal.     C4-5: There is severe disc narrowing with spondylitic and uncinate  spurring. There is facet arthropathy. There is severe right and mild to  moderate left-sided foraminal narrowing. No significant central spinal  canal stenosis.     C5-6: Prior interbody fusion with anterior plate fixation. There appears  to be solid fusion of the disc. There is mild spondylitic spurring.  There is mild facet arthropathy. There is no significant spinal or  foraminal stenosis.     C6-7: There has been prior interbody fusion with anterior plate  fixation. There appears to be good  "solid fusion of the disc. The facet  joints are maintained. There is no spinal or foraminal stenosis.     C7-T1: There is severe disc narrowing. There is spondylitic and uncinate  spurring. The left-sided facet joint is fused. There has been prior  posterior instrumented fusion on the right. There is no central spinal  canal stenosis. There is at least moderate bilateral foraminal stenosis.     T1-2: There is mild disc narrowing. There is anterior syndesmophyte  formation. There is mild facet arthropathy. There is mild foraminal  narrowing bilaterally.     T2-3: There is disc narrowing with anterior spurring. There is mild  facet arthropathy. There is mild foraminal narrowing bilaterally.     OTHER FINDINGS: There is abnormal opacification in the left lung apex.  There is carotid artery calcification in the neck bilaterally.       Impression:      1. No evidence of cervical spine fracture.  2. Postoperative and degenerative changes of the cervical spine and  upper thoracic spine, as described.  3. Abnormal opacification at the left lung apex. This area is not fully  evaluated on this study. This may be infectious or inflammatory.  Neoplasm is also considered. Follow-up nonemergent chest CT recommended.  Marked as \"new lung findings\" in PACS for follow-up.  4. Carotid artery calcification in the neck bilaterally.     The full report of this exam was immediately signed and available to the  emergency room. The patient is currently in the emergency room.        This report was finalized on 06/09/2022 18:31 by Dr. Jack Goldstein MD.    CT Head Without Contrast [[id number]] Collected: 06/09/22 1821     Updated: 06/09/22 1825    Narrative:      EXAMINATION:  CT HEAD WO CONTRAST-  6/9/2022 6:02 PM CDT     HISTORY: The patient fell. Rule out head injury.     TECHNIQUE: Multiple axial images were obtained through the brain without  contrast infusion. Multiplanar images were reconstructed.     DLP: 596 mGy-cm. Automated dosage " reduction technique was utilized to  reduce patient dosage.     COMPARISON: No comparison study.     FINDINGS: There are no hemorrhage, edema or mass effect. There is mild  atrophy. There is mild associated prominence of the lateral ventricles.  There is low density in the hemispheric white matter. The paranasal  sinuses are clear. The mastoid air cells are clear. No calvarial  fracture is seen. There is a partially empty appearance of the sella  turcica.       Impression:      1. No hemorrhage, edema or mass effect.  2. Mild atrophy with associated prominence of the lateral ventricles.  3. Low density in the hemispheric white matter is nonspecific and likely  due to chronic small vessel disease.  4. Partially empty appearance of the sella turcica.     The full report of this exam was immediately signed and available to the  emergency room. The patient is currently in the emergency room.     This report was finalized on 06/09/2022 18:22 by Dr. Jack Goldstein MD.    XR Chest 1 View [[id number]] Collected: 06/09/22 1743     Updated: 06/09/22 1747    Narrative:      EXAMINATION:  XR CHEST 1 VW-  6/9/2022 5:29 PM CDT     HISTORY: Shortness of breath. Covid positive.     COMPARISON: 2/28/2019.     TECHNIQUE: Single view AP image.     FINDINGS:  There is hypoventilation with vascular crowding. There is  stable bronchial wall thickening. There is patchy infiltrate in the left  perihilar region and left lung base. There is a calcified granuloma on  the left. Heart size appears to be within normal limits. There is a port  catheter on the right that is new compared to the prior study. There has  been prior cervical fusion and right shoulder arthroplasty. There are  degenerative changes of the spine and left glenohumeral joint.       Impression:      1. Hypoventilation with vascular crowding.  2. Opacity in the left perihilar region and left lung base likely due to  atelectasis. Pneumonia less likely.  3. Stable bronchial wall  thickening.        This report was finalized on 06/09/2022 17:44 by Dr. Jack Goldstein MD.        LAB RESULTS:      Lab 06/14/22  0650 06/14/22  0649 06/12/22  0426 06/11/22  1234 06/11/22  0655   WBC 13.35*  --  5.76  --  3.75   HEMOGLOBIN 14.1  --  12.5  --  12.3   HEMATOCRIT 42.0  --  37.3  --  36.4   PLATELETS 329  --  195  --  153   NEUTROS ABS  --   --  4.51  --   --    IMMATURE GRANS (ABS)  --   --  0.01  --   --    LYMPHS ABS  --   --  0.61*  --   --    MONOS ABS  --   --  0.63  --   --    EOS ABS  --   --  0.00  --   --    MCV 91.5  --  92.8  --  91.5   CRP  --  0.82*  --   --  0.48   PROCALCITONIN  --  0.10  --   --   --    PROTIME  --   --   --  13.9  --    APTT  --   --   --  29.8  --          Lab 06/16/22  0550 06/14/22  0649 06/12/22  0426 06/11/22  0655   SODIUM 145 143 140 137   POTASSIUM 3.5 3.7 3.9 4.1   CHLORIDE 106 104 106 103   CO2 25.0 25.0 22.0 22.0   ANION GAP 14.0 14.0 12.0 12.0   BUN 29* 24* 23 19   CREATININE 0.96 0.97 0.80 0.75   EGFR 60.3 59.6* 75.1 81.1   GLUCOSE 133* 101* 140* 124*   CALCIUM 10.0 9.6 9.0 8.7   MAGNESIUM 3.1* 1.7 1.8 1.4*   PHOSPHORUS  --  3.1  --   --          Lab 06/16/22  0550 06/14/22  0649 06/12/22  0426   TOTAL PROTEIN 7.7 7.4 6.7   ALBUMIN 4.40 4.30 3.60   GLOBULIN 3.3 3.1 3.1   ALT (SGPT) 48* 32 18   AST (SGOT) 41* 38* 22   BILIRUBIN 0.5 0.4 0.2   ALK PHOS 77 72 61         Lab 06/11/22  1234   PROTIME 13.9   INR 1.12*             Lab 06/14/22  0649   VITAMIN B 12 523         Brief Urine Lab Results  (Last result in the past 365 days)      Color   Clarity   Blood   Leuk Est   Nitrite   Protein   CREAT   Urine HCG        06/09/22 1738 Yellow   Clear   Negative   Negative   Negative   30 mg/dL (1+)               Microbiology Results (last 10 days)     Procedure Component Value - Date/Time    Culture, CSF - Cerebrospinal Fluid, Lumbar Puncture [[id number]] Collected: 06/12/22 1239    Lab Status: Final result Specimen: Cerebrospinal Fluid from Lumbar Puncture Updated:  06/15/22 0557     CSF Culture No growth at 3 days     Gram Stain Few (2+) WBCs per low power field      No organisms seen    Meningitis / Encephalitis Panel, PCR - Cerebrospinal Fluid, Lumbar Puncture [[id number]]  (Normal) Collected: 06/12/22 1239    Lab Status: Final result Specimen: Cerebrospinal Fluid from Lumbar Puncture Updated: 06/12/22 1935     ESCHERICHIA COLI K1, PCR Not Detected     HAEMOPHILUS INFLUENZAE, PCR Not Detected     LISTERIA MONOCYTOGENES, PCR Not Detected     NEISSERIA MENINGITIDIS, PCR Not Detected     STREPTOCOCCUS AGALACTIAE, PCR Not Detected     STREPTOCOCCUS PNEUMONIAE, PCR Not Detected     CYTOMEGALOVIRUS (CMV), PCR Not Detected     ENTEROVIRUS, PCR Not Detected     HERPES SIMPLEX VIRUS 1 (HSV-1), PCR Not Detected     HERPES SIMPLEX VIRUS 2 (HSV-2), PCR Not Detected     HUMAN PARECHOVIRUS, PCR Not Detected     VARICELLA ZOSTER VIRUS (VZV), PCR Not Detected     CRYPTOCOCCUS NEOFORMANS / GATTII, PCR Not Detected     HUMAN HERPES VIRUS 6 PCR Not Detected    Narrative:      This test is performed by utilizing real time polymerace chain recation (PCR).   The [employer] ME Panel does not distinguish between latent and active CMV and HHV-6 infections. Detection of these viruses may indicate primary infection, secondary reactivation, or the presence of latent virus. Results should always be interpreted in conjunction with other clinical, laboratory, and epidemiological information.    Blood Culture - Blood, Wrist, Right [[id number]]  (Normal) Collected: 06/09/22 1729    Lab Status: Final result Specimen: Blood from Wrist, Right Updated: 06/14/22 1833     Blood Culture No growth at 5 days    Blood Culture - Blood, Arm, Left [[id number]]  (Normal) Collected: 06/09/22 1725    Lab Status: Final result Specimen: Blood from Arm, Left Updated: 06/14/22 1833     Blood Culture No growth at 5 days    COVID-19,[facility] IN-HOUSE,Nasal Swab No Transport Media 3-4 HR TAT - Swab, Nasal Cavity [[id number]]   "(Abnormal) Collected: 06/09/22 4277    Lab Status: Final result Specimen: Swab from Nasal Cavity Updated: 06/09/22 1805     COVID19 Detected    Narrative:      Fact sheet for providers: https://www.fda.gov/media/272964/download     Fact sheet for patients: https://www.fda.gov/media/372965/download    Test performed by PCR.    Consider negative results in combination with clinical observations, patient history, and epidemiological information.          Hospital Course:   Patient admitted for confusion and concern for seizure.    Neurology consulted.  Keppra 500 mg BID.  MRI was negative for acute finding.  EEG showed diffuse slowing.  LP was unremarkable for acute processes.    Patient treated 5-days for UTI with ceftriaxone.    Patient was on paxlovid for COVID-19 prior to arrival.  She was exposed on 6/2, positive test 6/6 and started on paxlovid.  Removed from precautions on 6/14.      Magnesium had to be replaced.    Ultimately determined, likely had encephalopathy from COVID-19 and UTI.  Patient improved but still has a long-way to go to be back to baseline.        Physical Exam on Discharge:  /80 (BP Location: Right arm, Patient Position: Sitting)   Pulse 118   Temp 98.3 °F (36.8 °C) (Oral)   Resp 18   Ht 152.4 cm (60\")   Wt 86.3 kg (190 lb 4.1 oz)   SpO2 95%   BMI 37.16 kg/m²   Physical Exam  Vitals and nursing note reviewed.   Constitutional:       Appearance: She is obese.   HENT:      Head: Normocephalic and atraumatic.      Mouth/Throat:      Mouth: Mucous membranes are dry.      Pharynx: Oropharynx is clear.   Eyes:      General: No scleral icterus.     Conjunctiva/sclera: Conjunctivae normal.   Cardiovascular:      Rate and Rhythm: Normal rate and regular rhythm.      Heart sounds: No murmur heard.  Abdominal:      General: Abdomen is flat. Bowel sounds are normal. There is no distension.      Palpations: Abdomen is soft. There is no mass.   Skin:     General: Skin is warm and dry.      " Coloration: Skin is pale.   Neurological:      General: No focal deficit present.      Mental Status: She is alert. She is disoriented.      Comments: Oriented x 2 and to situation; did not get year correct   Psychiatric:         Mood and Affect: Mood normal.         Behavior: Behavior normal.        Condition on Discharge: Stable    Discharge Disposition:  Skilled Nursing Facility (DC - External)    Discharge Medications:     Discharge Medications      New Medications      Instructions Start Date   famotidine 20 MG tablet  Commonly known as: PEPCID   20 mg, Oral, 2 Times Daily Before Meals      levETIRAcetam 500 MG tablet  Commonly known as: KEPPRA   500 mg, Oral, Every 12 Hours Scheduled      lidocaine 5 %  Commonly known as: LIDODERM   1 patch, Transdermal, Every 24 Hours Scheduled, Remove & Discard patch within 12 hours or as directed by MD   Start Date: June 18, 2022     losartan 50 MG tablet  Commonly known as: COZAAR  Replaces: olmesartan 40 MG tablet   50 mg, Oral, Every 24 Hours Scheduled   Start Date: June 18, 2022     sennosides-docusate 8.6-50 MG per tablet  Commonly known as: PERICOLACE   2 tablets, Oral, 2 Times Daily         Continue These Medications      Instructions Start Date   acetaminophen 325 MG tablet  Commonly known as: TYLENOL   650 mg, Oral, Every 6 Hours PRN      amLODIPine 5 MG tablet  Commonly known as: NORVASC   5 mg, Oral, Daily      budesonide-formoterol 160-4.5 MCG/ACT inhaler  Commonly known as: SYMBICORT   2 puffs, Inhalation, 2 Times Daily - RT      cetirizine 10 MG tablet  Commonly known as: zyrTEC   10 mg, Oral, Daily      ipratropium-albuterol 0.5-2.5 mg/3 ml nebulizer  Commonly known as: DUO-NEB   3 mL, Inhalation, 4 Times Daily - RT      polyethylene glycol 17 GM/SCOOP powder  Commonly known as: MIRALAX   17 g, Oral, Daily PRN      venlafaxine  MG 24 hr capsule  Commonly known as: EFFEXOR-XR   150 mg, Oral, Daily         Stop These Medications    carvedilol 25 MG  tablet  Commonly known as: COREG     cloNIDine 0.1 MG tablet  Commonly known as: CATAPRES     doxepin 75 MG capsule  Commonly known as: SINEquan     EPINEPHrine 0.3 MG/0.3ML solution auto-injector injection  Commonly known as: EPIPEN     esomeprazole 40 MG capsule  Commonly known as: nexIUM     estradiol 0.1 MG/GM vaginal cream  Commonly known as: ESTRACE     ferrous sulfate 324 (65 Fe) MG tablet delayed-release EC tablet     fish oil 1000 MG capsule capsule     fluticasone 50 MCG/ACT nasal spray  Commonly known as: FLONASE     gabapentin 100 MG capsule  Commonly known as: NEURONTIN     guaiFENesin 600 MG 12 hr tablet  Commonly known as: MUCINEX     levothyroxine 100 MCG tablet  Commonly known as: SYNTHROID, LEVOTHROID     LORazepam 0.5 MG tablet  Commonly known as: ATIVAN     Nirmatrelvir & Ritonavir 20 x 150 MG & 10 x 100MG tablet therapy pack tablet  Commonly known as: PAXLOVID     olmesartan 40 MG tablet  Commonly known as: BENICAR  Replaced by: losartan 50 MG tablet     simvastatin 20 MG tablet  Commonly known as: ZOCOR     sucralfate 1 GM/10ML suspension  Commonly known as: CARAFATE            Discharge Diet:   Diet Instructions     Diet: Soft Texture; Nectar / Syrup Thick Liquids; Ground      Discharge Diet: Soft Texture    Fluid Consistency: Nectar / Syrup Thick Liquids    Soft Options: Ground    Finger foods    Nectar thick liquid consistency. Meds crushed (if safe to be crushed) in pudding/applesauce. 1:1 feedings with staff, feeder to alternate between bites and sips in efforts to minimize oral holding. Continue any other MD diet restrictions          Activity at Discharge:   Activity Instructions     Activity as Tolerated            Follow-up Appointments:   Future Appointments   Date Time Provider Department Center   10/28/2022  3:15 PM Federico Moyer PA [facility]       Test Results Pending at Discharge: None    Electronically signed by Robb Stoll MD, 06/17/22, 13:16 CDT.    Time: 35  minutes.           Electronically signed by Charley Mariee MD at 06/17/22 1352       Discharge Order (From admission, onward)     Start     Ordered    06/17/22 1307  Discharge patient  Once        Expected Discharge Date: 06/17/22    Discharge Disposition: Skilled Nursing Facility (DC - External)    Physician of Record for Attribution - Please select from Treatment Team: CHARLEY MARIEE [[id number]]    Review needed by CMO to determine Physician of Record: No       Question Answer Comment   Physician of Record for Attribution - Please select from Treatment Team CHARLEY MARIEE    Review needed by CMO to determine Physician of Record No        06/17/22 8682

## 2022-06-17 NOTE — DISCHARGE SUMMARY
[facility] Medicine Services  DISCHARGE SUMMARY       Date of Admission: 6/9/2022  Date of Discharge:  6/17/2022  Primary Care Physician: Kun Gonzalez MD    Presenting Problem/History of Present Illness:  Mental status changes.    Final Discharge Diagnoses:  Active Hospital Problems    Diagnosis    • **Acute encephalopathy    • Seizure-like activity (HCC)    • COVID-19 virus infection    • Essential hypertension    • Generalized anxiety disorder    • Acquired hypothyroidism    • Hyperlipidemia        Consults:   Neurology  Palliative    Procedures Performed: LP    Pertinent Test Results:       Imaging Results (All)     Procedure Component Value Units Date/Time    MRI Brain With & Without Contrast [[id number]] Collected: 06/10/22 1146     Updated: 06/10/22 1201    Narrative:      EXAMINATION:  MRI BRAIN W WO CONTRAST-  6/10/2022 10:43 AM CDT     HISTORY: Seizure, new-onset, no history of trauma; R41.82-Altered mental  status, unspecified; U07.1-COVID-19.     TECHNIQUE: Multiplanar imaging was performed in a high field magnet  before and after gadolinium contrast administration.     COMPARISON: No comparison study.     FINDINGS: There is a 1.2 cm area of T2 high signal within the right  petrous apex. This is of increased signal on both the FLAIR and standard  T2 sequence. It is low signal on T1. There is minimal signal within this  area on the diffusion sequence. There is no evidence of acute infarct on  the diffusion-weighted sequence. There is T2 high signal within the  periventricular white matter. There is mild to moderate atrophy with  associated ventricular prominence. There is a partially empty appearance  of the sella turcica. There is no abnormal enhancement after contrast  administration.       Impression:      1. A 1.2 cm T2 high signal lesion without enhancement in the right  petrous apex has minimal signal on the diffusion sequence. Cholesterol  granuloma and  petrous apex effusion with proteinaceous fluid are in the  differential.  2. Mild to moderate atrophy with associated ventricular prominence.  3. T2 high signal within the hemispheric white matter is nonspecific and  likely due to chronic small vessel disease.  4. Incidental partially empty appearance of the sella turcica.     This report was finalized on 06/10/2022 11:58 by Dr. Jack Goldstein MD.    CT Head Without Contrast [[id number]] Collected: 06/10/22 0610     Updated: 06/10/22 0626    Narrative:      EXAMINATION: CT HEAD WO CONTRAST-      6/9/2022 11:24 PM CDT     HISTORY: possible seizure/ RRT; R41.82-Altered mental status,  unspecified; U07.1-COVID-19     In order to have a CT radiation dose as low as reasonably achievable  Automated Exposure Control was utilized for adjustment of the mA and/or  KV according to patient size.     DLP in mGycm= 1520        The CT scan of the head is performed without intravenous contrast  enhancement. Images are acquired in axial plane and subsequent  reconstruction in coronal and sagittal planes.     Comparison is made with the previous study dated 6/9/2022.     There is no evidence of a mass. There is no midline shift.     There is no evidence of intracranial hemorrhage or hematoma.     Moderately prominent ventricles, basal cisterns and cortical sulci are  similar to the previous study, representing a chronic volume  loss/atrophy.     Chronic white matter ischemic changes bilaterally persist. Gray-white  matter differentiation is maintained.     The images are reviewed in bone windows show no evidence of skull  fracture. The visualized paranasal sinuses and mastoid air cells are  clear.       Impression:      1. No acute intracranial abnormality.     The above study was initially reviewed and reported by stat [facility]. I  cannot find any discrepancies.     This report was finalized on 06/10/2022 06:23 by Dr. Kierra Miranda MD.    CT Cervical Spine Without Contrast [[id number]]  Collected: 06/09/22 1823     Updated: 06/09/22 1834    Narrative:      EXAMINATION:  CT CERVICAL SPINE WO CONTRAST-  6/9/2022 6:02 PM CDT     HISTORY: The patient fell. Concern for neck injury.     COMPARISON: No comparison.      DOSE LENGTH PRODUCT: 301 mGy-cm. Automated exposure control was also  utilized to decrease patient radiation dose.     TECHNIQUE: Serial helical tomographic images of the cervical spine were  obtained without the use of intravenous contrast. Sagittal and coronal  reformatted images were also provided.     FINDINGS:     ALIGNMENT: The alignment is normal. There is pannus formation around the  dens with some erosive change of the dens.     BONES: There is no evidence of fracture. Vertebral body heights are  maintained.     DISC SPACES:     C2-3: The disc is fairly well-maintained. There is facet arthropathy  left greater than right. There is mild central disc bulge producing  minimal dural sac compression. There is mild foraminal narrowing on the  left.     C3-4: The disc is fairly well-maintained. There is spondylitic and  uncinate spurring. There is facet arthropathy left much greater than  right. There is severe left and moderate to severe right-sided foraminal  narrowing. There is mild narrowing of the central canal.     C4-5: There is severe disc narrowing with spondylitic and uncinate  spurring. There is facet arthropathy. There is severe right and mild to  moderate left-sided foraminal narrowing. No significant central spinal  canal stenosis.     C5-6: Prior interbody fusion with anterior plate fixation. There appears  to be solid fusion of the disc. There is mild spondylitic spurring.  There is mild facet arthropathy. There is no significant spinal or  foraminal stenosis.     C6-7: There has been prior interbody fusion with anterior plate  fixation. There appears to be good solid fusion of the disc. The facet  joints are maintained. There is no spinal or foraminal stenosis.     C7-T1:  "There is severe disc narrowing. There is spondylitic and uncinate  spurring. The left-sided facet joint is fused. There has been prior  posterior instrumented fusion on the right. There is no central spinal  canal stenosis. There is at least moderate bilateral foraminal stenosis.     T1-2: There is mild disc narrowing. There is anterior syndesmophyte  formation. There is mild facet arthropathy. There is mild foraminal  narrowing bilaterally.     T2-3: There is disc narrowing with anterior spurring. There is mild  facet arthropathy. There is mild foraminal narrowing bilaterally.     OTHER FINDINGS: There is abnormal opacification in the left lung apex.  There is carotid artery calcification in the neck bilaterally.       Impression:      1. No evidence of cervical spine fracture.  2. Postoperative and degenerative changes of the cervical spine and  upper thoracic spine, as described.  3. Abnormal opacification at the left lung apex. This area is not fully  evaluated on this study. This may be infectious or inflammatory.  Neoplasm is also considered. Follow-up nonemergent chest CT recommended.  Marked as \"new lung findings\" in PACS for follow-up.  4. Carotid artery calcification in the neck bilaterally.     The full report of this exam was immediately signed and available to the  emergency room. The patient is currently in the emergency room.        This report was finalized on 06/09/2022 18:31 by Dr. Jack Goldstein MD.    CT Head Without Contrast [[id number]] Collected: 06/09/22 1821     Updated: 06/09/22 1825    Narrative:      EXAMINATION:  CT HEAD WO CONTRAST-  6/9/2022 6:02 PM CDT     HISTORY: The patient fell. Rule out head injury.     TECHNIQUE: Multiple axial images were obtained through the brain without  contrast infusion. Multiplanar images were reconstructed.     DLP: 596 mGy-cm. Automated dosage reduction technique was utilized to  reduce patient dosage.     COMPARISON: No comparison study.     FINDINGS: " There are no hemorrhage, edema or mass effect. There is mild  atrophy. There is mild associated prominence of the lateral ventricles.  There is low density in the hemispheric white matter. The paranasal  sinuses are clear. The mastoid air cells are clear. No calvarial  fracture is seen. There is a partially empty appearance of the sella  turcica.       Impression:      1. No hemorrhage, edema or mass effect.  2. Mild atrophy with associated prominence of the lateral ventricles.  3. Low density in the hemispheric white matter is nonspecific and likely  due to chronic small vessel disease.  4. Partially empty appearance of the sella turcica.     The full report of this exam was immediately signed and available to the  emergency room. The patient is currently in the emergency room.     This report was finalized on 06/09/2022 18:22 by Dr. Jack Goldstein MD.    XR Chest 1 View [[id number]] Collected: 06/09/22 1743     Updated: 06/09/22 1747    Narrative:      EXAMINATION:  XR CHEST 1 VW-  6/9/2022 5:29 PM CDT     HISTORY: Shortness of breath. Covid positive.     COMPARISON: 2/28/2019.     TECHNIQUE: Single view AP image.     FINDINGS:  There is hypoventilation with vascular crowding. There is  stable bronchial wall thickening. There is patchy infiltrate in the left  perihilar region and left lung base. There is a calcified granuloma on  the left. Heart size appears to be within normal limits. There is a port  catheter on the right that is new compared to the prior study. There has  been prior cervical fusion and right shoulder arthroplasty. There are  degenerative changes of the spine and left glenohumeral joint.       Impression:      1. Hypoventilation with vascular crowding.  2. Opacity in the left perihilar region and left lung base likely due to  atelectasis. Pneumonia less likely.  3. Stable bronchial wall thickening.        This report was finalized on 06/09/2022 17:44 by Dr. Jack Goldstein MD.        LAB  RESULTS:      Lab 06/14/22  0650 06/14/22  0649 06/12/22  0426 06/11/22  1234 06/11/22  0655   WBC 13.35*  --  5.76  --  3.75   HEMOGLOBIN 14.1  --  12.5  --  12.3   HEMATOCRIT 42.0  --  37.3  --  36.4   PLATELETS 329  --  195  --  153   NEUTROS ABS  --   --  4.51  --   --    IMMATURE GRANS (ABS)  --   --  0.01  --   --    LYMPHS ABS  --   --  0.61*  --   --    MONOS ABS  --   --  0.63  --   --    EOS ABS  --   --  0.00  --   --    MCV 91.5  --  92.8  --  91.5   CRP  --  0.82*  --   --  0.48   PROCALCITONIN  --  0.10  --   --   --    PROTIME  --   --   --  13.9  --    APTT  --   --   --  29.8  --          Lab 06/16/22  0550 06/14/22  0649 06/12/22  0426 06/11/22  0655   SODIUM 145 143 140 137   POTASSIUM 3.5 3.7 3.9 4.1   CHLORIDE 106 104 106 103   CO2 25.0 25.0 22.0 22.0   ANION GAP 14.0 14.0 12.0 12.0   BUN 29* 24* 23 19   CREATININE 0.96 0.97 0.80 0.75   EGFR 60.3 59.6* 75.1 81.1   GLUCOSE 133* 101* 140* 124*   CALCIUM 10.0 9.6 9.0 8.7   MAGNESIUM 3.1* 1.7 1.8 1.4*   PHOSPHORUS  --  3.1  --   --          Lab 06/16/22  0550 06/14/22  0649 06/12/22  0426   TOTAL PROTEIN 7.7 7.4 6.7   ALBUMIN 4.40 4.30 3.60   GLOBULIN 3.3 3.1 3.1   ALT (SGPT) 48* 32 18   AST (SGOT) 41* 38* 22   BILIRUBIN 0.5 0.4 0.2   ALK PHOS 77 72 61         Lab 06/11/22  1234   PROTIME 13.9   INR 1.12*             Lab 06/14/22  0649   VITAMIN B 12 523         Brief Urine Lab Results  (Last result in the past 365 days)      Color   Clarity   Blood   Leuk Est   Nitrite   Protein   CREAT   Urine HCG        06/09/22 1738 Yellow   Clear   Negative   Negative   Negative   30 mg/dL (1+)               Microbiology Results (last 10 days)     Procedure Component Value - Date/Time    Culture, CSF - Cerebrospinal Fluid, Lumbar Puncture [[id number]] Collected: 06/12/22 1239    Lab Status: Final result Specimen: Cerebrospinal Fluid from Lumbar Puncture Updated: 06/15/22 0557     CSF Culture No growth at 3 days     Gram Stain Few (2+) WBCs per low power field       No organisms seen    Meningitis / Encephalitis Panel, PCR - Cerebrospinal Fluid, Lumbar Puncture [[id number]]  (Normal) Collected: 06/12/22 1239    Lab Status: Final result Specimen: Cerebrospinal Fluid from Lumbar Puncture Updated: 06/12/22 1935     ESCHERICHIA COLI K1, PCR Not Detected     HAEMOPHILUS INFLUENZAE, PCR Not Detected     LISTERIA MONOCYTOGENES, PCR Not Detected     NEISSERIA MENINGITIDIS, PCR Not Detected     STREPTOCOCCUS AGALACTIAE, PCR Not Detected     STREPTOCOCCUS PNEUMONIAE, PCR Not Detected     CYTOMEGALOVIRUS (CMV), PCR Not Detected     ENTEROVIRUS, PCR Not Detected     HERPES SIMPLEX VIRUS 1 (HSV-1), PCR Not Detected     HERPES SIMPLEX VIRUS 2 (HSV-2), PCR Not Detected     HUMAN PARECHOVIRUS, PCR Not Detected     VARICELLA ZOSTER VIRUS (VZV), PCR Not Detected     CRYPTOCOCCUS NEOFORMANS / GATTII, PCR Not Detected     HUMAN HERPES VIRUS 6 PCR Not Detected    Narrative:      This test is performed by utilizing real time polymerace chain recation (PCR).   The [employer] ME Panel does not distinguish between latent and active CMV and HHV-6 infections. Detection of these viruses may indicate primary infection, secondary reactivation, or the presence of latent virus. Results should always be interpreted in conjunction with other clinical, laboratory, and epidemiological information.    Blood Culture - Blood, Wrist, Right [[id number]]  (Normal) Collected: 06/09/22 1729    Lab Status: Final result Specimen: Blood from Wrist, Right Updated: 06/14/22 1833     Blood Culture No growth at 5 days    Blood Culture - Blood, Arm, Left [[id number]]  (Normal) Collected: 06/09/22 1725    Lab Status: Final result Specimen: Blood from Arm, Left Updated: 06/14/22 1833     Blood Culture No growth at 5 days    COVID-19,[facility] IN-HOUSE,Nasal Swab No Transport Media 3-4 HR TAT - Swab, Nasal Cavity [[id number]]  (Abnormal) Collected: 06/09/22 1657    Lab Status: Final result Specimen: Swab from Nasal Cavity Updated:  "06/09/22 1805     COVID19 Detected    Narrative:      Fact sheet for providers: https://www.fda.gov/media/407064/download     Fact sheet for patients: https://www.fda.gov/media/521831/download    Test performed by PCR.    Consider negative results in combination with clinical observations, patient history, and epidemiological information.          Hospital Course:     HPI per Dr. Brown:  \"77 year old female with PMH of HTN, hypothyroidism, CKD that was recently diagnosed with COVID 19 and started on Paxlovid. Daughter states that the patient has been debilitated and confused. Tonight she appeared to be totally disoriented and this is a significant change from baseline.      The patient had an episode of tonic clonic activity once on medical floor with a post ictal change that lasted over 10 minutes. Her T max is 100.5 in the ER, she was about 99 during the episode. She has not had seizures in the past or CVA history.      Her daughter states that no medications have been changed recently, other than addition of Paxlovid. And she had taken her home medications as prescribed. \"    Hospital Course:    Patient admitted for confusion and concern for seizure.    Neurology consulted.  Keppra 500 mg BID.  MRI was negative for acute finding.  EEG showed diffuse slowing.  LP was unremarkable for acute processes.    Patient treated 5-days for UTI with ceftriaxone.    Patient was on paxlovid for COVID-19 prior to arrival.  She was exposed on 6/2, positive test 6/6 and started on paxlovid.  Removed from precautions on 6/14.      Magnesium had to be replaced.    Ultimately determined, likely had encephalopathy from COVID-19 and UTI.  Patient improved but still has a long-way to go to be back to baseline.        Physical Exam on Discharge:  /80 (BP Location: Right arm, Patient Position: Sitting)   Pulse 118   Temp 98.3 °F (36.8 °C) (Oral)   Resp 18   Ht 152.4 cm (60\")   Wt 86.3 kg (190 lb 4.1 oz)   SpO2 95%   BMI " 37.16 kg/m²   Physical Exam  Vitals and nursing note reviewed.   Constitutional:       Appearance: She is obese.   HENT:      Head: Normocephalic and atraumatic.      Mouth/Throat:      Mouth: Mucous membranes are dry.      Pharynx: Oropharynx is clear.   Eyes:      General: No scleral icterus.     Conjunctiva/sclera: Conjunctivae normal.   Cardiovascular:      Rate and Rhythm: Normal rate and regular rhythm.      Heart sounds: No murmur heard.  Abdominal:      General: Abdomen is flat. Bowel sounds are normal. There is no distension.      Palpations: Abdomen is soft. There is no mass.   Skin:     General: Skin is warm and dry.      Coloration: Skin is pale.   Neurological:      General: No focal deficit present.      Mental Status: She is alert. She is disoriented.      Comments: Oriented x 2 and to situation; did not get year correct   Psychiatric:         Mood and Affect: Mood normal.         Behavior: Behavior normal.        Condition on Discharge: Stable    Discharge Disposition:  Skilled Nursing Facility (DC - External)    Discharge Medications:     Discharge Medications      New Medications      Instructions Start Date   famotidine 20 MG tablet  Commonly known as: PEPCID   20 mg, Oral, 2 Times Daily Before Meals      levETIRAcetam 500 MG tablet  Commonly known as: KEPPRA   500 mg, Oral, Every 12 Hours Scheduled      lidocaine 5 %  Commonly known as: LIDODERM   1 patch, Transdermal, Every 24 Hours Scheduled, Remove & Discard patch within 12 hours or as directed by MD   Start Date: June 18, 2022     losartan 50 MG tablet  Commonly known as: COZAAR  Replaces: olmesartan 40 MG tablet   50 mg, Oral, Every 24 Hours Scheduled   Start Date: June 18, 2022     sennosides-docusate 8.6-50 MG per tablet  Commonly known as: PERICOLACE   2 tablets, Oral, 2 Times Daily         Continue These Medications      Instructions Start Date   acetaminophen 325 MG tablet  Commonly known as: TYLENOL   650 mg, Oral, Every 6 Hours PRN       amLODIPine 5 MG tablet  Commonly known as: NORVASC   5 mg, Oral, Daily      budesonide-formoterol 160-4.5 MCG/ACT inhaler  Commonly known as: SYMBICORT   2 puffs, Inhalation, 2 Times Daily - RT      cetirizine 10 MG tablet  Commonly known as: zyrTEC   10 mg, Oral, Daily      ipratropium-albuterol 0.5-2.5 mg/3 ml nebulizer  Commonly known as: DUO-NEB   3 mL, Inhalation, 4 Times Daily - RT      polyethylene glycol 17 GM/SCOOP powder  Commonly known as: MIRALAX   17 g, Oral, Daily PRN      venlafaxine  MG 24 hr capsule  Commonly known as: EFFEXOR-XR   150 mg, Oral, Daily         Stop These Medications    carvedilol 25 MG tablet  Commonly known as: COREG     cloNIDine 0.1 MG tablet  Commonly known as: CATAPRES     doxepin 75 MG capsule  Commonly known as: SINEquan     EPINEPHrine 0.3 MG/0.3ML solution auto-injector injection  Commonly known as: EPIPEN     esomeprazole 40 MG capsule  Commonly known as: nexIUM     estradiol 0.1 MG/GM vaginal cream  Commonly known as: ESTRACE     ferrous sulfate 324 (65 Fe) MG tablet delayed-release EC tablet     fish oil 1000 MG capsule capsule     fluticasone 50 MCG/ACT nasal spray  Commonly known as: FLONASE     gabapentin 100 MG capsule  Commonly known as: NEURONTIN     guaiFENesin 600 MG 12 hr tablet  Commonly known as: MUCINEX     levothyroxine 100 MCG tablet  Commonly known as: SYNTHROID, LEVOTHROID     LORazepam 0.5 MG tablet  Commonly known as: ATIVAN     Nirmatrelvir & Ritonavir 20 x 150 MG & 10 x 100MG tablet therapy pack tablet  Commonly known as: PAXLOVID     olmesartan 40 MG tablet  Commonly known as: BENICAR  Replaced by: losartan 50 MG tablet     simvastatin 20 MG tablet  Commonly known as: ZOCOR     sucralfate 1 GM/10ML suspension  Commonly known as: CARAFATE            Discharge Diet:   Diet Instructions     Diet: Soft Texture; Nectar / Syrup Thick Liquids; Ground      Discharge Diet: Soft Texture    Fluid Consistency: Nectar / Syrup Thick Liquids    Soft Options:  Ground    Finger foods    Nectar thick liquid consistency. Meds crushed (if safe to be crushed) in pudding/applesauce. 1:1 feedings with staff, feeder to alternate between bites and sips in efforts to minimize oral holding. Continue any other MD diet restrictions          Activity at Discharge:   Activity Instructions     Activity as Tolerated            Follow-up Appointments:   Future Appointments   Date Time Provider Department Center   10/28/2022  3:15 PM Federico Moyer, PA [facility]       Test Results Pending at Discharge: None    Electronically signed by Robb Stoll MD, 06/17/22, 13:16 CDT.    Time: 35 minutes.

## 2022-06-17 NOTE — CASE MANAGEMENT/SOCIAL WORK
Continued Stay Note  [facility]     Patient Name: Hanny Iyv  MRN: [id number]  Today's Date: 6/17/2022    Admit Date: 6/9/2022     Discharge Plan     Row Name 06/17/22 1254       Plan    Final Discharge Disposition Code 61 - hospital-based swing bed    Final Note Pt has been accepted at [facility] for today. Pt does not need a new Covid test per Darleen at [facility]. Darleen is aware of recent  Covid. Will fax DC summary/orders once completed. Call report number is [phone]. Dtr is aware of dc.    DC summary/orders were faxed to [facility].                Discharge Codes    No documentation.               Expected Discharge Date and Time     Expected Discharge Date Expected Discharge Time    Jun 17, 2022             [facility]

## 2022-06-17 NOTE — THERAPY TREATMENT NOTE
Acute Care - Speech Language Pathology   Swallow Treatment Note [facility]     Patient Name: Hanny Ivy  : 1945  MRN: [id number]  Today's Date: 2022  Onset of Illness/Injury or Date of Surgery: 22     Referring Physician: Dr. Brown      Admit Date: 2022  Swallow treatment completed. The patient was alert and cooperative. Sitting up in chair and visibly fatigued. Poor intake from breakfast tray. Patient completed trials of regular solids, nectar thick boost, and thin water via edge of cup. Minimal to no oral holding noted today. Delayed cough with water from edge of cup on 1/2 trials presented. Patient too fatigued to actively participate with oral motor exercises. Nurse aid reports improved intake with daughter feeding yesterday afternoon. Patient to continue mechanical soft diet with finger foods and nectar thick liquids. Continue 1:1 feeding.   Kaylee Rodriguez, MS CCC-SLP 2022 09:45 CDT    Visit Dx:     ICD-10-CM ICD-9-CM   1. Altered mental status, unspecified altered mental status type  R41.82 780.97   2. COVID-19  U07.1 079.89   3. Dysphagia, unspecified type  R13.10 787.20   4. Acute encephalopathy  G93.40 348.30   5. Impaired mobility  Z74.09 799.89   6. Decreased activities of daily living (ADL)  Z78.9 V49.89     Patient Active Problem List   Diagnosis   • Dermatochalasis of both upper eyelids   • Visual field defect, unspecified   • Rotator cuff arthropathy, right   • Pure hypercholesterolemia   • Essential hypertension   • Mild intermittent asthma without complication   • Generalized anxiety disorder   • Gastroesophageal reflux disease without esophagitis   • Acquired hypothyroidism   • Anemia   • Chronic neck and back pain   • Gait abnormality   • Hyperlipidemia   • MVP (mitral valve prolapse)   • Pulmonary infiltrate in left lung on chest x-ray   • Seasonal allergies   • Traction bronchiectasis (HCC)   • History of recurrent UTIs   • Altered mental status, unspecified  altered mental status type   • COVID-19 virus infection   • Acute encephalopathy   • Seizure-like activity (HCC)     Past Medical History:   Diagnosis Date   • Anxiety and depression    • Asthma     bronchioectasis   • Back pain     with nerve pain in legs   • Cataract    • Dermatochalasis of both upper eyelids    • GERD (gastroesophageal reflux disease)    • Hypertension    • Hypothyroidism     hypothyroidism   • Kidney disease    • Lipoma    • Osteoarthritis    • Visual field defect, unspecified      Past Surgical History:   Procedure Laterality Date   • ANKLE SURGERY     • APPENDECTOMY     • BRONCHOSCOPY     • CERVICAL FUSION     • CHOLECYSTECTOMY     • EXCISION MASS LEG Left 3/4/2019    Procedure: EXCISION LIPOMA - LEFT HIP;  Surgeon: Giulia Rodrigez MD;  Location: [facility] OR;  Service: General   • HYSTERECTOMY     • OTHER SURGICAL HISTORY      thumb   • REPLACEMENT TOTAL KNEE     • TOTAL SHOULDER ARTHROPLASTY W/ DISTAL CLAVICLE EXCISION Right 3/6/2018    Procedure: RIGHT REVERSE TOTAL SHOULDER ARTHROPLASTY;  Surgeon: Imtiaz Lobato MD;  Location: [facility] OR;  Service:        SLP Recommendation and Plan                                         Daily Summary of Progress (SLP): progress toward functional goals is gradual (06/17/22 0911)                  Plan for Continued Treatment (SLP): continue treatment per plan of care (06/17/22 0911)         Plan of Care Reviewed With: patient, caregiver  Progress: improving      SWALLOW EVALUATION (last 72 hours)     SLP Adult Swallow Evaluation     Row Name 06/17/22 0911 06/16/22 1345 06/15/22 1255 06/14/22 1423          Rehab Evaluation    Document Type therapy note (daily note)  -MM therapy note (daily note)  -MM therapy note (daily note)  -BN therapy note (daily note)  -BN     Subjective Information no complaints  -MM no complaints  -MM no complaints  -BN no complaints  -BN     Patient Observations alert;cooperative;agree to therapy  -MM alert;cooperative;agree to  therapy  -MM alert;cooperative  -BN alert;cooperative  -BN     Patient/Family/Caregiver Comments/Observations No family present.  -MM No family present  -MM no family present  -BN pt sister arrived during tx session  -BN     Patient Effort adequate  -MM adequate  -MM adequate  -BN adequate  -BN     Symptoms Noted During/After Treatment none  -MM -- -- --            Pain    Additional Documentation Pain Scale: FACES Pre/Post-Treatment (Group)  -MM Pain Scale: FACES Pre/Post-Treatment (Group)  -MM -- --            Pain Scale: FACES Pre/Post-Treatment    Pain: FACES Scale, Pretreatment 0-->no hurt  -MM 0-->no hurt  -MM 0-->no hurt  -BN 0-->no hurt  -BN     Posttreatment Pain Rating 0-->no hurt  -MM 0-->no hurt  -MM 0-->no hurt  -BN 4-->hurts little more  -BN     Pre/Posttreatment Pain Comment -- -- -- pain with coughing  -BN            SLP Treatment Clinical Impressions    Daily Summary of Progress (SLP) progress toward functional goals is gradual  -MM progress toward functional goals is gradual  -MM progress toward functional goals is good  -BN progress toward functional goals is good  -BN     Barriers to Overall Progress (SLP) Cognitive status  -MM Cognitive status  -MM Cognitive status  -BN Cognitive status  -BN     Plan for Continued Treatment (SLP) continue treatment per plan of care  -MM continue treatment per plan of care  -MM continue treatment per plan of care  -BN continue treatment per plan of care  -BN     Care Plan Review care plan/treatment goals reviewed;risks/benefits reviewed  -MM risks/benefits reviewed;care plan/treatment goals reviewed  -MM care plan/treatment goals reviewed  -BN care plan/treatment goals reviewed  -BN     Care Plan Review, Other Participant(s) caregiver  -MM caregiver  -MM caregiver  -BN caregiver;family  -BN            Recommendations    SLP Diet Recommendation -- -- soft textures;other (see comments)  finger foods  -BN --            Swallow Goals (SLP)    Oral Nutrition/Hydration  Goal Selection (SLP) oral nutrition/hydration, SLP goal 1  -MM oral nutrition/hydration, SLP goal 1  -MM oral nutrition/hydration, SLP goal 1  -BN oral nutrition/hydration, SLP goal 1  -BN     Lingual Strengthening Goal Selection (SLP) lingual strengthening, SLP goal 1  -MM lingual strengthening, SLP goal 1  -MM lingual strengthening, SLP goal 1  -BN lingual strengthening, SLP goal 1  -BN     Additional Documentation lingual strengthening goal selection (SLP)  -MM lingual strengthening goal selection (SLP)  -MM lingual strengthening goal selection (SLP)  -BN lingual strengthening goal selection (SLP)  -BN            Oral Nutrition/Hydration Goal 1 (SLP)    Oral Nutrition/Hydration Goal 1, SLP Pt will tolerate LRD without overt s/s of aspiration.  -MM Pt will tolerate LRD without overt s/s of aspiration.  -MM Pt will tolerate LRD without overt s/s of aspiration.  -BN Pt will tolerate LRD without overt s/s of aspiration.  -BN     Time Frame (Oral Nutrition/Hydration Goal 1, SLP) by discharge  -MM by discharge  -MM by discharge  -BN by discharge  -BN     Barriers (Oral Nutrition/Hydration Goal 1, SLP) cognition  -MM cognition  -MM cognition  -BN cognition  -BN     Progress/Outcomes (Oral Nutrition/Hydration Goal 1, SLP) continuing progress toward goal  -MM continuing progress toward goal  -MM continuing progress toward goal  -BN continuing progress toward goal  -BN            Lingual Strengthening Goal 1 (SLP)    Activity (Lingual Strengthening Goal 1, SLP) increase lingual tone/sensation/control/coordination/movement  -MM increase lingual tone/sensation/control/coordination/movement  -MM increase lingual tone/sensation/control/coordination/movement  -BN increase lingual tone/sensation/control/coordination/movement  -BN     Increase Lingual Tone/Sensation/Control/Coordination/Movement lingual movement exercises  -MM lingual movement exercises  -MM lingual movement exercises  -BN lingual movement exercises  -BN      [place]/Accuracy (Lingual Strengthening Goal 1, SLP) with minimal cues (75-90% accuracy)  -MM with minimal cues (75-90% accuracy)  -MM with minimal cues (75-90% accuracy)  -BN with minimal cues (75-90% accuracy)  -BN     Time Frame (Lingual Strengthening Goal 1, SLP) by discharge  -MM by discharge  -MM by discharge  -BN by discharge  -BN     Barriers (Lingual Strengthening Goal 1, SLP) cognition  -MM cognition  -MM cognition  -BN cognition  -BN     Progress/Outcomes (Lingual Strengthening Goal 1, SLP) progress slower than expected  -MM goal ongoing  -MM goal ongoing  -BN goal ongoing  -BN           User Key  (r) = Recorded By, (t) = Taken By, (c) = Cosigned By    Initials Name Effective Dates    Fatoumata Elizabeth, MS CCC-SLP 05/31/22 - 06/14/22    Fatoumata Elizabeth, MS-CCC/SLP, CNT 06/15/22 -     MM Kaylee Rodriguez, MS CCC-SLP 06/16/21 -                 EDUCATION  The patient has been educated in the following areas:   Dysphagia (Swallowing Impairment) Oral Care/Hydration Modified Diet Instruction.        SLP GOALS     Row Name 06/17/22 0911 06/16/22 1345 06/15/22 1255       Oral Nutrition/Hydration Goal 1 (SLP)    Oral Nutrition/Hydration Goal 1, SLP Pt will tolerate LRD without overt s/s of aspiration.  -MM Pt will tolerate LRD without overt s/s of aspiration.  -MM Pt will tolerate LRD without overt s/s of aspiration.  -BN    Time Frame (Oral Nutrition/Hydration Goal 1, SLP) by discharge  -MM by discharge  -MM by discharge  -BN    Barriers (Oral Nutrition/Hydration Goal 1, SLP) cognition  -MM cognition  -MM cognition  -BN    Progress/Outcomes (Oral Nutrition/Hydration Goal 1, SLP) continuing progress toward goal  -MM continuing progress toward goal  -MM continuing progress toward goal  -BN       Lingual Strengthening Goal 1 (SLP)    Activity (Lingual Strengthening Goal 1, SLP) increase lingual tone/sensation/control/coordination/movement  -MM increase lingual  tone/sensation/control/coordination/movement  -MM increase lingual tone/sensation/control/coordination/movement  -BN    Increase Lingual Tone/Sensation/Control/Coordination/Movement lingual movement exercises  -MM lingual movement exercises  -MM lingual movement exercises  -BN    [place]/Accuracy (Lingual Strengthening Goal 1, SLP) with minimal cues (75-90% accuracy)  -MM with minimal cues (75-90% accuracy)  -MM with minimal cues (75-90% accuracy)  -BN    Time Frame (Lingual Strengthening Goal 1, SLP) by discharge  -MM by discharge  -MM by discharge  -BN    Barriers (Lingual Strengthening Goal 1, SLP) cognition  -MM cognition  -MM cognition  -BN    Progress/Outcomes (Lingual Strengthening Goal 1, SLP) progress slower than expected  -MM goal ongoing  -MM goal ongoing  -BN    Row Name 06/14/22 1423             Oral Nutrition/Hydration Goal 1 (SLP)    Oral Nutrition/Hydration Goal 1, SLP Pt will tolerate LRD without overt s/s of aspiration.  -BN      Time Frame (Oral Nutrition/Hydration Goal 1, SLP) by discharge  -BN      Barriers (Oral Nutrition/Hydration Goal 1, SLP) cognition  -BN      Progress/Outcomes (Oral Nutrition/Hydration Goal 1, SLP) continuing progress toward goal  -BN              Lingual Strengthening Goal 1 (SLP)    Activity (Lingual Strengthening Goal 1, SLP) increase lingual tone/sensation/control/coordination/movement  -BN      Increase Lingual Tone/Sensation/Control/Coordination/Movement lingual movement exercises  -BN      [place]/Accuracy (Lingual Strengthening Goal 1, SLP) with minimal cues (75-90% accuracy)  -BN      Time Frame (Lingual Strengthening Goal 1, SLP) by discharge  -BN      Barriers (Lingual Strengthening Goal 1, SLP) cognition  -BN      Progress/Outcomes (Lingual Strengthening Goal 1, SLP) goal ongoing  -BN            User Key  (r) = Recorded By, (t) = Taken By, (c) = Cosigned By    Initials Name Provider Type    Fatouamta Elizabeth MS CCC-SLP Speech and Language  Pathologist    Fatoumata Elizabeth, MS-CCC/SLP, CNT Speech and Language Pathologist    Kaylee Gilliam MS CCC-SLP Speech and Language Pathologist                   Time Calculation:    Time Calculation- SLP     Row Name 06/17/22 0943             Time Calculation- SLP    SLP Start Time 0911  -MM      SLP Stop Time 0943  -MM      SLP Time Calculation (min) 32 min  -MM      SLP Received On 06/17/22  -MM              Untimed Charges    [id number] Treatment Swallow Minutes 32  -MM              Total Minutes    Untimed Charges Total Minutes 32  -MM       Total Minutes 32  -MM            User Key  (r) = Recorded By, (t) = Taken By, (c) = Cosigned By    Initials Name Provider Type    Kaylee Gilliam MS CCC-SLP Speech and Language Pathologist                Therapy Charges for Today     Code Description Service Date Service Provider Modifiers Qty    40721371661 HC ST TREATMENT SWALLOW 3 6/16/2022 Kaylee Rodriguez MS CCC-SLP GN 1    99888785387 HC ST TREATMENT SWALLOW 2 6/17/2022 Kaylee Rodriguez MS CCC-SLP GN 1               Kaylee Rodriguez MS CCC-SLP  6/17/2022

## 2022-06-17 NOTE — THERAPY TREATMENT NOTE
Acute Care - Physical Therapy Treatment Note  [facility]     Patient Name: Hanny Ivy  : 1945  MRN: [id number]  Today's Date: 2022   Onset of Illness/Injury or Date of Surgery: 22  Visit Dx:     ICD-10-CM ICD-9-CM   1. Altered mental status, unspecified altered mental status type  R41.82 780.97   2. COVID-19  U07.1 079.89   3. Dysphagia, unspecified type  R13.10 787.20   4. Acute encephalopathy  G93.40 348.30   5. Impaired mobility  Z74.09 799.89   6. Decreased activities of daily living (ADL)  Z78.9 V49.89     Patient Active Problem List   Diagnosis   • Dermatochalasis of both upper eyelids   • Visual field defect, unspecified   • Rotator cuff arthropathy, right   • Pure hypercholesterolemia   • Essential hypertension   • Mild intermittent asthma without complication   • Generalized anxiety disorder   • Gastroesophageal reflux disease without esophagitis   • Acquired hypothyroidism   • Anemia   • Chronic neck and back pain   • Gait abnormality   • Hyperlipidemia   • MVP (mitral valve prolapse)   • Pulmonary infiltrate in left lung on chest x-ray   • Seasonal allergies   • Traction bronchiectasis (HCC)   • History of recurrent UTIs   • Altered mental status, unspecified altered mental status type   • COVID-19 virus infection   • Acute encephalopathy   • Seizure-like activity (HCC)     Past Medical History:   Diagnosis Date   • Anxiety and depression    • Asthma     bronchioectasis   • Back pain     with nerve pain in legs   • Cataract    • Dermatochalasis of both upper eyelids    • GERD (gastroesophageal reflux disease)    • Hypertension    • Hypothyroidism     hypothyroidism   • Kidney disease    • Lipoma    • Osteoarthritis    • Visual field defect, unspecified      Past Surgical History:   Procedure Laterality Date   • ANKLE SURGERY     • APPENDECTOMY     • BRONCHOSCOPY     • CERVICAL FUSION     • CHOLECYSTECTOMY     • EXCISION MASS LEG Left 3/4/2019    Procedure: EXCISION LIPOMA - LEFT HIP;   Surgeon: Giulia Rodrigez MD;  Location:  [facility] OR;  Service: General   • HYSTERECTOMY     • OTHER SURGICAL HISTORY      thumb   • REPLACEMENT TOTAL KNEE     • TOTAL SHOULDER ARTHROPLASTY W/ DISTAL CLAVICLE EXCISION Right 3/6/2018    Procedure: RIGHT REVERSE TOTAL SHOULDER ARTHROPLASTY;  Surgeon: Imtiaz Lobato MD;  Location:  [facility] OR;  Service:      PT Assessment (last 12 hours)     PT Evaluation and Treatment     Row Name 06/17/22 0805          Physical Therapy Time and Intention    Subjective Information complains of  sorenss in chest  -WK     Document Type therapy note (daily note)  -WK     Mode of Treatment physical therapy  -WK     Row Name 06/17/22 0805          General Information    Existing Precautions/Restrictions fall  -WK     Row Name 06/17/22 0805          Bed Mobility    Supine-Sit [place] (Bed Mobility) verbal cues;minimum assist (75% patient effort)  -WK     Row Name 06/17/22 0805          Transfers    Sit-Stand [place] (Transfers) verbal cues;contact guard  required cues prepare to stand  -WK     Stand-Sit [place] (Transfers) contact guard  -WK     Row Name 06/17/22 0805          Stand Pivot/Stand Step Transfer    Stand Pivot/Stand Step [place] (Transfers) verbal cues;minimum assist (75% patient effort)  -WK     Assistive Device (Stand Pivot Stand Step Transfer) walker, front-wheeled  -WK     Row Name 06/17/22 0805          Gait/Stairs (Locomotion)    [place] Level (Gait) verbal cues;minimum assist (75% patient effort)  -WK     Assistive Device (Gait) walker, front-wheeled  -WK     Distance in Feet (Gait) 5  -WK     Deviations/Abnormal Patterns (Gait) base of support, narrow;gait speed decreased  -WK     Bilateral Gait Deviations heel strike decreased;forward flexed posture  -WK     Comment, (Gait/Stairs) continues to be distracted easily and need cues to refocus. needs increased time to process information  -WK     Row Name 06/17/22 0805          Motor Skills     Therapeutic Exercise aerobic  -WK     Row Name 06/17/22 0805          Aerobic Exercise    Comment, Aerobic Exercise (Therapeutic Exercise) AROM bLE 20 reps sitting  -WK     Row Name 06/17/22 0805          Positioning and Restraints    Pre-Treatment Position in bed  -WK     Post Treatment Position chair  -WK     In Chair reclined;call light within reach;encouraged to call for assist;exit alarm on;notified nsg  -WK           User Key  (r) = Recorded By, (t) = Taken By, (c) = Cosigned By    Initials Name Provider Type    WK Jennifer Olvera PTA Physical Therapist Assistant                Physical Therapy Education                 Title: PT OT SLP Therapies (In Progress)     Topic: Physical Therapy (In Progress)     Point: Mobility training (Done)     Learning Progress Summary           Patient Acceptance, E, VU,NR by  at 6/17/2022 0839    Comment: POC    Acceptance, E,D, NL by  at 6/14/2022 1156    Comment: benefits of PT and POC, call for A OOB                   Point: Home exercise program (In Progress)     Learning Progress Summary           Patient Acceptance, E,D, NL by  at 6/14/2022 1156    Comment: benefits of PT and POC, call for A OOB                   Point: Precautions (In Progress)     Learning Progress Summary           Patient Acceptance, E,D, NL by  at 6/14/2022 1156    Comment: benefits of PT and POC, call for A OOB                               User Key     Initials Effective Dates Name Provider Type Discipline     06/16/21 -  Toribio Valle, PT Physical Therapist PT     06/16/21 -  Jennifer Olvera PTA Physical Therapist Assistant PT              PT Recommendation and Plan     Plan of Care Reviewed With: patient  Progress: improving  Outcome Evaluation: Pt performed bed mobility and transfer with min A. Pt amb 5' Kayla with RW with cues for safety and to progress. Pt fatigued quickly and was unable to amb further. Pt is able to follow more cues today but continues to need cues on progress  BLE during gait. Recommend SNF at discharge.   Outcome Measures     Row Name 06/17/22 0805 06/16/22 1500 06/15/22 1230       How much help from another person do you currently need...    Turning from your back to your side while in flat bed without using bedrails? 3  -WK -- --    Moving from lying on back to sitting on the side of a flat bed without bedrails? 3  -WK -- --    Moving to and from a bed to a chair (including a wheelchair)? 3  -WK -- --    Standing up from a chair using your arms (e.g., wheelchair, bedside chair)? 3  -WK -- --    Climbing 3-5 steps with a railing? 1  -WK -- --    To walk in hospital room? 2  -WK -- --    AM-PAC 6 Clicks Score (PT) 15  -WK -- --       How much help from another is currently needed...    Putting on and taking off regular lower body clothing? -- 2  -TS 2  -AC    Bathing (including washing, rinsing, and drying) -- 2  -TS 2  -AC    Toileting (which includes using toilet bed pan or urinal) -- 2  -TS 2  -AC    Putting on and taking off regular upper body clothing -- 3  -TS 2  -AC    Taking care of personal grooming (such as brushing teeth) -- 3  -TS 2  -AC    Eating meals -- 3  -TS 2  -AC    AM-PAC 6 Clicks Score (OT) -- 15  -TS 12  -AC       Functional Assessment    Outcome Measure Options AM-PAC 6 Clicks Basic Mobility (PT)  -WK -- AM-PAC 6 Clicks Daily Activity (OT)  -AC    Row Name 06/15/22 0843 06/14/22 1419          How much help from another person do you currently need...    Turning from your back to your side while in flat bed without using bedrails? 3  -WK --     Moving from lying on back to sitting on the side of a flat bed without bedrails? 2  -WK --     Moving to and from a bed to a chair (including a wheelchair)? 2  -WK --     Standing up from a chair using your arms (e.g., wheelchair, bedside chair)? 2  -WK --     Climbing 3-5 steps with a railing? 1  -WK --     To walk in hospital room? 2  -WK --     AM-PAC 6 Clicks Score (PT) 12  -WK --            How much help  from another is currently needed...    Putting on and taking off regular lower body clothing? -- 1  -AC     Bathing (including washing, rinsing, and drying) -- 1  -AC     Toileting (which includes using toilet bed pan or urinal) -- 2  -AC     Putting on and taking off regular upper body clothing -- 2  -AC     Taking care of personal grooming (such as brushing teeth) -- 2  -AC     Eating meals -- 2  -AC     AM-PAC 6 Clicks Score (OT) -- 10  -AC            Functional Assessment    Outcome Measure Options AM-PAC 6 Clicks Basic Mobility (PT)  -WK AM-PAC 6 Clicks Daily Activity (OT)  -AC           User Key  (r) = Recorded By, (t) = Taken By, (c) = Cosigned By    Initials Name Provider Type    AC Andrea Mitchell, OTR/L, JONES Occupational Therapist    TS Talya Biggs COTA Occupational Therapist Assistant    WK Jennifer Olvera PTA Physical Therapist Assistant                 Time Calculation:    PT Charges     Row Name 06/17/22 0839             Time Calculation    Start Time 0805  -WK      Stop Time 0830  -WK      Time Calculation (min) 25 min  -WK      PT Received On 06/17/22  -WK              Time Calculation- PT    Total Timed Code Minutes- PT 25 minute(s)  -WK            User Key  (r) = Recorded By, (t) = Taken By, (c) = Cosigned By    Initials Name Provider Type    WK Jennifer Olvera PTA Physical Therapist Assistant              Therapy Charges for Today     Code Description Service Date Service Provider Modifiers Qty    05986437768 HC PT THER PROC EA 15 MIN 6/16/2022 Jennifer Olvera, SHANTANU GP 1    15570159001 HC GAIT TRAINING EA 15 MIN 6/17/2022 Jennifer Olvera PTA GP 1    61757417944 HC PT THER PROC EA 15 MIN 6/17/2022 Jennifer Olvera PTA GP 1          PT G-Codes  Outcome Measure Options: AM-PAC 6 Clicks Basic Mobility (PT)  AM-PAC 6 Clicks Score (PT): 15  AM-PAC 6 Clicks Score (OT): 15    Jennifer Olvera PTA  6/17/2022

## 2022-06-17 NOTE — DISCHARGE PLACEMENT REQUEST
"Annalisa Barrett (77 y.o. Female)             Date of Birth   1945    Social Security Number       Address   [address] [facility] [address]    Home Phone   [phone]    MRN   [id number]       Episcopalian   [facility]    Marital Status                               Admission Date   6/9/22    Admission Type   Emergency    Admitting Provider   Robb Stoll MD    Attending Provider   Robb Stoll MD    Department, Room/Bed   [facility] 3A, 337/1       Discharge Date       Discharge Disposition       Discharge Destination                               Attending Provider: Robb Stoll MD    Allergies: Penicillins, Sulfa Antibiotics    Isolation: None   Infection: COVID (History) (06/14/22)   Code Status: CPR   Advance Care Planning Activity    Ht: 152.4 cm (60\")   Wt: 86.3 kg (190 lb 4.1 oz)    Admission Cmt: None   Principal Problem: Acute encephalopathy [G93.40]                 Active Insurance as of 6/9/2022     Primary Coverage     Payor Plan Insurance Group Employer/Plan Group    MEDICARE MEDICARE A & B      Payor Plan Address Payor Plan Phone Number Payor Plan Fax Number Effective Dates    [address] [phone]  2/1/2008 - None Entered    [facility] [id number]       Subscriber Name Subscriber Birth Date Member ID       ANNALISA BARRETT 1945 [id number]           Secondary Coverage     Payor Plan Insurance Group Employer/Plan Group    [facility]      Payor Plan Address Payor Plan Phone Number Payor Plan Fax Number Effective Dates    [facility] [phone]  1/1/2016 - None Entered    [address]       [facility] [address]       Subscriber Name Subscriber Birth Date Member ID       ANNALISA BARRETT 1945 [id number]                 Emergency Contacts      (Rel.) Home Phone Work Phone Mobile Phone    Shantell Maynard (Daughter) -- [phone]            Insurance Information                " MEDICARE/MEDICARE A & B Phone: [phone]    Subscriber: Hanny Ivy Subscriber#: [id number]    Group#: -- Precert#: --        [facility] HEALTH CARE OPTIONS Phone: [phone]    Subscriber: Hanny Ivy Subscriber#: [id number]    Group#: -- Precert#: --             History & Physical      Otilio Brown MD at 06/09/22 2045              [facility] Medicine Services  HISTORY AND PHYSICAL    Date of Admission: 6/9/2022  Primary Care Physician: Kun Gonzalez MD    Subjective     Chief Complaint: Mental status changes.    History of Present Illness  77 year old female with PMH of HTN, hypothyroidism, CKD that was recently diagnosed with COVID 19 and started on Paxlovid. Daughter states that the patient has been debilitated and confused. Tonight she appeared to be totally disoriented and this is a significant change from baseline.     The patient had an episode of tonic clonic activity once on medical floor with a post ictal change that lasted over 10 minutes. Her T max is 100.5 in the ER, she was about 99 during the episode. She has not had seizures in the past or CVA history.     Her daughter states that no medications have been changed recently, other than addition of Paxlovid. And she had taken her home medications as prescribed.     Review of Systems   Unable to perform ROS: Mental status change     Past Medical History:   Past Medical History:   Diagnosis Date   • Anxiety and depression    • Asthma     bronchioectasis   • Back pain     with nerve pain in legs   • Cataract    • Dermatochalasis of both upper eyelids    • GERD (gastroesophageal reflux disease)    • Hypertension    • Hypothyroidism     hypothyroidism   • Kidney disease    • Lipoma    • Osteoarthritis    • Visual field defect, unspecified      Past Surgical History:  Past Surgical History:   Procedure Laterality Date   • ANKLE SURGERY     • APPENDECTOMY     • BRONCHOSCOPY     • CERVICAL  FUSION     • CHOLECYSTECTOMY     • EXCISION MASS LEG Left 3/4/2019    Procedure: EXCISION LIPOMA - LEFT HIP;  Surgeon: Giulia Rodrigez MD;  Location:  [facility] OR;  Service: General   • HYSTERECTOMY     • OTHER SURGICAL HISTORY      thumb   • REPLACEMENT TOTAL KNEE     • TOTAL SHOULDER ARTHROPLASTY W/ DISTAL CLAVICLE EXCISION Right 3/6/2018    Procedure: RIGHT REVERSE TOTAL SHOULDER ARTHROPLASTY;  Surgeon: Imtiaz Lobato MD;  Location:  [facility] OR;  Service:      Social History:  reports that she has never smoked. She has never used smokeless tobacco. She reports that she does not drink alcohol and does not use drugs.    Family History: family history includes Cancer in her maternal grandmother; Heart attack in her father; Hypertension in her mother; Stroke in her mother.       Allergies:  Allergies   Allergen Reactions   • Penicillins Rash and Other (See Comments)     Whelps, itching   • Sulfa Antibiotics Rash and Other (See Comments)     Whelps,l itching       Medications:  Prior to Admission medications    Medication Sig Start Date End Date Taking? Authorizing Provider   acetaminophen (TYLENOL) 325 MG tablet Take 650 mg by mouth Every 6 (Six) Hours As Needed for Mild Pain .    Imelda Mcrae MD   amLODIPine (NORVASC) 5 MG tablet Take 5 mg by mouth Daily.    Imelda Mcrae MD   carvedilol (COREG) 25 MG tablet Take 25 mg by mouth 2 (Two) Times a Day. 9/20/21   Imelda Mcrae MD   cetirizine (zyrTEC) 10 MG tablet Take 10 mg by mouth Daily. 9/20/21   Imelda Mcrae MD   chlorthalidone (HYGROTON) 25 MG tablet Take 25 mg by mouth Daily. 9/20/21   Imelda Mcrae MD   ciprofloxacin (Cipro) 250 MG tablet 1 po q 24 hours for 7 days 12/3/21   Lexy Rodriguez APRN   CloNIDine (CATAPRES) 0.1 MG tablet Take 0.1 mg by mouth 2 (Two) Times a Day.    Imelda Mcrae MD   dexamethasone 0.5 MG/5ML elixir SWISH AND SPIT 5MLS BY MOUTH AND REPEAT FOUR TO FIVE TIMES A DAY 11/12/21    Imelda Mcrae MD   doxepin (SINEquan) 75 MG capsule Take 75 mg by mouth every night at bedtime. 9/20/21   Imelda Mcrae MD   Dulera 200-5 MCG/ACT inhaler INHALE TWO PUFFS INTO THE LUNGS TWICE DAILY 9/20/21   Imelda Mcrae MD   EPINEPHrine (EPIPEN) 0.3 MG/0.3ML solution auto-injector injection Inject 0.3 mg into the muscle as needed. Use as directed for allergic reaction    Imelda Mcrae MD   esomeprazole (nexIUM) 40 MG capsule Take 40 mg by mouth. 2/20/20   Imelda Mcrae MD   estradiol (ESTRACE) 0.1 MG/GM vaginal cream Insert 2 g into the vagina 2 (Two) Times a Week for 360 days. 3/28/22 3/23/23  Federico Moyer PA   ferrous sulfate 324 (65 Fe) MG tablet delayed-release EC tablet TAKE ONE TABLET BY MOUTH DAILY 8/6/18   Imelda Mcrae MD   fluticasone (FLONASE) 50 MCG/ACT nasal spray 2 sprays into the nostril(s) as directed by provider Daily.    Imelda Mcrae MD   folic acid (FOLVITE) 1 MG tablet Take 1 mg by mouth Daily.    Imelda Mcrae MD   gabapentin (NEURONTIN) 100 MG capsule Take 100 mg by mouth 3 (Three) Times a Day. 10/12/21   Imelda Mcrae MD   guaiFENesin (MUCINEX) 600 MG 12 hr tablet Take 1,200 mg by mouth. 8/22/19   Imelda Mcrae MD   HYDROcodone-acetaminophen (NORCO) 7.5-325 MG per tablet Take 1 tablet by mouth Every 4 (Four) Hours As Needed for Moderate Pain  (Pain). 3/4/19   Giulia Rodrigez MD   ipratropium-albuterol (DUO-NEB) 0.5-2.5 mg/3 ml nebulizer Inhale 3 mL. 7/5/19   Imelda Mcrae MD   levothyroxine (SYNTHROID, LEVOTHROID) 100 MCG tablet Take 1 tablet by mouth Q Morning 6/9/16   Imelda Mcrae MD   LORazepam (ATIVAN) 0.5 MG tablet Take 0.5 mg by mouth 3 (Three) Times a Day As Needed. for anxiety 9/20/21   Imelda Mcrae MD   mometasone (NASONEX) 50 MCG/ACT nasal spray 2 sprays into each nostril Daily.    Provider, MD Imelda   nitrofurantoin (MACRODANTIN) 50 MG capsule Take 1 capsule by  mouth Every Night. 1/28/22   Federico Moyer PA   olmesartan (BENICAR) 40 MG tablet Take 40 mg by mouth Daily.    Imelda Mcrae MD   Omega-3 Fatty Acids (FISH OIL) 1000 MG capsule capsule Take  by mouth Daily With Breakfast.    Imelda Mcrae MD   oxybutynin XL (Ditropan XL) 5 MG 24 hr tablet 1 po daily 11/4/21   Leyx Rodriguez APRN   polyethylene glycol (MIRALAX) powder Take 17 g by mouth As Needed.    Imelda Mcrae MD   prednisoLONE acetate (PRED FORTE) 1 % ophthalmic suspension  11/2/21   Imelda Mcrae MD   senna (SENOKOT) 8.6 MG tablet Take 2 tablets by mouth.    Imelda Mcrae MD   simvastatin (ZOCOR) 20 MG tablet simvastatin 20 mg tablet 2/12/18   Imelda Mcrae MD   sodium chloride 0.65 % nasal spray 1 spray into the nostril(s) as directed by provider As Needed for Congestion.    Imelda Mcrae MD   sucralfate (CARAFATE) 1 GM/10ML suspension Take 1 g by mouth 4 (Four) Times a Day.    Imelda Mcrae MD   venlafaxine XR (EFFEXOR-XR) 150 MG 24 hr capsule Take 150 mg by mouth. 1/15/20   Imelda Mcrae MD         Objective       Results Reviewed:  Lab Results (last 24 hours)     Procedure Component Value Units Date/Time    Blood Culture - Blood, Arm, Left [[id number]] Collected: 06/09/22 1725    Specimen: Blood from Arm, Left Updated: 06/09/22 1820    Blood Culture - Blood, Wrist, Right [[id number]] Collected: 06/09/22 1729    Specimen: Blood from Wrist, Right Updated: 06/09/22 1820    Procalcitonin [[id number]]  (Abnormal) Collected: 06/09/22 1729    Specimen: Blood Updated: 06/09/22 1808     Procalcitonin 0.33 ng/mL             COVID-19,[facility] IN-HOUSE,Nasal Swab No Transport Media 3-4 HR TAT - Swab, Nasal Cavity [[id number]]  (Abnormal) Collected: 06/09/22 1657    Specimen: Swab from Nasal Cavity Updated: 06/09/22 1805     COVID19 Detected            Comprehensive Metabolic Panel [[id number]]  (Abnormal) Collected: 06/09/22 7608     Specimen: Blood Updated: 06/09/22 1804     Glucose 92 mg/dL      BUN 25 mg/dL      Creatinine 1.34 mg/dL      Sodium 138 mmol/L      Potassium 3.9 mmol/L      Chloride 102 mmol/L      CO2 24.0 mmol/L      Calcium 9.1 mg/dL      Total Protein 8.0 g/dL      Albumin 4.30 g/dL      ALT (SGPT) 23 U/L      AST (SGOT) 34 U/L      Alkaline Phosphatase 76 U/L      Total Bilirubin 0.2 mg/dL      Globulin 3.7 gm/dL      A/G Ratio 1.2 g/dL      BUN/Creatinine Ratio 18.7     Anion Gap 12.0 mmol/L      eGFR 40.4 mL/min/1.73      Comment: National Kidney Foundation and American Society of Nephrology (ASN) Task Force recommended calculation based on the Chronic Kidney Disease Epidemiology Collaboration (CKD-EPI) equation refit without adjustment for race.       Narrative:      GFR Normal >60  Chronic Kidney Disease <60  Kidney Failure <15      Lactic Acid, Plasma [[id number]]  (Normal) Collected: 06/09/22 1729    Specimen: Blood Updated: 06/09/22 1802     Lactate 0.8 mmol/L     Troponin [[id number]]  (Normal) Collected: 06/09/22 1729    Specimen: Blood Updated: 06/09/22 1801     Troponin T <0.010 ng/mL     Narrative:      Troponin T Reference Range:  <= 0.03 ng/mL-   Negative for AMI  >0.03 ng/mL-     Abnormal for myocardial necrosis.  Clinicians would have to utilize clinical acumen, EKG, Troponin and serial changes to determine if it is an Acute Myocardial Infarction or myocardial injury due to an underlying chronic condition.       Results may be falsely decreased if patient taking Biotin.      BNP [[id number]]  (Normal) Collected: 06/09/22 1729    Specimen: Blood Updated: 06/09/22 1801     proBNP 531.9 pg/mL     Narrative:      Among patients with dyspnea, NT-proBNP is highly sensitive for the detection of acute congestive heart failure. In addition NT-proBNP of <300 pg/ml effectively rules out acute congestive heart failure with 99% negative predictive value.    Results may be falsely decreased if patient taking Biotin.       D-dimer, Quantitative [[id number]]  (Abnormal) Collected: 06/09/22 1729    Specimen: Blood Updated: 06/09/22 1752     D-Dimer, Quantitative 1.03 MCGFEU/mL     Narrative:      Reference Range is 0-0.50 MCGFEU/mL. However, results <0.50 MCGFEU/mL tends to rule out DVT or PE. Results >0.50 MCGFEU/mL are not useful in predicting absence or presence of DVT or PE.      Urinalysis With Culture If Indicated - Urine, Catheter [[id number]]  (Abnormal) Collected: 06/09/22 1738    Specimen: Urine, Catheter Updated: 06/09/22 1748     Color, UA Yellow     Appearance, UA Clear     pH, UA <=5.0     Specific Gravity, UA 1.016     Glucose, UA Negative     Ketones, UA Negative     Bilirubin, UA Negative     Blood, UA Negative     Protein, UA 30 mg/dL (1+)     Leuk Esterase, UA Negative     Nitrite, UA Negative     Urobilinogen, UA 0.2 E.U./dL    Narrative:      In absence of clinical symptoms, the presence of pyuria, bacteria, and/or nitrites on the urinalysis result does not correlate with infection.    Urinalysis, Microscopic Only - Urine, Catheter [[id number]]  (Abnormal) Collected: 06/09/22 1738    Specimen: Urine, Catheter Updated: 06/09/22 1748     RBC, UA 0-2 /HPF      WBC, UA None Seen /HPF      Comment: Urine culture not indicated.        Bacteria, UA None Seen /HPF      Squamous Epithelial Cells, UA None Seen /HPF      Hyaline Casts, UA 0-2 /LPF      Methodology Automated Microscopy    CBC & Differential [[id number]]  (Abnormal) Collected: 06/09/22 1729    Specimen: Blood Updated: 06/09/22 1741    Narrative:      The following orders were created for panel order CBC & Differential.  Procedure                               Abnormality         Status                     ---------                               -----------         ------                     CBC Auto Differential[[id number]]        Abnormal            Final result                 Please view results for these tests on the individual orders.    CBC Auto Differential  [[id number]]  (Abnormal) Collected: 06/09/22 1729    Specimen: Blood Updated: 06/09/22 1741     WBC 6.41 10*3/mm3      RBC 4.35 10*6/mm3      Hemoglobin 13.4 g/dL      Hematocrit 40.6 %      MCV 93.3 fL      MCH 30.8 pg      MCHC 33.0 g/dL      RDW 12.7 %      RDW-SD 43.8 fl      MPV 9.5 fL      Platelets 190 10*3/mm3      Neutrophil % 63.3 %      Lymphocyte % 22.9 %      Monocyte % 12.8 %      Eosinophil % 0.5 %      Basophil % 0.3 %      Immature Grans % 0.2 %      Neutrophils, Absolute 4.06 10*3/mm3      Lymphocytes, Absolute 1.47 10*3/mm3      Monocytes, Absolute 0.82 10*3/mm3      Eosinophils, Absolute 0.03 10*3/mm3      Basophils, Absolute 0.02 10*3/mm3      Immature Grans, Absolute 0.01 10*3/mm3      nRBC 0.0 /100 WBC     Blood Gas, Arterial - [[id number]]  (Abnormal) Collected: 06/09/22 1712    Specimen: Arterial Blood Updated: 06/09/22 1713     Site Right Brachial     Efren's Test N/A     pH, Arterial 7.452 pH units      Comment: 83 Value above reference range        pCO2, Arterial 31.2 mm Hg      Comment: 84 Value below reference range        pO2, Arterial 61.6 mm Hg      Comment: 84 Value below reference range        HCO3, Arterial 21.7 mmol/L      Base Excess, Arterial -1.4 mmol/L      Comment: 84 Value below reference range        O2 Saturation, Arterial 92.9 %      Comment: 84 Value below reference range        Temperature 37.0 C      Barometric Pressure for Blood Gas 749 mmHg      Modality Room Air     FIO2 21 %      Ventilator Mode NA     Collected by [id number]     Comment: Meter: [id number]     :  [id number]        pCO2, Temperature Corrected 31.2 mm Hg      pH, Temp Corrected 7.452 pH Units      pO2, Temperature Corrected 61.6 mm Hg         Imaging Results (Last 24 Hours)     Procedure Component Value Units Date/Time    CT Cervical Spine Without Contrast [[id number]] Collected: 06/09/22 1823     Updated: 06/09/22 1834    Narrative:      EXAMINATION:  CT CERVICAL SPINE WO CONTRAST-  6/9/2022  6:02 PM CDT     HISTORY: The patient fell. Concern for neck injury.     COMPARISON: No comparison.      DOSE LENGTH PRODUCT: 301 mGy-cm. Automated exposure control was also  utilized to decrease patient radiation dose.     TECHNIQUE: Serial helical tomographic images of the cervical spine were  obtained without the use of intravenous contrast. Sagittal and coronal  reformatted images were also provided.     FINDINGS:     ALIGNMENT: The alignment is normal. There is pannus formation around the  dens with some erosive change of the dens.     BONES: There is no evidence of fracture. Vertebral body heights are  maintained.     DISC SPACES:     C2-3: The disc is fairly well-maintained. There is facet arthropathy  left greater than right. There is mild central disc bulge producing  minimal dural sac compression. There is mild foraminal narrowing on the  left.     C3-4: The disc is fairly well-maintained. There is spondylitic and  uncinate spurring. There is facet arthropathy left much greater than  right. There is severe left and moderate to severe right-sided foraminal  narrowing. There is mild narrowing of the central canal.     C4-5: There is severe disc narrowing with spondylitic and uncinate  spurring. There is facet arthropathy. There is severe right and mild to  moderate left-sided foraminal narrowing. No significant central spinal  canal stenosis.     C5-6: Prior interbody fusion with anterior plate fixation. There appears  to be solid fusion of the disc. There is mild spondylitic spurring.  There is mild facet arthropathy. There is no significant spinal or  foraminal stenosis.     C6-7: There has been prior interbody fusion with anterior plate  fixation. There appears to be good solid fusion of the disc. The facet  joints are maintained. There is no spinal or foraminal stenosis.     C7-T1: There is severe disc narrowing. There is spondylitic and uncinate  spurring. The left-sided facet joint is fused. There has  "been prior  posterior instrumented fusion on the right. There is no central spinal  canal stenosis. There is at least moderate bilateral foraminal stenosis.     T1-2: There is mild disc narrowing. There is anterior syndesmophyte  formation. There is mild facet arthropathy. There is mild foraminal  narrowing bilaterally.     T2-3: There is disc narrowing with anterior spurring. There is mild  facet arthropathy. There is mild foraminal narrowing bilaterally.     OTHER FINDINGS: There is abnormal opacification in the left lung apex.  There is carotid artery calcification in the neck bilaterally.       Impression:      1. No evidence of cervical spine fracture.  2. Postoperative and degenerative changes of the cervical spine and  upper thoracic spine, as described.  3. Abnormal opacification at the left lung apex. This area is not fully  evaluated on this study. This may be infectious or inflammatory.  Neoplasm is also considered. Follow-up nonemergent chest CT recommended.  Marked as \"new lung findings\" in PACS for follow-up.  4. Carotid artery calcification in the neck bilaterally.     The full report of this exam was immediately signed and available to the  emergency room. The patient is currently in the emergency room.        This report was finalized on 06/09/2022 18:31 by Dr. Jack Goldstein MD.    CT Head Without Contrast [[id number]] Collected: 06/09/22 1821     Updated: 06/09/22 1825    Narrative:      EXAMINATION:  CT HEAD WO CONTRAST-  6/9/2022 6:02 PM CDT     HISTORY: The patient fell. Rule out head injury.     TECHNIQUE: Multiple axial images were obtained through the brain without  contrast infusion. Multiplanar images were reconstructed.     DLP: 596 mGy-cm. Automated dosage reduction technique was utilized to  reduce patient dosage.     COMPARISON: No comparison study.     FINDINGS: There are no hemorrhage, edema or mass effect. There is mild  atrophy. There is mild associated prominence of the lateral " ventricles.  There is low density in the hemispheric white matter. The paranasal  sinuses are clear. The mastoid air cells are clear. No calvarial  fracture is seen. There is a partially empty appearance of the sella  turcica.       Impression:      1. No hemorrhage, edema or mass effect.  2. Mild atrophy with associated prominence of the lateral ventricles.  3. Low density in the hemispheric white matter is nonspecific and likely  due to chronic small vessel disease.  4. Partially empty appearance of the sella turcica.     The full report of this exam was immediately signed and available to the  emergency room. The patient is currently in the emergency room.     This report was finalized on 06/09/2022 18:22 by Dr. Jack Goldstein MD.    XR Chest 1 View [[id number]] Collected: 06/09/22 1743     Updated: 06/09/22 1747    Narrative:      EXAMINATION:  XR CHEST 1 VW-  6/9/2022 5:29 PM CDT     HISTORY: Shortness of breath. Covid positive.     COMPARISON: 2/28/2019.     TECHNIQUE: Single view AP image.     FINDINGS:  There is hypoventilation with vascular crowding. There is  stable bronchial wall thickening. There is patchy infiltrate in the left  perihilar region and left lung base. There is a calcified granuloma on  the left. Heart size appears to be within normal limits. There is a port  catheter on the right that is new compared to the prior study. There has  been prior cervical fusion and right shoulder arthroplasty. There are  degenerative changes of the spine and left glenohumeral joint.       Impression:      1. Hypoventilation with vascular crowding.  2. Opacity in the left perihilar region and left lung base likely due to  atelectasis. Pneumonia less likely.  3. Stable bronchial wall thickening.        This report was finalized on 06/09/2022 17:44 by Dr. Jack Goldstein MD.        I have personally reviewed and interpreted the radiology studies and ECG obtained at time of admission.     Assessment / Plan      Assessment:   Active Hospital Problems    Diagnosis    • **Acute encephalopathy    • Seizure-like activity (HCC)    • COVID-19 virus infection    • Essential hypertension    • Generalized anxiety disorder    • Acquired hypothyroidism    • Hyperlipidemia      Plan:   Admitted initially to medical floor, but transferred to critical care after seizure activity  Vitals per protocol, neuro checks every 4 hours  Npo until alert and verified swallowing bedside or SLP if needed  IVF NS 50 cc/hour  Fall and seizure precautions, bed rest  Keppra 500 mg IVP BID  MRI brain in AM  Neurology consult in AM  Held home medications due to mental status changes on presentation    DVT prophylaxis > Lovenox 40 mg SQ daily    Code Status/Advanced Care Plan: Full    The patient's surrogate decision maker is see records.     I discussed my findings and recommendations with the daughter at bedside.    Estimated length of stay is over 2 midnights.     The patient was seen and examined by me on 06/09/2022 at 2045 and 2315.    Electronically signed by Otilio Brown MD, 06/09/22, 23:45 CDT.              Electronically signed by Otilio Brown MD at 06/10/22 0708       Vital Signs (last day)     Date/Time Temp Temp src Pulse Resp BP Patient Position SpO2    06/17/22 0734 -- -- 128 -- -- -- 96    06/17/22 0627 97.4 (36.3) Oral 92 18 149/82 Lying 94    06/17/22 0012 98.3 (36.8) Oral 93 18 156/93 Lying 95    06/16/22 2012 98.1 (36.7) Oral 124 18 150/85 Lying 97    06/16/22 1902 -- -- 104 17 -- -- 95    06/16/22 1600 97.6 (36.4) Oral 107 18 142/76 Lying 96    06/16/22 1044 97.9 (36.6) Oral 111 18 105/82 Sitting 94    06/16/22 0749 97.5 (36.4) Oral 103 20 107/65 Lying 97    06/16/22 0708 -- -- 100 20 -- -- 95    06/16/22 0500 97.4 (36.3) Oral 97 20 157/79 Lying 95             Operative/Procedure Notes (last 7 days)      Robb Stoll MD at 06/12/22 1241      Procedure Orders    1. Lumbar Puncture [[id number]] ordered by  "Robb Stoll MD           Post-procedure Diagnoses    1. Acute encephalopathy [G93.40]             Lumbar Puncture    Date/Time: 6/12/2022 12:42 PM  Performed by: Robb Stoll MD  Authorized by: Robb Stoll MD   Consent: Verbal consent obtained. Written consent obtained.  Risks and benefits: risks, benefits and alternatives were discussed  Consent given by: power of   Patient understanding: patient states understanding of the procedure being performed  Patient consent: the patient's understanding of the procedure matches consent given  Procedure consent: procedure consent matches procedure scheduled  Relevant documents: relevant documents present and verified  Test results: test results available and properly labeled  Required items: required blood products, implants, devices, and special equipment available  Patient identity confirmed: anonymous protocol, patient vented/unresponsive  Time out: Immediately prior to procedure a \"time out\" was called to verify the correct patient, procedure, equipment, support staff and site/side marked as required.  Indications: evaluation for altered mental status and evaluation for infection    Anesthesia:  Local Anesthetic: lidocaine 1% without epinephrine    Sedation:  Patient sedated: Gave a 1 mg of ativan.    Preparation: Patient was prepped and draped in the usual sterile fashion.  Lumbar space: L4-L5 interspace  Patient's position: right lateral decubitus  Needle gauge: 18  Needle type: spinal needle - Quincke tip  Needle length: 3.5 in  Number of attempts: 5 or more  Fluid appearance: clear, blood-tinged and blood-tinged then clearing  Tubes of fluid: 4  Total volume: 18 ml  Post-procedure: adhesive bandage applied  Patient tolerance: patient tolerated the procedure well with no immediate complications          Electronically signed by Robb Stoll MD at 06/12/22 5935          Physician Progress Notes (last 24 hours)      Robb Stoll " "MD Jamar at 06/16/22 1552              [facility]  INPATIENT PROGRESS NOTE    Patient Name: Hanny Ivy  Date of Admission: 6/9/2022  Today's Date: 06/16/22  Length of Stay: 7  Primary Care Physician: Kun Gonzalez MD    Subjective   Chief Complaint: confusion    HPI:    Patient much better.  She actually asked the nurse if she is \"over COVID\" yet.  Patient knew she was in the hospital.  She could tell me her name and birthday.  Continues to improve but still very weak and easily fatigued.    Review of Systems   Constitutional: Positive for fatigue. Negative for activity change, chills and fever.   Respiratory: Negative for cough and shortness of breath.    Cardiovascular: Negative for chest pain and palpitations.   Gastrointestinal: Negative for abdominal distention, abdominal pain, diarrhea, nausea and vomiting.   Neurological: Positive for tremors and weakness.        All pertinent negatives and positives are as above. All other systems have been reviewed and are negative unless otherwise stated.     Objective    Temp:  [97.4 °F (36.3 °C)-98.6 °F (37 °C)] 97.9 °F (36.6 °C)  Heart Rate:  [] 111  Resp:  [16-20] 18  BP: (105-176)/(65-84) 105/82  Physical Exam  Vitals and nursing note reviewed.   Constitutional:       Appearance: She is obese.   HENT:      Head: Normocephalic and atraumatic.      Mouth/Throat:      Mouth: Mucous membranes are dry.      Pharynx: Oropharynx is clear.   Eyes:      General: No scleral icterus.     Conjunctiva/sclera: Conjunctivae normal.   Cardiovascular:      Rate and Rhythm: Normal rate and regular rhythm.      Heart sounds: No murmur heard.  Abdominal:      General: Abdomen is flat. Bowel sounds are normal. There is no distension.      Palpations: Abdomen is soft. There is no mass.   Skin:     General: Skin is warm and dry.      Coloration: Skin is pale.   Neurological:      General: No focal deficit present.      Mental " Status: She is alert. She is disoriented.      Comments: Oriented x 2 and to situation; did not get year correct   Psychiatric:         Mood and Affect: Mood normal.         Behavior: Behavior normal.             Results Review:  I have reviewed the labs, radiology results, and diagnostic studies.    Laboratory Data:   Results from last 7 days   Lab Units 06/14/22  0650 06/12/22  0426 06/11/22  0655   WBC 10*3/mm3 13.35* 5.76 3.75   HEMOGLOBIN g/dL 14.1 12.5 12.3   HEMATOCRIT % 42.0 37.3 36.4   PLATELETS 10*3/mm3 329 195 153        Results from last 7 days   Lab Units 06/16/22  0550 06/14/22  0649 06/12/22  0426   SODIUM mmol/L 145 143 140   POTASSIUM mmol/L 3.5 3.7 3.9   CHLORIDE mmol/L 106 104 106   CO2 mmol/L 25.0 25.0 22.0   BUN mg/dL 29* 24* 23   CREATININE mg/dL 0.96 0.97 0.80   CALCIUM mg/dL 10.0 9.6 9.0   BILIRUBIN mg/dL 0.5 0.4 0.2   ALK PHOS U/L 77 72 61   ALT (SGPT) U/L 48* 32 18   AST (SGOT) U/L 41* 38* 22   GLUCOSE mg/dL 133* 101* 140*       Culture Data:   Blood Culture   Date Value Ref Range Status   06/09/2022 No growth at 4 days  Preliminary   06/09/2022 No growth at 4 days  Preliminary       Radiology Data:   Imaging Results (Last 24 Hours)     ** No results found for the last 24 hours. **          I have reviewed the patient's current medications.     Assessment/Plan     Active Hospital Problems    Diagnosis    • **Acute encephalopathy    • Seizure-like activity (HCC)    • COVID-19 virus infection    • Essential hypertension    • Generalized anxiety disorder    • Acquired hypothyroidism    • Hyperlipidemia        Plan:  Continue seizure precautions    Appreciate neurology input.      LP performed at bedside 6/12.  Results unremarkable.      MRI showed no acute findings.  EEG no seizure activity but diffuse slowing.    Magnesium replacement.  Serum magnesium ordered.  Monitor closely.    Incentive spirometer.    Resume lovenox    Dexamethasone 6 mg elvin initially, discontinued as patient has no O2  needs and can contribute to some of her confusion.  Was on paxlovid as an outpatient.  Patient removed from precautions.  Per infection control, the patient was exposed 6/2, started having symptoms on that weekend, positive home test on 6/6 and started paxlovid.  Infection control gave approval to remove from precautions on 6/14.    Blood culture NGTD.    PT/OT    SLP following, evaluated at bedside with Kaylee BURRIS    Patient received 5 days of ceftriaxone treatment for possible UTI.      Palliative care consultation, appreciate assistance     for placement                   Discharge Planning: I expect the patient to be discharged to SNF, medically ready.    Electronically signed by Robb Stoll MD, 06/16/22, 15:52 CDT.    Electronically signed by Robb Stoll MD at 06/16/22 2027     Little Rios APRN at 06/16/22 1130          Palliative Care Daily Progress Note     Code Status and Medical Interventions:   Ordered at: 06/09/22 2048     Code Status (Patient has no pulse and is not breathing):    CPR (Attempt to Resuscitate)     Medical Interventions (Patient has pulse or is breathing):    Full Support     Subjective   Chief complaint: goals of care/advanced care planning.    Medical record reviewed. Events noted. Labs completed this AM reveal AST remains elevated at 41 (38 on 6/14/2022) and ALT is now elevated. BUN slightly elevated at 29 however creatinine and GFR normalized. She is alert, lying in bed in no apparent distress. Able to communicate without as much of delay today and stated her name and place correctly. Stated she was in the hospital for COVID however unsure of other events regarding situation. She wasn't able to state the year however able to state the correct president. No visitors currently at bedside.     Advance Care Planning   Advanced Directives: Patient has an advanced directive on file.     Advance Care Planning Discussion: Spoke with patient's daughter, Shantell. Reports  "she was not able to visit last night however her niece did and shared she was speaking better. Shantell stated she is pleased to hear she is communicating better each day and plans to visit with her this afternoon. Discussed goals of care further to determine if she had any questions and/or concerns with medical priorities, treatment and/or discharge options. Shantell reflected on past illnesses her mother has overcame and stated, \"I am not going to make her a DNR yet especially if it is just something mild and she can recover quickly like she has during this hospitalization.\"  Shared she is aware of interventions and complications that can occur and knows her mother would not wish to be placed on ventilatory support long-term. Shantell states she works in the medical field and has a good understanding and would not have difficulty making decisions regarding interventions if need arises.     The patient receives support from her daughter and extended family.  POA/Healthcare Surrogate - Patient's children (Cheng Ivy, Shantell Maynard and John Ivy) are her healthcare surrogates.     Goals of care: Ongoing.    Review of Systems   Unable to perform ROS: mental status change     Pain Assessment  Nonverbal Indicators of Pain: moaning, activity pattern change, withdrawn, rubbing, other (see comments) (Patient will nod yes when asked if her head, neck, and knees are hurting.  Rubbing her knees.  She would nod no to all other areas.)  CPOT and PAINAD Scales: PAINAD (Pain Assessment in Advance Dementia Scale)  CPOT Facial Expression: 0-->relaxed, neutral  CPOT Body Movements: 0-->absence of movements  CPOT Muscle Tension: 0-->relaxed  Ventilator Compliance/Vocalization: 0-->talking in normal tone or no sound  CPOT Score: 0  PAINAD Breathin-->normal  PAINAD Negative Vocalization: 1-->occasional moan/groan, low speech, negative/disapproving quality  PAINAD Facial Expression: 0-->smiling or inexpressive  PAINAD Body Language: " 0-->relaxed  PAINAD Consolability: 1-->distracted or reassured by voice/touch  PAINAD Score: 2  Pain Location: chest  Pain Description: other (see comments) (MARGARET)    Objective   Diagnostics: Reviewed      Intake/Output Summary (Last 24 hours) at 6/16/2022 1246  Last data filed at 6/16/2022 0800  Gross per 24 hour   Intake 100 ml   Output --   Net 100 ml       Impression/Problem List:  1.   Altered mental status /  Acute encephalopathy  2.   Abnormal MRI of brain - Cerebral atrophy & chronic microvascular disease  3.   COVID-19 virus infection  4.   Seizure-like activity  5.   Chronic kidney disease stage IV  6.   Depression   7.   Hypermagnesemia  8.   Transaminitis   9.    Impaired mobility  10.  Generalized anxiety disorder  11.  Decreased activities of daily living   12.  Dysphagia  13.  Leukocytosis  14.  Hypertension  15.  Hyperlipidemia  16.  Hypothyroidism    Plans/Recommendations     1. Goals of care include CODE STATUS CPR with full support interventions.    2. Palliative care encounter  - Prognosis is guarded long term secondary to AMS/acute encephalopathy, abnormal MRI of brain revealing cerebral atrophy and chronic microvascular disease, COVID-19 virus infection, seizure-like activity, difficulty communicating, CKD stage IV, depression, anxiety, impaired mobility and decreased ADLs, dysphagia and other comorbidities listed above    - Ability to communicate improving as she has less delay. Able to correctly state name and place and that she was hospitalized for COVID-19. Unable to verbalize other details from situation or year however stated correct president.     - Spoke with patient's daughter, Shantell and discussed goals of care further to determine if she had any questions and/or concerns with medical priorities, treatment and/or discharge options.     - Wishes to continue current measures with CODE STATUS as delineated above.     -  consult placed as family interested in short-term SNF placement for  further rehab.     - Thank you for allowing us to participate in patient's plan of care. Palliative Care Team will sign off. Please re-consult if needed.    Time spent:30 minutes spent reviewing medical and medication records, assessing and examining patient, discussing with family, answering questions, providing some guidance about a plan and documentation of care, and coordinating care with other healthcare members, with > 50% time spent face to face.     Little Rios, APRN  2022          Problem: Adult Inpatient Plan of Care  Goal: Plan of Care Review  Outcome: Ongoing, Progressing  Flowsheets (Taken 2022 1450)  Progress: improving  Plan of Care Reviewed With: patient  Outcome Evaluation: Pt refused bed mobility due to pain and just getting back in bed. Pt agreed to BLE and performed AROM BLE with verbal and tactile cues to continue to perform exercises. Pt is easily distracted.       Electronically signed by Jennifer Olvera PTA at 22 1451     Jennifer Olvera PTA at 22 1452  Version 1 of 1         Acute Care - Physical Therapy Treatment Note  [facility]     Patient Name: Hanny Ivy  : 1945  MRN: [id number]  Today's Date: 2022   Onset of Illness/Injury or Date of Surgery: 22  Visit Dx:     ICD-10-CM ICD-9-CM   1. Altered mental status, unspecified altered mental status type  R41.82 780.97   2. COVID-19  U07.1 079.89   3. Dysphagia, unspecified type  R13.10 787.20   4. Acute encephalopathy  G93.40 348.30   5. Impaired mobility  Z74.09 799.89   6. Decreased activities of daily living (ADL)  Z78.9 V49.89     Patient Active Problem List   Diagnosis   • Dermatochalasis of both upper eyelids   • Visual field defect, unspecified   • Rotator cuff arthropathy, right   • Pure hypercholesterolemia   • Essential hypertension   • Mild intermittent asthma without complication   • Generalized anxiety disorder   • Gastroesophageal reflux disease without esophagitis   • Acquired  hypothyroidism   • Anemia   • Chronic neck and back pain   • Gait abnormality   • Hyperlipidemia   • MVP (mitral valve prolapse)   • Pulmonary infiltrate in left lung on chest x-ray   • Seasonal allergies   • Traction bronchiectasis (HCC)   • History of recurrent UTIs   • Altered mental status, unspecified altered mental status type   • COVID-19 virus infection   • Acute encephalopathy   • Seizure-like activity (HCC)     Past Medical History:   Diagnosis Date   • Anxiety and depression    • Asthma     bronchioectasis   • Back pain     with nerve pain in legs   • Cataract    • Dermatochalasis of both upper eyelids    • GERD (gastroesophageal reflux disease)    • Hypertension    • Hypothyroidism     hypothyroidism   • Kidney disease    • Lipoma    • Osteoarthritis    • Visual field defect, unspecified      Past Surgical History:   Procedure Laterality Date   • ANKLE SURGERY     • APPENDECTOMY     • BRONCHOSCOPY     • CERVICAL FUSION     • CHOLECYSTECTOMY     • EXCISION MASS LEG Left 3/4/2019    Procedure: EXCISION LIPOMA - LEFT HIP;  Surgeon: Giulia Rodrigez MD;  Location: [facility] OR;  Service: General   • HYSTERECTOMY     • OTHER SURGICAL HISTORY      thumb   • REPLACEMENT TOTAL KNEE     • TOTAL SHOULDER ARTHROPLASTY W/ DISTAL CLAVICLE EXCISION Right 3/6/2018    Procedure: RIGHT REVERSE TOTAL SHOULDER ARTHROPLASTY;  Surgeon: Imtiaz Lobato MD;  Location: [facility] OR;  Service:      PT Assessment (last 12 hours)     PT Evaluation and Treatment     Row Name 06/16/22 1434          Physical Therapy Time and Intention    Subjective Information complains of;pain  R shoulder, did not rate pain  -WK     Document Type therapy note (daily note)  -WK     Mode of Treatment physical therapy  -WK     Comment easily distracted  -WK     Row Name 06/16/22 1432          General Information    Existing Precautions/Restrictions fall  -WK     Row Name 06/16/22 1439          Bed Mobility    Comment, (Bed Mobility) refused due to pain   -WK     Row Name 06/16/22 1434          Motor Skills    Therapeutic Exercise aerobic  -WK     Row Name 06/16/22 1434          Aerobic Exercise    Comment, Aerobic Exercise (Therapeutic Exercise) AROM BLE 20 reps  with constant verbal and tactile cues to continue to perform exercise  -WK     Row Name 06/16/22 1434          Positioning and Restraints    Pre-Treatment Position in bed  -WK     Post Treatment Position bed  -WK     In Bed fowlers;call light within reach;encouraged to call for assist;exit alarm on  -WK           User Key  (r) = Recorded By, (t) = Taken By, (c) = Cosigned By    Initials Name Provider Type    WK Jennifer Olvera PTA Physical Therapist Assistant                Physical Therapy Education                 Title: PT OT SLP Therapies (In Progress)     Topic: Physical Therapy (In Progress)     Point: Mobility training (In Progress)     Learning Progress Summary           Patient Acceptance, E,D, NL by  at 6/14/2022 1156    Comment: benefits of PT and POC, call for A OOB                   Point: Home exercise program (In Progress)     Learning Progress Summary           Patient Acceptance, E,D, NL by  at 6/14/2022 1156    Comment: benefits of PT and POC, call for A OOB                   Point: Precautions (In Progress)     Learning Progress Summary           Patient Acceptance, E,D, NL by  at 6/14/2022 1156    Comment: benefits of PT and POC, call for A OOB                               User Key     Initials Effective Dates Name Provider Type [facility] 06/16/21 -  Toribio Valle D, PT Physical Therapist PT              PT Recommendation and Plan     Plan of Care Reviewed With: patient  Progress: improving  Outcome Evaluation: Pt refused bed mobility due to pain and just getting back in bed. Pt agreed to BLE and performed AROM BLE with verbal and tactile cues to continue to perform exercises. Pt is easily distracted.   Outcome Measures     [facility] Name 06/15/22 1230 06/15/22 0843 06/14/22  1419       How much help from another person do you currently need...    Turning from your back to your side while in flat bed without using bedrails? -- 3  -WK --    Moving from lying on back to sitting on the side of a flat bed without bedrails? -- 2  -WK --    Moving to and from a bed to a chair (including a wheelchair)? -- 2  -WK --    Standing up from a chair using your arms (e.g., wheelchair, bedside chair)? -- 2  -WK --    Climbing 3-5 steps with a railing? -- 1  -WK --    To walk in hospital room? -- 2  -WK --    AM-PAC 6 Clicks Score (PT) -- 12  -WK --       How much help from another is currently needed...    Putting on and taking off regular lower body clothing? 2  -AC -- 1  -AC    Bathing (including washing, rinsing, and drying) 2  -AC -- 1  -AC    Toileting (which includes using toilet bed pan or urinal) 2  -AC -- 2  -AC    Putting on and taking off regular upper body clothing 2  -AC -- 2  -AC    Taking care of personal grooming (such as brushing teeth) 2  -AC -- 2  -AC    Eating meals 2  -AC -- 2  -AC    AM-PAC 6 Clicks Score (OT) 12  -AC -- 10  -AC       Functional Assessment    Outcome Measure Options AM-PAC 6 Clicks Daily Activity (OT)  -AC AM-PAC 6 Clicks Basic Mobility (PT)  -WK AM-PAC 6 Clicks Daily Activity (OT)  -AC          User Key  (r) = Recorded By, (t) = Taken By, (c) = Cosigned By    Initials Name Provider Type    AC Andrea Mitchell, OTR/L, CNT Occupational Therapist    WK Jennifer Olvera, SHANTANU Physical Therapist Assistant                 Time Calculation:    PT Charges     Row Name 06/16/22 1451             Time Calculation    Start Time 1434  -WK      Stop Time 1448  -WK      Time Calculation (min) 14 min  -WK      PT Received On 06/16/22  -WK              Time Calculation- PT    Total Timed Code Minutes- PT 14 minute(s)  -WK            User Key  (r) = Recorded By, (t) = Taken By, (c) = Cosigned By    Initials Name Provider Type    WK Jennifer Olvera, SHANTANU Physical Therapist Assistant               Therapy Charges for Today     Code Description Service Date Service Provider Modifiers Qty    65922621794  PT THERAPEUTIC ACT EA 15 MIN 6/15/2022 Jennifer Olvera PTA GP 3    98880512965 HC PT THER PROC EA 15 MIN 2022 Jennifer Olvera PTA GP 1          PT G-Codes  Outcome Measure Options: AM-PAC 6 Clicks Daily Activity (OT)  AM-PAC 6 Clicks Score (PT): 12  AM-PAC 6 Clicks Score (OT): 12    Jennifer Olvera PTA  2022      Electronically signed by Jennifer Olvera PTA at 22 1452     Jennifer Olvera PTA at 22 0838  Version 1 of 1         Problem: Adult Inpatient Plan of Care  Goal: Plan of Care Review  Outcome: Ongoing, Progressing  Flowsheets (Taken 2022 0836)  Progress: improving  Plan of Care Reviewed With: patient  Outcome Evaluation: Pt performed bed mobility and transfer with min A. Pt amb 5' Kayla with RW with cues for safety and to progress. Pt fatigued quickly and was unable to amb further. Pt is able to follow more cues today but continues to need cues on progress BLE during gait. Recommend SNF at discharge.       Electronically signed by Jennifer Olvera PTA at 22 0839     Jennifer Olvera PTA at 22 0840  Version 1 of          Acute Care - Physical Therapy Treatment Note   [facility]     Patient Name: Hanny Ivy  : 1945  MRN: [id number]  Today's Date: 2022   Onset of Illness/Injury or Date of Surgery: 22  Visit Dx:     ICD-10-CM ICD-9-CM   1. Altered mental status, unspecified altered mental status type  R41.82 780.97   2. COVID-19  U07.1 079.89   3. Dysphagia, unspecified type  R13.10 787.20   4. Acute encephalopathy  G93.40 348.30   5. Impaired mobility  Z74.09 799.89   6. Decreased activities of daily living (ADL)  Z78.9 V49.89     Patient Active Problem List   Diagnosis   • Dermatochalasis of both upper eyelids   • Visual field defect, unspecified   • Rotator cuff arthropathy, right   • Pure hypercholesterolemia   •  Essential hypertension   • Mild intermittent asthma without complication   • Generalized anxiety disorder   • Gastroesophageal reflux disease without esophagitis   • Acquired hypothyroidism   • Anemia   • Chronic neck and back pain   • Gait abnormality   • Hyperlipidemia   • MVP (mitral valve prolapse)   • Pulmonary infiltrate in left lung on chest x-ray   • Seasonal allergies   • Traction bronchiectasis (HCC)   • History of recurrent UTIs   • Altered mental status, unspecified altered mental status type   • COVID-19 virus infection   • Acute encephalopathy   • Seizure-like activity (HCC)     Past Medical History:   Diagnosis Date   • Anxiety and depression    • Asthma     bronchioectasis   • Back pain     with nerve pain in legs   • Cataract    • Dermatochalasis of both upper eyelids    • GERD (gastroesophageal reflux disease)    • Hypertension    • Hypothyroidism     hypothyroidism   • Kidney disease    • Lipoma    • Osteoarthritis    • Visual field defect, unspecified      Past Surgical History:   Procedure Laterality Date   • ANKLE SURGERY     • APPENDECTOMY     • BRONCHOSCOPY     • CERVICAL FUSION     • CHOLECYSTECTOMY     • EXCISION MASS LEG Left 3/4/2019    Procedure: EXCISION LIPOMA - LEFT HIP;  Surgeon: Giulia Rodrigez MD;  Location: [facility] OR;  Service: General   • HYSTERECTOMY     • OTHER SURGICAL HISTORY      thumb   • REPLACEMENT TOTAL KNEE     • TOTAL SHOULDER ARTHROPLASTY W/ DISTAL CLAVICLE EXCISION Right 3/6/2018    Procedure: RIGHT REVERSE TOTAL SHOULDER ARTHROPLASTY;  Surgeon: Imtiaz Lobato MD;  Location: [facility] OR;  Service:      PT Assessment (last 12 hours)     PT Evaluation and Treatment     Row Name 06/17/22 0805          Physical Therapy Time and Intention    Subjective Information complains of  sorenss in chest  -WK     Document Type therapy note (daily note)  -WK     Mode of Treatment physical therapy  -WK     Row Name 06/17/22 0805          General Information    Existing  Precautions/Restrictions fall  -WK     Row Name 06/17/22 0805          Bed Mobility    Supine-Sit Reliance (Bed Mobility) verbal cues;minimum assist (75% patient effort)  -WK     Row Name 06/17/22 0805          Transfers    Sit-Stand [place] (Transfers) verbal cues;contact guard  required cues prepare to stand  -WK     Stand-Sit [place] (Transfers) contact guard  -WK     Row Name 06/17/22 0805          Stand Pivot/Stand Step Transfer    Stand Pivot/Stand Step [place] (Transfers) verbal cues;minimum assist (75% patient effort)  -WK     Assistive Device (Stand Pivot Stand Step Transfer) walker, front-wheeled  -WK     Row Name 06/17/22 0805          Gait/Stairs (Locomotion)    Reliance Level (Gait) verbal cues;minimum assist (75% patient effort)  -WK     Assistive Device (Gait) walker, front-wheeled  -WK     Distance in Feet (Gait) 5  -WK     Deviations/Abnormal Patterns (Gait) base of support, narrow;gait speed decreased  -WK     Bilateral Gait Deviations heel strike decreased;forward flexed posture  -WK     Comment, (Gait/Stairs) continues to be distracted easily and need cues to refocus. needs increased time to process information  -WK     Row Name 06/17/22 0805          Motor Skills    Therapeutic Exercise aerobic  -WK     Row Name 06/17/22 0805          Aerobic Exercise    Comment, Aerobic Exercise (Therapeutic Exercise) AROM bLE 20 reps sitting  -WK     Row Name 06/17/22 0805          Positioning and Restraints    Pre-Treatment Position in bed  -WK     Post Treatment Position chair  -WK     In Chair reclined;call light within reach;encouraged to call for assist;exit alarm on;notified nsg  -WK           User Key  (r) = Recorded By, (t) = Taken By, (c) = Cosigned By    Initials Name Provider Type    WK Jennifer Olvera PTA Physical Therapist Assistant                Physical Therapy Education                 Title: PT OT SLP Therapies (In Progress)     Topic: Physical Therapy (In Progress)      Point: Mobility training (Done)     Learning Progress Summary           Patient Acceptance, E, VU,NR by  at 6/17/2022 0839    Comment: POC    Acceptance, E,D, NL by  at 6/14/2022 1156    Comment: benefits of PT and POC, call for A OOB                   Point: Home exercise program (In Progress)     Learning Progress Summary           Patient Acceptance, E,D, NL by  at 6/14/2022 1156    Comment: benefits of PT and POC, call for A OOB                   Point: Precautions (In Progress)     Learning Progress Summary           Patient Acceptance, E,D, NL by  at 6/14/2022 1156    Comment: benefits of PT and POC, call for A OOB                               User Key     Initials Effective Dates Name Provider Type Discipline     06/16/21 -  Toribio Valle, PT Physical Therapist PT     06/16/21 -  Jennifer Olvera, PTA Physical Therapist Assistant PT              PT Recommendation and Plan     Plan of Care Reviewed With: patient  Progress: improving  Outcome Evaluation: Pt performed bed mobility and transfer with min A. Pt amb 5' Kayla with RW with cues for safety and to progress. Pt fatigued quickly and was unable to amb further. Pt is able to follow more cues today but continues to need cues on progress BLE during gait. Recommend SNF at discharge.   Outcome Measures     Row Name 06/17/22 0805 06/16/22 1500 06/15/22 1230       How much help from another person do you currently need...    Turning from your back to your side while in flat bed without using bedrails? 3  -WK -- --    Moving from lying on back to sitting on the side of a flat bed without bedrails? 3  -WK -- --    Moving to and from a bed to a chair (including a wheelchair)? 3  -WK -- --    Standing up from a chair using your arms (e.g., wheelchair, bedside chair)? 3  -WK -- --    Climbing 3-5 steps with a railing? 1  -WK -- --    To walk in hospital room? 2  -WK -- --    AM-PAC 6 Clicks Score (PT) 15  -WK -- --       How much help from another is  currently needed...    Putting on and taking off regular lower body clothing? -- 2  -TS 2  -AC    Bathing (including washing, rinsing, and drying) -- 2  -TS 2  -AC    Toileting (which includes using toilet bed pan or urinal) -- 2  -TS 2  -AC    Putting on and taking off regular upper body clothing -- 3  -TS 2  -AC    Taking care of personal grooming (such as brushing teeth) -- 3  -TS 2  -AC    Eating meals -- 3  -TS 2  -AC    AM-PAC 6 Clicks Score (OT) -- 15  -TS 12  -AC       Functional Assessment    Outcome Measure Options AM-PAC 6 Clicks Basic Mobility (PT)  -WK -- AM-PAC 6 Clicks Daily Activity (OT)  -    Row Name 06/15/22 0843 06/14/22 1419          How much help from another person do you currently need...    Turning from your back to your side while in flat bed without using bedrails? 3  -WK --     Moving from lying on back to sitting on the side of a flat bed without bedrails? 2  -WK --     Moving to and from a bed to a chair (including a wheelchair)? 2  -WK --     Standing up from a chair using your arms (e.g., wheelchair, bedside chair)? 2  -WK --     Climbing 3-5 steps with a railing? 1  -WK --     To walk in hospital room? 2  -WK --     AM-PAC 6 Clicks Score (PT) 12  -WK --            How much help from another is currently needed...    Putting on and taking off regular lower body clothing? -- 1  -AC     Bathing (including washing, rinsing, and drying) -- 1  -AC     Toileting (which includes using toilet bed pan or urinal) -- 2  -AC     Putting on and taking off regular upper body clothing -- 2  -AC     Taking care of personal grooming (such as brushing teeth) -- 2  -AC     Eating meals -- 2  -AC     AM-PAC 6 Clicks Score (OT) -- 10  -AC            Functional Assessment    Outcome Measure Options AM-PAC 6 Clicks Basic Mobility (PT)  -WK AM-PAC 6 Clicks Daily Activity (OT)  -           User Key  (r) = Recorded By, (t) = Taken By, (c) = Cosigned By    Initials Name Provider Type    Andrea Oseguera,  OTR/L, CNT Occupational Therapist    Talya Paz COTA Occupational Therapist Assistant    WK Jennifer Olvera, SHANTANU Physical Therapist Assistant                 Time Calculation:    PT Charges     Row Name 22 0839             Time Calculation    Start Time 0805  -WK      Stop Time 0830  -WK      Time Calculation (min) 25 min  -WK      PT Received On 22  -WK              Time Calculation- PT    Total Timed Code Minutes- PT 25 minute(s)  -WK                                                                       Acute Care - Occupational Therapy Treatment Note  [facility]     Patient Name: Hanny Ivy              : 1945                      MRN: [id number]  Today's Date: 2022                   Onset of Illness/Injury or Date of Surgery: 22  Date of Referral to OT: 22                  Referring Physician: Dr. Brown                  Admit Date: 2022     Visit Diagnosis       ICD-10-CM ICD-9-CM   1. Altered mental status, unspecified altered mental status type  R41.82 780.97   2. COVID-19  U07.1 079.89   3. Dysphagia, unspecified type  R13.10 787.20   4. Acute encephalopathy  G93.40 348.30   5. Impaired mobility  Z74.09 799.89   6. Decreased activities of daily living (ADL)  Z78.9 V49.89         Problem List       Patient Active Problem List   Diagnosis   • Dermatochalasis of both upper eyelids   • Visual field defect, unspecified   • Rotator cuff arthropathy, right   • Pure hypercholesterolemia   • Essential hypertension   • Mild intermittent asthma without complication   • Generalized anxiety disorder   • Gastroesophageal reflux disease without esophagitis   • Acquired hypothyroidism   • Anemia   • Chronic neck and back pain   • Gait abnormality   • Hyperlipidemia   • MVP (mitral valve prolapse)   • Pulmonary infiltrate in left lung on chest x-ray   • Seasonal allergies   • Traction bronchiectasis (HCC)   • History of recurrent UTIs   • Altered mental status,  unspecified altered mental status type   • COVID-19 virus infection   • Acute encephalopathy   • Seizure-like activity (HCC)         Medical History        Past Medical History:   Diagnosis Date   • Anxiety and depression     • Asthma       bronchioectasis   • Back pain       with nerve pain in legs   • Cataract     • Dermatochalasis of both upper eyelids     • GERD (gastroesophageal reflux disease)     • Hypertension     • Hypothyroidism       hypothyroidism   • Kidney disease     • Lipoma     • Osteoarthritis     • Visual field defect, unspecified           Surgical History         Past Surgical History:   Procedure Laterality Date   • ANKLE SURGERY       • APPENDECTOMY       • BRONCHOSCOPY       • CERVICAL FUSION       • CHOLECYSTECTOMY       • EXCISION MASS LEG Left 3/4/2019     Procedure: EXCISION LIPOMA - LEFT HIP;  Surgeon: Giulia Rodrigez MD;  Location: [facility] OR;  Service: General   • HYSTERECTOMY       • OTHER SURGICAL HISTORY         thumb   • REPLACEMENT TOTAL KNEE       • TOTAL SHOULDER ARTHROPLASTY W/ DISTAL CLAVICLE EXCISION Right 3/6/2018     Procedure: RIGHT REVERSE TOTAL SHOULDER ARTHROPLASTY;  Surgeon: Imtiaz Lobato MD;  Location: [facility] OR;  Service:                          OT ASSESSMENT FLOWSHEET (last 12 hours)                OT Evaluation and Treatment                Row Name 06/16/22 0836                                     OT Time and Intention      Subjective Information complains of;fatigue  -TS              Document Type therapy note (daily note)  -TS              Mode of Treatment occupational therapy  -TS              Patient Effort good  -TS                                        General Information      Existing Precautions/Restrictions fall  -TS                                        Pain Assessment      Pretreatment Pain Rating 0/10 - no pain  -TS              Posttreatment Pain Rating 0/10 - no pain  -TS                                        Cognition      Orientation  Status (Cognition) oriented to;person;place  -TS              Follows Commands (Cognition) follows one-step commands;over 90% accuracy  -TS              Personal Safety Interventions fall prevention program maintained;nonskid shoes/slippers when out of bed;gait belt;elopement precautions initiated  -TS                                        Activities of Daily Living      BADL Assessment/Intervention grooming;feeding  -TS                                        Grooming Assessment/Training      [place] Level (Grooming) oral care regimen;set up;verbal cues  -TS              Position (Grooming) supported sitting  -TS              Comment, (Grooming) in recliner, pt required cues to continue task or return to task if she became distracted  -TS                                        Self-Feeding Assessment/Training      [place] Level (Feeding) liquids to mouth;feeding skills;scoop food and bring to mouth;supervision;verbal cues;prepare tray/open items;maximum assist (25% patient effort)  -TS              Position (Self-Feeding) edge of bed sitting;supported sitting  -TS              Comment, (Feeding) pt required increased time, cues to continue task if she became distracted. Pt was able to load fork with food and take bite  -TS                                        Bed Mobility      Supine-Sit [place] (Bed Mobility) contact guard  -TS              Assistive Device (Bed Mobility) bed rails;head of bed elevated  -TS                                        Transfer Assessment/Treatment      Transfers sit-stand transfer;stand-sit transfer;bed-chair transfer  -TS                                        Transfers      Bed-Chair [place] (Transfers) contact guard;minimum assist (75% patient effort);2 person assist  -TS              Sit-Stand [place] (Transfers) contact guard;minimum assist (75% patient effort)  -TS              Stand-Sit [place] (Transfers) contact guard;verbal cues  -TS                                         Bed-Chair Transfer      Comment, (Bed-Chair Transfer) HHA  -TS                                        Sit-Stand Transfer      Comment, (Sit-Stand Transfer) HHA  -TS                                        Stand-Sit Transfer      Comment, (Stand-Sit Transfer) HHA  -TS                                        Balance      Static Sitting Balance standby assist  -TS              Comment, Balance EOB x30 minutes  -TS                                        Plan of Care Review      Plan of Care Reviewed With patient  -TS              Progress improving  -TS              Outcome Evaluation Pt demonstrated increased alertness this AM. Pt participated appropriately in conversation but occasionally had to be redirected to task. Pt CGA to come to EOB. Pt transfers with HHA with CGA/min A. Pt would benefit from acute rehab/swing bed if she continues to improve steadily as pt was independent prior to admit  -TS                                        Positioning and Restraints      Pre-Treatment Position in bed  -TS              Post Treatment Position chair  -TS              In Chair reclined;call light within reach;encouraged to call for assist;exit alarm on  -TS                         Acute Care - Speech Language Pathology   Swallow Treatment Note  [facility]     Patient Name: Hanny Ivy  : 1945  MRN: [id number]  Today's Date: 2022  Onset of Illness/Injury or Date of Surgery: 22     Referring Physician: Dr. Brown      Admit Date: 2022  Swallow treatment completed. The patient was alert and cooperative. Sitting up in chair feeding herself cake and whipped cream when SLP entered room. She was orally holding most of the cake. Cued to swallow. Patient leaned her head over meal tray and cake fell onto tray. She reports she is fatigued and has back pain. RN and MD aware. She completed trials of puree, nectar thick, and thin liquids. Oral holding with all but thin liquids. Quick  oral transit with thin which likely represents poor oral control of the bolus. Significant coughing on 2/5 thin water trials. No overt s/s given nectar thick or puree. Patient to continue on soft solids with finger foods and nectar thick liquids. SLP recommends 1:1 assistance with all PO. Patient will require cues to swallow and alternate bites/sips. Ensure oral care is completed post meal to decrease risk of choking due to pocketed food. SLP will continue to follow.   Kaylee Rodriguez, MS CCC-SLP 6/16/2022 14:33 CDT    Visit Dx:     ICD-10-CM ICD-9-CM   1. Altered mental status, unspecified altered mental status type  R41.82 780.97   2. COVID-19  U07.1 079.89   3. Dysphagia, unspecified type  R13.10 787.20   4. Acute encephalopathy  G93.40 348.30   5. Impaired mobility  Z74.09 799.89   6. Decreased activities of daily living (ADL)  Z78.9 V49.89     Patient Active Problem List   Diagnosis   • Dermatochalasis of both upper eyelids   • Visual field defect, unspecified   • Rotator cuff arthropathy, right   • Pure hypercholesterolemia   • Essential hypertension   • Mild intermittent asthma without complication   • Generalized anxiety disorder   • Gastroesophageal reflux disease without esophagitis   • Acquired hypothyroidism   • Anemia   • Chronic neck and back pain   • Gait abnormality   • Hyperlipidemia   • MVP (mitral valve prolapse)   • Pulmonary infiltrate in left lung on chest x-ray   • Seasonal allergies   • Traction bronchiectasis (HCC)   • History of recurrent UTIs   • Altered mental status, unspecified altered mental status type   • COVID-19 virus infection   • Acute encephalopathy   • Seizure-like activity (HCC)     Past Medical History:   Diagnosis Date   • Anxiety and depression    • Asthma     bronchioectasis   • Back pain     with nerve pain in legs   • Cataract    • Dermatochalasis of both upper eyelids    • GERD (gastroesophageal reflux disease)    • Hypertension    • Hypothyroidism     hypothyroidism    • Kidney disease    • Lipoma    • Osteoarthritis    • Visual field defect, unspecified      Past Surgical History:   Procedure Laterality Date   • ANKLE SURGERY     • APPENDECTOMY     • BRONCHOSCOPY     • CERVICAL FUSION     • CHOLECYSTECTOMY     • EXCISION MASS LEG Left 3/4/2019    Procedure: EXCISION LIPOMA - LEFT HIP;  Surgeon: Giulia Rodrigez MD;  Location: [facility];  Service: General   • HYSTERECTOMY     • OTHER SURGICAL HISTORY      thumb   • REPLACEMENT TOTAL KNEE     • TOTAL SHOULDER ARTHROPLASTY W/ DISTAL CLAVICLE EXCISION Right 3/6/2018    Procedure: RIGHT REVERSE TOTAL SHOULDER ARTHROPLASTY;  Surgeon: Imtiaz Lobato MD;  Location: [facility] OR;  Service:        SLP Recommendation and Plan                                         Daily Summary of Progress (SLP): progress toward functional goals is gradual (06/16/22 1345)                  Plan for Continued Treatment (SLP): continue treatment per plan of care (06/16/22 1345)         Plan of Care Reviewed With: patient, caregiver  Progress: no change      SWALLOW EVALUATION (last 72 hours)     SLP Adult Swallow Evaluation     Row Name 06/16/22 1345 06/15/22 1255 06/14/22 1423             Rehab Evaluation    Document Type therapy note (daily note)  -MM therapy note (daily note)  -BN therapy note (daily note)  -BN      Subjective Information no complaints  -MM no complaints  -BN no complaints  -BN      Patient Observations alert;cooperative;agree to therapy  -MM alert;cooperative  -BN alert;cooperative  -BN      Patient/Family/Caregiver Comments/Observations No family present  -MM no family present  -BN pt sister arrived during tx session  -BN      Patient Effort adequate  -MM adequate  -BN adequate  -BN              Pain    Additional Documentation Pain Scale: FACES Pre/Post-Treatment (Group)  -MM -- --              Pain Scale: FACES Pre/Post-Treatment    Pain: FACES Scale, Pretreatment 0-->no hurt  -MM 0-->no hurt  -BN 0-->no hurt  -BN       Posttreatment Pain Rating 0-->no hurt  -MM 0-->no hurt  -BN 4-->hurts little more  -BN      Pre/Posttreatment Pain Comment -- -- pain with coughing  -BN              SLP Treatment Clinical Impressions    Daily Summary of Progress (SLP) progress toward functional goals is gradual  -MM progress toward functional goals is good  -BN progress toward functional goals is good  -BN      Barriers to Overall Progress (SLP) Cognitive status  -MM Cognitive status  -BN Cognitive status  -BN      Plan for Continued Treatment (SLP) continue treatment per plan of care  -MM continue treatment per plan of care  -BN continue treatment per plan of care  -BN      Care Plan Review risks/benefits reviewed;care plan/treatment goals reviewed  -MM care plan/treatment goals reviewed  -BN care plan/treatment goals reviewed  -BN      Care Plan Review, Other Participant(s) caregiver  -MM caregiver  -BN caregiver;family  -BN              Recommendations    SLP Diet Recommendation -- soft textures;other (see comments)  finger foods  -BN --              Swallow Goals (SLP)    Oral Nutrition/Hydration Goal Selection (SLP) oral nutrition/hydration, SLP goal 1  -MM oral nutrition/hydration, SLP goal 1  -BN oral nutrition/hydration, SLP goal 1  -BN      Lingual Strengthening Goal Selection (SLP) lingual strengthening, SLP goal 1  -MM lingual strengthening, SLP goal 1  -BN lingual strengthening, SLP goal 1  -BN      Additional Documentation lingual strengthening goal selection (SLP)  -MM lingual strengthening goal selection (SLP)  -BN lingual strengthening goal selection (SLP)  -BN              Oral Nutrition/Hydration Goal 1 (SLP)    Oral Nutrition/Hydration Goal 1, SLP Pt will tolerate LRD without overt s/s of aspiration.  -MM Pt will tolerate LRD without overt s/s of aspiration.  -BN Pt will tolerate LRD without overt s/s of aspiration.  -BN      Time Frame (Oral Nutrition/Hydration Goal 1, SLP) by discharge  -MM by discharge  -BN by discharge  -BN       Barriers (Oral Nutrition/Hydration Goal 1, SLP) cognition  -MM cognition  -BN cognition  -BN      Progress/Outcomes (Oral Nutrition/Hydration Goal 1, SLP) continuing progress toward goal  -MM continuing progress toward goal  -BN continuing progress toward goal  -BN              Lingual Strengthening Goal 1 (SLP)    Activity (Lingual Strengthening Goal 1, SLP) increase lingual tone/sensation/control/coordination/movement  -MM increase lingual tone/sensation/control/coordination/movement  -BN increase lingual tone/sensation/control/coordination/movement  -BN      Increase Lingual Tone/Sensation/Control/Coordination/Movement lingual movement exercises  -MM lingual movement exercises  -BN lingual movement exercises  -BN      [place]/Accuracy (Lingual Strengthening Goal 1, SLP) with minimal cues (75-90% accuracy)  -MM with minimal cues (75-90% accuracy)  -BN with minimal cues (75-90% accuracy)  -BN      Time Frame (Lingual Strengthening Goal 1, SLP) by discharge  -MM by discharge  -BN by discharge  -BN      Barriers (Lingual Strengthening Goal 1, SLP) cognition  -MM cognition  -BN cognition  -BN      Progress/Outcomes (Lingual Strengthening Goal 1, SLP) goal ongoing  -MM goal ongoing  -BN goal ongoing  -BN            User Key  (r) = Recorded By, (t) = Taken By, (c) = Cosigned By    Initials Name Effective Dates    Fatoumata Elizabeth, MS CCC-SLP 05/31/22 - 06/14/22    Fatoumata Elizabeth, MS-CCC/SLP, [facility] 06/15/22 -     MM Kaylee Rodriguez MS CCC-SLP 06/16/21 -                 EDUCATION  The patient has been educated in the following areas:   Dysphagia (Swallowing Impairment).        SLP GOALS     Row Name 06/16/22 1345 06/15/22 1255 06/14/22 1423       Oral Nutrition/Hydration Goal 1 (SLP)    Oral Nutrition/Hydration Goal 1, SLP Pt will tolerate LRD without overt s/s of aspiration.  -MM Pt will tolerate LRD without overt s/s of aspiration.  -BN Pt will tolerate LRD without overt s/s of aspiration.   -BN    Time Frame (Oral Nutrition/Hydration Goal 1, SLP) by discharge  -MM by discharge  -BN by discharge  -BN    Barriers (Oral Nutrition/Hydration Goal 1, SLP) cognition  -MM cognition  -BN cognition  -BN    Progress/Outcomes (Oral Nutrition/Hydration Goal 1, SLP) continuing progress toward goal  -MM continuing progress toward goal  -BN continuing progress toward goal  -BN       Lingual Strengthening Goal 1 (SLP)    Activity (Lingual Strengthening Goal 1, SLP) increase lingual tone/sensation/control/coordination/movement  -MM increase lingual tone/sensation/control/coordination/movement  -BN increase lingual tone/sensation/control/coordination/movement  -BN    Increase Lingual Tone/Sensation/Control/Coordination/Movement lingual movement exercises  -MM lingual movement exercises  -BN lingual movement exercises  -BN    [place]/Accuracy (Lingual Strengthening Goal 1, SLP) with minimal cues (75-90% accuracy)  -MM with minimal cues (75-90% accuracy)  -BN with minimal cues (75-90% accuracy)  -BN    Time Frame (Lingual Strengthening Goal 1, SLP) by discharge  -MM by discharge  -BN by discharge  -BN    Barriers (Lingual Strengthening Goal 1, SLP) cognition  -MM cognition  -BN cognition  -BN    Progress/Outcomes (Lingual Strengthening Goal 1, SLP) goal ongoing  -MM goal ongoing  -BN goal ongoing  -BN          User Key  (r) = Recorded By, (t) = Taken By, (c) = Cosigned By    Initials Name Provider Type    Fatoumata Elizabeth, MS CCC-SLP Speech and Language Pathologist    Fatoumata Elizabeth, MS-CCC/SLP, [facility] Speech and Language Pathologist    MM Kaylee Rodriguez MS CCC-SLP Speech and Language Pathologist                   Time Calculation:    Time Calculation- SLP     Row Name 06/16/22 1432             Time Calculation- SLP    SLP Start Time 1345  -MM      SLP Stop Time 1432  -MM      SLP Time Calculation (min) 47 min  -MM      SLP Received On 06/16/22  -MM              Untimed Charges    80588-ZZ  Treatment Swallow Minutes 47  -MM              Total Minutes    Untimed Charges Total Minutes 47  -MM       Total Minutes 47  -MM            User Key  (r) = Recorded By, (t) = Taken By, (c) = Cosigned By    Initials Name Provider Type    Kaylee Gilliam MS CCC-SLP Speech and Language Pathologist                Therapy Charges for Today     Code Description Service Date Service Provider Modifiers Qty    [id number] HC ST TREATMENT SWALLOW 3 6/16/2022 Kaylee Rodriguez MS CCC-SLP GN 1               MS BENITA Watt  6/16/2022    Electronically signed by Kaylee Rodriguez MS CCC-SLP at 06/16/22 1433     Rodriguez, Kaylee L, MS CCC-SLP at 06/16/22 1428        Goal Outcome Evaluation:  Plan of Care Reviewed With: patient, caregiver        Progress: no change       Swallow treatment completed. The patient was alert and cooperative. Sitting up in chair feeding herself cake and whipped cream when SLP entered room. She was orally holding most of the cake. Cued to swallow. Patient leaned her head over meal tray and cake fell onto tray. She reports she is fatigued and has back pain. RN and MD aware. She completed trials of puree, nectar thick, and thin liquids. Oral holding with all but thin liquids. Quick oral transit with thin which likely represents poor oral control of the bolus. Significant coughing on 2/5 thin water trials. No overt s/s given nectar thick or puree. Patient to continue on soft solids with finger foods and nectar thick liquids. SLP recommends 1:1 assistance with all PO. Patient will require cues to swallow and alternate bites/sips. Ensure oral care is completed post meal to decrease risk of choking due to pocketed food. SLP will continue to follow.     Electronically signed by Kaylee Rodriguez MS CCC-SLP at 06/16/22 1432       Discharge Summary    No notes of this type exist for this encounter.

## 2022-06-17 NOTE — CASE MANAGEMENT/SOCIAL WORK
Continued Stay Note  [facility]     Patient Name: Hanny Ivy  MRN: [id number]  Today's Date: 6/17/2022    Admit Date: 6/9/2022     Discharge Plan     Row Name 06/17/22 0940       Plan    Plan Comments Dtr called and states she did speak to Dr. Galicia regarding rehab. Dtr states Dr. Galicia spoke to admissions at [facility] bed and they can offer a bed. Message has been left with Darleen at [facility] to find out if they can offer and when bed would be available. Referral has been sent to [facility] bed, await answer from Darleen.               Discharge Codes    No documentation.               Expected Discharge Date and Time     Expected Discharge Date Expected Discharge Time    Jun 17, 2022             AC [place]

## 2022-06-18 NOTE — THERAPY DISCHARGE NOTE
Acute Care - Occupational Therapy Discharge Summary  [facility]     Patient Name: Hanny Ivy  : 1945  MRN: [id number]    Today's Date: 2022  Onset of Illness/Injury or Date of Surgery: 22    Date of Referral to OT: 22  Referring Physician: Dr. Brown      Admit Date: 2022        OT Recommendation and Plan    Visit Dx:    ICD-10-CM ICD-9-CM   1. Altered mental status, unspecified altered mental status type  R41.82 780.97   2. COVID-19  U07.1 079.89   3. Dysphagia, unspecified type  R13.10 787.20   4. Acute encephalopathy  G93.40 348.30   5. Impaired mobility  Z74.09 799.89   6. Decreased activities of daily living (ADL)  Z78.9 V49.89                OT Rehab Goals     Row Name 22 1200             Transfer Goal 1 (OT)    Activity/Assistive Device (Transfer Goal 1, OT) bed-to-chair/chair-to-bed;commode, bedside without drop arms;walker, rolling  -JJ      [place] Level/Cues Needed (Transfer Goal 1, OT) minimum assist (75% or more patient effort)  -JJ      Time Frame (Transfer Goal 1, OT) long term goal (LTG);10 days  -JJ      Progress/Outcome (Transfer Goal 1, OT) goal not met;discharged from facility  -JJ              Dressing Goal 1 (OT)    Activity/Device (Dressing Goal 1, OT) dressing skills, all  -JJ      [place]/Cues Needed (Dressing Goal 1, OT) minimum assist (75% or more patient effort)  -JJ      Time Frame (Dressing Goal 1, OT) long term goal (LTG);10 days  -JJ      Progress/Outcome (Dressing Goal 1, OT) goal not met;discharged from facility  -JJ              Grooming Goal 1 (OT)    Activity/Device (Grooming Goal 1, OT) grooming skills, all  -JJ      [place] (Grooming Goal 1, OT) minimum assist (75% or more patient effort)  -JJ      Time Frame (Grooming Goal 1, OT) long term goal (LTG);10 days  -JJ      Progress/Outcome (Grooming Goal 1, OT) goal not met;discharged from facility  -JJ            User Key  (r) = Recorded By, (t) = Taken By, (c) = Cosigned By     Initials Name Provider Type Discipline    Isabel Swann, OTR/L, CSRS Occupational Therapist OT                 Outcome Measures     Row Name 06/17/22 1200 06/17/22 0805 06/16/22 1500       How much help from another person do you currently need...    Turning from your back to your side while in flat bed without using bedrails? -- 3  -WK --    Moving from lying on back to sitting on the side of a flat bed without bedrails? -- 3  -WK --    Moving to and from a bed to a chair (including a wheelchair)? -- 3  -WK --    Standing up from a chair using your arms (e.g., wheelchair, bedside chair)? -- 3  -WK --    Climbing 3-5 steps with a railing? -- 1  -WK --    To walk in hospital room? -- 2  -WK --    AM-PAC 6 Clicks Score (PT) -- 15  -WK --       How much help from another is currently needed...    Putting on and taking off regular lower body clothing? 2  -TS -- 2  -TS    Bathing (including washing, rinsing, and drying) 2  -TS -- 2  -TS    Toileting (which includes using toilet bed pan or urinal) 2  -TS -- 2  -TS    Putting on and taking off regular upper body clothing 3  -TS -- 3  -TS    Taking care of personal grooming (such as brushing teeth) 3  -TS -- 3  -TS    Eating meals 3  -TS -- 3  -TS    AM-PAC 6 Clicks Score (OT) 15  -TS -- 15  -TS       Functional Assessment    Outcome Measure Options -- AM-PAC 6 Clicks Basic Mobility (PT)  -WK --          User Key  (r) = Recorded By, (t) = Taken By, (c) = Cosigned By    Initials Name Provider Type    TS Talya Biggs COTA Occupational Therapist Assistant    WK Jennifer Olvera, PTA Physical Therapist Assistant                Timed Therapy Charges  Total Units: 5    Charges  Total Units: 5    Procedure Name Documented Minutes Units Code    HC OT SELF CARE/MGMT/TRAIN EA 15 MIN 70  5    16193 (CPT®)               Documented Minutes  Total Minutes: 70    Therapy Provided Minutes    84604 - OT Self Care/Mgmt Minutes 70                    OT Discharge  Summary  Anticipated Discharge Disposition (OT): skilled nursing facility  Reason for Discharge: Discharge from facility  Outcomes Achieved: Refer to plan of care for updates on goals achieved  Discharge Destination: SNF      Isabel Moyer OTR/L, CSRS  6/18/2022

## 2022-06-18 NOTE — THERAPY DISCHARGE NOTE
Acute Care - Physical Therapy Discharge Summary  [facility]       Patient Name: Hanny Ivy  : 1945  MRN: [id number]    Today's Date: 2022  Onset of Illness/Injury or Date of Surgery: 22       Referring Physician: Dr. Brown      Admit Date: 2022      PT Recommendation and Plan    Visit Dx:    ICD-10-CM ICD-9-CM   1. Altered mental status, unspecified altered mental status type  R41.82 780.97   2. COVID-19  U07.1 079.89   3. Dysphagia, unspecified type  R13.10 787.20   4. Acute encephalopathy  G93.40 348.30   5. Impaired mobility  Z74.09 799.89   6. Decreased activities of daily living (ADL)  Z78.9 V49.89        Outcome Measures     Row Name 22 1200 22 0805 22 1500       How much help from another person do you currently need...    Turning from your back to your side while in flat bed without using bedrails? -- 3  -WK --    Moving from lying on back to sitting on the side of a flat bed without bedrails? -- 3  -WK --    Moving to and from a bed to a chair (including a wheelchair)? -- 3  -WK --    Standing up from a chair using your arms (e.g., wheelchair, bedside chair)? -- 3  -WK --    Climbing 3-5 steps with a railing? -- 1  -WK --    To walk in hospital room? -- 2  -WK --    AM-PAC 6 Clicks Score (PT) -- 15  -WK --       How much help from another is currently needed...    Putting on and taking off regular lower body clothing? 2  -TS -- 2  -TS    Bathing (including washing, rinsing, and drying) 2  -TS -- 2  -TS    Toileting (which includes using toilet bed pan or urinal) 2  -TS -- 2  -TS    Putting on and taking off regular upper body clothing 3  -TS -- 3  -TS    Taking care of personal grooming (such as brushing teeth) 3  -TS -- 3  -TS    Eating meals 3  -TS -- 3  -TS    AM-PAC 6 Clicks Score (OT) 15  -TS -- 15  -TS       Functional Assessment    Outcome Measure Options -- AM-PAC 6 Clicks Basic Mobility (PT)  -WK --          User Key  (r) = Recorded By, (t) = Taken By,  (c) = Cosigned By    Initials Name Provider Type    Talya Paz COTA Occupational Therapist Assistant    Jennifer Aguero PTA Physical Therapist Assistant                     PT Rehab Goals     Row Name 06/18/22 1605             Bed Mobility Goal 1 (PT)    Activity/Assistive Device (Bed Mobility Goal 1, PT) bed mobility activities, all  -NW      [place] Level/Cues Needed (Bed Mobility Goal 1, PT) standby assist  -NW      Time Frame (Bed Mobility Goal 1, PT) by discharge  -NW      Progress/Outcomes (Bed Mobility Goal 1, PT) goal not met  -NW              Transfer Goal 1 (PT)    Activity/Assistive Device (Transfer Goal 1, PT) sit-to-stand/stand-to-sit  -NW      [place] Level/Cues Needed (Transfer Goal 1, PT) contact guard required  -NW      Time Frame (Transfer Goal 1, PT) by discharge  -NW      Progress/Outcome (Transfer Goal 1, PT) goal not met  -NW              Gait Training Goal 1 (PT)    Activity/Assistive Device (Gait Training Goal 1, PT) gait (walking locomotion)  -NW      [place] Level (Gait Training Goal 1, PT) contact guard required  -NW      Distance (Gait Training Goal 1, PT) 20ft  -NW      Time Frame (Gait Training Goal 1, PT) by discharge  -NW      Progress/Outcome (Gait Training Goal 1, PT) goal not met  -NW            User Key  (r) = Recorded By, (t) = Taken By, (c) = Cosigned By    Initials Name Provider Type Susan Nava PTA Physical Therapist Assistant PT                    PT Discharge Summary  Anticipated Discharge Disposition (PT): skilled nursing facility  Reason for Discharge: Discharge from facility  Outcomes Achieved: Refer to plan of care for updates on goals achieved  Discharge Destination: SNF      Susan Mendiola PTA   6/18/2022

## 2022-06-19 NOTE — THERAPY DISCHARGE NOTE
Acute Care - Speech Language Pathology Discharge Summary  [facility]       Patient Name: Hanny Ivy  : 1945  MRN: [id number]    Today's Date: 2022  Onset of Illness/Injury or Date of Surgery: 22       Referring Physician: Dr. Brown        Admit Date: 2022      SLP Recommendation and Plan  [facility] soft, finger foods, nectar thick liquids  Galina Patino, MS-CCC/SLP, CNT 2022 15:30 CDT    Visit Dx:    ICD-10-CM ICD-9-CM   1. Altered mental status, unspecified altered mental status type  R41.82 780.97   2. COVID-19  U07.1 079.89   3. Dysphagia, unspecified type  R13.10 787.20   4. Acute encephalopathy  G93.40 348.30   5. Impaired mobility  Z74.09 799.89   6. Decreased activities of daily living (ADL)  Z78.9 V49.89                SLP GOALS     Row Name 22 1500 22 0911          Oral Nutrition/Hydration Goal 1 (SLP)    Oral Nutrition/Hydration Goal 1, SLP Pt will tolerate LRD without overt s/s of aspiration.  -KW Pt will tolerate LRD without overt s/s of aspiration.  -MM     Time Frame (Oral Nutrition/Hydration Goal 1, SLP) by discharge  -KW by discharge  -MM     Barriers (Oral Nutrition/Hydration Goal 1, SLP) cognition  -KW cognition  -MM     Progress/Outcomes (Oral Nutrition/Hydration Goal 1, SLP) goal partially met;discharged from facility  -KW continuing progress toward goal  -MM            Lingual Strengthening Goal 1 (SLP)    Activity (Lingual Strengthening Goal 1, SLP) increase lingual tone/sensation/control/coordination/movement  -KW increase lingual tone/sensation/control/coordination/movement  -MM     Increase Lingual Tone/Sensation/Control/Coordination/Movement lingual movement exercises  -KW lingual movement exercises  -MM     [place]/Accuracy (Lingual Strengthening Goal 1, SLP) with minimal cues (75-90% accuracy)  -KW with minimal cues (75-90% accuracy)  -MM     Time Frame (Lingual Strengthening Goal 1, SLP) by discharge  -KW by discharge  -MM     Barriers (Lingual  Strengthening Goal 1, SLP) cognition  -KW cognition  -MM     Progress/Outcomes (Lingual Strengthening Goal 1, SLP) goal not met;discharged from facility  -KW progress slower than expected  -MM           User Key  (r) = Recorded By, (t) = Taken By, (c) = Cosigned By    Initials Name Provider Type    Galina Lee MS-CCC/SLP, JONES Speech and Language Pathologist    Kaylee Gilliam MS CCC-SLP Speech and Language Pathologist                        SLP Discharge Summary  Anticipated Discharge Disposition (SLP): unknown  Reason for Discharge: discharge from this facility  Progress Toward Achieving Short/long Term Goals: goals partially met within established timelines  Discharge Destination: [facility]      CHIN JordanCCC/SLP, JONES  6/19/2022

## 2022-07-20 PROBLEM — J47.9 BRONCHIECTASIS WITHOUT COMPLICATION (HCC): Status: ACTIVE | Noted: 2021-08-25

## 2022-07-20 PROBLEM — F13.20 SEDATIVE, HYPNOTIC OR ANXIOLYTIC DEPENDENCE, UNCOMPLICATED (HCC): Status: RESOLVED | Noted: 2021-08-25 | Resolved: 2022-01-01

## 2022-07-20 PROBLEM — F13.29 SEDATIVE, HYPNOTIC OR ANXIOLYTIC DEPENDENCE WITH UNSPECIFIED SEDATIVE, HYPNOTIC OR ANXIOLYTIC-INDUCED DISORDER (HCC): Status: RESOLVED | Noted: 2021-08-25 | Resolved: 2022-01-01

## 2022-07-20 PROBLEM — F13.99 SEDATIVE, HYPNOTIC OR ANXIOLYTIC USE, UNSPECIFIED WITH UNSPECIFIED SEDATIVE, HYPNOTIC OR ANXIOLYTIC-INDUCED DISORDER (HCC): Status: RESOLVED | Noted: 2021-08-25 | Resolved: 2022-01-01

## 2022-07-20 NOTE — PROGRESS NOTES
SUBJECTIVE:    Patient ID: Brock Ruiz is a 76 y. o.female. HPI:   Patient here for follow-up of multiple medical problems  Patient is a 67-year-old white female. She was recently discharged from [facility] in the nursing home after a short stay at [facility] secondary to COVID-19 infection. She also have chronic kidney disease and bronchiectasis. It took a long time for her to be able to ambulate on her own. She developed encephalopathy. There was a concern for a possible seizure but appears to be more syncopal episode. She slowly improved and was discharged home. She still complains of depression symptoms. She is taking Effexor 150 and Wellbutrin. She was placed on doxepin to help her sleep. She is still feels depressed but diazepam was just added couple days ago. She have no falls since leaving the nursing home but some close calls. She supposed to be using her walker. Past Medical History:   Diagnosis Date    Allergic rhinitis     Anxiety     Arthritis     Asthma     Chronic back pain     Chronic kidney disease     DDD (degenerative disc disease), lumbar 5/21/2019    Depression     GERD (gastroesophageal reflux disease)     Hyperlipidemia     Hypertension     Hypothyroidism     Irritable bowel syndrome     Palliative care patient 06/18/2019    Restless legs syndrome     Spondylolisthesis at L4-L5 level 5/21/2019      Current Outpatient Medications   Medication Sig Dispense Refill    LORazepam (ATIVAN) 0.5 MG tablet Take 1 tablet by mouth 2 times daily for 180 days. (Patient taking differently: Take 0.5 mg by mouth every 8 hours as needed for Anxiety.) 60 tablet 5    gabapentin (NEURONTIN) 100 MG capsule Take 2 capsules by mouth in the morning and at bedtime for 180 days.  (Patient taking differently: 100 mg in the morning and at bedtime.) 120 capsule 5    budesonide-formoterol (SYMBICORT) 160-4.5 MCG/ACT AERO Inhale 2 puffs into the lungs 2 times daily 10.2 g 3    doxepin (SINEQUAN) 75 MG capsule TAKE ONE TABLET BY MOUTH EVERY NIGHT 90 capsule 3    FEROSUL 325 (65 Fe) MG tablet TAKE ONE TABLET BY MOUTH DAILY WITH BREAKFAST 90 tablet 2    esomeprazole (NEXIUM) 40 MG delayed release capsule TAKE ONE CAPSULE BY MOUTH EVERY MORNING BEFORE BREAKFAST 90 capsule 3    fluticasone (FLONASE) 50 MCG/ACT nasal spray USE 2 SPRAYS IN EACH NOSTRIL ONCE  DAILY 32 g 3    acetaminophen (TYLENOL) 325 MG tablet Take 650 mg by mouth nightly      traMADol (ULTRAM) 50 MG tablet Take 1 tablet by mouth 3 times daily as needed.       amLODIPine (NORVASC) 5 MG tablet TAKE ONE TABLET BY MOUTH EVERY DAY 90 tablet 3    levothyroxine (SYNTHROID) 100 MCG tablet TAKE ONE TABLET BY MOUTH EVERY DAY 90 tablet 3    simvastatin (ZOCOR) 20 MG tablet TAKE ONE TABLET BY MOUTH EVERY NIGHT 90 tablet 3    olmesartan (BENICAR) 40 MG tablet TAKE ONE TABLET BY MOUTH EVERY DAY 90 tablet 3    cetirizine (ZYRTEC) 10 MG tablet TAKE ONE TABLET BY MOUTH DAILY 90 tablet 3    venlafaxine (EFFEXOR XR) 150 MG extended release capsule TAKE ONE CAPSULE BY MOUTH EVERY DAY 90 capsule 3    nystatin (MYCOSTATIN) 873845 UNIT/GM powder Apply 3 times daily to stomach and groin (Patient taking differently: Apply 3 times daily prn to stomach and groin) 60 g 5    sucralfate (CARAFATE) 1 GM tablet Take 1 tablet by mouth 2 times daily (Patient taking differently: Take 1 g by mouth in the morning and 1 g at noon and 1 g in the evening and 1 g before bedtime.) 60 tablet 5    cloNIDine (CATAPRES) 0.1 MG tablet Take 0.1 mg by mouth 2 times daily as needed for High Blood Pressure (systolic > 469)      SENNOSIDES-DOCUSATE SODIUM PO Take 2 tablets by mouth nightly       ondansetron (ZOFRAN) 4 MG tablet Take 1 tablet by mouth every 6 hours as needed for Nausea or Vomiting 90 tablet 0    guaiFENesin (MUCINEX) 600 MG extended release tablet Take 2 tablets by mouth 2 times daily (Patient taking differently: Take 600 mg by mouth in the morning.) 60 tablet 5 ipratropium-albuterol (DUONEB) 0.5-2.5 (3) MG/3ML SOLN nebulizer solution Inhale 3 mLs into the lungs every 4 hours (while awake) (Patient taking differently: Inhale 3 mLs into the lungs every 6 hours as needed) 360 mL 0    polyethylene glycol (GLYCOLAX) powder Take 17 g by mouth daily as needed       Omega-3 Fatty Acids (GNP FISH OIL PO) Take 520 mg by mouth nightly      Cholecalciferol (VITAMIN D3) 2000 units CAPS Take 1 capsule by mouth nightly      EPINEPHrine (EPIPEN) 0.3 MG/0.3ML SOAJ injection Inject 0.3 mg into the muscle as needed Use as directed for allergic reaction       No current facility-administered medications for this visit. Allergies   Allergen Reactions    Penicillins     Sulfa Antibiotics        Review of Systems   Constitutional: Negative. HENT: Negative. Eyes: Negative. Respiratory: Negative. Cardiovascular: Negative. Gastrointestinal: Negative. Endocrine: Negative. Genitourinary: Negative. Musculoskeletal: Negative. Skin: Negative. Allergic/Immunologic: Negative. Neurological: Negative. Hematological: Negative. Psychiatric/Behavioral: Negative. OBJECTIVE:    Physical Exam  Constitutional:       Appearance: Normal appearance. She is well-developed and well-groomed. HENT:      Head: Normocephalic and atraumatic. Right Ear: Tympanic membrane, ear canal and external ear normal. There is no impacted cerumen. Left Ear: Tympanic membrane, ear canal and external ear normal. There is no impacted cerumen. Nose: Nose normal.      Mouth/Throat:      Lips: Pink. Mouth: Mucous membranes are moist.      Dentition: Normal dentition. Pharynx: Oropharynx is clear. Uvula midline. Eyes:      General: Lids are normal.         Right eye: No discharge. Left eye: No discharge. Extraocular Movements: Extraocular movements intact. Conjunctiva/sclera: Conjunctivae normal.      Right eye: Right conjunctiva is not injected. Behavior normal. Behavior is cooperative. Thought Content: Thought content normal.         Cognition and Memory: Cognition and memory normal.         Judgment: Judgment normal.      BP (!) 154/80   Pulse 98   Temp 97.9 °F (36.6 °C) (Infrared)   Resp 18   Ht 5' 4.5\" (1.638 m)   Wt 190 lb (86.2 kg)   SpO2 96%   BMI 32.11 kg/m²      ASSESSMENT:     Diagnosis Orders   1. Traction bronchiectasis (HCC)-ongoing       2. Chronic kidney disease (CKD), stage IV (severe) (HCC)-ongoing CBC    Comprehensive Metabolic Panel      3. Weakness-slowly improving       4. History of COVID-19-resolved       5. Depression, unspecified depression type-no control            PLAN:    1. Continue to monitor  2. Continue to monitor. Blood work. 3.  Physical therapy. Encouraged to use a walker. 4.  No further intervention.   5.  Continue same treatment plan if no improvement in a couple of weeks will increase Effexor dose to max dose  Follow-up 1 month

## 2022-08-05 NOTE — TELEPHONE ENCOUNTER
Patient is requesting a refill on Effexor 75 mg to [facility]. Total daily dosage 225 mg daily.  Rx'd at [facility]

## 2022-08-30 PROBLEM — R77.8 ELEVATED TROPONIN: Status: ACTIVE | Noted: 2022-01-01

## 2022-08-30 PROBLEM — R79.89 ELEVATED TROPONIN: Status: ACTIVE | Noted: 2022-01-01

## 2022-08-30 PROBLEM — Z86.19 HISTORY OF CLOSTRIDIUM DIFFICILE COLITIS: Status: ACTIVE | Noted: 2022-01-01

## 2022-08-30 NOTE — PROGRESS NOTES
4 Eyes Skin Assessment    Pb Raya is being assessed upon: Admission    I agree that Kvng Flores, RN, along with Jese Gilbert RN (either 2 RN's or 1 LPN and 1 RN) have performed a thorough Head to Toe Skin Assessment on the patient. ALL assessment sites listed below have been assessed. Areas assessed by both nurses:     [x]   Head, Face, and Ears   [x]   Shoulders, Back, and Chest  [x]   Arms, Elbows, and Hands   [x]   Coccyx, Sacrum, and Ischium  [x]   Legs, Feet, and Heels    Does the Patient have Skin Breakdown?  No    Tanvir Prevention initiated: Yes  Wound Care Orders initiated: No    WOC nurse consulted for Pressure Injury (Stage 3,4, Unstageable, DTI, NWPT, and Complex wounds) and New or Established Ostomies: No        Primary Nurse eSignature: eTjal Bolden RN on 8/30/2022 at 1:05 PM      Co-Signer eSignature: Electronically signed by Jese Gilbert RN on 8/30/22 at 2:55 PM CDT

## 2022-08-30 NOTE — PROGRESS NOTES
Kamran Heath arrived to room # [id number]. Presented with: elevated troponin, uti, c. Diff  Mental Status: Patient is disoriented and alert. Vitals:    08/30/22 1245   BP: (!) 140/84   Pulse: 96   Resp: 22   Temp: 97 °F (36.1 °C)   SpO2: 100%     Patient safety contract and falls prevention contract reviewed with patient Yes. Oriented Patient and Family to room. Call light within reach. Yes.   Needs, issues or concerns expressed at this time: no.      Electronically signed by Ramón Hernandez RN on 8/30/2022 at 1:04 PM 2

## 2022-08-30 NOTE — PROCEDURES
[facility] Vascular Access Team:  MidLine Insertion Procedure Note      Patient: Khurram Altamirano  YOB: 1945   MRN: [id number]  Room: [address] [place] [address]     Attending Physician - Landry Ge MD  Ordering Physician - Landry Ge MD    Diagnosis:   Elevated troponin [R77.8]       Procedure(s): Insertion of 4.5 Japanese Single Lumen Power Midline    Indication(s):   Poor Venous Access and Frequent Lab Draws    Date of Procedure: 8/30/2022   Start Time: [place]  End Time: 1615    Proceduralist: Yarelis Evans RN    Anesthesia: Local Injection <=5 mL 1% Lidocaine without Epinephrine    Estimated Blood Loss (mL): Minimal    Complications: None    Midline Single Lumen 08/30/22 Right Brachial (Active)   Criteria for Appropriate Use Limited/no vessel suitable for conventional peripheral access 08/30/22 1621   Site Assessment Clean, dry & intact 08/30/22 1621   Phlebitis Assessment No symptoms 08/30/22 1621   Infiltration Assessment 0 08/30/22 1621   External Catheter Length (cm) 1 cm 08/30/22 1621   Lumen Color/Status Pink;Flushed;Normal saline locked; Blood return noted 08/30/22 1621   Date of Last Dressing Change 08/30/22 08/30/22 1621   Dressing Type Transparent; Antimicrobial;Securing device 08/30/22 1621   Dressing Status New dressing applied 08/30/22 1621   Dressing Intervention New 08/30/22 1621         Findings:   1. Successful insertion of Midline. 2. Midline is ready to be used. Please change tubing prior to using the new Midline. Make sure to label, date and use alcohol caps on new tubing and alcohol caps on unused ports. Detailed Description of Procedure:   Informed consent was obtained for the procedure, including sedation. Risks of hemorrhage and adverse drug reaction were discussed. The Right Brachial vein was visualized using the ultrasound and deemed a suitable vessel. The area was prepped with Chlorhexidine and draped in sterile fashion using full max barrier draping.  The area was anesthetized with 3 cc's of 1% Lidocaine. The vein was accessed using US guidance. The guidewire was advanced through the needle to secure the vein. The needle was removed and a peel-a-way Sheath was placed over the guidewire. Guidewire was removed with tip intact. The 4.5 Thai Single Lumen Power Midline was left untrimmed at 15 cm and inserted into the Sheath. The peel-a-way sheath was removed. Approximately 1 cm exposed. All lumens had brisk blood return and was flushed with 10 cc of NS. The Midline was secured using a securement device and a Biopatch was placed over the insertion site. A Tegaderm was placed over the securement device and insertion site. Dressing was dated and initialed with external measurement marked. Patient did tolerate procedure well.         Electronically signed by Nate Kennedy RN on 8/30/2022 at 4:22 PM

## 2022-08-30 NOTE — PROGRESS NOTES
4 Eyes Skin Assessment    Wenceslao Stout is being assessed upon: Admission    I agree that Richie Medina, RN, along with Neo Stratton RN (either 2 RN's or 1 LPN and 1 RN) have performed a thorough Head to Toe Skin Assessment on the patient. ALL assessment sites listed below have been assessed. Areas assessed by both nurses:     [x]   Head, Face, and Ears   [x]   Shoulders, Back, and Chest  [x]   Arms, Elbows, and Hands   [x]   Coccyx, Sacrum, and Ischium  [x]   Legs, Feet, and Heels    Does the Patient have Skin Breakdown?  No    Tanvir Prevention initiated: Yes  Wound Care Orders initiated: No    WOC nurse consulted for Pressure Injury (Stage 3,4, Unstageable, DTI, NWPT, and Complex wounds) and New or Established Ostomies: No        Primary Nurse eSignature: Ignacio Quintero RN on 8/30/2022 at 1:07 PM      Co-Signer eSignature: Electronically signed by Neo Stratton RN on 8/30/22 at 2:55 PM CDT

## 2022-08-30 NOTE — H&P
screws    CERVICAL SPINE SURGERY  x 2    WITH HARDWARE    CHOLECYSTECTOMY  02/2015    Dr. Jean Moura Left 02/2015    Taya correction - Dr. Sarmad Jones    HAND SURGERY Left 2/2007    Thumb implant    HYSTERECTOMY (CERVIX STATUS UNKNOWN)      JOINT REPLACEMENT Bilateral     TKR    JOINT REPLACEMENT Right     TSR    JOINT REPLACEMENT Left     THUMB    LUMBAR FUSION Left 5/21/2019    LEFT L4-5 LLIF WITH POSTERIOR SPINAL FUSION WITH INSTRUMENTATION performed by Dominique Garcia MD at [address] Right 3/2010    [place] [address] INCL FLUOR GDNCE DX W/CELL WASHG SPX N/A 7/17/2018    BRONCHOSCOPY AND BAL with Nurse sedation performed by Anne-Marie Persaud MD at [facility]    SHOULDER SURGERY Right 03/06/2018    Dr. Wilda Snow - Total reversal replacement    TOTAL KNEE ARTHROPLASTY Bilateral     at two different times       Home Medications:  Prior to Admission medications    Medication Sig Start Date End Date Taking? Authorizing Provider   carvedilol (COREG) 25 MG tablet Take 25 mg by mouth 2 times daily   Yes Historical Provider, MD   chlorthalidone (HYGROTON) 25 MG tablet Take 25 mg by mouth daily   Yes Historical Provider, MD   vancomycin (VANCOCIN) 125 MG capsule Take 125 mg by mouth 4 times daily   Yes Historical Provider, MD   potassium chloride (KLOR-CON M) 20 MEQ extended release tablet Take 20 mEq by mouth daily   Yes Historical Provider, MD   losartan (COZAAR) 25 MG tablet Take 25 mg by mouth daily   Yes Historical Provider, MD   metroNIDAZOLE (FLAGYL) IVPB Infuse intravenously every 8 hours   Yes Historical Provider, MD   venlafaxine (EFFEXOR XR) 75 MG extended release capsule Pt to take with 150 mg for total daily dosage of 225 mg 8/5/22   Jazz Fulton MD   LORazepam (ATIVAN) 0.5 MG tablet Take 1 tablet by mouth every 8 hours as needed for Anxiety for up to 180 days.  8/5/22 2/1/23  Jazz Fulton MD   sucralfate (CARAFATE) 1 GM tablet Take 1 tablet by mouth in the morning and 1 tablet at noon and 1 tablet in the evening and 1 tablet before bedtime. 7/22/22   Jazz Fulton MD   gabapentin (NEURONTIN) 100 MG capsule Take 2 capsules by mouth in the morning and at bedtime for 180 days. Patient taking differently: 100 mg in the morning and at bedtime. 6/27/22 12/24/22  Jazz Fulton MD   budesonide-formoterol [facility]) 160-4.5 MCG/ACT AERO Inhale 2 puffs into the lungs 2 times daily 3/15/22   Jazz Fulton MD   doxepin [facility]) 75 MG capsule TAKE ONE TABLET BY MOUTH EVERY NIGHT 3/7/22   Jazz Fulton MD   FEROSUL 325 (65 Fe) MG tablet TAKE ONE TABLET BY MOUTH DAILY WITH BREAKFAST 3/7/22   Jazz Fulton MD   esomeprazole ([address]) 40 MG delayed release capsule TAKE ONE CAPSULE BY MOUTH EVERY MORNING BEFORE BREAKFAST 3/7/22   Jazz Fulton MD   fluticasone [facility]) 50 MCG/ACT nasal spray USE 2 SPRAYS IN EACH NOSTRIL ONCE  DAILY 12/13/21   Jazz Fulton MD   acetaminophen (TYLENOL) 325 MG tablet Take 650 mg by mouth nightly    Historical Provider, MD   traMADol (ULTRAM) 50 MG tablet Take 1 tablet by mouth 3 times daily as needed.     Historical Provider, MD   amLODIPine (NORVASC) 5 MG tablet TAKE ONE TABLET BY MOUTH EVERY DAY 9/20/21   Jazz Fulton MD   levothyroxine (SYNTHROID) 100 MCG tablet TAKE ONE TABLET BY MOUTH EVERY DAY 9/20/21   Jazz Fulton MD   simvastatin (ZOCOR) 20 MG tablet TAKE ONE TABLET BY MOUTH EVERY NIGHT 9/20/21   Jazz Fulton MD   olmesartan (BENICAR) 40 MG tablet TAKE ONE TABLET BY MOUTH EVERY DAY 9/20/21   Jazz Fulton MD   cetirizine (ZYRTEC) 10 MG tablet TAKE ONE TABLET BY MOUTH DAILY 9/20/21   Jazz Fulton MD   venlafaxine (EFFEXOR XR) 150 MG extended release capsule TAKE ONE CAPSULE BY MOUTH EVERY DAY 9/20/21   Jazz Fulton MD   nystatin (MYCOSTATIN) 970544 UNIT/GM powder Apply 3 times daily to stomach and groin  Patient taking differently: Apply 3 times daily prn to stomach and groin 10/8/20   Jazz Fulton MD   cloNIDine (CATAPRES) 0.1 MG tablet Take 0.1 mg by mouth 2 times daily as needed for High Blood Pressure (systolic > 270)    Historical Provider, MD   SENNOSIDES-DOCUSATE SODIUM PO Take 2 tablets by mouth nightly   Patient not taking: Reported on 8/30/2022    Historical Provider, MD   ondansetron (ZOFRAN) 4 MG tablet Take 1 tablet by mouth every 6 hours as needed for Nausea or Vomiting 9/9/19   Eden Leroy MD   guaiFENesin (MUCINEX) 600 MG extended release tablet Take 2 tablets by mouth 2 times daily  Patient taking differently: Take 600 mg by mouth in the morning. 8/22/19   Eden Leroy MD   ipratropium-albuterol (DUONEB) 0.5-2.5 (3) MG/3ML SOLN nebulizer solution Inhale 3 mLs into the lungs every 4 hours (while awake)  Patient taking differently: Inhale 3 mLs into the lungs every 6 hours as needed 7/5/19 7/20/22  Scottie Schaefer MD   polyethylene glycol (GLYCOLAX) powder Take 17 g by mouth daily as needed   Patient not taking: Reported on 8/30/2022    Historical Provider, MD   Omega-3 Fatty Acids (GNP FISH OIL PO) Take 520 mg by mouth nightly    Historical Provider, MD   Cholecalciferol (VITAMIN D3) 2000 units CAPS Take 1 capsule by mouth nightly    Historical Provider, MD   EPINEPHrine (EPIPEN) 0.3 MG/0.3ML SOAJ injection Inject 0.3 mg into the muscle as needed Use as directed for allergic reaction    Historical Provider, MD       Allergies:    Penicillins and Sulfa antibiotics    Social History:    The patient currently lives nursing home  Tobacco:   reports that she is a non-smoker but has been exposed to tobacco smoke. She has never used smokeless tobacco.  Alcohol:   reports no history of alcohol use.   Illicit Drugs: none    Family History:      Problem Relation Age of Onset    High Blood Pressure Mother     Stroke Mother     Dementia Mother         late onset    Depression Mother     Anxiety Disorder Mother     Kidney Disease Mother     Osteoporosis Mother     Substance Abuse Father         Alcohol    Kidney Disease Father         dialysis    Diabetes Maternal Aunt     Kidney Disease Maternal Aunt     Cancer Maternal Grandmother         Colon Cancer    Osteoporosis Maternal Grandmother     Anxiety Disorder Maternal Grandmother     Depression Maternal Grandmother        Review of Systems:   Review of Systems   Constitutional:  Positive for fatigue. Negative for fever. Respiratory:  Positive for cough and shortness of breath (this morning). Cardiovascular:  Negative for chest pain. Neurological:  Positive for weakness. Psychiatric/Behavioral:  Positive for confusion (since covid infection). All other systems reviewed and are negative. 14 point review of systems is negative except as specifically addressed above. Physical Examination:  BP (!) 140/84   Pulse 96   Temp 97 °F (36.1 °C) (Temporal)   Resp 22   Ht 5' 4.5\" (1.638 m)   Wt 185 lb 6.4 oz (84.1 kg)   SpO2 100%   BMI 31.33 kg/m²   Physical Exam  Vitals reviewed. Constitutional:       Appearance: She is ill-appearing. HENT:      Right Ear: External ear normal.      Left Ear: External ear normal.      Mouth/Throat:      Mouth: Mucous membranes are dry. Eyes:      Conjunctiva/sclera: Conjunctivae normal.   Cardiovascular:      Rate and Rhythm: Normal rate and regular rhythm. Pulmonary:      Effort: Pulmonary effort is normal.      Breath sounds: Rhonchi present. Abdominal:      Tenderness: There is no abdominal tenderness. Musculoskeletal:      Cervical back: Neck supple. Right lower leg: Edema present. Left lower leg: Edema present. Comments: 1+ edema bilateral lower extremities   Skin:     General: Skin is warm and dry. Neurological:      Mental Status: She is alert.       Comments: Disoriented to time-daughter tells me that this is not unusual for her mom      Assessment/Plan:  Principal Problem:    Elevated troponin  Active Problems:    History of Clostridium difficile colitis    Hypertension    Hyperlipidemia    Shortness of breath    Chronic kidney disease (CKD), stage IV (severe) [facility])  Resolved Problems:    * No resolved hospital problems. *     Principal Problem:    Elevated troponin/Shortness of breath   -consult cards   -telemetry   -trend serial troponin  -echocardiogram  -cardiac meds asa/statin   -cxr   -monitor for increasing supplemental oxygen requirement   -spot check sp02   -incentive spirometry q2hrs wa   -procalcitonin   -blood cultures x2   -pro-bnp   -covid test   -lactic acid   -pt/inr   -magnesium   -HgA1c   -npo after midnight   -clear liquid diet no caffeine   -lipid panel   -tsh w/reflex FT4   -inpatient consult     -ua with reflex to cx    History of Clostridium difficile colitis   -gi molecular panel   -continue vanc 125mg po qid   -contact isolation precautions    Hypertension   -monitor blood pressure  -hold antihypertensive meds resume when able  -pt with recent hypotension  -orthastatic vitals  -avoid hypotension    Chronic kidney disease, stage IV    -recent acute kidney injury    -follow renal fxn/lytes   -avoid nephrotoxic agents   -ns at 75cc/hr x13hrs   -I's and O's   -daily weight  Resolved Problems:    * No resolved hospital problems.  *  Signed:  KEYONA Mustafa - CNP, 8/30/2022 2:00 PM

## 2022-08-31 NOTE — PROGRESS NOTES
CXR-1. Bilateral interstitial thickening, suggestive of pulmonary edema.   Pro-bnp 14k    Volume overload    -dc ns at 75cc/hr    -lasix 40mg iv x1dose    -echocardiogram    -I's and O's    -daily weight

## 2022-08-31 NOTE — CONSULTS
Palliative Care:    Pt known to palliative care team.  Pt in bed, appears weak. Dtr, Melchor Osorio, is present and assists with information on pt. PCA in to get pt cleaned up. Dtr and this nurse met in CCU waiting area. Pt presents to ED with c/o SOA, diarrhea, cough, and worsening generalized weakness. Found to have elevated Troponin. Cardiology consulted. Past Medical History:        Past Medical History:   Diagnosis Date    Allergic rhinitis     Anxiety     Arthritis     Asthma     Chronic back pain     Chronic kidney disease     DDD (degenerative disc disease), lumbar 5/21/2019    Depression     GERD (gastroesophageal reflux disease)     Hyperlipidemia     Hypertension     Hypothyroidism     Irritable bowel syndrome     Palliative care patient 06/18/2019    Restless legs syndrome     Spondylolisthesis at L4-L5 level 5/21/2019       Advance Directives: On file. DNR. Dtr is POA. Pain/Other Symptoms:  Per dtr, pt does not take pain meds regularly, however on Neurontin 100mg BID and Tramadol  at HS. Activity:    Limited, needs assist.         Psychological/Spiritual:   D/t pt health, spiritual support has been limited. Dtr is available. Pt has CG around the clock. Plan:   Medical management, O2 support, Cardiology and SW consult, I&O, daily wts     Patient/family discussion r/t goals:  Dtr would like to see pt improve and be able to return to her home with CG's. However, also ok with pt returning to SNF if needed. Dtr reviews hx with this nurse of the past few months. Pt had COVID in June, was admitted at [facility]. Suffered a cardiac arrest and was resuscitated. She was a a pt at [employer]. 6/17-6/27 upon dc went to SNF. Pt stayed at [facility] until 7/16. Pt wanted to go home. Pt dc'd home with CG's and HH followed. Dtr states pt has chronic depression as well as anxiety.   Dtr also reports pt has had multiple surgeries, R reverse shoulder in 2018, bilateral knee replacement, unsuccessful lumbar fusion in 2019. Palliative following for support, goc. Review of any needed services:    Hospice services in home vs NF discussed  If pt has dx to support.           Electronically signed by Chelsea Vo RN on 8/31/2022 at 11:00 AM

## 2022-08-31 NOTE — PROGRESS NOTES
[id number] [facility]      Patient:  Yesenia Jaime  YOB: 1945  Date of Service: 8/31/2022  MRN: [id number]   Acct: [de-identified]   Primary Care Physician: Latonia Morris MD  Advance Directive: DNR  Admit Date: 8/30/2022       Hospital Day: 1  Portions of this note have been copied forward, however, changed to reflect the most current clinical status of this patient. CHIEF COMPLAINT shortness of breath and elevated troponin    SUBJECTIVE:  Patient resting quietly in bed with daughter at beside. Patient shake head no when asked if she is still having diarrhea. She shrugs her shoulders when asked if she is having shortness of breath. Patient looks away during conversation and answers no more questions. Daughter at bedside indicates her \"will to live\" has declined. The daughter indicates \"I think she has given up\"    Torrey 10:  The patient is a 68-year-old female with past medical history of CKD, hypertension, hyperlipidemia, hypothyroidism, and depression who presented to [facility] as a transfer from [facility] for evaluation of shortness of breath and elevated troponin. Patient was admitted to the outside facility after being evaluated for diarrhea and severe hypotension. Patient was noted to have pneumonia, COTY, C. difficile colitis, and significant hypotension requiring a Levophed infusion. Upon admission to [facility] patient was noted to have slightly elevated troponin however his renal functions improved troponin also normalized. On day of transfer it was noted that patient began having significant shortness of breath and troponin was reevaluated and noted to be significantly elevated. Decision was made to transfer patient to this facility for cardiac evaluation. Daughter indicates that in June patient was hospitalized for COVID and suffered cardiac arrest.  Since discharge after COVID diagnosis patient has gradually declined.   Patient was initiated on vancomycin oral and reactive to light. Cardiovascular:      Rate and Rhythm: Regular rhythm. Tachycardia present. Pulses: Normal pulses. Heart sounds: Normal heart sounds. Pulmonary:      Effort: Pulmonary effort is normal.      Breath sounds: Normal breath sounds. Abdominal:      General: Bowel sounds are normal. There is no distension. Palpations: Abdomen is soft. Tenderness: There is no abdominal tenderness. There is no guarding. Musculoskeletal:      Right lower leg: Edema present. Left lower leg: Edema present. Skin:     General: Skin is warm and dry. Capillary Refill: Capillary refill takes less than 2 seconds. Neurological:      Mental Status: She is alert. Comments: Unable to perform accurate neurological exam.  Patient is very withdrawn and having delayed responses. Patient only answers questions with head shakes of yes or no. Moving all extremities equally.            Medications:      sodium chloride        sodium chloride flush  5-40 mL IntraVENous 2 times per day    aspirin  81 mg Oral Daily    atorvastatin  80 mg Oral Nightly    enoxaparin  40 mg SubCUTAneous Daily    [Held by provider] amLODIPine  5 mg Oral Daily    carvedilol  25 mg Oral BID    [Held by provider] chlorthalidone  25 mg Oral Daily    Vitamin D  2,000 Units Oral Nightly    doxepin  75 mg Oral Nightly    pantoprazole  40 mg Oral QAM AC    ferrous sulfate  325 mg Oral Daily with breakfast    fluticasone  2 spray Each Nostril Daily    guaiFENesin  600 mg Oral Daily    levothyroxine  100 mcg Oral Daily    [Held by provider] losartan  25 mg Oral Daily    sucralfate  1 g Oral 4x Daily    venlafaxine  150 mg Oral Daily    Arformoterol Tartrate  15 mcg Nebulization BID    And    budesonide  0.5 mg Nebulization BID    vancomycin  125 mg Oral 4 times per day     sodium chloride flush, sodium chloride, ondansetron **OR** ondansetron, acetaminophen **OR** acetaminophen, polyethylene glycol, [Held by provider] cloNIDine, [Held by provider] LORazepam, traMADol  Diet NPO     Lab and other Data:     Recent Labs     08/30/22  1403 08/31/22  0137   WBC 7.3 8.4   HGB 11.9* 11.4*    252     Recent Labs     08/30/22  1510 08/31/22  0137    140   K 4.1 3.6   * 112*   CO2 18* 16*   BUN 15 13   CREATININE 0.7 0.7   GLUCOSE 104 92     Recent Labs     08/30/22  1510   AST 47*   ALT 32   BILITOT 0.3   ALKPHOS 307*     Troponin T:   Recent Labs     08/30/22  1830 08/30/22  2133 08/31/22  0137   TROPONINI 0.16* 0.14* 0.14*     Pro-BNP: No results for input(s): BNP in the last 72 hours. INR: No results for input(s): INR in the last 72 hours. UA:  Recent Labs     08/30/22  1630   COLORU YELLOW   PHUR 5.5   WBCUA 10-15   RBCUA 2-4   YEAST Present*   BACTERIA None Seen*   CLARITYU CLOUDY*   SPECGRAV 1.018   LEUKOCYTESUR SMALL*   UROBILINOGEN 0.2   BILIRUBINUR Negative   BLOODU Negative   GLUCOSEU Negative     A1C:   Recent Labs     08/30/22  1510   LABA1C 5.5     ABG:No results for input(s): PHART, VLH2IBT, PO2ART, FNV7JHR, BEART, HGBAE, J3CQWLUD, CARBOXHGBART in the last 72 hours. RAD:   XR CHEST PORTABLE    Result Date: 8/30/2022  1. Bilateral interstitial thickening, suggestive of pulmonary edema. Superimposed infection is not excluded. Echo:   Summary   Normal left ventricular size with preserved LV function and an estimated   ejection fraction of approximately 30-35%. Severe hypokinesis of the   anterior, anteroseptal, septal and apical walls. No evidence of left   ventricular mass or thrombus noted. Normal diastolic filling pattern for   age. Normal right ventricular size with preserved RV function. No significant valvular abnormalities. No evidence of significant pericardial effusion is noted. Aortic root and ascending aorta are within normal limits. The rhythm is sinus with PACs. ABNORMAL study.  When compared to study dated 7/1/2019 the LVEF has   decreased (previously reported as 55-60%). Micro:   Component 8/30/22 1630    Urine Culture, Routine <50,000 CFU/ml   Multiple organisms isolated, no predominance. Culture   indicates probable contamination. Please review colony count   and clinical indications to determine if a repeat culture is   necessary. No further workup to be done. Component Ref Range & Units 8/31/22 0550    Adenovirus F 40 41 PCR Not Detected Not Detected    Astrovirus PCR Not Detected Not Detected    Campylobacter PCR Not Detected Not Detected    Clostridium difficile, PCR Not Detected DETECTED Panic     Cryptosporidium PCR Not Detected Not Detected    Cyclospora Cayetanensis PCR Not Detected Not Detected    Entamoeba Histolytica PCR Not Detected Not Detected    E Coli Enteroaggregative PCR Not Detected Not Detected    E Coli Enteropathogenic PCR Not Detected Not Detected    E Coli Enterotoxigenic PCR Not Detected Not Detected    Giardia Lamblia PCR Not Detected Not Detected    Norovirus GI GII PCR Not Detected Not Detected    Plesiomonas Shigelloides PCR Not Detected Not Detected    Rotavirus A PCR Not Detected Not Detected    Salmonella PCR Not Detected Not Detected    Sapovirus PCR Not Detected Not Detected    Shiga-like Toxin-producing E.  Coli (STEC) stx1/stx2 Not Detected Not Detected    E Coli Shigella/Enteroinvasive PCR Not Detected Not Detected    Vibrio PCR Not Detected Not Detected    Vibrio Cholerae PCR Not Detected Not Detected    Yersinia Enterocolitica PCR Not Detected Not Detected     Assessment/Plan   Principal Problem:    Elevated troponin   -cardiology on board    -monitor on tele   -Echo complete   -Lexiscan today   -Monitor for increased need of supplemental oxygen   -Monitor CBC, CMP, and mag   -Continue aspirin and statin therapy   -Continue Coreg    Active Problems:   Shortness of breath   -Elevated BNP   -Gentle diuresis   -Strict intake and output   -Daily weight    -Low-sodium diet     Clostridium difficile colitis   -Continue oral vancomycin   -Monitor stools   -Monitor intake and output   -Monitor BMP      Hypertension   -Resume Coreg hold other antihypertensives at this time      Hyperlipidemia   -Continue statin therapy      Chronic kidney disease (CKD), stage IV (severe) (HCC)   -Diurese cautiously   -Daily BMP   -Daily weight   -Strict intake and output      Antibiotic: oral vancomycin     DVT Prophylaxis: LoveKEYONA Zarco - CNP, 8/31/2022 3:38 PM

## 2022-08-31 NOTE — ACP (ADVANCE CARE PLANNING)
Advance Care Planning     Advance Care Planning Activator (Inpatient)  Conversation Note      Date of ACP Conversation: 8/31/2022     Conversation Conducted with: Carrington/SABRA, Karin Corea. ACP Activator: Mae Anthony RN      Health Care Decision Maker:     Current Designated Health Care Decision Maker:     Primary Decision Maker: Judy Cruz - Child - [phone]    Secondary Decision Maker: Danielle So - Child - [phone]      Care Preferences    Ventilation: \"If you were in your present state of health and suddenly became very ill and were unable to breathe on your own, what would your preference be about the use of a ventilator (breathing machine) if it were available to you? \"      Would the patient desire the use of ventilator (breathing machine)?: No        Resuscitation  \"CPR works best to restart the heart when there is a sudden event, like a heart attack, in someone who is otherwise healthy. Unfortunately, CPR does not typically restart the heart for people who have serious health conditions or who are very sick. \"    \"In the event your heart stopped as a result of an underlying serious health condition, would you want attempts to be made to restart your heart (answer \"yes\" for attempt to resuscitate) or would you prefer a natural death (answer \"no\" for do not attempt to resuscitate)? \" No       Conversation Outcomes:  [x] ACP discussion completed  [] Existing advance directive reviewed with patient; no changes to patient's previously recorded wishes  [] New Advance Directive completed  [] Portable Do Not Rescitate prepared for Provider review and signature  [] POLST/POST/MOLST/MOST prepared for Provider review and signature      Follow-up plan:    [] Schedule follow-up conversation to continue planning  [] Referred individual to Provider for additional questions/concerns   [] Advised patient/agent/surrogate to review completed ACP document and update if needed with changes in condition, patient preferences or care setting    [] This note routed to one or more involved healthcare providers         Electronically signed by Alexis Merlin, RN on 8/31/2022 at 11:48 AM

## 2022-08-31 NOTE — PROGRESS NOTES
Visited with pt to provide spiritual care. Pt had just returned from a [place] and appeared to be nauseas. Pt's daughter and sister were present. Pt nodded her head in agreement to prayer. This  provided spiritual care with sustaining presence, nurtured hope, and prayer. Pt expressed gratitude for spiritual care.      Electronically signed by Melba  on 8/31/2022 at 3:28 PM

## 2022-08-31 NOTE — CARE COORDINATION
Spoke with pt's dtr/SABRA Wallis who informs agreeable to attempt skillable SNF services and requested referral to [facility] since both [facility] and [facility] are not currently accepting new admits. FABIEN made the referral and RN KOBE entered therapy orders for needed evals.      [facility]  [phone]   [phone] fax  [phone] referral fax

## 2022-08-31 NOTE — CONSULTS
[id number] [facility]  Cardiology Consult      Requesting MD:  Dedrick Cheema MD   Admit Status:         History obtained from:   [] Patient  [] Other (specify):     Patient:  Sandi Qureshi  [id number]     Chief Complaint: No chief complaint on file. HPI: Ms. Alpa Aquino is a 76 y.o. female with a history of recent COVID infection in June 2022 admitted at [facility] last Friday I believe complained of diarrhea and weakness. History of mitral valve prolapse in the past.  She was noted to have elevated troponin level there but had no complaints of chest pain. History related by review of chart records and per her daughter who is a nurse and has power of . She has had generalized weakness. She had COVID in June 2022 was walking with a walker prior to that but has been on ambulatory since then. She is essentially nonverbal.  At one point recently she had some type of a spell where they thought she might of had a cardiac arrest they had to do brief CPR for 5-6 compressions and subsequently was noted to have a fractured sternum. Presently DNR. Noted to have PNA and C. difficile enterocolitis and hypotension. She required Levophed infusion. She developed shortness of breath on the day of transfer here on supplemental oxygen 3 L nasal cannula. Transferred here for further cardiac assessment. Presently nonverbal.  Troponins elevated 8.16, 0.16, 0.14, and 0.14.  D-dimer 2.17.  proBNP 14,367. Echocardiogram showed ejection fraction 30 to 35%. Multiple wall motion abnormalities noted. No significant valvular disease noted. Compared to previous study 7/1/2019 ejection fraction is significantly decreased. Jason Curiel also ordered. Cardiology now consulted. Review of Systems:  Review of Systems   Constitutional: Negative. Negative for chills, fever and unexpected weight change. HENT: Negative. Eyes: Negative. Respiratory: Negative. Negative for shortness of breath. Cardiovascular: Negative. Negative for chest pain. Gastrointestinal: Negative. Negative for diarrhea, nausea and vomiting. Endocrine: Negative. Genitourinary: Negative. Musculoskeletal: Negative. Skin: Negative. Neurological: Negative. All other systems reviewed and are negative.     Cardiac Specific Data:  Specialty Problems          Cardiology Problems    Hyperlipidemia        Hypertension        MVP (mitral valve prolapse)           Past Medical History:  Past Medical History:   Diagnosis Date    Allergic rhinitis     Anxiety     Arthritis     Asthma     Chronic back pain     Chronic kidney disease     DDD (degenerative disc disease), lumbar 5/21/2019    Depression     GERD (gastroesophageal reflux disease)     Hyperlipidemia     Hypertension     Hypothyroidism     Irritable bowel syndrome     Palliative care patient 06/18/2019    Restless legs syndrome     Spondylolisthesis at L4-L5 level 5/21/2019        Past Surgical History:  Past Surgical History:   Procedure Laterality Date    ANKLE FRACTURE SURGERY Left     metal plate & screws    CERVICAL SPINE SURGERY  x 2    WITH HARDWARE    CHOLECYSTECTOMY  02/2015    Dr. Joaquín Goodwin Left 02/2015    Taya correction - Dr. Dheeraj Ribera    HAND SURGERY Left 2/2007    Thumb implant    HYSTERECTOMY (CERVIX STATUS UNKNOWN)      JOINT REPLACEMENT Bilateral     TKR    JOINT REPLACEMENT Right     TSR    JOINT REPLACEMENT Left     THUMB    LUMBAR FUSION Left 5/21/2019    LEFT L4-5 LLIF WITH POSTERIOR SPINAL FUSION WITH INSTRUMENTATION performed by Sandra Gustafson MD at [address] [place] [address] Right 3/2010    CO [address] INCL FLUOR GDNCE DX W/CELL [facility] SPX N/A 7/17/2018    BRONCHOSCOPY AND BAL with Nurse sedation performed by Guera Fajardo MD at [facility]    SHOULDER SURGERY Right 03/06/2018    Dr. Mederos Him - Total reversal replacement    TOTAL KNEE ARTHROPLASTY Bilateral     at two different times       Past Family History:  Family History Problem Relation Age of Onset    High Blood Pressure Mother     Stroke Mother     Dementia Mother         late onset    Depression Mother     Anxiety Disorder Mother     Kidney Disease Mother     Osteoporosis Mother     Substance Abuse Father         Alcohol    Kidney Disease Father         dialysis    Diabetes Maternal Aunt     Kidney Disease Maternal Aunt     Cancer Maternal Grandmother         Colon Cancer    Osteoporosis Maternal Grandmother     Anxiety Disorder Maternal Grandmother     Depression Maternal Grandmother        Past Social History:  Social History     Socioeconomic History    Marital status:      Spouse name: Not on file    Number of children: Not on file    Years of education: Not on file    Highest education level: Not on file   Occupational History    Not on file   Tobacco Use    Smoking status: Passive Smoke Exposure - Never Smoker    Smokeless tobacco: Never   Vaping Use    Vaping Use: Never used   Substance and Sexual Activity    Alcohol use: No    Drug use: No    Sexual activity: Not Currently     Partners: Male   Other Topics Concern    Not on file   Social History Narrative    Not on file     Social Determinants of Health     Financial Resource Strain: Low Risk     Difficulty of Paying Living Expenses: Not hard at all   Food Insecurity: No Food Insecurity    Worried About Running Out of Food in the Last Year: Never true    Ran Out of Food in the Last Year: Never true   Transportation Needs: Not on file   Physical Activity: Not on file   Stress: Not on file   Social Connections: Not on file   Intimate Partner Violence: Not on file   Housing Stability: Not on file       Allergies: Allergies   Allergen Reactions    Penicillins     Sulfa Antibiotics        Home Meds:  Prior to Admission medications    Medication Sig Start Date End Date Taking?  Authorizing Provider   carvedilol (COREG) 25 MG tablet Take 25 mg by mouth 2 times daily   Yes Historical Provider, MD   chlorthalidone amLODIPine (NORVASC) 5 MG tablet TAKE ONE TABLET BY MOUTH EVERY DAY 9/20/21   Marion Dial MD   levothyroxine (SYNTHROID) 100 MCG tablet TAKE ONE TABLET BY MOUTH EVERY DAY 9/20/21   Marion Dial MD   simvastatin (ZOCOR) 20 MG tablet TAKE ONE TABLET BY MOUTH EVERY NIGHT 9/20/21   Marion Dial MD   olmesartan (BENICAR) 40 MG tablet TAKE ONE TABLET BY MOUTH EVERY DAY 9/20/21   Marion Dial MD   cetirizine (ZYRTEC) 10 MG tablet TAKE ONE TABLET BY MOUTH DAILY 9/20/21   Marion Dial MD   venlafaxine (EFFEXOR XR) 150 MG extended release capsule TAKE ONE CAPSULE BY MOUTH EVERY DAY 9/20/21   Marion Dial MD   nystatin (MYCOSTATIN) 198105 UNIT/GM powder Apply 3 times daily to stomach and groin  Patient taking differently: Apply 3 times daily prn to stomach and groin 10/8/20   Marion Dial MD   cloNIDine (CATAPRES) 0.1 MG tablet Take 0.1 mg by mouth 2 times daily as needed for High Blood Pressure (systolic > 044)    Historical Provider, MD   SENNOSIDES-DOCUSATE SODIUM PO Take 2 tablets by mouth nightly   Patient not taking: Reported on 8/30/2022    Historical Provider, MD   ondansetron (ZOFRAN) 4 MG tablet Take 1 tablet by mouth every 6 hours as needed for Nausea or Vomiting 9/9/19   Marion Dial MD   guaiFENesin (MUCINEX) 600 MG extended release tablet Take 2 tablets by mouth 2 times daily  Patient taking differently: Take 600 mg by mouth in the morning. 8/22/19   Marion Dial MD   ipratropium-albuterol (DUONEB) 0.5-2.5 (3) MG/3ML SOLN nebulizer solution Inhale 3 mLs into the lungs every 4 hours (while awake)  Patient taking differently: Inhale 3 mLs into the lungs every 6 hours as needed 7/5/19 7/20/22  Richie Poe MD   polyethylene glycol (GLYCOLAX) powder Take 17 g by mouth daily as needed   Patient not taking: Reported on 8/30/2022    Historical Provider, MD   Omega-3 Fatty Acids (GNP FISH OIL PO) Take 520 mg by mouth nightly    Historical Provider, MD   Cholecalciferol (VITAMIN D3) 2000 units CAPS Take 1 capsule by mouth nightly    Historical Provider, MD   EPINEPHrine (EPIPEN) 0.3 MG/0.3ML SOAJ injection Inject 0.3 mg into the muscle as needed Use as directed for allergic reaction    Historical Provider, MD       Current Meds:   sodium chloride flush  5-40 mL IntraVENous 2 times per day    aspirin  81 mg Oral Daily    atorvastatin  80 mg Oral Nightly    enoxaparin  40 mg SubCUTAneous Daily    [Held by provider] amLODIPine  5 mg Oral Daily    carvedilol  25 mg Oral BID    [Held by provider] chlorthalidone  25 mg Oral Daily    Vitamin D  2,000 Units Oral Nightly    doxepin  75 mg Oral Nightly    pantoprazole  40 mg Oral QAM AC    ferrous sulfate  325 mg Oral Daily with breakfast    fluticasone  2 spray Each Nostril Daily    guaiFENesin  600 mg Oral Daily    levothyroxine  100 mcg Oral Daily    [Held by provider] losartan  25 mg Oral Daily    sucralfate  1 g Oral 4x Daily    venlafaxine  150 mg Oral Daily    Arformoterol Tartrate  15 mcg Nebulization BID    And    budesonide  0.5 mg Nebulization BID    vancomycin  125 mg Oral 4 times per day       Current Infused Meds:   sodium chloride         Physical Exam:  Vitals:    08/31/22 1007   BP: (!) 142/89   Pulse: (!) 104   Resp:    Temp: 97.2 °F (36.2 °C)   SpO2: 98%       Intake/Output Summary (Last 24 hours) at 8/31/2022 1312  Last data filed at 8/31/2022 8511  Gross per 24 hour   Intake 418.49 ml   Output 1200 ml   Net -781.51 ml     Estimated body mass index is 31.62 kg/m² as calculated from the following:    Height as of this encounter: 5' 4.5\" (1.638 m). Weight as of this encounter: 187 lb 1.6 oz (84.9 kg). Physical Exam  Vitals reviewed. Constitutional:       General: She is not in acute distress. Appearance: She is well-developed and normal weight. She is not ill-appearing, toxic-appearing or diaphoretic. HENT:      Head: Normocephalic and atraumatic.       Nose: Nose normal.      Mouth/Throat:      Mouth: Mucous membranes are moist. Pharynx: Oropharynx is clear. Eyes:      General: No scleral icterus. Extraocular Movements: Extraocular movements intact. Pupils: Pupils are equal, round, and reactive to light. Neck:      Vascular: No carotid bruit or JVD. Cardiovascular:      Rate and Rhythm: Normal rate and regular rhythm. Heart sounds: Normal heart sounds. No murmur heard. No friction rub. No gallop. Pulmonary:      Effort: Pulmonary effort is normal. No respiratory distress. Breath sounds: Normal breath sounds. No stridor. No wheezing, rhonchi or rales. Chest:      Chest wall: No tenderness. Abdominal:      General: Abdomen is flat. Bowel sounds are normal. There is no distension. Palpations: Abdomen is soft. There is no mass. Tenderness: There is no abdominal tenderness. There is no right CVA tenderness, left CVA tenderness, guarding or rebound. Hernia: No hernia is present. Musculoskeletal:         General: No swelling, tenderness, deformity or signs of injury. Cervical back: Normal range of motion and neck supple. No rigidity or tenderness. Right lower leg: No edema. Left lower leg: No edema. Lymphadenopathy:      Cervical: No cervical adenopathy. Skin:     General: Skin is warm and dry. Neurological:      General: No focal deficit present. Mental Status: She is alert and oriented to person, place, and time. Mental status is at baseline. Cranial Nerves: No cranial nerve deficit. Sensory: No sensory deficit. Motor: No weakness. Coordination: Coordination normal.   Psychiatric:         Mood and Affect: Mood normal.         Behavior: Behavior normal.         Thought Content:  Thought content normal.         Judgment: Judgment normal.       Labs:  Recent Labs     08/30/22  1403 08/31/22  0137   WBC 7.3 8.4   HGB 11.9* 11.4*    252       Recent Labs     08/30/22  1510 08/31/22  0137    140   K 4.1 3.6   * 112*   CO2 18* 16*   BUN 15 ----------------------------------------------------------------   Findings   Mitral Valve  Mild sclerosis of the mitral valve with normal leaflet mobility. No  evidence of mitral valve stenosis or significant mitral regurgitation. Aortic Valve  Aortic valve appears to be tricuspid. Structurally normal aortic valve. No significant aortic regurgitation or stenosis is noted. Tricuspid Valve  Tricuspid valve is structurally normal.  Trivial tricuspid regurgitation with estimated RVSP of 32 mm Hg. Pulmonic Valve  The pulmonic valve was not well visualized. Left Atrium  Normal size left atrium. Left Ventricle  Normal left ventricular size with preserved LV function and an estimated  ejection fraction of approximately 30-35%. Severe hypokinesis of the  anterior, anteroseptal, septal and apical walls. No evidence of left ventricular mass or thrombus noted. Normal diastolic filling pattern for age. Right Atrium  Normal right atrial dimension with no evidence of thrombus or mass noted. Right Ventricle  Normal right ventricular size with preserved RV function. Pericardial Effusion  No evidence of significant pericardial effusion is noted. Pleural Effusion  No evidence of pleural effusion. Miscellaneous  Aortic root and ascending aorta are within normal limits. The rhythm is sinus with PACs.   2D Measurements and Calculations(cm)   % Ejection Fraction: 35 %             AO Root Dimension: 2.1 cm  LA Volume: 68.9 ml  LA Volume Index: 36 ml/m^2            LA Area: 21.4 cm^2   LVEDV: 78.5 ml                        LVOT diameter: 1.9 cm  LVESV:67.4 ml                         RA Systolic pressure: 3mmHg  Cardic Output (CO): 6.03l/min         LVEDVI: 41 ml/m^2  Ascending Aorta: 2.3 cm               LVESVI: 35 ml/m^2  Doppler Measurements and Calculations   AV Peak Velocity:141 cm/s              MV Peak E-Wave: 83.9 cm/s  AV Mean Velocity:105 cm/s              MV Peak A-Wave: 147 cm/s  AV Peak Gradient: 7.95 mmHg            MV E/A Ratio: 0.57 %  AV Mean Gradient: 5 mmHg               MV Mean velocity: 75.8cm/s  AV Area (Continuity):1.9 cm^2          MV Peak Gradient: 2.82 mmHg  AV VTI:29.7 cm/s                       MV Mean gradient: 3 mmHg  TR Velocity:269 cm/s  TR Gradient:28.94 mmHg  Estimated RAP:3 mmHg  RVSP:32 mmHg   MV E' septal velocity: 5.77cm/s  MV E' lateral velocity:13.2 cm/s      XR CHEST PORTABLE    Result Date: 8/30/2022  EXAMINATION: Radiography of the Chest TECHNIQUE: 1 view of the chest. COMPARISON: 02/18/2020 FINDINGS: Lungs: Bilateral interstitial thickening. Pleura: No effusion. No pneumothorax. Mediastinum and Maryam: Unremarkable. Heart and Vessels: Normal sized heart. Bones: No acute osseous abnormality. Lines/Tubes/Hardware: Right subclavian mediport. 1.Bilateral interstitial thickening, suggestive of pulmonary edema. Superimposed infection is not excluded.       Assessment:  Complaints of shortness of breath admitted 8/30/2020 to transfer from [facility] reported elevated troponin at that facility  Chronic kidney disease  Hypertension  Hyperlipidemia  Hypothyroidism  Depression  Unconfirmed history of cardiac arrest  History of recent hypotension  Complaints of diarrhea  History of C. difficile enterocolitis  PNA  Hypoxemia supplemental oxygen 3 L  DNR status recent fractured sternum  Nonverbal status  History of mitral valve prolapse  Gait abnormality  Chronic low back pain  Macrocytic anemia  Bronchiectasis  Tremor  Spondylolisthesis at L4-L5 level  Recent COVID June 22 nonambulatory since then  History of seizures  History of pancreatitis  Protein calorie malnutrition      Recommendations:  Await Lexiscan findings further comments to follow

## 2022-08-31 NOTE — PLAN OF CARE
Problem: Discharge Planning  Goal: Discharge to home or other facility with appropriate resources  Outcome: Progressing  Flowsheets (Taken 8/30/2022 1349 by Shayna Rothman RN)  Discharge to home or other facility with appropriate resources: Identify barriers to discharge with patient and caregiver     Problem: Safety - Adult  Goal: Free from fall injury  Outcome: Progressing     Problem: ABCDS Injury Assessment  Goal: Absence of physical injury  Outcome: Progressing     Problem: Skin/Tissue Integrity  Goal: Absence of new skin breakdown  Description: 1. Monitor for areas of redness and/or skin breakdown  2. Assess vascular access sites hourly  3. Every 4-6 hours minimum:  Change oxygen saturation probe site  4. Every 4-6 hours:  If on nasal continuous positive airway pressure, respiratory therapy assess nares and determine need for appliance change or resting period.   Outcome: Progressing

## 2022-08-31 NOTE — CARE COORDINATION
Patient Contact Information:    [address] [place] [address] [address]   [phone] (home) [phone] (work)  Telephone Information:   Mobile [phone]     Above information verified? [x]   Yes  []   No      Emergency Contacts:    Extended Emergency Contact Information  Primary Emergency Contact: Judy Cruz  Address: [address] [place] [address], [address] Phone: [phone]  Mobile Phone: [phone]  Relation: Child  Secondary Emergency Contact: [facility]  Home Phone: [phone]  Mobile Phone: [phone]  Relation: Child      Have you been vaccinated for COVID-19 (SARS-CoV-2)? [x]   Yes  []   No                   If so, when?  w/booster  Which :         []   Pfizer-BioNTech  []   Moderna  []   Iglesia Ochoa  []   Other:         Pharmacy:  [facility]    [facility] [address] [facility], [address] - P [phone] - F [phone]  [address]  [place] [address]  Phone: [phone] Fax: [phone] - [facility] [facility] [phone] [facility] [phone]  [address] [address]  Phone: [phone] Fax: [phone] [address] [facility] -  [address] [phone] - F [phone]  [address] [place] [address]  Phone: [phone] Fax: [phone]           08/31/22 0902   Service Assessment   Patient Orientation Alert and Oriented   Cognition Alert   History Provided By Patient; Child/Family  (dtr present at bedside)   Primary Caregiver Private caregiver   Support Systems Family Members   PCP Verified by CM Yes   Last Visit to PCP Within last year   Prior Functional Level Assistance with the following:   Current Functional Level Assistance with the following:   Can patient return to prior living arrangement No   Ability to make needs known: Fair   Family able to assist with home care needs: Yes  (limited d/t work)   Would you like for me to discuss the discharge plan with any other family members/significant others, and if so, who? Yes  (dtr)   Financial Resources Medicare   Social/Functional History   Lives With Alone; Other (comment)  (24/7 CGs pta)   Type of [facility] One level   Home Access Stairs to enter with rails   Entrance Stairs - Number of Steps 4   Entrance Stairs - Rails Both   Bathroom Shower/Tub Tub/Shower unit   Bathroom Toilet Handicap height   Bathroom Equipment Tub transfer bench  (has swivel chair for entering tub)   [address] Cane;Lift chair;Cane, quad;Hospital bed;Walker, rolling; [address] Help From Family   ADL Assistance Needs assistance   Toileting Needs assistance   Homemaking Assistance Needs assistance   Ambulation Assistance Non-ambulatory  (for almost week pta)   Transfer Assistance Needs assistance   Discharge Planning   Type of Residence [address] Alone   Current Services Prior To Admission Durable Medical Equipment   DME Bedside Commode;Cane;Hospital Bed;Walker; Wheelchair   Potential Assistance Purchasing Medications No       Pt was lying in bed and would speak very softly to answer questions of SW, otherwise pt's dtr/SABRA Posada was present at bedside and provided information. Pt has been to [facility] bed unit for therapy services previously and swing bed unit within 60days; therefore any further therapy services would be private pay. Dtr states understanding and ability to private pay for whatever services are necessary upon dc for the pt. Pt has private CGs 24/7 pta and can continue those services if necessary upon dc. Dtr reports pt is DNR and \"seems to have given up\" currently as she speaks very little and dtr reports unsure when the last time pt ambulated.  Dtr states pt has diarrhea for approx one week and diagnosed as CDiff which kept her in the bed and prior to that did not have much drive anyway. SW discussed options for SNF therapy services and mentioned [facility] and [facility] are both on admissions hold currently. Dtr works at Dr [employer] office and that is pt's PCP as well as medical director for both SNFs. Dtr has been in contact with Dr Angelito Mejia to determine if able to get pt admitted to [facility] as preferred facility and awaiting further assistance. SW can send the referral when/if approved. SW also mentioned pt's ability for hospice services in the home with the 24/7 CGs that would be covered by pt's medicare. Dtr verbalized understanding and said would not want any further cardio intervention past a heart cath and awaiting cardio eval at this time. SW will continue to follow and assist with pt's dc disposition when appropriate. Provided name/contact to Dtr for further assistance as necessary.

## 2022-09-01 NOTE — PROGRESS NOTES
Physical Therapy  Facility/Department: [facility] PROGRESSIVE CARE  Physical Therapy Initial Assessment    Name: Trell Parker  : 1945  MRN: [id number]  Date of Service: 2022    Discharge Recommendations:  Continue to assess pending progress, 24 hour supervision or assist, Patient would benefit from continued therapy after discharge          Patient Diagnosis(es): There were no encounter diagnoses. Past Medical History:  has a past medical history of Allergic rhinitis, Anxiety, Arthritis, Asthma, Chronic back pain, Chronic kidney disease, DDD (degenerative disc disease), lumbar, Depression, GERD (gastroesophageal reflux disease), Hyperlipidemia, Hypertension, Hypothyroidism, Irritable bowel syndrome, Palliative care patient, Restless legs syndrome, and Spondylolisthesis at L4-L5 level. Past Surgical History:  has a past surgical history that includes Hysterectomy; Cervical spine surgery (x 2); Ankle fracture surgery (Left); Hand surgery (Left, 2007); Ovary removal (Right, 3/2010); Cholecystectomy (2015); Foot surgery (Left, 2015); Total knee arthroplasty (Bilateral); shoulder surgery (Right, 2018); pr [facility] incl fluor gdnce dx w/cell washg spx (N/A, 2018); joint replacement (Bilateral); joint replacement (Right); joint replacement (Left); and lumbar fusion (Left, 2019). Assessment   Body Structures, Functions, Activity Limitations Requiring Skilled Therapeutic Intervention: Decreased functional mobility ; Decreased ROM; Decreased sensation;Decreased cognition;Decreased safe awareness;Decreased strength;Decreased balance;Decreased posture;Decreased fine motor control  Assessment: Pt. will benefit from cont. PT to decrease impairments. Pt. a fall risk due to low level of functional mobility at home. Pt. will benefit from 24 hr care and will likely need cont. rehab upon d/c from [facility] due to weakness, decreased balance and fear of falling.  Will attempt progressive mobility to have pt work on getting up out of bed to a chair as pt is able to tolerate. Cardiology nurse present during PT eval to discuss future possible procedures. Pt. would likely be able to use SS and 2A to get to chair once stools slow. Treatment Diagnosis: impaired mobility  Therapy Prognosis: Fair  Decision Making: Medium Complexity  Barriers to Learning: cognition  Requires PT Follow-Up: Yes  Activity Tolerance  Activity Tolerance: Patient limited by fatigue;Treatment limited secondary to decreased cognition; Other (comment)  Activity Tolerance Comments: fearful of falling     Plan   Plan  Plan: 5-7 times per week  Plan weeks: 2  Current Treatment Recommendations: Strengthening, ROM, Balance training, Functional mobility training, Transfer training, Endurance training, Safety education & training, Patient/Caregiver education & training, Equipment evaluation, education, & procurement, Therapeutic activities, Positioning  Plan Comment: cont. PT per POC  Safety Devices  Type of Devices: Bed alarm in place, Call light within reach, Left in bed (did not attempt to get pt to chair d/t frequent loose stools and pt dizziness with standing attempts)     Restrictions  Restrictions/Precautions  Restrictions/Precautions: Fall Risk, Contact Precautions  Position Activity Restriction  Other position/activity restrictions: c diff contact, MRSA     Subjective   Pain: none  General  Chart Reviewed: Yes  Patient assessed for rehabilitation services?: Yes  Response To Previous Treatment: Not applicable  Family / Caregiver Present: Yes (daughter)  Referring Practitioner: Beatriz Parmar MD  Referral Date : 08/31/22  Diagnosis: elevated troponin, hx c diff colitis, SOB  Follows Commands: Impaired  Other (Comment): needs simple v. commands  General Comment  Comments: [facility] PT. Subjective  Subjective: Pt. willing to sit up and attempt standing. States she is feeling dizzy. \"My legs feel like lead. \"         Social/Functional History  Social/Functional History  Lives With: Alone, Other (comment) (24 hr caregivers)  Type of Home: House (Simultaneous filing. User may not have seen previous data.)  Home Layout: One level (Simultaneous filing. User may not have seen previous data.)  Home Access: Stairs to enter with rails (Simultaneous filing. User may not have seen previous data.)  Entrance Stairs - Number of Steps: 4 (Simultaneous filing. User may not have seen previous data.)  Entrance Stairs - Rails: Both (Simultaneous filing. User may not have seen previous data.)  Bathroom Shower/Tub: Tub/Shower unit (Simultaneous filing. User may not have seen previous data.)  Bathroom Toilet: Handicap height (Simultaneous filing. User may not have seen previous data.)  Bathroom Equipment: Tub transfer bench (swivel chair for entering tub  Simultaneous filing. User may not have seen previous data.)  Bathroom Accessibility: Accessible  Home Equipment: [employer], Lift chair, [employer], quad, Hospital bed, Walker, rolling, Wheelchair-manual (Simultaneous filing. User may not have seen previous data.)  Receives Help From: Family, Personal care attendant (Simultaneous filing. User may not have seen previous data.)  ADL Assistance: Needs assistance (Simultaneous filing. User may not have seen previous data.)  Toileting: Needs assistance (Simultaneous filing. User may not have seen previous data.)  Homemaking Assistance: Needs assistance (Simultaneous filing. User may not have seen previous data.)  Ambulation Assistance: Non-ambulatory (w/c locomotion  Simultaneous filing. User may not have seen previous data.)  Transfer Assistance: Needs assistance (Simultaneous filing. User may not have seen previous data.)  Additional Comments: prior covid and weak afterwards, failed back surgery 2019 (Simultaneous filing.  User may not have seen previous data.)  Vision/Hearing  Vision  Vision: Impaired  Vision Exceptions: Wears glasses for reading  Hearing  Hearing: Within functional limits    Cognition   Orientation  Orientation Level: Disoriented to time;Oriented to place;Oriented to situation;Oriented to person  Cognition  Overall Cognitive Status: Exceptions  Arousal/Alertness: Delayed responses to stimuli  Following Commands: Follows one step commands with repetition; Follows one step commands with increased time  Attention Span: Attends with cues to redirect  Memory: Decreased recall of precautions;Decreased recall of recent events  Safety Judgement: Decreased awareness of need for assistance;Decreased awareness of need for safety  Problem Solving: Assistance required to generate solutions;Assistance required to implement solutions  Insights: Decreased awareness of deficits  Initiation: Requires cues for all  Sequencing: Requires cues for all  Cognition Comment: fearful of falling     Objective   Heart Rate: 83  Heart Rate Source: Monitor  BP: 115/81  BP Location: Left upper arm  BP Method: Automatic  MAP (Calculated): 92.33  Resp: 20  SpO2: 98 %  O2 Device: Nasal cannula     Observation/Palpation  Posture: Fair  Observation: depends, purewick, O2, telemetry  Gross Assessment  Sensation: Impaired (reports numbness and tingling B hands and feet)     AROM RLE (degrees)  RLE AROM: Exceptions  RLE General AROM: hip flexion to 90, knees and ankles WFLs  AROM LLE (degrees)  LLE AROM : Exceptions  LLE General AROM: hip flexion to 90, knees and ankles WFLs  Strength RLE  Strength RLE: Exception  Comment: 3-/5 hips, 3+/5 knees and ankles  Strength LLE  Strength LLE: Exception  Comment: 3-/5 hips, 3+/5 knees and ankles           Bed mobility  Supine to Sit: Moderate assistance  Sit to Supine: Moderate assistance;2 Person assistance  Scooting: Maximal assistance;2 Person assistance  Transfers  Sit to Stand:  Moderate Assistance;2 Person Assistance  Stand to sit: Moderate Assistance;2 Person Assistance  Comment: pt stood twice with posterior lean and B knees blocked, BLEs noted to be trembling during stands. Pt. unable to shift weight to R and L to take side steps to [facility]. Ambulation  Comments: pt non-amb. Balance  Posture: Good  Sitting - Static: Fair;+  Sitting - Dynamic: Fair;-  Standing - Static: Poor;+  Standing - Dynamic: Poor           OutComes Score                                                  AM-PAC Score             Tinneti Score       Goals  Short Term Goals  Time Frame for Short term goals: 2 wks  Short term goal 1: supine to sit SBA  Short term goal 2: sit to stand CGA  Short term goal 3: bed to chair CGA  Patient Goals   Patient goals : get stronger       Education  Patient Education  Education Given To: Patient; Family  Education Provided: Role of Therapy;Plan of Care;Transfer Training  Education Provided Comments: use of call light for staff A  Education Method: Verbal  Barriers to Learning: Cognition  Education Outcome: Continued education needed      Therapy Time   Individual Concurrent Group Co-treatment   Time In           Time Out           Minutes                   Gita Linton PT     Electronically signed by Gita Linton PT on 9/1/2022 at 2:00 PM

## 2022-09-01 NOTE — PLAN OF CARE
Problem: Discharge Planning  Goal: Discharge to home or other facility with appropriate resources  9/1/2022 0046 by Candie Barraza RN  Outcome: Progressing  8/31/2022 1823 by Patrick Maria RN  Outcome: Progressing     Problem: Safety - Adult  Goal: Free from fall injury  9/1/2022 0046 by Candie Barraza RN  Outcome: Progressing  8/31/2022 1823 by Patrick Maria RN  Outcome: Progressing     Problem: ABCDS Injury Assessment  Goal: Absence of physical injury  9/1/2022 0046 by Candie Barraza RN  Outcome: Progressing  8/31/2022 1823 by Patrick Maria RN  Outcome: Progressing     Problem: Skin/Tissue Integrity  Goal: Absence of new skin breakdown  Description: 1. Monitor for areas of redness and/or skin breakdown  2. Assess vascular access sites hourly  3. Every 4-6 hours minimum:  Change oxygen saturation probe site  4. Every 4-6 hours:  If on nasal continuous positive airway pressure, respiratory therapy assess nares and determine need for appliance change or resting period.   9/1/2022 0046 by Candie Barraza RN  Outcome: Progressing  8/31/2022 1823 by Patrick Maria RN  Outcome: Progressing   Electronically signed by Candie Barraza RN on 9/1/2022 at 12:46 AM

## 2022-09-01 NOTE — PROGRESS NOTES
Occupational Therapy Initial Assessment  Date: 2022   Patient Name: Yesenia Jaime  MRN: [id number]     : 1945    Date of Service: 2022    Discharge Recommendations:  24 hour supervision or assist, Patient would benefit from continued therapy after discharge (pt/family seeking placement)  OT Equipment Recommendations  Equipment Needed: No (if D/C is to facility)    Assessment   Assessment: OT evaluation completed and tx initiated. Pt may benefit from continued skilled services to address functional deficits. Pt may progress with further tx, but will likely require placement for rehab and 24/7 physical support (assist x 2 needed frequently). Tentative plan is to D/C to SNF; OT endorses this plan. REQUIRES OT FOLLOW-UP: Yes  Activity Tolerance  Activity Tolerance: Patient Tolerated treatment well  Activity Tolerance Comments: Participatory but limited d/t incontinence of bowel and reported dizziness. Patient Diagnosis(es): There were no encounter diagnoses.     Past Medical History:   Past Medical History:   Diagnosis Date    Allergic rhinitis     Anxiety     Arthritis     Asthma     Chronic back pain     Chronic kidney disease     DDD (degenerative disc disease), lumbar 2019    Depression     GERD (gastroesophageal reflux disease)     Hyperlipidemia     Hypertension     Hypothyroidism     Irritable bowel syndrome     Palliative care patient 2019    Restless legs syndrome     Spondylolisthesis at L4-L5 level 2019        Past Surgical History:   Past Surgical History:   Procedure Laterality Date    ANKLE FRACTURE SURGERY Left     metal plate & screws    CERVICAL SPINE SURGERY  x 2    WITH HARDWARE    CHOLECYSTECTOMY  2015    Dr. Kyle Cummings Left 2015    Taya correction - Dr. Fay Patel    HAND SURGERY Left 2007    Thumb implant    HYSTERECTOMY (CERVIX STATUS UNKNOWN)      JOINT REPLACEMENT Bilateral     TKR    JOINT REPLACEMENT Right     TSR    JOINT REPLACEMENT Left     THUMB    LUMBAR FUSION Left 5/21/2019    LEFT L4-5 LLIF WITH POSTERIOR SPINAL FUSION WITH INSTRUMENTATION performed by Cristal Yanez MD at [address] Right 3/2010    [place] [address] INCL FLUOR GDNCE DX W/CELL WASHG SPX N/A 7/17/2018    BRONCHOSCOPY AND BAL with Nurse sedation performed by Edson Bledsoe MD at [facility]    SHOULDER SURGERY Right 03/06/2018    Dr. Andrew Mariscal - Total reversal replacement    TOTAL KNEE ARTHROPLASTY Bilateral     at two different times              Restrictions  Restrictions/Precautions  Restrictions/Precautions: Fall Risk, Contact Precautions  Position Activity Restriction  Other position/activity restrictions: c diff contact, MRSA    Subjective      Pain Assessment  Pain Assessment: None - Denies Pain  Pre Treatment Pain Screening  Pain at present: 0  Scale Used: Numeric Score  Intervention List: Patient able to continue with treatment  Vital Signs  Temp: (!) 96.6 °F (35.9 °C)  Temp Source: Temporal  Heart Rate: 83  Heart Rate Source: Monitor  Resp: 20  BP: 115/81  BP Location: Left upper arm  BP Method: Automatic  MAP (Calculated): 92.33  Level of Consciousness: Alert (0)  Oxygen Therapy  SpO2: 98 %  O2 Device: Nasal cannula  O2 Flow Rate (L/min): 2 L/min    Social/Functional History  Social/Functional History  Lives With: Alone, Other (comment) (24 hr caregivers)  Type of Home: House (Simultaneous filing. User may not have seen previous data.)  Home Layout: One level (Simultaneous filing. User may not have seen previous data.)  Home Access: Stairs to enter with rails (Simultaneous filing. User may not have seen previous data.)  Entrance Stairs - Number of Steps: 4 (Simultaneous filing. User may not have seen previous data.)  Entrance Stairs - Rails: Both (Simultaneous filing. User may not have seen previous data.)  Bathroom Shower/Tub: Tub/Shower unit (Simultaneous filing.  User may not have seen previous data.)  Bathroom Toilet: Handicap height (Simultaneous filing. User may not have seen previous data.)  Bathroom Equipment: Tub transfer bench (swivel chair for entering tub  Simultaneous filing. User may not have seen previous data.)  Bathroom Accessibility: Accessible  Home Equipment: [employer], Lift chair, [employer], quad, Hospital bed, Walker, rolling, Wheelchair-manual (Simultaneous filing. User may not have seen previous data.)  Receives Help From: Family, Personal care attendant (Simultaneous filing. User may not have seen previous data.)  ADL Assistance: Needs assistance (Simultaneous filing. User may not have seen previous data.)  Toileting: Needs assistance (Simultaneous filing. User may not have seen previous data.)  Homemaking Assistance: Needs assistance (Simultaneous filing. User may not have seen previous data.)  Ambulation Assistance: Non-ambulatory (w/c locomotion  Simultaneous filing. User may not have seen previous data.)  Transfer Assistance: Needs assistance (Simultaneous filing. User may not have seen previous data.)  Additional Comments: prior covid and weak afterwards, failed back surgery 2019 (Simultaneous filing. User may not have seen previous data.)       Objective   Vision Exceptions: Wears glasses for reading  Hearing: Within functional limits       Observation/Palpation  Posture: Fair  Observation: depends, purewick, O2, telemetry  Toilet Transfers  Toilet Transfers Comments: Not able to take 1 lateral step up [facility]; currently bedlevel/SS use  ADL  Feeding: Independent;Setup  Grooming: Contact guard assistance;Setup  Grooming Skilled Clinical Factors: at EOB  UE Bathing: Minimal assistance; Moderate assistance  LE Bathing: Maximum assistance  UE Dressing: Minimal assistance  LE Dressing: Maximum assistance  Toileting: Dependent/Total        Bed mobility  Supine to Sit: Moderate assistance  Sit to Supine: Moderate assistance;2 Person assistance  Scooting: Maximal assistance;2 Person assistance  Transfers  Sit to stand:  Moderate assistance;2 Person assistance (EOB used as posterior support)  Stand to sit: Moderate assistance;2 Person assistance (EOB used as posterior support)  Transfer Comments: Would likely be able to get to recliner with [address]; witheld this session d/t use of pure wick for frequent loose stools     Cognition  Overall Cognitive Status: Exceptions  Arousal/Alertness: Delayed responses to stimuli  Following Commands: Follows one step commands with repetition; Follows one step commands with increased time  Attention Span: Attends with cues to redirect  Memory: Decreased recall of precautions;Decreased recall of recent events  Safety Judgement: Decreased awareness of need for assistance;Decreased awareness of need for safety  Problem Solving: Assistance required to generate solutions;Assistance required to implement solutions  Insights: Decreased awareness of deficits  Initiation: Requires cues for all  Sequencing: Requires cues for all  Cognition Comment: fearful of falling               Gross Assessment  AROM: Generally decreased, functional  Strength: Grossly decreased, non-functional  Sensation: Impaired (neuropathy baseline)                    Included Treatment  Tx consisted of: bed mobility; pt/family education; transfer training; activity tolerance/balance challenges; in bed positioning; distal LBD. (Treatment time: min)        Plan   Plan  Times per Week: 3-5    Goals  Short Term Goals  Time Frame for Short term goals: 1 week  Short Term Goal 1: Tolerate 5 min of static standing with CGA and BUE support to progress in ADLs/mobility. Short Term Goal 2: Perform UBD/bathing while seated with SBA. Short Term Goal 3: Complete LBD/bathing with max A and DME. Short Term Goal 4: Complete toileting with mod A and DME. Short Term Goal 5: Transfer away from posterior support with mod A and DME.                LIEN Jung/L  Electronically signed by Dania EMMANUEL/L on 9/1/2022 at 2:08 PM.

## 2022-09-01 NOTE — PROGRESS NOTES
Physician Progress Note      PATIENT:               Virginia Martinez  CSN #:                  [id number]  :                       1945  ADMIT DATE:       2022 12:40 PM  [address] [place] DATE:  RESPONDING  PROVIDER #:        Albertina Alvarado          QUERY TEXT:    Pt admitted with shortness of breath. Pt noted to have elevated BNP in the   setting of pleural effusions. If possible, please document in progress notes   and discharge summary if you are evaluating and/or treating any of the   following: The medical record reflects the following:  Risk Factors: CKD, HTN,  Clinical Indicators: transferred from outside facility due to shortness of   breath and elevated troponin, BNP () 14,367, () 27,198;   CXR-. ..suggestive of pulmonary edema, CTA pulm-small bilateral pleural   effusions. .., Echo-preserved LV function and an estimated EF 30-35%  Treatment: Lasix IV, Card consult, echo, serial BNP    Thank you,  Nati Marin, CCDS  [phone]  Options provided:  -- Acute Systolic CHF/HFrEF  -- Acute Diastolic CHF/HFpEF  -- Acute Systolic and Diastolic CHF  -- Other - I will add my own diagnosis  -- Disagree - Not applicable / Not valid  -- Disagree - Clinically unable to determine / Unknown  -- Refer to Clinical Documentation Reviewer    PROVIDER RESPONSE TEXT:    This patient is in acute systolic CHF/HFrEF.     Query created by: Stephani Hayes on 2022 1:20 PM      Electronically signed by:  Albertina Alvarado 2022 3:27 PM

## 2022-09-01 NOTE — PROGRESS NOTES
Cardiology Daily Note Alonso Metz MD      Patient:  Cory Guillen  [id number]    Patient Active Problem List    Diagnosis Date Noted    Elevated troponin 08/30/2022    History of Clostridium difficile colitis 08/30/2022    Bronchiectasis without complication ([facility] [address] 41/29/6511    Chronic kidney disease (CKD), stage IV (severe) ([facility] [address] 02/18/2020    Macrocytic anemia 07/12/2019    Shortness of breath     Palliative care patient 06/18/2019    Spondylolisthesis at L4-L5 level 05/21/2019    DDD (degenerative disc disease), lumbar 05/21/2019    Chronic low back pain without sciatica 05/21/2019    MVP (mitral valve prolapse) 07/21/2017    Gait abnormality 07/21/2017    Seasonal allergies 11/15/2016    Chronic neck and back pain 10/26/2015    Multiple allergies 05/21/2015    Hypothyroid 02/23/2015    Hypertension 10/14/2014    Hyperlipidemia 10/14/2014       Admit Date:  8/30/2022    Admission Problem List: Present on Admission:   Elevated troponin   Chronic kidney disease (CKD), stage IV (severe) ([facility])   Hypertension   Hyperlipidemia   Shortness of breath   History of Clostridium difficile colitis      Cardiac Specific Data:  Specialty Problems          Cardiology Problems    Hyperlipidemia        Hypertension        MVP (mitral valve prolapse)           Subjective:  Ms. Nyla Robert seen today resting comfortably remains nonverbal blood pressure 145/88 heart 94. No reported chest pain or other issues. Lexiscan EF 31% anteroseptal apical defects no ischemia. Objective:   BP (!) 145/88   Pulse 94   Temp 98.7 °F (37.1 °C) (Temporal)   Resp 16   Ht 5' 4.5\" (1.638 m)   Wt 187 lb 1.6 oz (84.9 kg)   SpO2 98%   BMI 31.62 kg/m²       Intake/Output Summary (Last 24 hours) at 9/1/2022 0844  Last data filed at 9/1/2022 0520  Gross per 24 hour   Intake 110 ml   Output 3100 ml   Net -2990 ml       Prior to Admission medications    Medication Sig Start Date End Date Taking?  Authorizing Provider   carvedilol (COREG) 25 MG tablet Take 25 mg by mouth 2 times daily   Yes Historical Provider, MD   chlorthalidone (HYGROTON) 25 MG tablet Take 25 mg by mouth daily   Yes Historical Provider, MD   vancomycin (VANCOCIN) 125 MG capsule Take 125 mg by mouth 4 times daily   Yes Historical Provider, MD   potassium chloride (KLOR-CON M) 20 MEQ extended release tablet Take 20 mEq by mouth daily   Yes Historical Provider, MD   losartan (COZAAR) 25 MG tablet Take 25 mg by mouth daily   Yes Historical Provider, MD   metroNIDAZOLE (FLAGYL) IVPB Infuse intravenously every 8 hours   Yes Historical Provider, MD   venlafaxine (EFFEXOR XR) 75 MG extended release capsule Pt to take with 150 mg for total daily dosage of 225 mg 8/5/22   Pako Lui MD   LORazepam (ATIVAN) 0.5 MG tablet Take 1 tablet by mouth every 8 hours as needed for Anxiety for up to 180 days. 8/5/22 2/1/23  Pako Lui MD   sucralfate (CARAFATE) 1 GM tablet Take 1 tablet by mouth in the morning and 1 tablet at noon and 1 tablet in the evening and 1 tablet before bedtime. 7/22/22   Pako Lui MD   gabapentin (NEURONTIN) 100 MG capsule Take 2 capsules by mouth in the morning and at bedtime for 180 days. Patient taking differently: 100 mg in the morning and at bedtime.  6/27/22 12/24/22  Pako Lui MD   budesonide-formoterol [facility]) 160-4.5 MCG/ACT AERO Inhale 2 puffs into the lungs 2 times daily 3/15/22   Pako Lui MD   doxepin [facility]) 75 MG capsule TAKE ONE TABLET BY MOUTH EVERY NIGHT 3/7/22   Pako Lui MD   FEROSUL 325 (65 Fe) MG tablet TAKE ONE TABLET BY MOUTH DAILY WITH BREAKFAST 3/7/22   Pako Lui MD   esomeprazole ([address]) 40 MG delayed release capsule TAKE ONE CAPSULE BY MOUTH EVERY MORNING BEFORE BREAKFAST 3/7/22   Paok Lui MD   fluticasone [facility]) 50 MCG/ACT nasal spray USE 2 SPRAYS IN [facility] NOSTRIL ONCE  DAILY 12/13/21   Pako Lui MD   acetaminophen (TYLENOL) 325 MG tablet Take 650 mg by mouth nightly    Historical Provider, MD   traMADol (ULTRAM) 50 MG tablet Take 1 tablet by mouth 3 times daily as needed.     Historical Provider, MD   amLODIPine (NORVASC) 5 MG tablet TAKE ONE TABLET BY MOUTH EVERY DAY 9/20/21   Holly Armstrong MD   levothyroxine (SYNTHROID) 100 MCG tablet TAKE ONE TABLET BY MOUTH EVERY DAY 9/20/21   Holly Armstrong MD   simvastatin (ZOCOR) 20 MG tablet TAKE ONE TABLET BY MOUTH EVERY NIGHT 9/20/21   Holly Armstrong MD   olmesartan (BENICAR) 40 MG tablet TAKE ONE TABLET BY MOUTH EVERY DAY 9/20/21   Holly Armstrong MD   cetirizine (ZYRTEC) 10 MG tablet TAKE ONE TABLET BY MOUTH DAILY 9/20/21   Holly Armstrong MD   venlafaxine (EFFEXOR XR) 150 MG extended release capsule TAKE ONE CAPSULE BY MOUTH EVERY DAY 9/20/21   Holly Armstrong MD   nystatin (MYCOSTATIN) 714783 UNIT/GM powder Apply 3 times daily to stomach and groin  Patient taking differently: Apply 3 times daily prn to stomach and groin 10/8/20   Holly Armstrong MD   cloNIDine (CATAPRES) 0.1 MG tablet Take 0.1 mg by mouth 2 times daily as needed for High Blood Pressure (systolic > 011)    Historical Provider, MD   SENNOSIDES-DOCUSATE SODIUM PO Take 2 tablets by mouth nightly   Patient not taking: Reported on 8/30/2022    Historical Provider, MD   ondansetron (ZOFRAN) 4 MG tablet Take 1 tablet by mouth every 6 hours as needed for Nausea or Vomiting 9/9/19   Holly Armstrong MD   guaiFENesin (MUCINEX) 600 MG extended release tablet Take 2 tablets by mouth 2 times daily  Patient taking differently: Take 600 mg by mouth in the morning. 8/22/19   Holly Armstrong MD   ipratropium-albuterol (DUONEB) 0.5-2.5 (3) MG/3ML SOLN nebulizer solution Inhale 3 mLs into the lungs every 4 hours (while awake)  Patient taking differently: Inhale 3 mLs into the lungs every 6 hours as needed 7/5/19 7/20/22  Aubree Aceves MD   polyethylene glycol (GLYCOLAX) powder Take 17 g by mouth daily as needed   Patient not taking: Reported on 8/30/2022    Historical Provider, MD   Omega-3 Fatty Acids (GNP FISH OIL PO) Take 520 mg by mouth nightly Historical Provider, MD   Cholecalciferol (VITAMIN D3) 2000 units CAPS Take 1 capsule by mouth nightly    Historical Provider, MD   EPINEPHrine (EPIPEN) 0.3 MG/0.3ML SOAJ injection Inject 0.3 mg into the muscle as needed Use as directed for allergic reaction    Historical Provider, MD        furosemide  20 mg IntraVENous BID    sodium chloride flush  5-40 mL IntraVENous 2 times per day    aspirin  81 mg Oral Daily    atorvastatin  80 mg Oral Nightly    enoxaparin  40 mg SubCUTAneous Daily    [Held by provider] amLODIPine  5 mg Oral Daily    carvedilol  25 mg Oral BID    [Held by provider] chlorthalidone  25 mg Oral Daily    Vitamin D  2,000 Units Oral Nightly    doxepin  75 mg Oral Nightly    pantoprazole  40 mg Oral QAM AC    ferrous sulfate  325 mg Oral Daily with breakfast    fluticasone  2 spray Each Nostril Daily    guaiFENesin  600 mg Oral Daily    levothyroxine  100 mcg Oral Daily    [Held by provider] losartan  25 mg Oral Daily    sucralfate  1 g Oral 4x Daily    venlafaxine  150 mg Oral Daily    Arformoterol Tartrate  15 mcg Nebulization BID    And    budesonide  0.5 mg Nebulization BID    vancomycin  125 mg Oral 4 times per day       TELEMETRY: Sinus     Physical Exam:      Physical Exam      General:  Awake, alert, NAD  Skin:  Warm and dry  Neck:  no jvd , no carotid bruits  Chest:  Clear to auscultation, no wheezing or rales  Cardiovascular:  RRR M5Y1 no murmurs, clicks, gallups, or rubs  Abdomen:  Soft nontender, nondistended, bowel sounds present  Extremities:  Edema: none         Lab Data:  CBC:   Recent Labs     08/30/22  1403 08/31/22  0137 09/01/22  0141   WBC 7.3 8.4 7.9   HGB 11.9* 11.4* 10.9*   HCT 36.2* 35.1* 32.6*   MCV 92.8 91.6 91.1    252 243     BMP:   Recent Labs     08/30/22  1510 08/31/22  0137 09/01/22  0141    140 141   K 4.1 3.6 3.3*   * 112* 109   CO2 18* 16* 19*   BUN 15 13 12   CREATININE 0.7 0.7 0.8     LIVER PROFILE:   Recent Labs     08/30/22  1510   AST 47* ALT 32   BILITOT 0.3   ALKPHOS 307*     PT/INR: No results for input(s): PROTIME, INR in the last 72 hours. APTT: No results for input(s): APTT in the last 72 hours. BNP:  No results for input(s): BNP in the last 72 hours. CK, CKMB, Troponin: @LABRCNT (CKTOTAL:3, CKMB:3, TROPONINI:3)@    IMAGING:  ECHO Complete 2D W Doppler W Color    Result Date: 8/31/2022  Transthoracic Echocardiography Report (TTE)  Demographics   Patient Name  Rufus Hernandez Date of Study          08/31/2022   MRN           [id number]       Gender                 Female   Date of Birth 1945   Room Number            [facility]-0708   Age           76 year(s)   Height:       65 inches    Referring Physician    Debbi Ann   Weight:       185 pounds   Sonographer            Mattie Ellison [facility]   BSA:          1.91 m^2     Interpreting Physician Louise Hong   BMI:          30.79 kg/m^2  Procedure Type of Study   TTE procedure:ECHO 2D W/DOPPLER/COLOR/CONTRAST. Study Location: Echo Lab Technical Quality: Limited visualization due to poor acoustical window. Patient Status: Inpatient Contrast Medium: Definity. Amount - 8 ml HR: 107 bpm BP: 136/76 mmHg Indications:Elevated Troponin. Conclusions   Summary  Normal left ventricular size with preserved LV function and an estimated  ejection fraction of approximately 30-35%. Severe hypokinesis of the  anterior, anteroseptal, septal and apical walls. No evidence of left  ventricular mass or thrombus noted. Normal diastolic filling pattern for  age. Normal right ventricular size with preserved RV function. No significant valvular abnormalities. No evidence of significant pericardial effusion is noted. Aortic root and ascending aorta are within normal limits. The rhythm is sinus with PACs. ABNORMAL study. When compared to study dated 7/1/2019 the LVEF has  decreased (previously reported as 55-60%).    Signature   ----------------------------------------------------------------  Electronically signed by Benny Hansen(Interpreting physician)  on 08/31/2022 10:31 AM  ----------------------------------------------------------------   Findings   Mitral Valve  Mild sclerosis of the mitral valve with normal leaflet mobility. No  evidence of mitral valve stenosis or significant mitral regurgitation. Aortic Valve  Aortic valve appears to be tricuspid. Structurally normal aortic valve. No significant aortic regurgitation or stenosis is noted. Tricuspid Valve  Tricuspid valve is structurally normal.  Trivial tricuspid regurgitation with estimated RVSP of 32 mm Hg. Pulmonic Valve  The pulmonic valve was not well visualized. Left Atrium  Normal size left atrium. Left Ventricle  Normal left ventricular size with preserved LV function and an estimated  ejection fraction of approximately 30-35%. Severe hypokinesis of the  anterior, anteroseptal, septal and apical walls. No evidence of left ventricular mass or thrombus noted. Normal diastolic filling pattern for age. Right Atrium  Normal right atrial dimension with no evidence of thrombus or mass noted. Right Ventricle  Normal right ventricular size with preserved RV function. Pericardial Effusion  No evidence of significant pericardial effusion is noted. Pleural Effusion  No evidence of pleural effusion. Miscellaneous  Aortic root and ascending aorta are within normal limits. The rhythm is sinus with PACs.   2D Measurements and Calculations(cm)   % Ejection Fraction: 35 %             AO Root Dimension: 2.1 cm  LA Volume: 68.9 ml  LA Volume Index: 36 ml/m^2            LA Area: 21.4 cm^2   LVEDV: 78.5 ml                        LVOT diameter: 1.9 cm  LVESV:67.4 ml                         RA Systolic pressure: 3mmHg  Cardic Output (CO): 6.03l/min         LVEDVI: 41 ml/m^2  Ascending Aorta: 2.3 cm               LVESVI: 35 ml/m^2  Doppler Measurements and Calculations   AV Peak Velocity:141 cm/s              MV Peak E-Wave: 83.9 cm/s  AV Mean Velocity:105 cm/s MV Peak A-Wave: 147 cm/s  AV Peak Gradient: 7.95 mmHg            MV E/A Ratio: 0.57 %  AV Mean Gradient: 5 mmHg               MV Mean velocity: 75.8cm/s  AV Area (Continuity):1.9 cm^2          MV Peak Gradient: 2.82 mmHg  AV VTI:29.7 cm/s                       MV Mean gradient: 3 mmHg  TR Velocity:269 cm/s  TR Gradient:28.94 mmHg  Estimated RAP:3 mmHg  RVSP:32 mmHg   MV E' septal velocity: 5.77cm/s  MV E' lateral velocity:13.2 cm/s      XR CHEST PORTABLE    Result Date: 8/30/2022  EXAMINATION: Radiography of the Chest TECHNIQUE: 1 view of the chest. COMPARISON: 02/18/2020 FINDINGS: Lungs: Bilateral interstitial thickening. Pleura: No effusion. No pneumothorax. Mediastinum and Maryam: Unremarkable. Heart and Vessels: Normal sized heart. Bones: No acute osseous abnormality. Lines/Tubes/Hardware: Right subclavian mediport. 1.Bilateral interstitial thickening, suggestive of pulmonary edema. Superimposed infection is not excluded. CTA PULMONARY W CONTRAST    Result Date: 8/31/2022  EXAM: CT ANGIOGRAPHY CHEST WITH INTRAVENOUS CONTRAST CLINICAL INDICATION: elevated ddimer TECHNIQUE: Helically acquired angiography images were obtained of the chest with intravenous contrast.This CT exam was performed using one or more of the following dose reduction techniques: automated exposure control, adjustment of the mA and/or kV according to patient size, and/or use of iterative reconstruction technique. This report was created using [employer] report generation technology. MIP reconstructed images were created and reviewed. COMPARISON: 7/2/2019 FINDINGS: Pulmonary arteries: Unremarkable. Normal in caliber. No evidence of pulmonary embolism. Aorta: Unremarkable. Normal in caliber. No evidence of dissection. Great vessels of aortic arch: Unremarkable. Normal in caliber. No evidence of dissection. Lungs and pleural spaces: There are small bilateral pleural effusions. Airspace opacity identified probably interstitial involving the anterior segment of the left upper lobe. There is a pleural-based 7 mm calcified granuloma of the left upper lobe. There are interstitial opacities present within both lung bases. No mass. Heart: Unremarkable. Heart size is normal.No pericardial effusion. No signs of right heart strain, ratio of right ventricle to left ventricle measures less than 1. Mediastinum: There are hilar as well as subcarinal lymph node calcifications. Esophagus is unremarkable. No hiatal hernia. Thyroid: Unremarkable. No thyroid lesions. Bones/joints: There is a fracture identified involving the mid sternum with callus formation. There is a central lytic area, pathologic fracture not excluded. There are degenerative changes present throughout the thoracic spine with anterior fusion. There are postsurgical changes along the lower cervical segments consistent with anterior cervical fusion. Liver: The punctate calcification present in the posterior segment right lobe liver. Gallbladder and bile ducts: There are surgical clips present within the right upper quadrant consistent cholecystectomy. Spleen: There are multiple punctate calcifications present in the spleen. Tubes, lines and devices: There is a right-sided chest port, adequate and position. 1.No CT findings suggestive of pulmonary thromboembolism within the proximal ureters of the pulmonary arteries. 2.Small bilateral pleural effusions with interstitial infiltrates in the upper lobes, left worse than right and both lower lobes. Underlying interstitial lung disease was identified on the prior study. 3.Calcified granulomatous disease in the left upper lobe, mediastinum, liver and spleen. 4.Subacute midsternal fracture, pathologic fracture not excluded. New from the prior study.     NM MYOCARDIAL SPECT REST EXERCISE OR RX    Result Date: 8/31/2022  Lexiscan Nuclear Stress Test Report Procedure date: 8/31/2022 Indications: shortness of breath Procedure: Stress was performed with injection of 0.4 mg Carly Arellano. Vital signs and EKG were monitored. Technetium-99 Myoview was injected in divided doses, approximately 7.7 mCi and 23 mCi respectively for rest and stress imaging. The patient was discharged in stable condition. Results: Patient had symptoms of nausea during infusion that resolved in recovery. Baseline EKG showed sinus tachycardia with nonspecific ST/T changes. During stress there were no significant EKG changes or rhythm changes. Baseline and peak blood pressures were 146/89, and 156/96 respectively. Baseline and peak heart rates were 140 and  145 respectively. Review of rest and stress images obtained utilizing a gated SPECT acquisition protocol along with review of the polar plot revealed: 1. Ejection fraction 31% 2. Wall motion study global hypokinesis more pronounced in the distal anteroseptal and apical 3.  Myocardial perfusion imaging demonstrated small defects distal anteroseptal region the sum stress score was 23 the sum difference score was 2 no appreciable ischemia correlate clinically Signed by Dr Escobar Salvage:  Complaints of shortness of breath admitted 8/30/2020 to transfer from [facility] reported elevated troponin at that facility  Chronic kidney disease  Hypertension  Hyperlipidemia  Hypothyroidism  Depression  Unconfirmed history of cardiac arrest  History of recent hypotension  Complaints of diarrhea  History of C. difficile enterocolitis  PNA  Hypoxemia supplemental oxygen 3 L  DNR status recent fractured sternum  Nonverbal status  History of mitral valve prolapse  Gait abnormality  Chronic low back pain  Macrocytic anemia  Bronchiectasis  Tremor  Spondylolisthesis at L4-L5 level  Recent COVID June 22 nonambulatory since then  History of seizures  History of pancreatitis  Protein calorie malnutrition  Lexiscan 8/31/2022 abnormal ejection fraction 31% anteroseptal and apical defects no ischemia correlate clinically    Plan:  Continue current treatment resume losartan  Will discuss with daughter when available would offer her diagnostic catheterization that could be done as an outpatient if she wishes for further definitive assessment. Further comments to follow.     Keyanna Larios MD, MD 9/1/2022 8:44 AM

## 2022-09-01 NOTE — PROGRESS NOTES
Occupational Therapy               RE: Donna Menon          MRN: [id number]            Per chart review, patient still has active bedrest order in place. If appropriate, please D/C order so OT/PT can begin mobility assessment(s) for SNF referral. Thank you.       Bree Murillo, OTR/L  Electronically signed by Julisa Arauz OTR/L on 9/1/2022 at 7:34 AM.

## 2022-09-01 NOTE — PROGRESS NOTES
[facility] Hospitalists      Patient:  Halley Kim  YOB: 1945  Date of Service: 9/1/2022  MRN: [id number]   Acct: [de-identified]   Primary Care Physician: Una Garza MD  Advance Directive: DNR  Admit Date: 8/30/2022       Hospital Day: 2  Portions of this note have been copied forward, however, changed to reflect the most current clinical status of this patient. CHIEF COMPLAINT shortness of breath and elevated troponin    SUBJECTIVE: Patient much more awake and alert today. Patient reports she is having some abdominal bloating and continued diarrhea today. Patient also indicates some nausea but no vomiting. Patient reports improvement in shortness of breath overnight with increased urination. Patient continues to have fatigue as well. Patient denies chest pain. CUMULATIVE HOSPITAL COURSE:  The patient is a 68-year-old female with past medical history of CKD, hypertension, hyperlipidemia, hypothyroidism, and depression who presented to [facility] as a transfer from [facility] for evaluation of shortness of breath and elevated troponin. Patient was admitted to the outside facility after being evaluated for diarrhea and severe hypotension. Patient was noted to have pneumonia, COTY, C. difficile colitis, and significant hypotension requiring a Levophed infusion. Upon admission to [facility] patient was noted to have slightly elevated troponin however his renal functions improved troponin also normalized. On day of transfer it was noted that patient began having significant shortness of breath and troponin was reevaluated and noted to be significantly elevated. Decision was made to transfer patient to this facility for cardiac evaluation. Daughter indicates that in June patient was hospitalized for COVID and suffered cardiac arrest.  Since discharge after COVID diagnosis patient has gradually declined.   Patient was initiated on vancomycin oral treatment for C. difficile and treatment is continued at this facility. Diarrhea continues but is improved. .  Renal functions remain normal.  Upon arrival patient's BNP noted to be elevated 14,000 and patient was placed on IV diuresis. Repeat BNP remains elevated, continue with IV diuresis and monitor renal functions closely. Patient was weaned down to 2 L of nasal cannula. Troponin elevated 0.14 but remained flat. Cardiology was consulted. Echocardiogram indicates normal left ventricular size, estimated EF of 30 to 35%, severe hypokinesis of the anterior, anterior septal, septal, and apical walls which is significantly different from previous study in 2019. Cardiology recommended Jen Mela which indicates ejection fraction of 31%, wall motion study global hypokinesis more pronounced in the distal anteroseptal and apical, myocardial perfusion imaging demonstrated small defects distal anteroseptal region. Cardiology plans to discuss diagnostic catheterization versus medical management with daughter. D-dimer obtained and noted to be elevated, CTA obtained with no evidence of PE, small bilateral pleural effusions noted. Review of Systems:   Review of Systems   Constitutional:  Positive for appetite change and fatigue. Negative for chills and fever. HENT:  Negative for sore throat and trouble swallowing. Respiratory:  Positive for cough and shortness of breath. Negative for wheezing. Cardiovascular:  Positive for leg swelling. Negative for chest pain and palpitations. Gastrointestinal:  Positive for diarrhea and nausea. Negative for abdominal pain and vomiting. Genitourinary:  Negative for difficulty urinating, dysuria and urgency. Musculoskeletal:  Negative for back pain and myalgias. Neurological:  Positive for weakness. Negative for dizziness and headaches. Psychiatric/Behavioral:  Negative for agitation and confusion. 14 point review of systems is negative except as specifically addressed above.       Objective:   VITALS: BP (!) 145/88   Pulse 94   Temp 98.7 °F (37.1 °C) (Temporal)   Resp 16   Ht 5' 4.5\" (1.638 m)   Wt 187 lb 1.6 oz (84.9 kg)   SpO2 98%   BMI 31.62 kg/m²   24HR INTAKE/OUTPUT:    Intake/Output Summary (Last 24 hours) at 9/1/2022 1121  Last data filed at 9/1/2022 0520  Gross per 24 hour   Intake 110 ml   Output 3100 ml   Net -2990 ml       Physical Exam  Vitals reviewed. Constitutional:       Appearance: She is ill-appearing. Eyes:      Pupils: Pupils are equal, round, and reactive to light. Cardiovascular:      Rate and Rhythm: Normal rate and regular rhythm. Pulses: Normal pulses. Heart sounds: Normal heart sounds. Pulmonary:      Effort: Pulmonary effort is normal.      Comments: Coarse crackles in bilateral bases  Abdominal:      General: Bowel sounds are normal. There is no distension. Palpations: Abdomen is soft. Tenderness: There is no abdominal tenderness. There is no guarding. Musculoskeletal:      Right lower leg: No edema. Left lower leg: Edema (1+) present. Skin:     General: Skin is warm and dry. Capillary Refill: Capillary refill takes less than 2 seconds. Neurological:      Mental Status: She is alert and oriented to person, place, and time. Psychiatric:      Comments: Patient is much more interactive and talkative today. Less withdrawn.          Medications:      sodium chloride 25 mL (09/01/22 0708)      furosemide  20 mg IntraVENous BID    sodium chloride flush  5-40 mL IntraVENous 2 times per day    aspirin  81 mg Oral Daily    atorvastatin  80 mg Oral Nightly    enoxaparin  40 mg SubCUTAneous Daily    [Held by provider] amLODIPine  5 mg Oral Daily    carvedilol  25 mg Oral BID    [Held by provider] chlorthalidone  25 mg Oral Daily    Vitamin D  2,000 Units Oral Nightly    doxepin  75 mg Oral Nightly    pantoprazole  40 mg Oral QAM AC    ferrous sulfate  325 mg Oral Daily with breakfast    fluticasone  2 spray Each Nostril Daily    guaiFENesin  600 mg Oral Daily    levothyroxine  100 mcg Oral Daily    [Held by provider] losartan  25 mg Oral Daily    sucralfate  1 g Oral 4x Daily    venlafaxine  150 mg Oral Daily    Arformoterol Tartrate  15 mcg Nebulization BID    And    budesonide  0.5 mg Nebulization BID    vancomycin  125 mg Oral 4 times per day     magnesium sulfate, sodium chloride flush, sodium chloride, ondansetron **OR** ondansetron, acetaminophen **OR** acetaminophen, polyethylene glycol, [Held by provider] cloNIDine, [Held by provider] LORazepam, traMADol  ADULT DIET; Clear Liquid; No Caffeine     Lab and other Data:     Recent Labs     08/30/22  1403 08/31/22  0137 09/01/22  0141   WBC 7.3 8.4 7.9   HGB 11.9* 11.4* 10.9*    252 243     Recent Labs     08/30/22  1510 08/31/22  0137 09/01/22  0141    140 141   K 4.1 3.6 3.3*   * 112* 109   CO2 18* 16* 19*   BUN 15 13 12   CREATININE 0.7 0.7 0.8   GLUCOSE 104 92 112*     Recent Labs     08/30/22  1510   AST 47*   ALT 32   BILITOT 0.3   ALKPHOS 307*     Troponin T:   Recent Labs     08/30/22  1830 08/30/22  2133 08/31/22  0137   TROPONINI 0.16* 0.14* 0.14*     Pro-BNP: No results for input(s): BNP in the last 72 hours. INR: No results for input(s): INR in the last 72 hours. UA:  Recent Labs     08/30/22  1630   COLORU YELLOW   PHUR 5.5   WBCUA 10-15   RBCUA 2-4   YEAST Present*   BACTERIA None Seen*   CLARITYU CLOUDY*   SPECGRAV 1.018   LEUKOCYTESUR SMALL*   UROBILINOGEN 0.2   BILIRUBINUR Negative   BLOODU Negative   GLUCOSEU Negative     A1C:   Recent Labs     08/30/22  1510   LABA1C 5.5     ABG:No results for input(s): PHART, IOB8SPF, PO2ART, BQQ3DLJ, BEART, HGBAE, T0TQACKR, CARBOXHGBART in the last 72 hours. RAD:   XR CHEST PORTABLE    Result Date: 8/30/2022  1. Bilateral interstitial thickening, suggestive of pulmonary edema. Superimposed infection is not excluded. CTA PULMONARY W CONTRAST    Result Date: 8/31/2022  1. No CT findings suggestive of pulmonary thromboembolism within the proximal ureters of the pulmonary arteries. 2.Small bilateral pleural effusions with interstitial infiltrates in the upper lobes, left worse than right and both lower lobes. Underlying interstitial lung disease was identified on the prior study. 3.Calcified granulomatous disease in the left upper lobe, mediastinum, liver and spleen. 4.Subacute midsternal fracture, pathologic fracture not excluded. New from the prior study. Lexiscan:  1. Ejection fraction 31%   2. Wall motion study global hypokinesis more pronounced in the distal   anteroseptal and apical   3. Myocardial perfusion imaging demonstrated small defects distal   anteroseptal region the sum stress score was 23 the sum difference   score was 2 no appreciable ischemia correlate clinically   Signed by Dr Cristino Rea     Echo:   Summary   Normal left ventricular size with preserved LV function and an estimated   ejection fraction of approximately 30-35%. Severe hypokinesis of the   anterior, anteroseptal, septal and apical walls. No evidence of left   ventricular mass or thrombus noted. Normal diastolic filling pattern for   age. Normal right ventricular size with preserved RV function. No significant valvular abnormalities. No evidence of significant pericardial effusion is noted. Aortic root and ascending aorta are within normal limits. The rhythm is sinus with PACs. ABNORMAL study. When compared to study dated 7/1/2019 the LVEF has   decreased (previously reported as 55-60%). Micro:   Component 8/30/22 1630    Urine Culture, Routine <50,000 CFU/ml   Multiple organisms isolated, no predominance. Culture   indicates probable contamination. Please review colony count   and clinical indications to determine if a repeat culture is   necessary. No further workup to be done.           Component Ref Range & Units 8/31/22 0550    Adenovirus F 40 41 PCR Not Detected Not Detected    Astrovirus PCR Not Detected Not Detected Hypertension              -Resume Coreg hold other antihypertensives at this time       Hyperlipidemia              -Continue statin therapy       Chronic kidney disease (CKD), stage IV (severe) (HCC)              -Diurese cautiously              -Daily BMP              -Daily weight              -Strict intake and output        Antibiotic: oral vancomycin      DVT Prophylaxis: KEYONA Lozano - CNP, 9/1/2022 11:21 AM

## 2022-09-01 NOTE — PROGRESS NOTES
Palliative care follow up visit. Pt appears to feel better today, more awake and interactive. Able to assist with repositioning for dorene care. Dtr is at bedside. APRN is in talking with pt/dtr. Plan to continue to diurese, treat c diff. Also increase diet to reg low Na to see how pt tiera. Conversation about heart cath on an outpt basis, however, dtr feels if this needs to be done would like it done while admitted d/t difficulty and expense of ambulance as an out pt. She is waiting to talk with cardiologist before moving forward. Dtr believes pt will need SNF before any chance of trying to return home. Palliative following for support.     Electronically signed by Nolberto Monroe RN on 9/1/2022 at 11:23 AM

## 2022-09-02 NOTE — PROGRESS NOTES
[facility]      Patient:  Diane Wolf  YOB: 1945  Date of Service: 9/2/2022  MRN: [id number]   Acct: [de-identified]   Primary Care Physician: Charles Ross MD  Advance Directive: DNR  Admit Date: 8/30/2022       Hospital Day: 3  Portions of this note have been copied forward, however, changed to reflect the most current clinical status of this patient. CHIEF COMPLAINT shortness of breath and elevated troponin    SUBJECTIVE: Reports she is not feeling very well today, however does not comment on why. Patient reports she had a procedure done today. CUMULATIVE HOSPITAL COURSE:  The patient is a 42-year-old female with past medical history of CKD, hypertension, hyperlipidemia, hypothyroidism, and depression who presented to [facility] as a transfer from [facility] for evaluation of shortness of breath and elevated troponin. Patient was admitted to the outside facility after being evaluated for diarrhea and severe hypotension. Patient was noted to have pneumonia, COTY, C. difficile colitis, and significant hypotension requiring a Levophed infusion. Upon admission to [facility] patient was noted to have slightly elevated troponin however his renal functions improved troponin also normalized. On day of transfer it was noted that patient began having significant shortness of breath and troponin was reevaluated and noted to be significantly elevated. Decision was made to transfer patient to this facility for cardiac evaluation. Daughter indicates that in June patient was hospitalized for COVID and suffered cardiac arrest.  Since discharge after COVID diagnosis patient has gradually declined. Patient was initiated on vancomycin oral treatment for C. difficile and treatment is continued at this facility. Diarrhea continues but is improved. .  Renal functions remain normal.  Upon arrival patient's BNP noted to be elevated 14,000 and patient was placed on IV diuresis.   Repeat BNP remains elevated, continue with IV diuresis and monitor renal functions closely. Over 5 L net negative. Patient was weaned down to 2 L of nasal cannula. Troponin elevated 0.14 but remained flat. Cardiology was consulted. Echocardiogram indicates normal left ventricular size, estimated EF of 30 to 35%, severe hypokinesis of the anterior, anterior septal, septal, and apical walls which is significantly different from previous study in 2019. Cardiology recommended Dennise Aiden which indicates ejection fraction of 31%, wall motion study global hypokinesis more pronounced in the distal anteroseptal and apical, myocardial perfusion imaging demonstrated small defects distal anteroseptal region. Cardiology discussed options with daughter, plans for cardiac catheterization today. D-dimer obtained and noted to be elevated, CTA obtained with no evidence of PE, small bilateral pleural effusions noted. Patient evaluated by PT/OT and recommends continued care at discharge. Social work assisting with discharge and daughter agreeable to skilled nursing facility. Review of Systems:   Review of Systems   Constitutional:  Positive for appetite change and fatigue. Negative for chills and fever. HENT:  Negative for sore throat and trouble swallowing. Respiratory:  Positive for cough and shortness of breath. Negative for wheezing. Cardiovascular:  Positive for leg swelling. Negative for chest pain and palpitations. Gastrointestinal:  Positive for diarrhea and nausea. Negative for abdominal pain and vomiting. Genitourinary:  Negative for difficulty urinating, dysuria and urgency. Musculoskeletal:  Negative for back pain and myalgias. Neurological:  Positive for weakness. Negative for dizziness and headaches. Psychiatric/Behavioral:  Negative for agitation and confusion. 14 point review of systems is negative except as specifically addressed above.       Objective:   VITALS:  /78   Pulse 97   Temp 98.4 °F (36.9 °C)   Resp 18   Ht 5' 4.5\" (1.638 m)   Wt 180 lb (81.6 kg)   SpO2 98%   BMI 30.42 kg/m²   24HR INTAKE/OUTPUT:    Intake/Output Summary (Last 24 hours) at 9/2/2022 1341  Last data filed at 9/2/2022 0601  Gross per 24 hour   Intake 120 ml   Output 1725 ml   Net -1605 ml         Physical Exam  Vitals reviewed. Constitutional:       Appearance: She is ill-appearing. Eyes:      Pupils: Pupils are equal, round, and reactive to light. Cardiovascular:      Rate and Rhythm: Normal rate and regular rhythm. Pulses: Normal pulses. Heart sounds: Normal heart sounds. Pulmonary:      Effort: Pulmonary effort is normal.      Comments: Coarse crackles in bilateral bases  Abdominal:      General: Bowel sounds are normal. There is no distension. Palpations: Abdomen is soft. Tenderness: There is no abdominal tenderness. There is no guarding. Musculoskeletal:      Right lower leg: No edema. Left lower leg: Edema (1+) present. Skin:     General: Skin is warm and dry. Capillary Refill: Capillary refill takes less than 2 seconds. Neurological:      Mental Status: She is alert and oriented to person, place, and time. Psychiatric:      Comments: Patient is much more interactive and talkative today. Less withdrawn.          Medications:      sodium chloride 1,000 mL (09/02/22 1210)    sodium chloride 75 mL/hr at 09/02/22 0818    sodium chloride 25 mL (09/01/22 0708)      furosemide  20 mg IntraVENous BID    sodium chloride flush  5-40 mL IntraVENous 2 times per day    aspirin  81 mg Oral Daily    atorvastatin  80 mg Oral Nightly    enoxaparin  40 mg SubCUTAneous Daily    [Held by provider] amLODIPine  5 mg Oral Daily    carvedilol  25 mg Oral BID    [Held by provider] chlorthalidone  25 mg Oral Daily    Vitamin D  2,000 Units Oral Nightly    doxepin  75 mg Oral Nightly    pantoprazole  40 mg Oral QAM AC    ferrous sulfate  325 mg Oral Daily with breakfast    fluticasone  2 spray 8/31/2022  1. No CT findings suggestive of pulmonary thromboembolism within the proximal ureters of the pulmonary arteries. 2.Small bilateral pleural effusions with interstitial infiltrates in the upper lobes, left worse than right and both lower lobes. Underlying interstitial lung disease was identified on the prior study. 3.Calcified granulomatous disease in the left upper lobe, mediastinum, liver and spleen. 4.Subacute midsternal fracture, pathologic fracture not excluded. New from the prior study. Lexiscan:  1. Ejection fraction 31%   2. Wall motion study global hypokinesis more pronounced in the distal   anteroseptal and apical   3. Myocardial perfusion imaging demonstrated small defects distal   anteroseptal region the sum stress score was 23 the sum difference   score was 2 no appreciable ischemia correlate clinically   Signed by Dr Keny Elizondo     Echo:   Summary   Normal left ventricular size with preserved LV function and an estimated   ejection fraction of approximately 30-35%. Severe hypokinesis of the   anterior, anteroseptal, septal and apical walls. No evidence of left   ventricular mass or thrombus noted. Normal diastolic filling pattern for   age. Normal right ventricular size with preserved RV function. No significant valvular abnormalities. No evidence of significant pericardial effusion is noted. Aortic root and ascending aorta are within normal limits. The rhythm is sinus with PACs. ABNORMAL study. When compared to study dated 7/1/2019 the LVEF has   decreased (previously reported as 55-60%). Micro:   Component 8/30/22 1630    Urine Culture, Routine <50,000 CFU/ml   Multiple organisms isolated, no predominance. Culture   indicates probable contamination. Please review colony count   and clinical indications to determine if a repeat culture is   necessary. No further workup to be done.           Component Ref Range & Units 8/31/22 0550    Adenovirus F 40 41 PCR Not Detected Not Detected    Astrovirus PCR Not Detected Not Detected    Campylobacter PCR Not Detected Not Detected    Clostridium difficile, PCR Not Detected DETECTED Panic     Cryptosporidium PCR Not Detected Not Detected    Cyclospora Cayetanensis PCR Not Detected Not Detected    Entamoeba Histolytica PCR Not Detected Not Detected    E Coli Enteroaggregative PCR Not Detected Not Detected    E Coli Enteropathogenic PCR Not Detected Not Detected    E Coli Enterotoxigenic PCR Not Detected Not Detected    Giardia Lamblia PCR Not Detected Not Detected    Norovirus GI GII PCR Not Detected Not Detected    Plesiomonas Shigelloides PCR Not Detected Not Detected    Rotavirus A PCR Not Detected Not Detected    Salmonella PCR Not Detected Not Detected    Sapovirus PCR Not Detected Not Detected    Shiga-like Toxin-producing E.  Coli (STEC) stx1/stx2 Not Detected Not Detected    E Coli Shigella/Enteroinvasive PCR Not Detected Not Detected    Vibrio PCR Not Detected Not Detected    Vibrio Cholerae PCR Not Detected Not Detected    Yersinia Enterocolitica PCR Not Detected Not Detected       Assessment/Plan   Principal Problem:    Elevated troponin              -cardiology on board    -plan for cardiac cath today               -monitor on tele              -Echo complete              -Lexiscan EF 31%, anterior septal and apical defects with no ischemia noted              -Monitor for increased need of supplemental oxygen              -Monitor CBC, CMP, and mag              -Continue aspirin and statin therapy              -Continue Coreg   -D-dimer elevated   -CTA no evidence of PE, bilateral pleural effusions     Active Problems:   Shortness of breath              -Elevated BNP              -Gentle IV diuresis   -Monitoring renal function closely              -Strict intake and output              -Daily weight                 -Low-sodium diet                Clostridium difficile colitis              -Continue oral vancomycin -Monitor stools              -Monitor intake and output              -Monitor BMP       Hypertension              -Resume Coreg hold other antihypertensives at this time       Hyperlipidemia              -Continue statin therapy       Chronic kidney disease (CKD), stage IV (severe) (HCC)              -Diurese cautiously              -Daily BMP              -Daily weight              -Strict intake and output        Antibiotic: oral vancomycin      DVT Prophylaxis: Lovenox    KEYONA Cheng - CNP, 9/2/2022 1:41 PM

## 2022-09-02 NOTE — CARE COORDINATION
Hien Joshi with admissions is offering services tot he pt and pt and family accept. Dtr Colby Beltrán to complete ppwk prior to pt's admit to the facility. Pt will transport via EMS d/t non-ambulatory. Shala Ware  [phone]   [phone] fax  [phone] referral fax    Discussed allegation made by pt last evening re: RN staff with dtr who reports \"nothing to worry about\" states the dementia as the cause and no concerns or follow up necessary.
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
## 2022-09-02 NOTE — PROGRESS NOTES
Cardiology catheterization note preliminary findings right radial artery access left ventricular function impaired coronary angiograms unremarkable?   [place] report to follow

## 2022-09-02 NOTE — PROGRESS NOTES
Visited with pt's daughter Dipti Diaz. Pt was out of the room for a heart cath. Pt's daughter had received a report from cath lab. This  was present for pt's daughter to discuss events, issues, and needs and provide a listening ear. Provided spiritual care with sustaining presence, and support.      Electronically signed by Julissa Padilla on 9/2/2022 at 11:49 AM

## 2022-09-02 NOTE — PROGRESS NOTES
Went to take pt morning medications along with supplies for port accessed lab draws. Pt refused and stated she did not want her nurse in her room. Educated patient on reasons for medications and lab work. Pt stated \"get out\".     Electronically signed by Pasquale Solares RN on 9/2/2022 at 6:22 AM

## 2022-09-03 NOTE — DISCHARGE SUMMARY
Be Ngo  :  1945  MRN:  [id number]    Admit date:  2022  Discharge date:  9/3/2022    Discharging Physician:  Dr. Dequan Spencer Directive: DNR    Consults: Jerzy Figueroa     Primary Care Physician:  Jazz Fulton MD    Discharge Diagnoses:  Principal Problem:    Elevated troponin  Active Problems:    History of Clostridium difficile colitis    Hypertension    Hyperlipidemia    Shortness of breath    Chronic kidney disease (CKD), stage IV (severe) ([address]  Resolved Problems:    * No resolved hospital problems. *      Portions of this note have been copied forward, however, changed to reflect the most current clinical status of this patient. Hospital Course: The patient is a 75-year-old female with past medical history of CKD, hypertension, hyperlipidemia, hypothyroidism, and depression who presented to [facility] as a transfer from [facility] for evaluation of shortness of breath and elevated troponin. Patient was admitted to the outside facility after being evaluated for diarrhea and severe hypotension. Patient was noted to have pneumonia, COTY, C. difficile colitis, and significant hypotension requiring a Levophed infusion. Upon admission to [facility] patient was noted to have slightly elevated troponin however his renal functions improved troponin also normalized. On day of transfer it was noted that patient began having significant shortness of breath and troponin was reevaluated and noted to be significantly elevated. Decision was made to transfer patient to this facility for cardiac evaluation. Daughter indicates that in  patient was hospitalized for COVID and suffered cardiac arrest.  Since discharge after COVID diagnosis patient has gradually declined. Patient was initiated on vancomycin oral treatment for C. difficile and treatment is continued at this facility. Diarrhea continues but is improved. .  Renal functions remain normal.  Upon arrival patient's BNP noted to be elevated 14,000 and patient was placed on IV diuresis. Repeat BNP remained elevated, continued with IV diuresis and monitored renal functions closely. Over 5 L net negative. Transitioned to oral diuresis. Patient was weaned down to 2 L of nasal cannula. Troponin elevated 0.14 but remained flat. Cardiology was consulted. Echocardiogram indicates normal left ventricular size, estimated EF of 30 to 35%, severe hypokinesis of the anterior, anterior septal, septal, and apical walls which is significantly different from previous study in 2019. Cardiology recommended Maryfrances Pollen which indicates ejection fraction of 31%, wall motion study global hypokinesis more pronounced in the distal anteroseptal and apical, myocardial perfusion imaging demonstrated small defects distal anteroseptal region. Cardiology discussed options with daughter, plans for cardiac catheterization. Cardiac cath revealed unremarkable coronary angiograms. Cardiology recommends continued medical management. D-dimer obtained and noted to be elevated, CTA obtained with no evidence of PE, small bilateral pleural effusions noted. Patient evaluated by PT/OT and recommends continued care at discharge. Social work assisting with discharge and daughter agreeable to skilled nursing facility. Patient is now medically stable for discharge to skilled nursing facility.     Significant Diagnostic Studies:   ECHO Complete 2D W Doppler W Color    Result Date: 8/31/2022  Transthoracic Echocardiography Report (TTE)  Demographics   Patient Name  Talia Roy Date of Study          08/31/2022   MRN           [id number]       Gender                 Female   Date of Birth 1945   Room Number            MHL-0708   Age           76 year(s)   Height:       65 inches    Referring Physician    Carly Jimenez   Weight:       185 pounds   Sonographer            Mattie Ellison RDCS   BSA:          1.91 m^2 Interpreting Physician Dwight Miguel   BMI:          30.79 kg/m^2  Procedure Type of Study   TTE procedure:ECHO 2D W/DOPPLER/COLOR/CONTRAST. Study Location: Echo Lab Technical Quality: Limited visualization due to poor acoustical window. Patient Status: Inpatient Contrast Medium: Definity. Amount - 8 ml HR: 107 bpm BP: 136/76 mmHg Indications:Elevated Troponin. Conclusions   Summary  Normal left ventricular size with preserved LV function and an estimated  ejection fraction of approximately 30-35%. Severe hypokinesis of the  anterior, anteroseptal, septal and apical walls. No evidence of left  ventricular mass or thrombus noted. Normal diastolic filling pattern for  age. Normal right ventricular size with preserved RV function. No significant valvular abnormalities. No evidence of significant pericardial effusion is noted. Aortic root and ascending aorta are within normal limits. The rhythm is sinus with PACs. ABNORMAL study. When compared to study dated 7/1/2019 the LVEF has  decreased (previously reported as 55-60%). Signature   ----------------------------------------------------------------  Electronically signed by Lakeshia Hansen(Interpreting physician)  on 08/31/2022 10:31 AM  ----------------------------------------------------------------   Findings   Mitral Valve  Mild sclerosis of the mitral valve with normal leaflet mobility. No  evidence of mitral valve stenosis or significant mitral regurgitation. Aortic Valve  Aortic valve appears to be tricuspid. Structurally normal aortic valve. No significant aortic regurgitation or stenosis is noted. Tricuspid Valve  Tricuspid valve is structurally normal.  Trivial tricuspid regurgitation with estimated RVSP of 32 mm Hg. Pulmonic Valve  The pulmonic valve was not well visualized. Left Atrium  Normal size left atrium.    Left Ventricle  Normal left ventricular size with preserved LV function and an estimated  ejection fraction of approximately 30-35%. Severe hypokinesis of the  anterior, anteroseptal, septal and apical walls. No evidence of left ventricular mass or thrombus noted. Normal diastolic filling pattern for age. Right Atrium  Normal right atrial dimension with no evidence of thrombus or mass noted. Right Ventricle  Normal right ventricular size with preserved RV function. Pericardial Effusion  No evidence of significant pericardial effusion is noted. Pleural Effusion  No evidence of pleural effusion. Miscellaneous  Aortic root and ascending aorta are within normal limits. The rhythm is sinus with PACs. 2D Measurements and Calculations(cm)   % Ejection Fraction: 35 %             AO Root Dimension: 2.1 cm  LA Volume: 68.9 ml  LA Volume Index: 36 ml/m^2            LA Area: 21.4 cm^2   LVEDV: 78.5 ml                        LVOT diameter: 1.9 cm  LVESV:67.4 ml                         RA Systolic pressure: 3mmHg  Cardic Output (CO): 6.03l/min         LVEDVI: 41 ml/m^2  Ascending Aorta: 2.3 cm               LVESVI: 35 ml/m^2  Doppler Measurements and Calculations   AV Peak Velocity:141 cm/s              MV Peak E-Wave: 83.9 cm/s  AV Mean Velocity:105 cm/s              MV Peak A-Wave: 147 cm/s  AV Peak Gradient: 7.95 mmHg            MV E/A Ratio: 0.57 %  AV Mean Gradient: 5 mmHg               MV Mean velocity: 75.8cm/s  AV Area (Continuity):1.9 cm^2          MV Peak Gradient: 2.82 mmHg  AV VTI:29.7 cm/s                       MV Mean gradient: 3 mmHg  TR Velocity:269 cm/s  TR Gradient:28.94 mmHg  Estimated RAP:3 mmHg  RVSP:32 mmHg   MV E' septal velocity: 5.77cm/s  MV E' lateral velocity:13.2 cm/s      XR CHEST PORTABLE    Result Date: 8/30/2022  EXAMINATION: Radiography of the Chest TECHNIQUE: 1 view of the chest. COMPARISON: 02/18/2020 FINDINGS: Lungs: Bilateral interstitial thickening. Pleura: No effusion. No pneumothorax. Mediastinum and Maryam: Unremarkable. Heart and Vessels: Normal sized heart. Bones: No acute osseous abnormality. along the lower cervical segments consistent with anterior cervical fusion. Liver: The punctate calcification present in the posterior segment right lobe liver. Gallbladder and bile ducts: There are surgical clips present within the right upper quadrant consistent cholecystectomy. Spleen: There are multiple punctate calcifications present in the spleen. Tubes, lines and devices: There is a right-sided chest port, adequate and position. 1.No CT findings suggestive of pulmonary thromboembolism within the proximal ureters of the pulmonary arteries. 2.Small bilateral pleural effusions with interstitial infiltrates in the upper lobes, left worse than right and both lower lobes. Underlying interstitial lung disease was identified on the prior study. 3.Calcified granulomatous disease in the left upper lobe, mediastinum, liver and spleen. 4.Subacute midsternal fracture, pathologic fracture not excluded. New from the prior study. NM MYOCARDIAL SPECT REST EXERCISE OR RX    Result Date: 8/31/2022  Lexiscan Nuclear Stress Test Report Procedure date: 8/31/2022 Indications: shortness of breath Procedure: Stress was performed with injection of 0.4 mg Lexiscan. Vital signs and EKG were monitored. Technetium-99 Myoview was injected in divided doses, approximately 7.7 mCi and 23 mCi respectively for rest and stress imaging. The patient was discharged in stable condition. Results: Patient had symptoms of nausea during infusion that resolved in recovery. Baseline EKG showed sinus tachycardia with nonspecific ST/T changes. During stress there were no significant EKG changes or rhythm changes. Baseline and peak blood pressures were 146/89, and 156/96 respectively. Baseline and peak heart rates were 140 and  145 respectively. Review of rest and stress images obtained utilizing a gated SPECT acquisition protocol along with review of the polar plot revealed: 1.  Ejection fraction 31% 2. Wall motion study global hypokinesis more pronounced in the distal anteroseptal and apical 3. Myocardial perfusion imaging demonstrated small defects distal anteroseptal region the sum stress score was 23 the sum difference score was 2 no appreciable ischemia correlate clinically Signed by Dr Gage Singh      Pertinent Labs:   CBC:   Recent Labs     09/01/22  0141 09/02/22  0922 09/03/22  0200   WBC 7.9 7.7 6.8   HGB 10.9* 10.4* 9.8*    281 268     BMP:    Recent Labs     09/01/22  0141 09/02/22 0922 09/03/22  0200    143 142   K 3.3* 3.6 3.5    108 106   CO2 19* 24 26   BUN 12 10 8   CREATININE 0.8 0.5 0.7   GLUCOSE 112* 108 104     INR: No results for input(s): INR in the last 72 hours. Physical Exam:  Vital Signs: /68   Pulse 82   Temp 97.7 °F (36.5 °C) (Temporal)   Resp 18   Ht 5' 4.5\" (1.638 m)   Wt 180 lb (81.6 kg)   SpO2 99%   BMI 30.42 kg/m²   Physical Exam  Vitals reviewed. Constitutional:       Appearance: She is ill-appearing (chronically). Eyes:      Pupils: Pupils are equal, round, and reactive to light. Cardiovascular:      Rate and Rhythm: Normal rate and regular rhythm. Pulses: Normal pulses. Heart sounds: Normal heart sounds. Pulmonary:      Effort: Pulmonary effort is normal.      Breath sounds: Normal breath sounds. Abdominal:      General: Bowel sounds are normal. There is no distension. Palpations: Abdomen is soft. Tenderness: There is no abdominal tenderness. There is no guarding. Musculoskeletal:      Right lower leg: No edema. Left lower leg: No edema (1+). Skin:     General: Skin is warm and dry. Capillary Refill: Capillary refill takes less than 2 seconds. Neurological:      Mental Status: She is alert and oriented to person, place, and time.        Discharge Medications:         Medication List        START taking these medications      Arformoterol Tartrate 15 MCG/2ML Nebu  Commonly known as: BROVANA  Take 2 mLs by nebulization in the morning and 2 mLs in the evening. aspirin 81 MG chewable tablet  Take 1 tablet by mouth daily  Start taking on: September 4, 2022     atorvastatin 80 MG tablet  Commonly known as: LIPITOR  Take 1 tablet by mouth nightly     budesonide 0.5 MG/2ML nebulizer suspension  Commonly known as: PULMICORT  Take 2 mLs by nebulization in the morning and 2 mLs in the evening. furosemide 20 MG tablet  Commonly known as: LASIX  Take 1 tablet by mouth in the morning and 1 tablet in the evening. ondansetron 4 MG disintegrating tablet  Commonly known as: ZOFRAN-ODT  Take 1 tablet by mouth every 8 hours as needed for Nausea or Vomiting            CHANGE how you take these medications      gabapentin 100 MG capsule  Commonly known as: NEURONTIN  Take 1 capsule by mouth in the morning and at bedtime for 3 days. What changed: how much to take     guaiFENesin 600 MG extended release tablet  Commonly known as: MUCINEX  Take 1 tablet by mouth 2 times daily for 5 days  What changed: how much to take     ipratropium-albuterol 0.5-2.5 (3) MG/3ML Soln nebulizer solution  Commonly known as: DUONEB  Inhale 3 mLs into the lungs every 4 hours (while awake)  What changed:   when to take this  reasons to take this     nystatin 170176 UNIT/GM powder  Commonly known as: MYCOSTATIN  Apply 3 times daily to stomach and groin  What changed: additional instructions     traMADol 50 MG tablet  Commonly known as: ULTRAM  Take 1 tablet by mouth 3 times daily as needed for Pain for up to 9 days. What changed: reasons to take this     venlafaxine 150 MG extended release capsule  Commonly known as: EFFEXOR XR  TAKE ONE CAPSULE BY MOUTH EVERY DAY  What changed: Another medication with the same name was removed. Continue taking this medication, and follow the directions you see here.             CONTINUE taking these medications      acetaminophen 325 MG tablet  Commonly known as: TYLENOL     amLODIPine 5 MG tablet  Commonly known as: NORVASC  TAKE ONE TABLET BY MOUTH EVERY DAY     budesonide-formoterol 160-4.5 MCG/ACT Aero  Commonly known as: Symbicort  Inhale 2 puffs into the lungs 2 times daily     cetirizine 10 MG tablet  Commonly known as: ZYRTEC  TAKE ONE TABLET BY MOUTH DAILY     cloNIDine 0.1 MG tablet  Commonly known as: CATAPRES     doxepin 75 MG capsule  Commonly known as: SINEQUAN  TAKE ONE TABLET BY MOUTH EVERY NIGHT     EPINEPHrine 0.3 MG/0.3ML Soaj injection  Commonly known as: EPIPEN     esomeprazole 40 MG delayed release capsule  Commonly known as: NEXIUM  TAKE ONE CAPSULE BY MOUTH EVERY MORNING BEFORE BREAKFAST     ferrous sulfate 325 (65 Fe) MG tablet  Commonly known as: FeroSul  TAKE ONE TABLET BY MOUTH DAILY WITH BREAKFAST     fluticasone 50 MCG/ACT nasal spray  Commonly known as: FLONASE  USE 2 SPRAYS IN EACH NOSTRIL ONCE  DAILY     GNP FISH OIL PO     levothyroxine 100 MCG tablet  Commonly known as: SYNTHROID  TAKE ONE TABLET BY MOUTH EVERY DAY     LORazepam 0.5 MG tablet  Commonly known as: ATIVAN  Take 1 tablet by mouth every 8 hours as needed for Anxiety for up to 3 days. metroNIDAZOLE  IVPB  Commonly known as: FLAGYL     olmesartan 40 MG tablet  Commonly known as: BENICAR  TAKE ONE TABLET BY MOUTH EVERY DAY     ondansetron 4 MG tablet  Commonly known as: ZOFRAN  Take 1 tablet by mouth every 6 hours as needed for Nausea or Vomiting     sucralfate 1 GM tablet  Commonly known as: Carafate  Take 1 tablet by mouth in the morning and 1 tablet at noon and 1 tablet in the evening and 1 tablet before bedtime.      vancomycin 125 MG capsule  Commonly known as: VANCOCIN     Vitamin D3 50 MCG (2000 UT) Caps  Take 1 capsule by mouth nightly            STOP taking these medications      carvedilol 25 MG tablet  Commonly known as: COREG     chlorthalidone 25 MG tablet  Commonly known as: HYGROTON     losartan 25 MG tablet  Commonly known as: COZAAR     polyethylene glycol 17 GM/SCOOP powder  Commonly known as: GLYCOLAX     potassium chloride 20 MEQ extended release tablet  Commonly known as: KLOR-CON M     SENNOSIDES-DOCUSATE SODIUM PO     simvastatin 20 MG tablet  Commonly known as: ZOCOR               Where to Get Your Medications        These medications were sent to [facility], [address] [facility] [address] [place] P [phone] - F [phone]  [address] [place] [address]      Phone: [phone]   Arformoterol Tartrate 15 MCG/2ML Nebu  aspirin 81 MG chewable tablet  atorvastatin 80 MG tablet  budesonide 0.5 MG/2ML nebulizer suspension  furosemide 20 MG tablet       You can get these medications from any pharmacy    Bring a paper prescription for each of these medications  gabapentin 100 MG capsule  LORazepam 0.5 MG tablet  traMADol 50 MG tablet       Information about where to get these medications is not yet available    Ask your nurse or doctor about these medications  ferrous sulfate 325 (65 Fe) MG tablet  guaiFENesin 600 MG extended release tablet  ondansetron 4 MG disintegrating tablet  Vitamin D3 50 MCG (2000 UT) Caps         Discharge Instructions: Follow up with Tobias New MD in 5-7 days. Take medications as directed. Resume activity as tolerated. Diet: ADULT DIET; Regular; Low Fat/Low Chol/High Fiber/NATALIO; No Added Salt (3-4 gm)     Disposition: Patient is Stableand will be discharged to [address] [place] [address]. Time spent on discharge 31 minutes spent in assessing patient, reviewing medications, discussion with nursing, confirming safe discharge plan and preparation of discharge summary.     Signed:  KEYONA Tovar CNP, 9/3/2022 2:09 PM

## 2022-09-03 NOTE — PROGRESS NOTES
Cardiology Daily Note Jenifer Santos MD      Patient:  Khurram Altamirano  [id number]    Patient Active Problem List    Diagnosis Date Noted    Elevated troponin 08/30/2022    History of Clostridium difficile colitis 08/30/2022    Bronchiectasis without complication ([facility] [address] 95/13/1407    Chronic kidney disease (CKD), stage IV (severe) ([facility] [address] 02/18/2020    Macrocytic anemia 07/12/2019    Shortness of breath     Palliative care patient 06/18/2019    Spondylolisthesis at L4-L5 level 05/21/2019    DDD (degenerative disc disease), lumbar 05/21/2019    Chronic low back pain without sciatica 05/21/2019    MVP (mitral valve prolapse) 07/21/2017    Gait abnormality 07/21/2017    Seasonal allergies 11/15/2016    Chronic neck and back pain 10/26/2015    Multiple allergies 05/21/2015    Hypothyroid 02/23/2015    Hypertension 10/14/2014    Hyperlipidemia 10/14/2014       Admit Date:  8/30/2022    Admission Problem List: Present on Admission:   Elevated troponin   Chronic kidney disease (CKD), stage IV (severe) ([facility])   Hypertension   Hyperlipidemia   Shortness of breath   History of Clostridium difficile colitis      Cardiac Specific Data:  Specialty Problems          Cardiology Problems    Hyperlipidemia        Hypertension        MVP (mitral valve prolapse)           Subjective:  Ms. Jeannie Paredes seen today underwent cardiac catheterization yesterday coronary arteries unremarkable impaired left ventricular function? [place] on good medical management blood pressure 131/70 heart rate 88. Could be discharged from a cardiac standpoint follow-up in the office.     Objective:   /70   Pulse 88   Temp 97.7 °F (36.5 °C) (Temporal)   Resp 16   Ht 5' 4.5\" (1.638 m)   Wt 180 lb (81.6 kg)   SpO2 94%   BMI 30.42 kg/m²       Intake/Output Summary (Last 24 hours) at 9/3/2022 1143  Last data filed at 9/3/2022 0911  Gross per 24 hour   Intake 1675.63 ml   Output 1800 ml   Net -124.37 ml       Prior to Admission medications    Medication Sig Start Date End Date Taking? Authorizing Provider   carvedilol (COREG) 25 MG tablet Take 25 mg by mouth 2 times daily   Yes Historical Provider, MD   chlorthalidone (HYGROTON) 25 MG tablet Take 25 mg by mouth daily   Yes Historical Provider, MD   vancomycin (VANCOCIN) 125 MG capsule Take 125 mg by mouth 4 times daily   Yes Historical Provider, MD   potassium chloride (KLOR-CON M) 20 MEQ extended release tablet Take 20 mEq by mouth daily   Yes Historical Provider, MD   losartan (COZAAR) 25 MG tablet Take 25 mg by mouth daily   Yes Historical Provider, MD   metroNIDAZOLE (FLAGYL) IVPB Infuse intravenously every 8 hours   Yes Historical Provider, MD   venlafaxine (EFFEXOR XR) 75 MG extended release capsule Pt to take with 150 mg for total daily dosage of 225 mg 8/5/22   Nunu Zamarripa MD   LORazepam (ATIVAN) 0.5 MG tablet Take 1 tablet by mouth every 8 hours as needed for Anxiety for up to 180 days. 8/5/22 2/1/23  Nunu Zamarripa MD   sucralfate (CARAFATE) 1 GM tablet Take 1 tablet by mouth in the morning and 1 tablet at noon and 1 tablet in the evening and 1 tablet before bedtime. 7/22/22   Nunu Zamarripa MD   gabapentin (NEURONTIN) 100 MG capsule Take 2 capsules by mouth in the morning and at bedtime for 180 days. Patient taking differently: 100 mg in the morning and at bedtime.  6/27/22 12/24/22  Nunu Zamarripa MD   budesonide-formoterol [facility]) 160-4.5 MCG/ACT AERO Inhale 2 puffs into the lungs 2 times daily 3/15/22   Nunu Zamarripa MD   doxepin [facility]) 75 MG capsule TAKE ONE TABLET BY MOUTH EVERY NIGHT 3/7/22   Nunu Zamarripa MD   FEROSUL 325 (65 Fe) MG tablet TAKE ONE TABLET BY MOUTH DAILY WITH BREAKFAST 3/7/22   Nunu Zamarripa MD   esomeprazole ([address]) 40 MG delayed release capsule TAKE ONE CAPSULE BY MOUTH EVERY MORNING BEFORE BREAKFAST 3/7/22   Nunu Zamarripa MD   fluticasone [facility]) 50 MCG/ACT nasal spray USE 2 SPRAYS IN [facility] NOSTRIL ONCE  DAILY 12/13/21   Nunu Zamarripa MD   acetaminophen (TYLENOL) 325 MG tablet Take 650 mg by mouth nightly    Historical Provider, MD   traMADol (ULTRAM) 50 MG tablet Take 1 tablet by mouth 3 times daily as needed.     Historical Provider, MD   amLODIPine (NORVASC) 5 MG tablet TAKE ONE TABLET BY MOUTH EVERY DAY 9/20/21   Cassi Lee MD   levothyroxine (SYNTHROID) 100 MCG tablet TAKE ONE TABLET BY MOUTH EVERY DAY 9/20/21   Cassi Lee MD   simvastatin (ZOCOR) 20 MG tablet TAKE ONE TABLET BY MOUTH EVERY NIGHT 9/20/21   Cassi Lee MD   olmesartan (BENICAR) 40 MG tablet TAKE ONE TABLET BY MOUTH EVERY DAY 9/20/21   Cassi Lee MD   cetirizine (ZYRTEC) 10 MG tablet TAKE ONE TABLET BY MOUTH DAILY 9/20/21   Cassi Lee MD   venlafaxine (EFFEXOR XR) 150 MG extended release capsule TAKE ONE CAPSULE BY MOUTH EVERY DAY 9/20/21   Cassi Lee MD   nystatin (MYCOSTATIN) 153687 UNIT/GM powder Apply 3 times daily to stomach and groin  Patient taking differently: Apply 3 times daily prn to stomach and groin 10/8/20   Cassi Lee MD   cloNIDine (CATAPRES) 0.1 MG tablet Take 0.1 mg by mouth 2 times daily as needed for High Blood Pressure (systolic > 606)    Historical Provider, MD   SENNOSIDES-DOCUSATE SODIUM PO Take 2 tablets by mouth nightly   Patient not taking: Reported on 8/30/2022    Historical Provider, MD   ondansetron (ZOFRAN) 4 MG tablet Take 1 tablet by mouth every 6 hours as needed for Nausea or Vomiting 9/9/19   Cassi Lee MD   guaiFENesin (MUCINEX) 600 MG extended release tablet Take 2 tablets by mouth 2 times daily  Patient taking differently: Take 600 mg by mouth in the morning. 8/22/19   Cassi Lee MD   ipratropium-albuterol (DUONEB) 0.5-2.5 (3) MG/3ML SOLN nebulizer solution Inhale 3 mLs into the lungs every 4 hours (while awake)  Patient taking differently: Inhale 3 mLs into the lungs every 6 hours as needed 7/5/19 7/20/22  Wil Christie MD   polyethylene glycol (GLYCOLAX) powder Take 17 g by mouth daily as needed   Patient not taking: Reported on 8/30/2022    Historical PROFILE: No results for input(s): AST, ALT, LIPASE, BILIDIR, BILITOT, ALKPHOS in the last 72 hours. Invalid input(s): AMYLASE,  ALB  PT/INR: No results for input(s): PROTIME, INR in the last 72 hours. APTT: No results for input(s): APTT in the last 72 hours. BNP:  No results for input(s): BNP in the last 72 hours. CK, CKMB, Troponin: @LABRCNT (CKTOTAL:3, CKMB:3, TROPONINI:3)@    IMAGING:  ECHO Complete 2D W Doppler W Color    Result Date: 8/31/2022  Transthoracic Echocardiography Report (TTE)  Demographics   Patient Name  Lyssa Cuenca Date of Study          08/31/2022   MRN           [id number]       Gender                 Female   Date of Birth 1945   Room Number            [id number]   Age           76 year(s)   Height:       65 inches    Referring Physician    Cher Bonner   Weight:       185 pounds   Sonographer            Mattie Ellison [facility]   BSA:          1.91 m^2     Interpreting Physician Que Acosta   BMI:          30.79 kg/m^2  Procedure Type of Study   TTE procedure:ECHO 2D W/DOPPLER/COLOR/CONTRAST. Study Location: Echo Lab Technical Quality: Limited visualization due to poor acoustical window. Patient Status: Inpatient Contrast Medium: Definity. Amount - 8 ml HR: 107 bpm BP: 136/76 mmHg Indications:Elevated Troponin. Conclusions   Summary  Normal left ventricular size with preserved LV function and an estimated  ejection fraction of approximately 30-35%. Severe hypokinesis of the  anterior, anteroseptal, septal and apical walls. No evidence of left  ventricular mass or thrombus noted. Normal diastolic filling pattern for  age. Normal right ventricular size with preserved RV function. No significant valvular abnormalities. No evidence of significant pericardial effusion is noted. Aortic root and ascending aorta are within normal limits. The rhythm is sinus with PACs. ABNORMAL study. When compared to study dated 7/1/2019 the LVEF has  decreased (previously reported as 55-60%). Signature   ----------------------------------------------------------------  Electronically signed by Warren OlveraInterpreting physician)  on 08/31/2022 10:31 AM  ----------------------------------------------------------------   Findings   Mitral Valve  Mild sclerosis of the mitral valve with normal leaflet mobility. No  evidence of mitral valve stenosis or significant mitral regurgitation. Aortic Valve  Aortic valve appears to be tricuspid. Structurally normal aortic valve. No significant aortic regurgitation or stenosis is noted. Tricuspid Valve  Tricuspid valve is structurally normal.  Trivial tricuspid regurgitation with estimated RVSP of 32 mm Hg. Pulmonic Valve  The pulmonic valve was not well visualized. Left Atrium  Normal size left atrium. Left Ventricle  Normal left ventricular size with preserved LV function and an estimated  ejection fraction of approximately 30-35%. Severe hypokinesis of the  anterior, anteroseptal, septal and apical walls. No evidence of left ventricular mass or thrombus noted. Normal diastolic filling pattern for age. Right Atrium  Normal right atrial dimension with no evidence of thrombus or mass noted. Right Ventricle  Normal right ventricular size with preserved RV function. Pericardial Effusion  No evidence of significant pericardial effusion is noted. Pleural Effusion  No evidence of pleural effusion. Miscellaneous  Aortic root and ascending aorta are within normal limits. The rhythm is sinus with PACs.   2D Measurements and Calculations(cm)   % Ejection Fraction: 35 %             AO Root Dimension: 2.1 cm  LA Volume: 68.9 ml  LA Volume Index: 36 ml/m^2            LA Area: 21.4 cm^2   LVEDV: 78.5 ml                        LVOT diameter: 1.9 cm  LVESV:67.4 ml                         RA Systolic pressure: 3mmHg  Cardic Output (CO): 6.03l/min         LVEDVI: 41 ml/m^2  Ascending Aorta: 2.3 cm               LVESVI: 35 ml/m^2  Doppler Measurements and Calculations   AV Peak Velocity:141 cm/s              MV Peak E-Wave: 83.9 cm/s  AV Mean Velocity:105 cm/s              MV Peak A-Wave: 147 cm/s  AV Peak Gradient: 7.95 mmHg            MV E/A Ratio: 0.57 %  AV Mean Gradient: 5 mmHg               MV Mean velocity: 75.8cm/s  AV Area (Continuity):1.9 cm^2          MV Peak Gradient: 2.82 mmHg  AV VTI:29.7 cm/s                       MV Mean gradient: 3 mmHg  TR Velocity:269 cm/s  TR Gradient:28.94 mmHg  Estimated RAP:3 mmHg  RVSP:32 mmHg   MV E' septal velocity: 5.77cm/s  MV E' lateral velocity:13.2 cm/s      XR CHEST PORTABLE    Result Date: 8/30/2022  EXAMINATION: Radiography of the Chest TECHNIQUE: 1 view of the chest. COMPARISON: 02/18/2020 FINDINGS: Lungs: Bilateral interstitial thickening. Pleura: No effusion. No pneumothorax. Mediastinum and Maryam: Unremarkable. Heart and Vessels: Normal sized heart. Bones: No acute osseous abnormality. Lines/Tubes/Hardware: Right subclavian mediport. 1.Bilateral interstitial thickening, suggestive of pulmonary edema. Superimposed infection is not excluded. CTA PULMONARY W CONTRAST    Result Date: 8/31/2022  EXAM: CT ANGIOGRAPHY CHEST WITH INTRAVENOUS CONTRAST CLINICAL INDICATION: elevated ddimer TECHNIQUE: Helically acquired angiography images were obtained of the chest with intravenous contrast.This CT exam was performed using one or more of the following dose reduction techniques: automated exposure control, adjustment of the mA and/or kV according to patient size, and/or use of iterative reconstruction technique. This report was created using [employer] report generation technology. MIP reconstructed images were created and reviewed. COMPARISON: 7/2/2019 FINDINGS: Pulmonary arteries: Unremarkable. Normal in caliber. No evidence of pulmonary embolism. Aorta: Unremarkable. Normal in caliber. No evidence of dissection. Great vessels of aortic arch: Unremarkable. Normal in caliber. No evidence of dissection.  Lungs and pleural spaces: There are small bilateral pleural effusions. Airspace opacity identified probably interstitial involving the anterior segment of the left upper lobe. There is a pleural-based 7 mm calcified granuloma of the left upper lobe. There are interstitial opacities present within both lung bases. No mass. Heart: Unremarkable. Heart size is normal.No pericardial effusion. No signs of right heart strain, ratio of right ventricle to left ventricle measures less than 1. Mediastinum: There are hilar as well as subcarinal lymph node calcifications. Esophagus is unremarkable. No hiatal hernia. Thyroid: Unremarkable. No thyroid lesions. Bones/joints: There is a fracture identified involving the mid sternum with callus formation. There is a central lytic area, pathologic fracture not excluded. There are degenerative changes present throughout the thoracic spine with anterior fusion. There are postsurgical changes along the lower cervical segments consistent with anterior cervical fusion. Liver: The punctate calcification present in the posterior segment right lobe liver. Gallbladder and bile ducts: There are surgical clips present within the right upper quadrant consistent cholecystectomy. Spleen: There are multiple punctate calcifications present in the spleen. Tubes, lines and devices: There is a right-sided chest port, adequate and position. 1.No CT findings suggestive of pulmonary thromboembolism within the proximal ureters of the pulmonary arteries. 2.Small bilateral pleural effusions with interstitial infiltrates in the upper lobes, left worse than right and both lower lobes. Underlying interstitial lung disease was identified on the prior study. 3.Calcified granulomatous disease in the left upper lobe, mediastinum, liver and spleen. 4.Subacute midsternal fracture, pathologic fracture not excluded. New from the prior study.     NM MYOCARDIAL SPECT REST EXERCISE OR RX    Result Date: 8/31/2022  Loren Shah Nuclear Stress Test Report Procedure date: 8/31/2022 Indications: shortness of breath Procedure: Stress was performed with injection of 0.4 mg Lexiscan. Vital signs and EKG were monitored. Technetium-99 Myoview was injected in divided doses, approximately 7.7 mCi and 23 mCi respectively for rest and stress imaging. The patient was discharged in stable condition. Results: Patient had symptoms of nausea during infusion that resolved in recovery. Baseline EKG showed sinus tachycardia with nonspecific ST/T changes. During stress there were no significant EKG changes or rhythm changes. Baseline and peak blood pressures were 146/89, and 156/96 respectively. Baseline and peak heart rates were 140 and  145 respectively. Review of rest and stress images obtained utilizing a gated SPECT acquisition protocol along with review of the polar plot revealed: 1. Ejection fraction 31% 2. Wall motion study global hypokinesis more pronounced in the distal anteroseptal and apical 3.  Myocardial perfusion imaging demonstrated small defects distal anteroseptal region the sum stress score was 23 the sum difference score was 2 no appreciable ischemia correlate clinically Signed by Dr Ame Burgos:  Complaints of shortness of breath admitted 8/30/2020 to transfer from [facility] reported elevated troponin at that facility  Chronic kidney disease  Hypertension  Hyperlipidemia  Hypothyroidism  Depression  Unconfirmed history of cardiac arrest  History of recent hypotension  Complaints of diarrhea  History of C. difficile enterocolitis  PNA  Hypoxemia supplemental oxygen 3 L  DNR status recent fractured sternum  Nonverbal status  History of mitral valve prolapse  Gait abnormality  Chronic low back pain  Macrocytic anemia  Bronchiectasis  Tremor  Spondylolisthesis at L4-L5 level  Recent COVID June 22 nonambulatory since then  History of seizures  History of pancreatitis  Protein calorie malnutrition  Lexiscan 8/31/2022 abnormal ejection fraction 31% anteroseptal and apical defects no ischemia correlate clinically  Cardiac catheterization 9/2/2022 impaired LV systolic function angiographically unremarkable coronary arteries consistent with cardiomyopathy?   [place]    Plan:  Continue current medical management cardiovascular status stable discharge as per primary service follow-up in the office    Dennis Valdivia MD, MD 9/3/2022 11:43 AM

## 2022-09-03 NOTE — PROGRESS NOTES
Called to give report they would not take report till they had d/c summary.  I informed them it had been faxed a while before hand she said she would find it and call back.josee irwin

## 2022-09-03 NOTE — PROGRESS NOTES
Magnesium 1.3 first bag of mag. 2000 mg /50 ml started. Then patient will get another 2000 mg from iv replacement protocol.

## 2022-09-12 NOTE — PROGRESS NOTES
Physician Progress Note      PATIENT:               Katherine Mendez  CSN #:                  [id number]  :                       1945  ADMIT DATE:       2022 12:40 PM  [address] [place] DATE:        9/3/2022 5:23 PM  RESPONDING  PROVIDER #:        Milind Pulido          QUERY TEXT:    Pt admitted with shortness of breath. Noted documentation of elevated   troponin in H/P and subsequent progress notes though described as \"flat\". If   possible, please document in progress notes and discharge summary:    The medical record reflects the following:  Risk Factors: elevated troponin  Clinical Indicators: shortness of breath, () troponin 0.16, repeat 0.16   and 0.14, () troponin 0.14; Card pnotes  \"Lexiscan. ..abnormal ejection   fraction 31% anteroseptal and apical defects no ischemia. Fabiola Crape Fabiola Crape \"  Treatment: serial troponins, Cardiology consult, [facility]    Thank you,  Elvia Gil, [facility]  [phone]  Options provided:  -- Elevated troponin not clinically significant  -- Elevated troponin is clinically significant for, Please specify condition. -- Other - I will add my own diagnosis  -- Disagree - Not applicable / Not valid  -- Disagree - Clinically unable to determine / Unknown  -- Refer to Clinical Documentation Reviewer    PROVIDER RESPONSE TEXT:    Elevated troponin is not clinically significant.     Query created by: Layla Hamilton on 2022 10:48 AM      Electronically signed by:  Milind Pulido 2022 11:17 AM

## 2022-09-29 PROBLEM — R77.8 ELEVATED TROPONIN: Status: RESOLVED | Noted: 2022-01-01 | Resolved: 2022-01-01

## 2022-09-29 PROBLEM — R79.89 ELEVATED TROPONIN: Status: RESOLVED | Noted: 2022-01-01 | Resolved: 2022-01-01

## 2022-10-12 ENCOUNTER — TELEPHONE (OUTPATIENT)
Dept: UROLOGY | Facility: CLINIC | Age: 77
End: 2022-10-12

## 2022-10-12 NOTE — TELEPHONE ENCOUNTER
"    “Please be informed that patient has passed. Patient has been marked  in the system. The date of death is: 10/11/22\".    Caller: Shantell Maynard    Relationship: Emergency Contact    Best call back number: [phone]  "

## 2022-11-16 NOTE — PROGRESS NOTES
Patient has appointment with Dr. Riddle for recurrent UTI's.   Lexy Rodriguez, SEAN 
[employer]

## (undated) DEVICE — UNDERGLOVE SURG SZ 8 FNGR THK0.21MIL GRN LTX BEAD CUF

## (undated) DEVICE — TOWEL,OR,DSP,ST,BLUE,STD,4/PK,20PK/CS: Brand: MEDLINE

## (undated) DEVICE — ST PIN FIX TEMP UNIVERS REVERS W/OSTEO/GUIDE/PIN 2.4MM STRL

## (undated) DEVICE — GARMENT,MEDLINE,DVT,INT,CALF,XL,GEN2: Brand: MEDLINE

## (undated) DEVICE — DRSNG SURESITE WNDW 4X4.5

## (undated) DEVICE — MAT LT BIFUR DISP LUMITEX CL LAT
Type: IMPLANTABLE DEVICE | Site: SPINE LUMBAR | Status: NON-FUNCTIONAL
Removed: 2019-05-21

## (undated) DEVICE — SECTO® DISSECTOR, ONE-PIECE GAUZE, 5 MM, 380 MM, SINGLE-USE, (10/BX): Brand: SYMMETRY SURGICAL

## (undated) DEVICE — CONN FLX BREATHE CIRCT

## (undated) DEVICE — SINGLE USE BIOPSY VALVE MAJ-210: Brand: SINGLE USE BIOPSY VALVE (STERILE)

## (undated) DEVICE — SUT ETHIB 5 V37 30IN MB66G

## (undated) DEVICE — 1010 S-DRAPE TOWEL DRAPE 10/BX: Brand: STERI-DRAPE™

## (undated) DEVICE — POSTN HD UNIV

## (undated) DEVICE — SPNG GZ WOVN 4X4IN 12PLY 10/BX STRL

## (undated) DEVICE — NEEDLE HYPO 18GA L1.5IN PNK POLYPR HUB S STL REG BVL STR

## (undated) DEVICE — 3M™ STERI-DRAPE™ U-DRAPE 1015: Brand: STERI-DRAPE™

## (undated) DEVICE — PROBE PEDCL L165MM CANN W/ MOD JAMSH NDL NO2 MOD MOD

## (undated) DEVICE — HANDPIECE SET WITH HIGH FLOW TIP AND SUCTION TUBE: Brand: INTERPULSE

## (undated) DEVICE — SYSTEM SKIN CLSR 22CM DERMBND PRINEO

## (undated) DEVICE — GOWN,SIRUS,NON REINFRCD,LARGE,SET IN SL: Brand: MEDLINE

## (undated) DEVICE — BLD SAW AGGR 1.27X19X90MM STRL

## (undated) DEVICE — 3M™ IOBAN™ 2 ANTIMICROBIAL INCISE DRAPE 6651EZ: Brand: IOBAN™ 2

## (undated) DEVICE — BNDG GZ SOF-FORM CONFRM 2X75IN LF STRL

## (undated) DEVICE — PK TURNOVER RM ADV

## (undated) DEVICE — SOLUTION IV 100ML 0.9% SOD CHL PLAS CONT USP VIAFLX 1 PER

## (undated) DEVICE — SINGLE USE SUCTION VALVE MAJ-209: Brand: SINGLE USE SUCTION VALVE (STERILE)

## (undated) DEVICE — NEURO CDS

## (undated) DEVICE — SKIN AFFIX SURG ADHESIVE 72/CS 0.55ML: Brand: MEDLINE

## (undated) DEVICE — DRSNG BRDR MEPILEX P/OP SIL 4X8IN

## (undated) DEVICE — ANTIBACTERIAL UNDYED BRAIDED (POLYGLACTIN 910), SYNTHETIC ABSORBABLE SUTURE: Brand: COATED VICRYL

## (undated) DEVICE — SHOULDER STABILIZATION KIT,                                    DISPOSABLE 12 PER BOX

## (undated) DEVICE — GLV SURG BIOGEL M LTX PF 7 1/2

## (undated) DEVICE — HANDLE PRB CANN DETACH FOR GRP MOD MOD PEDIGUARD

## (undated) DEVICE — DRAPE,SHOULDER,BEACH CHAIR,STERILE: Brand: MEDLINE

## (undated) DEVICE — INTENDED FOR TISSUE SEPARATION, AND OTHER PROCEDURES THAT REQUIRE A SHARP SURGICAL BLADE TO PUNCTURE OR CUT.: Brand: BARD-PARKER ® STAINLESS STEEL BLADES

## (undated) DEVICE — ADHS LIQ MASTISOL 2/3ML

## (undated) DEVICE — PAD GRND REM POLYHESIVE A/ DISP

## (undated) DEVICE — SWITCH DRAPE TENET 7633

## (undated) DEVICE — IMPLANTABLE DEVICE
Type: IMPLANTABLE DEVICE | Site: SPINE LUMBAR | Status: NON-FUNCTIONAL
Removed: 2019-05-21

## (undated) DEVICE — SOLUTION IV 250ML 0.9% SOD CHL PH 5 INJ USP VIAFLX PLAS

## (undated) DEVICE — PROBE SURG L275MM MPLR CL LAT

## (undated) DEVICE — FORCEPS BX L100CM DIA1.8MM WRK CHN 2MM PULM S STL RAD JAW 4

## (undated) DEVICE — PAD,PREPPING,CUFFED,24X48,7",NONSTERILE: Brand: MEDLINE

## (undated) DEVICE — T-MAX DISPOSABLE FACE MASK 8 PER BOX

## (undated) DEVICE — APPL CHLORAPREP W/TINT 26ML ORNG

## (undated) DEVICE — TROCAR TIP NITINOL GUIDEWIRE 18"
Type: IMPLANTABLE DEVICE | Site: SPINE LUMBAR | Status: NON-FUNCTIONAL
Brand: INVICTUS
Removed: 2019-05-21

## (undated) DEVICE — GLOVE SURG SZ 75 L12IN FNGR THK94MIL TRNSLUC YEL LTX

## (undated) DEVICE — YANKAUER SUCTION INSTRUMENT WITHOUT CONTROL VENT, OPEN TIP, CLEAR: Brand: YANKAUER

## (undated) DEVICE — BIPOLAR SEALER 23-112-1 AQM 6.0: Brand: AQUAMANTYS™

## (undated) DEVICE — SUTURE VCRL SZ 2-0 L18IN ABSRB UD CT-1 L36MM 1/2 CIR J839D

## (undated) DEVICE — BONE MARROW KIT CONC 60 CC

## (undated) DEVICE — DISC SPNL SHIM
Type: IMPLANTABLE DEVICE | Site: SPINE LUMBAR | Status: NON-FUNCTIONAL
Removed: 2019-05-21

## (undated) DEVICE — DRAPE,UTILITY,XL,4/PK,STERILE: Brand: MEDLINE

## (undated) DEVICE — SOLUTION IV IRRIG POUR BRL 0.9% SODIUM CHL 2F7124

## (undated) DEVICE — GLOVE SURG SZ 75 L12IN FNGR THK79MIL GRN LTX FREE

## (undated) DEVICE — PK SHLDR 30

## (undated) DEVICE — C-ARMOR C-ARM EQUIPMENT COVERS CLEAR STERILE UNIVERSAL FIT 12 PER CASE: Brand: C-ARMOR

## (undated) DEVICE — CONTAINER,SPECIMEN,OR STERILE,4OZ: Brand: MEDLINE

## (undated) DEVICE — FORCEPS BPLR IRR INSUL BAYNT SMOOTH TIP STRL DISP L26.7CM SZ

## (undated) DEVICE — NDL HYPO PRECISIONGLIDE REG 25G 1 1/2

## (undated) DEVICE — PAD MINOR UNIVERSAL: Brand: MEDLINE INDUSTRIES, INC.

## (undated) DEVICE — 3M™ STERI-STRIP™ REINFORCED ADHESIVE SKIN CLOSURES, R1546, 1/4 IN X 4 IN (6 MM X 100 MM), 10 STRIPS/ENVELOPE: Brand: 3M™ STERI-STRIP™

## (undated) DEVICE — GLV SURG TRIUMPH GREEN W/ALOE PF LTX 8 STRL

## (undated) DEVICE — GLV SURG BIOGEL LTX PF 8